# Patient Record
Sex: FEMALE | Race: WHITE | NOT HISPANIC OR LATINO | Employment: OTHER | URBAN - METROPOLITAN AREA
[De-identification: names, ages, dates, MRNs, and addresses within clinical notes are randomized per-mention and may not be internally consistent; named-entity substitution may affect disease eponyms.]

---

## 2017-01-23 ENCOUNTER — ALLSCRIPTS OFFICE VISIT (OUTPATIENT)
Dept: OTHER | Facility: OTHER | Age: 77
End: 2017-01-23

## 2017-02-15 ENCOUNTER — LAB REQUISITION (OUTPATIENT)
Dept: LAB | Facility: HOSPITAL | Age: 77
End: 2017-02-15
Payer: MEDICARE

## 2017-02-15 ENCOUNTER — ALLSCRIPTS OFFICE VISIT (OUTPATIENT)
Dept: OTHER | Facility: OTHER | Age: 77
End: 2017-02-15

## 2017-02-15 DIAGNOSIS — R39.9 UNSPECIFIED SYMPTOMS AND SIGNS INVOLVING THE GENITOURINARY SYSTEM: ICD-10-CM

## 2017-02-15 LAB
BILIRUB UR QL STRIP: NORMAL
CLARITY UR: NORMAL
COLOR UR: YELLOW
GLUCOSE (HISTORICAL): NORMAL
HGB UR QL STRIP.AUTO: NORMAL
KETONES UR STRIP-MCNC: NORMAL MG/DL
LEUKOCYTE ESTERASE UR QL STRIP: NORMAL
NITRITE UR QL STRIP: NORMAL
PH UR STRIP.AUTO: 5 [PH]
PROT UR STRIP-MCNC: NORMAL MG/DL
SP GR UR STRIP.AUTO: 1.01
UROBILINOGEN UR QL STRIP.AUTO: 0.2

## 2017-02-15 PROCEDURE — 81003 URINALYSIS AUTO W/O SCOPE: CPT | Performed by: PHYSICIAN ASSISTANT

## 2017-02-16 LAB
BILIRUB UR QL STRIP: NEGATIVE
CLARITY UR: CLEAR
COLOR UR: YELLOW
GLUCOSE UR STRIP-MCNC: NEGATIVE MG/DL
HGB UR QL STRIP.AUTO: NEGATIVE
KETONES UR STRIP-MCNC: NEGATIVE MG/DL
LEUKOCYTE ESTERASE UR QL STRIP: NEGATIVE
NITRITE UR QL STRIP: NEGATIVE
PH UR STRIP.AUTO: 6 [PH] (ref 4.5–8)
PROT UR STRIP-MCNC: NEGATIVE MG/DL
SP GR UR STRIP.AUTO: 1.02 (ref 1–1.03)
UROBILINOGEN UR QL STRIP.AUTO: 0.2 E.U./DL

## 2017-02-20 ENCOUNTER — ALLSCRIPTS OFFICE VISIT (OUTPATIENT)
Dept: OTHER | Facility: OTHER | Age: 77
End: 2017-02-20

## 2017-02-21 ENCOUNTER — OFFICE VISIT (OUTPATIENT)
Dept: URGENT CARE | Facility: CLINIC | Age: 77
End: 2017-02-21
Payer: MEDICARE

## 2017-02-21 PROCEDURE — 99213 OFFICE O/P EST LOW 20 MIN: CPT

## 2017-02-21 PROCEDURE — G0463 HOSPITAL OUTPT CLINIC VISIT: HCPCS

## 2017-02-22 ENCOUNTER — APPOINTMENT (OUTPATIENT)
Dept: LAB | Facility: HOSPITAL | Age: 77
End: 2017-02-22
Payer: MEDICARE

## 2017-02-22 ENCOUNTER — TRANSCRIBE ORDERS (OUTPATIENT)
Dept: ADMINISTRATIVE | Facility: HOSPITAL | Age: 77
End: 2017-02-22

## 2017-02-22 DIAGNOSIS — R19.7 DIARRHEA, UNSPECIFIED TYPE: Primary | ICD-10-CM

## 2017-02-22 DIAGNOSIS — R19.7 DIARRHEA, UNSPECIFIED TYPE: ICD-10-CM

## 2017-02-22 PROCEDURE — 87899 AGENT NOS ASSAY W/OPTIC: CPT

## 2017-02-22 PROCEDURE — 87046 STOOL CULTR AEROBIC BACT EA: CPT

## 2017-02-22 PROCEDURE — 87015 SPECIMEN INFECT AGNT CONCNTJ: CPT

## 2017-02-22 PROCEDURE — 87045 FECES CULTURE AEROBIC BACT: CPT

## 2017-02-24 ENCOUNTER — ALLSCRIPTS OFFICE VISIT (OUTPATIENT)
Dept: OTHER | Facility: OTHER | Age: 77
End: 2017-02-24

## 2017-02-24 LAB
BACTERIA STL CULT: NORMAL
BACTERIA STL CULT: NORMAL

## 2017-02-28 ENCOUNTER — GENERIC CONVERSION - ENCOUNTER (OUTPATIENT)
Dept: OTHER | Facility: OTHER | Age: 77
End: 2017-02-28

## 2017-03-07 ENCOUNTER — TRANSCRIBE ORDERS (OUTPATIENT)
Dept: ADMINISTRATIVE | Facility: HOSPITAL | Age: 77
End: 2017-03-07

## 2017-03-07 ENCOUNTER — APPOINTMENT (OUTPATIENT)
Dept: LAB | Facility: HOSPITAL | Age: 77
End: 2017-03-07
Attending: INTERNAL MEDICINE
Payer: MEDICARE

## 2017-03-07 ENCOUNTER — GENERIC CONVERSION - ENCOUNTER (OUTPATIENT)
Dept: OTHER | Facility: OTHER | Age: 77
End: 2017-03-07

## 2017-03-07 DIAGNOSIS — R19.7 DIARRHEA, UNSPECIFIED TYPE: ICD-10-CM

## 2017-03-07 DIAGNOSIS — R10.13 EPIGASTRIC PAIN: Primary | ICD-10-CM

## 2017-03-07 DIAGNOSIS — R10.32 LLQ ABDOMINAL PAIN: ICD-10-CM

## 2017-03-07 DIAGNOSIS — R19.7 DIARRHEA, UNSPECIFIED TYPE: Primary | ICD-10-CM

## 2017-03-07 LAB
ALBUMIN SERPL BCP-MCNC: 3.6 G/DL (ref 3.5–5)
ALP SERPL-CCNC: 68 U/L (ref 46–116)
ALT SERPL W P-5'-P-CCNC: 28 U/L (ref 12–78)
ANION GAP SERPL CALCULATED.3IONS-SCNC: 9 MMOL/L (ref 4–13)
AST SERPL W P-5'-P-CCNC: 20 U/L (ref 5–45)
BASOPHILS # BLD AUTO: 0.02 THOUSANDS/ΜL (ref 0–0.1)
BASOPHILS NFR BLD AUTO: 0 % (ref 0–1)
BILIRUB SERPL-MCNC: 0.6 MG/DL (ref 0.2–1)
BUN SERPL-MCNC: 9 MG/DL (ref 5–25)
CALCIUM SERPL-MCNC: 8.8 MG/DL (ref 8.3–10.1)
CHLORIDE SERPL-SCNC: 105 MMOL/L (ref 100–108)
CO2 SERPL-SCNC: 28 MMOL/L (ref 21–32)
CREAT SERPL-MCNC: 0.8 MG/DL (ref 0.6–1.3)
EOSINOPHIL # BLD AUTO: 0.17 THOUSAND/ΜL (ref 0–0.61)
EOSINOPHIL NFR BLD AUTO: 2 % (ref 0–6)
ERYTHROCYTE [DISTWIDTH] IN BLOOD BY AUTOMATED COUNT: 13 % (ref 11.6–15.1)
GFR SERPL CREATININE-BSD FRML MDRD: >60 ML/MIN/1.73SQ M
GLUCOSE SERPL-MCNC: 251 MG/DL (ref 65–140)
HCT VFR BLD AUTO: 42.5 % (ref 34.8–46.1)
HGB BLD-MCNC: 14.1 G/DL (ref 11.5–15.4)
LYMPHOCYTES # BLD AUTO: 2.41 THOUSANDS/ΜL (ref 0.6–4.47)
LYMPHOCYTES NFR BLD AUTO: 34 % (ref 14–44)
MCH RBC QN AUTO: 29.4 PG (ref 26.8–34.3)
MCHC RBC AUTO-ENTMCNC: 33.2 G/DL (ref 31.4–37.4)
MCV RBC AUTO: 89 FL (ref 82–98)
MONOCYTES # BLD AUTO: 0.57 THOUSAND/ΜL (ref 0.17–1.22)
MONOCYTES NFR BLD AUTO: 8 % (ref 4–12)
NEUTROPHILS # BLD AUTO: 3.91 THOUSANDS/ΜL (ref 1.85–7.62)
NEUTS SEG NFR BLD AUTO: 56 % (ref 43–75)
PLATELET # BLD AUTO: 192 THOUSANDS/UL (ref 149–390)
PMV BLD AUTO: 10.3 FL (ref 8.9–12.7)
POTASSIUM SERPL-SCNC: 4.3 MMOL/L (ref 3.5–5.3)
PROT SERPL-MCNC: 7 G/DL (ref 6.4–8.2)
RBC # BLD AUTO: 4.8 MILLION/UL (ref 3.81–5.12)
SODIUM SERPL-SCNC: 142 MMOL/L (ref 136–145)
TSH SERPL DL<=0.05 MIU/L-ACNC: 1.39 UIU/ML (ref 0.36–3.74)
WBC # BLD AUTO: 7.08 THOUSAND/UL (ref 4.31–10.16)

## 2017-03-07 PROCEDURE — 83516 IMMUNOASSAY NONANTIBODY: CPT

## 2017-03-07 PROCEDURE — 80053 COMPREHEN METABOLIC PANEL: CPT

## 2017-03-07 PROCEDURE — 84443 ASSAY THYROID STIM HORMONE: CPT

## 2017-03-07 PROCEDURE — 82784 ASSAY IGA/IGD/IGG/IGM EACH: CPT

## 2017-03-07 PROCEDURE — 86255 FLUORESCENT ANTIBODY SCREEN: CPT

## 2017-03-07 PROCEDURE — 36415 COLL VENOUS BLD VENIPUNCTURE: CPT

## 2017-03-07 PROCEDURE — 85025 COMPLETE CBC W/AUTO DIFF WBC: CPT

## 2017-03-08 ENCOUNTER — APPOINTMENT (OUTPATIENT)
Dept: LAB | Facility: HOSPITAL | Age: 77
End: 2017-03-08
Attending: INTERNAL MEDICINE
Payer: MEDICARE

## 2017-03-08 ENCOUNTER — TRANSCRIBE ORDERS (OUTPATIENT)
Dept: ADMINISTRATIVE | Facility: HOSPITAL | Age: 77
End: 2017-03-08

## 2017-03-08 DIAGNOSIS — R10.32 LLQ ABDOMINAL PAIN: ICD-10-CM

## 2017-03-08 DIAGNOSIS — R19.7 DIARRHEA, UNSPECIFIED TYPE: Primary | ICD-10-CM

## 2017-03-08 DIAGNOSIS — R19.7 DIARRHEA, UNSPECIFIED TYPE: ICD-10-CM

## 2017-03-08 LAB
C DIFF TOX GENS STL QL NAA+PROBE: NORMAL
ENDOMYSIUM IGA SER QL: NEGATIVE
GLIADIN PEPTIDE IGA SER-ACNC: 2 UNITS (ref 0–19)
GLIADIN PEPTIDE IGG SER-ACNC: 2 UNITS (ref 0–19)
IGA SERPL-MCNC: 115 MG/DL (ref 64–422)
TTG IGA SER-ACNC: <2 U/ML (ref 0–3)
TTG IGG SER-ACNC: <2 U/ML (ref 0–5)

## 2017-03-08 PROCEDURE — 89055 LEUKOCYTE ASSESSMENT FECAL: CPT

## 2017-03-08 PROCEDURE — 87046 STOOL CULTR AEROBIC BACT EA: CPT

## 2017-03-08 PROCEDURE — 82705 FATS/LIPIDS FECES QUAL: CPT

## 2017-03-08 PROCEDURE — 87329 GIARDIA AG IA: CPT

## 2017-03-08 PROCEDURE — 87899 AGENT NOS ASSAY W/OPTIC: CPT

## 2017-03-08 PROCEDURE — 87015 SPECIMEN INFECT AGNT CONCNTJ: CPT

## 2017-03-08 PROCEDURE — 87045 FECES CULTURE AEROBIC BACT: CPT

## 2017-03-08 PROCEDURE — 87493 C DIFF AMPLIFIED PROBE: CPT

## 2017-03-10 LAB
BACTERIA STL CULT: NORMAL
BACTERIA STL CULT: NORMAL
FAT STL QL: NORMAL
G LAMBLIA AG STL QL IA: NEGATIVE
NEUTRAL FAT STL QL: NORMAL

## 2017-03-11 LAB — WBC SPEC QL GRAM STN: NORMAL

## 2017-03-13 ENCOUNTER — HOSPITAL ENCOUNTER (OUTPATIENT)
Dept: CT IMAGING | Facility: HOSPITAL | Age: 77
Discharge: HOME | End: 2017-03-13
Attending: INTERNAL MEDICINE
Payer: MEDICARE

## 2017-03-13 DIAGNOSIS — R10.13 EPIGASTRIC PAIN: ICD-10-CM

## 2017-03-13 PROCEDURE — 74177 CT ABD & PELVIS W/CONTRAST: CPT

## 2017-03-13 RX ADMIN — IOHEXOL 100 ML: 350 INJECTION, SOLUTION INTRAVENOUS at 14:33

## 2017-03-23 ENCOUNTER — APPOINTMENT (OUTPATIENT)
Dept: LAB | Facility: CLINIC | Age: 77
End: 2017-03-23
Payer: MEDICARE

## 2017-03-23 ENCOUNTER — TRANSCRIBE ORDERS (OUTPATIENT)
Dept: LAB | Facility: CLINIC | Age: 77
End: 2017-03-23

## 2017-03-23 ENCOUNTER — ALLSCRIPTS OFFICE VISIT (OUTPATIENT)
Dept: OTHER | Facility: OTHER | Age: 77
End: 2017-03-23

## 2017-03-23 DIAGNOSIS — E03.9 UNSPECIFIED HYPOTHYROIDISM: ICD-10-CM

## 2017-03-23 DIAGNOSIS — E06.3 CHRONIC LYMPHOCYTIC THYROIDITIS: Primary | ICD-10-CM

## 2017-03-23 DIAGNOSIS — E78.2 MIXED HYPERLIPIDEMIA: ICD-10-CM

## 2017-03-23 DIAGNOSIS — E13.8 DIABETES MELLITUS OF OTHER TYPE WITH COMPLICATION: ICD-10-CM

## 2017-03-23 LAB
ALBUMIN SERPL BCP-MCNC: 3.6 G/DL (ref 3.5–5)
ALP SERPL-CCNC: 67 U/L (ref 46–116)
ALT SERPL W P-5'-P-CCNC: 25 U/L (ref 12–78)
ANION GAP SERPL CALCULATED.3IONS-SCNC: 5 MMOL/L (ref 4–13)
AST SERPL W P-5'-P-CCNC: 16 U/L (ref 5–45)
BILIRUB SERPL-MCNC: 0.65 MG/DL (ref 0.2–1)
BUN SERPL-MCNC: 7 MG/DL (ref 5–25)
CALCIUM SERPL-MCNC: 9.2 MG/DL (ref 8.3–10.1)
CHLORIDE SERPL-SCNC: 106 MMOL/L (ref 100–108)
CHOLEST SERPL-MCNC: 131 MG/DL (ref 50–200)
CO2 SERPL-SCNC: 31 MMOL/L (ref 21–32)
CREAT SERPL-MCNC: 0.6 MG/DL (ref 0.6–1.3)
CREAT UR-MCNC: 27.6 MG/DL
EST. AVERAGE GLUCOSE BLD GHB EST-MCNC: 157 MG/DL
GFR SERPL CREATININE-BSD FRML MDRD: >60 ML/MIN/1.73SQ M
GLUCOSE P FAST SERPL-MCNC: 142 MG/DL (ref 65–99)
HBA1C MFR BLD: 7.1 % (ref 4.2–6.3)
HDLC SERPL-MCNC: 42 MG/DL (ref 40–60)
LDLC SERPL CALC-MCNC: 61 MG/DL (ref 0–100)
MICROALBUMIN UR-MCNC: 5.9 MG/L (ref 0–20)
MICROALBUMIN/CREAT 24H UR: 21 MG/G CREATININE (ref 0–30)
POTASSIUM SERPL-SCNC: 4.4 MMOL/L (ref 3.5–5.3)
PROT SERPL-MCNC: 7 G/DL (ref 6.4–8.2)
SODIUM SERPL-SCNC: 142 MMOL/L (ref 136–145)
TRIGL SERPL-MCNC: 139 MG/DL

## 2017-03-23 PROCEDURE — 80053 COMPREHEN METABOLIC PANEL: CPT

## 2017-03-23 PROCEDURE — 82570 ASSAY OF URINE CREATININE: CPT

## 2017-03-23 PROCEDURE — 83036 HEMOGLOBIN GLYCOSYLATED A1C: CPT

## 2017-03-23 PROCEDURE — 36415 COLL VENOUS BLD VENIPUNCTURE: CPT

## 2017-03-23 PROCEDURE — 80061 LIPID PANEL: CPT

## 2017-03-23 PROCEDURE — 82043 UR ALBUMIN QUANTITATIVE: CPT

## 2017-04-10 DIAGNOSIS — I80.8 PHLEBITIS AND THROMBOPHLEBITIS OF OTHER SITES: ICD-10-CM

## 2017-04-10 DIAGNOSIS — J98.4 OTHER DISORDERS OF LUNG: ICD-10-CM

## 2017-04-10 DIAGNOSIS — D17.20 BENIGN LIPOMATOUS NEOPLASM OF SKIN AND SUBCUTANEOUS TISSUE OF EXTREMITY: ICD-10-CM

## 2017-04-10 DIAGNOSIS — D21.12 BENIGN NEOPLASM OF CONNECTIVE AND OTHER SOFT TISSUE OF LEFT UPPER LIMB, INCLUDING SHOULDER: ICD-10-CM

## 2017-04-11 ENCOUNTER — GENERIC CONVERSION - ENCOUNTER (OUTPATIENT)
Dept: OTHER | Facility: OTHER | Age: 77
End: 2017-04-11

## 2017-04-19 ENCOUNTER — TRANSCRIBE ORDERS (OUTPATIENT)
Dept: LAB | Facility: CLINIC | Age: 77
End: 2017-04-19

## 2017-04-19 ENCOUNTER — APPOINTMENT (OUTPATIENT)
Dept: LAB | Facility: CLINIC | Age: 77
End: 2017-04-19
Payer: MEDICARE

## 2017-04-19 DIAGNOSIS — R15.9 ENCOPRESIS(307.7): Primary | ICD-10-CM

## 2017-04-19 DIAGNOSIS — Z15.89 BIALLELIC MUTATION OF C19ORF12 GENE: ICD-10-CM

## 2017-04-19 LAB — CEA SERPL-MCNC: 2.2 NG/ML (ref 0–3)

## 2017-04-19 PROCEDURE — 36415 COLL VENOUS BLD VENIPUNCTURE: CPT

## 2017-04-19 PROCEDURE — 82378 CARCINOEMBRYONIC ANTIGEN: CPT

## 2017-04-20 ENCOUNTER — HOSPITAL ENCOUNTER (OUTPATIENT)
Dept: NON INVASIVE DIAGNOSTICS | Facility: HOSPITAL | Age: 77
Discharge: HOME | End: 2017-04-20
Attending: INTERNAL MEDICINE
Payer: MEDICARE

## 2017-04-20 DIAGNOSIS — I80.8 PHLEBITIS AND THROMBOPHLEBITIS OF OTHER SITES: ICD-10-CM

## 2017-04-20 PROCEDURE — 93971 EXTREMITY STUDY: CPT

## 2017-05-04 ENCOUNTER — ALLSCRIPTS OFFICE VISIT (OUTPATIENT)
Dept: OTHER | Facility: OTHER | Age: 77
End: 2017-05-04

## 2017-05-04 ENCOUNTER — GENERIC CONVERSION - ENCOUNTER (OUTPATIENT)
Dept: OTHER | Facility: OTHER | Age: 77
End: 2017-05-04

## 2017-05-04 ENCOUNTER — TRANSCRIBE ORDERS (OUTPATIENT)
Dept: ADMINISTRATIVE | Facility: HOSPITAL | Age: 77
End: 2017-05-04

## 2017-05-04 DIAGNOSIS — Z12.31 ENCOUNTER FOR MAMMOGRAM TO ESTABLISH BASELINE MAMMOGRAM: Primary | ICD-10-CM

## 2017-05-08 ENCOUNTER — HOSPITAL ENCOUNTER (OUTPATIENT)
Dept: RADIOLOGY | Facility: HOSPITAL | Age: 77
Discharge: HOME | End: 2017-05-08
Payer: MEDICARE

## 2017-05-08 ENCOUNTER — ALLSCRIPTS OFFICE VISIT (OUTPATIENT)
Dept: OTHER | Facility: OTHER | Age: 77
End: 2017-05-08

## 2017-05-08 DIAGNOSIS — J98.4 OTHER DISORDERS OF LUNG: ICD-10-CM

## 2017-05-08 PROCEDURE — 71020 HB CHEST X-RAY 2VW FRONTAL&LATL: CPT

## 2017-05-10 ENCOUNTER — HOSPITAL ENCOUNTER (OUTPATIENT)
Dept: ULTRASOUND IMAGING | Facility: HOSPITAL | Age: 77
Discharge: HOME | End: 2017-05-10
Attending: INTERNAL MEDICINE
Payer: MEDICARE

## 2017-05-10 DIAGNOSIS — D21.12 BENIGN NEOPLASM OF CONNECTIVE AND OTHER SOFT TISSUE OF LEFT UPPER LIMB, INCLUDING SHOULDER: ICD-10-CM

## 2017-05-10 DIAGNOSIS — D17.20 BENIGN LIPOMATOUS NEOPLASM OF SKIN AND SUBCUTANEOUS TISSUE OF EXTREMITY: ICD-10-CM

## 2017-05-10 PROCEDURE — 76882 US LMTD JT/FCL EVL NVASC XTR: CPT

## 2017-05-17 ENCOUNTER — TRANSCRIBE ORDERS (OUTPATIENT)
Dept: ADMINISTRATIVE | Facility: HOSPITAL | Age: 77
End: 2017-05-17

## 2017-05-17 ENCOUNTER — ALLSCRIPTS OFFICE VISIT (OUTPATIENT)
Dept: OTHER | Facility: OTHER | Age: 77
End: 2017-05-17

## 2017-05-17 DIAGNOSIS — J98.4 OTHER PULMONARY INSUFFICIENCY, NOT ELSEWHERE CLASSIFIED: Primary | ICD-10-CM

## 2017-05-17 DIAGNOSIS — J98.4 OTHER DISORDERS OF LUNG: ICD-10-CM

## 2017-05-30 ENCOUNTER — ALLSCRIPTS OFFICE VISIT (OUTPATIENT)
Dept: OTHER | Facility: OTHER | Age: 77
End: 2017-05-30

## 2017-06-01 ENCOUNTER — GENERIC CONVERSION - ENCOUNTER (OUTPATIENT)
Dept: OTHER | Facility: OTHER | Age: 77
End: 2017-06-01

## 2017-06-06 ENCOUNTER — HOSPITAL ENCOUNTER (OUTPATIENT)
Dept: PULMONOLOGY | Facility: HOSPITAL | Age: 77
Discharge: HOME | End: 2017-06-06
Attending: INTERNAL MEDICINE
Payer: MEDICARE

## 2017-06-06 ENCOUNTER — HOSPITAL ENCOUNTER (OUTPATIENT)
Dept: CT IMAGING | Facility: HOSPITAL | Age: 77
Discharge: HOME | End: 2017-06-06
Attending: INTERNAL MEDICINE
Payer: MEDICARE

## 2017-06-06 DIAGNOSIS — J98.4 OTHER DISORDERS OF LUNG: ICD-10-CM

## 2017-06-06 DIAGNOSIS — J98.4 OTHER PULMONARY INSUFFICIENCY, NOT ELSEWHERE CLASSIFIED: ICD-10-CM

## 2017-06-06 PROCEDURE — 94726 PLETHYSMOGRAPHY LUNG VOLUMES: CPT

## 2017-06-06 PROCEDURE — 94760 N-INVAS EAR/PLS OXIMETRY 1: CPT

## 2017-06-06 PROCEDURE — 94620 HB PULMONARY STRESS TEST/SIMPLE: CPT

## 2017-06-06 PROCEDURE — 94729 DIFFUSING CAPACITY: CPT

## 2017-06-06 PROCEDURE — 94060 EVALUATION OF WHEEZING: CPT

## 2017-06-06 PROCEDURE — 71250 CT THORAX DX C-: CPT

## 2017-06-06 RX ORDER — ALBUTEROL SULFATE 2.5 MG/3ML
2.5 SOLUTION RESPIRATORY (INHALATION) EVERY 6 HOURS PRN
Status: DISCONTINUED | OUTPATIENT
Start: 2017-06-06 | End: 2017-06-10 | Stop reason: HOSPADM

## 2017-06-08 ENCOUNTER — GENERIC CONVERSION - ENCOUNTER (OUTPATIENT)
Dept: OTHER | Facility: OTHER | Age: 77
End: 2017-06-08

## 2017-07-03 DIAGNOSIS — Z12.31 ENCOUNTER FOR SCREENING MAMMOGRAM FOR MALIGNANT NEOPLASM OF BREAST: ICD-10-CM

## 2017-07-05 ENCOUNTER — HOSPITAL ENCOUNTER (OUTPATIENT)
Dept: BONE DENSITY | Facility: IMAGING CENTER | Age: 77
Discharge: HOME | End: 2017-07-05
Payer: MEDICARE

## 2017-07-05 DIAGNOSIS — Z12.31 ENCOUNTER FOR SCREENING MAMMOGRAM FOR MALIGNANT NEOPLASM OF BREAST: ICD-10-CM

## 2017-07-05 PROCEDURE — G0202 SCR MAMMO BI INCL CAD: HCPCS

## 2017-08-08 ENCOUNTER — ALLSCRIPTS OFFICE VISIT (OUTPATIENT)
Dept: OTHER | Facility: OTHER | Age: 77
End: 2017-08-08

## 2017-08-22 ENCOUNTER — TRANSCRIBE ORDERS (OUTPATIENT)
Dept: LAB | Facility: CLINIC | Age: 77
End: 2017-08-22

## 2017-08-22 ENCOUNTER — APPOINTMENT (OUTPATIENT)
Dept: LAB | Facility: CLINIC | Age: 77
End: 2017-08-22
Payer: MEDICARE

## 2017-08-22 DIAGNOSIS — E78.2 MIXED HYPERLIPIDEMIA: ICD-10-CM

## 2017-08-22 DIAGNOSIS — I10 ESSENTIAL HYPERTENSION, MALIGNANT: Primary | ICD-10-CM

## 2017-08-22 DIAGNOSIS — E03.9 UNSPECIFIED HYPOTHYROIDISM: ICD-10-CM

## 2017-08-22 DIAGNOSIS — E13.8 DIABETES MELLITUS OF OTHER TYPE WITH COMPLICATION: ICD-10-CM

## 2017-08-22 DIAGNOSIS — E06.3 CHRONIC LYMPHOCYTIC THYROIDITIS: ICD-10-CM

## 2017-08-22 LAB
ALBUMIN SERPL BCP-MCNC: 3.5 G/DL (ref 3.5–5)
ALP SERPL-CCNC: 66 U/L (ref 46–116)
ALT SERPL W P-5'-P-CCNC: 24 U/L (ref 12–78)
ANION GAP SERPL CALCULATED.3IONS-SCNC: 7 MMOL/L (ref 4–13)
AST SERPL W P-5'-P-CCNC: 19 U/L (ref 5–45)
BILIRUB SERPL-MCNC: 0.76 MG/DL (ref 0.2–1)
BUN SERPL-MCNC: 9 MG/DL (ref 5–25)
CALCIUM SERPL-MCNC: 9.4 MG/DL (ref 8.3–10.1)
CHLORIDE SERPL-SCNC: 103 MMOL/L (ref 100–108)
CHOLEST SERPL-MCNC: 179 MG/DL (ref 50–200)
CO2 SERPL-SCNC: 29 MMOL/L (ref 21–32)
CREAT SERPL-MCNC: 0.62 MG/DL (ref 0.6–1.3)
EST. AVERAGE GLUCOSE BLD GHB EST-MCNC: 166 MG/DL
GFR SERPL CREATININE-BSD FRML MDRD: 88 ML/MIN/1.73SQ M
GLUCOSE P FAST SERPL-MCNC: 146 MG/DL (ref 65–99)
HBA1C MFR BLD: 7.4 % (ref 4.2–6.3)
HDLC SERPL-MCNC: 42 MG/DL (ref 40–60)
LDLC SERPL CALC-MCNC: 94 MG/DL (ref 0–100)
POTASSIUM SERPL-SCNC: 4.5 MMOL/L (ref 3.5–5.3)
PROT SERPL-MCNC: 7 G/DL (ref 6.4–8.2)
SODIUM SERPL-SCNC: 139 MMOL/L (ref 136–145)
T3FREE SERPL-MCNC: 2.2 PG/ML (ref 2.3–4.2)
T4 FREE SERPL-MCNC: 1 NG/DL (ref 0.76–1.46)
TRIGL SERPL-MCNC: 215 MG/DL
TSH SERPL DL<=0.05 MIU/L-ACNC: 0.84 UIU/ML (ref 0.36–3.74)

## 2017-08-22 PROCEDURE — 84443 ASSAY THYROID STIM HORMONE: CPT

## 2017-08-22 PROCEDURE — 84481 FREE ASSAY (FT-3): CPT

## 2017-08-22 PROCEDURE — 36415 COLL VENOUS BLD VENIPUNCTURE: CPT

## 2017-08-22 PROCEDURE — 80053 COMPREHEN METABOLIC PANEL: CPT

## 2017-08-22 PROCEDURE — 84439 ASSAY OF FREE THYROXINE: CPT

## 2017-08-22 PROCEDURE — 83036 HEMOGLOBIN GLYCOSYLATED A1C: CPT

## 2017-08-22 PROCEDURE — 80061 LIPID PANEL: CPT

## 2017-08-23 ENCOUNTER — APPOINTMENT (OUTPATIENT)
Dept: LAB | Facility: CLINIC | Age: 77
End: 2017-08-23
Payer: MEDICARE

## 2017-08-23 ENCOUNTER — TRANSCRIBE ORDERS (OUTPATIENT)
Dept: LAB | Facility: CLINIC | Age: 77
End: 2017-08-23

## 2017-08-23 DIAGNOSIS — E78.2 MIXED HYPERLIPIDEMIA: ICD-10-CM

## 2017-08-23 DIAGNOSIS — E06.3 CHRONIC LYMPHOCYTIC THYROIDITIS: ICD-10-CM

## 2017-08-23 DIAGNOSIS — I10 ESSENTIAL HYPERTENSION, MALIGNANT: ICD-10-CM

## 2017-08-23 DIAGNOSIS — E13.8 DIABETES MELLITUS OF OTHER TYPE WITH COMPLICATION: Primary | ICD-10-CM

## 2017-08-23 DIAGNOSIS — E03.9 UNSPECIFIED HYPOTHYROIDISM: ICD-10-CM

## 2017-08-23 PROCEDURE — 84479 ASSAY OF THYROID (T3 OR T4): CPT

## 2017-08-23 PROCEDURE — 36415 COLL VENOUS BLD VENIPUNCTURE: CPT

## 2017-08-24 LAB — T3RU NFR SERPL: 25 % (ref 24–39)

## 2017-08-29 ENCOUNTER — GENERIC CONVERSION - ENCOUNTER (OUTPATIENT)
Dept: OTHER | Facility: OTHER | Age: 77
End: 2017-08-29

## 2017-08-30 ENCOUNTER — ALLSCRIPTS OFFICE VISIT (OUTPATIENT)
Dept: OTHER | Facility: OTHER | Age: 77
End: 2017-08-30

## 2017-09-17 ENCOUNTER — OFFICE VISIT (OUTPATIENT)
Dept: URGENT CARE | Facility: CLINIC | Age: 77
End: 2017-09-17
Payer: MEDICARE

## 2017-09-17 PROCEDURE — G0463 HOSPITAL OUTPT CLINIC VISIT: HCPCS

## 2017-09-17 PROCEDURE — 99214 OFFICE O/P EST MOD 30 MIN: CPT

## 2017-10-02 ENCOUNTER — GENERIC CONVERSION - ENCOUNTER (OUTPATIENT)
Dept: OTHER | Facility: OTHER | Age: 77
End: 2017-10-02

## 2017-10-11 ENCOUNTER — GENERIC CONVERSION - ENCOUNTER (OUTPATIENT)
Dept: OTHER | Facility: OTHER | Age: 77
End: 2017-10-11

## 2017-10-26 ENCOUNTER — APPOINTMENT (OUTPATIENT)
Dept: LAB | Facility: CLINIC | Age: 77
End: 2017-10-26
Payer: MEDICARE

## 2017-10-26 ENCOUNTER — TRANSCRIBE ORDERS (OUTPATIENT)
Dept: LAB | Facility: CLINIC | Age: 77
End: 2017-10-26

## 2017-10-26 DIAGNOSIS — Z79.1 ENCOUNTER FOR LONG-TERM (CURRENT) USE OF NON-STEROIDAL ANTI-INFLAMMATORIES: ICD-10-CM

## 2017-10-26 DIAGNOSIS — M19.041 ARTHRITIS OF RIGHT HAND: Primary | ICD-10-CM

## 2017-10-26 LAB
BASOPHILS # BLD AUTO: 0.02 THOUSANDS/ΜL (ref 0–0.1)
BASOPHILS NFR BLD AUTO: 0 % (ref 0–1)
EOSINOPHIL # BLD AUTO: 0.24 THOUSAND/ΜL (ref 0–0.61)
EOSINOPHIL NFR BLD AUTO: 3 % (ref 0–6)
ERYTHROCYTE [DISTWIDTH] IN BLOOD BY AUTOMATED COUNT: 12.6 % (ref 11.6–15.1)
HCT VFR BLD AUTO: 44.3 % (ref 34.8–46.1)
HGB BLD-MCNC: 15.1 G/DL (ref 11.5–15.4)
LYMPHOCYTES # BLD AUTO: 2.61 THOUSANDS/ΜL (ref 0.6–4.47)
LYMPHOCYTES NFR BLD AUTO: 34 % (ref 14–44)
MCH RBC QN AUTO: 30 PG (ref 26.8–34.3)
MCHC RBC AUTO-ENTMCNC: 34.1 G/DL (ref 31.4–37.4)
MCV RBC AUTO: 88 FL (ref 82–98)
MONOCYTES # BLD AUTO: 0.64 THOUSAND/ΜL (ref 0.17–1.22)
MONOCYTES NFR BLD AUTO: 8 % (ref 4–12)
NEUTROPHILS # BLD AUTO: 4.06 THOUSANDS/ΜL (ref 1.85–7.62)
NEUTS SEG NFR BLD AUTO: 55 % (ref 43–75)
NRBC BLD AUTO-RTO: 0 /100 WBCS
PLATELET # BLD AUTO: 204 THOUSANDS/UL (ref 149–390)
PMV BLD AUTO: 10.9 FL (ref 8.9–12.7)
RBC # BLD AUTO: 5.04 MILLION/UL (ref 3.81–5.12)
WBC # BLD AUTO: 7.58 THOUSAND/UL (ref 4.31–10.16)

## 2017-10-26 PROCEDURE — 85025 COMPLETE CBC W/AUTO DIFF WBC: CPT

## 2017-10-26 PROCEDURE — 36415 COLL VENOUS BLD VENIPUNCTURE: CPT

## 2017-11-22 ENCOUNTER — ALLSCRIPTS OFFICE VISIT (OUTPATIENT)
Dept: OTHER | Facility: OTHER | Age: 77
End: 2017-11-22

## 2017-11-23 NOTE — PROGRESS NOTES
Assessment  Assessed    1  Chronic hand pain, right (729 5,338 29) (M79 641,G89 29)   2  Hypertension (401 9) (I10)   3  Pacemaker Permanent Placement   4  Sick sinus syndrome (427 81) (I49 5)   5  Paroxysmal atrial fibrillation (427 31) (I48 0)    Plan  Chronic hand pain, right    · 1 - [place] [facility], Maite Espinoza  (Orthopedic Surgery) Co-Management  *  Status: Active Requested for: 60JEB0624   Ordered; For: Chronic hand pain, right; Ordered By: Adrian Hitchcock Performed:  Due: 17FQF3408  Care Summary provided  : Yes  Hypertension, Paroxysmal atrial fibrillation    · EKG/ECG- POC ; every 1 year; Last 95DSV7566; Next 92NOM8695; Status:Active   For: 'Hypertension, Paroxysmal atrial fibrillation'Ordered By: Reji Hassan'  Paroxysmal atrial fibrillation    · EKG/ECG- POC; Status:Complete;   Done: 10EAU9636   Perform: In Office; [phone]; Last Updated By:Tiffany Davila; 11/22/2017 3:12:04 PM;Ordered;atrial fibrillation; Ordered By:Yao Melgar;   · Follow-up visit in 1 year Evaluation and Treatment  Follow-up  Status: Hold For -Scheduling  Requested for: 60IZL8871   Ordered;Paroxysmal atrial fibrillation; Ordered By: Adrian Hitchcock Performed:  Due: 53AMA5130   US EXTREMITY SOFT TISSUE; Status:Resulted - Requires Verification;   Done: 56QDI7920 12:00AM NTP:72UVO3441; Ordered; For:Benign neoplasm of soft tissues of left upper extremity, Lipoma of upper extremity, unspecified laterality; Ordered By:Yao Melgar;    Discussion/Summary  Cardiology Discussion Summary Free Text Note Form [facility]:   1  Left upper arm swelling  It is slightly larger than the right arm  There is no evidence of vascular insufficiency or occlusion, possible lipoma present  Ultrasound negative in the past  Paroxysmal atrial for ablation, stable  Anticoagulation on appropriate therapy  Sick sinus syndrome pacemaker in situ working appropriately    Right thumb pain referral to hand and wrist surgeon she is wearing splint today  Shortness of breath and wheezing stable possibly related to prior tobacco use ECHO 2015 showed mild to moderate mitral regurgitation I do not hear any significant murmur today consider repeat echo shortness of breath gets worse  one year  Chief Complaint  Chief Complaint Free Text Note Form: Pt here for F/U  States she has had swelling of the upper left arm for about 9mths  Denies any pain or cardiac issues      History of Present Illness  Cardiology HPI Free Text Note Form [facility]: She has a dual-chamber pacemaker for tachycardia-bradycardia syndrome  Sick sinus syndrome and paroxysmal atrial fibrillation  She states that her left arm has swelled up  She is on anticoagulant therapy is not bothering her  She has just been concerned there is a history of lipomas  A duplex Doppler showed no evidence of deep vein thrombosis  point review of systems positive for lack of energy fatigue weight changes shortness of breath wheezing that is unchanged from prior years frequent urination at night loss of bladder control numbness tingling swallowing problems arthritis back pain she is concerned over continued swelling of her left arm although this is been evaluated and there was no pathology present  It does not hurt her or bother her she also has right thumb pain and swollen finger she would like have a referral to a rheumatologist if possible because the one she is currently seeing his pharmacy from where she lives  has shortness of breath with exertion, which she states is stable  All other 12 point review of systems negative for complaints today      Review of Systems  ROS Reviewed:   ROS reviewed  Active Problems  Problems    1  Abnormal echocardiogram (793 2) (R93 1)   2  Adenocarcinoma of colon (153 9) (C18 9)   3  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)   4  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)   5  Cardiomegaly (429 3) (I51 7)   6  Colonoscopy (Fiberoptic) Screening   7   Dermatitis (692 9) (L30 9)   8  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)   9  Encounter for screening mammogram for malignant neoplasm of breast (V76 12) (Z12 31)   10  Encounter for therapeutic drug monitoring (V58 83) (Z51 81)   11  History of Esophageal stricture (530 3) (K22 2)   12  Feeling anxious (300 00) (F41 9)   13  Hematuria, microscopic (599 72) (R31 29)   14  Hyperlipidemia (272 4) (E78 5)   15  Hypertension (401 9) (I10)   16  Hypothyroidism (244 9) (E03 9)   17  Insomnia (780 52) (G47 00)   18  Irritable bowel syndrome with diarrhea (564 1) (K58 0)   19  Lipoma of upper extremity, unspecified laterality (214 8) (D17 20)   20  Long term use of drug (V58 69) (Z79 899)   21  Mitral regurgitation (424 0) (I34 0)   22  Need for prophylactic vaccination and inoculation against influenza (V04 81) (Z23)   23  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)   24  Osteoporosis (733 00) (M81 0)   25  Pacemaker Permanent Placement   26  Paraesophageal hernia (553 3) (K44 9)   27  Paroxysmal atrial fibrillation (427 31) (I48 0)   28  Primary osteoarthritis involving multiple joints (715 09) (M15 0)   29  Restrictive lung disease (518 89) (J98 4)   30  Screening for genitourinary condition (V81 6) (Z13 89)   31  Screening for neurological condition (V80 09) (Z13 89)   32  Sick sinus syndrome (427 81) (I49 5)   33  Symptoms involving urinary system (788 99) (R39 9)   34  Thrombophlebitis of arm, left (451 84) (I80 8)   35  Type 2 diabetes mellitus (250 00) (E11 9)   36  Visit For: Screening Exam Neurological Disorders (V80 09)   37  Vitamin D deficiency (268 9) (E55 9)    Past Medical History  Problems    1  History of Abdominal pain, epigastric (789 06) (R10 13)   2  History of Abnormal ECG (794 31) (R94 31)   3  Acute bronchitis (466 0) (J20 9)   4  History of Acute UTI (599 0) (N39 0)   5  History of Benign Neoplasm Of The Sigmoid Colon (211 3)   6  History of Bronchitis, asthmatic (289 90) (J29 938)   7   History of Contact dermatitis due to plant (692 6) (L25 5)   8  History of Contact dermatitis due to poison ivy (692 6) (L23 7)   9  History of Cough (786 2) (R05)   10  History of Dyspepsia (536 8) (K30)   11  History of Encounter for routine gynecological examination (V72 31) (Z01 419)   12  History of Esophageal stricture (530 3) (K22 2)   13  History of Fibrotic Scar (709 2)   14  History of abdominal pain (V13 89) (Z87 898)   15  History of acute pharyngitis (V12 69) (Z87 09)   16  History of acute pharyngitis (V12 69) (Z87 09)   17  History of iron deficiency anemia (V12 3) (Z86 2)   18  History of urinary frequency (V13 09) (Z87 898)   19  History of Jaw pain (784 92) (R68 84)   20  Need for prophylactic vaccination and inoculation against influenza (V04 81) (Z23)   21  History of Nervousness (799 21) (R45 0)   22  History of Plant dermatitis (692 6) (L25 5)   23  History of Screening for glaucoma (V80 1) (Z13 5)   24  History of Urinary Tract Infection (V13 02)   25  History of Wax in ear (380 4) (H61 20)   26  History of Yeast vaginitis (112 1) (B37 3)  Active Problems And Past Medical History Reviewed: The active problems and past medical history were reviewed and updated today  Surgical History  Problems    1  History of  Section   2  History of Colon Surgery   3  History of Total Abdominal Hysterectomy  Surgical History Reviewed: The surgical history was reviewed and updated today  Family History  Father    1  Family history of Father  At Age 62   2  Family history of Gastric Cancer (V16 0)   3  Family history of Mother  At Age 80  Family History Reviewed: The family history was reviewed and updated today         Social History  Problems    · Always uses seat belt   · Copy of advanced directive obtained (V49 89) (Z78 9)   · Drinks coffee   · Former smoker (V15 82) (Q05 552)   · Has smoke detectors   · No alcohol use   · No drug use   · Uses Safety Equipment - Seatbelts  Social History Reviewed: The social history was reviewed and updated today  The social history was reviewed and is unchanged  Current Meds   1  Atenolol 50 MG Oral Tablet; TAKE 1 TABLET AT BEDTIME; Therapy: 68SJC5175 to (Last Rx:11Jan2017)  Requested for: 33TNO1434 Ordered   2  BD Pen Needle Mini U/F 31G X 5 MM Miscellaneous; used twice daily; Therapy: 53IPP7732 to (Evaluate:48Ioq9851); Last Rx:24Dir3269 Ordered   3  [place]etta 10 MCG Pen 10 MCG/0 04ML SOLN; INJECT 10 UNIT Twice daily; Therapy: (Recorded:04Jun2015) to Recorded   4  Centrum Silver TABS; TAKE 1 TABLET DAILY; Therapy: (Recorded:03Apr2014) to Recorded   5  Diclofenac Sodium 1 % Transdermal Gel; APPLY TO TENDER AREA AND RUB IN WELL UP TO FOUR TIMES A DAY; Therapy: 09LOU6373 to (Last Rx:30Nov2016)  Requested for: 12NOE4645 Ordered   6  DULoxetine HCl - 20 MG Oral Capsule Delayed Release Particles; TAKE ONE CAPSULE BY MOUTH EVERY DAY; Therapy: 21HEZ0090 to (Last DB:94OVB1311)  Requested for: 47KLR0453 Ordered   7  Eliquis 5 MG Oral Tablet; take 1 tablet by mouth twice a day; Therapy: 47Xjz8397 to (Evaluate:94Dgc0175)  Requested for: 64Vqo7257; Last Rx:54Pmc9732; Status: ACTIVE - Transmit to Pharmacy - Awaiting Verification Ordered   8  Ferrous Sulfate TABS; TAKE 1 TABLET DAILY; Therapy: ([facility]) to Recorded   9  Fish Oil CAPS; take 1 capsule daily; Therapy: (Recorded:03Apr2014) to Recorded   10  FreeStyle Lite Test In Vitro Strip; USE 4 TIMES DAILY; Therapy: 92ZJH6760 to (Last Rx:13Apr2015)  Requested for: 41Pkp6008 Ordered   11  MetFORMIN HCl  MG Oral Tablet Extended Release 24 Hour; Take 1 tablet twice  daily; Therapy: 18Wxn2367 to  Requested for: 22Nov2017 Recorded   12  Microlet Lancets Miscellaneous; USE 4 TIMES DAILY; Therapy: 66SDK5651 to (Last Rx:12Mar2015)  Requested for: 88AWJ8041 Ordered   13  Precision QID Monitor Device; USE AS DIRECTED; Therapy: 47JUS1997 to (Last Rx:18Mar2015)  Requested for: 51RPF0190 Ordered   14  Simvastatin 20 MG Oral Tablet; take 1 tablet daily at bedtime; Therapy: 30MYR9379 to (Last JU:13BUX4873)  Requested for: 42RUU3090 Ordered   15  Synthroid 125 MCG Oral Tablet; TAKE 1 TABLET DAILY; Therapy: (0664 313 06 [phone]) to  Requested for: 22Nov2017 Recorded   16  Ventolin  (90 Base) MCG/ACT Inhalation Aerosol Solution; INHALE 2 PUFFS  EVERY 4 HOURS AS NEEDED FOR COUGH AND WHEEZE;  Therapy: 04MBQ1896 to (Evaluate:10Jun2018)  Requested for: 78NQX6655; Last  Rx:15Jun2017 Ordered  Medication List Reviewed: The medication list was reviewed and updated today  Allergies  Medication    1  No Known Drug Allergies  Non-Medication    2  No Known Environmental Allergies   3  No Known Food Allergies    Vitals  Vital Signs    Recorded: 22Nov2017 03:10PM   Heart Rate 75   Systolic 403, RUE, Sitting   Diastolic 78, RUE, Sitting   Height 5 ft 4 in   Weight 179 lb 9 oz   BMI Calculated 30 82   BSA Calculated 1 87       Physical Exam   Constitutional - General appearance: No acute distress, well appearing and well nourished  -- Her left arm is s swollenlThere is good palpable pulse and there is the swelling is contained to the upper part of the arm  It is soft and nontender  Eyes - Conjunctiva and Sclera examination: Conjunctiva pink, sclera anicteric  Neck - Normal, no JVD   Pulmonary - Respiratory effort: No signs of respiratory distress  -- Auscultation of lungs: Clear to auscultation  Cardiovascular - Auscultation of heart: Normal rate and rhythm, normal S1 and S2, no murmurs  -- Pedal pulses: Normal, 2+ bilaterally  -- Examination of extremities for edema and/or varicosities: Normal    Abdomen - Soft  Musculoskeletal - Gait and station: Normal gait  Skin - Skin: Normal without rashes  Skin is warm and well perfused  Neurologic - Speech normal  No focal deficits    Psychiatric - Orientation to person, place, and time: Normal       Results/Data  ECG Report: Normal sinus rhythm Cardiac "Electrophysiology Report [id number] 08:27PM Jaycob Be     Test Name Result Flag Reference   Cardiac Electrophysiology Report      [id number]  pdf       Health Management  Colonoscopy (Fiberoptic) Screening   COLONOSCOPY; every 3 years; Last 02Jun2016; Next Due: 02Jun2019; Active  Encounter for screening mammogram for malignant neoplasm of breast   * MAMMO SCREENING BILATERAL W CAD; every 1 year; Last 07RFN9196; Next Due: 45HQC1733; Active  Hypertension   EKG/ECG- POC; every 1 year; Last 22Nov2017; Next Due: 22Nov2018; Active  Paroxysmal atrial fibrillation   EKG/ECG- POC; every 1 year; Last 22Nov2017; Next Due: 22Nov2018; Active  Health Maintenance   Medicare Annual Wellness Visit; every 1 year; Last 08Aug2017; Next Due: 08Aug2018;  Active    Future Appointments    Date/Time Provider Specialty Site   12/05/2017 08:45 AM Enedelia Echavarria DO Family Medicine [facility]   11/26/2018 09:00 AM Cardiology, [address]   02/26/2018 03:00 PM Cardiology, Device Remote   [facility]       Signatures   Electronically signed by : Kari Thomas DO; Nov 22 2017  3:50PM EST                       (Author)    "

## 2017-12-04 ENCOUNTER — GENERIC CONVERSION - ENCOUNTER (OUTPATIENT)
Dept: OTHER | Facility: OTHER | Age: 77
End: 2017-12-04

## 2017-12-05 ENCOUNTER — GENERIC CONVERSION - ENCOUNTER (OUTPATIENT)
Dept: OTHER | Facility: OTHER | Age: 77
End: 2017-12-05

## 2017-12-11 ENCOUNTER — GENERIC CONVERSION - ENCOUNTER (OUTPATIENT)
Dept: FAMILY MEDICINE CLINIC | Facility: CLINIC | Age: 77
End: 2017-12-11

## 2017-12-19 ENCOUNTER — TRANSCRIBE ORDERS (OUTPATIENT)
Dept: LAB | Facility: CLINIC | Age: 77
End: 2017-12-19

## 2017-12-19 ENCOUNTER — APPOINTMENT (OUTPATIENT)
Dept: LAB | Facility: CLINIC | Age: 77
End: 2017-12-19
Payer: MEDICARE

## 2017-12-19 DIAGNOSIS — E06.3 CHRONIC LYMPHOCYTIC THYROIDITIS: Primary | ICD-10-CM

## 2017-12-19 DIAGNOSIS — I51.9 MYXEDEMA HEART DISEASE: ICD-10-CM

## 2017-12-19 DIAGNOSIS — E78.2 MIXED HYPERLIPIDEMIA: ICD-10-CM

## 2017-12-19 DIAGNOSIS — I10 ESSENTIAL HYPERTENSION, MALIGNANT: ICD-10-CM

## 2017-12-19 DIAGNOSIS — E11.9 TYPE 2 DIABETES MELLITUS WITHOUT COMPLICATION, UNSPECIFIED LONG TERM INSULIN USE STATUS: ICD-10-CM

## 2017-12-19 DIAGNOSIS — E03.9 MYXEDEMA HEART DISEASE: ICD-10-CM

## 2017-12-19 LAB
ALBUMIN SERPL BCP-MCNC: 3.6 G/DL (ref 3.5–5)
ALP SERPL-CCNC: 72 U/L (ref 46–116)
ALT SERPL W P-5'-P-CCNC: 24 U/L (ref 12–78)
ANION GAP SERPL CALCULATED.3IONS-SCNC: 5 MMOL/L (ref 4–13)
AST SERPL W P-5'-P-CCNC: 28 U/L (ref 5–45)
BILIRUB SERPL-MCNC: 0.73 MG/DL (ref 0.2–1)
BUN SERPL-MCNC: 9 MG/DL (ref 5–25)
CALCIUM SERPL-MCNC: 9.1 MG/DL (ref 8.3–10.1)
CHLORIDE SERPL-SCNC: 106 MMOL/L (ref 100–108)
CHOLEST SERPL-MCNC: 142 MG/DL (ref 50–200)
CO2 SERPL-SCNC: 28 MMOL/L (ref 21–32)
CREAT SERPL-MCNC: 0.67 MG/DL (ref 0.6–1.3)
EST. AVERAGE GLUCOSE BLD GHB EST-MCNC: 183 MG/DL
GFR SERPL CREATININE-BSD FRML MDRD: 86 ML/MIN/1.73SQ M
GLUCOSE P FAST SERPL-MCNC: 164 MG/DL (ref 65–99)
HBA1C MFR BLD: 8 % (ref 4.2–6.3)
HDLC SERPL-MCNC: 42 MG/DL (ref 40–60)
LDLC SERPL CALC-MCNC: 66 MG/DL (ref 0–100)
POTASSIUM SERPL-SCNC: 4.9 MMOL/L (ref 3.5–5.3)
PROT SERPL-MCNC: 7.5 G/DL (ref 6.4–8.2)
SODIUM SERPL-SCNC: 139 MMOL/L (ref 136–145)
TRIGL SERPL-MCNC: 169 MG/DL
TSH SERPL DL<=0.05 MIU/L-ACNC: 1.95 UIU/ML (ref 0.36–3.74)

## 2017-12-19 PROCEDURE — 84443 ASSAY THYROID STIM HORMONE: CPT

## 2017-12-19 PROCEDURE — 80053 COMPREHEN METABOLIC PANEL: CPT

## 2017-12-19 PROCEDURE — 80061 LIPID PANEL: CPT

## 2017-12-19 PROCEDURE — 83036 HEMOGLOBIN GLYCOSYLATED A1C: CPT

## 2017-12-19 PROCEDURE — 36415 COLL VENOUS BLD VENIPUNCTURE: CPT

## 2018-01-08 ENCOUNTER — ALLSCRIPTS OFFICE VISIT (OUTPATIENT)
Dept: OTHER | Facility: OTHER | Age: 78
End: 2018-01-08

## 2018-01-08 ENCOUNTER — APPOINTMENT (OUTPATIENT)
Dept: RADIOLOGY | Facility: CLINIC | Age: 78
End: 2018-01-08
Payer: MEDICARE

## 2018-01-08 ENCOUNTER — APPOINTMENT (OUTPATIENT)
Dept: OCCUPATIONAL THERAPY | Facility: CLINIC | Age: 78
End: 2018-01-08
Payer: MEDICARE

## 2018-01-08 DIAGNOSIS — M79.641 PAIN OF RIGHT HAND: ICD-10-CM

## 2018-01-08 DIAGNOSIS — M19.041 PRIMARY OSTEOARTHRITIS OF RIGHT HAND: ICD-10-CM

## 2018-01-08 PROCEDURE — 97760 ORTHOTIC MGMT&TRAING 1ST ENC: CPT

## 2018-01-08 PROCEDURE — 73130 X-RAY EXAM OF HAND: CPT

## 2018-01-08 PROCEDURE — G8990 OTHER PT/OT CURRENT STATUS: HCPCS

## 2018-01-08 PROCEDURE — G8991 OTHER PT/OT GOAL STATUS: HCPCS

## 2018-01-09 NOTE — PROGRESS NOTES
Assessment   1  Osteoarthritis of finger of right hand (713 94) (M19 041)    Plan   Osteoarthritis of finger of right hand    · *1 - [facility]  Fashion right ring finger PIP join    immoblization splint  Status: Active - Retrospective Authorization  Requested for:    [id number]  Care Summary provided  : Yes   · Follow-up visit in 3 months Evaluation and Treatment  Follow-up  Status: Hold For -    Scheduling  Requested for: 71MDF5323   · Joint Bursa Aspiration &/or Injection Small; Status:Complete - Retrospective Authorization;      Done: 07PGB9382  Primary osteoarthritis involving multiple joints    · Betamethasone Sod Phos & Acet 6 (3-3) MG/ML Injection Suspension    Discussion/Summary      Conservative versus surgical treatment options were thoroughly discussed with patient  At this time she has not had any previous conservative treatment and her  is undergoing some upcoming surgery  Though her pain level does dictate having surgery she wishes to hold off on this until her  has healed  Cold cortisone injection into the right ring PIP joint was given today and she tolerated this well she will be referred to occupational therapy for a right ring finger PIP joint immobilization splint to be worn for comfort  She'll follow-up in 3 months for recheck  Shawna Zurita acted as a scribe into visit the presence of attending surgeon  Attestation:   Ramandeep Millan MD, personally performed the services described in this documentation as scribed in my presence  Chief Complaint   1  Hand Problem    History of Present Illness   HPI: 69-year-old female who is here for evaluation of her right ring finger  Year she's been noting right knuckle pain and swelling which has gradually progressed  She denies a specific injury but does have a history of arthritis  She's been wearing a comfort brace for [facility] arthritis treated at another facility   Prior treatment in regards to her right finger  Her discomfort is at the middle knuckle and it is a constant daily ache  She has some limitations in motion  Review of Systems        Constitutional: No fever, no chills, feels well, no tiredness, no recent weight gain or loss  Eyes: No complaints of eyesight problems, no red eyes  ENT: no loss of hearing, no nosebleeds, no sore throat  Cardiovascular: No complaints of chest pain, no palpitations, no leg claudication or lower extremity edema  Respiratory: no compliants of shortness of breath, no wheezing, no cough  Gastrointestinal: no complaints of abdominal pain, no constipation, no nausea or diarrhea, no vomiting, no bloody stools  Genitourinary: no complaints of dysuria, no incontinence  Musculoskeletal: as noted in HPI  Integumentary: no complaints of skin rash or lesion, no itching or dry skin, no skin wounds  Neurological: no complaints of headache, no confusion, no numbness or tingling, no dizziness  Endocrine: No complaints of muscle weakness, no feelings of weakness, no frequent urination, no excessive thirst       Psychiatric: No suicidal thoughts, no anxiety, no feelings of depression  ROS reviewed  Active Problems   1  Abnormal echocardiogram (793 2) (R93 1)   2  Adenocarcinoma of colon (153 9) (C18 9)   3  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)   4  Cardiomegaly (429 3) (I51 7)   5  Chronic hand pain, right (729 5,338 29) (M79 641,G89 29)   6  Colonoscopy (Fiberoptic) Screening   7  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)   8  History of Esophageal stricture (530 3) (K22 2)   9  Feeling anxious (300 00) (F41 9)   10  Hematuria, microscopic (599 72) (R31 29)   11  Hyperlipidemia (272 4) (E78 5)   12  Hypertension (401 9) (I10)   13  Hypothyroidism (244 9) (E03 9)   14  Insomnia (780 52) (G47 00)   15  Irritable bowel syndrome with diarrhea (564 1) (K58 0)   16   Lipoma of upper extremity, unspecified laterality (214 8) (D17 20)   17  Mitral regurgitation (424 0) (I34 0)   18  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)   19  Osteoporosis (733 00) (M81 0)   20  Pacemaker Permanent Placement   21  Paraesophageal hernia (553 3) (K44 9)   22  Paroxysmal atrial fibrillation (427 31) (I48 0)   23  Primary osteoarthritis involving multiple joints (715 09) (M15 0)   24  Restrictive lung disease (518 89) (J98 4)   25  Sick sinus syndrome (427 81) (I49 5)   26  Symptoms involving urinary system (788 99) (R39 9)   27  Thrombophlebitis of arm, left (451 84) (I80 8)   28  Type 2 diabetes mellitus (250 00) (E11 9)   29   Vitamin D deficiency (268 9) (E55 9)    Past Medical History    · History of Abdominal pain, epigastric (789 06) (R10 13)   · History of Abnormal ECG (794 31) (R94 31)   · Acute bronchitis (466 0) (J20 9)   · History of Acute UTI (599 0) (N39 0)   · History of Benign Neoplasm Of The Sigmoid Colon (211 3)   · History of Bronchitis, asthmatic (493 90) (J45 909)   · History of Contact dermatitis due to plant (692 6) (L25 5)   · History of Contact dermatitis due to poison ivy (692 6) (L23 7)   · History of Cough (786 2) (R05)   · History of Dyspepsia (536 8) (K30)   · History of Encounter for routine gynecological examination (V72 31) (Z01 419)   · History of Esophageal stricture (530 3) (K22 2)   · History of Fibrotic Scar (709 2)   · History of abdominal pain (V13 89) (S79 073)   · History of acute pharyngitis (V12 69) (Z87 09)   · History of acute pharyngitis (V12 69) (Z87 09)   · History of acute pharyngitis (V12 69) (Z87 09)   · History of dermatitis (V13 3) (Z87 2)   · History of iron deficiency anemia (V12 3) (Z86 2)   · History of urinary frequency (V13 09) (Z87 898)   · History of Jaw pain (784 92) (R68 84)   · History of Nervousness (799 21) (R45 0)   · History of Plant dermatitis (692 6) (L25 5)   · History of Screening for glaucoma (V80 1) (Z13 5)   · History of Urinary Tract Infection (V13 02)   · History of Wax in ear (380 4) (H61 20)   · History of Yeast vaginitis (112 1) (B37 3)     The active problems and past medical history were reviewed and updated today  Surgical History    · History of  Section   · History of Colon Surgery   · History of Total Abdominal Hysterectomy     The surgical history was reviewed and updated today  Family History    · Family history of Father  At Age 62   · Family history of Gastric Cancer (V16 0)   · Family history of Mother  At Age 80     The family history was reviewed and updated today  Social History    · Always uses seat belt   · Copy of advanced directive obtained (V49 89) (Z78 9)   · Drinks coffee   · Drinks 5 cups a day   · Former smoker (V15 82) (Z87 891)   · Quit smoking in    · Has smoke detectors   · No alcohol use   · No drug use   · Uses Safety Equipment - Seatbelts  The social history was reviewed and updated today  Current Meds    1  Atenolol 50 MG Oral Tablet; TAKE 1 TABLET AT BEDTIME; Therapy: 65PLD7760 to (Last Rx:2017)  Requested for: 84VXD7641 Ordered   2  BD Pen Needle Mini U/F 31G X 5 MM Miscellaneous; used twice daily; Therapy: 12STU3692 to (Evaluate:84Yld4866); Last Rx:2013 Ordered   3  Byetta 10 MCG Pen 10 MCG/0 04ML SOLN; INJECT 10 UNIT Twice daily; Therapy: (Recorded:2015) to Recorded   4  Centrum Silver TABS; TAKE 1 TABLET DAILY; Therapy: (Recorded:39Fcp4878) to Recorded   5  Diclofenac Sodium 1 % Transdermal Gel; APPLY TO TENDER AREA AND RUB IN     WELL UP TO FOUR TIMES A DAY; Therapy: 27FAS7839 to (Last Rx:2016)  Requested for: 11POX8176 Ordered   6  DULoxetine HCl - 20 MG Oral Capsule Delayed Release Particles; TAKE ONE CAPSULE     BY MOUTH EVERY DAY; Therapy: 12RVJ7341 to (Last E58TQS4485)  Requested for: 13LHT9577 Ordered   7  Eliquis 5 MG Oral Tablet; take 1 tablet by mouth twice a day;      Therapy: 90Jdv9267 to (Evaluate:32Wdh2499)  Requested for: 69Hzu0837; Last Rx: 07UWO8784; Status: ACTIVE - Transmit to Pharmacy - Awaiting Verification Ordered   8  Ferrous Sulfate TABS; TAKE 1 TABLET DAILY; Therapy: [phone]) to Recorded   9  Fish Oil CAPS; take 1 capsule daily; Therapy: (Recorded:42Yky9400) to Recorded   10  FreeStyle Lite Test In Vitro Strip; USE 4 TIMES DAILY; Therapy: 16WXM1041 to (Last Rx:13Apr2015)  Requested for: 64Lnm7486 Ordered   11  MetFORMIN HCl  MG Oral Tablet Extended Release 24 Hour; Take 1 tablet twice      daily; Therapy: 74Aiu5132 to  Requested for: 22Nov2017 Recorded   12  Microlet Lancets Miscellaneous; USE 4 TIMES DAILY; Therapy: 58ZHE8337 to (Last Rx:12Mar2015)  Requested for: 76POP4103 Ordered   13  Precision QID Monitor Device; USE AS DIRECTED; Therapy: 01BCO7622 to (Last Rx:18Mar2015)  Requested for: 68MZN6097 Ordered   14  Simvastatin 20 MG Oral Tablet; take 1 tablet daily at bedtime; Therapy: 23RUE2811 to (Last NI:04EHM9655)  Requested for: 33KJI5332 Ordered   15  Synthroid 125 MCG Oral Tablet; TAKE 1 TABLET DAILY; Therapy: ([phone]) to  Requested for: 22Nov2017 Recorded   16  Ventolin  (90 Base) MCG/ACT Inhalation Aerosol Solution; INHALE 2 PUFFS      EVERY 4 HOURS AS NEEDED FOR COUGH AND WHEEZE;      Therapy: 11CYX3714 to (Evaluate:10Jun2018)  Requested for: 54MXH8107; Last      Rx:15Jun2017 Ordered     The medication list was reviewed and updated today  Allergies   1  No Known Drug Allergies  2  No Known Environmental Allergies   3  No Known Food Allergies    Vitals   Signs   Heart Rate: 41  Systolic: 436  Diastolic: 76  Height: 5 ft 4 in  Weight: 181 lb 4 oz  BMI Calculated: 31 11  BSA Calculated: 1 88    Physical Exam           General: No acute distress, age-appropriate  Neck: Supple, trachea midline  HEENT: Normocephalic atraumatic, mucous membranes are moist, sclera are nonicteric  Cardiovascular: No discernable arrhythmia        Respiratory: Breathing is even and unlabored, no stridor or audible wheezing  Psychiatric: Awake alert and oriented x3, normal mood and affect  Abdomen: Without rebound or guarding  No shortness of breath, mood and affect are appropriate  Right Fingers:      Right ring finger reveals a hypertrophic PIP joint with imitations and motion 25-55 degrees and tenderness to deep palpation  No ligament laxity is noted today at the PIP joint  Skin: was evaluated and demonstrated no masses, no abrasions, no erythema, no effusion, no edema, no fluctuance, no induration, no laceration, no ulceration, no lymphadenopathy  Right Upper Neurovascular: were evaluated and demonstrated: The patient has normal motor strength to the median, ulnar and radial nerve  Normal sensation to the median, ulnar, and radial nerve  Normal pulses in the radial and ulnar artery  Capillary refill is < 2 seconds  Results/Data   I personally reviewed the films/images/results in the office today  My interpretation follows  X-ray Review 3 views of the right hand were obtained here today confirm obliteration of the right ring finger PIP joint with significant sclerosis and osteophyte formation and cystic changes present  Procedure              Injection: After appropriate confirmation of the patient, site, and procedure, the Right ring PIP joint was prepared in a sterile fashion  The area was then injected with 6 milligrams of Celestone and 1 of 1% lidocaine without epinephrine  A sterile bandage was then applied  The patient tolerated the procedure well   All risks, benefits, and potential complications of the steroid injection were discussed with the patient, which were including but not limited to: Bleeding, small risk of infection, numbness and tingling within the area of injection, soreness at the injection site, potential brief exacerbation of pain, as well as steroid reactions including flushing, increased aggravation, and potential increased blood sugar in diabetics  Future Appointments      Date/Time Provider Specialty Site   06/05/2018 08:00 AM Pina Tinoco DO Family Medicine [address]  [address]   11/26/2018 09:00 AM Cardiology, [address]   02/26/2018 03:00 PM Cardiology, Device Remote   [address]   06/25/2018 07:30 AM RAJ Arellano   Endocrinology [facility] ENDOCRINOLOGY  [facility]   01/16/2018 01:00 PM Danica Little MD Orthopedic Surgery [facility]     Signatures    Electronically signed by : Earline Mi, [facility]; Jan 8 2018  4:16PM EST                       (/Recorder)     Electronically signed by : RAJ Aquino ; Jan 8 2018  4:35PM EST                       (Author)

## 2018-01-11 NOTE — MISCELLANEOUS
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)    Plan   #1 patient's dysphagia, reflux and esophagitis have gone away with her surgery  Of course she is only having clear liquids and soft food at this time  She is still on her omeprazole  I asked patient to pay attention to what she eats now as the weight loss of 8 pounds she has full come back if she does not change her ways  Her blood sugars still aren't really low or normal even despite what she is eating  #2 keep the same medicine for diabetes and recheck as scheduled    #3 patient to ask Dr Addison Earl when she could go back on her calcium and vitamin D    Recheck as scheduled or as needed     Chief Complaint  Chief Complaint Free Text Note Form: Pt presents to the office for her SCRALET appt from her recent hospital stay  Colon 05/2016  mammo 06/29//2016  PAP def  EKG 01/2017  DM foot 11/2016  DM eye 11/2014- done 10/2015 dr Rabia Salvador  AWV 06/2016       History of Present Illness   Patient is here for a transition of care note  Patient had to go in for a large pair of esophageal hernia repair with a volvulus  This was done endoscopically with great success  Currently patient is not on her vitamin D and calcium her surgery  She is also not on garlic because of cardiology  Patient's blood sugars have been 150s 160s and today I 200  Patient is down 8 pounds since her preop in January  She was not taking her pain medication because she didn't want to become addicted; however her port entrances are very painful  I advised her to continue with the pain medicine for at least a week in order to help herself feel better as pain prevents one from healing  TCM Communication [facility]: [facility] records were reviewed  She was hospitalized [facility] [address]   The date of admission: 01/27/2016, date of discharge: 01/29/2016  Diagnosis: PEH with partial obstruction  She was discharged to home  Medications reviewed and updated today  She scheduled a follow up appointment  The patient is currently asymptomatic  Counseling was provided to the patient  Communication performed and completed by Rajinder Carmichael MA, 02/01/2016      Review of Systems   Constitutional  No fever chills no fatigue no weight loss no weight gain    Mental status  No anxiety or depression and no sleep disturbances no changes in personality or emotional problems no suicidal or homicidal ideations    Eyes  No eye pain no red eyes no visual disturbances no discharge no eye itch    ENT  No earache no hearing loss nasal discharge no sore throat no hoarseness no postnasal drip     Cardio  No chest pain no palpitations no leg edema no claudication no dyspnea on exertion no nocturnal dyspnea    Respiratory  No shortness of breath or wheeze no cough no orthopnea no dyspnea on exertion no hemoptysis no sputum production    GI  No abdominal pain no nausea no vomiting no diarrhea or constipation no bloody stools no change in bowel habits no change in weight      no dysuria hematuria no pyuria no incontinence no pelvic pain    Musculoskeletal  No myalgias arthralgias no joint swelling or stiffness no limb pain or swelling    Skin  No rashes or lesions no itchiness and no wounds    Neuro  No headache dizziness lightheadedness syncope numbness paresthesias or confusion    Heme  No swollen glands no easy bruising        Active Problems    1  Abnormal ECG (794 31) (R94 31)   2  Abnormal echocardiogram (793 2) (R93 1)   3  Adenocarcinoma of colon (153 9) (C18 9)   4  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)   5  Cardiomegaly (429 3) (I51 7)   6  Colonoscopy (Fiberoptic) Screening   7  COPD, moderate (496) (J44 9)   8  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)   9  Dysphagia (787 20) (R13 10)   10   Encounter for screening mammogram for malignant neoplasm of breast (V76 12) (Z12 31)   11  Encounter for therapeutic drug monitoring (V58 83) (Z51 81)   12  Esophageal stricture (530 3) (K22 2)   13  Feeling anxious (300 00) (F41 9)   14  Gastro-esophageal reflux disease with esophagitis (530 11) (K21 0)   15  Hyperlipidemia (272 4) (E78 5)   16  Hypertension (401 9) (I10)   17  Hypothyroidism (244 9) (E03 9)   18  Insomnia (780 52) (G47 00)   19  Iron deficiency anemia (280 9) (D50 9)   20  Long term use of drug (V58 69) (Z79 899)   21  Mitral regurgitation (424 0) (I34 0)   22  Need for pneumococcal vaccination (V03 82) (Z23)   23  Need for prophylactic vaccination and inoculation against influenza (V04 81) (Z23)   24  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)   25  Osteoporosis (733 00) (M81 0)   26  Pacemaker Permanent Placement   27  Paraesophageal hernia (553 3) (K44 9)   28  Paroxysmal atrial fibrillation (427 31) (I48 0)   29  Primary osteoarthritis involving multiple joints (715 09) (M15 0)   30  Screening for genitourinary condition (V81 6) (Z13 89)   31  Screening for neurological condition (V80 09) (Z13 89)   32  Screening for osteoporosis (V82 81) (Z13 820)   33  Sick sinus syndrome (427 81) (I49 5)   34  Symptoms involving urinary system (788 99) (R39 9)   35  Type 2 diabetes mellitus (250 00) (E11 9)   36  Visit For: Screening Exam Neurological Disorders (V80 09)   37  Vitamin D deficiency (268 9) (E55 9)    Past Medical History    1  History of Abdominal pain, epigastric (789 06) (R10 13)   2  Acute bronchitis (466 0) (J20 9)   3  History of Acute UTI (599 0) (N39 0)   4  History of Benign Neoplasm Of The Sigmoid Colon (211 3)   5  History of Bronchitis, asthmatic (493 90) (J45 909)   6  History of Contact dermatitis due to plant (692 6) (L25 5)   7  History of Contact dermatitis due to poison ivy (692 6) (L23 7)   8  History of Cough (786 2) (R05)   9  History of Dyspepsia (536 8) (K30)   10   History of Encounter for routine gynecological examination (C42 31) (Z01 419)   11  History of Fibrotic Scar (709 2)   12  History of abdominal pain (V13 89) (Z87 898)   13  History of acute pharyngitis (V12 69) (Z87 09)   14  History of acute pharyngitis (V12 69) (Z87 09)   15  History of insomnia (V13 89) (Z87 898)   16  History of urinary frequency (V13 09) (Z87 898)   17  Need for prophylactic vaccination and inoculation against influenza (V04 81) (Z23)   18  History of Nervousness (799 21) (R45 0)   19  History of Plant dermatitis (692 6) (L25 5)   20  History of Screening for glaucoma (V80 1) (Z13 5)   21  History of Urinary Tract Infection (V13 02)   22  History of Wax in ear (380 4) (H61 20)    Surgical History    1  History of  Section   2  History of Colon Surgery   3  History of Total Abdominal Hysterectomy    Family History    1  Family history of Father  At Age 62   2  Family history of Gastric Cancer (V16 0)   3  Family history of Mother  At Age 80    Social History    · Always uses seat belt   · Drinks coffee   · Former smoker (V83 76) (S43 082)   · Has smoke detectors   · No alcohol use   · No drug use   · Uses Safety Equipment - Seatbelts    Current Meds   1  Advair Diskus 250-50 MCG/DOSE Inhalation Aerosol Powder Breath Activated; One puff   twice a day and drink something afterwards; Therapy: 11RRL6455 to (Last Rx:2015)  Requested for: 46UMM7831 Ordered   2  Atenolol 50 MG Oral Tablet; TAKE 1 TABLET AT BEDTIME; Therapy: 93ZUZ6341 to (Last Rx:2016)  Requested for: 03ODX5139 Ordered   3  BD Pen Needle Mini U/F 31G X 5 MM Miscellaneous; used twice daily; Therapy: 20OEB0826 to (Evaluate:32Enn1123); Last Rx:2013 Ordered   4  Byetta 10 MCG Pen 10 MCG/0 04ML SOLN; INJECT 10 UNIT Twice daily; Therapy: (Recorded:45Uux7016) to Recorded   5  Centrum Silver TABS; TAKE 1 TABLET DAILY; Therapy: (Recorded:2014) to Recorded   6  Co Q 10 CAPS; take 1 capsule daily; Therapy: (Recorded:2014) to Recorded   7   Eliquis 5 MG Oral Tablet; Take 1 tablet twice daily; Therapy: 03Oiw1933 to (Evaluate:88Ftx5198)  Requested for: 82Fal4272; Last   Rx:88Rah3458 Ordered   8  Ferrous Sulfate TABS; TAKE 1 TABLET DAILY; Therapy: [phone]) to Recorded   9  Fish Oil CAPS; take 1 capsule daily; Therapy: (Recorded:79Vtz8691) to Recorded   10  FreeStyle Lite Test In Vitro Strip; USE 4 TIMES DAILY; Therapy: 95OQF7526 to (Last Rx:87Nez3636)  Requested for: 59Pdd2979 Ordered   11  Invokana 100 MG Oral Tablet; Therapy: 82SRP7259 to Recorded   12  MetFORMIN HCl ER (OSM) 1000 MG Oral Tablet Extended Release 24 Hour; take 1 tablet    twice a day; Therapy: 36Zdt0089 to (Last Rx:36Coj0627)  Requested for: 49Mip1005 Ordered   13  Microlet Lancets Miscellaneous; USE 4 TIMES DAILY; Therapy: 23BVI5796 to (Last Rx:12Mar2015)  Requested for: 21DCB1565 Ordered   14  Omeprazole 40 MG Oral Capsule Delayed Release; take one capsule at bedtime; Therapy: 11EKQ1014 to (Evaluate:24Tsn0717)  Requested for: 63Gly1420; Last    Rx:01Ikp2815 Ordered   15  Precision QID Monitor Device; USE AS DIRECTED; Therapy: 67YRB4752 to (Last Rx:18Mar2015)  Requested for: 69QOJ8062 Ordered   16  Simvastatin 20 MG Oral Tablet; take 1 tablet daily at bedtime; Therapy: 54YAB7705 to (Last NK:26DBT3652)  Requested for: 85OWE1254 Ordered   17  Synthroid 125 MCG Oral Tablet; Take 1 tablet daily  Requested for: 15Tus7625; Last    Rx:12Apr2013 Ordered   18  Ventolin  (90 Base) MCG/ACT Inhalation Aerosol Solution; INHALE 2 PUFFS    EVERY 4 HOURS AS NEEDED FOR COUGH AND WHEEZE;    Therapy: 38UOG1183 to (Last Rx:03Apr2014)  Requested for: 58Rpq9438 Ordered   19  Voltaren 1 % Transdermal Gel; APPLY TO TENDER AREA AND RUB IN WELL UP TO    FOUR TIMES A DAY; Therapy: 53WXN1451 to (Last Rx:95Ded0600)  Requested for: 88Smh3099 Ordered    Allergies    1  No Known Drug Allergies    2  No Known Environmental Allergies   3   No Known Food Allergies    Vitals  Signs Hipolito Gaucher Includes: Current Encounter]   Recorded: [id number] 02:17PM   Heart Rate: 76, R Radial  Respiration: 14  Systolic: 393, RUE, Sitting  Diastolic: 70, RUE, Sitting  Height: 5 ft 3 in  Weight: 158 lb   BMI Calculated: 27 99  BSA Calculated: 1 75    Physical Exam   Constitutional  Appears healthy, Looks well, Appearance consistent with age    Mental Status  Alert, Oriented, Cooperative, Memory function normal , clean    Neck  No neck mass, No thyromegaly, Good carotid upstrokes bilaterally, trachea midline positive click    Respiratory  Breath sounds normal, No rales, No rhonchi, No wheezing, normal palpation    Cardiac   Regular rhythm without ectopy or murmur no S3-S4, no heave lift or thrill to palpation    Vascular  No leg edema, No pedal edema    Muscular skeletal  No clubbing cyanosis , muscle tone normal    Skin  No appreciable rashes or lesions        Health Management  Colonoscopy (Fiberoptic) Screening   COLONOSCOPY; every 3 years; Last 40DIO9960; Next Due: [id number]; Active  Encounter for screening mammogram for malignant neoplasm of breast   Digital Bilateral Screening Mammogram With CAD; every 1 year; Last 10KQX7606; Next Due:  16YMX4845; Active  Hypertension   EKG/ECG- POC; every 1 year; Last 46STP8063; Next Due: 36UOA4025; Overdue  Health Maintenance   Medicare Annual Wellness Visit; every 1 year; Last 44TYU6530; Next Due: 65ZEB1381;  Active    Future Appointments    Date/Time Provider Specialty Site   03/17/2016 07:30 AM Edelmira Dewey DO [address]   11/17/2016 09:30 AM Cardiology, [address] [facility]   02/22/2016 08:30 AM Cardiology, Device Remote   [address]   02/19/2016 02:15 PM Yudelka Pugh MD Thoracic Surgery CT SURGERY [facility]     Signatures   Electronically signed by : Olga Barkley DO; Feb 8 2016  2:45PM EST                       (Author)

## 2018-01-11 NOTE — RESULT NOTES
Verified Results  * CT CHEST WO CONTRAST [id number] 11:36AM Donald Qureshi Order Number: [id number]    - Patient Instructions: To schedule this appointment, please contact Central Scheduling at [phone]  Test Name Result Flag Reference   CT CHEST WO CONTRAST (Report)     CT CHEST WITHOUT IV CONTRAST     INDICATION: J98 4: Other disorders of lung (this clinical history is taken directly from the electronic ordering system)  Hiatal hernia repair, reevaluate  COMPARISON: January 21, 2016  TECHNIQUE: CT examination of the chest was performed without intravenous contrast  Reformatted images were created in axial, sagittal, and coronal planes  Radiation dose length product (DLP) for this visit: 228 75 mGy-cm   This examination, like all CT scans performed in the [facility], was performed utilizing techniques to minimize radiation dose exposure, including the use of iterative   reconstruction and automated exposure control  FINDINGS:     LUNGS: Mild centrilobular emphysematous changes are noted  No suspicious pulmonary nodule or mass is identified  PLEURA: Unremarkable  HEART/GREAT VESSELS: Left chest pacemaker is noted with intact pacer leads unchanged in position from previous examination  No thoracic aortic aneurysm  MEDIASTINUM AND PIEDAD: Unremarkable  CHEST WALL AND LOWER NECK: Thyromegaly is again noted, similar from previous examination  Left chest pacer noted  No axillary mass  VISUALIZED STRUCTURES IN THE UPPER ABDOMEN: Postsurgical changes related to reduction and repair of paraesophageal hernia  No residual hiatal hernia  OSSEOUS STRUCTURES: No acute fracture  No destructive osseous lesion  IMPRESSION:     Expected postsurgical appearance in a patient who has undergone prior paraesophageal hernia reduction and repair  No residual or recurrent hiatal hernia       Centrilobular emphysematous changes without suspicious pulmonary nodule or mass present  Thyromegaly         Workstation performed: [id number]     Signed by:   John Conroy MD   6/7/17

## 2018-01-12 NOTE — MISCELLANEOUS
Message   Recorded as Task   Date: 02/28/2017 08:27 AM, Created By: Simon Rogers   Task Name: Call Back   Assigned To: Trudy Espinoza   Regarding Patient: Deidra Shaffer, Status: Active   CommentMarqulupe Schmitt - 28 Feb 2017 8:27 AM     TASK CREATED  Caller: Self; Results Inquiry; [phone] (Home); [phone] (Work)  patient would like stool results  It looks like [employer] tried to connect it back to the urgent care provider  patient wanted to make sure you are aware that nothing changed with sickness, still having pain   Trudy Espinoza - 28 Feb 2017 1:31 PM     TASK REPLIED TO: Previously Assigned To Elton Espinoza  Her cultures are negative    I could send her to physical therapy to help stretch out her muscles if she would like   Nate Glasgow - 28 Feb 2017 2:12 PM     TASK REPLIED TO: Previously Assigned To [employer]   Electronically signed by : Dany Nissen, DO; Feb 28 2017  2:46PM EST                       (Author)

## 2018-01-12 NOTE — MISCELLANEOUS
Message   Recorded as Task   Date: 07/25/2016 10:40 AM, Created By: Williams Vega   Task Name: Call Back   Assigned To: Trudy Espinoza   Regarding Patient: Bennie Angeles, Status: Active   CommentBarbaraann Else - 25 Jul 2016 10:40 AM     TASK CREATED  Please call Dr Keli Couch office and asked to talk to either his nurse her   Please send the message that patient keeps getting terrible yeast infections from her Invocana  She does not want to stop taking it because she is losing weight and her sugars are good, however she gets very terrible yeast infections that will eventually become resistant to the few medicines we have to treat her  Erendira Mann - 25 Jul 2016 11:35 AM     TASK REPLIED TO: Previously Assigned To Erendira Mann  I spoke to Dr Keli Couch office  They state that if you do not drink enough fluids while taking Invokana, yeast infections are a side effect  They actually addressed this with her at her April 2016 visit  They faxed that visit over, it is on your desk  They state that they will make a note in her chart regarding this call from our office to address with her at the next visit          Signatures   Electronically signed by : Alma Rosa Hand DO; Jul 25 2016  5:35PM EST                       (Author)

## 2018-01-12 NOTE — RESULT NOTES
Verified Results  (1) SURGICAL PATHOLOGY 19ZAM4240 12:24PM Yolette Peguero   Intestinal metaplasia     Test Name Result Flag Reference   LAB AP CASE REPORT (Report)     Surgical Pathology Report             Case: [id number]                   Authorizing Provider: Mark Handley MD       Collected:      01/09/2016 1337        Ordering Location:   [address] [place] [address]   Received:      01/11/2016 [place] [address] Operating Room                            Pathologist:      Cesia Forte                              Specimens:  A) - Stomach, antrum                                          B) - Stomach, gastric body-r/o h pylori   LAB AP FINAL DIAGNOSIS (Report)     A  Stomach, antrum, biopsy:    - Inactive chronic antritis with focal intestinal metaplasia  - No H  pylori organisms are identified on H&E and immunohistochemical   stains     - Negative for dysplasia and malignancy  B  Stomach, stomach, biopsy:    - Inactive chronic oxyntic gastritis  - No H  pylori organisms are identified on H&E and immunohistochemical   stains     - Negative for intestinal metaplasia, dysplasia and malignancy  LAB AP SURGICAL ADDITIONAL INFORMATION (Report)     These tests were developed and their performance characteristics   determined by [address] [place] [address] or [facility]  They may not be cleared or approved by the [place]  Food and   Drug Administration  The FDA has determined that such clearance or   approval is not necessary  These tests are used for clinical purposes  They should not be regarded as investigational or for research  This   laboratory has been approved by [place] 88, designated as a high-complexity   laboratory and is qualified to perform these tests  LAB AP GROSS DESCRIPTION (Report)     A  The specimen is received in formalin, labeled with the patient's name   and hospital number, and is designated antrum   The specimen consists of   2 tan-pink soft tissue fragments each measuring 0 4 cm in greatest   dimension  Entirely submitted  One cassette  B  The specimen is received in formalin, labeled with the patient's name   and hospital number, and is designated antrum  The specimen consists of   3 tan-pink soft tissue fragments measuring 0 2 cm, 0 3 cm, and 0 3 cm in   greatest dimension  Entirely submitted  One cassette  Note: The estimated total formalin fixation time based upon information   provided by the submitting clinician and the standard processing schedule   is over 72 hours  MAS   LAB AP CLINICAL INFORMATION      Intestinal metaplasia       Discussion/Summary   negative biopsies  Follow up with Dr Yun Jensen and [address]  thanks

## 2018-01-13 VITALS
HEIGHT: 64 IN | DIASTOLIC BLOOD PRESSURE: 66 MMHG | RESPIRATION RATE: 16 BRPM | HEART RATE: 72 BPM | SYSTOLIC BLOOD PRESSURE: 120 MMHG | BODY MASS INDEX: 29.62 KG/M2 | WEIGHT: 173.5 LBS

## 2018-01-13 VITALS
WEIGHT: 173.6 LBS | HEART RATE: 68 BPM | DIASTOLIC BLOOD PRESSURE: 70 MMHG | RESPIRATION RATE: 16 BRPM | BODY MASS INDEX: 29.64 KG/M2 | HEIGHT: 64 IN | SYSTOLIC BLOOD PRESSURE: 120 MMHG

## 2018-01-13 VITALS
DIASTOLIC BLOOD PRESSURE: 80 MMHG | RESPIRATION RATE: 16 BRPM | WEIGHT: 173.1 LBS | TEMPERATURE: 98.4 F | HEART RATE: 72 BPM | BODY MASS INDEX: 29.55 KG/M2 | SYSTOLIC BLOOD PRESSURE: 122 MMHG | HEIGHT: 64 IN

## 2018-01-13 VITALS
HEIGHT: 64 IN | DIASTOLIC BLOOD PRESSURE: 80 MMHG | SYSTOLIC BLOOD PRESSURE: 110 MMHG | WEIGHT: 174 LBS | HEART RATE: 70 BPM | BODY MASS INDEX: 29.71 KG/M2

## 2018-01-13 NOTE — MISCELLANEOUS
Assessment    1  Former smoker (E32 76) (L03 052)    Plan  Screening for genitourinary condition    · *VB-Urinary Incontinence Screen (Dx V81 6 Screen for UI); Status:Complete -  Retrospective By Protocol Authorization;   Done: 39OIS0651 01:45PM   Performed: In Office; ICC:[id number]; Last Updated By:Tommy Rivera; 1/11/2016 1:49:28 PM;Ordered; For:Screening for genitourinary condition; Ordered By:Trudy Espinoza; For patient's organoaxial gastric volvulus she will see Dr Amita Pinto and have this area surgerized that she no longer has to worry about it    Patient to recheck as scheduled or as needed, all patient information from this weekend was reviewed with her  Chief Complaint  Chief Complaint Free Text Note Form: Pt presents here for a [facility]ARLET appt;    AWV due 06/2016  colon due 05/2016  EKG due  mammo due 06/29/2016  pap def      History of Present Illness   Patient went into the hospital January 8 for terrific pain that started in her epigastric area radiated straight through to her back up to her left shoulder and then down into her left lower quadrant of her abdomen  She was told by Dr Michael Pillai GI that when this happen again she needed to go immediately to the emergency room to be evaluated  While she was there she had a CAT scan and a chest x-ray  CAT scan showed a volvulus of her stomach which was causing the pain  When patient took the oral barium she vomited and this stopped her pain  Now she has an appointment with Dr Trip Benito, thoracic surgery, to have this repaired  She is here for her [address]: The patient is being contacted for follow-up after hospitalization  Hospital course was discussed with the inpatient physician and records were reviewed  She was hospitalized [address]  The date of admission: 01/08/2016, date of discharge: 01/11/2016  Diagnosis: Hiatal Hernia  She was discharged to home  Medications reviewed and updated today     She scheduled a follow up appointment  The patient is currently asymptomatic  Counseling was provided to the patient  Communication performed and completed by Carlos Manuel Santos MA, 01/11/2016      Review of Systems  Preventive Quality 65 Older:   Preventive Quality 65 and Older: The patient currently has no urinary incontinence symptoms  Active Problems    1  Abnormal ECG (794 31) (R94 31)   2  Abnormal echocardiogram (793 2) (R93 1)   3  Adenocarcinoma of colon (153 9) (C18 9)   4  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)   5  Cardiomegaly (429 3) (I51 7)   6  Colonoscopy (Fiberoptic) Screening   7  COPD, moderate (496) (J44 9)   8  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)   9  Dysphagia (787 20) (R13 10)   10  Encounter for screening mammogram for malignant neoplasm of breast (V76 12)    (Z12 31)   11  Encounter for therapeutic drug monitoring (V58 83) (Z51 81)   12  Esophageal stricture (530 3) (K22 2)   13  Feeling anxious (300 00) (F41 9)   14  Gastro-esophageal reflux disease with esophagitis (530 11) (K21 0)   15  Hyperlipidemia (272 4) (E78 5)   16  Hypertension (401 9) (I10)   17  Hypothyroidism (244 9) (E03 9)   18  Insomnia (780 52) (G47 00)   19  Iron deficiency anemia (280 9) (D50 9)   20  Long term use of drug (V58 69) (Z79 899)   21  Mitral regurgitation (424 0) (I34 0)   22  Need for pneumococcal vaccination (V03 82) (Z23)   23  Need for prophylactic vaccination and inoculation against influenza (V04 81) (Z23)   24  Osteoporosis (733 00) (M81 0)   25  Pacemaker Permanent Placement   26  Paroxysmal atrial fibrillation (427 31) (I48 0)   27  Primary osteoarthritis involving multiple joints (715 09) (M15 0)   28  Screening for genitourinary condition (V81 6) (Z13 89)   29  Screening for neurological condition (V80 09) (Z13 89)   30  Screening for osteoporosis (V82 81) (Z13 820)   31  Sick sinus syndrome (427 81) (I49 5)   32  Symptoms involving urinary system (788 99) (R39 9)   33   Type 2 diabetes mellitus (250 00) (E11 9) 34  Visit For: Screening Exam Neurological Disorders (V80 09)   35  Vitamin D deficiency (268 9) (E55 9)    Past Medical History    1  History of Abdominal pain, epigastric (789 06) (R10 13)   2  Acute bronchitis (466 0) (J20 9)   3  History of Acute UTI (599 0) (N39 0)   4  History of Benign Neoplasm Of The Sigmoid Colon (211 3)   5  History of Bronchitis, asthmatic (493 90) (J45 909)   6  History of Contact dermatitis due to plant (692 6) (L25 5)   7  History of Contact dermatitis due to poison ivy (692 6) (L23 7)   8  History of Cough (786 2) (R05)   9  History of Dyspepsia (536 8) (K30)   10  History of Encounter for routine gynecological examination (V72 31) (Z01 419)   11  History of Fibrotic Scar (709 2)   12  History of abdominal pain (V13 89) (Z87 898)   13  History of acute pharyngitis (V12 69) (Z87 09)   14  History of acute pharyngitis (V12 69) (Z87 09)   15  History of insomnia (V13 89) (Z87 898)   16  History of urinary frequency (V13 09) (Z87 898)   17  Need for prophylactic vaccination and inoculation against influenza (V04 81) (Z23)   18  History of Nervousness (799 21) (R45 0)   19  History of Plant dermatitis (692 6) (L25 5)   20  History of Screening for glaucoma (V80 1) (Z13 5)   21  History of Urinary Tract Infection (V13 02)   22  History of Wax in ear (380 4) (H61 20)    Surgical History    1  History of  Section   2  History of Colon Surgery   3  History of Total Abdominal Hysterectomy    Family History    1  Family history of Father  At Age 62   2  Family history of Gastric Cancer (V16 0)   3  Family history of Mother  At Age 80    Social History    · Former smoker (F40 98) (B67 565)   · Uses Safety Equipment - Seatbelts    Current Meds   1  Advair Diskus 250-50 MCG/DOSE Inhalation Aerosol Powder Breath Activated; One puff   twice a day and drink something afterwards; Therapy: 71ASY0036 to (Last Rx:2015)  Requested for: 20WJK9807 Ordered   2   Atenolol 50 MG Oral Tablet; TAKE 1 TABLET AT BEDTIME; Therapy: 91HBE1356 to (Last Rx:06Jan2016)  Requested for: 51ISX0811 Ordered   3  BD Pen Needle Mini U/F 31G X 5 MM Miscellaneous; used twice daily; Therapy: 74XOJ2721 to (Evaluate:31Iqc1998); Last Rx:08Ijk6227 Ordered   4  Byetta 10 MCG Pen 10 MCG/0 04ML SOLN; INJECT 10 UNIT Twice daily; Therapy: (Recorded:04Jun2015) to Recorded   5  Calcium 500 MG Oral Tablet Chewable; TAKE 3 TABLET Daily; Therapy: [phone]) to Recorded   6  Centrum Silver TABS; TAKE 1 TABLET DAILY; Therapy: (Recorded:03Apr2014) to Recorded   7  Co Q 10 CAPS; take 1 capsule daily; Therapy: (Recorded:03Apr2014) to Recorded   8  Eliquis 5 MG Oral Tablet; Take 1 tablet twice daily; Therapy: 92Jtj8774 to (Evaluate:19Zag2004)  Requested for: 84Cze8877; Last   Rx:01Aug2015 Ordered   9  Ferrous Sulfate TABS; TAKE 1 TABLET DAILY; Therapy: [phone]) to Recorded   10  Fish Oil CAPS; take 1 capsule daily; Therapy: (Recorded:03Apr2014) to Recorded   11  FreeStyle Lite Test In Vitro Strip; USE 4 TIMES DAILY; Therapy: 40JOS3684 to (Last Rx:13Apr2015)  Requested for: 41Dwc1797 Ordered   12  Garlic CAPS; take 1 capsule daily; Therapy: (Recorded:03Apr2014) to Recorded   13  Invokana 100 MG Oral Tablet; Therapy: 86WVP3744 to Recorded   14  MetFORMIN HCl ER (OSM) 1000 MG Oral Tablet Extended Release 24 Hour; take 1 tablet    twice a day; Therapy: 03Pjr3082 to (Last Rx:25Apr2015)  Requested for: 87Jwa0098 Ordered   15  Microlet Lancets Miscellaneous; USE 4 TIMES DAILY; Therapy: 01TEJ2154 to (Last Rx:12Mar2015)  Requested for: 70VFD4568 Ordered   16  Mometasone Furoate 0 1 % External Cream; APPLY SPARINGLY TO AFFECTED AREAS    TWICE DAILY  (AM AND PM); Therapy: 91OUL4017 to (Last Rx:15Nne8457)  Requested for: 82CGV0313 Ordered   17  Omeprazole 40 MG Oral Capsule Delayed Release; take one capsule at bedtime;     Therapy: 60HCA9570 to (Evaluate:19Lus3035)  Requested for: 49MRQ2725; Last    Rx:16Jke9891 Ordered   18  Oscal 500/200 D-3 TABS; TAKE 1 TABLET DAILY AS DIRECTED; Therapy: (Recorded:73Ymy9955) to Recorded   19  Osteo-Bi-Flex TABS; Therapy: (Recorded:94Mvx2162) to Recorded   20  Precision QID Monitor Device; USE AS DIRECTED; Therapy: 15CRP2151 to (Last Rx:84Yhw2383)  Requested for: 44GYN8561 Ordered   21  Simvastatin 20 MG Oral Tablet; take 1 tablet daily at bedtime; Therapy: 78JGO6645 to (Last SH:46UHO1627)  Requested for: 72UMY1068 Ordered   22  Synthroid 125 MCG Oral Tablet; Take 1 tablet daily  Requested for: 98Fmy8249; Last    Rx:12Apr2013 Ordered   23  Ventolin  (90 Base) MCG/ACT Inhalation Aerosol Solution; INHALE 2 PUFFS    EVERY 4 HOURS AS NEEDED FOR COUGH AND WHEEZE;    Therapy: 77JCQ5482 to (Last Rx:91Ydn0995)  Requested for: 10Fwc1922 Ordered   24  Vitamin D 1000 UNIT CAPS; Take 1 capsule twice daily; Therapy: (Mohan Bagley) to Recorded   25  Voltaren 1 % Transdermal Gel; APPLY TO TENDER AREA AND RUB IN WELL UP TO    FOUR TIMES A DAY; Therapy: 28WEB9906 to (Last Rx:68Tbv7868)  Requested for: 47Fbp9375 Ordered    Allergies    1   No Known Drug Allergies    Vitals  Signs [Data Includes: Current Encounter]   Recorded: 36KVJ6473 01:45PM   Heart Rate: 92  Respiration: 14  Systolic: 150, RUE, Sitting  Diastolic: 70, RUE, Sitting  Height: 5 ft 3 25 in  Weight: 166 lb 6 4 oz  BMI Calculated: 29 24  BSA Calculated: 1 79    Physical Exam   Constitutional  Appears healthy, Looks well, Appearance consistent with age    Mental Status  Alert, Oriented, Cooperative, Memory function normal , clean    Neck  No neck mass, No thyromegaly, Good carotid upstrokes bilaterally, trachea midline positive click    Respiratory  Breath sounds normal, No rales, No rhonchi, No wheezing, normal palpation    Cardiac   Regular rhythm without ectopy or murmur no S3-S4, no heave lift or thrill to palpation    Vascular  No leg edema, No pedal edema    Muscular skeletal  No clubbing cyanosis , muscle tone normal    Skin  No appreciable rashes or lesions        Results/Data  Encounter Results   *VB-Urinary Incontinence Screen (Dx V81 6 Screen for UI) 45TQH3995 01:45PM Nava Pompa     Test Name Result Flag Reference   Urinary Incontinence Assessment 79KLG9796       PHQ-2 Adult Depression Screening 73WIG1118 01:44PM User, Ahs     Test Name Result Flag Reference   PHQ-2 Adult Depression Score 0     Q1: 0, Q2: 0   PHQ-2 Adult Depression Screening Negative       Falls Risk Assessment (Dx V80 09 Screen for Neurologic Disorder) 73AMX6999 01:44PM User, Ahs     Test Name Result Flag Reference   Falls Risk      No falls in the past year       Health Management  Colonoscopy (Fiberoptic) Screening   COLONOSCOPY; every 3 years; Last 67KVR5344; Next Due: A0304071; Active  Encounter for screening mammogram for malignant neoplasm of breast   Digital Bilateral Screening Mammogram With CAD; every 1 year; Last 51KQG2052; Next Due:  08ALP5774; Active  Hypertension   EKG/ECG- POC; every 1 year; Last 82JHF3633; Next Due: 33YDW4630; Overdue  Health Maintenance   Medicare Annual Wellness Visit; every 1 year; Last 76INX7649; Next Due: 54BEO2279;  Active    Future Appointments    Date/Time Provider Specialty Site   03/17/2016 07:30 AM Nava Pompa DO [address]   11/17/2016 09:30 AM Cardiology, Device Clinic [facility]   02/22/2016 08:30 AM Cardiology, Device Remote  [address]     Signatures   Electronically signed by : Atif Carcamo DO; Jan 11 2016  2:20PM EST                       (Author)

## 2018-01-14 VITALS
DIASTOLIC BLOOD PRESSURE: 80 MMHG | WEIGHT: 174.5 LBS | SYSTOLIC BLOOD PRESSURE: 130 MMHG | TEMPERATURE: 97.9 F | RESPIRATION RATE: 16 BRPM | HEART RATE: 72 BPM | HEIGHT: 64 IN | BODY MASS INDEX: 29.79 KG/M2

## 2018-01-14 VITALS
OXYGEN SATURATION: 95 % | WEIGHT: 293 LBS | DIASTOLIC BLOOD PRESSURE: 64 MMHG | TEMPERATURE: 97.5 F | HEART RATE: 84 BPM | HEIGHT: 64 IN | SYSTOLIC BLOOD PRESSURE: 120 MMHG | BODY MASS INDEX: 50.02 KG/M2 | RESPIRATION RATE: 16 BRPM

## 2018-01-14 VITALS
HEIGHT: 64 IN | DIASTOLIC BLOOD PRESSURE: 70 MMHG | SYSTOLIC BLOOD PRESSURE: 120 MMHG | HEART RATE: 72 BPM | RESPIRATION RATE: 16 BRPM | BODY MASS INDEX: 29.37 KG/M2 | WEIGHT: 172 LBS

## 2018-01-14 VITALS
BODY MASS INDEX: 30.66 KG/M2 | HEART RATE: 75 BPM | HEIGHT: 64 IN | SYSTOLIC BLOOD PRESSURE: 120 MMHG | WEIGHT: 179.56 LBS | DIASTOLIC BLOOD PRESSURE: 78 MMHG

## 2018-01-15 VITALS
SYSTOLIC BLOOD PRESSURE: 120 MMHG | DIASTOLIC BLOOD PRESSURE: 80 MMHG | WEIGHT: 171.2 LBS | HEIGHT: 64 IN | RESPIRATION RATE: 16 BRPM | BODY MASS INDEX: 29.23 KG/M2 | TEMPERATURE: 97.4 F | HEART RATE: 72 BPM

## 2018-01-15 NOTE — MISCELLANEOUS
Message   Recorded as Task   Date: 10/10/2017 05:18 PM, Created By: Moody Ndiaye   Task Name: Call Back   Assigned To: Trudy Espinoza   Regarding Patient: Estefany Husain, Status: Active   CommentHoney Gens - 10 Oct 2017 5:18 PM     TASK CREATED  Please send material on the printer with the request from Rheumatology is both the it is for records  Please add a note on the fax face sheet saying we "did not refer her so we really have nothing that is pertinent to send  In close the following for history purposes"   Stephanie Rivera - 11 Oct 2017 7:23 AM     TASK REPLIED TO: Previously Assigned To Gala Gonzales   MRP        Signatures   Electronically signed by : Kasey Griffin DO; Oct 11 2017  4:53PM EST                       (Author)

## 2018-01-15 NOTE — MISCELLANEOUS
Message   Recorded as Task   Date: 04/08/2016 02:08 PM, Created By: [id number]   Task Name: Call Back   Assigned To: [address]   Regarding Patient: Ruthie Kaufman, Status: Active   CommentSmai Gay - 08 Apr 2016 2:08 PM     TASK CREATED  Call patient please  Someone ordered his thyroid and it shows that his Synthroid needs to be decreased  I have change his Synthroid from 125 ÃÂµg to now 100 ÃÂµg daily  I will need to have his TSH done in 3 months  Order is on the printer  If the physician who is giving him the Synthroid has called and made any changes, he can ignore my changes but I need to know about it   Kelly Carrillo - 08 Apr 2016 5:17 PM     TASK REPLIED TO: Previously Assigned To Kelly Carrillo  SPOKE WITH PT STATED DR Livier Lazo IS NOW TAKING CARE OF HER THYROID  SHE WILL BE FOLLOWING UP WITH HIM FOR THIS  Signatures   Electronically signed by : Paula Hoff DO;  Apr 8 2016  5:41PM EST                       (Author)

## 2018-01-22 VITALS
DIASTOLIC BLOOD PRESSURE: 76 MMHG | SYSTOLIC BLOOD PRESSURE: 131 MMHG | WEIGHT: 181.25 LBS | HEART RATE: 41 BPM | BODY MASS INDEX: 30.94 KG/M2 | HEIGHT: 64 IN

## 2018-01-22 VITALS — BODY MASS INDEX: 30.56 KG/M2 | HEIGHT: 64 IN | WEIGHT: 179 LBS

## 2018-01-23 NOTE — CONSULTS
Chief Complaint    1  Hand Problem    History of Present Illness  HPI: 71-year-old female who is here for evaluation of her right ring finger  Year she's been noting right knuckle pain and swelling which has gradually progressed  She denies a specific injury but does have a history of arthritis  She's been wearing a comfort brace for [facility] arthritis treated at another facility  Prior treatment in regards to her right finger  Her discomfort is at the middle knuckle and it is a constant daily ache  She has some limitations in motion  Review of Systems    Constitutional: No fever, no chills, feels well, no tiredness, no recent weight gain or loss  Eyes: No complaints of eyesight problems, no red eyes  ENT: no loss of hearing, no nosebleeds, no sore throat  Cardiovascular: No complaints of chest pain, no palpitations, no leg claudication or lower extremity edema  Respiratory: no compliants of shortness of breath, no wheezing, no cough  Gastrointestinal: no complaints of abdominal pain, no constipation, no nausea or diarrhea, no vomiting, no bloody stools  Genitourinary: no complaints of dysuria, no incontinence  Musculoskeletal: as noted in HPI  Integumentary: no complaints of skin rash or lesion, no itching or dry skin, no skin wounds  Neurological: no complaints of headache, no confusion, no numbness or tingling, no dizziness  Endocrine: No complaints of muscle weakness, no feelings of weakness, no frequent urination, no excessive thirst    Psychiatric: No suicidal thoughts, no anxiety, no feelings of depression  ROS reviewed  Active Problems    1  Abnormal echocardiogram (793 2) (R93 1)   2  Adenocarcinoma of colon (153 9) (C18 9)   3  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)   4  Cardiomegaly (429 3) (I51 7)   5  Chronic hand pain, right (729 5,338 29) (M79 641,G89 29)   6  Colonoscopy (Fiberoptic) Screening   7  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)   8   History of Esophageal stricture (530 3) (K22 2)   9  Feeling anxious (300 00) (F41 9)   10  Hematuria, microscopic (599 72) (R31 29)   11  Hyperlipidemia (272 4) (E78 5)   12  Hypertension (401 9) (I10)   13  Hypothyroidism (244 9) (E03 9)   14  Insomnia (780 52) (G47 00)   15  Irritable bowel syndrome with diarrhea (564 1) (K58 0)   16  Lipoma of upper extremity, unspecified laterality (214 8) (D17 20)   17  Mitral regurgitation (424 0) (I34 0)   18  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)   19  Osteoporosis (733 00) (M81 0)   20  Pacemaker Permanent Placement   21  Paraesophageal hernia (553 3) (K44 9)   22  Paroxysmal atrial fibrillation (427 31) (I48 0)   23  Primary osteoarthritis involving multiple joints (715 09) (M15 0)   24  Restrictive lung disease (518 89) (J98 4)   25  Sick sinus syndrome (427 81) (I49 5)   26  Symptoms involving urinary system (788 99) (R39 9)   27  Thrombophlebitis of arm, left (451 84) (I80 8)   28  Type 2 diabetes mellitus (250 00) (E11 9)   29   Vitamin D deficiency (268 9) (E55 9)    Past Medical History    · History of Abdominal pain, epigastric (789 06) (R10 13)   · History of Abnormal ECG (794 31) (R94 31)   · Acute bronchitis (466 0) (J20 9)   · History of Acute UTI (599 0) (N39 0)   · History of Benign Neoplasm Of The Sigmoid Colon (211 3)   · History of Bronchitis, asthmatic (493 90) (J45 909)   · History of Contact dermatitis due to plant (692 6) (L25 5)   · History of Contact dermatitis due to poison ivy (692 6) (L23 7)   · History of Cough (786 2) (R05)   · History of Dyspepsia (536 8) (K30)   · History of Encounter for routine gynecological examination (V72 31) (Z01 419)   · History of Esophageal stricture (530 3) (K22 2)   · History of Fibrotic Scar (709 2)   · History of abdominal pain (V13 89) (G57 536)   · History of acute pharyngitis (V12 69) (Z87 09)   · History of acute pharyngitis (V12 69) (Z87 09)   · History of acute pharyngitis (V12 69) (Z87 09)   · History of dermatitis (V13 3) (Z87 2)   · History of iron deficiency anemia (V12 3) (Z86 2)   · History of urinary frequency (V13 09) (U57 115)   · History of Jaw pain (784 92) (R68 84)   · History of Nervousness (799 21) (R45 0)   · History of Plant dermatitis (692 6) (L25 5)   · History of Screening for glaucoma (V80 1) (Z13 5)   · History of Urinary Tract Infection (V13 02)   · History of Wax in ear (380 4) (H61 20)   · History of Yeast vaginitis (112 1) (B37 3)    The active problems and past medical history were reviewed and updated today  Surgical History    · History of  Section   · History of Colon Surgery   · History of Total Abdominal Hysterectomy    The surgical history was reviewed and updated today  Family History    · Family history of Father  At Age 62   · Family history of Gastric Cancer (V16 0)   · Family history of Mother  At Age 80    The family history was reviewed and updated today  Social History    · Always uses seat belt   · Copy of advanced directive obtained (V49 89) (Z78 9)   · Drinks coffee   · Drinks 5 cups a day   · Former smoker (V15 82) (Z87 891)   · Quit smoking in    · Has smoke detectors   · No alcohol use   · No drug use   · Uses Safety Equipment - Seatbelts  The social history was reviewed and updated today  Current Meds   1  Atenolol 50 MG Oral Tablet; TAKE 1 TABLET AT BEDTIME; Therapy: 13SRW9656 to (Last Rx:2017)  Requested for: 73NXT5265 Ordered   2  BD Pen Needle Mini U/F 31G X 5 MM Miscellaneous; used twice daily; Therapy: 81FFA5833 to (Evaluate:76Oje9217); Last Rx:2013 Ordered   3  Byetta 10 MCG Pen 10 MCG/0 04ML SOLN; INJECT 10 UNIT Twice daily; Therapy: (Recorded:2015) to Recorded   4  Centrum Silver TABS; TAKE 1 TABLET DAILY; Therapy: (Recorded:2014) to Recorded   5  Diclofenac Sodium 1 % Transdermal Gel; APPLY TO TENDER AREA AND RUB IN WELL   UP TO FOUR TIMES A DAY;    Therapy: 36SEU6194 to (Last Rx:2016) Requested for: 08UYL6576 Ordered   6  DULoxetine HCl - 20 MG Oral Capsule Delayed Release Particles; TAKE ONE CAPSULE   BY MOUTH EVERY DAY; Therapy: 39MBS8520 to (Last DM:93MEA6792)  Requested for: 94NJY9745 Ordered   7  Eliquis 5 MG Oral Tablet; take 1 tablet by mouth twice a day; Therapy: 48Xic5935 to (Evaluate:31Tkm9321)  Requested for: 02Ihc2755; Last   Rx:68Vza6363; Status: ACTIVE - Transmit to Pharmacy - Awaiting Verification Ordered   8  Ferrous Sulfate TABS; TAKE 1 TABLET DAILY; Therapy: (21 ) to Recorded   9  Fish Oil CAPS; take 1 capsule daily; Therapy: (Recorded:03Apr2014) to Recorded   10  FreeStyle Lite Test In Vitro Strip; USE 4 TIMES DAILY; Therapy: 81PTL5405 to (Last Rx:38Zjt1692)  Requested for: 22Boe2182 Ordered   11  MetFORMIN HCl  MG Oral Tablet Extended Release 24 Hour; Take 1 tablet twice    daily; Therapy: 25Apr2015 to  Requested for: 22Nov2017 Recorded   12  Microlet Lancets Miscellaneous; USE 4 TIMES DAILY; Therapy: 62KSZ3163 to (Last Rx:12Mar2015)  Requested for: 90DQR1508 Ordered   13  Precision QID Monitor Device; USE AS DIRECTED; Therapy: 16DXR2532 to (Last Rx:18Mar2015)  Requested for: 34SYH0671 Ordered   14  Simvastatin 20 MG Oral Tablet; take 1 tablet daily at bedtime; Therapy: 87VAC6340 to (Last SX:48QKJ9643)  Requested for: 05XZV3586 Ordered   15  Synthroid 125 MCG Oral Tablet; TAKE 1 TABLET DAILY; Therapy: (0370 4729760) to  Requested for: 22Nov2017 Recorded   16  Ventolin  (90 Base) MCG/ACT Inhalation Aerosol Solution; INHALE 2 PUFFS    EVERY 4 HOURS AS NEEDED FOR COUGH AND WHEEZE;    Therapy: 19DBC4781 to (Evaluate:10Jun2018)  Requested for: 54ADZ1057; Last    Rx:15Jun2017 Ordered    The medication list was reviewed and updated today  Allergies    1  No Known Drug Allergies    2  No Known Environmental Allergies   3   No Known Food Allergies    Vitals  Signs    Heart Rate: 99ZHQAYEMX: 004ALMXLRVPU: 12LQIMBM: 5 ft 4 inWeight: 181 lb 4 ozBMI Calculated: 31 11BSA Calculated: 1 88    Physical Exam      General: No acute distress, age-appropriate  Neck: Supple, trachea midline  HEENT: Normocephalic atraumatic, mucous membranes are moist, sclera are nonicteric  Cardiovascular: No discernable arrhythmia  Respiratory: Breathing is even and unlabored, no stridor or audible wheezing  Psychiatric: Awake alert and oriented x3, normal mood and affect  Abdomen: Without rebound or guarding  No shortness of breath, mood and affect are appropriate  Right Fingers:   Right ring finger reveals a hypertrophic PIP joint with imitations and motion 25-55 degrees and tenderness to deep palpation  No ligament laxity is noted today at the PIP joint  Skin: was evaluated and demonstrated no masses, no abrasions, no erythema, no effusion, no edema, no fluctuance, no induration, no laceration, no ulceration, no lymphadenopathy  Right Upper Neurovascular: were evaluated and demonstrated: The patient has normal motor strength to the median, ulnar and radial nerve  Normal sensation to the median, ulnar, and radial nerve  Normal pulses in the radial and ulnar artery  Capillary refill is < 2 seconds  Results/Data  I personally reviewed the films/images/results in the office today  My interpretation follows  X-ray Review 3 views of the right hand were obtained here today confirm obliteration of the right ring finger PIP joint with significant sclerosis and osteophyte formation and cystic changes present  Procedure           Injection: After appropriate confirmation of the patient, site, and procedure, the Right ring PIP joint was prepared in a sterile fashion  The area was then injected with 6 milligrams of Celestone and 1 of 1% lidocaine without epinephrine  A sterile bandage was then applied  The patient tolerated the procedure well   All risks, benefits, and potential complications of the steroid injection were discussed with the patient, which were including but not limited to: Bleeding, small risk of infection, numbness and tingling within the area of injection, soreness at the injection site, potential brief exacerbation of pain, as well as steroid reactions including flushing, increased aggravation, and potential increased blood sugar in diabetics  Assessment    1  Osteoarthritis of finger of right hand (035 94) (M19 041)    Plan     Osteoarthritis of finger of right hand    · *1 - [facility]  Fashion right ring finger PIP join  immoblization splint  Status: Active - Retrospective Authorization  Requested for:  69TII1982  Care Summary provided  : Yes   · Follow-up visit in 3 months Evaluation and Treatment  Follow-up  Status: Hold For -  Scheduling  Requested for: 03DDW4463   · Joint Bursa Aspiration &/or Injection Small; Status:Complete - Retrospective Authorization;    Done: 79PFS5520     Primary osteoarthritis involving multiple joints    · Betamethasone Sod Phos & Acet 6 (3-3) MG/ML Injection Suspension    Discussion/Summary    Conservative versus surgical treatment options were thoroughly discussed with patient  At this time she has not had any previous conservative treatment and her  is undergoing some upcoming surgery  Though her pain level does dictate having surgery she wishes to hold off on this until her  has healed  Cold cortisone injection into the right ring PIP joint was given today and she tolerated this well she will be referred to occupational therapy for a right ring finger PIP joint immobilization splint to be worn for comfort  She'll follow-up in 3 months for recheck  Genesis Clarke acted as a scribe into visit the presence of attending surgeon  Physician Attestation:    Piter Shaffer MD, personally performed the services described in this documentation as scribed in my presence           Signatures   Electronically signed by : [facility]; Jan 8 2018  4:16PM EST                       (/Recorder)    Electronically signed by : RAJ Cooley ; Jan 8 2018  4:35PM EST                       (Author)

## 2018-01-23 NOTE — CONSULTS
Chief Complaint    1  Hand Problem    History of Present Illness  HPI: 52-year-old female who is here for evaluation of her right ring finger  Year she's been noting right knuckle pain and swelling which has gradually progressed  She denies a specific injury but does have a history of arthritis  She's been wearing a comfort brace for [facility] arthritis treated at another facility  Prior treatment in regards to her right finger  Her discomfort is at the middle knuckle and it is a constant daily ache  She has some limitations in motion  Review of Systems  Focused-Female Orthopedics:   Constitutional: No fever, no chills, feels well, no tiredness, no recent weight gain or loss  Eyes: No complaints of eyesight problems, no red eyes  ENT: no loss of hearing, no nosebleeds, no sore throat  Cardiovascular: No complaints of chest pain, no palpitations, no leg claudication or lower extremity edema  Respiratory: no compliants of shortness of breath, no wheezing, no cough  Gastrointestinal: no complaints of abdominal pain, no constipation, no nausea or diarrhea, no vomiting, no bloody stools  Genitourinary: no complaints of dysuria, no incontinence  Musculoskeletal: as noted in HPI  Integumentary: no complaints of skin rash or lesion, no itching or dry skin, no skin wounds  Neurological: no complaints of headache, no confusion, no numbness or tingling, no dizziness  Endocrine: No complaints of muscle weakness, no feelings of weakness, no frequent urination, no excessive thirst    Psychiatric: No suicidal thoughts, no anxiety, no feelings of depression  ROS Reviewed:   ROS reviewed  Active Problems    1  Abnormal echocardiogram (793 2) (R93 1)   2  Adenocarcinoma of colon (153 9) (C18 9)   3  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)   4  Cardiomegaly (429 3) (I51 7)   5  Chronic hand pain, right (729 5,338 29) (M79 641,G89 29)   6  Colonoscopy (Fiberoptic) Screening   7   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)   8  History of Esophageal stricture (530 3) (K22 2)   9  Feeling anxious (300 00) (F41 9)   10  Hematuria, microscopic (599 72) (R31 29)   11  Hyperlipidemia (272 4) (E78 5)   12  Hypertension (401 9) (I10)   13  Hypothyroidism (244 9) (E03 9)   14  Insomnia (780 52) (G47 00)   15  Irritable bowel syndrome with diarrhea (564 1) (K58 0)   16  Lipoma of upper extremity, unspecified laterality (214 8) (D17 20)   17  Mitral regurgitation (424 0) (I34 0)   18  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)   19  Osteoporosis (733 00) (M81 0)   20  Pacemaker Permanent Placement   21  Paraesophageal hernia (553 3) (K44 9)   22  Paroxysmal atrial fibrillation (427 31) (I48 0)   23  Primary osteoarthritis involving multiple joints (715 09) (M15 0)   24  Restrictive lung disease (518 89) (J98 4)   25  Sick sinus syndrome (427 81) (I49 5)   26  Symptoms involving urinary system (788 99) (R39 9)   27  Thrombophlebitis of arm, left (451 84) (I80 8)   28  Type 2 diabetes mellitus (250 00) (E11 9)   29  Vitamin D deficiency (268 9) (E55 9)    Past Medical History    1  History of Abdominal pain, epigastric (789 06) (R10 13)   2  History of Abnormal ECG (794 31) (R94 31)   3  Acute bronchitis (466 0) (J20 9)   4  History of Acute UTI (599 0) (N39 0)   5  History of Benign Neoplasm Of The Sigmoid Colon (211 3)   6  History of Bronchitis, asthmatic (493 90) (J45 909)   7  History of Contact dermatitis due to plant (692 6) (L25 5)   8  History of Contact dermatitis due to poison ivy (692 6) (L23 7)   9  History of Cough (786 2) (R05)   10  History of Dyspepsia (536 8) (K30)   11  History of Encounter for routine gynecological examination (V72 31) (Z01 419)   12  History of Esophageal stricture (530 3) (K22 2)   13  History of Fibrotic Scar (709 2)   14  History of abdominal pain (V13 89) (Z87 898)   15  History of acute pharyngitis (V12 69) (Z87 09)   16  History of acute pharyngitis (V12 69) (Z87 09)   17   History of acute pharyngitis (V12 69) (Z87 09)   18  History of dermatitis (V13 3) (Z87 2)   19  History of iron deficiency anemia (V12 3) (Z86 2)   20  History of urinary frequency (V13 09) (Z87 898)   21  History of Jaw pain (784 92) (R68 84)   22  History of Nervousness (799 21) (R45 0)   23  History of Plant dermatitis (692 6) (L25 5)   24  History of Screening for glaucoma (V80 1) (Z13 5)   25  History of Urinary Tract Infection (V13 02)   26  History of Wax in ear (380 4) (H61 20)   27  History of Yeast vaginitis (112 1) (B37 3)  Active Problems And Past Medical History Reviewed: The active problems and past medical history were reviewed and updated today  Surgical History    1  History of  Section   2  History of Colon Surgery   3  History of Total Abdominal Hysterectomy  Surgical History Reviewed: The surgical history was reviewed and updated today  Family History    1  Family history of Father  At Age 62   2  Family history of Gastric Cancer (V16 0)   3  Family history of Mother  At Age 80  Family History Reviewed: The family history was reviewed and updated today  Social History    · Always uses seat belt   · Copy of advanced directive obtained (V49 89) (Z78 9)   · Drinks coffee   · Former smoker (V15 82) (M98 122)   · Has smoke detectors   · No alcohol use   · No drug use   · Uses Safety Equipment - Seatbelts  Social History Reviewed: The social history was reviewed and updated today  Current Meds   1  Atenolol 50 MG Oral Tablet; TAKE 1 TABLET AT BEDTIME; Therapy: 67JLL3701 to (Last Rx:2017)  Requested for: 70ZXI6551 Ordered   2  BD Pen Needle Mini U/F 31G X 5 MM Miscellaneous; used twice daily; Therapy: 98PKC7057 to (Evaluate:08Yqn8350); Last Rx:2013 Ordered   3  Byetta 10 MCG Pen 10 MCG/0 04ML SOLN; INJECT 10 UNIT Twice daily; Therapy: (Recorded:2015) to Recorded   4  Centrum Silver TABS; TAKE 1 TABLET DAILY;    Therapy: (Recorded:2014) to Recorded   5  Diclofenac Sodium 1 % Transdermal Gel; APPLY TO TENDER AREA AND RUB IN WELL   UP TO FOUR TIMES A DAY; Therapy: 29DOK0098 to (Last Rx:16Bux5983)  Requested for: 54NOO1568 Ordered   6  DULoxetine HCl - 20 MG Oral Capsule Delayed Release Particles; TAKE ONE CAPSULE   BY MOUTH EVERY DAY; Therapy: 84TJM4391 to (Last TF:70XYE6753)  Requested for: 45CGT2739 Ordered   7  Eliquis 5 MG Oral Tablet; take 1 tablet by mouth twice a day; Therapy: 62Sii8981 to (Evaluate:41Cdx2613)  Requested for: 14Xus7452; Last   Rx:07Mli2427; Status: ACTIVE - Transmit to Pharmacy - Awaiting Verification Ordered   8  Ferrous Sulfate TABS; TAKE 1 TABLET DAILY; Therapy: (21 ) to Recorded   9  Fish Oil CAPS; take 1 capsule daily; Therapy: (Recorded:21Dzl1614) to Recorded   10  FreeStyle Lite Test In Vitro Strip; USE 4 TIMES DAILY; Therapy: 16SHE0224 to (Last Rx:83Ypy7335)  Requested for: 57Eow0654 Ordered   11  MetFORMIN HCl  MG Oral Tablet Extended Release 24 Hour; Take 1 tablet twice    daily; Therapy: 09Nhf3528 to  Requested for: 22Nov2017 Recorded   12  Microlet Lancets Miscellaneous; USE 4 TIMES DAILY; Therapy: 75BUC9262 to (Last Rx:12Mar2015)  Requested for: 23EQU0608 Ordered   13  Precision QID Monitor Device; USE AS DIRECTED; Therapy: 73BMJ8982 to (Last Rx:18Mar2015)  Requested for: 71QVU5468 Ordered   14  Simvastatin 20 MG Oral Tablet; take 1 tablet daily at bedtime; Therapy: 70RNQ9421 to (Last YP:45RUT5730)  Requested for: 86LGV2094 Ordered   15  Synthroid 125 MCG Oral Tablet; TAKE 1 TABLET DAILY; Therapy: (Dona Camejo) to  Requested for: 22Nov2017 Recorded   16  Ventolin  (90 Base) MCG/ACT Inhalation Aerosol Solution; INHALE 2 PUFFS    EVERY 4 HOURS AS NEEDED FOR COUGH AND WHEEZE;    Therapy: 61QZQ5203 to (Evaluate:10Jun2018)  Requested for: 34UQV1570; Last    Rx:15Jun2017 Ordered  Medication List Reviewed: The medication list was reviewed and updated today  Allergies    1  No Known Drug Allergies    2  No Known Environmental Allergies   3  No Known Food Allergies    Vitals  Signs   Recorded: 26IDR4575 03:19PM   Heart Rate: 41  Systolic: 586  Diastolic: 76  Height: 5 ft 4 in  Weight: 181 lb 4 oz  BMI Calculated: 31 11  BSA Calculated: 1 88    Physical Exam      General: No acute distress, age-appropriate  Neck: Supple, trachea midline  HEENT: Normocephalic atraumatic, mucous membranes are moist, sclera are nonicteric  Cardiovascular: No discernable arrhythmia  Respiratory: Breathing is even and unlabored, no stridor or audible wheezing  Psychiatric: Awake alert and oriented x3, normal mood and affect  Abdomen: Without rebound or guarding  No shortness of breath, mood and affect are appropriate  Right Fingers:   Right ring finger reveals a hypertrophic PIP joint with imitations and motion 25-55 degrees and tenderness to deep palpation  No ligament laxity is noted today at the PIP joint  Skin: was evaluated and demonstrated no masses, no abrasions, no erythema, no effusion, no edema, no fluctuance, no induration, no laceration, no ulceration, no lymphadenopathy  Right Upper Neurovascular: were evaluated and demonstrated: The patient has normal motor strength to the median, ulnar and radial nerve  Normal sensation to the median, ulnar, and radial nerve  Normal pulses in the radial and ulnar artery  Capillary refill is < 2 seconds  Results/Data  Diagnostic Studies Reviewed: I personally reviewed the films/images/results in the office today  My interpretation follows  X-ray Review 3 views of the right hand were obtained here today confirm obliteration of the right ring finger PIP joint with significant sclerosis and osteophyte formation and cystic changes present  Procedure           Injection: After appropriate confirmation of the patient, site, and procedure, the Right ring PIP joint was prepared in a sterile fashion   The area was then injected with 6 milligrams of Celestone and 1 of 1% lidocaine without epinephrine  A sterile bandage was then applied  The patient tolerated the procedure well  All risks, benefits, and potential complications of the steroid injection were discussed with the patient, which were including but not limited to: Bleeding, small risk of infection, numbness and tingling within the area of injection, soreness at the injection site, potential brief exacerbation of pain, as well as steroid reactions including flushing, increased aggravation, and potential increased blood sugar in diabetics  Assessment    1  Osteoarthritis of finger of right hand (715 94) (M19 041)    Plan  Osteoarthritis of finger of right hand    1  *1 - [facility]  Fashion right ring finger PIP join   immoblization splint  Status: Active - Retrospective Authorization  Requested for:   33JAO2080  Care Summary provided  : Yes   2  Follow-up visit in 3 months Evaluation and Treatment  Follow-up  Status: Hold For -   Scheduling  Requested for: 62LAQ5987   3  Joint Bursa Aspiration &/or Injection Small; Status:Complete - Retrospective Authorization;     Done: 76JCN0387  Primary osteoarthritis involving multiple joints    4  Betamethasone Sod Phos & Acet 6 (3-3) MG/ML Injection Suspension    Discussion/Summary  Discussion Summary:   Conservative versus surgical treatment options were thoroughly discussed with patient  At this time she has not had any previous conservative treatment and her  is undergoing some upcoming surgery  Though her pain level does dictate having surgery she wishes to hold off on this until her  has healed  Cold cortisone injection into the right ring PIP joint was given today and she tolerated this well she will be referred to occupational therapy for a right ring finger PIP joint immobilization splint to be worn for comfort  She'll follow-up in 3 months for akilah Perez Hilaria Chaudhry acted as a scribe into visit the presence of attending surgeon  Physician Attestation:    Darcie Kent MD, personally performed the services described in this documentation as scribed in my presence           Future Appointments    Signatures   Electronically signed by : Charles Pierre, [facility]; Jan 8 2018  4:16PM EST                       (/Recorder)    Electronically signed by : RAJ Bartlett ; Jan 8 2018  4:35PM EST                       (Author)

## 2018-01-24 VITALS
SYSTOLIC BLOOD PRESSURE: 120 MMHG | RESPIRATION RATE: 16 BRPM | BODY MASS INDEX: 31.07 KG/M2 | TEMPERATURE: 97.8 F | HEART RATE: 68 BPM | HEIGHT: 64 IN | DIASTOLIC BLOOD PRESSURE: 72 MMHG | WEIGHT: 182 LBS

## 2018-01-24 VITALS
DIASTOLIC BLOOD PRESSURE: 80 MMHG | BODY MASS INDEX: 30.83 KG/M2 | WEIGHT: 180.6 LBS | RESPIRATION RATE: 16 BRPM | HEIGHT: 64 IN | HEART RATE: 80 BPM | SYSTOLIC BLOOD PRESSURE: 120 MMHG

## 2018-01-31 ENCOUNTER — TELEPHONE (OUTPATIENT)
Dept: FAMILY MEDICINE CLINIC | Facility: CLINIC | Age: 78
End: 2018-01-31

## 2018-01-31 NOTE — TELEPHONE ENCOUNTER
Patient of dr Burt Siu who has a rattle in her chest and a cough since Sunday night  She has no fever but is achy and has no appetitie  She is asking what she can do  She can come in for a visit but we only have 15 min time slots left  pt # [phone]

## 2018-02-01 ENCOUNTER — OFFICE VISIT (OUTPATIENT)
Dept: FAMILY MEDICINE CLINIC | Facility: CLINIC | Age: 78
End: 2018-02-01
Payer: MEDICARE

## 2018-02-01 VITALS
WEIGHT: 174.3 LBS | BODY MASS INDEX: 29.92 KG/M2 | HEART RATE: 84 BPM | RESPIRATION RATE: 16 BRPM | SYSTOLIC BLOOD PRESSURE: 120 MMHG | DIASTOLIC BLOOD PRESSURE: 70 MMHG | TEMPERATURE: 98.5 F

## 2018-02-01 DIAGNOSIS — R05.9 COUGH: Primary | ICD-10-CM

## 2018-02-01 PROBLEM — R31.29 HEMATURIA, MICROSCOPIC: Status: ACTIVE | Noted: 2017-02-15

## 2018-02-01 PROBLEM — J06.9 UPPER RESPIRATORY INFECTION: Status: ACTIVE | Noted: 2018-02-01

## 2018-02-01 PROBLEM — K58.0 IRRITABLE BOWEL SYNDROME WITH DIARRHEA: Status: ACTIVE | Noted: 2017-03-23

## 2018-02-01 PROBLEM — J98.4 RESTRICTIVE LUNG DISEASE: Status: ACTIVE | Noted: 2017-05-04

## 2018-02-01 PROCEDURE — 99214 OFFICE O/P EST MOD 30 MIN: CPT | Performed by: FAMILY MEDICINE

## 2018-02-01 RX ORDER — METFORMIN HYDROCHLORIDE 500 MG/1
TABLET, EXTENDED RELEASE ORAL
COMMUNITY
Start: 2018-01-15 | End: 2018-03-06 | Stop reason: SDUPTHER

## 2018-02-01 RX ORDER — LEVOTHYROXINE SODIUM 112 UG/1
125 TABLET ORAL DAILY
COMMUNITY
Start: 2018-01-25 | End: 2018-05-16

## 2018-02-01 RX ORDER — METFORMIN HYDROCHLORIDE EXTENDED-RELEASE TABLETS 500 MG/1
TABLET, FILM COATED, EXTENDED RELEASE ORAL
COMMUNITY
Start: 2017-11-27 | End: 2018-02-01 | Stop reason: SDUPTHER

## 2018-02-01 RX ORDER — ATENOLOL 50 MG/1
1 TABLET ORAL
COMMUNITY
Start: 2016-01-06 | End: 2018-02-06 | Stop reason: SDUPTHER

## 2018-02-01 RX ORDER — SIMVASTATIN 40 MG
TABLET ORAL
COMMUNITY
Start: 2018-01-27 | End: 2018-02-01 | Stop reason: SDUPTHER

## 2018-02-01 RX ORDER — EXENATIDE 250 UG/ML
10 INJECTION SUBCUTANEOUS 2 TIMES DAILY
COMMUNITY
Start: 2017-10-29 | End: 2019-04-17 | Stop reason: SDUPTHER

## 2018-02-01 RX ORDER — EMPAGLIFLOZIN 10 MG/1
10 TABLET, FILM COATED ORAL DAILY
COMMUNITY
Start: 2018-01-12 | End: 2018-07-12 | Stop reason: SDUPTHER

## 2018-02-01 RX ORDER — LANCETS
EACH MISCELLANEOUS 4 TIMES DAILY
COMMUNITY
Start: 2015-03-12 | End: 2018-05-07

## 2018-02-01 RX ORDER — METFORMIN HYDROCHLORIDE 500 MG/1
500 TABLET, EXTENDED RELEASE ORAL 2 TIMES DAILY
COMMUNITY
Start: 2015-04-25 | End: 2018-08-15 | Stop reason: SDUPTHER

## 2018-02-01 RX ORDER — DULOXETIN HYDROCHLORIDE 20 MG/1
CAPSULE, DELAYED RELEASE ORAL
COMMUNITY
Start: 2018-01-07 | End: 2018-02-01 | Stop reason: SDUPTHER

## 2018-02-01 RX ORDER — ALBUTEROL SULFATE 90 UG/1
2 AEROSOL, METERED RESPIRATORY (INHALATION) EVERY 4 HOURS PRN
COMMUNITY
Start: 2012-03-07 | End: 2018-08-09 | Stop reason: SDUPTHER

## 2018-02-01 RX ORDER — LEVOTHYROXINE SODIUM 0.12 MG/1
1 TABLET ORAL DAILY
COMMUNITY
End: 2018-02-01 | Stop reason: SDUPTHER

## 2018-02-01 RX ORDER — SIMVASTATIN 20 MG
1 TABLET ORAL
COMMUNITY
Start: 2014-02-19 | End: 2018-12-05 | Stop reason: SDUPTHER

## 2018-02-01 RX ORDER — DULOXETIN HYDROCHLORIDE 20 MG/1
1 CAPSULE, DELAYED RELEASE ORAL DAILY
COMMUNITY
Start: 2017-03-23 | End: 2018-03-16 | Stop reason: SDUPTHER

## 2018-02-01 RX ORDER — HYDROCODONE POLISTIREX AND CHLORPHENIRAMINE POLISTIREX 10; 8 MG/5ML; MG/5ML
5 SUSPENSION, EXTENDED RELEASE ORAL
Qty: 120 ML | Refills: 0 | Status: SHIPPED | OUTPATIENT
Start: 2018-02-01 | End: 2018-03-06 | Stop reason: ALTCHOICE

## 2018-02-01 NOTE — PROGRESS NOTES
Assessment/Plan:    Upper respiratory infection  Patient has a viral infection but the cough is keeping her from sleeping at night  Her peak flows are also low at 250 expected around 400  Because of the diabetes however we will hold off on any prednisone and try to get her to sleep at night so she can heel  Each night she will take 1 tsp or 5 mL of cough medicine  If this does not work she will take 10 mL of cough medicine  Patient will gradually get better  If she gets worse she will let us know  If she gets a temperature greater than 101 5 she will let us now  Her cough will slowly go away can last anywhere from 4-6 weeks      Sindhu was seen today for cold like symptoms and cough  Diagnoses and all orders for this visit:    Cough  -     Peak flow  -     hydrocodone-chlorpheniramine polistirex (TUSSIONEX) 10-8 mg/5 mL ER suspension; Take 5 mL by mouth daily at bedtime as needed for cough If 5 mL does not work, the next night take 10 mL Max Daily Amount: 5 mL        Subjective: Today is Thursday, this past Sunday night after dinner patient had pain in her neck  She went to bed and felt okay  She woke up the next day and Monday and Tuesday she was coughing all day  Wednesday she started with a runny nose    She is overall feels are head hurts all over, she is coughing as a result of the coughing she is not sleeping  She had has no fevers or chills but does complain of myalgias      Cough           Constitutional  No fatigue, No weight loss, No weight gain, No fever, No chills    Respiratory  As above    Cardiovascular  No chest pain, No palpitations, No arrhythmia, No orthopnea, No nocturnal dyspnea, No edema, No claudication    Breasts (R,L)  No discharge from nipple, No breast tenderness, No breast mass    Gastrointestinal  No loss of appetite, No dysphagia, No abdominal pain, No nausea, No vomiting, No change in bowel habits, No diarrhea, No constipation, No blood in stool    Genitourinary, Female  No increased frequency of urination, No dysuria, No hematuria, No nocturia, No urinary incontinence, No vaginal discharge, No abnormal vaginal bleeding, No pelvic pain, No menstrual problem, No menopausal problem    Neurologic  No headache, No dizziness, No lightheadedness, No syncope, No vertigo, No weakness, No numbness, No paresthesia, No tremor    Psychiatric  No difficulty sleeping, No mood swings, Not feeling anxious, Not feeling depressed, No confusion, No memory loss    Muscular skeletal  Myalgias, arthralgias, no joint swelling or stiffness, no limb pain or swelling    Heme  No swollen glands, no easy bruising    Past Medical History:   Diagnosis Date    A-fib ([place] [address] )     Acid reflux     Arthritis     Cancer ([place] [address] )     COPD (chronic obstructive pulmonary disease) ([place] [address] )     Diabetes mellitus ([place] [address] )     High cholesterol     Hypothyroidism      Social History   Substance Use Topics    Smoking status: Former Smoker     Types: Cigarettes     Quit date: 2002    Smokeless tobacco: Never Used    Alcohol use No     Family History   Problem Relation Age of Onset    Heart disease Mother     Diabetes Mother     Asthma Father     Stomach cancer Father     Cancer Paternal Grandmother     Cancer Paternal Grandfather     Stomach cancer Sister        Current Outpatient Prescriptions   Medication Sig Dispense Refill    albuterol (VENTOLIN HFA) 90 mcg/act inhaler Inhale 2 puffs every 4 (four) hours as needed      apixaban (ELIQUIS) 5 mg Take 1 tablet by mouth 2 (two) times a day      atenolol (TENORMIN) 50 mg tablet Take 1 tablet by mouth      BYETTA 10 MCG PEN 10 MCG/0 04ML SOPN       diclofenac sodium (VOLTAREN) 1 % Place on the skin      DULoxetine (CYMBALTA) 20 mg capsule Take 1 capsule by mouth daily      Ferrous Sulfate 27 MG TABS Take 1 tablet by mouth daily      fluticasone-salmeterol (ADVAIR) 250-50 mcg/dose Inhale 1 puff 2 (two) times a day as needed        glucose blood (FREESTYLE LITE) test strip by In Vitro route 4 (four) times a day      Insulin Pen Needle (B-D UF III MINI PEN NEEDLES) 31G X 5 MM MISC by Does not apply route      JARDIANCE 10 MG TABS       levothyroxine 112 mcg tablet       metFORMIN (GLUCOPHAGE-XR) 500 mg 24 hr tablet Take 1 tablet by mouth 2 (two) times a day      MICROLET LANCETS MISC by Does not apply route 4 (four) times a day      Misc Natural Products (OSTEO BI-FLEX ADV JOINT SHIELD PO) Take by mouth daily   Multiple Vitamins-Minerals (CENTRUM SILVER 50+MEN) TABS Take 1 tablet by mouth daily      Omega-3 Fatty Acids (FISH OIL) 645 MG CAPS Take 1 capsule by mouth daily      simvastatin (ZOCOR) 20 mg tablet Take 1 tablet by mouth      hydrocodone-chlorpheniramine polistirex (TUSSIONEX) 10-8 mg/5 mL ER suspension Take 5 mL by mouth daily at bedtime as needed for cough If 5 mL does not work, the next night take 10 mL Max Daily Amount: 5 mL 120 mL 0    metFORMIN (GLUCOPHAGE-XR) 500 mg 24 hr tablet       omeprazole (PriLOSEC) 40 MG capsule Take 40 mg by mouth daily in the early morning   oxyCODONE (ROXICODONE) 5 mg immediate release tablet May take 5-10mg q4-6hrs prn pain 30 tablet 0     No current facility-administered medications for this visit  Objective:    Vitals:    02/01/18 1022   BP: 120/70   Pulse: 84   Resp: 16   Temp: 98 5 °F (36 9 °C)     Body mass index is 29 92 kg/m²       Constitutional  Patient appears ill and is coughing but is nontoxic    Mental Status  Alert, Cooperative, oriented to time person and place, recent and remote memory intact, mood and affect normal , patient is reasonable    HEENT  TMs are clear, turbinates are moderate size and red watery discharge pharynx benign    Neck  No neck mass, No thyromegaly, No thyroid nodule, No thyroid tenderness    Respiratory  Some coarse anterior breath sounds that clear with coughing  Respiratory effort normal, Breath sounds normal, No rales, No rhonchi, No wheezing Cardiac  Heart rate normal, Heart rhythm normal, Heart sounds normal, No heart murmur    Vascular  Carotid pulse normal, No carotid bruit, No varicose veins, No leg edema    Breasts (R,L)  No discharge from nipple, No breast tenderness, No breast mass, No nipple retraction, No skin changes    Gastrointestinal  Bowel sounds normal, Abdomen soft, No hepatomegaly, No splenomegaly, No abdominal tenderness, No abdominal mass, No hernia, No hemorrhoids    Genitourinary, Female  Vulva normal, No erythema of urethra    Genitourinary, Female  Vulva normal, No erythema of vulva, Vaginal mucosa normal, No vaginal discharge, No lesion of urethra, No urethral discharge, No bladder distention, No bladder tenderness, Cervix normal, No cervical inflammation, No cervical lesion, No cervical discharge, No cervical tenderness, Uterus normal size, No uterine tenderness, No adnexal tenderness, No adnexal mass, No pelvic mass    Lymphatics  No axillary lymphadenopathy, No inguinal lymphadenopathy    Skin  Pattern of hair growth normal, No skin rash, No abnormal skin lesion, No acne

## 2018-02-01 NOTE — ASSESSMENT & PLAN NOTE
Patient has a viral infection but the cough is keeping her from sleeping at night  Her peak flows are also low at 250 expected around 400  Because of the diabetes however we will hold off on any prednisone and try to get her to sleep at night so she can heel  Each night she will take 1 tsp or 5 mL of cough medicine  If this does not work she will take 10 mL of cough medicine     Patient will gradually get better  If she gets worse she will let us know  If she gets a temperature greater than 101 5 she will let us now  Her cough will slowly go away can last anywhere from 4-6 weeks

## 2018-02-01 NOTE — PATIENT INSTRUCTIONS
Upper respiratory infection  Patient has a viral infection but the cough is keeping her from sleeping at night  Her peak flows are also low at 250 expected around 400  Because of the diabetes however we will hold off on any prednisone and try to get her to sleep at night so she can heel  Each night she will take 1 tsp or 5 mL of cough medicine  If this does not work she will take 10 mL of cough medicine     Patient will gradually get better  If she gets worse she will let us know  If she gets a temperature greater than 101 5 she will let us now  Her cough will slowly go away can last anywhere from 4-6 weeks

## 2018-02-02 ENCOUNTER — TELEPHONE (OUTPATIENT)
Dept: FAMILY MEDICINE CLINIC | Facility: CLINIC | Age: 78
End: 2018-02-02

## 2018-02-06 ENCOUNTER — TELEPHONE (OUTPATIENT)
Dept: FAMILY MEDICINE CLINIC | Facility: CLINIC | Age: 78
End: 2018-02-06

## 2018-02-06 DIAGNOSIS — I10 ESSENTIAL HYPERTENSION: Primary | ICD-10-CM

## 2018-02-06 RX ORDER — ATENOLOL 50 MG/1
50 TABLET ORAL DAILY
Qty: 90 TABLET | Refills: 3 | Status: SHIPPED | OUTPATIENT
Start: 2018-02-06 | End: 2018-03-12 | Stop reason: SDUPTHER

## 2018-02-12 ENCOUNTER — TELEPHONE (OUTPATIENT)
Dept: FAMILY MEDICINE CLINIC | Facility: CLINIC | Age: 78
End: 2018-02-12

## 2018-02-12 NOTE — TELEPHONE ENCOUNTER
Patient's bladder leaks when she sneezes or coughs and would like to know who to go to to fix it    [phone]

## 2018-02-12 NOTE — TELEPHONE ENCOUNTER
We have talked about this many times as the only way to do this is surgery and she was not   Interested    If she is now we can send her to Dr Radha Mcpherson

## 2018-02-26 ENCOUNTER — CLINICAL SUPPORT (OUTPATIENT)
Dept: CARDIOLOGY CLINIC | Facility: CLINIC | Age: 78
End: 2018-02-26
Payer: MEDICARE

## 2018-02-26 DIAGNOSIS — I49.5 SICK SINUS SYNDROME (HCC): Primary | ICD-10-CM

## 2018-02-26 DIAGNOSIS — Z95.0 CARDIAC PACEMAKER: ICD-10-CM

## 2018-02-26 DIAGNOSIS — I48.0 PAROXYSMAL ATRIAL FIBRILLATION (HCC): ICD-10-CM

## 2018-02-26 PROCEDURE — 93296 REM INTERROG EVL PM/IDS: CPT | Performed by: INTERNAL MEDICINE

## 2018-02-26 PROCEDURE — 93294 REM INTERROG EVL PM/LDLS PM: CPT | Performed by: INTERNAL MEDICINE

## 2018-02-26 NOTE — PROGRESS NOTES
CARELINK TRANSMISSION (PPM);  BATTERY STATUS ADEQUATE (7 5 YRS)  AP-71 5%  -3 2%  ALL AVAILABLE LEAD PARAMETERS APPEAR WITHIN NORMAL LIMITS & STABLE  [address]  LONGEST DURATION @ >= 12 HRS  ALL AVAIL EGRAMS SHOW PAF/AFL   9 SVT- AF EPISODE FOR RVR (AVG CL ~ 330 MS)  AT/AF BURDEN - 4 2%  HX OF PAF   PT TAKES ELIQUIS, ATENOLOL  NORMAL DEVICE FUNCTION   eb       Current Outpatient Prescriptions:     albuterol (VENTOLIN HFA) 90 mcg/act inhaler, Inhale 2 puffs every 4 (four) hours as needed, Disp: , Rfl:     apixaban (ELIQUIS) 5 mg, Take 1 tablet by mouth 2 (two) times a day, Disp: , Rfl:     atenolol (TENORMIN) 50 mg tablet, Take 1 tablet (50 mg total) by mouth daily, Disp: 90 tablet, Rfl: 3    BYETTA 10 MCG PEN 10 MCG/0 04ML SOPN, , Disp: , Rfl:     diclofenac sodium (VOLTAREN) 1 %, Place on the skin, Disp: , Rfl:     DULoxetine (CYMBALTA) 20 mg capsule, Take 1 capsule by mouth daily, Disp: , Rfl:     Ferrous Sulfate 27 MG TABS, Take 1 tablet by mouth daily, Disp: , Rfl:     fluticasone-salmeterol (ADVAIR) 250-50 mcg/dose, Inhale 1 puff 2 (two) times a day as needed  , Disp: , Rfl:     glucose blood (FREESTYLE LITE) test strip, by In Vitro route 4 (four) times a day, Disp: , Rfl:     hydrocodone-chlorpheniramine polistirex (TUSSIONEX) 10-8 mg/5 mL ER suspension, Take 5 mL by mouth daily at bedtime as needed for cough If 5 mL does not work, the next night take 10 mL Max Daily Amount: 5 mL, Disp: 120 mL, Rfl: 0    Insulin Pen Needle (B-D UF III MINI PEN NEEDLES) 31G X 5 MM MISC, by Does not apply route, Disp: , Rfl:     JARDIANCE 10 MG TABS, , Disp: , Rfl:     levothyroxine 112 mcg tablet, , Disp: , Rfl:     metFORMIN (GLUCOPHAGE-XR) 500 mg 24 hr tablet, , Disp: , Rfl:     metFORMIN (GLUCOPHAGE-XR) 500 mg 24 hr tablet, Take 1 tablet by mouth 2 (two) times a day, Disp: , Rfl:     MICROLET LANCETS MISC, by Does not apply route 4 (four) times a day, Disp: , Rfl:     Misc Natural Products (OSTEO BI-FLEX ADV JOINT SHIELD PO), Take by mouth daily  , Disp: , Rfl:     Multiple Vitamins-Minerals (CENTRUM SILVER 50+MEN) TABS, Take 1 tablet by mouth daily, Disp: , Rfl:     Omega-3 Fatty Acids (FISH OIL) 645 MG CAPS, Take 1 capsule by mouth daily, Disp: , Rfl:     omeprazole (PriLOSEC) 40 MG capsule, Take 40 mg by mouth daily in the early morning   , Disp: , Rfl:     oxyCODONE (ROXICODONE) 5 mg immediate release tablet, May take 5-10mg q4-6hrs prn pain, Disp: 30 tablet, Rfl: 0    simvastatin (ZOCOR) 20 mg tablet, Take 1 tablet by mouth, Disp: , Rfl:

## 2018-03-06 ENCOUNTER — OFFICE VISIT (OUTPATIENT)
Dept: VASCULAR SURGERY | Facility: CLINIC | Age: 78
End: 2018-03-06
Payer: MEDICARE

## 2018-03-06 VITALS
WEIGHT: 175 LBS | TEMPERATURE: 98.4 F | HEIGHT: 69 IN | SYSTOLIC BLOOD PRESSURE: 130 MMHG | RESPIRATION RATE: 16 BRPM | BODY MASS INDEX: 25.92 KG/M2 | HEART RATE: 90 BPM | DIASTOLIC BLOOD PRESSURE: 70 MMHG

## 2018-03-06 DIAGNOSIS — M79.89 LEFT ARM SWELLING: Primary | ICD-10-CM

## 2018-03-06 PROCEDURE — 99203 OFFICE O/P NEW LOW 30 MIN: CPT | Performed by: SURGERY

## 2018-03-06 NOTE — ASSESSMENT & PLAN NOTE
Patient relates left arm swelling upper arm post pacemaker    Also palpable supraclavicular lymph node and obtaining CT chest

## 2018-03-06 NOTE — PATIENT INSTRUCTIONS
Left upper arm swelling which has occurred since her pacemaker placed  Her Doppler shows no evidence of deep vein thrombosis in the arm however I am suspicious there may be central vein stenosis  She also has a palpable supraclavicular lymph node on the left am requesting his CT chest for evaluation    I am also recommending a left arm sleeve for graduated compression

## 2018-03-06 NOTE — PROGRESS NOTES
Assessment/Plan:    Left arm swelling  Patient relates left arm swelling upper arm post pacemaker  Also palpable supraclavicular lymph node and obtaining CT chest       Diagnoses and all orders for this visit:    Left arm swelling        Subjective:      Patient ID: Danny Viveros is a 69 y o  female  HPI history of pacemaker via left cephalic vein approach with left arm swelling and palpable lymph node supraclavicular space on the left    The following portions of the patient's history were reviewed and updated as appropriate: allergies, current medications, past family history, past medical history, past social history, past surgical history and problem list     Review of Systems  all systems negative    Objective:      /70 (BP Location: Right arm, Patient Position: Sitting, Cuff Size: Adult)   Pulse 90   Temp 98 4 °F (36 9 °C) (Tympanic)   Resp 16   Ht 5' 9" (1 753 m)   Wt 79 4 kg (175 lb)   BMI 25 84 kg/m²          Physical Exam      Oriented x3 no evidence of clinical depression  Neck is supple carotid pulses bilaterally no bruits heard, palpable node/fullness left supra clavicular space, left upper arm swelling  Chest lungs clear abdomen soft without palpable masses lower extremity pulses are palpable femoral popliteal dorsalis pedis    Vitals:    03/06/18 1510   BP: 130/70   BP Location: Right arm   Patient Position: Sitting   Cuff Size: Adult   Pulse: 90   Resp: 16   Temp: 98 4 °F (36 9 °C)   TempSrc: Tympanic   Weight: 79 4 kg (175 lb)   Height: 5' 9" (1 753 m)       Patient Active Problem List   Diagnosis    Organoaxial gastric volvulus    Type 2 diabetes mellitus ([address] )    Hypertension    Coronary artery disease involving native coronary artery of native heart without angina pectoris    History of colon cancer    Abnormal echocardiogram    Adenocarcinoma of colon (HCC)    Pacheco esophagus    Cardiomegaly    Colon cancer screening    Diffuse nontoxic goiter    Feeling anxious    Hematuria, microscopic    Hyperlipidemia    Hypothyroidism    Insomnia    Irritable bowel syndrome with diarrhea    Mitral regurgitation    Osteoporosis    History of permanent cardiac pacemaker placement    Paraesophageal hernia    Paroxysmal atrial fibrillation (HCC)    Restrictive lung disease    Primary osteoarthritis involving multiple joints    Sick sinus syndrome (HCC)    Vitamin D deficiency    Upper respiratory infection    Left arm swelling       Past Surgical History:   Procedure Laterality Date    CARDIAC PACEMAKER PLACEMENT      does not know type  Dr Geovanna Mcrae is cariologist    COLON SURGERY      resection  removed 18 In     COLONOSCOPY      ESOPHAGOGASTRODUODENOSCOPY N/A 1/9/2016    Procedure: ESOPHAGOGASTRODUODENOSCOPY (EGD); Surgeon: Aline Vázquez MD;  Location: [facility];  Service:     HYSTERECTOMY      SC LAP, REPAIR PARAESOPHAGEAL HERNIA, INCL FUNDOPLASTY W/ MESH N/A 1/27/2016    Procedure: LAPAROSCOPIC PARAESOPHAGEAL HERNIA REPAIR with mesh; toupet  FUNDOPLICATION ;  Surgeon: Benuel Gosselin, MD;  Location: [facility];  Service: Thoracic       Family History   Problem Relation Age of Onset    Heart disease Mother     Diabetes Mother     Asthma Father     Stomach cancer Father     Cancer Paternal Grandmother     Cancer Paternal Grandfather     Stomach cancer Sister        Social History     Social History    Marital status: /Civil Union     Spouse name: N/A    Number of children: N/A    Years of education: N/A     Occupational History    Not on file       Social History Main Topics    Smoking status: Former Smoker     Types: Cigarettes     Quit date: 2002    Smokeless tobacco: Never Used    Alcohol use No    Drug use: No    Sexual activity: Not on file     Other Topics Concern    Not on file     Social History Narrative    No narrative on file       No Known Allergies      Current Outpatient Prescriptions:     albuterol (VENTOLIN HFA) 90 mcg/act inhaler, Inhale 2 puffs every 4 (four) hours as needed, Disp: , Rfl:     apixaban (ELIQUIS) 5 mg, Take 1 tablet by mouth 2 (two) times a day, Disp: , Rfl:     atenolol (TENORMIN) 50 mg tablet, Take 1 tablet (50 mg total) by mouth daily, Disp: 90 tablet, Rfl: 3    BYETTA 10 MCG PEN 10 MCG/0 04ML SOPN, , Disp: , Rfl:     diclofenac sodium (VOLTAREN) 1 %, Place on the skin, Disp: , Rfl:     DULoxetine (CYMBALTA) 20 mg capsule, Take 1 capsule by mouth daily, Disp: , Rfl:     Ferrous Sulfate 27 MG TABS, Take 1 tablet by mouth daily, Disp: , Rfl:     glucose blood (FREESTYLE LITE) test strip, by In Vitro route 4 (four) times a day, Disp: , Rfl:     Insulin Pen Needle (B-D UF III MINI PEN NEEDLES) 31G X 5 MM MISC, by Does not apply route, Disp: , Rfl:     JARDIANCE 10 MG TABS, , Disp: , Rfl:     levothyroxine 112 mcg tablet, 125 mcg  , Disp: , Rfl:     metFORMIN (GLUCOPHAGE-XR) 500 mg 24 hr tablet, Take 1 tablet by mouth 2 (two) times a day, Disp: , Rfl:     MICROLET LANCETS MISC, by Does not apply route 4 (four) times a day, Disp: , Rfl:     Misc Natural Products (OSTEO BI-FLEX ADV JOINT SHIELD PO), Take by mouth daily  , Disp: , Rfl:     Multiple Vitamins-Minerals (CENTRUM SILVER 50+MEN) TABS, Take 1 tablet by mouth daily, Disp: , Rfl:     Omega-3 Fatty Acids (FISH OIL) 645 MG CAPS, Take 1 capsule by mouth daily, Disp: , Rfl:     simvastatin (ZOCOR) 20 mg tablet, Take 1 tablet by mouth, Disp: , Rfl:

## 2018-03-07 NOTE — PROGRESS NOTES
"  Discussion/Summary  Normal device function      Results/Data  Cardiac Device Remote [id number] 05:39PM Ogden Bucco     Test Name Result Flag Reference   MISCELLANEOUS COMMENT (Report)     CARELINK TRANSMISSION:X0A BATTERY STATUS ADEQUATE (8 YRS)  AP 75%  1 2%  ALL AVAILABLE LEAD PARAMETERS WITHIN NORMAL LIMITS & TRENDS STABLE  657 AT/AF NOTED  MULTIPLE EPISODES ON 5/23/17  LONGEST DURATION @ 64 MINS  AT/AF BURDEN = 2 1%  HX OF PAF  ALL AVAIL EGRAMS SHOW PAF/AFL  7 VHR EPISODES WITH MOST RECENT FOR NSVT @ 10 BEATS/AVG  MS  EF = 55% (5/19/15 ECHO)  PT TAKES ELIQUIS & ATENOLOL  CO SIGN/TASK TO DR COCHRAN  NORMAL DEVICE FUNCTION  eb   Cardiac Electrophysiology Report      [id number]  pdf   DEVICE TYPE Pacemaker       Cardiac Electrophysiology Report [id number] 05:39PM Ogden Bucco     Test Name Result Flag Reference   Cardiac Electrophysiology Report      [id number]  pdf     Signatures   Electronically signed by : Siobhan Espinoza ; May 31 2017 11:10AM EST                       (Author)    Electronically signed by : Kendall Mckeon DO; May 31 2017  3:17PM EST                       (Author)    "

## 2018-03-07 NOTE — PROGRESS NOTES
"  Discussion/Summary  Normal device function      Results/Data  Cardiac Device In Clinic [id number] 03:05PM Zula Alexis     Test Name Result Flag Reference   MISCELLANEOUS COMMENT (Report)     DEVICE INTERROGATED IN THE [facility] OFFICE  BATTERY VOLTAGE ADEQUATE  (7 YRS)  AP 74%  1%  ALL LEAD PARAMETERS WITHIN NORMAL LIMITS  MULTIPLE AT/AF EPISODES DETECTED (3% BURDEN)  PATIENT IS ON ELIQUIS AND ATENOLOL  NO PROGRAMMING CHANGES MADE TO DEVICE PARAMETERS  NORMAL DEVICE FUNCTION  APPT WITH DR VILLALOBOS  ---STEPHANIE   Cardiac Electrophysiology Report      [id number]  pdf   DEVICE TYPE Pacemaker       Cardiac Electrophysiology Report [id number] 03:05PM Zula Alexis     Test Name Result Flag Reference   Cardiac Electrophysiology Report      [id number]     Signatures   Electronically signed by : Mack Aguirre, ; Nov 17 2016  9:30AM EST                       (Author)    Electronically signed by : Luis Hernandez DO; Nov 25 2016  5:51PM EST                       (Author)    "

## 2018-03-07 NOTE — PROGRESS NOTES
"  Discussion/Summary  Normal device function      Results/Data  Diagnostic Studies Reviewed Cardio: I personally reviewed the recording/images in the office today  My interpretation follows  Cardiac Device In Clinic [id number] 08:27PM Julia Cash     Test Name Result Flag Reference   MISCELLANEOUS COMMENT (Report)     DEVICE INTERROGATED IN THE [facility] OFFICE  BATTERY STATUS ADEQUATE (7 5 YRS)  AP 78 5%  1 6%  ALL LEAD PARAMETERS TEST WITHIN NORMAL LIMITS & STABLE  MULTIPLE AHR EPISODES WITH MANY SAME DAY  LONGEST DURATION @ 5 HRS  AT/AF BURDEN - 2 4%  HX OF PAF  ALL AVAIL EGRAMS SHOW PAF/AFL  1 NEW VHR EPISODES FOR NSVT @ 7 BEATS/AVG RATE 182 BPM  EF - 55% (5/19/15 ECHO)  PT TAKES ELIQUIS & ATENOLOL  NO PROGRAMMING CHANGES MADE TO DEVICE PARAMETERS  PT SEEN IN OFFICE TODAY DR COCHRAN  NORMAL DEVICE FUNCTION  eb   Cardiac Electrophysiology Report      [id number]  pdf   DEVICE TYPE Pacemaker       Cardiac Electrophysiology Report [id number] 08:27PM Julia Cash     Test Name Result Flag Reference   Cardiac Electrophysiology Report      [id number]  pdf     Signatures   Electronically signed by : Missy Chua, ; Nov 22 2017  2:59PM EST                       (Author)    Electronically signed by : Cain Vasquez DO; Nov 26 2017  4:57PM EST                       (Author)    "

## 2018-03-07 NOTE — PROGRESS NOTES
"  Discussion/Summary  Normal device function      Results/Data  Cardiac Device Remote [id number] 05:41PM Bettie Cueto     Test Name Result Flag Reference   MISCELLANEOUS COMMENT      CARELINK TRANSMISSION: BATTERY STATUS "OK"  AP 75%  1 4%  493 AT/AF NOTED, 2 1% OF TIME IN AT/AF  HX OF PAF  PT ON ELIQUIS & ATENOLOL  AVAIL EGRAMS SHOW PAF  ALL LEAD PARAMETERS WITHIN NORMAL LIMITS  NORMAL DEVICE FUNCTION  NC   Cardiac Electrophysiology Report      [id number]  pdf   DEVICE TYPE Pacemaker       Cardiac Electrophysiology Report [id number] 05:41PM Bettie Cueto     Test Name Result Flag Reference   Cardiac Electrophysiology Report      [id number]  pdf     Signatures   Electronically signed by : Zaire Cordova, ; Feb 22 2017  2:44PM EST                       (Author)    Electronically signed by : Jesus Coto DO; Mar  2 2017 12:56PM EST                       (Author)    "

## 2018-03-07 NOTE — PROGRESS NOTES
"  Discussion/Summary  Normal device function      Results/Data  Cardiac Device Remote [id number] 10:09AM Antonella Self     Test Name Result Flag Reference   MISCELLANEOUS COMMENT (Report)     CARELINK TRANSMISSION:T0DSZCIDXL STATUS ADEQUATE (7 5 YRS)  AP 80 6%  1 9%  ALL AVAILABLE LEAD PARAMETERS APPEAR WITHIN NORMAL LIMITS & TRENDS STABLE  553 AT/AF NOTED  MULTIPLE EPISODES ON SAME DAYS  LONGEST DURATION @ 3 HRS  RVR NOTED WITH V RATES >100 BPM DURING AHR  26 SVT-AF EPISODES ALSO NOTED  AT/AF BURDEN = 3 8%  HX OF PAF  ALL AVAIL EGRAMS SHOW PAF/AFL  PT TAKES ELIQUIS & ATENOLOL  NORMAL DEVICE FUNCTION  eb   Cardiac Electrophysiology Report      [id number]  pdf   DEVICE TYPE Pacemaker       Cardiac Electrophysiology Report [id number] 10:09AM Antonella Self     Test Name Result Flag Reference   Cardiac Electrophysiology Report      [id number]  pdf     Signatures   Electronically signed by : Natasha Vega, ; Aug 30 2017 11:07AM EST                       (Author)    Electronically signed by : Adela Mederos DO; Sep  4 2017 12:18PM EST                       (Author)    "

## 2018-03-07 NOTE — PROGRESS NOTES
"  Discussion/Summary  Normal device function      Results/Data  Results   Cardiac Device Remote [id number] 09:38PM Ilivikki Rebolledo     Test Name Result Flag Reference   MISCELLANEOUS COMMENT (Report)     CARELINK TRANSMISSION: BATTERY VOLTAGE ADEQUATE (9 5 YRS); AP = 73%,  = 1 5%; (3) NSVT, (3) SVT & (262) AHR EPISODES NOTED - ALL EGRMS STORED APPEAR TO BE AF/FL w RVR (* >100 VENT RATES WITH AT/AF EPISODES) - LONGEST AT/AF DURATION @ 3 HRS (3 2% AT/AF DAILY); HX OF SAME - PT TAKES ELIQUIS, TOPROL; ALL LEAD PARAMETERS APPEAR WITHIN NORMAL LIMITS/STABLE; NO OTHER TESTING AVAILABLE DUE TO REMOTE TRANSMISSION; NORMAL DEVICE FUNCTION  eb   Cardiac Electrophysiology Report      [id number]}  pdf   DEVICE TYPE Pacemaker       Cardiac Electrophysiology Report [id number] 09:38PM Marco Rebolledo     Test Name Result Flag Reference   Cardiac Electrophysiology Report      [id number]  pdf     Signatures   Electronically signed by : Samuel Yee, ; Feb 22 2016 11:25AM EST                       (Author)    Electronically signed by : Jazmin Felder DO; Feb 23 2016 11:10AM EST                       (Author)    "

## 2018-03-07 NOTE — PROGRESS NOTES
"  Discussion/Summary  Normal device function      Results/Data  Results   Cardiac Device Remote [id number] 06:13AM Evelyn Nazario     Test Name Result Flag Reference   MISCELLANEOUS COMMENT (Report)     CARELINK TRANSMISSION: BATTERY VOLTAGE ADEQUATE (9 YRS); AP = 70%,  = 1%; (5) VHR EPISODES -EPISODE #NSVT/~1121 STORED AS MONITORED VT @ ~20 BEATS/AVG  MS; EPISODE #1075 WITH 7 BEAT NSVT/ AVG  MS; OTHERS APPEAR TO BE AF/RVR; (25) SVT & MULTI AT/AF ALSO NOTED FOR AF/RVR (*NOTE V RATES >100 DURING SOME EPISODES); EF = 55% (5/19/15 ECHO); PT TAKES ELIQUIS, ATENOLOL; ALL LEAD PARAMETERS APPEAR WITHIN NORMAL LIMITS/STABLE; NORMAL DEVICE FUNCTION  eb   Cardiac Electrophysiology Report      [id number]  pdf   DEVICE TYPE Pacemaker       Cardiac Electrophysiology Report [id number] 06:13AM Evelyn Nazario     Test Name Result Flag Reference   Cardiac Electrophysiology Report      [id number]  pdf     Signatures   Electronically signed by : Tesha García, ; May 23 2016 11:34AM EST                       (Author)    Electronically signed by : Neftaly Thompson DO; May 25 2016  7:30PM EST                       (Author)    "

## 2018-03-07 NOTE — PROGRESS NOTES
"  Discussion/Summary  Normal device function      Results/Data  Cardiac Device Remote [id number] 02:16PM Jacqueline Trivedi     Test Name Result Flag Reference   MISCELLANEOUS COMMENT (Report)     CARELINK TRANSMISSION: BATTERY VOLTAGE ADEQUATE (8 YRS)  AP-75%, -1%  DEVICE CLASSIFIED 3 NSVT & 10 FAST A&V EPISODES THAT OCCURRED CONSECUTIVELY OVER 1 5 HRS ON 7/26/16- AF W/ RVR ON EGM'S, HR UP  BPM  388 AT/AF EPISODES LONGEST 7 MIN  AF BURDEN-2 2%  HX: PAF & ON ELIQUIS & ATENOLOL  ALL AVAILABLE LEAD PARAMETERS WITHIN NORMAL LIMITS  NORMAL DEVICE FUNCTION  GV   Cardiac Electrophysiology Report      [id number]  pdf   DEVICE TYPE Pacemaker       Cardiac Electrophysiology Report [id number] 02:16PM Jacquelinesalinas Trivedi     Test Name Result Flag Reference   Cardiac Electrophysiology Report      [id number]  pdf     Signatures   Electronically signed by : Asa Meza RN; Aug 26 2016  2:09PM EST                       (Author)    Electronically signed by : Octavio Chavarria DO; Aug 29 2016  6:24PM EST                       (Author)    "

## 2018-03-08 ENCOUNTER — APPOINTMENT (OUTPATIENT)
Dept: LAB | Facility: CLINIC | Age: 78
End: 2018-03-08
Payer: MEDICARE

## 2018-03-08 DIAGNOSIS — M79.89 LEFT ARM SWELLING: ICD-10-CM

## 2018-03-08 LAB
ANION GAP SERPL CALCULATED.3IONS-SCNC: 7 MMOL/L (ref 4–13)
BUN SERPL-MCNC: 10 MG/DL (ref 5–25)
CALCIUM SERPL-MCNC: 9.2 MG/DL (ref 8.3–10.1)
CHLORIDE SERPL-SCNC: 104 MMOL/L (ref 100–108)
CO2 SERPL-SCNC: 28 MMOL/L (ref 21–32)
CREAT SERPL-MCNC: 0.67 MG/DL (ref 0.6–1.3)
GFR SERPL CREATININE-BSD FRML MDRD: 86 ML/MIN/1.73SQ M
GLUCOSE SERPL-MCNC: 145 MG/DL (ref 65–140)
POTASSIUM SERPL-SCNC: 4.2 MMOL/L (ref 3.5–5.3)
SODIUM SERPL-SCNC: 139 MMOL/L (ref 136–145)

## 2018-03-08 PROCEDURE — 80048 BASIC METABOLIC PNL TOTAL CA: CPT

## 2018-03-08 PROCEDURE — 36415 COLL VENOUS BLD VENIPUNCTURE: CPT

## 2018-03-12 DIAGNOSIS — I10 ESSENTIAL HYPERTENSION: ICD-10-CM

## 2018-03-12 NOTE — TELEPHONE ENCOUNTER
Keli called and said she is completely out of her Atenolol  Is asking for a 2 week refill to the [facility] in her chart    She also needs 90 day refills of the Atenolol and Duloxetine to the [facility]

## 2018-03-13 NOTE — TELEPHONE ENCOUNTER
Patient called again asking for her refills to please be sent to [facility] and her mailorder  See previous message   I told her it takes 24/48 hrs, she said she is out of the atenolol

## 2018-03-14 ENCOUNTER — HOSPITAL ENCOUNTER (OUTPATIENT)
Dept: CT IMAGING | Facility: HOSPITAL | Age: 78
Discharge: HOME | End: 2018-03-14
Attending: SURGERY
Payer: MEDICARE

## 2018-03-14 DIAGNOSIS — M79.89 SWELLING OF ARM: ICD-10-CM

## 2018-03-14 DIAGNOSIS — M79.89 SWELLING OF ARM: Primary | ICD-10-CM

## 2018-03-14 PROCEDURE — 71250 CT THORAX DX C-: CPT

## 2018-03-14 RX ORDER — ATENOLOL 50 MG/1
TABLET ORAL
Qty: 90 TABLET | Refills: 3 | Status: SHIPPED | OUTPATIENT
Start: 2018-03-14 | End: 2018-03-16 | Stop reason: SDUPTHER

## 2018-03-15 ENCOUNTER — TELEPHONE (OUTPATIENT)
Dept: FAMILY MEDICINE CLINIC | Facility: CLINIC | Age: 78
End: 2018-03-15

## 2018-03-16 DIAGNOSIS — F41.9 ANXIETY: Primary | ICD-10-CM

## 2018-03-16 DIAGNOSIS — I10 ESSENTIAL HYPERTENSION: ICD-10-CM

## 2018-03-16 RX ORDER — DULOXETIN HYDROCHLORIDE 20 MG/1
20 CAPSULE, DELAYED RELEASE ORAL DAILY
Qty: 90 CAPSULE | Refills: 3 | Status: SHIPPED | OUTPATIENT
Start: 2018-03-16 | End: 2019-04-10 | Stop reason: SDUPTHER

## 2018-03-16 RX ORDER — ATENOLOL 50 MG/1
50 TABLET ORAL
Qty: 15 TABLET | Refills: 0 | Status: SHIPPED | OUTPATIENT
Start: 2018-03-16 | End: 2019-05-07 | Stop reason: SDUPTHER

## 2018-03-20 DIAGNOSIS — I48.91 ATRIAL FIBRILLATION, UNSPECIFIED TYPE (HCC): Primary | ICD-10-CM

## 2018-04-16 ENCOUNTER — OFFICE VISIT (OUTPATIENT)
Dept: OBGYN CLINIC | Facility: CLINIC | Age: 78
End: 2018-04-16
Payer: MEDICARE

## 2018-04-16 VITALS
HEART RATE: 83 BPM | WEIGHT: 174.2 LBS | BODY MASS INDEX: 29.74 KG/M2 | SYSTOLIC BLOOD PRESSURE: 135 MMHG | DIASTOLIC BLOOD PRESSURE: 92 MMHG | HEIGHT: 64 IN

## 2018-04-16 DIAGNOSIS — M18.11 PRIMARY OSTEOARTHRITIS OF FIRST CARPOMETACARPAL JOINT OF RIGHT HAND: Primary | ICD-10-CM

## 2018-04-16 DIAGNOSIS — M19.041 OSTEOARTHRITIS OF FINGER, RIGHT: ICD-10-CM

## 2018-04-16 PROCEDURE — 99214 OFFICE O/P EST MOD 30 MIN: CPT | Performed by: ORTHOPAEDIC SURGERY

## 2018-04-16 NOTE — PROGRESS NOTES
ASSESSMENT/PLAN:    Diagnoses and all orders for this visit:    Primary osteoarthritis of first carpometacarpal joint of right hand    Osteoarthritis of finger, right        Assessment:   Thumb CMC Arthritis  right and Arthritis of the PIP joint  Right ring finger    Plan:   The patient may continue to resume activities as tolerated  Surgery for a R ring finger PIP jt replacement was discussed with the patient today  She would like to proceed with this option however she is undergoing bladder surgery in the next month and would like to follow up to schedule sx after she is healed from this surgery  It was discussed with the patient that this is a 3 month recovery  Pt will be in a splint for 6 weeks after sx and will be in therapy to work on motion  Follow Up:  2  month(s)    To Do Next Visit:       General Discussions:  CMC Arthritis: The anatomy and physiology of carpometacarpal joint arthritis was discussed with the patient today in the office  Deterioration of the articular cartilage eventually leads to hypermobility at the thumb [facility] joint, resulting in joint subluxation, osteophyte formation, cystic changes within the trapezium and base of the first metacarpal, as well as subchondral sclerosis  Eventually, pain, limited mobility, and compensatory hyperextension at the metacarpophalangeal joint may develop  While normal activity and usage of the thumb joint may provide a painful experience to the patient, this typically does not result in damage to the thumb or hand  Treatment options include resting thumb spica splints to decreased joint edema, pain, and inflammation  Therapy exercises to strengthen the thenar musculature may relieve pain, but do not alter the overall continued development of osteoarthritis  Oral medications, topical medications, corticosteroid injections may decrease pain and increase overall function  Eventually, approximately 5% of patients may require surgical intervention  Osteoarthritis:  The anatomy and physiology of osteoarthritis was discussed with the patient today in the office  Deterioration of the articular cartilage eventually leads to altered mobility at the joint, resulting in joint subluxation, osteophyte formation, cystic changes, as well as subchondral sclerosis  Eventually, pain, limited mobility, and compensatory hypermobility at surrounding joints may develop  While normal activity and usage of the joint may provide a painful experience to the patient, this typically does not result in damage to the limb  Treatment options include splints to decreased joint edema, pain, and inflammation  Therapy exercises to strengthen the surrounding musculature may relieve pain, but do not alter the overall continued development of osteoarthritis  Oral medications, topical medications, corticosteroid injections may decrease pain and increase overall function  Eventually, some patients may require surgical intervention  Operative Discussions:         _____________________________________________________  CHIEF COMPLAINT:  Chief Complaint   Patient presents with    Right Ring Finger - Follow-up         SUBJECTIVE:  Ivone Head is a 69y o  year old female who presents for follow up regarding Thumb CMC Arthritis  right and Arthritis of the PIP joint  Ring finger  Since last visit, Ivone Head has tried steroid injection, therapy and bracing with partial relief  Today there is Pain  Moderate  Intermittant  Sharp and Aching to the right ring finger  Pt likes to craft and makes wreaths and states that these issues are severely affecting her ability to do this     Radiation: Yes to the  thumb  Associated symptoms: None    PAST MEDICAL HISTORY:  Past Medical History:   Diagnosis Date    A-fib ([facility] )     Acid reflux     Arthritis     Cancer ([facility] )     COPD (chronic obstructive pulmonary disease) (HCC)     Diabetes mellitus (HCC)     High cholesterol     Hypothyroidism        PAST SURGICAL HISTORY:  Past Surgical History:   Procedure Laterality Date    CARDIAC PACEMAKER PLACEMENT      does not know type  Dr Rajiv Fox is cariologist    COLON SURGERY      resection  removed 18 In     COLONOSCOPY      ESOPHAGOGASTRODUODENOSCOPY N/A 1/9/2016    Procedure: ESOPHAGOGASTRODUODENOSCOPY (EGD);   Surgeon: Arnel Garcia MD;  [facility];  Service:     HYSTERECTOMY      [place] LAP, REPAIR PARAESOPHAGEAL HERNIA, INCL FUNDOPLASTY W/ MESH N/A 1/27/2016    Procedure: LAPAROSCOPIC PARAESOPHAGEAL HERNIA REPAIR with mesh; toupet  FUNDOPLICATION ;  Surgeon: Bertin Randolph MD;  [facility];  Service: Thoracic       FAMILY HISTORY:  Family History   Problem Relation Age of Onset    Heart disease Mother     Diabetes Mother     Asthma Father     Stomach cancer Father     Cancer Paternal Grandmother     Cancer Paternal Grandfather     Stomach cancer Sister        SOCIAL HISTORY:  Social History   Substance Use Topics    Smoking status: Former Smoker     Types: Cigarettes     Quit date: 2002    Smokeless tobacco: Never Used    Alcohol use No       MEDICATIONS:    Current Outpatient Prescriptions:     albuterol (VENTOLIN HFA) 90 mcg/act inhaler, Inhale 2 puffs every 4 (four) hours as needed, Disp: , Rfl:     apixaban (ELIQUIS) 5 mg, Take 1 tablet (5 mg total) by mouth 2 (two) times a day, Disp: 56 tablet, Rfl: 0    atenolol (TENORMIN) 50 mg tablet, Take 1 tablet (50 mg total) by mouth daily at bedtime, Disp: 15 tablet, Rfl: 0    BYETTA 10 MCG PEN 10 MCG/0 04ML SOPN, , Disp: , Rfl:     diclofenac sodium (VOLTAREN) 1 %, Place on the skin, Disp: , Rfl:     DULoxetine (CYMBALTA) 20 mg capsule, Take 1 capsule (20 mg total) by mouth daily, Disp: 90 capsule, Rfl: 3    Ferrous Sulfate 27 MG TABS, Take 1 tablet by mouth daily, Disp: , Rfl:     glucose blood (FREESTYLE LITE) test strip, by In Vitro route 4 (four) times a day, Disp: , Rfl:     Insulin Pen Needle (B-D UF III MINI PEN NEEDLES) 31G X 5 MM MISC, by Does not apply route, Disp: , Rfl:     JARDIANCE 10 MG TABS, , Disp: , Rfl:     levothyroxine 112 mcg tablet, 125 mcg  , Disp: , Rfl:     metFORMIN (GLUCOPHAGE-XR) 500 mg 24 hr tablet, Take 1 tablet by mouth 2 (two) times a day, Disp: , Rfl:     MICROLET LANCETS MISC, by Does not apply route 4 (four) times a day, Disp: , Rfl:     Misc Natural Products (OSTEO BI-FLEX ADV JOINT SHIELD PO), Take by mouth daily  , Disp: , Rfl:     Multiple Vitamins-Minerals (CENTRUM SILVER 50+MEN) TABS, Take 1 tablet by mouth daily, Disp: , Rfl:     Omega-3 Fatty Acids (FISH OIL) 645 MG CAPS, Take 1 capsule by mouth daily, Disp: , Rfl:     simvastatin (ZOCOR) 20 mg tablet, Take 1 tablet by mouth, Disp: , Rfl:     ALLERGIES:  No Known Allergies    REVIEW OF SYSTEMS:  Pertinent items are noted in HPI  LABS:  HgA1c:   Lab Results   Component Value Date    HGBA1C 8 0 (H) 12/19/2017     BMP:   Lab Results   Component Value Date    GLUCOSE 145 (H) 03/08/2018    CALCIUM 9 2 03/08/2018     03/08/2018    K 4 2 03/08/2018    CO2 28 03/08/2018     03/08/2018    BUN 10 03/08/2018    CREATININE 0 67 03/08/2018           _____________________________________________________  PHYSICAL EXAMINATION:  General: well developed and well nourished, alert, oriented times 3 and appears comfortable  Psychiatric: Normal  HEENT: Trachea Midline, No torticollis  Cardiovascular: No discernable arrhythmia  Pulmonary: No wheezing or stridor  Skin: No masses, erthema, lacerations, fluctation, ulcerations, swelling localized to R ring finger PIP jt  Neurovascular: Sensation Intact to the Median, Ulnar, Radial Nerve, Motor Intact to the Median, Ulnar, Radial Nerve and Pulses Intact    MUSCULOSKELETAL EXAMINATION:  RIGHT SIDE:  CMC: Positive tendnerness CMC, Positive Shoulder Sign and full motion, no hyperextension  and Finger:  swelling localize to ring pip jt   flexion deformity of 10 degrees, patient can flex pip jt to 45 at pip jt  no instability, tenderness at pip jt  no problem with dorsal and volar translation   fdp fds intact      _____________________________________________________  STUDIES REVIEWED:  No Studies to review      PROCEDURES PERFORMED:  Procedures  No Procedures performed today

## 2018-04-24 ENCOUNTER — APPOINTMENT (OUTPATIENT)
Dept: LAB | Facility: CLINIC | Age: 78
End: 2018-04-24
Payer: MEDICARE

## 2018-04-24 ENCOUNTER — TRANSCRIBE ORDERS (OUTPATIENT)
Dept: LAB | Facility: CLINIC | Age: 78
End: 2018-04-24

## 2018-04-24 DIAGNOSIS — N39.3 FEMALE STRESS INCONTINENCE: Primary | ICD-10-CM

## 2018-04-24 LAB
ANION GAP SERPL CALCULATED.3IONS-SCNC: 5 MMOL/L (ref 4–13)
BUN SERPL-MCNC: 13 MG/DL (ref 5–25)
CALCIUM SERPL-MCNC: 9.7 MG/DL (ref 8.3–10.1)
CHLORIDE SERPL-SCNC: 105 MMOL/L (ref 100–108)
CO2 SERPL-SCNC: 29 MMOL/L (ref 21–32)
CREAT SERPL-MCNC: 0.65 MG/DL (ref 0.6–1.3)
ERYTHROCYTE [DISTWIDTH] IN BLOOD BY AUTOMATED COUNT: 13.7 % (ref 11.6–15.1)
GFR SERPL CREATININE-BSD FRML MDRD: 87 ML/MIN/1.73SQ M
GLUCOSE P FAST SERPL-MCNC: 139 MG/DL (ref 65–99)
HCT VFR BLD AUTO: 48.3 % (ref 34.8–46.1)
HGB BLD-MCNC: 15.1 G/DL (ref 11.5–15.4)
MCH RBC QN AUTO: 29.2 PG (ref 26.8–34.3)
MCHC RBC AUTO-ENTMCNC: 31.3 G/DL (ref 31.4–37.4)
MCV RBC AUTO: 93 FL (ref 82–98)
PLATELET # BLD AUTO: 212 THOUSANDS/UL (ref 149–390)
PMV BLD AUTO: 11 FL (ref 8.9–12.7)
POTASSIUM SERPL-SCNC: 4.6 MMOL/L (ref 3.5–5.3)
RBC # BLD AUTO: 5.18 MILLION/UL (ref 3.81–5.12)
SODIUM SERPL-SCNC: 139 MMOL/L (ref 136–145)
WBC # BLD AUTO: 7.27 THOUSAND/UL (ref 4.31–10.16)

## 2018-04-24 PROCEDURE — 36415 COLL VENOUS BLD VENIPUNCTURE: CPT

## 2018-04-24 PROCEDURE — 85027 COMPLETE CBC AUTOMATED: CPT

## 2018-04-24 PROCEDURE — 80048 BASIC METABOLIC PNL TOTAL CA: CPT

## 2018-05-01 NOTE — PROGRESS NOTES
ASSESSMENT/PLAN:    PATIENT IS MEDICALLY CLEARED FOR HER PROPOSED SURGERY FOR SLING PLACEMENT FOR HER BLADDER INCONTINENCE  Patient is on Eliquis for CAD and cardiomegaly, she saw Cardiology who advised her to stop 1 day prior  Patient's past medical history is as follows: Adenocarcinoma of the colon, 2012  This was resected Patient will probably have a colonoscopy sometime in the next 12 months   She follows with GI      Emphysema by CT   Patient only on Ventolin as needed as her pulmonaryfunction test and walk test are okay Follows with Pulmonary only as needed       Insomnia/anxiety   Patient taking duloxetine for sleep and has not slept better       Diabetes   Follows with endocrine doctor Kerry This taking Byetta Invocana and metformin Last A1c was up to 7 4       Hashimoto's thyroiditis   Also following with endocrine for suppression of her thyroid with medication        Sick sinus syndrome   Patient has a pacer and takes Eliquis for anticoagulation and atenolol Follows with Cardiology       Vitamin D deficiency   Continue vitamin D 2000 units daily        Osteoporosis   Calcium vitamin D and exercise       History of large paraesophageal hernia   Had surgery and no longer has Pacheco's or her reflux and no longer on any PPIs       Colon cancer by history   Next colonoscopy June 2019       Hyperlipidemia   Simvastatin and CoQ10 and diet           Will follow-up with pulmonary for recommendations regarding breathing   Will follow up with endocrine regarding diabetes and thyroid  colonoscopy June 2019 Next mammogram July 5, 2018        Rechec 6 months or sooner if needed      Health Maintenance   Topic Date Due    SLP PLAN OF CARE  1940    DTaP,Tdap,and Td Vaccines (1 - Tdap) 10/22/1962    GLAUCOMA SCREENING 67+ YR  01/06/2016    Diabetic Foot Exam  03/23/2018    URINE MICROALBUMIN  03/23/2018    HEMOGLOBIN A1C  06/19/2018    Annual Wellnes Visit (AWV)  08/08/2018    INFLUENZA VACCINE 09/01/2018    OPHTHALMOLOGY EXAM  12/11/2018    Fall Risk  02/01/2019    Depression Screening PHQ-9  02/01/2019    Urinary Incontinence Screening  02/01/2019    COLONOSCOPY  06/02/2019    PNEUMOCOCCAL POLYSACCHARIDE VACCINE AGE 72 AND OVER  Completed         Problem List as of 5/4/2018 Reviewed: 1/27/2016 11:21 AM by Darrion Rouse MD    Abnormal echocardiogram    Adenocarcinoma of colon ([facility] )    Pacheco esophagus    Cardiomegaly    Colon cancer screening    Coronary artery disease involving native coronary artery of native heart without angina pectoris    Diffuse nontoxic goiter    Feeling anxious    Hematuria, microscopic    History of colon cancer    History of permanent cardiac pacemaker placement    Hyperlipidemia    Hypertension    Hypothyroidism    Insomnia    Irritable bowel syndrome with diarrhea    Left arm swelling    Last Assessment & Plan 3/6/2018 Office Visit Written 3/6/2018  3:31 PM by Amarilis Soto MD     Patient relates left arm swelling upper arm post pacemaker  Also palpable supraclavicular lymph node and obtaining CT chest         Mitral regurgitation    Organoaxial gastric volvulus    Osteoporosis    Paraesophageal hernia    Paroxysmal atrial fibrillation (HCC)    Primary osteoarthritis involving multiple joints    Restrictive lung disease    Sick sinus syndrome ([facility] )    Type 2 diabetes mellitus ([facility] )    Upper respiratory infection    Last Assessment & Plan 2/1/2018 Office Visit Written 2/1/2018 10:47 AM by Samuel Arzate DO     Patient has a viral infection but the cough is keeping her from sleeping at night  Her peak flows are also low at 250 expected around 400  Because of the diabetes however we will hold off on any prednisone and try to get her to sleep at night so she can heel  Each night she will take 1 tsp or 5 mL of cough medicine  If this does not work she will take 10 mL of cough medicine     Patient will gradually get better  If she gets worse she will let us know  If she gets a temperature greater than 101 5 she will let us now  Her cough will slowly go away can last anywhere from 4-6 weeks           Vitamin D deficiency            Subjective:   No chief complaint on file  Patient is here for preop clearance for sling procedure for stress urinary incontinence  She saw Dr Amita Simpson today and had a workup and is now being scheduled  She gets shortness of breath only with extreme work  She does not feel any chest pain or palpitations  She does have a pacer in place  She does not have any bleeding tendencies  She does not wake up at night short of breath  or with palpitation    She had EKG done today and blood work for her clearance  patient ID: Janusz Matt is a 69 y o  female  Past Medical History:   Diagnosis Date    [facility]     Acid reflux     Arthritis     Cancer ([facility] )     COPD (chronic obstructive pulmonary disease) (HCC)     Diabetes mellitus ([facility] )     High cholesterol     Hypothyroidism      Past Surgical History:   Procedure Laterality Date    CARDIAC PACEMAKER PLACEMENT      does not know type  Dr Indira Chris is cariologist    COLON SURGERY      resection  removed 18 In     COLONOSCOPY      ESOPHAGOGASTRODUODENOSCOPY N/A 1/9/2016    Procedure: ESOPHAGOGASTRODUODENOSCOPY (EGD);   Surgeon: Ly Dickey MD;  [facility];  Service:     HYSTERECTOMY      [place] LAP, REPAIR PARAESOPHAGEAL HERNIA, INCL FUNDOPLASTY W/ MESH N/A 1/27/2016    Procedure: LAPAROSCOPIC PARAESOPHAGEAL HERNIA REPAIR with mesh; toupet  FUNDOPLICATION ;  Surgeon: Berry Paz MD;  [facility];  Service: Thoracic     Family History   Problem Relation Age of Onset    Heart disease Mother     Diabetes Mother     Asthma Father     Stomach cancer Father     Cancer Paternal Grandmother     Cancer Paternal Grandfather     Stomach cancer Sister      Social History   Substance Use Topics    Smoking status: Former Smoker     Types: Cigarettes     Quit date: 2002    Smokeless tobacco: Never Used    Alcohol use No     History   Smoking Status    Former Smoker    Types: Cigarettes    Quit date: 2002   Smokeless Tobacco    Never Used        MED LIST WAS REVIEWED AND UPDATED       ROS    Constitutional  No fever chills no fatigue no weight loss no weight gain    Mental status  No anxiety or depression and no sleep disturbances no changes in personality or emotional problems no suicidal or homicidal ideations    Eyes  No eye pain no red eyes no visual disturbances no discharge no eye itch    ENT  No earache no hearing loss nasal discharge no sore throat no hoarseness no postnasal drip     Cardio  No chest pain no palpitations no leg edema no claudication no dyspnea on exertion no nocturnal dyspnea    Respiratory  No shortness of breath or wheeze no cough no orthopnea no dyspnea on exertion no hemoptysis no sputum production    GI  No abdominal pain no nausea no vomiting no diarrhea or constipation no bloody stools no change in bowel habits no change in weight      no dysuria hematuria no pyuria no incontinence no pelvic pain    Musculoskeletal  No myalgias arthralgias no joint swelling or stiffness no limb pain or swelling    Skin  No rashes or lesions no itchiness and no wounds    Neuro  No headache dizziness lightheadedness syncope numbness paresthesias or confusion    Heme  No swollen glands no easy bruising          Objective:    EKG shows paced rhythm about 75% of the time  Cannot really tell anything else from this    SMA 7 and CBC are stable and normal    VITALS:  Wt Readings from Last 3 Encounters:   04/16/18 79 kg (174 lb 3 2 oz)   03/06/18 79 4 kg (175 lb)   02/01/18 79 1 kg (174 lb 4 8 oz)     BP Readings from Last 3 Encounters:   04/16/18 135/92   03/06/18 130/70   02/01/18 120/70     Pulse Readings from Last 3 Encounters:   04/16/18 83   03/06/18 90   02/01/18 84     There is no height or weight on file to calculate BMI      Laboratory Results:   Lab Results Component Value Date    WBC 7 27 04/24/2018    WBC 7 58 10/26/2017    WBC 7 08 03/07/2017    HGB 15 1 04/24/2018    HGB 15 1 10/26/2017    HGB 14 1 03/07/2017    HCT 48 3 (H) 04/24/2018    HCT 44 3 10/26/2017    HCT 42 5 03/07/2017    MCV 93 04/24/2018    MCV 88 10/26/2017    MCV 89 03/07/2017     04/24/2018     10/26/2017     03/07/2017     Lab Results   Component Value Date     04/24/2018     03/08/2018     12/19/2017    K 4 6 04/24/2018    K 4 2 03/08/2018    K 4 9 12/19/2017     04/24/2018     03/08/2018     12/19/2017    CO2 29 04/24/2018    CO2 28 03/08/2018    CO2 28 12/19/2017    BUN 13 04/24/2018    BUN 10 03/08/2018    BUN 9 12/19/2017     Lab Results   Component Value Date    GLUCOSE 145 (H) 03/08/2018    ALT 24 12/19/2017    AST 28 12/19/2017    ALKPHOS 72 12/19/2017    EGFR 87 04/24/2018    CALCIUM 9 7 04/24/2018     No results found for: TSH    Lipid Profile:   Lab Results   Component Value Date    CHOL 142 12/19/2017    CHOL 179 08/22/2017    CHOL 131 03/23/2017     Lab Results   Component Value Date    HDL 42 12/19/2017    HDL 42 08/22/2017    HDL 42 03/23/2017     Lab Results   Component Value Date    LDLCALC 66 12/19/2017    LDLCALC 94 08/22/2017    LDLCALC 61 03/23/2017     Lab Results   Component Value Date    TRIG 169 (H) 12/19/2017    TRIG 215 (H) 08/22/2017    TRIG 139 03/23/2017       Diabetic labs (if applicable)  Lab Results   Component Value Date    HGBA1C 8 0 (H) 12/19/2017    HGBA1C 7 4 (H) 08/22/2017    HGBA1C 7 1 (H) 03/23/2017     Lab Results   Component Value Date    GLUF 139 (H) 04/24/2018    LDLCALC 66 12/19/2017    CREATININE 0 65 04/24/2018          Physical Exam  Constitutional  Appears healthy, Looks well, Appearance consistent with age    Mental Status  Alert, Oriented, Cooperative, Memory function normal , clean, and reasonable    Neck  No neck mass, No thyromegaly, Good carotid upstrokes bilaterally, trachea midline positive click    Respiratory  Breath sounds normal, No rales, No rhonchi, No wheezing, normal palpation    Cardiac  Irregularly irregular rhythm with a controlled rate, no murmur no S3-S4, no heave lift or thrill to palpation    Vascular  No leg edema, No pedal edema    Muscular skeletal  No clubbing cyanosis , muscle tone normal    Skin  No appreciable rashes or abnormal appearing lesions

## 2018-05-04 ENCOUNTER — OFFICE VISIT (OUTPATIENT)
Dept: FAMILY MEDICINE CLINIC | Facility: CLINIC | Age: 78
End: 2018-05-04
Payer: MEDICARE

## 2018-05-04 VITALS
DIASTOLIC BLOOD PRESSURE: 70 MMHG | HEART RATE: 72 BPM | BODY MASS INDEX: 28.71 KG/M2 | SYSTOLIC BLOOD PRESSURE: 136 MMHG | RESPIRATION RATE: 16 BRPM | HEIGHT: 64 IN | WEIGHT: 168.2 LBS

## 2018-05-04 DIAGNOSIS — C18.9 ADENOCARCINOMA OF COLON (HCC): ICD-10-CM

## 2018-05-04 DIAGNOSIS — I10 ESSENTIAL HYPERTENSION: ICD-10-CM

## 2018-05-04 DIAGNOSIS — K22.719 BARRETT'S ESOPHAGUS WITH DYSPLASIA: ICD-10-CM

## 2018-05-04 DIAGNOSIS — I25.10 CORONARY ARTERY DISEASE INVOLVING NATIVE CORONARY ARTERY OF NATIVE HEART WITHOUT ANGINA PECTORIS: ICD-10-CM

## 2018-05-04 DIAGNOSIS — E11.9 TYPE 2 DIABETES MELLITUS WITHOUT COMPLICATION, WITH LONG-TERM CURRENT USE OF INSULIN (HCC): ICD-10-CM

## 2018-05-04 DIAGNOSIS — R93.1 ABNORMAL ECHOCARDIOGRAM: ICD-10-CM

## 2018-05-04 DIAGNOSIS — E03.9 HYPOTHYROIDISM, UNSPECIFIED TYPE: ICD-10-CM

## 2018-05-04 DIAGNOSIS — I34.0 MITRAL VALVE INSUFFICIENCY, UNSPECIFIED ETIOLOGY: ICD-10-CM

## 2018-05-04 DIAGNOSIS — E78.2 MIXED HYPERLIPIDEMIA: ICD-10-CM

## 2018-05-04 DIAGNOSIS — Z01.818 PRE-OP EXAMINATION: Primary | ICD-10-CM

## 2018-05-04 DIAGNOSIS — Z79.4 TYPE 2 DIABETES MELLITUS WITHOUT COMPLICATION, WITH LONG-TERM CURRENT USE OF INSULIN (HCC): ICD-10-CM

## 2018-05-04 PROBLEM — J06.9 UPPER RESPIRATORY INFECTION: Status: RESOLVED | Noted: 2018-02-01 | Resolved: 2018-05-04

## 2018-05-04 PROBLEM — N39.3 STRESS INCONTINENCE IN FEMALE: Status: ACTIVE | Noted: 2018-05-04

## 2018-05-04 PROBLEM — K58.0 IRRITABLE BOWEL SYNDROME WITH DIARRHEA: Status: RESOLVED | Noted: 2017-03-23 | Resolved: 2018-05-04

## 2018-05-04 PROCEDURE — 93000 ELECTROCARDIOGRAM COMPLETE: CPT | Performed by: FAMILY MEDICINE

## 2018-05-04 PROCEDURE — 99215 OFFICE O/P EST HI 40 MIN: CPT | Performed by: FAMILY MEDICINE

## 2018-05-04 NOTE — PATIENT INSTRUCTIONS
Patient is cleared for surgery    Patient already knows that she has an appointment in June and will follow up with me then or sooner if needed

## 2018-05-06 ENCOUNTER — ANESTHESIA EVENT (OUTPATIENT)
Dept: PERIOP | Facility: HOSPITAL | Age: 78
End: 2018-05-06
Payer: MEDICARE

## 2018-05-07 NOTE — PRE-PROCEDURE INSTRUCTIONS
Pre-Surgery Instructions:   Medication Instructions    albuterol (VENTOLIN HFA) 90 mcg/act inhaler Instructed patient per Anesthesia Guidelines   apixaban (ELIQUIS) 5 mg Patient was instructed by Physician and understands   atenolol (TENORMIN) 50 mg tablet Instructed patient per Anesthesia Guidelines   BYETTA 10 MCG PEN 10 MCG/0 04ML SOPN Instructed patient per Anesthesia Guidelines   diclofenac sodium (VOLTAREN) 1 % Patient was instructed to contact Physician for medication instruction   DULoxetine (CYMBALTA) 20 mg capsule Instructed patient per Anesthesia Guidelines   Ferrous Sulfate 27 MG TABS Instructed patient per Anesthesia Guidelines   JARDIANCE 10 MG TABS Instructed patient per Anesthesia Guidelines   levothyroxine 112 mcg tablet Instructed patient per Anesthesia Guidelines   metFORMIN (GLUCOPHAGE-XR) 500 mg 24 hr tablet Instructed patient per Anesthesia Guidelines   Misc Natural Products (OSTEO BI-FLEX ADV JOINT SHIELD PO) Patient was instructed by Physician and understands   Multiple Vitamins-Minerals (CENTRUM SILVER 50+MEN) TABS Patient was instructed by Physician and understands   Omega-3 Fatty Acids (FISH OIL) 645 MG CAPS Patient was instructed by Physician and understands   simvastatin (ZOCOR) 20 mg tablet Instructed patient per Anesthesia Guidelines  Instructed to take levothyroxine and cymbalta am of surgery  with sip ofw ater per anesthesia DR Delores Pereira    To use inhaler am ofs urgery

## 2018-05-07 NOTE — ANESTHESIA PREPROCEDURE EVALUATION
Review of Systems/Medical History  Patient summary reviewed  Chart reviewed  No history of anesthetic complications     Cardiovascular  EKG reviewed, Pacemaker/AICD, Hyperlipidemia, Hypertension controlled, Valvular heart disease , mitral regurgitation, CAD , Dysrhythmias , atrial fibrillation,    Pulmonary  COPD moderate- medication dependent ,        GI/Hepatic    GERD well controlled,  Hiatal hernia, GI malignancy,             Endo/Other  Diabetes well controlled type 2 Oral agent, History of thyroid disease , hypothyroidism,      GYN    Hysterectomy,        Hematology  Anemia ,     Musculoskeletal    Arthritis     Neurology  Negative neurology ROS      Psychology   Anxiety, Depression , being treated for depression,              Physical Exam    Airway    Mallampati score: II  TM Distance: >3 FB  Neck ROM: full     Dental       Cardiovascular  Rhythm: regular, Rate: normal, Cardiovascular exam normal    Pulmonary  Pulmonary exam normal Breath sounds clear to auscultation,     Other Findings        Anesthesia Plan  ASA Score- 3     Anesthesia Type- IV sedation with anesthesia with ASA Monitors  Additional Monitors:   Airway Plan:     Comment: GA prn  Plan Factors- Patient instructed to abstain from smoking on day of procedure  Patient did not smoke on day of surgery  Induction- intravenous  Postoperative Plan-     Informed Consent- Anesthetic plan and risks discussed with patient               Lab Results   Component Value Date    WBC 7 27 04/24/2018    HGB 15 1 04/24/2018    HCT 48 3 (H) 04/24/2018    MCV 93 04/24/2018     04/24/2018     Lab Results   Component Value Date    GLUCOSE 145 (H) 03/08/2018    CALCIUM 9 7 04/24/2018     04/24/2018    K 4 6 04/24/2018    CO2 29 04/24/2018     04/24/2018    BUN 13 04/24/2018    CREATININE 0 65 04/24/2018     Lab Results   Component Value Date    INR 1 18 (H) 01/21/2016    INR 0 95 07/29/2015    PROTIME 14 5 (H) 01/21/2016    PROTIME 12 5 07/29/2015     Lab Results   Component Value Date    PTT 31 01/21/2016

## 2018-05-08 RX ORDER — CLOTRIMAZOLE AND BETAMETHASONE DIPROPIONATE 10; .64 MG/G; MG/G
1 CREAM TOPICAL 2 TIMES DAILY PRN
COMMUNITY
End: 2019-08-06 | Stop reason: ALTCHOICE

## 2018-05-09 RX ORDER — CHOLECALCIFEROL (VITAMIN D3) 25 MCG
1 CAPSULE ORAL DAILY
COMMUNITY

## 2018-05-09 RX ORDER — ASCORBIC ACID 500 MG
500 TABLET ORAL DAILY
COMMUNITY

## 2018-05-14 ENCOUNTER — HOSPITAL ENCOUNTER (OUTPATIENT)
Facility: HOSPITAL | Age: 78
Setting detail: OUTPATIENT SURGERY
Discharge: HOME | End: 2018-05-14
Attending: OBSTETRICS & GYNECOLOGY | Admitting: OBSTETRICS & GYNECOLOGY
Payer: MEDICARE

## 2018-05-14 ENCOUNTER — ANESTHESIA (OUTPATIENT)
Dept: PERIOP | Facility: HOSPITAL | Age: 78
End: 2018-05-14
Payer: MEDICARE

## 2018-05-14 VITALS
RESPIRATION RATE: 20 BRPM | HEIGHT: 63 IN | TEMPERATURE: 98.6 F | BODY MASS INDEX: 30.3 KG/M2 | SYSTOLIC BLOOD PRESSURE: 109 MMHG | WEIGHT: 171 LBS | HEART RATE: 61 BPM | DIASTOLIC BLOOD PRESSURE: 55 MMHG | OXYGEN SATURATION: 95 %

## 2018-05-14 DIAGNOSIS — I48.91 ATRIAL FIBRILLATION, UNSPECIFIED TYPE (HCC): ICD-10-CM

## 2018-05-14 LAB
GLUCOSE SERPL-MCNC: 153 MG/DL (ref 65–140)
GLUCOSE SERPL-MCNC: 158 MG/DL (ref 65–140)

## 2018-05-14 PROCEDURE — 51798 US URINE CAPACITY MEASURE: CPT | Performed by: OBSTETRICS & GYNECOLOGY

## 2018-05-14 PROCEDURE — 82948 REAGENT STRIP/BLOOD GLUCOSE: CPT

## 2018-05-14 PROCEDURE — C1771 REP DEV, URINARY, W/SLING: HCPCS | Performed by: OBSTETRICS & GYNECOLOGY

## 2018-05-14 DEVICE — SINGLE INCISION SLING SYSTEM
Type: IMPLANTABLE DEVICE | Site: VAGINA | Status: FUNCTIONAL
Brand: ALTIS

## 2018-05-14 RX ORDER — MIDAZOLAM HYDROCHLORIDE 1 MG/ML
INJECTION INTRAMUSCULAR; INTRAVENOUS AS NEEDED
Status: DISCONTINUED | OUTPATIENT
Start: 2018-05-14 | End: 2018-05-14 | Stop reason: SURG

## 2018-05-14 RX ORDER — MAGNESIUM HYDROXIDE 1200 MG/15ML
LIQUID ORAL AS NEEDED
Status: DISCONTINUED | OUTPATIENT
Start: 2018-05-14 | End: 2018-05-14 | Stop reason: HOSPADM

## 2018-05-14 RX ORDER — ACETAMINOPHEN 325 MG/1
650 TABLET ORAL EVERY 6 HOURS
Status: DISCONTINUED | OUTPATIENT
Start: 2018-05-14 | End: 2018-05-14 | Stop reason: HOSPADM

## 2018-05-14 RX ORDER — PROPOFOL 10 MG/ML
INJECTION, EMULSION INTRAVENOUS CONTINUOUS PRN
Status: DISCONTINUED | OUTPATIENT
Start: 2018-05-14 | End: 2018-05-14 | Stop reason: SURG

## 2018-05-14 RX ORDER — SODIUM CHLORIDE 9 MG/ML
125 INJECTION, SOLUTION INTRAVENOUS CONTINUOUS
Status: DISCONTINUED | OUTPATIENT
Start: 2018-05-14 | End: 2018-05-14 | Stop reason: HOSPADM

## 2018-05-14 RX ORDER — EPHEDRINE SULFATE 50 MG/ML
INJECTION, SOLUTION INTRAVENOUS AS NEEDED
Status: DISCONTINUED | OUTPATIENT
Start: 2018-05-14 | End: 2018-05-14 | Stop reason: SURG

## 2018-05-14 RX ORDER — FENTANYL CITRATE 50 UG/ML
INJECTION, SOLUTION INTRAMUSCULAR; INTRAVENOUS AS NEEDED
Status: DISCONTINUED | OUTPATIENT
Start: 2018-05-14 | End: 2018-05-14 | Stop reason: SURG

## 2018-05-14 RX ORDER — FENTANYL CITRATE/PF 50 MCG/ML
25 SYRINGE (ML) INJECTION
Status: DISCONTINUED | OUTPATIENT
Start: 2018-05-14 | End: 2018-05-14 | Stop reason: HOSPADM

## 2018-05-14 RX ORDER — ONDANSETRON 2 MG/ML
4 INJECTION INTRAMUSCULAR; INTRAVENOUS ONCE AS NEEDED
Status: DISCONTINUED | OUTPATIENT
Start: 2018-05-14 | End: 2018-05-14 | Stop reason: HOSPADM

## 2018-05-14 RX ORDER — ONDANSETRON 2 MG/ML
INJECTION INTRAMUSCULAR; INTRAVENOUS AS NEEDED
Status: DISCONTINUED | OUTPATIENT
Start: 2018-05-14 | End: 2018-05-14 | Stop reason: SURG

## 2018-05-14 RX ADMIN — EPHEDRINE SULFATE 10 MG: 50 INJECTION, SOLUTION INTRAMUSCULAR; INTRAVENOUS; SUBCUTANEOUS at 07:58

## 2018-05-14 RX ADMIN — ACETAMINOPHEN 650 MG: 325 TABLET, FILM COATED ORAL at 11:39

## 2018-05-14 RX ADMIN — FENTANYL CITRATE 50 MCG: 50 INJECTION, SOLUTION INTRAMUSCULAR; INTRAVENOUS at 07:29

## 2018-05-14 RX ADMIN — ONDANSETRON HYDROCHLORIDE 4 MG: 2 INJECTION, SOLUTION INTRAVENOUS at 07:52

## 2018-05-14 RX ADMIN — PROPOFOL 130 MCG/KG/MIN: 10 INJECTION, EMULSION INTRAVENOUS at 07:29

## 2018-05-14 RX ADMIN — SODIUM CHLORIDE 125 ML/HR: 0.9 INJECTION, SOLUTION INTRAVENOUS at 06:45

## 2018-05-14 RX ADMIN — PROPOFOL 100 MCG/KG/MIN: 10 INJECTION, EMULSION INTRAVENOUS at 08:09

## 2018-05-14 RX ADMIN — EPHEDRINE SULFATE 15 MG: 50 INJECTION, SOLUTION INTRAMUSCULAR; INTRAVENOUS; SUBCUTANEOUS at 08:03

## 2018-05-14 RX ADMIN — FENTANYL CITRATE 25 MCG: 50 INJECTION, SOLUTION INTRAMUSCULAR; INTRAVENOUS at 08:58

## 2018-05-14 RX ADMIN — EPHEDRINE SULFATE 10 MG: 50 INJECTION, SOLUTION INTRAMUSCULAR; INTRAVENOUS; SUBCUTANEOUS at 07:43

## 2018-05-14 RX ADMIN — FENTANYL CITRATE 50 MCG: 50 INJECTION, SOLUTION INTRAMUSCULAR; INTRAVENOUS at 07:26

## 2018-05-14 RX ADMIN — Medication 1000 MG: at 07:26

## 2018-05-14 RX ADMIN — MIDAZOLAM HYDROCHLORIDE 2 MG: 1 INJECTION, SOLUTION INTRAMUSCULAR; INTRAVENOUS at 07:20

## 2018-05-14 RX ADMIN — FENTANYL CITRATE 25 MCG: 50 INJECTION, SOLUTION INTRAMUSCULAR; INTRAVENOUS at 09:03

## 2018-05-14 NOTE — ANESTHESIA POSTPROCEDURE EVALUATION
Post-Op Assessment Note      CV Status:  Stable    Mental Status:  Alert and awake    Hydration Status:  Euvolemic    PONV Controlled:  Controlled    Airway Patency:  Patent    Post Op Vitals Reviewed: Yes          Staff: Anesthesiologist           /71 (05/14/18 0935)    Temp      Pulse 64 (05/14/18 0935)   Resp 19 (05/14/18 0935)    SpO2 92 % (05/14/18 0935)

## 2018-05-14 NOTE — OP NOTE
OPERATIVE REPORT  PATIENT NAME: Ulysses Chandler    :  1940  MRN: [id number]  Pt Location: [facility] 04    SURGERY DATE: 2018    Surgeon(s) and Role: * Josh Pickard MD - Primary     * Michael Ocasio MD - Fellow, Leora Arora MD - Resident, present    Preop Diagnosis:  Stress incontinence [N39 3]    Post-Op Diagnosis Codes:     * Stress incontinence [N39 3]    Procedure(s) (LRB):  INSERTION PUBOVAGINAL SLING (FEMALE) (N/A)  CYSTOSCOPY (N/A)    Specimen(s):  * No specimens in log *    Estimated Blood Loss:   Minimal    Drains:       Anesthesia Type:   IV Sedation with Anesthesia    Operative Indications:  Stress incontinence [N39 3]    Operative Findings:  Cystoscopy: efflux noted from bilateral ureteral orifices  No mesh, suture material, or injury noted to bladder lumen  Complications:   None    Procedure and Technique:  Appropriate preoperative antibiotics chosen per ACOG guidelines were given  Bilateral SCDs were placed in the lower extremities for DVT prevention prior to the institution of anesthesia  No bladder, ureteral, viscus, or solid organ injury were noted at the end of the procedure  The patient was identified in the holding area by the operating room staff and attending physician  She was taken to the operating room where anesthesia was instituted without complications  She was placed in the dorsal lithotomy position with the legs in [address] [place] [address] with care taken to avoid excessive flexion or extension of her lower extremities  The patient was prepped and draped in the usual sterile fashion  A Sue catheter was inserted  Next, we turned our attention to the single incision sling  The mid urethral zone was identified by reference to the Sue catheter and urethral meatus   Local anesthetic solution was injected into the anterior vaginal wall muscularis at the mid urethral level for hydrodissection and vasoconstriction, 10 mL was injected at the midline  Next, an additional 10 mL was injected to the left and right of midline directing the infiltration laterally towards the cephalad aspect of the inferior pubic ramus bilaterally  Care was taken to ensure that the anterolateral sulcus was flattened by the infiltration to minimize chance for buttonholing or sling (tape) becoming too close to the anterolateral sulcus  After completion of hydrodissection, 2 Allis clamps were used to grasp the anterior vaginal wall for traction  A 1 5 cm incision was made into the midline through mucosa and muscularis  Two Allis clamps were then placed on each cut edge of the incision for stabilization  Tenotomy scissors were used to create a small vaginal tunnel with sharp and blunt dissection above the anterior vaginal wall directed laterally towards the cephalad aspect of the inferior pubic ramus bilaterally  Dissection was carried out to the edge of the bone itself but without dissection into the obturator internus muscle  Once the dissection was complete, the Altis sling system was placed  An index finger was placed in the vagina for guidance  The Altis trocar and fixed anchor was placed into the pre-dissected tract  The handle was held horizontally in a slight upward angle to avoid buttonholing of the sulcus  Cephalad drift was used to allow passage around the inferior pubic ramus  A thumb was placed on the heel of the introducer to allow a lateral followed by a rotation push maneuver to place a fixed anchor into the obturator internus muscle membrane complex on the patient's left side  Proper handle deviation confirmed proper anchor placement, as well as inspection of the sling itself  Once the introducer was removed, proper anchor placement was confirmed by gentle tugging on the sling  The exact same sequence of steps was repeated on the patient's right side with the adjustable anchor and trocar       Once placement of dynamic anchor was completed, the introducer was removed and gentle tugging again confirmed proper anchor placement  The Sue catheter was then removed and a diagnostic cystoscopy was performed by instilling 300 mL of fluid into the bladder  Both ureters were effluxing normally and there were no lacerations or evidence of injury to the lower urinary tract  The tensioning suture was used to adjust the tape until there was minimal to no leakage with Crede and coughing  After appropriate tensioning, the tensioning suture was cut  The vaginal incision was closed with 2-0 Vicryl suture in a running locked stitch  The sponge, needle and instrument count were correct x 2  The patient tolerated the procedure well  She was awakened from anesthesia and transferred to the recovery room in stable condition  A qualified resident physician was not available  Dr Chris Yadav was present for the entire procedure      Patient Disposition:  PACU     SIGNATURE: Rolan Maddox MD  DATE: May 14, 2018  TIME: 8:19 AM

## 2018-05-14 NOTE — DISCHARGE INSTRUCTIONS
Bladder Sling Procedure   WHAT YOU NEED TO KNOW:   A bladder sling procedure is surgery to treat urinary incontinence in women  The sling acts as a hammock to keep your urethra in place and hold it closed when your bladder is full  You may have vaginal bleeding or discharge for up to a week after your surgery  Use sanitary pads  Do not use tampons  You may have some pelvic discomfort or trouble urinating  DISCHARGE INSTRUCTIONS:   Call 911 for any of the following:   · You have sudden trouble breathing  Seek care immediately if:   · Your bleeding gets worse  · You have yellow or foul smelling discharge from your vagina  · You cannot urinate, or you are urinating less than what is normal for you  · You feel confused  Contact your healthcare provider if:   · You have a fever  · You do not feel like you are able to empty your bladder completely when you urinate  · You feel the need to urinate very suddenly  · You have burning or stinging when you urinate  · You have blood in your urine  · Your skin is itchy, swollen, or you have a rash  · You have questions or concerns about your condition or care  Medicines:   · Prescription pain medicine  may be given  Ask your how to take this medicine safely  · Take your medicine as directed  Contact your healthcare provider if you think your medicine is not helping or if you have side effects  Tell him or her if you are allergic to any medicine  Keep a list of the medicines, vitamins, and herbs you take  Include the amounts, and when and why you take them  Bring the list or the pill bottles to follow-up visits  Carry your medicine list with you in case of an emergency  Self-catheterization:  You may need to put a catheter into your bladder after you urinate to empty any remaining urine  A catheter is a small rubber tube used to drain urine  Healthcare providers will teach you how to put the catheter in safely   This may be needed until you are completely emptying your bladder  Sue catheter: You may have a Sue catheter for a short period of time  The Sue is a tube put into your bladder to drain urine into a bag  Keep the bag below your waist  This will prevent urine from flowing back into your bladder and causing an infection or other problems  Also, keep the tube free of kinks so the urine will drain properly  Do not pull on the catheter  This can cause pain and bleeding, and may cause the catheter to come out  Activity:  Do not lift heavy objects for 6 weeks after your procedure  Do not have intercourse for 4 to 6 weeks  Do not use a tampon for 4 weeks  Ask your healthcare provider when you can return to work or your usual activities  Do pelvic muscle exercises: These are also called Kegel exercises  These exercises help strengthen your pelvic muscles and help prevent urine leakage  Tighten the muscles of your pelvis and hold them tight for 5 seconds, then relax for 5 seconds  Gradually work up to tightening them for 10 seconds and relaxing for 10 seconds  Do this 3 times each day  Keep a record:  Keep a record of when you urinate and if you leak any urine  Write down what you were doing when you leaked urine, such as coughing or sneezing  Bring the log to your follow-up visits  Prevent constipation:  Drink liquids as directed  You may need to drink more water than usual to soften your bowel movements  Eat a variety of healthy foods, especially fruit and foods high in fiber  You may need to use an over-the-counter bowel movement softener  Follow up with your healthcare provider as directed: You may need a test to check how much urine remains in your bladder after you urinate  This will help show how the sling is working  Write down your questions so you remember to ask them during your visits    © 2017 [employer] Information is for End User's use only and may not be sold, redistributed or otherwise used for commercial purposes  All illustrations and images included in CareNotes® are the copyrighted property of A D A M , Inc  or Jimmy Batista  The above information is an  only  It is not intended as medical advice for individual conditions or treatments  Talk to your doctor, nurse or pharmacist before following any medical regimen to see if it is safe and effective for you

## 2018-05-16 ENCOUNTER — APPOINTMENT (OUTPATIENT)
Dept: LAB | Facility: HOSPITAL | Age: 78
End: 2018-05-16
Attending: INTERNAL MEDICINE
Payer: MEDICARE

## 2018-05-16 ENCOUNTER — OFFICE VISIT (OUTPATIENT)
Dept: ENDOCRINOLOGY | Facility: HOSPITAL | Age: 78
End: 2018-05-16
Payer: MEDICARE

## 2018-05-16 VITALS
WEIGHT: 174 LBS | HEART RATE: 60 BPM | SYSTOLIC BLOOD PRESSURE: 138 MMHG | HEIGHT: 63 IN | DIASTOLIC BLOOD PRESSURE: 82 MMHG | BODY MASS INDEX: 30.83 KG/M2

## 2018-05-16 DIAGNOSIS — E06.3 HYPOTHYROIDISM DUE TO HASHIMOTO'S THYROIDITIS: ICD-10-CM

## 2018-05-16 DIAGNOSIS — E11.40 TYPE 2 DIABETES MELLITUS WITH DIABETIC NEUROPATHY, WITHOUT LONG-TERM CURRENT USE OF INSULIN (HCC): Primary | ICD-10-CM

## 2018-05-16 DIAGNOSIS — E03.8 HYPOTHYROIDISM DUE TO HASHIMOTO'S THYROIDITIS: ICD-10-CM

## 2018-05-16 DIAGNOSIS — I10 ESSENTIAL HYPERTENSION: ICD-10-CM

## 2018-05-16 DIAGNOSIS — E11.40 TYPE 2 DIABETES MELLITUS WITH DIABETIC NEUROPATHY, WITHOUT LONG-TERM CURRENT USE OF INSULIN (HCC): ICD-10-CM

## 2018-05-16 DIAGNOSIS — E78.5 HYPERLIPIDEMIA, UNSPECIFIED HYPERLIPIDEMIA TYPE: ICD-10-CM

## 2018-05-16 LAB
ALBUMIN SERPL BCP-MCNC: 3.8 G/DL (ref 3.5–5)
ALP SERPL-CCNC: 77 U/L (ref 46–116)
ALT SERPL W P-5'-P-CCNC: 22 U/L (ref 12–78)
ANION GAP SERPL CALCULATED.3IONS-SCNC: 7 MMOL/L (ref 4–13)
AST SERPL W P-5'-P-CCNC: 42 U/L (ref 5–45)
BILIRUB SERPL-MCNC: 0.8 MG/DL (ref 0.2–1)
BUN SERPL-MCNC: 11 MG/DL (ref 5–25)
CALCIUM SERPL-MCNC: 9.3 MG/DL (ref 8.3–10.1)
CHLORIDE SERPL-SCNC: 105 MMOL/L (ref 100–108)
CHOLEST SERPL-MCNC: 144 MG/DL (ref 50–200)
CO2 SERPL-SCNC: 30 MMOL/L (ref 21–32)
CREAT SERPL-MCNC: 0.67 MG/DL (ref 0.6–1.3)
CREAT UR-MCNC: 33.1 MG/DL
EST. AVERAGE GLUCOSE BLD GHB EST-MCNC: 160 MG/DL
GFR SERPL CREATININE-BSD FRML MDRD: 86 ML/MIN/1.73SQ M
GLUCOSE SERPL-MCNC: 108 MG/DL (ref 65–140)
HBA1C MFR BLD: 7.2 % (ref 4.2–6.3)
HDLC SERPL-MCNC: 18 MG/DL (ref 40–60)
LDLC SERPL CALC-MCNC: 85 MG/DL (ref 0–100)
MICROALBUMIN UR-MCNC: 10.2 MG/L (ref 0–20)
MICROALBUMIN/CREAT 24H UR: 31 MG/G CREATININE (ref 0–30)
POTASSIUM SERPL-SCNC: 4.2 MMOL/L (ref 3.5–5.3)
PROT SERPL-MCNC: 7.4 G/DL (ref 6.4–8.2)
SODIUM SERPL-SCNC: 142 MMOL/L (ref 136–145)
T4 FREE SERPL-MCNC: 1.3 NG/DL (ref 0.76–1.46)
TRIGL SERPL-MCNC: 206 MG/DL
TSH SERPL DL<=0.05 MIU/L-ACNC: 0.27 UIU/ML (ref 0.36–3.74)

## 2018-05-16 PROCEDURE — 83036 HEMOGLOBIN GLYCOSYLATED A1C: CPT

## 2018-05-16 PROCEDURE — 84439 ASSAY OF FREE THYROXINE: CPT

## 2018-05-16 PROCEDURE — 80053 COMPREHEN METABOLIC PANEL: CPT

## 2018-05-16 PROCEDURE — 82570 ASSAY OF URINE CREATININE: CPT

## 2018-05-16 PROCEDURE — 99204 OFFICE O/P NEW MOD 45 MIN: CPT | Performed by: INTERNAL MEDICINE

## 2018-05-16 PROCEDURE — 36415 COLL VENOUS BLD VENIPUNCTURE: CPT

## 2018-05-16 PROCEDURE — 82043 UR ALBUMIN QUANTITATIVE: CPT

## 2018-05-16 PROCEDURE — 84443 ASSAY THYROID STIM HORMONE: CPT

## 2018-05-16 PROCEDURE — 80061 LIPID PANEL: CPT

## 2018-05-16 RX ORDER — AMPICILLIN TRIHYDRATE 250 MG
200 CAPSULE ORAL DAILY
COMMUNITY

## 2018-05-16 RX ORDER — LEVOTHYROXINE SODIUM 0.12 MG/1
125 TABLET ORAL DAILY
Qty: 90 TABLET | Refills: 3
Start: 2018-05-16 | End: 2018-05-17 | Stop reason: SDUPTHER

## 2018-05-16 RX ORDER — AMOXICILLIN 500 MG
CAPSULE ORAL
COMMUNITY
End: 2018-08-01

## 2018-05-16 NOTE — LETTER
May 16, 2018     Deb Phan DO  [address] [address]    Patient: Ivone Head   YOB: 1940   Date of Visit: 5/16/2018       Dear Dr Mason [place]: Thank you for referring Almax Sindy to me for evaluation  Below are my notes for this consultation  If you have questions, please do not hesitate to call me  I look forward to following your patient along with you  Sincerely,        Sierra Munson DO        CC: No Recipients  Sierra Munson DO  5/16/2018  8:35 AM  Sign at close encounter  5/16/2018    Assessment/Plan      Diagnoses and all orders for this visit:    Type 2 diabetes mellitus with diabetic neuropathy, without long-term current use of insulin ([facility] )  -     Comprehensive metabolic panel Lab Collect; Future  -     HEMOGLOBIN A1C W/ EAG ESTIMATION Lab Collect; Future  -     Microalbumin / creatinine urine ratio- Lab Collect; Future  -     Lipid Panel with Direct LDL reflex Lab Collect; Future  -     Comprehensive metabolic panel Lab Collect; Future  -     HEMOGLOBIN A1C W/ EAG ESTIMATION Lab Collect; Future    Hyperlipidemia, unspecified hyperlipidemia type    Hypothyroidism due to Hashimoto's thyroiditis  -     TSH, 3rd generation Lab Collect; Future  -     T4, free Lab Collect; Future  -     levothyroxine 125 mcg tablet; Take 1 tablet (125 mcg total) by mouth daily 1 tab 6 days of the week  0 5 tab Sundays   -     TSH, 3rd generation Lab Collect; Future  -     T4, free Lab Collect; Future    Essential hypertension    Other orders  -     Coenzyme Q10 (COQ10) 200 MG CAPS; Take by mouth  -     Omega-3 Fatty Acids (FISH OIL) 1200 MG CAPS; Take by mouth        Assessment/Plan:  1  Type 2 diabetes with peripheral neuropathy:  Will update patient's blood work with that A1c, CMP, lipids, urine microalbumin to creatinine ratio now  Based on A1c, I suspect I may need increase her Jardiance dose to 25 mg daily    She is tolerating this medicine well since starting it in January  Will continue metformin  twice a day and Byetta 10 twice a day  I have also ordered labs to be done before follow-up appointment in 3 months  Otherwise she is up-to-date on preventive care  2   Hypertension:  Controlled on current regimen  3   Hyperlipidemia:  Check lipids with upcoming blood work  4   Hypothyroidism:  Check TSH and free T4 in optimize dose so as to not be a factor in patients bowel concerns  CC: Diabetes Consult    History of Present Illness     HPI: Mike Arroyo is a 69y o  year old female with type 2 diabetes for about 8 years  She is on oral agents at home and takes Jardiance 10 mg daily, Metformin  bid, byetta 10 bid  She denies any polyuria, polydipsia, nocturia and blurry vision  She denies nephropathy and retinopathy but does admit to neuropathy  Hypoglycemic episodes: No      The patient's last eye exam was in October 2017  The patient's last foot exam was in Jan 2018  Blood Sugar/Glucometer/Pump/CGM review:   Fasting blood sugar typically at goal   Occasional hyperglycemia later in the day  No hypoglycemia  Hypothyroidism:   She takes levothyroxin 125 mcg generic tablets but only takes half tablet on Sundays  Otherwise she feels clinically euthyroid without any symptoms of hypothyroidism hyperthyroidism  Had recent sling placed and notes better urination but notes constipation being worked up     Review of Systems   Constitutional: Negative for chills, fatigue and fever  HENT: Negative for trouble swallowing and voice change  Eyes: Negative for visual disturbance  Respiratory: Negative for shortness of breath  Cardiovascular: Negative for chest pain, palpitations and leg swelling  Gastrointestinal: Positive for constipation  Negative for abdominal pain, nausea and vomiting  Endocrine: Negative for cold intolerance, polydipsia and polyuria     Musculoskeletal: Negative for arthralgias and myalgias  Skin: Negative for rash  Neurological: Negative for dizziness, tremors and weakness  Hematological: Negative for adenopathy  Psychiatric/Behavioral: Negative for agitation and confusion  Historical Information   Past Medical History:   Diagnosis Date    A-fib [facility] )     Acid reflux     resolved    Anxiety     Colon cancer ([facility] )     2012- had colon resection    COPD (chronic obstructive pulmonary disease) (HCC)     questionable    Depression     Diabetes mellitus (HCC)     on byetta    Dizziness     occ    High cholesterol     Hypertension     Hypothyroidism     Iron (Fe) deficiency anemia     Irritable bowel syndrome     OA (osteoarthritis)     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    Pacemaker     hx SSS    Rash     labia area- uses cream prn    Restrictive lung disease     Seasonal allergies     MONA (stress urinary incontinence, female)     Wears glasses     reading     Past Surgical History:   Procedure Laterality Date   [address]       does not know type  Dr Litzy Lopes is cariologist    COLON SURGERY      resection  removed 18 In     COLONOSCOPY      ESOPHAGOGASTRODUODENOSCOPY N/A 1/9/2016    Procedure: ESOPHAGOGASTRODUODENOSCOPY (EGD); Surgeon: Helga Hernandez MD;  [facility];  Service:    Burtis Milder      IL CYSTOURETHROSCOPY N/A 5/14/2018    Procedure: Nicki Hargrove;  Surgeon: Jordan Cook MD;  [facility];  Service: UroGynecology           IL LAP, REPAIR PARAESOPHAGEAL HERNIA, INCL FUNDOPLASTY W/ MESH N/A 1/27/2016    Procedure: LAPAROSCOPIC PARAESOPHAGEAL HERNIA REPAIR with mesh; toupet  FUNDOPLICATION ;  Surgeon: Oly Tapia MD;  [facility];  Service: Thoracic    IL SLING OPER STRES INCONTINENCE N/A 5/14/2018    Procedure: INSERTION PUBOVAGINAL SLING (FEMALE);   Surgeon: Jordan Cook MD;  [facility];  Service: UroGynecology            Social History   History   Alcohol Use No     History   Drug Use No     History   Smoking Status    Former Smoker    Types: Cigarettes    Quit date: 2002   Smokeless Tobacco    Never Used     Family History:   Family History   Problem Relation Age of Onset    Heart disease Mother     Diabetes Mother     Asthma Father     Stomach cancer Father     Cancer Paternal Grandmother     Cancer Paternal Grandfather     Stomach cancer Sister     Substance Abuse Neg Hx     Mental illness Neg Hx        Meds/Allergies   Current Outpatient Prescriptions   Medication Sig Dispense Refill    albuterol (VENTOLIN HFA) 90 mcg/act inhaler Inhale 2 puffs every 4 (four) hours as needed      apixaban (ELIQUIS) 5 mg Resume Eliquis on Tuesday 5/15/18  5mg by mouth BID 56 tablet 0    ascorbic acid (VITAMIN C) 500 mg tablet Take 500 mg by mouth daily      atenolol (TENORMIN) 50 mg tablet Take 1 tablet (50 mg total) by mouth daily at bedtime 15 tablet 0    BYETTA 10 MCG PEN 10 MCG/0 04ML SOPN Inject 10 mcg under the skin 2 (two) times a day        Cholecalciferol (VITAMIN D-3) 1000 units CAPS Take 1 capsule by mouth daily      clotrimazole-betamethasone (LOTRISONE) 1-0 05 % cream Apply 1 application topically 2 (two) times a day as needed      Coenzyme Q10 (COQ10) 200 MG CAPS Take by mouth      diclofenac sodium (VOLTAREN) 1 % Place 2 g on the skin 4 (four) times a day as needed        DULoxetine (CYMBALTA) 20 mg capsule Take 1 capsule (20 mg total) by mouth daily 90 capsule 3    Ferrous Sulfate 27 MG TABS Take 1 tablet by mouth daily      JARDIANCE 10 MG TABS Take 10 mg by mouth daily        MEGARED OMEGA-3 KRILL OIL PO Take 1 capsule by mouth daily      metFORMIN (GLUCOPHAGE-XR) 500 mg 24 hr tablet Take 500 mg by mouth 2 (two) times a day        Misc Natural Products (OSTEO BI-FLEX ADV JOINT SHIELD PO) Take 1 tablet by mouth daily        Multiple Vitamins-Minerals (CENTRUM SILVER 50+MEN) TABS Take 1 tablet by mouth daily      Omega-3 Fatty Acids (FISH OIL) 1200 MG CAPS Take by mouth      simvastatin (ZOCOR) 20 mg tablet Take 1 tablet by mouth daily at bedtime        levothyroxine 125 mcg tablet Take 1 tablet (125 mcg total) by mouth daily 1 tab 6 days of the week  0 5 tab Sundays  90 tablet 3     No current facility-administered medications for this visit  No Known Allergies    Objective   Vitals: Blood pressure 138/82, pulse 60, height 5' 3" (1 6 m), weight 78 9 kg (174 lb), not currently breastfeeding  Invasive Devices          No matching active lines, drains, or airways          Physical Exam   Constitutional: She is oriented to person, place, and time  She appears well-developed and well-nourished  No distress  HENT:   Head: Normocephalic and atraumatic  Mouth/Throat: No oropharyngeal exudate  Eyes: Conjunctivae and EOM are normal  Pupils are equal, round, and reactive to light  No scleral icterus  Neck: Normal range of motion  Neck supple  No thyromegaly present  Cardiovascular: Normal rate and regular rhythm  No murmur heard  Pulmonary/Chest: Effort normal and breath sounds normal  She has no wheezes  Abdominal: Soft  Bowel sounds are normal  She exhibits no distension  There is no tenderness  Musculoskeletal: Normal range of motion  She exhibits no edema  Lymphadenopathy:     She has no cervical adenopathy  Neurological: She is alert and oriented to person, place, and time  She exhibits normal muscle tone  Skin: Skin is warm and dry  No rash noted  She is not diaphoretic  Psychiatric: She has a normal mood and affect  Her behavior is normal        The history was obtained from the review of the chart and from the patient      Lab Results:    Most recent [facility] is  Lab Results   Component Value Date    HGBA1C 8 0 (H) 12/19/2017           No components found for: HA1C  No results found for: GLU    Lab Results   Component Value Date    CREATININE 0 65 04/24/2018    CREATININE 0 67 03/08/2018    CREATININE 0 67 12/19/2017    BUN 13 04/24/2018     04/24/2018    K 4 6 04/24/2018     04/24/2018    CO2 29 04/24/2018     eGFR   Date Value Ref Range Status   04/24/2018 87 ml/min/1 73sq m Final     No components found for: [facility]    Lab Results   Component Value Date    CHOL 142 12/19/2017    HDL 42 12/19/2017    TRIG 169 (H) 12/19/2017       Lab Results   Component Value Date    ALT 24 12/19/2017    AST 28 12/19/2017    ALKPHOS 72 12/19/2017    BILITOT 0 73 12/19/2017       Lab Results   Component Value Date    FREET4 1 00 08/22/2017         Future Appointments  Date Time Provider Avinash Jimenezi   5/31/2018 11:00 AM [facility] [place] [facility]   6/5/2018 8:00 AM Alirio Pratt DO [facility]   6/18/2018 10:00 AM Henry Friend MD ORTHO [facility]   8/31/2018 1:00 PM [facility] [place] [facility]   11/26/2018 9:00 AM DEVICE IN PERSON [place] [facility]

## 2018-05-16 NOTE — PROGRESS NOTES
5/16/2018    Assessment/Plan      Diagnoses and all orders for this visit:    Type 2 diabetes mellitus with diabetic neuropathy, without long-term current use of insulin ([facility] [address] )  -     Comprehensive metabolic panel Lab Collect; Future  -     HEMOGLOBIN A1C W/ EAG ESTIMATION Lab Collect; Future  -     Microalbumin / creatinine urine ratio- Lab Collect; Future  -     Lipid Panel with Direct LDL reflex Lab Collect; Future  -     Comprehensive metabolic panel Lab Collect; Future  -     HEMOGLOBIN A1C W/ EAG ESTIMATION Lab Collect; Future    Hyperlipidemia, unspecified hyperlipidemia type    Hypothyroidism due to Hashimoto's thyroiditis  -     TSH, 3rd generation Lab Collect; Future  -     T4, free Lab Collect; Future  -     levothyroxine 125 mcg tablet; Take 1 tablet (125 mcg total) by mouth daily 1 tab 6 days of the week  0 5 tab Sundays   -     TSH, 3rd generation Lab Collect; Future  -     T4, free Lab Collect; Future    Essential hypertension    Other orders  -     Coenzyme Q10 (COQ10) 200 MG CAPS; Take by mouth  -     Omega-3 Fatty Acids (FISH OIL) 1200 MG CAPS; Take by mouth        Assessment/Plan:  1  Type 2 diabetes with peripheral neuropathy:  Will update patient's blood work with that A1c, CMP, lipids, urine microalbumin to creatinine ratio now  Based on A1c, I suspect I may need increase her Jardiance dose to 25 mg daily  She is tolerating this medicine well since starting it in January  Will continue metformin  twice a day and Byetta 10 twice a day  I have also ordered labs to be done before follow-up appointment in 3 months  Otherwise she is up-to-date on preventive care  2   Hypertension:  Controlled on current regimen  3   Hyperlipidemia:  Check lipids with upcoming blood work  4   Hypothyroidism:  Check TSH and free T4 in optimize dose so as to not be a factor in patients bowel concerns        CC: Diabetes Consult    History of Present Illness     HPI: Marie De La Torre is a 69 y o  year old female with type 2 diabetes for about 8 years  She is on oral agents at home and takes Jardiance 10 mg daily, Metformin  bid, byetta 10 bid  She denies any polyuria, polydipsia, nocturia and blurry vision  She denies nephropathy and retinopathy but does admit to neuropathy  Hypoglycemic episodes: No      The patient's last eye exam was in October 2017  The patient's last foot exam was in Jan 2018  Blood Sugar/Glucometer/Pump/CGM review:   Fasting blood sugar typically at goal   Occasional hyperglycemia later in the day  No hypoglycemia  Hypothyroidism:   She takes levothyroxin 125 mcg generic tablets but only takes half tablet on Sundays  Otherwise she feels clinically euthyroid without any symptoms of hypothyroidism hyperthyroidism  Had recent sling placed and notes better urination but notes constipation being worked up     Review of Systems   Constitutional: Negative for chills, fatigue and fever  HENT: Negative for trouble swallowing and voice change  Eyes: Negative for visual disturbance  Respiratory: Negative for shortness of breath  Cardiovascular: Negative for chest pain, palpitations and leg swelling  Gastrointestinal: Positive for constipation  Negative for abdominal pain, nausea and vomiting  Endocrine: Negative for cold intolerance, polydipsia and polyuria  Musculoskeletal: Negative for arthralgias and myalgias  Skin: Negative for rash  Neurological: Negative for dizziness, tremors and weakness  Hematological: Negative for adenopathy  Psychiatric/Behavioral: Negative for agitation and confusion         Historical Information   Past Medical History:   Diagnosis Date    [facility]     Acid reflux     resolved    Anxiety     [facility]     2012- had colon resection    COPD (chronic obstructive pulmonary disease) (HCC)     questionable    Depression     Diabetes mellitus (HCC)     on byetta    Dizziness     occ    High cholesterol     Hypertension     Hypothyroidism     Iron (Fe) deficiency anemia     Irritable bowel syndrome     OA (osteoarthritis)     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    Pacemaker     hx SSS    Rash     labia area- uses cream prn    Restrictive lung disease     Seasonal allergies     MONA (stress urinary incontinence, female)     Wears glasses     reading     Past Surgical History:   Procedure Laterality Date   [address]      does not know type  Dr Fred Benites is cariologist    COLON SURGERY      resection  removed 18 In     COLONOSCOPY      ESOPHAGOGASTRODUODENOSCOPY N/A 1/9/2016    Procedure: ESOPHAGOGASTRODUODENOSCOPY (EGD); Surgeon: Kimberly Aguilera MD;  [facility];  Service:    Leiva Ayla      MO CYSTOURETHROSCOPY N/A 5/14/2018    Procedure: Zac Finney;  Surgeon: Josh Pickard MD;  [facility];  Service: UroGynecology           MO LAP, REPAIR PARAESOPHAGEAL HERNIA, INCL FUNDOPLASTY W/ MESH N/A 1/27/2016    Procedure: LAPAROSCOPIC PARAESOPHAGEAL HERNIA REPAIR with mesh; toupet  FUNDOPLICATION ;  Surgeon: Carlos Bustamante MD;  [facility];  Service: Thoracic    MO SLING OPER STRES INCONTINENCE N/A 5/14/2018    Procedure: INSERTION PUBOVAGINAL SLING (FEMALE);   Surgeon: Josh Pickard MD;  [facility];  Service: UroGynecology            Social History   History   Alcohol Use No     History   Drug Use No     History   Smoking Status    Former Smoker    Types: Cigarettes    Quit date: 2002   Smokeless Tobacco    Never Used     Family History:   Family History   Problem Relation Age of Onset    Heart disease Mother     Diabetes Mother     Asthma Father     Stomach cancer Father     Cancer Paternal Grandmother     Cancer Paternal Grandfather     Stomach cancer Sister     Substance Abuse Neg Hx     Mental illness Neg Hx        Meds/Allergies   Current Outpatient Prescriptions   Medication Sig Dispense Refill    albuterol (VENTOLIN HFA) 90 mcg/act inhaler Inhale 2 puffs every 4 (four) hours as needed      apixaban (ELIQUIS) 5 mg Resume Eliquis on Tuesday 5/15/18  5mg by mouth BID 56 tablet 0    ascorbic acid (VITAMIN C) 500 mg tablet Take 500 mg by mouth daily      atenolol (TENORMIN) 50 mg tablet Take 1 tablet (50 mg total) by mouth daily at bedtime 15 tablet 0    BYETTA 10 MCG PEN 10 MCG/0 04ML SOPN Inject 10 mcg under the skin 2 (two) times a day        Cholecalciferol (VITAMIN D-3) 1000 units CAPS Take 1 capsule by mouth daily      clotrimazole-betamethasone (LOTRISONE) 1-0 05 % cream Apply 1 application topically 2 (two) times a day as needed      Coenzyme Q10 (COQ10) 200 MG CAPS Take by mouth      diclofenac sodium (VOLTAREN) 1 % Place 2 g on the skin 4 (four) times a day as needed        DULoxetine (CYMBALTA) 20 mg capsule Take 1 capsule (20 mg total) by mouth daily 90 capsule 3    Ferrous Sulfate 27 MG TABS Take 1 tablet by mouth daily      JARDIANCE 10 MG TABS Take 10 mg by mouth daily        MEGARED OMEGA-3 KRILL OIL PO Take 1 capsule by mouth daily      metFORMIN (GLUCOPHAGE-XR) 500 mg 24 hr tablet Take 500 mg by mouth 2 (two) times a day        Misc Natural Products (OSTEO BI-FLEX ADV JOINT SHIELD PO) Take 1 tablet by mouth daily        Multiple Vitamins-Minerals (CENTRUM SILVER 50+MEN) TABS Take 1 tablet by mouth daily      Omega-3 Fatty Acids (FISH OIL) 1200 MG CAPS Take by mouth      simvastatin (ZOCOR) 20 mg tablet Take 1 tablet by mouth daily at bedtime        levothyroxine 125 mcg tablet Take 1 tablet (125 mcg total) by mouth daily 1 tab 6 days of the week  0 5 tab Sundays  90 tablet 3     No current facility-administered medications for this visit  No Known Allergies    Objective   Vitals: Blood pressure 138/82, pulse 60, height 5' 3" (1 6 m), weight 78 9 kg (174 lb), not currently breastfeeding    Invasive Devices          No matching active lines, drains, or airways          Physical Exam   Constitutional: She is oriented to person, place, and time  She appears well-developed and well-nourished  No distress  HENT:   Head: Normocephalic and atraumatic  Mouth/Throat: No oropharyngeal exudate  Eyes: Conjunctivae and EOM are normal  Pupils are equal, round, and reactive to light  No scleral icterus  Neck: Normal range of motion  Neck supple  No thyromegaly present  Cardiovascular: Normal rate and regular rhythm  No murmur heard  Pulmonary/Chest: Effort normal and breath sounds normal  She has no wheezes  Abdominal: Soft  Bowel sounds are normal  She exhibits no distension  There is no tenderness  Musculoskeletal: Normal range of motion  She exhibits no edema  Lymphadenopathy:     She has no cervical adenopathy  Neurological: She is alert and oriented to person, place, and time  She exhibits normal muscle tone  Skin: Skin is warm and dry  No rash noted  She is not diaphoretic  Psychiatric: She has a normal mood and affect  Her behavior is normal        The history was obtained from the review of the chart and from the patient      Lab Results:    Most recent Alc is  Lab Results   Component Value Date    HGBA1C 8 0 (H) 12/19/2017           No components found for: HA1C  No results found for: GLU    Lab Results   Component Value Date    CREATININE 0 65 04/24/2018    CREATININE 0 67 03/08/2018    CREATININE 0 67 12/19/2017    BUN 13 04/24/2018     04/24/2018    K 4 6 04/24/2018     04/24/2018    CO2 29 04/24/2018     eGFR   Date Value Ref Range Status   04/24/2018 87 ml/min/1 73sq m Final     No components found for: [facility]    Lab Results   Component Value Date    CHOL 142 12/19/2017    HDL 42 12/19/2017    TRIG 169 (H) 12/19/2017       Lab Results   Component Value Date    ALT 24 12/19/2017    AST 28 12/19/2017    ALKPHOS 72 12/19/2017    BILITOT 0 73 12/19/2017       Lab Results   Component Value Date    FREET4 1 00 08/22/2017         Future Appointments  Date Time Provider Avinash Caceres 5/31/2018 11:00 AM DEVICE [facility] [place] [facility]   6/5/2018 8:00 AM Kelley Garcia DO [facility]   6/18/2018 10:00 AM Richard Fuentes MD [facility]THO [facility]   8/31/2018 1:00 PM [facility] [place] [facility]   11/26/2018 9:00 AM DEVICE IN PERSON [place] [facility]

## 2018-05-17 RX ORDER — LEVOTHYROXINE SODIUM 0.12 MG/1
125 TABLET ORAL DAILY
Qty: 90 TABLET | Refills: 0
Start: 2018-05-17 | End: 2019-06-05 | Stop reason: SDUPTHER

## 2018-05-31 ENCOUNTER — IN-CLINIC DEVICE VISIT (OUTPATIENT)
Dept: CARDIOLOGY CLINIC | Facility: CLINIC | Age: 78
End: 2018-05-31
Payer: MEDICARE

## 2018-05-31 DIAGNOSIS — Z95.0 PACEMAKER: Primary | ICD-10-CM

## 2018-05-31 DIAGNOSIS — R00.1 BRADYCARDIA: ICD-10-CM

## 2018-05-31 PROCEDURE — 93294 REM INTERROG EVL PM/LDLS PM: CPT | Performed by: INTERNAL MEDICINE

## 2018-05-31 PROCEDURE — 93296 REM INTERROG EVL PM/IDS: CPT | Performed by: INTERNAL MEDICINE

## 2018-05-31 NOTE — PROGRESS NOTES
Results for orders placed or performed in visit on 05/31/18   Cardiac EP device report    Narrative    CARELINK TRANSMISSION: BATTERY STATUS ADEQUATE (7 YRS)  AP 73 9%;  2 7%  ALL AVAILABLE LEAD PARAMETERS WITHIN NORMAL LIMITS  11 VT, 15 FAST A&V EPISODES WITH AVAILABLE EGMs SHOWING AF/RVR  507  AHR, MULTIPLE - CONSECUTIVE/SAME DAY EPISODES   ALL AVAIL EGMs SHOW PAF/AFL  AT/AF BURDEN  4 7%  HX OF PAF  PT TAKES ELIQUIS & ATENOLOL  NORMAL DEVICE FUNCTION   PL/GV

## 2018-06-05 ENCOUNTER — TELEPHONE (OUTPATIENT)
Dept: FAMILY MEDICINE CLINIC | Facility: CLINIC | Age: 78
End: 2018-06-05

## 2018-06-05 DIAGNOSIS — Z12.31 SCREENING MAMMOGRAM, ENCOUNTER FOR: Primary | ICD-10-CM

## 2018-06-18 ENCOUNTER — OFFICE VISIT (OUTPATIENT)
Dept: OBGYN CLINIC | Facility: CLINIC | Age: 78
End: 2018-06-18
Payer: MEDICARE

## 2018-06-18 VITALS
HEART RATE: 84 BPM | DIASTOLIC BLOOD PRESSURE: 67 MMHG | HEIGHT: 64 IN | BODY MASS INDEX: 29.02 KG/M2 | SYSTOLIC BLOOD PRESSURE: 106 MMHG | WEIGHT: 170 LBS

## 2018-06-18 DIAGNOSIS — M15.9 PRIMARY OSTEOARTHRITIS INVOLVING MULTIPLE JOINTS: Primary | ICD-10-CM

## 2018-06-18 PROCEDURE — 99213 OFFICE O/P EST LOW 20 MIN: CPT | Performed by: ORTHOPAEDIC SURGERY

## 2018-06-18 RX ORDER — POLYETHYLENE GLYCOL 3350, SODIUM CHLORIDE, POTASSIUM CHLORIDE, SODIUM BICARBONATE, AND SODIUM SULFATE 240; 5.84; 2.98; 6.72; 22.72 G/4L; G/4L; G/4L; G/4L; G/4L
POWDER, FOR SOLUTION ORAL
COMMUNITY
Start: 2018-05-18 | End: 2018-08-01

## 2018-06-18 NOTE — PROGRESS NOTES
ASSESSMENT/PLAN:    There are no diagnoses linked to this encounter  Assessment:   Right thumb CMC arthritis  Right ring finger PIP joint arthritis    Plan:   Resume activities as tolerated, activity modification and bracing  Will see her in approximately 4 months to determine which is bothering her more  Operative discussion included that for right thumb Weilby procedures well as right ring finger PIP joint arthroplasty the which would have to be done as separate procedures    Follow Up:  4 months    To Do Next Visit:  Discuss whether not we should pursue right thumb Weilby procedure or right ring finger PIP joint arthroplasty    General Discussions:      Operative Discussions:     Weilby: The anatomy and physiology of carpometacarpal joint arthritis was discussed with the patient today in the office  Deterioration of the articular cartilage eventually leads to hypermobility at the thumb [facility] joint, resulting in joint subluxation, osteophyte formation, cystic changes within the trapezium and base of the first metacarpal, as well as subchondral sclerosis  Eventually, pain, limited mobility, and compensatory hyperextension at the metacarpophalangeal joint may develop  While normal activity and usage of the thumb joint may provide a painful experience to the patient, this typically does not result in damage to the thumb or hand  Treatment options include resting thumb spica splints to decreased joint edema, pain, and inflammation  Therapy exercises to strengthen the thenar musculature may relieve pain, but do not alter the overall continued development of osteoarthritis  Oral medications, topical medications, corticosteroid injections may decrease pain and increase overall function  Eventually, approximately 5% of patients may require surgical intervention  The risks and benefit of surgery were discussed with the patient    The patient is aware of the incision, the harvesting of the flexor carpi radialis tendon, as well as the weave portion of the procedure  After the procedure, the pain will be decreased, the function improved, and the strength improved  The potential for numbness around the incision site was also discussed  The patient is aware that in the postoperative period, a bulky dressing will be worn for roughly 10 days, followed by a removable splint for 4  This removable splint may be removed for showering, bathing, and daily therapy exercises  At 6 weeks postoperative, the splint will be discontinued in strengthening will continue for the next 6-8 weeks  The patient is aware that the average total recovery time is approximately 3-4 months  Success rate is approximately 90-95%  The risks and benefits of the procedure were explained to the patient, which include, but are not limited to: Bleeding, infection, recurrence, pain, scar, damage to tendons, damage to nerves, and damage to blood vessels, failure to give desired results and complications related to anesthesia  These risks, along with alternative conservative treatment options, and postoperative protocols were voiced back and understood by the patient  All questions were answered to the patient's satisfaction  The patient agrees to comply with a standard postoperative protocol, and is willing to proceed  Education was provided via written and auditory forms  There were no barriers to learning  Written handouts regarding wound care, incision and scar care, and general preoperative information was provided to the patient  Prior to surgery, the patient may be requested to stop all anti-inflammatory medications  Prophylactic aspirin, Plavix, and Coumadin may be allowed to be continued  Medications including vitamin E , ginkgo, and fish oil are requested to be stopped approximately one week prior to surgery  Hypertensive medications and beta blockers, if taken, should be continued    Bony Consent: The risks and benefits of the procedure were explained to the patient, which include, but are not limited to: Bleeding, infection, recurrence, pain, scar, malunion, nonunion, damage to tendons, damage to nerves, and damage to blood vessels, and complications related to anesthesia, failure to give desire result, need for more surgery  These risks, along with alternative conservative treatment options, and postoperative protocols were voiced back and understood by the patient  All questions were answered to the patient's satisfaction  The patient agrees to comply with a standard postoperative protocol, and is willing to proceed  Education was provided via written and auditory forms  There were no barriers to learning  Written handouts regarding wound care, incision and scar care, and general preoperative information was provided to the patient  Prior to surgery, the patient may be requested to stop all anti-inflammatory medications  Prophylactic aspirin, Plavix, and Coumadin may be allowed to be continued  Medications including vitamin E , ginkgo, and fish oil are requested to be stopped approximately one week prior to surgery  Hypertensive medications and beta blockers, if taken, should be continued  _____________________________________________________  CHIEF COMPLAINT:  No chief complaint on file  SUBJECTIVE:  Jamia Condon is a 69y o  year old female who presents for follow up regarding right hand CMC joint arthritis as well as right ring finger PIP joint arthritis  She continues to wear a brace  She has previously received corticosteroid injections that do not help with the pain   Pain is worse with activity and improved with rest     PAST MEDICAL HISTORY:  Past Medical History:   Diagnosis Date    A-fib [facility])     Abnormal ECG     last assessed: 7/14/2015    Acid reflux     resolved    Anxiety     Benign neoplasm of sigmoid colon 09/13/2011    next colon 2012    Bronchitis, asthmatic     last assessed: 3/12/2012    Colon cancer [facility])     2012- had colon resection    COPD (chronic obstructive pulmonary disease) ([facility] [address] )     questionable    Depression     Diabetes mellitus ([facility] [address] )     on byetta    Dizziness     occ    Esophageal stricture     last assessed: 9/30/2014    High cholesterol     Hypertension     Hypothyroidism     Iron (Fe) deficiency anemia 12/22/2011    iron pill continue    Irritable bowel syndrome     OA (osteoarthritis)     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    Pacemaker     hx SSS    Rash     labia area- uses cream prn    Restrictive lung disease     Seasonal allergies     Skin scarring/fibrosis     fibrotic scar; last assessed: 3/22/2013    MONA (stress urinary incontinence, female)     Urinary frequency     last assessed: 9/10/2012    Wears glasses     reading       PAST SURGICAL HISTORY:  Past Surgical History:   Procedure Laterality Date   [address]      does not know type  Dr Taisha Sky is cariologist   [facility] [address] [address]      resection  removed 25 In     COLONOSCOPY  06/01/2012    proctosigmoidectomy    ESOPHAGOGASTRODUODENOSCOPY N/A 1/9/2016    Procedure: ESOPHAGOGASTRODUODENOSCOPY (EGD); Surgeon: Tegan Mora MD;  [facility];  Service:    Arline Donald      MT CYSTOURETHROSCOPY N/A 5/14/2018    Procedure: Carter Dues;  Surgeon: Pierre De Jesus MD;  [facility];  Service: UroGynecology           MT LAP, REPAIR PARAESOPHAGEAL HERNIA, INCL FUNDOPLASTY W/ MESH N/A 1/27/2016    Procedure: LAPAROSCOPIC PARAESOPHAGEAL HERNIA REPAIR with mesh; toupet  FUNDOPLICATION ;  Surgeon: Charles Mobley MD;  [facility];  Service: Thoracic    MT SLING OPER STRES INCONTINENCE N/A 5/14/2018    Procedure: INSERTION PUBOVAGINAL SLING (FEMALE);   Surgeon: Pierre De Jesus MD;  [facility];  Service: UroGynecology           TOTAL ABDOMINAL HYSTERECTOMY         FAMILY HISTORY:  Family History   Problem Relation Age of Onset    Heart disease Mother     Diabetes Mother     Asthma Father     Stomach cancer Father         gastrkic cancer    Cancer Paternal Grandmother     Cancer Paternal Grandfather     Stomach cancer Sister     Substance Abuse Neg Hx     Mental illness Neg Hx        SOCIAL HISTORY:  Social History   Substance Use Topics    Smoking status: Former Smoker     Types: Cigarettes     Quit date: 2002    Smokeless tobacco: Never Used    Alcohol use No       MEDICATIONS:    Current Outpatient Prescriptions:     albuterol (VENTOLIN HFA) 90 mcg/act inhaler, Inhale 2 puffs every 4 (four) hours as needed, Disp: , Rfl:     apixaban (ELIQUIS) 5 mg, Resume Eliquis on Tuesday 5/15/18  5mg by mouth BID, Disp: 56 tablet, Rfl: 0    ascorbic acid (VITAMIN C) 500 mg tablet, Take 500 mg by mouth daily, Disp: , Rfl:     atenolol (TENORMIN) 50 mg tablet, Take 1 tablet (50 mg total) by mouth daily at bedtime, Disp: 15 tablet, Rfl: 0    BYETTA 10 MCG PEN 10 MCG/0 04ML SOPN, Inject 10 mcg under the skin 2 (two) times a day  , Disp: , Rfl:     Cholecalciferol (VITAMIN D-3) 1000 units CAPS, Take 1 capsule by mouth daily, Disp: , Rfl:     clotrimazole-betamethasone (LOTRISONE) 1-0 05 % cream, Apply 1 application topically 2 (two) times a day as needed, Disp: , Rfl:     Coenzyme Q10 (COQ10) 200 MG CAPS, Take by mouth, Disp: , Rfl:     diclofenac sodium (VOLTAREN) 1 %, Place 2 g on the skin 4 (four) times a day as needed  , Disp: , Rfl:     DULoxetine (CYMBALTA) 20 mg capsule, Take 1 capsule (20 mg total) by mouth daily, Disp: 90 capsule, Rfl: 3    Ferrous Sulfate 27 MG TABS, Take 1 tablet by mouth daily, Disp: , Rfl:     JARDIANCE 10 MG TABS, Take 10 mg by mouth daily  , Disp: , Rfl:     levothyroxine 125 mcg tablet, Take 1 tablet (125 mcg total) by mouth daily 1 tab 6 days of the week  No tab Sundays  , Disp: 90 tablet, Rfl: 0    MEGARED OMEGA-3 KRILL OIL PO, Take 1 capsule by mouth daily, Disp: , Rfl:     metFORMIN (GLUCOPHAGE-XR) 500 mg 24 hr tablet, Take 500 mg by mouth 2 (two) times a day  , Disp: , Rfl:     Misc Natural Products (OSTEO BI-FLEX ADV JOINT SHIELD PO), Take 1 tablet by mouth daily  , Disp: , Rfl:     Multiple Vitamins-Minerals (CENTRUM SILVER 50+MEN) TABS, Take 1 tablet by mouth daily, Disp: , Rfl:     Omega-3 Fatty Acids (FISH OIL) 1200 MG CAPS, Take by mouth, Disp: , Rfl:     PEG 3350-KCl-NaBcb-NaCl-NaSulf (PEG 3350/ELECTROLYTES) 240 g SOLR, , Disp: , Rfl:     simvastatin (ZOCOR) 20 mg tablet, Take 1 tablet by mouth daily at bedtime  , Disp: , Rfl:     ALLERGIES:  No Known Allergies    REVIEW OF SYSTEMS:  Pertinent items are noted in HPI  A comprehensive review of systems was negative  LABS:  HgA1c:   Lab Results   Component Value Date    HGBA1C 7 2 (H) 05/16/2018     BMP:   Lab Results   Component Value Date    GLUCOSE 108 05/16/2018    CALCIUM 9 3 05/16/2018     05/16/2018    K 4 2 05/16/2018    CO2 30 05/16/2018     05/16/2018    BUN 11 05/16/2018    CREATININE 0 67 05/16/2018           _____________________________________________________  PHYSICAL EXAMINATION:  General: well developed and well nourished, alert, oriented times 3 and appears comfortable  Psychiatric: Normal  HEENT: Trachea Midline, No torticollis  Cardiovascular: No discernable arrhythmia  Pulmonary: No wheezing or stridor  Skin: No masses, erthema, lacerations, fluctation, ulcerations  Neurovascular: Sensation Intact to the Median, Ulnar, Radial Nerve, Motor Intact to the Median, Ulnar, Radial Nerve and Pulses Intact    MUSCULOSKELETAL EXAMINATION:   Right hand  Mild tenderness to palpation over the right thumb CMC joint  Palpable crepitation  No appreciable hyper extension of the thumb MCP joint   Tenderness to palpation of the ring finger PIP joint with obvious Chad's nodules, range of motion is limited from 30-90 degrees without instability    _____________________________________________________  STUDIES REVIEWED:  No Studies to review      PROCEDURES PERFORMED:  Procedures  No Procedures performed today

## 2018-07-09 ENCOUNTER — HOSPITAL ENCOUNTER (OUTPATIENT)
Dept: MAMMOGRAPHY | Facility: CLINIC | Age: 78
Discharge: HOME | End: 2018-07-09
Payer: MEDICARE

## 2018-07-09 DIAGNOSIS — Z12.31 SCREENING MAMMOGRAM, ENCOUNTER FOR: ICD-10-CM

## 2018-07-09 PROCEDURE — 77067 SCR MAMMO BI INCL CAD: CPT

## 2018-07-09 PROCEDURE — 77063 BREAST TOMOSYNTHESIS BI: CPT

## 2018-07-11 ENCOUNTER — TELEPHONE (OUTPATIENT)
Dept: OBGYN CLINIC | Facility: HOSPITAL | Age: 78
End: 2018-07-11

## 2018-07-11 NOTE — TELEPHONE ENCOUNTER
Caller: patient  Callback#h  [phone]  V#[phone]  Dr Philippe Alamo          Patient want to schedule surgery for right ring finger please advise thanks

## 2018-07-11 NOTE — TELEPHONE ENCOUNTER
This is for R ring finger PIP joint arthroplasty - would you like me to put orders and consent in or do you feel like she needs to follow up first? You saw her 6/18

## 2018-07-12 ENCOUNTER — PREP FOR PROCEDURE (OUTPATIENT)
Dept: OBGYN CLINIC | Facility: HOSPITAL | Age: 78
End: 2018-07-12

## 2018-07-12 DIAGNOSIS — M19.041 ARTHRITIS OF FINGER OF RIGHT HAND: Primary | ICD-10-CM

## 2018-07-12 DIAGNOSIS — E11.8 TYPE 2 DIABETES MELLITUS WITH COMPLICATION, UNSPECIFIED WHETHER LONG TERM INSULIN USE: Primary | ICD-10-CM

## 2018-07-12 RX ORDER — EMPAGLIFLOZIN 10 MG/1
10 TABLET, FILM COATED ORAL DAILY
Qty: 30 TABLET | Refills: 3 | Status: SHIPPED | OUTPATIENT
Start: 2018-07-12 | End: 2018-08-08 | Stop reason: SDUPTHER

## 2018-07-26 ENCOUNTER — OFFICE VISIT (OUTPATIENT)
Dept: FAMILY MEDICINE CLINIC | Facility: CLINIC | Age: 78
End: 2018-07-26
Payer: MEDICARE

## 2018-07-26 VITALS
WEIGHT: 168.2 LBS | RESPIRATION RATE: 16 BRPM | BODY MASS INDEX: 28.71 KG/M2 | SYSTOLIC BLOOD PRESSURE: 110 MMHG | DIASTOLIC BLOOD PRESSURE: 70 MMHG | HEART RATE: 72 BPM | HEIGHT: 64 IN

## 2018-07-26 DIAGNOSIS — M15.9 PRIMARY OSTEOARTHRITIS INVOLVING MULTIPLE JOINTS: ICD-10-CM

## 2018-07-26 DIAGNOSIS — R10.32 LEFT LOWER QUADRANT PAIN: Primary | ICD-10-CM

## 2018-07-26 PROCEDURE — 99214 OFFICE O/P EST MOD 30 MIN: CPT | Performed by: FAMILY MEDICINE

## 2018-07-26 RX ORDER — ESTRADIOL 0.1 MG/G
CREAM VAGINAL
COMMUNITY
Start: 2018-06-26 | End: 2020-11-03 | Stop reason: SDUPTHER

## 2018-07-26 NOTE — PROGRESS NOTES
Assessment/Plan:    Left lower quadrant pain  We will check a CT scan 1st of her abdomen and pelvis and see if we can delineate what exactly is causing her pain  It may be a hernia as it is sore to feels this way but it is not distinct and seems out of placed  Also does not follow hernia in that it is worse when she lays down rather than stands up  We will call her with results and further plan such as surgery consult  Subjective:   Lucila Lee is a 69 y  o female  Chief Complaint   Patient presents with    Abdominal Pain     For the last couple months patient has had a left achiness in her low left lower quadrant around her femoral canal   2 weeks ago she cut suzi bushes with electric she years and they were very high  She is not sure if this aggravated it but since that time her pain is worse and now is stabbing and thumping in nature  Now it does not go away where it had in the past   For her it is worse when she lays down but does not keep her awake and it does not wake her from sleep  Sometimes she feels a bulge in this area          Past Medical History:   Diagnosis Date    [facility])     Abnormal ECG     last assessed: 7/14/2015    Acid reflux     resolved    Anxiety     Benign neoplasm of sigmoid colon 09/13/2011    next colon 2012    Bronchitis, asthmatic     last assessed: 3/12/2012    [facility] cancer ([facility] [address] )     2012- had colon resection    COPD (chronic obstructive pulmonary disease) ([facility] [facility] )     questionable    Depression     Diabetes mellitus ([facility] [address] )     on byetta    Dizziness     occ    Esophageal stricture     last assessed: 9/30/2014    High cholesterol     Hypertension     Hypothyroidism     Iron (Fe) deficiency anemia 12/22/2011    iron pill continue    Irritable bowel syndrome     OA (osteoarthritis)     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    Pacemaker     hx SSS    Rash     labia area- uses cream prn    Restrictive lung disease     Seasonal allergies     Skin scarring/fibrosis     fibrotic scar; last assessed: 3/22/2013    MONA (stress urinary incontinence, female)     Urinary frequency     last assessed: 9/10/2012    Wears glasses     reading     Social History   Substance Use Topics    Smoking status: Former Smoker     Types: Cigarettes     Quit date: 2002    Smokeless tobacco: Never Used    Alcohol use No     Family History   Problem Relation Age of Onset    Heart disease Mother     Diabetes Mother     Asthma Father     Stomach cancer Father         gastrkic cancer    Cancer Paternal Grandmother     Cancer Paternal Grandfather     Stomach cancer Sister     Substance Abuse Neg Hx     Mental illness Neg Hx     Alcohol abuse Neg Hx        MEDICATIONS REVIEWED AND UPDATED    Rest Of 10 Point Review Of System Negative    Objective:    Vitals:    07/26/18 1109   BP: 110/70   Pulse: 72   Resp: 16     Body mass index is 29 1 kg/m²  Physical Exam   General  Patient in no acute distress, well appearing, well nourished and appears stated age    Mental status  Good judgment and insight, oriented to time person and place, recent and remote memory is intact, mood and affect are normal, cooperative, and patient is reasonable  Abdomen  Soft no hepatosplenomegaly no palpable masses in her abdomen  Left groin area above the inguinal canal is some fullness and tenderness that appears horizontal 2 finger breaths below the umbilicus  There does seem to be a hernia type material that seems to reduce with gentle rotation  There is also a lot of pain for patient when I do this      Skin  This is totally clear

## 2018-07-26 NOTE — PATIENT INSTRUCTIONS
Left lower quadrant pain  We will check a CT scan 1st of her abdomen and pelvis and see if we can delineate what exactly is causing her pain  It may be a hernia as it is sore to feels this way but it is not distinct and seems out of placed  Also does not follow hernia in that it is worse when she lays down rather than stands up  We will call her with results and further plan such as surgery consult

## 2018-08-01 ENCOUNTER — HOSPITAL ENCOUNTER (OUTPATIENT)
Dept: CT IMAGING | Facility: HOSPITAL | Age: 78
Discharge: HOME | End: 2018-08-01
Payer: MEDICARE

## 2018-08-01 DIAGNOSIS — R10.32 LEFT LOWER QUADRANT PAIN: ICD-10-CM

## 2018-08-01 PROCEDURE — 74177 CT ABD & PELVIS W/CONTRAST: CPT

## 2018-08-01 RX ADMIN — IOHEXOL 100 ML: 350 INJECTION, SOLUTION INTRAVENOUS at 07:54

## 2018-08-01 NOTE — PRE-PROCEDURE INSTRUCTIONS
Pre-Surgery Instructions:   Medication Instructions    albuterol (VENTOLIN HFA) 90 mcg/act inhaler Instructed patient per Anesthesia Guidelines   apixaban (ELIQUIS) 5 mg Patient was instructed by Physician and understands   ascorbic acid (VITAMIN C) 500 mg tablet Patient was instructed by Physician and understands   atenolol (TENORMIN) 50 mg tablet Instructed patient per Anesthesia Guidelines   BYETTA 10 MCG PEN 10 MCG/0 04ML SOPN Patient was instructed by Physician and understands   Cholecalciferol (VITAMIN D-3) 1000 units CAPS Patient was instructed by Physician and understands   Coenzyme Q10 (COQ10) 200 MG CAPS Patient was instructed by Physician and understands   diclofenac sodium (VOLTAREN) 1 % Patient was instructed by Physician and understands   DULoxetine (CYMBALTA) 20 mg capsule Instructed patient per Anesthesia Guidelines   Ferrous Sulfate 27 MG TABS Patient was instructed by Physician and understands   JARDIANCE 10 MG TABS Patient was instructed by Physician and understands   levothyroxine 125 mcg tablet Patient was instructed by Physician and understands   CHUCKIE OMEGA-3 KRILL OIL PO Patient was instructed by Physician and understands   metFORMIN (GLUCOPHAGE-XR) 500 mg 24 hr tablet Patient was instructed by Physician and understands   Misc Natural Products (OSTEO BI-FLEX ADV JOINT SHIELD PO) Patient was instructed by Physician and understands   Multiple Vitamins-Minerals (CENTRUM SILVER 50+MEN) TABS Patient was instructed by Physician and understands   simvastatin (ZOCOR) 20 mg tablet Instructed patient per Anesthesia Guidelines  Before your operation, you play an important role in decreasing your risk for infection by washing with special antiseptic soap  This is an effective way to reduce bacteria on the skin which may help to prevent infections at the surgical site  Please read the following directions in advance  1   In the week before your operation purchase a 4 ounce bottle of antiseptic soap containing chlorhexidine gluconate 4%  Some brand names include: Aplicare, Endure, and Hibiclens  The cost is usually less than $5 00  · For your convenience, the [address] [place] [address] carries the soap  · It may also be available at your doctor's office or pre-admission testing center, and at most retail pharmacies  · If you are allergic or sensitive to soaps containing chlorhexidine gluconate (CHG), please let your doctor know so another antiseptic soap can be suggested  · CHG antiseptic soap is for external use only  2      The day before your operation follow these directions carefully to get ready  · Place clean lines (sheets) on your bed; you should sleep on clean sheets after your evening shower  · Get clean towels and washcloths ready - you need enough for 2 showers  · Set aside clean underwear, pajamas, and clothing to wear after the shower  Reminders:  · DO NOT use any other soap or body rinse on your skin during or after the antiseptic showers  · DO NOT use lotion , powder, deodorant, or perfume/aftershave of any kind on your skin after your antiseptic shower  · DO NOT shave any body parts in the 24 hours/the day before your operation  · DO NOT get the antiseptic soap in your eyes, ears, nose, mouth, or vaginal area  3      You will need to shower the night before AND the morning of your Surgery  Shower 1:  · The evening before your operation, take the fist shower  · First, shampoo your hair with regular shampoo and rinse it completely before you use the anitseptic soap  After washing and rinsing your hair, rinse your body  · Next, use a clean wash cloth to apply the antiseptic soap and wash your body from the neck down to your toes using 1/2 bottle of the antiseptic soap  You will use the other 1/2 bottle for the second shower  · Clean the area where your incision will be; later this area well for about 2 minutes    · If you ar having head or neck surgery, wash areas with the antiseptic soap  · Rinse yourself completely with running water  · Use a clean towel to dry off  · Wear clean underwear and clothing/pajamas  Shower 2:  · The Morning of your operation, take the second shower following the same steps as Shower 1 using the second 1/2 of the bottle of antiseptic soap  · Use clean cloths and towels to was and dry yourself off  · Wear clean underwear and clothing

## 2018-08-02 ENCOUNTER — TELEPHONE (OUTPATIENT)
Dept: CARDIOLOGY CLINIC | Facility: CLINIC | Age: 78
End: 2018-08-02

## 2018-08-05 NOTE — PROGRESS NOTES
ASSESSMENT/PLAN:    PATIENT IS MEDICALLY CLEARED FOR SURGERY ON HER RIGHT RING FINGER FOR REPLACEMENT OF HER RIGHT PIP JOINT UNDER LOCAL ANESTHESIA CONSCIOUS SEDATION    Patient's past medical history is as follows:     Adenocarcinoma of the colon, 2012  This was resected Patient will probably have a colonoscopy sometime in the next 12 months   She follows with GI       Emphysema by CT   Patient only on Ventolin as needed as her pulmonary function test and walk test are okay Follows with Pulmonary only as needed        Insomnia/anxiety   Patient taking duloxetine for sleep and has not slept better        Diabetes   Follows with endocrine doctor Kerry This taking Byetta Invocana and metformin Last A1c was up to 7 4        Hashimoto's thyroiditis   Also following with endocrine for suppression of her thyroid with medication         Sick sinus syndrome   Patient has a pacer and takes Eliquis for anticoagulation and atenolol Follows with Cardiology        Vitamin D deficiency   Continue vitamin D 2000 units daily         Osteoporosis   Calcium vitamin D and exercise        History of large paraesophageal hernia   Had surgery and no longer has Pacheco's or her reflux and no longer on any PPIs        Colon cancer by history   Next colonoscopy June 2019        Hyperlipidemia   Simvastatin and CoQ10 and diet             Will follow-up with pulmonary for recommendations regarding breathing   Will follow up with endocrine regarding diabetes and thyroid  colonoscopy June 2019 Next mammogram July 5, 2018        Rechec 6 months or sooner if needed        Health Maintenance   Topic Date Due    SLP PLAN OF CARE  1940    DTaP,Tdap,and Td Vaccines (1 - Tdap) 10/22/1962    GLAUCOMA SCREENING 65 + YR  01/06/2016    Diabetic Foot Exam  03/23/2018    Medicare Annual Wellness Visit (AWV)  08/08/2018    INFLUENZA VACCINE  09/01/2018    DM Eye Exam  12/11/2018    Fall Risk  02/01/2019    Depression Screening PHQ-9 02/01/2019    HEMOGLOBIN A1C  02/08/2019    URINE MICROALBUMIN  05/16/2019    Urinary Incontinence Screening  08/09/2019    CRC Screening: Colonoscopy  06/11/2021    PNEUMOCOCCAL POLYSACCHARIDE VACCINE AGE 72 AND OVER  Completed         Problem List as of 8/9/2018 Reviewed: 7/26/2018 11:30 AM by Mei Ugalde DO    Abnormal echocardiogram    Adenocarcinoma of colon ([address] )    Arthritis of finger of right hand    Pacheco esophagus    Cardiomegaly    Colon cancer screening    Coronary artery disease involving native coronary artery of native heart without angina pectoris    Diffuse nontoxic goiter    Feeling anxious    Hematuria, microscopic    History of colon cancer    History of permanent cardiac pacemaker placement    Hyperlipidemia    Hypertension    Hypothyroidism    Insomnia    Left arm swelling    Last Assessment & Plan 3/6/2018 Office Visit Written 3/6/2018  3:31 PM by Kush Ma MD     Patient relates left arm swelling upper arm post pacemaker  Also palpable supraclavicular lymph node and obtaining CT chest         Mitral regurgitation    Osteoporosis    Paraesophageal hernia    Paroxysmal atrial fibrillation (HCC)    Primary osteoarthritis involving multiple joints    Restrictive lung disease    Sick sinus syndrome (HCC)    Stress incontinence in female    Type 2 diabetes mellitus (HCC)    Vitamin D deficiency            Subjective:   Chief Complaint   Patient presents with    Pre-op Exam     Right ring finger surgery on 08/16/2018   Jolanda Seals Medicare Wellness Visit     Patient is here for preop clearance to have an arthroplasty done of her right ring finger PIP joint by Dr Sascha Aguilar  She will have local anesthesia  Patient has been well without any acute illnesses        patient ID: Ulysses Chandler is a 69 y o  female      Past Medical History:   Diagnosis Date    A-fib [facility])     Abnormal ECG     last assessed: 7/14/2015    Acid reflux     resolved    Anxiety     Benign neoplasm of sigmoid colon 09/13/2011    next colon 2012    Bronchitis, asthmatic     last assessed: 3/12/2012    Colon cancer ([facility] [address] )     2012- had colon resection    COPD (chronic obstructive pulmonary disease) ([facility] [address] )     questionable    Depression     Diabetes mellitus ([facility] )     on byetta    Dizziness     occ    Esophageal stricture     last assessed: 9/30/2014    High cholesterol     Hypertension     Hypothyroidism     Iron (Fe) deficiency anemia 12/22/2011    iron pill continue    Irritable bowel syndrome     OA (osteoarthritis)     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    Pacemaker     hx SSS    Rash     labia area- uses cream prn    Restrictive lung disease     Seasonal allergies     Skin scarring/fibrosis     fibrotic scar; last assessed: 3/22/2013    MONA (stress urinary incontinence, female)     Urinary frequency     last assessed: 9/10/2012    Wears glasses     reading     Past Surgical History:   Procedure Laterality Date   [address]      does not know type  Dr Enoc Hewitt is cariologist   [facility] [address]      resection  removed 25 In     COLONOSCOPY  06/01/2012    proctosigmoidectomy    ESOPHAGOGASTRODUODENOSCOPY N/A 1/9/2016    Procedure: ESOPHAGOGASTRODUODENOSCOPY (EGD); Surgeon: Angelina Inman MD;  [facility];  Service:    Henry Laws      SC CYSTOURETHROSCOPY N/A 5/14/2018    Procedure: Main Morris;  Surgeon: Aaron Clayton MD;  [facility];  Service: UroGynecology           SC LAP, REPAIR PARAESOPHAGEAL HERNIA, INCL FUNDOPLASTY W/ MESH N/A 1/27/2016    Procedure: LAPAROSCOPIC PARAESOPHAGEAL HERNIA REPAIR with mesh; toupet  FUNDOPLICATION ;  Surgeon: Karin Guadalupe MD;  Location[facility];  Service: Thoracic    SC SLING OPER STRES INCONTINENCE N/A 5/14/2018    Procedure: INSERTION PUBOVAGINAL SLING (FEMALE);   Surgeon: Aaron Clayton MD;  [facility];  Service: UroGynecology           TOTAL ABDOMINAL HYSTERECTOMY       Family History   Problem Relation Age of Onset    Heart disease Mother     Diabetes Mother     Asthma Father     Stomach cancer Father         gastrkic cancer    Cancer Paternal Grandmother     Cancer Paternal Grandfather     Stomach cancer Sister     Substance Abuse Neg Hx     Mental illness Neg Hx     Alcohol abuse Neg Hx      Social History   Substance Use Topics    Smoking status: Former Smoker     Types: Cigarettes     Quit date: 2002    Smokeless tobacco: Never Used    Alcohol use No     History   Smoking Status    Former Smoker    Types: Cigarettes    Quit date: 2002   Smokeless Tobacco    Never Used        MED LIST WAS REVIEWED AND UPDATED       ROS    Constitutional  No fever chills no fatigue no weight loss no weight gain    Mental status  No anxiety or depression and no sleep disturbances no changes in personality or emotional problems no suicidal or homicidal ideations    Eyes  No eye pain no red eyes no visual disturbances no discharge no eye itch    ENT  No earache no hearing loss nasal discharge no sore throat no hoarseness no postnasal drip     Cardio  No chest pain no palpitations no leg edema no claudication no dyspnea on exertion no nocturnal dyspnea    Respiratory  No shortness of breath or wheeze no cough no orthopnea no dyspnea on exertion no hemoptysis no sputum production    GI  No abdominal pain no nausea no vomiting no diarrhea or constipation no bloody stools no change in bowel habits no change in weight      no dysuria hematuria no pyuria no incontinence no pelvic pain    Musculoskeletal  No myalgias arthralgias no joint swelling or stiffness no limb pain or swelling    Skin  No rashes or lesions no itchiness and no wounds    Neuro  No headache dizziness lightheadedness syncope numbness paresthesias or confusion    Heme  No swollen glands no easy bruising          Objective:      VITALS:  Wt Readings from Last 3 Encounters:   08/09/18 76 6 kg (168 lb 14 4 oz)   07/26/18 76 3 kg (168 lb 3 2 oz)   06/18/18 77 1 kg (170 lb)     BP Readings from Last 3 Encounters:   08/09/18 128/70   07/26/18 110/70   06/18/18 106/67     Pulse Readings from Last 3 Encounters:   08/09/18 72   07/26/18 72   06/18/18 84     Body mass index is 29 22 kg/m²      Laboratory Results:   Lab Results   Component Value Date    WBC 7 27 04/24/2018    WBC 7 58 10/26/2017    WBC 7 08 03/07/2017    HGB 15 1 04/24/2018    HGB 15 1 10/26/2017    HGB 14 1 03/07/2017    HCT 48 3 (H) 04/24/2018    HCT 44 3 10/26/2017    HCT 42 5 03/07/2017    MCV 93 04/24/2018    MCV 88 10/26/2017    MCV 89 03/07/2017     04/24/2018     10/26/2017     03/07/2017     Lab Results   Component Value Date     08/08/2018     05/16/2018     04/24/2018    K 4 6 08/08/2018    K 4 2 05/16/2018    K 4 6 04/24/2018     08/08/2018     05/16/2018     04/24/2018    CO2 30 08/08/2018    CO2 30 05/16/2018    CO2 29 04/24/2018    BUN 13 08/08/2018    BUN 11 05/16/2018    BUN 13 04/24/2018     Lab Results   Component Value Date    GLUCOSE 108 05/16/2018    ALT 19 08/08/2018    AST 14 08/08/2018    ALKPHOS 77 08/08/2018    EGFR 78 08/08/2018    CALCIUM 9 3 08/08/2018     No results found for: TSH    Lipid Profile:   Lab Results   Component Value Date    CHOL 144 05/16/2018    CHOL 142 12/19/2017    CHOL 179 08/22/2017     Lab Results   Component Value Date    HDL 18 (L) 05/16/2018    HDL 42 12/19/2017    HDL 42 08/22/2017     Lab Results   Component Value Date    LDLCALC 85 05/16/2018    LDLCALC 66 12/19/2017    LDLCALC 94 08/22/2017     Lab Results   Component Value Date    TRIG 206 (H) 05/16/2018    TRIG 169 (H) 12/19/2017    TRIG 215 (H) 08/22/2017       Diabetic labs (if applicable)  Lab Results   Component Value Date    HGBA1C 7 3 (H) 08/08/2018    HGBA1C 7 2 (H) 05/16/2018    HGBA1C 8 0 (H) 12/19/2017     Lab Results   Component Value Date    GLUF 153 (H) 08/08/2018    [facility] 85 05/16/2018 CREATININE 0 75 08/08/2018          Physical Exam    Constitutional  Appears healthy, Looks well, Appearance consistent with age    Mental Status  Alert, Oriented, Cooperative, Memory function normal , clean, and reasonable    Neck  No neck mass, No thyromegaly, Good carotid upstrokes bilaterally, trachea midline positive click    Respiratory  Breath sounds normal, No rales, No rhonchi, No wheezing, normal palpation    Cardiac   Regular rhythm without ectopy or murmur no S3-S4, no heave lift or thrill to palpation    Vascular  No leg edema, No pedal edema    Muscular skeletal  No clubbing cyanosis , muscle tone normal    Skin  No appreciable rashes or abnormal appearing lesions

## 2018-08-08 ENCOUNTER — APPOINTMENT (OUTPATIENT)
Dept: LAB | Facility: CLINIC | Age: 78
End: 2018-08-08
Payer: MEDICARE

## 2018-08-08 ENCOUNTER — TRANSCRIBE ORDERS (OUTPATIENT)
Dept: LAB | Facility: CLINIC | Age: 78
End: 2018-08-08

## 2018-08-08 DIAGNOSIS — E06.3 HYPOTHYROIDISM DUE TO HASHIMOTO'S THYROIDITIS: Primary | ICD-10-CM

## 2018-08-08 DIAGNOSIS — E11.40 TYPE 2 DIABETES MELLITUS WITH DIABETIC NEUROPATHY, WITHOUT LONG-TERM CURRENT USE OF INSULIN (HCC): ICD-10-CM

## 2018-08-08 DIAGNOSIS — E06.3 HYPOTHYROIDISM DUE TO HASHIMOTO'S THYROIDITIS: ICD-10-CM

## 2018-08-08 DIAGNOSIS — E11.8 TYPE 2 DIABETES MELLITUS WITH COMPLICATION, UNSPECIFIED WHETHER LONG TERM INSULIN USE: ICD-10-CM

## 2018-08-08 DIAGNOSIS — E03.8 HYPOTHYROIDISM DUE TO HASHIMOTO'S THYROIDITIS: ICD-10-CM

## 2018-08-08 DIAGNOSIS — E03.8 HYPOTHYROIDISM DUE TO HASHIMOTO'S THYROIDITIS: Primary | ICD-10-CM

## 2018-08-08 LAB
ALBUMIN SERPL BCP-MCNC: 3.8 G/DL (ref 3.5–5)
ALP SERPL-CCNC: 77 U/L (ref 46–116)
ALT SERPL W P-5'-P-CCNC: 19 U/L (ref 12–78)
ANION GAP SERPL CALCULATED.3IONS-SCNC: 5 MMOL/L (ref 4–13)
AST SERPL W P-5'-P-CCNC: 14 U/L (ref 5–45)
BILIRUB SERPL-MCNC: 0.86 MG/DL (ref 0.2–1)
BUN SERPL-MCNC: 13 MG/DL (ref 5–25)
CALCIUM SERPL-MCNC: 9.3 MG/DL (ref 8.3–10.1)
CHLORIDE SERPL-SCNC: 105 MMOL/L (ref 100–108)
CO2 SERPL-SCNC: 30 MMOL/L (ref 21–32)
CREAT SERPL-MCNC: 0.75 MG/DL (ref 0.6–1.3)
EST. AVERAGE GLUCOSE BLD GHB EST-MCNC: 163 MG/DL
GFR SERPL CREATININE-BSD FRML MDRD: 78 ML/MIN/1.73SQ M
GLUCOSE P FAST SERPL-MCNC: 153 MG/DL (ref 65–99)
HBA1C MFR BLD: 7.3 % (ref 4.2–6.3)
POTASSIUM SERPL-SCNC: 4.6 MMOL/L (ref 3.5–5.3)
PROT SERPL-MCNC: 7.6 G/DL (ref 6.4–8.2)
SODIUM SERPL-SCNC: 140 MMOL/L (ref 136–145)
T4 FREE SERPL-MCNC: 0.92 NG/DL (ref 0.76–1.46)
TSH SERPL DL<=0.05 MIU/L-ACNC: 2.53 UIU/ML (ref 0.36–3.74)

## 2018-08-08 PROCEDURE — 80053 COMPREHEN METABOLIC PANEL: CPT

## 2018-08-08 PROCEDURE — 36415 COLL VENOUS BLD VENIPUNCTURE: CPT

## 2018-08-08 PROCEDURE — 83036 HEMOGLOBIN GLYCOSYLATED A1C: CPT

## 2018-08-08 PROCEDURE — 84439 ASSAY OF FREE THYROXINE: CPT

## 2018-08-08 PROCEDURE — 84443 ASSAY THYROID STIM HORMONE: CPT

## 2018-08-08 RX ORDER — EMPAGLIFLOZIN 10 MG/1
10 TABLET, FILM COATED ORAL DAILY
Qty: 90 TABLET | Refills: 1 | Status: SHIPPED | OUTPATIENT
Start: 2018-08-08 | End: 2018-08-15 | Stop reason: DRUGHIGH

## 2018-08-09 ENCOUNTER — OFFICE VISIT (OUTPATIENT)
Dept: FAMILY MEDICINE CLINIC | Facility: CLINIC | Age: 78
End: 2018-08-09
Payer: MEDICARE

## 2018-08-09 VITALS
RESPIRATION RATE: 16 BRPM | HEART RATE: 72 BPM | SYSTOLIC BLOOD PRESSURE: 128 MMHG | BODY MASS INDEX: 29.22 KG/M2 | WEIGHT: 168.9 LBS | DIASTOLIC BLOOD PRESSURE: 70 MMHG

## 2018-08-09 DIAGNOSIS — E03.8 OTHER SPECIFIED HYPOTHYROIDISM: ICD-10-CM

## 2018-08-09 DIAGNOSIS — M81.0 AGE-RELATED OSTEOPOROSIS WITHOUT CURRENT PATHOLOGICAL FRACTURE: ICD-10-CM

## 2018-08-09 DIAGNOSIS — I48.0 PAROXYSMAL ATRIAL FIBRILLATION (HCC): ICD-10-CM

## 2018-08-09 DIAGNOSIS — E78.2 MIXED HYPERLIPIDEMIA: ICD-10-CM

## 2018-08-09 DIAGNOSIS — J98.4 RESTRICTIVE LUNG DISEASE: Primary | ICD-10-CM

## 2018-08-09 DIAGNOSIS — K22.70 BARRETT'S ESOPHAGUS WITHOUT DYSPLASIA: ICD-10-CM

## 2018-08-09 DIAGNOSIS — I10 ESSENTIAL HYPERTENSION: ICD-10-CM

## 2018-08-09 DIAGNOSIS — E04.0 DIFFUSE NONTOXIC GOITER: ICD-10-CM

## 2018-08-09 DIAGNOSIS — R45.89 FEELING ANXIOUS: ICD-10-CM

## 2018-08-09 DIAGNOSIS — Z79.4 TYPE 2 DIABETES MELLITUS WITHOUT COMPLICATION, WITH LONG-TERM CURRENT USE OF INSULIN (HCC): ICD-10-CM

## 2018-08-09 DIAGNOSIS — I25.10 CORONARY ARTERY DISEASE INVOLVING NATIVE CORONARY ARTERY OF NATIVE HEART WITHOUT ANGINA PECTORIS: ICD-10-CM

## 2018-08-09 DIAGNOSIS — M19.041 ARTHRITIS OF FINGER OF RIGHT HAND: ICD-10-CM

## 2018-08-09 DIAGNOSIS — K44.9 PARAESOPHAGEAL HERNIA: ICD-10-CM

## 2018-08-09 DIAGNOSIS — E11.9 TYPE 2 DIABETES MELLITUS WITHOUT COMPLICATION, WITH LONG-TERM CURRENT USE OF INSULIN (HCC): ICD-10-CM

## 2018-08-09 PROBLEM — M79.89 LEFT ARM SWELLING: Status: RESOLVED | Noted: 2018-03-06 | Resolved: 2018-08-09

## 2018-08-09 PROCEDURE — 99214 OFFICE O/P EST MOD 30 MIN: CPT | Performed by: FAMILY MEDICINE

## 2018-08-09 RX ORDER — ALBUTEROL SULFATE 90 UG/1
2 AEROSOL, METERED RESPIRATORY (INHALATION) EVERY 4 HOURS PRN
Qty: 1 INHALER | Refills: 0 | Status: SHIPPED | OUTPATIENT
Start: 2018-08-09 | End: 2018-08-10 | Stop reason: SDUPTHER

## 2018-08-10 ENCOUNTER — TELEPHONE (OUTPATIENT)
Dept: FAMILY MEDICINE CLINIC | Facility: CLINIC | Age: 78
End: 2018-08-10

## 2018-08-10 DIAGNOSIS — J98.4 RESTRICTIVE LUNG DISEASE: ICD-10-CM

## 2018-08-10 NOTE — TELEPHONE ENCOUNTER
Patient called to say that the script that you sent for her Albuterol was sent to the wrong pharmacy  She would like it sent to [address] [place] [address] Delivery  Could you please resend to [facility] in her chart      Best number for Keli:  [phone]

## 2018-08-13 ENCOUNTER — OFFICE VISIT (OUTPATIENT)
Dept: SURGERY | Facility: HOSPITAL | Age: 78
End: 2018-08-13
Payer: MEDICARE

## 2018-08-13 VITALS
HEART RATE: 72 BPM | SYSTOLIC BLOOD PRESSURE: 120 MMHG | BODY MASS INDEX: 29.12 KG/M2 | TEMPERATURE: 99.2 F | WEIGHT: 170.6 LBS | HEIGHT: 64 IN | DIASTOLIC BLOOD PRESSURE: 76 MMHG | RESPIRATION RATE: 16 BRPM

## 2018-08-13 DIAGNOSIS — R10.84 GENERALIZED ABDOMINAL PAIN: Primary | ICD-10-CM

## 2018-08-13 DIAGNOSIS — R10.9 ABDOMINAL PAIN, UNSPECIFIED ABDOMINAL LOCATION: ICD-10-CM

## 2018-08-13 PROCEDURE — 99203 OFFICE O/P NEW LOW 30 MIN: CPT | Performed by: SURGERY

## 2018-08-13 RX ORDER — NAPROXEN 500 MG/1
500 TABLET ORAL 2 TIMES DAILY WITH MEALS
Qty: 30 TABLET | Refills: 0 | Status: SHIPPED | OUTPATIENT
Start: 2018-08-13 | End: 2018-12-11 | Stop reason: ALTCHOICE

## 2018-08-13 RX ORDER — CYCLOBENZAPRINE HCL 10 MG
10 TABLET ORAL 3 TIMES DAILY PRN
Qty: 30 TABLET | Refills: 0 | Status: SHIPPED | OUTPATIENT
Start: 2018-08-13 | End: 2019-08-06 | Stop reason: ALTCHOICE

## 2018-08-13 NOTE — LETTER
August 17, 2018     Nathen Crockett [address] [address]    Patient: Harish Schmitt   YOB: 1940   Date of Visit: 8/13/2018       Dear Dr Oro Neither: Thank you for referring Titi Holman to me for evaluation  Below are my notes for this consultation  If you have questions, please do not hesitate to call me  I look forward to following your patient along with you           Sincerely,        Dharmesh Chaney MD        CC: No Recipients

## 2018-08-13 NOTE — PROGRESS NOTES
Assessment/Plan: Josefa Cornell is a 69year old female who presents today, per referral by Dr Corbin Guerin, for consultation regarding abdominal pain  Physical exam revealed no hernia  The area is tender  Will prescribe anti-inflammatory and muscle relaxer  She should not take these until after surgery unless Dr Vy Combs says otherwise  Recommended she follow up with Dr Albin Boykin, as the pain has been there since surgery  She will follow up as needed  She knows to call if any questions or concerns arise  No problem-specific Assessment & Plan notes found for this encounter  There are no diagnoses linked to this encounter  Subjective:      Patient ID: Janeth Baker is a 69 y o  female  Josefa Cornell is a 69year old female who presents today, per referral by Dr Corbin Guerin, for consultation regarding abdominal pain  She feels a pain in her lower abdomen that feels "like a lump"  She reports it pains her every day  It used to be off and on, but it does not stop now  She has not taken any medication for it  Her pain was relieved by placing a hot compress on the area  She had colon cancer about 6 years ago for which she had surgery  She has also had a   The following portions of the patient's history were reviewed and updated as appropriate: allergies, current medications, past family history, past medical history, past social history, past surgical history and problem list     Review of Systems   Constitutional: Negative  HENT: Negative  Eyes: Negative  Respiratory: Negative  Cardiovascular: Negative  Gastrointestinal: Positive for abdominal pain  Endocrine: Negative  Genitourinary: Negative  Musculoskeletal: Negative  Skin: Negative  Allergic/Immunologic: Negative  Neurological: Negative  Hematological: Negative  Psychiatric/Behavioral: Negative  All other systems reviewed and are negative          Objective:      /76   Pulse 72 Temp 99 2 °F (37 3 °C) (Tympanic)   Resp 16   Ht 5' 3 75" (1 619 m)   Wt 77 4 kg (170 lb 9 6 oz)   BMI 29 51 kg/m²          Physical Exam   Constitutional: She is oriented to person, place, and time  She appears well-developed and well-nourished  No distress  HENT:   Head: Normocephalic and atraumatic  Right Ear: External ear normal    Left Ear: External ear normal    Nose: Nose normal    Mouth/Throat: Oropharynx is clear and moist  No oropharyngeal exudate  Eyes: Conjunctivae and EOM are normal  Pupils are equal, round, and reactive to light  Right eye exhibits no discharge  Left eye exhibits no discharge  No scleral icterus  Neck: Normal range of motion  Neck supple  No JVD present  No tracheal deviation present  No thyromegaly present  Cardiovascular: Normal rate, normal heart sounds and intact distal pulses  Exam reveals no gallop and no friction rub  No murmur heard  Pulmonary/Chest: Effort normal  No stridor  No respiratory distress  She has no wheezes  She has no rales  She exhibits no tenderness  Abdominal: Soft  Bowel sounds are normal  She exhibits no distension and no mass  There is tenderness  There is no rebound and no guarding  No hernias  Musculoskeletal: Normal range of motion  She exhibits no edema, tenderness or deformity  Lymphadenopathy:     She has no cervical adenopathy  Neurological: She is alert and oriented to person, place, and time  No cranial nerve deficit  Coordination normal    Skin: Skin is warm and dry  No rash noted  She is not diaphoretic  No erythema  No pallor  Psychiatric: She has a normal mood and affect  Her behavior is normal  Judgment and thought content normal    Nursing note and vitals reviewed  By signing my name below, Roger Carbone, attest that this documentation has been prepared under the direction and in the presence of Leeanna Hogue MD  Electronically Signed: Jenny Garcia   08/13/18  Leeanna NOBLE, personally performed the services described in this documentation  All medical record entries made by the scribe were at my direction and in my presence  I have reviewed the chart and discharge instructions and agree that the record reflects my personal performance and is accurate and complete  Leeanna Hogue MD  08/13/18

## 2018-08-14 RX ORDER — ALBUTEROL SULFATE 90 UG/1
2 AEROSOL, METERED RESPIRATORY (INHALATION) EVERY 4 HOURS PRN
Qty: 1 INHALER | Refills: 0 | Status: SHIPPED | OUTPATIENT
Start: 2018-08-14 | End: 2018-11-16 | Stop reason: SDUPTHER

## 2018-08-15 ENCOUNTER — OFFICE VISIT (OUTPATIENT)
Dept: ENDOCRINOLOGY | Facility: HOSPITAL | Age: 78
End: 2018-08-15
Payer: MEDICARE

## 2018-08-15 ENCOUNTER — APPOINTMENT (OUTPATIENT)
Dept: LAB | Facility: CLINIC | Age: 78
End: 2018-08-15
Payer: MEDICARE

## 2018-08-15 ENCOUNTER — HOSPITAL ENCOUNTER (OUTPATIENT)
Dept: NON INVASIVE DIAGNOSTICS | Facility: HOSPITAL | Age: 78
Discharge: HOME | End: 2018-08-15
Payer: MEDICARE

## 2018-08-15 VITALS
WEIGHT: 170.2 LBS | SYSTOLIC BLOOD PRESSURE: 116 MMHG | HEART RATE: 74 BPM | HEIGHT: 64 IN | BODY MASS INDEX: 29.06 KG/M2 | DIASTOLIC BLOOD PRESSURE: 70 MMHG

## 2018-08-15 DIAGNOSIS — I10 ESSENTIAL HYPERTENSION: ICD-10-CM

## 2018-08-15 DIAGNOSIS — E03.8 HYPOTHYROIDISM DUE TO HASHIMOTO'S THYROIDITIS: ICD-10-CM

## 2018-08-15 DIAGNOSIS — Z79.4 TYPE 2 DIABETES MELLITUS WITHOUT COMPLICATION, WITH LONG-TERM CURRENT USE OF INSULIN (HCC): Primary | ICD-10-CM

## 2018-08-15 DIAGNOSIS — E55.9 VITAMIN D DEFICIENCY: ICD-10-CM

## 2018-08-15 DIAGNOSIS — M19.041 ARTHRITIS OF FINGER OF RIGHT HAND: ICD-10-CM

## 2018-08-15 DIAGNOSIS — E06.3 HYPOTHYROIDISM DUE TO HASHIMOTO'S THYROIDITIS: ICD-10-CM

## 2018-08-15 DIAGNOSIS — E11.9 TYPE 2 DIABETES MELLITUS WITHOUT COMPLICATION, WITH LONG-TERM CURRENT USE OF INSULIN (HCC): Primary | ICD-10-CM

## 2018-08-15 DIAGNOSIS — E11.8 TYPE 2 DIABETES MELLITUS WITH COMPLICATION, UNSPECIFIED WHETHER LONG TERM INSULIN USE: ICD-10-CM

## 2018-08-15 DIAGNOSIS — E78.2 MIXED HYPERLIPIDEMIA: ICD-10-CM

## 2018-08-15 LAB
APTT PPP: 28 SECONDS (ref 24–36)
ATRIAL RATE: 81 BPM
BASOPHILS # BLD AUTO: 0.03 THOUSANDS/ΜL (ref 0–0.1)
BASOPHILS NFR BLD AUTO: 0 % (ref 0–1)
EOSINOPHIL # BLD AUTO: 0.24 THOUSAND/ΜL (ref 0–0.61)
EOSINOPHIL NFR BLD AUTO: 3 % (ref 0–6)
ERYTHROCYTE [DISTWIDTH] IN BLOOD BY AUTOMATED COUNT: 13.4 % (ref 11.6–15.1)
HCT VFR BLD AUTO: 47.5 % (ref 34.8–46.1)
HGB BLD-MCNC: 14.7 G/DL (ref 11.5–15.4)
IMM GRANULOCYTES # BLD AUTO: 0.02 THOUSAND/UL (ref 0–0.2)
IMM GRANULOCYTES NFR BLD AUTO: 0 % (ref 0–2)
INR PPP: 0.93 (ref 0.86–1.17)
LYMPHOCYTES # BLD AUTO: 3.22 THOUSANDS/ΜL (ref 0.6–4.47)
LYMPHOCYTES NFR BLD AUTO: 37 % (ref 14–44)
MCH RBC QN AUTO: 28.5 PG (ref 26.8–34.3)
MCHC RBC AUTO-ENTMCNC: 30.9 G/DL (ref 31.4–37.4)
MCV RBC AUTO: 92 FL (ref 82–98)
MONOCYTES # BLD AUTO: 0.63 THOUSAND/ΜL (ref 0.17–1.22)
MONOCYTES NFR BLD AUTO: 7 % (ref 4–12)
NEUTROPHILS # BLD AUTO: 4.51 THOUSANDS/ΜL (ref 1.85–7.62)
NEUTS SEG NFR BLD AUTO: 53 % (ref 43–75)
NRBC BLD AUTO-RTO: 0 /100 WBCS
P AXIS: 0 DEGREES
PLATELET # BLD AUTO: 217 THOUSANDS/UL (ref 149–390)
PMV BLD AUTO: 10.9 FL (ref 8.9–12.7)
PR INTERVAL: 222 MS
PROTHROMBIN TIME: 12.6 SECONDS (ref 11.8–14.2)
QRS AXIS: 44 DEGREES
QRSD INTERVAL: 94 MS
QT INTERVAL: 394 MS
QTC INTERVAL: 457 MS
RBC # BLD AUTO: 5.15 MILLION/UL (ref 3.81–5.12)
T WAVE AXIS: 81 DEGREES
VENTRICULAR RATE: 81 BPM
WBC # BLD AUTO: 8.65 THOUSAND/UL (ref 4.31–10.16)

## 2018-08-15 PROCEDURE — 36415 COLL VENOUS BLD VENIPUNCTURE: CPT

## 2018-08-15 PROCEDURE — 85730 THROMBOPLASTIN TIME PARTIAL: CPT

## 2018-08-15 PROCEDURE — 85610 PROTHROMBIN TIME: CPT

## 2018-08-15 PROCEDURE — 85025 COMPLETE CBC W/AUTO DIFF WBC: CPT

## 2018-08-15 PROCEDURE — 93010 ELECTROCARDIOGRAM REPORT: CPT | Performed by: INTERNAL MEDICINE

## 2018-08-15 PROCEDURE — 99214 OFFICE O/P EST MOD 30 MIN: CPT | Performed by: NURSE PRACTITIONER

## 2018-08-15 PROCEDURE — 93005 ELECTROCARDIOGRAM TRACING: CPT

## 2018-08-15 RX ORDER — METFORMIN HYDROCHLORIDE 500 MG/1
500 TABLET, EXTENDED RELEASE ORAL
Qty: 270 TABLET | Refills: 2 | Status: SHIPPED | OUTPATIENT
Start: 2018-08-15 | End: 2018-08-15 | Stop reason: SDUPTHER

## 2018-08-15 RX ORDER — METFORMIN HYDROCHLORIDE 500 MG/1
500 TABLET, EXTENDED RELEASE ORAL 2 TIMES DAILY WITH MEALS
Qty: 180 TABLET | Refills: 2 | Status: SHIPPED | OUTPATIENT
Start: 2018-08-15 | End: 2019-04-03 | Stop reason: SDUPTHER

## 2018-08-15 NOTE — PROGRESS NOTES
Shikha Peraza 69 y o  female MRN: [id number]    Encounter: [id number]      Assessment/Plan     Assessment: This is a 69y o -year-old female with type 2 diabetes, hypothyroidism, hyperlipidemia and hypertension    Plan:  1  Type 2 diabetes:  Her most recent hemoglobin A1c is elevated to 7 3  She is checking her blood sugars only once daily in the morning when she checks and her range is 150-160 by her report  I have asked her to increase her dose of Jardiance to 25 mg  She will continue metformin and Byetta at the current dose  I have asked her to check her blood sugars twice daily at alternating times and for the record to the office in 2 weeks for review and further adjustment, if necessary  She is due for her next diabetic eye exam in November 2018  She will continue to follow up with Podiatry every 12 weeks  Check hemoglobin A1c prior to next visit  2   Hypothyroidism:  She believe she is taking levothyroxine 112 mcg, however our system shows 125 mcg  She will confirm the dose and contact the office  Check TSH and free T4 prior to next visit  3   Hypertension:  She is normotensive in the office today  Continue atenolol  Check comprehensive metabolic panel prior to next visit  4   Hyperlipidemia:  Continue simvastatin  5   Vitamin-D deficiency:  Continue supplement with 1000 units of vitamin D3 daily  CC:  Type 2 Diabetes follow-up    History of Present Illness     HPI:  80-year-old female presents in the office today for follow-up of type 2 diabetes, hypothyroidism, hypertension and hyperlipidemia  She states she has been diagnosed with type 2 diabetes for 8 years  She presents with no blood sugar records or meter for download  She checks her blood sugars once daily in the AM and states she is in the 150-160 range  She is currently taking metformin 500 mg twice daily, Byetta 10 mg twice daily and Jardiance 10 mg daily    She denies any recent episodes of hypoglycemia, polyuria, polydipsia or polyphagia  Her most recent hemoglobin A1c from August 8, 2018 of 7 3  She states she is up-to-date with her annual diabetic eye exam and is due again in November 2018  She complains of some numbness and tingling to her feet at times and follows Podiatry every 12 weeks for regular diabetic foot care  For her hypothyroidism, she is treated with levothyroxine 112? mcg daily Monday through Saturday and no tablet on Sunday  Her most recent TSH from August 8, 2018 was 2 530 with a free T4 of 0 92  Her hypertension is treated with atenolol 50 mg daily  For her hyperlipidemia, she is treated with simvastatin 20 mg daily  For her vitamin-D deficiency, she supplements with 1000 units of vitamin D3 daily  Review of Systems   Constitutional: Negative  Negative for chills, fatigue and fever  HENT: Negative  Eyes: Negative  Respiratory: Negative  Negative for chest tightness and shortness of breath  Cardiovascular: Negative  Negative for chest pain and palpitations  Gastrointestinal: Positive for abdominal pain ( left-sided inguinal discomfort)  Negative for constipation, diarrhea and vomiting  Endocrine: Negative for cold intolerance, heat intolerance, polydipsia, polyphagia and polyuria  Genitourinary: Negative  Musculoskeletal: Positive for arthralgias (Right hand)  Skin: Negative  Allergic/Immunologic: Negative  Neurological: Negative  Negative for dizziness, syncope, light-headedness and headaches  Hematological: Negative  Psychiatric/Behavioral: Negative  All other systems reviewed and are negative        Historical Information   Past Medical History:   Diagnosis Date    A-fib [facility])     Abnormal ECG     last assessed: 7/14/2015    Acid reflux     resolved    Anxiety     Benign neoplasm of sigmoid colon 09/13/2011    next colon 2012    Bronchitis, asthmatic     last assessed: 3/12/2012    Colon cancer ([facility] [address] )     2012- had colon resection    COPD (chronic obstructive pulmonary disease) (HCC)     questionable    Depression     Diabetes mellitus ([facility] [address] )     on byetta    Dizziness     occ    Esophageal stricture     last assessed: 9/30/2014    High cholesterol     Hypertension     Hypothyroidism     Iron (Fe) deficiency anemia 12/22/2011    iron pill continue    Irritable bowel syndrome     OA (osteoarthritis)     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    Pacemaker     hx SSS    Rash     labia area- uses cream prn    Restrictive lung disease     Seasonal allergies     Skin scarring/fibrosis     fibrotic scar; last assessed: 3/22/2013    MONA (stress urinary incontinence, female)     Urinary frequency     last assessed: 9/10/2012    Wears glasses     reading     Past Surgical History:   Procedure Laterality Date   [address]      does not know type  Dr Alirio Zacarias is cariologist   [facility] [address]      resection  removed 25 In     COLONOSCOPY  06/01/2012    proctosigmoidectomy    ESOPHAGOGASTRODUODENOSCOPY N/A 1/9/2016    Procedure: ESOPHAGOGASTRODUODENOSCOPY (EGD); Surgeon: Robert Jacobs MD;  [facility];  Service:    [place]      [place] CYSTOURETHROSCOPY N/A 5/14/2018    Procedure: Daly Sunshine;  Surgeon: Zee Martini MD;  [facility];  Service: UroGynecology           [place] LAP, REPAIR PARAESOPHAGEAL HERNIA, INCL FUNDOPLASTY W/ MESH N/A 1/27/2016    Procedure: LAPAROSCOPIC PARAESOPHAGEAL HERNIA REPAIR with mesh; toupet  FUNDOPLICATION ;  Surgeon: Andres Austin MD;  [facility];  Service: Thoracic    [place] SLING OPER STRES INCONTINENCE N/A 5/14/2018    Procedure: INSERTION PUBOVAGINAL SLING (FEMALE);   Surgeon: Zee Martini MD;  [facility];  Service: UroGynecology           TOTAL ABDOMINAL HYSTERECTOMY       Social History   History   Alcohol Use No     History   Drug Use No     History   Smoking Status    Former Smoker    Types: Cigarettes    Quit date: 2002 Smokeless Tobacco    Never Used     Family History:   Family History   Problem Relation Age of Onset    Heart disease Mother     Diabetes Mother     Asthma Father     Stomach cancer Father         gastrkic cancer    Cancer Paternal Grandmother     Cancer Paternal Grandfather     Stomach cancer Sister     Substance Abuse Neg Hx     Mental illness Neg Hx     Alcohol abuse Neg Hx        Meds/Allergies   Current Outpatient Prescriptions   Medication Sig Dispense Refill    albuterol (VENTOLIN HFA) 90 mcg/act inhaler Inhale 2 puffs every 4 (four) hours as needed for shortness of breath 1 Inhaler 0    apixaban (ELIQUIS) 5 mg Resume Eliquis on Tuesday 5/15/18  5mg by mouth BID 56 tablet 0    ascorbic acid (VITAMIN C) 500 mg tablet Take 500 mg by mouth daily      atenolol (TENORMIN) 50 mg tablet Take 1 tablet (50 mg total) by mouth daily at bedtime 15 tablet 0    BYETTA 10 MCG PEN 10 MCG/0 04ML SOPN Inject 10 mcg under the skin 2 (two) times a day        Cholecalciferol (VITAMIN D-3) 1000 units CAPS Take 1 capsule by mouth daily      clotrimazole-betamethasone (LOTRISONE) 1-0 05 % cream Apply 1 application topically 2 (two) times a day as needed      Coenzyme Q10 (COQ10) 200 MG CAPS Take by mouth      cyclobenzaprine (FLEXERIL) 10 mg tablet Take 1 tablet (10 mg total) by mouth 3 (three) times a day as needed for muscle spasms 30 tablet 0    diclofenac sodium (VOLTAREN) 1 % Apply 2 g topically 4 (four) times a day 1 Tube 1    DULoxetine (CYMBALTA) 20 mg capsule Take 1 capsule (20 mg total) by mouth daily 90 capsule 3    estradiol (ESTRACE) 0 1 mg/g vaginal cream       Ferrous Sulfate 27 MG TABS Take 1 tablet by mouth daily      JARDIANCE 10 MG TABS Take 1 tablet (10 mg total) by mouth daily 90 tablet 1    levothyroxine 125 mcg tablet Take 1 tablet (125 mcg total) by mouth daily 1 tab 6 days of the week  No tab Sundays   90 tablet 0    MEGARED OMEGA-3 KRILL OIL PO Take 1 capsule by mouth daily      metFORMIN (GLUCOPHAGE-XR) 500 mg 24 hr tablet Take 500 mg by mouth 2 (two) times a day        Misc Natural Products (OSTEO BI-FLEX ADV JOINT SHIELD PO) Take 1 tablet by mouth daily        Multiple Vitamins-Minerals (CENTRUM SILVER 50+MEN) TABS Take 1 tablet by mouth daily      naproxen (EC NAPROSYN) 500 MG EC tablet Take 1 tablet (500 mg total) by mouth 2 (two) times a day with meals 30 tablet 0    simvastatin (ZOCOR) 20 mg tablet Take 1 tablet by mouth daily at bedtime         No current facility-administered medications for this visit  Allergies   Allergen Reactions    Fluconazole Dizziness       Objective   Vitals: Blood pressure 116/70, pulse 74, height 5' 3 75" (1 619 m), weight 77 2 kg (170 lb 3 2 oz), not currently breastfeeding  Physical Exam   Constitutional: She is oriented to person, place, and time  She appears well-developed and well-nourished  No distress  HENT:   Head: Normocephalic and atraumatic  Mouth/Throat: Oropharynx is clear and moist    Eyes: Conjunctivae and EOM are normal  Pupils are equal, round, and reactive to light  Right eye exhibits no discharge  Left eye exhibits no discharge  Neck: Normal range of motion  Neck supple  No JVD present  No tracheal deviation present  No thyromegaly present  Cardiovascular: Normal rate, regular rhythm, normal heart sounds and intact distal pulses  Pulmonary/Chest: Effort normal and breath sounds normal  No stridor  No respiratory distress  She has no wheezes  She has no rales  She exhibits no tenderness  Abdominal: Soft  Bowel sounds are normal  She exhibits no distension  There is no tenderness  Musculoskeletal: Normal range of motion  She exhibits edema (Left arm-post pacemaker implantation)  She exhibits no tenderness or deformity  Lymphadenopathy:     She has no cervical adenopathy  Neurological: She is alert and oriented to person, place, and time  She displays normal reflexes  No cranial nerve deficit   Coordination normal    Skin: Skin is warm and dry  No rash noted  No erythema  No pallor  Dry skin   Psychiatric: She has a normal mood and affect  Her behavior is normal  Judgment and thought content normal    Vitals reviewed      Lab Results:   Lab Results   Component Value Date/Time    Hemoglobin A1C 7 3 (H) 08/08/2018 08:13 AM    Hemoglobin A1C 7 2 (H) 05/16/2018 08:46 AM    Hemoglobin A1C 8 0 (H) 12/19/2017 07:18 AM    WBC 7 27 04/24/2018 07:25 AM    WBC 7 58 10/26/2017 08:52 AM    Hemoglobin 15 1 04/24/2018 07:25 AM    Hemoglobin 15 1 10/26/2017 08:52 AM    Hematocrit 48 3 (H) 04/24/2018 07:25 AM    Hematocrit 44 3 10/26/2017 08:52 AM    MCV 93 04/24/2018 07:25 AM    MCV 88 10/26/2017 08:52 AM    Platelets 617 66/73/2396 07:25 AM    Platelets 583 01/24/8990 08:52 AM    BUN 13 08/08/2018 08:13 AM    BUN 11 05/16/2018 08:46 AM    BUN 13 04/24/2018 07:25 AM    Sodium 140 08/08/2018 08:13 AM    Sodium 142 05/16/2018 08:46 AM    Sodium 139 04/24/2018 07:25 AM    Potassium 4 6 08/08/2018 08:13 AM    Potassium 4 2 05/16/2018 08:46 AM    Potassium 4 6 04/24/2018 07:25 AM    Chloride 105 08/08/2018 08:13 AM    Chloride 105 05/16/2018 08:46 AM    Chloride 105 04/24/2018 07:25 AM    CO2 30 08/08/2018 08:13 AM    CO2 30 05/16/2018 08:46 AM    CO2 29 04/24/2018 07:25 AM    Creatinine 0 75 08/08/2018 08:13 AM    Creatinine 0 67 05/16/2018 08:46 AM    Creatinine 0 65 04/24/2018 07:25 AM    AST 14 08/08/2018 08:13 AM    AST 42 05/16/2018 08:46 AM    AST 28 12/19/2017 07:18 AM    ALT 19 08/08/2018 08:13 AM    ALT 22 05/16/2018 08:46 AM    ALT 24 12/19/2017 07:18 AM    Albumin 3 8 08/08/2018 08:13 AM    Albumin 3 8 05/16/2018 08:46 AM    Albumin 3 6 12/19/2017 07:18 AM    Cholesterol 144 05/16/2018 08:46 AM    Cholesterol 142 12/19/2017 07:18 AM    Cholesterol 179 08/22/2017 07:16 AM    HDL, Direct 18 (L) 05/16/2018 08:46 AM    HDL, Direct 42 12/19/2017 07:18 AM    HDL, Direct 42 08/22/2017 07:16 AM    Triglycerides 206 (H) 05/16/2018 08:46 AM    Triglycerides 169 (H) 12/19/2017 07:18 AM    Triglycerides 215 (H) 08/22/2017 07:16 AM     Portions of the record may have been created with voice recognition software  Occasional wrong word or "sound a like" substitutions may have occurred due to the inherent limitations of voice recognition software  Read the chart carefully and recognize, using context, where substitutions have occurred

## 2018-08-15 NOTE — PATIENT INSTRUCTIONS
Be mindful of diet  Stay active and stay hydrated  Please check your blood sugars 2 times daily and for the record to the office in 2 weeks for review and adjustment, if necessary  Increase Jardiance to 25 mg daily  Continue Byetta and Metformin  Continue atenolol and simvastatin  Continue with regular supplementation of vitamin D3 daily

## 2018-08-16 ENCOUNTER — ANESTHESIA EVENT (OUTPATIENT)
Dept: PERIOP | Facility: HOSPITAL | Age: 78
End: 2018-08-16
Payer: MEDICARE

## 2018-08-16 ENCOUNTER — ANESTHESIA (OUTPATIENT)
Dept: PERIOP | Facility: HOSPITAL | Age: 78
End: 2018-08-16
Payer: MEDICARE

## 2018-08-16 ENCOUNTER — HOSPITAL ENCOUNTER (OUTPATIENT)
Facility: HOSPITAL | Age: 78
Setting detail: OUTPATIENT SURGERY
Discharge: HOME | End: 2018-08-16
Attending: ORTHOPAEDIC SURGERY | Admitting: ORTHOPAEDIC SURGERY
Payer: MEDICARE

## 2018-08-16 VITALS
DIASTOLIC BLOOD PRESSURE: 60 MMHG | TEMPERATURE: 98.5 F | BODY MASS INDEX: 30.12 KG/M2 | WEIGHT: 170 LBS | OXYGEN SATURATION: 92 % | RESPIRATION RATE: 20 BRPM | HEART RATE: 64 BPM | SYSTOLIC BLOOD PRESSURE: 117 MMHG | HEIGHT: 63 IN

## 2018-08-16 DIAGNOSIS — M19.041 ARTHRITIS OF FINGER OF RIGHT HAND: Primary | ICD-10-CM

## 2018-08-16 LAB — GLUCOSE SERPL-MCNC: 184 MG/DL (ref 65–140)

## 2018-08-16 PROCEDURE — C1776 JOINT DEVICE (IMPLANTABLE): HCPCS | Performed by: ORTHOPAEDIC SURGERY

## 2018-08-16 PROCEDURE — 26536 REVISE/IMPLANT FINGER JOINT: CPT | Performed by: ORTHOPAEDIC SURGERY

## 2018-08-16 PROCEDURE — 82948 REAGENT STRIP/BLOOD GLUCOSE: CPT

## 2018-08-16 DEVICE — THE ASCENSION SILICONE PIP IS AN ANATOMICALLY DESIGNED, SINGLE USE, ONE PIECE, FLEXIBLE HINGE PROSTHESIS DESIGNED TO BE IMPLANTED WITHOUT BONE CEMENT ACROSS THE PROXIMAL INTERPHALANGEAL  (PIP) JOINT.  IT IS MADE FROM MEDICAL GRADE SILICONE ELASTOMER.  THE PROXIMAL AND DISTAL STEMS OF THE PROSTHESIS ARE PRE-FLEXED TO MATCH THE APPROXIMATE NATURAL FLEXION POSITION OF THE JOINT WHEN THE HAND IS RELAXED.  THE PIP PROVIDES 90 DEGREES OF FLEXION FROM FULL EXTENSION.  THE ASCENSION PIP FINGER JOINT PROSTHESES IS INTENDED FOR CEMENTLESS REPLACEMENT OF THE PROXIMAL INTERPHALANGEAL (PIP) JOINT WHERE DISABLED BY RHEUMATOID, DEGENERATIVE, OR TRAUMATIC ARTHRITIS.
Type: IMPLANTABLE DEVICE | Site: FINGER | Status: FUNCTIONAL
Brand: ASCENSION® SILICONE PIP

## 2018-08-16 RX ORDER — HYDRALAZINE HYDROCHLORIDE 20 MG/ML
5 INJECTION INTRAMUSCULAR; INTRAVENOUS AS NEEDED
Status: DISCONTINUED | OUTPATIENT
Start: 2018-08-16 | End: 2018-08-16 | Stop reason: HOSPADM

## 2018-08-16 RX ORDER — ONDANSETRON 2 MG/ML
4 INJECTION INTRAMUSCULAR; INTRAVENOUS ONCE AS NEEDED
Status: DISCONTINUED | OUTPATIENT
Start: 2018-08-16 | End: 2018-08-16 | Stop reason: HOSPADM

## 2018-08-16 RX ORDER — FENTANYL CITRATE 50 UG/ML
INJECTION, SOLUTION INTRAMUSCULAR; INTRAVENOUS AS NEEDED
Status: DISCONTINUED | OUTPATIENT
Start: 2018-08-16 | End: 2018-08-16 | Stop reason: SURG

## 2018-08-16 RX ORDER — PROMETHAZINE HYDROCHLORIDE 25 MG/ML
6.25 INJECTION, SOLUTION INTRAMUSCULAR; INTRAVENOUS ONCE AS NEEDED
Status: DISCONTINUED | OUTPATIENT
Start: 2018-08-16 | End: 2018-08-16 | Stop reason: HOSPADM

## 2018-08-16 RX ORDER — METOCLOPRAMIDE HYDROCHLORIDE 5 MG/ML
10 INJECTION INTRAMUSCULAR; INTRAVENOUS ONCE AS NEEDED
Status: DISCONTINUED | OUTPATIENT
Start: 2018-08-16 | End: 2018-08-16 | Stop reason: HOSPADM

## 2018-08-16 RX ORDER — MIDAZOLAM HYDROCHLORIDE 1 MG/ML
INJECTION INTRAMUSCULAR; INTRAVENOUS AS NEEDED
Status: DISCONTINUED | OUTPATIENT
Start: 2018-08-16 | End: 2018-08-16 | Stop reason: SURG

## 2018-08-16 RX ORDER — MAGNESIUM HYDROXIDE 1200 MG/15ML
LIQUID ORAL AS NEEDED
Status: DISCONTINUED | OUTPATIENT
Start: 2018-08-16 | End: 2018-08-16 | Stop reason: HOSPADM

## 2018-08-16 RX ORDER — ROPIVACAINE HYDROCHLORIDE 5 MG/ML
INJECTION, SOLUTION EPIDURAL; INFILTRATION; PERINEURAL AS NEEDED
Status: DISCONTINUED | OUTPATIENT
Start: 2018-08-16 | End: 2018-08-16 | Stop reason: SURG

## 2018-08-16 RX ORDER — HYDROCODONE BITARTRATE AND ACETAMINOPHEN 5; 325 MG/1; MG/1
1 TABLET ORAL EVERY 6 HOURS PRN
Qty: 20 TABLET | Refills: 0 | Status: SHIPPED | OUTPATIENT
Start: 2018-08-16 | End: 2018-08-27 | Stop reason: SDUPTHER

## 2018-08-16 RX ORDER — SODIUM CHLORIDE, SODIUM LACTATE, POTASSIUM CHLORIDE, CALCIUM CHLORIDE 600; 310; 30; 20 MG/100ML; MG/100ML; MG/100ML; MG/100ML
75 INJECTION, SOLUTION INTRAVENOUS CONTINUOUS
Status: DISCONTINUED | OUTPATIENT
Start: 2018-08-16 | End: 2018-08-16 | Stop reason: HOSPADM

## 2018-08-16 RX ORDER — ONDANSETRON 2 MG/ML
INJECTION INTRAMUSCULAR; INTRAVENOUS AS NEEDED
Status: DISCONTINUED | OUTPATIENT
Start: 2018-08-16 | End: 2018-08-16 | Stop reason: SURG

## 2018-08-16 RX ORDER — MEPERIDINE HYDROCHLORIDE 50 MG/ML
12.5 INJECTION INTRAMUSCULAR; INTRAVENOUS; SUBCUTANEOUS
Status: DISCONTINUED | OUTPATIENT
Start: 2018-08-16 | End: 2018-08-16 | Stop reason: HOSPADM

## 2018-08-16 RX ORDER — FENTANYL CITRATE/PF 50 MCG/ML
25 SYRINGE (ML) INJECTION
Status: DISCONTINUED | OUTPATIENT
Start: 2018-08-16 | End: 2018-08-16 | Stop reason: HOSPADM

## 2018-08-16 RX ORDER — LABETALOL HYDROCHLORIDE 5 MG/ML
5 INJECTION, SOLUTION INTRAVENOUS AS NEEDED
Status: DISCONTINUED | OUTPATIENT
Start: 2018-08-16 | End: 2018-08-16 | Stop reason: HOSPADM

## 2018-08-16 RX ORDER — PROPOFOL 10 MG/ML
INJECTION, EMULSION INTRAVENOUS CONTINUOUS PRN
Status: DISCONTINUED | OUTPATIENT
Start: 2018-08-16 | End: 2018-08-16 | Stop reason: SURG

## 2018-08-16 RX ORDER — EPHEDRINE SULFATE 50 MG/ML
INJECTION, SOLUTION INTRAVENOUS AS NEEDED
Status: DISCONTINUED | OUTPATIENT
Start: 2018-08-16 | End: 2018-08-16 | Stop reason: SURG

## 2018-08-16 RX ORDER — SENNOSIDES 8.6 MG
650 CAPSULE ORAL EVERY 8 HOURS
Qty: 15 TABLET | Refills: 0 | Status: SHIPPED | OUTPATIENT
Start: 2018-08-16 | End: 2018-08-21

## 2018-08-16 RX ADMIN — ONDANSETRON 4 MG: 2 INJECTION INTRAMUSCULAR; INTRAVENOUS at 13:30

## 2018-08-16 RX ADMIN — MIDAZOLAM HYDROCHLORIDE 2 MG: 1 INJECTION, SOLUTION INTRAMUSCULAR; INTRAVENOUS at 11:21

## 2018-08-16 RX ADMIN — EPHEDRINE SULFATE 10 MG: 50 INJECTION, SOLUTION INTRAMUSCULAR; INTRAVENOUS; SUBCUTANEOUS at 13:42

## 2018-08-16 RX ADMIN — EPHEDRINE SULFATE 10 MG: 50 INJECTION, SOLUTION INTRAMUSCULAR; INTRAVENOUS; SUBCUTANEOUS at 13:17

## 2018-08-16 RX ADMIN — FENTANYL CITRATE 50 MCG: 50 INJECTION, SOLUTION INTRAMUSCULAR; INTRAVENOUS at 11:22

## 2018-08-16 RX ADMIN — CEFAZOLIN SODIUM 2000 MG: 2 SOLUTION INTRAVENOUS at 12:34

## 2018-08-16 RX ADMIN — SODIUM CHLORIDE, SODIUM LACTATE, POTASSIUM CHLORIDE, AND CALCIUM CHLORIDE: .6; .31; .03; .02 INJECTION, SOLUTION INTRAVENOUS at 11:01

## 2018-08-16 RX ADMIN — PROPOFOL 60 MCG/KG/MIN: 10 INJECTION, EMULSION INTRAVENOUS at 12:27

## 2018-08-16 RX ADMIN — ROPIVACAINE HYDROCHLORIDE 25 ML: 5 INJECTION, SOLUTION EPIDURAL; INFILTRATION; PERINEURAL at 11:25

## 2018-08-16 NOTE — OP NOTE
OPERATIVE REPORT  PATIENT NAME: Abraham Brennan  :  1940  MRN: [id number]  Pt Location: [facility]    SURGERY DATE: 18    Surgeon(s) and Role:     * Kimberli Reilly MD - Primary     * Arianna Watson MD - Assisting    Pre-Op Diagnosis:  Arthritis of finger of right hand [M19 041]    Post-Op Diagnosis Codes:     * Arthritis of finger of right hand [M19 041]    Procedure(s):  ARTHROPLASTY PIP/FINGER - RIGHT RING (Right)    Specimen(s):  * No orders in the log *    Estimated Blood Loss:   Minimal      Anesthesia Type:   General    Operative Indications: The patient has a history of Arthritis of the ring finger proximal interphalangeal joint  right that was recalcitrant to conservative management  The decision was made to bring the patient to the operating room for Arthroplasty of the right ring finger proximal interphalangeal joint  Risks of the procedure were explained which include, but are not limited to bleeding; infection; damage to nerves, arteries,veins, tendons; scar; pain; need for reoperation; failure to give desired result; and risks of anaesthesia  All questions were answered to satisfaction and they were willing to proceed  Operative Findings:  Significant arthritic change right hand ring finger proximal interphalangeal joint    Complications:   None    Procedure and Technique:  After the patient, site, and procedure were identified, the patient was brought into the operating room in a supine position  Regional anaesthesia and sedation were provided  A well padded tourniquet was applied to the extremity, set at 250 mmHg  The  right upper extremity was then prepped and drapped in a normal, sterile, orthopedic fashion  After the patient, site, and procedure were identified attention was turned towards the right hand ring finger  An Esmarch bandage was used to exsanguinate the limb the tourniquet was inflated to 250 mm of mercury    A dorsal longitudinal incision was made over the dorsal aspect of the hand  This was centered at the proximal interphalangeal joint  We dissected down through skin and subcutaneous tissues protecting dorsal venous structures as well as small cutaneous nerves  Full-thickness radial and ulnar flaps were then performed  A Chaime approach was made with elevation of the extensor tendon  We identified significant synovitis localized to the proximal interphalangeal joint with significant osteophyte formation on the distal aspect of the proximal phalanx  There was complete loss of articular cartilage on the proximal base of the middle phalanx  Rongeur was used to resect the synovitis as well as osteophytes  With care taken to protect the collateral ligaments, the finger was flexed utilizing a microsagittal saw, the distal aspect of the proximal phalanx was then resected  Once this was done, the finger was flexed a microsagittal saw was then utilized to resect the base of the middle phalanx  At this point, utilizing fluoroscopic guidance a starter awl was then placed down the central canal of the distal aspect of the proximal phalanx and proximal aspect of the middle phalanx  We then broached to a size 0 from the [address] [place] [address] PIP silicone arthroplasty set  Fluoroscopic images confirmed no larger implant could be placed secondary to the proximal phalanx fit  A size 0 trial was then placed which demonstrated excellent stability  Wound was then copiously irrigated with sterile saline solution and a final size 0 implant was then placed  Fluoroscopic images confirmed good placement  Patient had good range of motion from extension to 90° flexion  The wound was copiously irrigated with sterile saline solution and the extensor tendon was repaired utilizing 4-0 Ethibond sutures  The tourniquet was then deflated  There was a good blood supply noted to the tip of the finger        At the completion of the procedure, hemostasis was obtained with cautery and direct pressure  The wounds were copiously irrigated with sterile solution  The wounds were closed with Prolene  Sterile dressings were applied, including Xeroform, Gauze, Dorsal Splint and Finger Dressing  Please note, all sponge, needle, and instrument counts were correct prior to closure  Loupe magnification was utilized  The patient tolerated the procedure well       I was present for the entire procedure    Patient Disposition:  PACU  and hemodynamically stable    SIGNATURE: Yan Pendleton MD  DATE: 08/16/18  TIME: 2:04 PM

## 2018-08-16 NOTE — H&P
H&P Exam - Orthopedics   Sinhdu A Genoe 69 y o  female MRN: [id number]  Unit/Bed#: HOLDING 2    Assessment/Plan   Assessment:  Right ring finger proximal interphalangeal joint arthritis  Plan:  Right ring finger proximal interphalangeal joint arthroplasty    History of Present Illness   HPI:  Usama Kemp is a 69 y o  y o  female who presents with right ring finger pain with decreased function proximal interphalangeal joint      Historical Information  Review Of Systems:   · Skin: Normal  · Neuro: See HPI  · Musculoskeletal: See HPI  · 14 point review of systems negative except as stated above     Past Medical History:   Past Medical History:   Diagnosis Date    A-fib ([facility] )     Abnormal ECG     last assessed: 7/14/2015    Acid reflux     resolved    Anxiety     Benign neoplasm of sigmoid colon 09/13/2011    next colon 2012    Bronchitis, asthmatic     last assessed: 3/12/2012    Colon cancer ([facility] [address] )     2012- had colon resection    COPD (chronic obstructive pulmonary disease) ([facility] [address] )     questionable    Depression     Diabetes mellitus ([facility] )     on byetta    Dizziness     occ    Esophageal stricture     last assessed: 9/30/2014    High cholesterol     Hypertension     Hypothyroidism     Iron (Fe) deficiency anemia 12/22/2011    iron pill continue    Irritable bowel syndrome     OA (osteoarthritis)     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    Pacemaker     hx SSS    Rash     labia area- uses cream prn    Restrictive lung disease     Seasonal allergies     Skin scarring/fibrosis     fibrotic scar; last assessed: 3/22/2013    MONA (stress urinary incontinence, female)     Urinary frequency     last assessed: 9/10/2012    Wears glasses     reading       Past Surgical History:   Past Surgical History:   Procedure Laterality Date   [id number]       does not know type  Dr Santiago Martinez is cariologist    [address]      resection  removed 18 In     COLONOSCOPY 06/01/2012    proctosigmoidectomy    ESOPHAGOGASTRODUODENOSCOPY N/A 1/9/2016    Procedure: ESOPHAGOGASTRODUODENOSCOPY (EGD); Surgeon: Andrey Nguyen MD;  [facility];  Service:    Nitin Kelsey      HI CYSTOURETHROSCOPY N/A 5/14/2018    Procedure: Christian Callahans;  Surgeon: Margarita Dumont MD;  [facility];  Service: UroGynecology           [place] LAP, REPAIR PARAESOPHAGEAL HERNIA, INCL FUNDOPLASTY W/ MESH N/A 1/27/2016    Procedure: LAPAROSCOPIC PARAESOPHAGEAL HERNIA REPAIR with mesh; toupet  FUNDOPLICATION ;  Surgeon: Shreyas Martinez MD;  [facility];  Service: Thoracic    HI SLING OPER STRES INCONTINENCE N/A 5/14/2018    Procedure: INSERTION PUBOVAGINAL SLING (FEMALE); Surgeon: Margarita Dumont MD;  [facility];  Service: UroGynecology           TOTAL ABDOMINAL HYSTERECTOMY         Family History:  Family history reviewed and non-contributory  Family History   Problem Relation Age of Onset    Heart disease Mother     Diabetes Mother     Asthma Father     Stomach cancer Father         gastrkic cancer    Cancer Paternal Grandmother     Cancer Paternal Grandfather     Stomach cancer Sister     Substance Abuse Neg Hx     Mental illness Neg Hx     Alcohol abuse Neg Hx        Social History:  Social History     Social History    Marital status: /Civil Union     Spouse name: N/A    Number of children: N/A    Years of education: N/A     Social History Main Topics    Smoking status: Former Smoker     Types: Cigarettes     Quit date: 2002    Smokeless tobacco: Never Used    Alcohol use No    Drug use: No    Sexual activity: No     Other Topics Concern    None     Social History Narrative    Always uses seat belt    Copy of advanced directive obtained    Drinks coffee-drinks 5 cups a day    Has smoke detectors    Uses safety equipment-seatbelts       Allergies:    Allergies   Allergen Reactions    Fluconazole Dizziness           Labs:    0  Lab Value Date/Time   HCT 47 5 (H) 08/15/2018 1116   HCT 48 3 (H) 04/24/2018 0725   HCT 44 3 10/26/2017 0852   HCT 40 6 08/01/2015 0454   HCT 46 3 (H) 07/31/2015 0919   HCT 42 9 07/29/2015 0803   HGB 14 7 08/15/2018 1116   HGB 15 1 04/24/2018 0725   HGB 15 1 10/26/2017 0852   HGB 13 6 08/01/2015 0454   HGB 15 6 (H) 07/31/2015 0919   HGB 14 6 07/29/2015 0803   INR 0 93 08/15/2018 1116   INR 0 95 07/29/2015 0803   WBC 8 65 08/15/2018 1116   WBC 7 27 04/24/2018 0725   WBC 7 58 10/26/2017 0852   WBC 7 95 08/01/2015 0454   WBC 7 12 07/31/2015 0919   WBC 7 07 07/29/2015 0803       Meds:    Current Facility-Administered Medications:     ceFAZolin (ANCEF) IVPB (premix) 2,000 mg, 2,000 mg, Intravenous, Once, ANGELIC Rascon    lactated ringers infusion, 75 mL/hr, Intravenous, Continuous, Galen Fritz DO    Facility-Administered Medications Ordered in Other Encounters:     fentanyl citrate (PF) 100 MCG/2ML, , Intravenous, PRN, Sun Guy MD, 50 mcg at 08/16/18 1122    midazolam (VERSED) injection, , Intravenous, PRN, Sun Guy MD, 2 mg at 08/16/18 1121    ropivacaine (NAROPIN) 0 5 % injection, , , PRN, Sun Guy MD, 25 mL at 08/16/18 1125    Blood Culture:   No results found for: BLOODCX    Wound Culture:   No results found for: WOUNDCULT    Ins and Outs:  No intake/output data recorded  Physical Exam  /74   Pulse 80   Temp 98 °F (36 7 °C) (Oral)   Resp 18   Ht 5' 3" (1 6 m)   Wt 77 1 kg (170 lb)   SpO2 98%   BMI 30 11 kg/m²   Gen: Alert and oriented to person, place, time  HEENT: EOMI, eyes clear, moist mucus membranes, hearing intact  Respiratory: Bilateral chest rise  No audible wheezing found  Cardiovascular: Regular Rate and Rhythm  Abdomen: soft nontender/nondistended  Ortho Exam:  Tendons 60° motion right ring finger proximal interphalangeal joint with significant tenderness  Neuro Exam:  The patient is neurovascularly intact in the median, ulnar, and radial nerve distribution    There is normal sensation and good capillary refill within the digits  2+ pulses      Lab Results: Reviewed  Imaging: Reviewed

## 2018-08-16 NOTE — ANESTHESIA PREPROCEDURE EVALUATION
Review of Systems/Medical History  Patient summary reviewed  Chart reviewed  No history of anesthetic complications     Cardiovascular  EKG reviewed, Exercise tolerance (METS): >4,  Pacemaker/AICD (SSS s/p PPM), Hyperlipidemia, Hypertension , CAD , Dysrhythmias (on eliquis (last taken 8/14/18)) , atrial fibrillation,    Pulmonary  Smoker ex-smoker  , COPD mild- PRN medicaiton , Asthma , well controlled/ stable ,        GI/Hepatic    GERD well controlled, GI malignancy (colon CA s/p colectomy),        Negative  ROS        Endo/Other  Diabetes , History of thyroid disease , hypothyroidism,      GYN  Negative gynecology ROS          Hematology  Negative hematology ROS      Musculoskeletal    Arthritis     Neurology  Negative neurology ROS      Psychology   Anxiety, Depression ,              Physical Exam    Airway    Mallampati score: II  TM Distance: >3 FB  Neck ROM: full     Dental   No notable dental hx     Cardiovascular  Rhythm: regular, Rate: normal, Cardiovascular exam normal    Pulmonary  Pulmonary exam normal Breath sounds clear to auscultation,     Other Findings        Anesthesia Plan  ASA Score- 3     Anesthesia Type- regional and IV sedation with anesthesia with ASA Monitors  Additional Monitors:   Airway Plan:     Comment: Discussed plan for SS right BP PNB + MAC w/GA LMA as back-up  Willa Mitchell Plan Factors-    Induction-     Postoperative Plan- Plan for postoperative opioid use  Informed Consent- Anesthetic plan and risks discussed with patient  I personally reviewed this patient with the CRNA  Discussed and agreed on the Anesthesia Plan with the JASMYNE Mitchell

## 2018-08-16 NOTE — ANESTHESIA POSTPROCEDURE EVALUATION
Post-Op Assessment Note      CV Status:  Stable    Mental Status:  Alert and awake    Hydration Status:  Euvolemic    PONV Controlled:  Controlled    Airway Patency:  Patent    Post Op Vitals Reviewed: Yes          Staff: CRNA, Anesthesiologist       Comments: Awake on 3L NC to PACU          BP   121/57   Temp      Pulse  61   Resp   18   SpO2   98

## 2018-08-16 NOTE — ANESTHESIA PROCEDURE NOTES
Peripheral Block    Patient location during procedure: pre-op  Start time: 8/16/2018 11:20 AM  Removal time: 8/16/2018 11:30 AM  Reason for block: procedure for pain, at surgeon's request and post-op pain management  Staffing  Anesthesiologist: Yury NAVARRO  Performed: anesthesiologist   Preanesthetic Checklist  Completed: patient identified, site marked, surgical consent, pre-op evaluation, timeout performed, IV checked, risks and benefits discussed and monitors and equipment checked  Peripheral Block  Patient position: sitting  Prep: Betadine  Patient monitoring: heart rate, cardiac monitor, continuous pulse ox and frequent blood pressure checks  Block type: supraclavicular  Laterality: right  Injection technique: single-shot  Procedures: ultrasound guided  Ultrasound permanent image saved  Local infiltration: ropivacaine  Infiltration strength: 0 5 %  Dose: 25 mL  Needle  Needle type: Stimuplex   Needle gauge: 22 G  Needle length: 5 cm  Needle localization: anatomical landmarks and ultrasound guidance  Test dose: negative  Assessment  Injection assessment: incremental injection, local visualized surrounding nerve on ultrasound, negative aspiration for CSF, negative aspiration for heme and no paresthesia on injection  Heart rate change: no  Slow fractionated injection: yes  Post-procedure:  site cleaned  patient tolerated the procedure well with no immediate complications

## 2018-08-17 ENCOUNTER — TELEPHONE (OUTPATIENT)
Dept: OBGYN CLINIC | Facility: HOSPITAL | Age: 78
End: 2018-08-17

## 2018-08-17 NOTE — TELEPHONE ENCOUNTER
I called back to check on how patient was doing   states patient is currently resting  I advised her to call if she continues to have increased pain  He verbalized understanding

## 2018-08-17 NOTE — TELEPHONE ENCOUNTER
Zion Mattson,    So I spoke with the resident who was in the surgery  She is in a finger splint only with a finger bandage both below and above the splint  So what she can do is remove the top finger bandage to expose the splint  She then can take the tape off of the splint, and either reapply or apply new tape more loosely where the old tape was  She then can reapply the outer finger bandage  Agree with ice and elevation  Let me know if she continues to have issues   Thank you

## 2018-08-17 NOTE — TELEPHONE ENCOUNTER
Patient was given above instructions  Her  will be back in 30min and then she will call me back for step by step instructions while we are on the phone together

## 2018-08-17 NOTE — TELEPHONE ENCOUNTER
Patient called me back, she states that she took the medication on an empty stomach and it made her sick but she is feeling better now  Her pain is down a bit  She will continue to ice and elevate  She will take her medication on a schedule with food  She will call office should uncontrolled pain return

## 2018-08-17 NOTE — TELEPHONE ENCOUNTER
Dr Dandre Velazquez yesterday    Patient calling that she is having problems with the hand swelling  She is icing and elevating  I advised her 20 min on 20 min off, elevate above the heart  Patient is on eliquis and cannot take NSAIDs orally  Will she be able to loosen her surgical splint at all or is this not possible to relieve some of the swelling?

## 2018-08-17 NOTE — TELEPHONE ENCOUNTER
Patient called back that her pain is still not controlled  She is under medicating with her pain medication  Advised her that she should take her medication on schedule for the next couple days  Advised ok to take Norco 2 tabs now to knock the pain down, then go back to 1 tab every 6 hrs  Ice 20 min on 20 min off and elevate  Patient is does not want to touch the tape on the splint so she will try above and call office if not able to control pain and swelling

## 2018-08-17 NOTE — TELEPHONE ENCOUNTER
I spoke with patient's   and he states that she developed nausea after taking the norco 2 tabs x 1 dose  I asked him if she wanted to request Zofran for nausea and at this time she does not want to take any more medication  I asked her to return my call when she could  He will tell her to call

## 2018-08-20 NOTE — TELEPHONE ENCOUNTER
I spoke with patient this morning and she states that she had a good weekend, pain is improved but she still is taking tylenol for her pain  She is requesting someone to retape her splint, she does not want to touch it  Jolie, can we bring her in to see tomorrow ? Thank you

## 2018-08-20 NOTE — TELEPHONE ENCOUNTER
I unfortunately will not be in the office until Wed (they have me filling in for surgeries while Juan Guevara is out)  We could see if any of the other providers feel comfortable helping her adjust or potentially even see if a hand therapist would mind helping us out?

## 2018-08-20 NOTE — TELEPHONE ENCOUNTER
Shona Santos, could you possibly see this Dr Fidelia England patient in [place] tomorrow for a tape adjustment on the patients splint  She does not feel comfortable doing it herself  Please let me know  Thank you

## 2018-08-21 ENCOUNTER — OFFICE VISIT (OUTPATIENT)
Dept: OBGYN CLINIC | Facility: CLINIC | Age: 78
End: 2018-08-21

## 2018-08-21 VITALS
WEIGHT: 169.4 LBS | HEART RATE: 78 BPM | BODY MASS INDEX: 28.22 KG/M2 | SYSTOLIC BLOOD PRESSURE: 146 MMHG | DIASTOLIC BLOOD PRESSURE: 86 MMHG | HEIGHT: 65 IN

## 2018-08-21 DIAGNOSIS — Z96.691 AFTERCARE FOLLOWING RIGHT FINGER JOINT REPLACEMENT SURGERY: Primary | ICD-10-CM

## 2018-08-21 DIAGNOSIS — Z47.1 AFTERCARE FOLLOWING RIGHT FINGER JOINT REPLACEMENT SURGERY: Primary | ICD-10-CM

## 2018-08-21 PROBLEM — M19.041 ARTHRITIS OF FINGER OF RIGHT HAND: Status: RESOLVED | Noted: 2018-07-12 | Resolved: 2018-08-21

## 2018-08-21 PROCEDURE — 99024 POSTOP FOLLOW-UP VISIT: CPT | Performed by: ORTHOPAEDIC SURGERY

## 2018-08-21 NOTE — ASSESSMENT & PLAN NOTE
71-year-old female status post a right ring finger PIP joint replacement on August 16th by Dr Zarina Kelley  Her dressing was changed today and she was placed in a dorsal splint over her PIP joint  She will follow up in one week as scheduled

## 2018-08-21 NOTE — PROGRESS NOTES
Assessment:     1  Aftercare following right finger joint replacement surgery          Plan:     Problem List Items Addressed This Visit        Other    Aftercare following right finger joint replacement surgery - Primary     64-year-old female status post a right ring finger PIP joint replacement on August 16th by Dr Kameron Grady  Her dressing was changed today and she was placed in a dorsal splint over her PIP joint  She will follow up in one week as scheduled  Patient ID: Mayank Mackay is a 69 y o  female  Chief Complaint:  dirty dressing    Subjective:  64-year-old female with a right ring finger PIP joint arthroplasty by Dr Kameron Grady on August 16th  She comes in today because she is very concerned about how dirty her dressing is in the splint seems to be shifting  She has been having no other problems  Allergy:  Allergies   Allergen Reactions    Fluconazole Dizziness       Medications:  all current active meds have been reviewed    ROS:  Review of Systems   All other systems reviewed and are negative  Objective:  BP Readings from Last 1 Encounters:   08/21/18 146/86      Wt Readings from Last 1 Encounters:   08/21/18 76 8 kg (169 lb 6 4 oz)        Exam:   Physical Exam  Right Hand Exam     Comments:  Right ring finger incision is clean, dry, intact with no erythema or drainage  Very minimal swelling of the hand, moderate swelling of the finger

## 2018-08-27 ENCOUNTER — TELEPHONE (OUTPATIENT)
Dept: CARDIOLOGY CLINIC | Facility: CLINIC | Age: 78
End: 2018-08-27

## 2018-08-27 ENCOUNTER — TELEPHONE (OUTPATIENT)
Dept: OBGYN CLINIC | Facility: HOSPITAL | Age: 78
End: 2018-08-27

## 2018-08-27 DIAGNOSIS — M19.041 ARTHRITIS OF FINGER OF RIGHT HAND: ICD-10-CM

## 2018-08-27 RX ORDER — HYDROCODONE BITARTRATE AND ACETAMINOPHEN 5; 325 MG/1; MG/1
1 TABLET ORAL EVERY 6 HOURS PRN
Qty: 10 TABLET | Refills: 0 | Status: SHIPPED | OUTPATIENT
Start: 2018-08-27 | End: 2019-02-05 | Stop reason: ALTCHOICE

## 2018-08-27 RX ORDER — HYDROCODONE BITARTRATE AND ACETAMINOPHEN 5; 325 MG/1; MG/1
1 TABLET ORAL EVERY 6 HOURS PRN
Qty: 10 TABLET | Refills: 0 | Status: SHIPPED | OUTPATIENT
Start: 2018-08-27 | End: 2018-08-27 | Stop reason: SDUPTHER

## 2018-08-27 NOTE — TELEPHONE ENCOUNTER
Dr Jerrica Talley patient - Arthroplasty R Ring Finger    Patient calling that she is still having pain and is requesting refill on pain medication  She is checking with cardiologist if she would be okay to do a short course of anti-inflammatory if you would allow  I advised she can also take the tylenol 1000mg TID, and ice 20 min on 20 min off  She has postop with Jolie on 8/29

## 2018-08-27 NOTE — TELEPHONE ENCOUNTER
Jolie can you re-send the [place] to [facility]  The RX was sent to mail order pharmacy  Thanks

## 2018-08-27 NOTE — TELEPHONE ENCOUNTER
Per Dr Shelbi Medrano Cardiology, patient is okay to take a short course of aleve OTC BID for pain and inflammation while on her eliquis  Are you okay with her taking the aleve?

## 2018-08-27 NOTE — TELEPHONE ENCOUNTER
Patient called, questioning if of to take NSAIDs with Eliquis post surgery, arthroplasty of finger by Dr Jake Ayon  She originally was given Vicodin and Tylenol Arthritis which she tolerated but now wanted to give her Aleve OTC b i d  No h/o GI bleeds  Is on Eliquis  Per Buzz Monday, ANGELIC, ok to use for short-term therapy  I did relay this to [facility] at Dr Derek Wilson office and she will notify the patient  She will be seeing Dr Jake Ayon at end of the week as well

## 2018-08-27 NOTE — TELEPHONE ENCOUNTER
Only if she needs it   As long her her cardio is OK, I'm OK with it, but would minimize use to be safe

## 2018-08-27 NOTE — TELEPHONE ENCOUNTER
Patient made aware recommendations below and that RX refilled called to pharmacy  Patient states that she will stick with the Norco and tylenol keeping tylenol to 3000mg max daily dosage  Follow up on 8/29

## 2018-08-27 NOTE — PROGRESS NOTES
Assessment:   S/P Right ring finger PIP arthoplasty 8/16/18    Plan:   Finger splint made today for patient to wear until 4 weeks after surgery  We will then have her proceed with therapy to work on ROM and wear the splint at nighttime only    Follow Up:  8  week(s)        CHIEF COMPLAINT:  No chief complaint on file  SUBJECTIVE:  Josephine Wiseman is a 69y o  year old female who presents for follow up S/P Right ring finger PIP arthoplasty 8/16/18  Patient with significant swelling of the finger and still with some discomfort  No tingling or numbness in this finger  PHYSICAL EXAMINATION:  General: well developed and well nourished, alert, oriented times 3 and appears comfortable  Psychiatric: Normal    MUSCULOSKELETAL EXAMINATION:  Incision: Clean, dry, intact  Patient does have moderate edema along the area of her incision  Range of Motion:  This was not directly tested, but patient noted to be able to flex and extend the finger while talking  Neurovascular status: Neuro intact, good cap refill  Done today: Sutures out      STUDIES REVIEWED:  Images were reviewd in PACS: Xrays taken today show well-aligned PIP joint arthroplasty, no evidence of failure      PROCEDURES PERFORMED:  Procedures  No Procedures performed today

## 2018-08-27 NOTE — TELEPHONE ENCOUNTER
I did send in an rx for a few more days worth of pills  If she takes the hydrocodone/acetaminophen, she will have to cut her tylenol back down to 650 3x/day though

## 2018-08-29 ENCOUNTER — OFFICE VISIT (OUTPATIENT)
Dept: OCCUPATIONAL THERAPY | Facility: HOSPITAL | Age: 78
End: 2018-08-29
Payer: MEDICARE

## 2018-08-29 ENCOUNTER — OFFICE VISIT (OUTPATIENT)
Dept: OBGYN CLINIC | Facility: HOSPITAL | Age: 78
End: 2018-08-29

## 2018-08-29 ENCOUNTER — HOSPITAL ENCOUNTER (OUTPATIENT)
Dept: RADIOLOGY | Facility: HOSPITAL | Age: 78
Discharge: HOME | End: 2018-08-29
Payer: MEDICARE

## 2018-08-29 VITALS
BODY MASS INDEX: 28.32 KG/M2 | WEIGHT: 170 LBS | SYSTOLIC BLOOD PRESSURE: 122 MMHG | HEART RATE: 78 BPM | HEIGHT: 65 IN | DIASTOLIC BLOOD PRESSURE: 80 MMHG

## 2018-08-29 DIAGNOSIS — Z96.691 AFTERCARE FOLLOWING RIGHT FINGER JOINT REPLACEMENT SURGERY: Primary | ICD-10-CM

## 2018-08-29 DIAGNOSIS — Z48.89 AFTERCARE FOLLOWING SURGERY: ICD-10-CM

## 2018-08-29 DIAGNOSIS — Z48.89 AFTERCARE FOLLOWING SURGERY: Primary | ICD-10-CM

## 2018-08-29 DIAGNOSIS — Z47.1 AFTERCARE FOLLOWING RIGHT FINGER JOINT REPLACEMENT SURGERY: Primary | ICD-10-CM

## 2018-08-29 PROCEDURE — G8990 OTHER PT/OT CURRENT STATUS: HCPCS | Performed by: OCCUPATIONAL THERAPIST

## 2018-08-29 PROCEDURE — G8992 OTHER PT/OT  D/C STATUS: HCPCS | Performed by: OCCUPATIONAL THERAPIST

## 2018-08-29 PROCEDURE — G8991 OTHER PT/OT GOAL STATUS: HCPCS | Performed by: OCCUPATIONAL THERAPIST

## 2018-08-29 PROCEDURE — 99024 POSTOP FOLLOW-UP VISIT: CPT | Performed by: PHYSICIAN ASSISTANT

## 2018-08-29 PROCEDURE — L3933 FO W/O JOINTS CF: HCPCS | Performed by: OCCUPATIONAL THERAPIST

## 2018-08-29 PROCEDURE — 73140 X-RAY EXAM OF FINGER(S): CPT

## 2018-08-29 NOTE — PROGRESS NOTES
Splint     Diagnosis:   1  Aftercare following right finger joint replacement surgery       Indication: Arthroplasty     Location: Right Ring Finger  Supplies: Custom Fit Orthotic  Splint type: Digit Gutter  Wearing Schedule: Remove for hygiene only  Describe Position: PIP blocking with lateral support, resting position (~15 degrees flexion)    Precautions: Arthroplasty    Patient or Caregiver expresses understanding of wearing Schedule and Precautions? Yes  Patient or Caregiver able to don/doff orthotic independently? Yes    Written orders provided to patient?  No  Orders Obtained: Verbal  Orders Obtained from: Ezequiel Rice    Return for evaluation and treatment Yes

## 2018-08-30 ENCOUNTER — TELEPHONE (OUTPATIENT)
Dept: CARDIOLOGY CLINIC | Facility: CLINIC | Age: 78
End: 2018-08-30

## 2018-08-30 NOTE — TELEPHONE ENCOUNTER
Pt needs extraction & dentist is requesting a 3 day hold on the Eliquis  Pt has hx of PAF  Please advise, thanks

## 2018-08-31 ENCOUNTER — REMOTE DEVICE CLINIC VISIT (OUTPATIENT)
Dept: CARDIOLOGY CLINIC | Facility: CLINIC | Age: 78
End: 2018-08-31
Payer: MEDICARE

## 2018-08-31 DIAGNOSIS — I48.0 PAROXYSMAL ATRIAL FIBRILLATION (HCC): Primary | ICD-10-CM

## 2018-08-31 DIAGNOSIS — Z95.0 CARDIAC PACEMAKER IN SITU: ICD-10-CM

## 2018-08-31 DIAGNOSIS — R00.1 BRADYCARDIA: ICD-10-CM

## 2018-08-31 DIAGNOSIS — I49.5 SICK SINUS SYNDROME (HCC): ICD-10-CM

## 2018-08-31 PROCEDURE — 93294 REM INTERROG EVL PM/LDLS PM: CPT | Performed by: INTERNAL MEDICINE

## 2018-08-31 PROCEDURE — 93296 REM INTERROG EVL PM/IDS: CPT | Performed by: INTERNAL MEDICINE

## 2018-08-31 NOTE — PROGRESS NOTES
Results for orders placed or performed in visit on 08/31/18   Cardiac EP device report    Narrative    MDT DUAL PM  CARELINK TRANSMISSION: BATTERY VOLTAGE ADEQUATE (6 5 YRS)  AP-90%, -1%  ALL AVAILABLE LEAD PARAMETERS WITHIN NORMAL LIMITS  DEVICE CLASSIFIED 2 NSVT EPISODES- PAT ON EGM'S  9 FAST A&V EPISODES, 8 ON SAME DAY, MAX DURATION 2 6 MINS W/ MAX HR-182 BPM- AF W/ RVR ON EGM'S  196 AF/AFL EPISODES LONGEST 47 MIN  AF BURDEN-2 2%  HX: PAF & ON ELIQUIS & ATENOLOL  NORMAL DEVICE FUNCTION   GV

## 2018-09-10 ENCOUNTER — TELEPHONE (OUTPATIENT)
Dept: ENDOCRINOLOGY | Facility: HOSPITAL | Age: 78
End: 2018-09-10

## 2018-09-10 NOTE — TELEPHONE ENCOUNTER
Pt not sure which are the correct dosages for Jardiance & Simvastatin  I told her it looked like Verner Brow was increased to 25 mg at her last visit  She still has a lot of 10 mg pills left  If 25 mg is the right dose, can she take two & a half of these pills to use them up? She has Rx for Simvastatin 40 mg, but thinks she should only be taking 20 mg  Wasn't sure if you ordered 40 mg tablets and told her to take half a pill a day

## 2018-09-11 NOTE — TELEPHONE ENCOUNTER
Yes, she can utilize the jardiance 10 mg-2 tabs daily until they are used up  For her Simvastatin, I have her documented as taking 20 mg daily in her chart from her last visit  I did not change her dose

## 2018-09-17 ENCOUNTER — EVALUATION (OUTPATIENT)
Dept: OCCUPATIONAL THERAPY | Facility: CLINIC | Age: 78
End: 2018-09-17
Payer: MEDICARE

## 2018-09-17 DIAGNOSIS — Z47.1 AFTERCARE FOLLOWING RIGHT FINGER JOINT REPLACEMENT SURGERY: Primary | ICD-10-CM

## 2018-09-17 DIAGNOSIS — Z96.691 AFTERCARE FOLLOWING RIGHT FINGER JOINT REPLACEMENT SURGERY: Primary | ICD-10-CM

## 2018-09-17 PROCEDURE — 97165 OT EVAL LOW COMPLEX 30 MIN: CPT | Performed by: OCCUPATIONAL THERAPIST

## 2018-09-17 PROCEDURE — G8991 OTHER PT/OT GOAL STATUS: HCPCS | Performed by: OCCUPATIONAL THERAPIST

## 2018-09-17 PROCEDURE — 97110 THERAPEUTIC EXERCISES: CPT | Performed by: OCCUPATIONAL THERAPIST

## 2018-09-17 PROCEDURE — G8990 OTHER PT/OT CURRENT STATUS: HCPCS | Performed by: OCCUPATIONAL THERAPIST

## 2018-09-17 NOTE — PROGRESS NOTES
OT Evaluation     Today's date: 2018  Patient name: Shikha Peraza  : 1940  MRN: [id number]  Referring provider: Morelia Solano MD  Dx:   Encounter Diagnosis     ICD-10-CM    1  Aftercare following right finger joint replacement surgery Z47 1     Z96 691                   Assessment  Impairments: abnormal or restricted ROM, impaired physical strength and pain with function  Functional limitations: no use R for  , lift , carry   Assessment details: Caridad Zurita presents S/P R ring PIP arthroplasty with residual pain , decreased ROM and edema   She is limited with use of R for ADL - meal prep , housekeeping   She is unable to  and lift with R hand   She gets assistance from her  for bottles , jars   Function is limited by a Aia 16 OA as well  Goals  STG - 3 wks  1- I with HEP   2- improve ROM 25%    LTG - 6 wks  1- no pain with ROM  2- I with ADL- meal prep , housekeeping      Plan  Planned modality interventions: fluidotherapy, thermotherapy: hydrocollator packs and cryotherapy  Planned therapy interventions: ADL retraining, manual therapy, massage, orthotic fitting/training, therapeutic exercise, stretching, strengthening, home exercise program, functional ROM exercises and graded exercise  Frequency: 2x week  Duration in weeks: 4  Plan of Care beginning date: 2018  Plan of Care expiration date: 10/17/2018  Plan details: MH/fluido  Edema management   Scar management   ROM  Functional exercises        Subjective Evaluation    History of Present Illness  Date of surgery: 2018  Mechanism of injury: surgery  Mechanism of injury: R ring PIP arthroplasty     Quality of life: good    Pain  Current pain ratin  At best pain ratin  At worst pain ratin  Quality: throbbing  Relieving factors: ice  Progression: improved    Social Support  Steps to enter house: no  Stairs in house: yes   [facility] in: [facility]  Lives with: spouse    Patient Goals  Patient goals for therapy: increased motion, decreased pain, decreased edema, increased strength and independence with ADLs/IADLs          Objective     Observations     Additional Observation Details  Scar 4cm  R dorsal ring PIP- mildly tender    Active Range of Motion     Right Digits   Flexion   Ring     MCP: 95    PIP: 60    DIP: 35  Extension   Ring     MCP: -10    PIP: 30    DIP: 0    Passive Range of Motion     Right Digits   Flexion   Ring     MCP: 90    PIP: 64    DIP: 50  Extension   Ring     MCP: -30    PIP: 0    DIP: 0    Additional Passive Range of Motion Details  Blocked PIP  65  ; DIP 60    Strength/Myotome Testing     Left Wrist/Hand      (2nd hand position)     Trial 1: 46    Additional Strength Details  Scar 4cm  R dorsal ring PIP- mildly tender  R not tested     Swelling     Left Wrist/Hand   Ring     Middle: 6 3 cm    Right Wrist/Hand   Ring     Middle: 7 cm      Flowsheet Rows      Most Recent Value   PT/OT G-Codes   Current Score  37   Projected Score  75   FOTO information reviewed  Yes   Assessment Type  Evaluation   G code set  Other PT/OT Primary   Other PT Primary Current Status ()  CJ   Other PT Primary Goal Status ()  CK          Precautions:  PIP arthroplasty     Daily Treatment Diary     Manual  9/17            retrograde 3m            Scar STM 4m            Grade 1 MOB 3m                                          Exercise Diary  9/17            AROM digits 10x            Blocking PIP/DIP 10x            Pegs                                                                                                                                                                                                                              HEP ROM   5x day                Modalities  9/17            MH pre 10x

## 2018-09-24 ENCOUNTER — OFFICE VISIT (OUTPATIENT)
Dept: OCCUPATIONAL THERAPY | Facility: CLINIC | Age: 78
End: 2018-09-24
Payer: MEDICARE

## 2018-09-24 DIAGNOSIS — Z47.1 AFTERCARE FOLLOWING RIGHT FINGER JOINT REPLACEMENT SURGERY: Primary | ICD-10-CM

## 2018-09-24 DIAGNOSIS — Z96.691 AFTERCARE FOLLOWING RIGHT FINGER JOINT REPLACEMENT SURGERY: Primary | ICD-10-CM

## 2018-09-24 PROCEDURE — 97140 MANUAL THERAPY 1/> REGIONS: CPT | Performed by: OCCUPATIONAL THERAPIST

## 2018-09-24 PROCEDURE — 97110 THERAPEUTIC EXERCISES: CPT | Performed by: OCCUPATIONAL THERAPIST

## 2018-09-24 NOTE — PROGRESS NOTES
Daily Note     Today's date: 2018  Patient name: Josephine Wiseman  : 1940  MRN: [id number]  Referring provider: Martin Aceves MD  Dx:   Encounter Diagnosis     ICD-10-CM    1  Aftercare following right finger joint replacement surgery Z47 1     Z96 691                   Subjective: I have some pain      Objective: See treatment diary below      Manual             retrograde 3m 3m           Scar STM 4m 4m           Grade 1 MOB 3m 3m                                         Exercise Diary             AROM digits 10x 10x           Blocking PIP/DIP 10x 10x           Pegs   30x           Wrist e/f   Dowel  3x10           Hook fist  6x6                                                                                                                                                                                                 HEP ROM   5x day                Modalities             MH pre 10x 10x                                             Assessment: Tolerated treatment well  Patient has improve ROM PIP  PIP -32-68      Plan: Continue per plan of care

## 2018-09-26 ENCOUNTER — TELEPHONE (OUTPATIENT)
Dept: ENDOCRINOLOGY | Facility: HOSPITAL | Age: 78
End: 2018-09-26

## 2018-09-26 ENCOUNTER — OFFICE VISIT (OUTPATIENT)
Dept: OCCUPATIONAL THERAPY | Facility: CLINIC | Age: 78
End: 2018-09-26
Payer: MEDICARE

## 2018-09-26 DIAGNOSIS — Z47.1 AFTERCARE FOLLOWING RIGHT FINGER JOINT REPLACEMENT SURGERY: Primary | ICD-10-CM

## 2018-09-26 DIAGNOSIS — Z96.691 AFTERCARE FOLLOWING RIGHT FINGER JOINT REPLACEMENT SURGERY: Primary | ICD-10-CM

## 2018-09-26 PROCEDURE — 97110 THERAPEUTIC EXERCISES: CPT | Performed by: OCCUPATIONAL THERAPIST

## 2018-09-26 PROCEDURE — 97140 MANUAL THERAPY 1/> REGIONS: CPT | Performed by: OCCUPATIONAL THERAPIST

## 2018-09-26 NOTE — PROGRESS NOTES
Daily Note     Today's date: 2018  Patient name: Jamia Condon  : 1940  MRN: [id number]  Referring provider: Alf Rehman MD  Dx:   Encounter Diagnosis     ICD-10-CM    1  Aftercare following right finger joint replacement surgery Z47 1     Z96 691                   Subjective: I am doing better      Objective: See treatment diary below      Manual            retrograde 3m 3m 3m          Scar STM 4m 4m 4m          Grade 1 MOB 3m 3m 3m                                        Exercise Diary            AROM digits 10x 10x 10x          Blocking PIP/DIP 10x 10x 10x          Pegs   30x 10x          Wrist e/f   Dowel  3x10 Dowel  3x10          Hook fist  6x6 6x6                                                                                                                                                                                                HEP ROM   5x day                Modalities           MH pre 10x 10x 10x 10                                         Assessment: Tolerated treatment well  Patient has improved ROM for flex      PIP 35-70    Plan:  Cont POC

## 2018-09-26 NOTE — TELEPHONE ENCOUNTER
Reviewed blood sugars  1st week is well controlled however, 2nd week as higher blood sugars before dinner and consistently before bedtime  I suspect that these are related to diet  For now, continue current regimen

## 2018-10-01 ENCOUNTER — OFFICE VISIT (OUTPATIENT)
Dept: OCCUPATIONAL THERAPY | Facility: CLINIC | Age: 78
End: 2018-10-01
Payer: MEDICARE

## 2018-10-01 DIAGNOSIS — Z96.691 AFTERCARE FOLLOWING RIGHT FINGER JOINT REPLACEMENT SURGERY: Primary | ICD-10-CM

## 2018-10-01 DIAGNOSIS — Z47.1 AFTERCARE FOLLOWING RIGHT FINGER JOINT REPLACEMENT SURGERY: Primary | ICD-10-CM

## 2018-10-01 PROCEDURE — 97110 THERAPEUTIC EXERCISES: CPT | Performed by: OCCUPATIONAL THERAPIST

## 2018-10-01 PROCEDURE — 97140 MANUAL THERAPY 1/> REGIONS: CPT | Performed by: OCCUPATIONAL THERAPIST

## 2018-10-01 PROCEDURE — 97018 PARAFFIN BATH THERAPY: CPT | Performed by: OCCUPATIONAL THERAPIST

## 2018-10-01 NOTE — PROGRESS NOTES
Daily Note     Today's date: 10/1/2018  Patient name: Kylah Shah  : 1940  MRN: [id number]  Referring provider: Yosef Gil MD  Dx:   Encounter Diagnosis     ICD-10-CM    1  Aftercare following right finger joint replacement surgery Z47 1     Z96 691                   Subjective: I was trimming suzi bushes yesterday , the finger looks crooked  Objective: See treatment diary below        Manual   10         retrograde 3m 3m 3m 3m         Scar STM 4m 4m 4m 4m         Grade 1 MOB 3m 3m 3m 3m                                       Exercise Diary   10         AROM digits 10x 10x 10x 10x         Blocking PIP/DIP 10x 10x 10x 10x         Pegs   30x 10x 10x         Wrist e/f   Dowel  3x10 Dowel  3x10 2#  3x10         Hook fist  6x6 6x6 6x6         powerweb     Yellow  3x10         flexbar p/s    Yellow  3x10                                                                                                                                                                     HEP ROM   5x day                Modalities   10        MH pre 10x 10x 10x 10         Pdip w/flex     10m                           Assessment: Tolerated treatment well  Patient has improved ROM   Joint alignment looks good but looks off from edema   Plan: Continue per plan of care

## 2018-10-03 ENCOUNTER — APPOINTMENT (OUTPATIENT)
Dept: OCCUPATIONAL THERAPY | Facility: CLINIC | Age: 78
End: 2018-10-03
Payer: MEDICARE

## 2018-10-03 DIAGNOSIS — I48.91 ATRIAL FIBRILLATION, UNSPECIFIED TYPE (HCC): ICD-10-CM

## 2018-10-04 RX ORDER — APIXABAN 5 MG/1
TABLET, FILM COATED ORAL
Qty: 180 TABLET | Refills: 3 | Status: SHIPPED | OUTPATIENT
Start: 2018-10-04 | End: 2019-07-17 | Stop reason: SDUPTHER

## 2018-10-05 ENCOUNTER — OFFICE VISIT (OUTPATIENT)
Dept: OCCUPATIONAL THERAPY | Facility: CLINIC | Age: 78
End: 2018-10-05
Payer: MEDICARE

## 2018-10-05 DIAGNOSIS — Z96.691 AFTERCARE FOLLOWING RIGHT FINGER JOINT REPLACEMENT SURGERY: Primary | ICD-10-CM

## 2018-10-05 DIAGNOSIS — Z47.1 AFTERCARE FOLLOWING RIGHT FINGER JOINT REPLACEMENT SURGERY: Primary | ICD-10-CM

## 2018-10-05 PROCEDURE — 97110 THERAPEUTIC EXERCISES: CPT | Performed by: OCCUPATIONAL THERAPIST

## 2018-10-05 PROCEDURE — 97140 MANUAL THERAPY 1/> REGIONS: CPT | Performed by: OCCUPATIONAL THERAPIST

## 2018-10-05 PROCEDURE — 97018 PARAFFIN BATH THERAPY: CPT | Performed by: OCCUPATIONAL THERAPIST

## 2018-10-05 NOTE — PROGRESS NOTES
Daily Note     Today's date: 10/5/2018  Patient name: Caroline Murphy  : 1940  MRN: [id number]  Referring provider: Francine Jimenez MD  Dx:   Encounter Diagnosis     ICD-10-CM    1  Aftercare following right finger joint replacement surgery Z47 1     Z96 691                   Subjective: My finger doesn't want to go straight  Objective: See treatment diary below        Manual  9/17 9/24 9/26 10/1 10/5        retrograde 3m 3m 3m 3m 3min        Scar STM 4m 4m 4m 4m 4min        Grade 1 MOB 3m 3m 3m 3m 3min                                      Exercise Diary  9/17 9/24 9/26 10/1 105        AROM digits 10x 10x 10x 10x 10x        Blocking PIP/DIP 10x 10x 10x 10x 10x        Pegs   30x 10x 10x grooved pegs        Wrist e/f   Dowel  3x10 Dowel  3x10 2#  3x10 #2 3x10        Hook fist  6x6 6x6 6x6 skinny pegs        powerweb     Yellow  3x10 yellow 3x10        flexbar p/s    Yellow  3x10 yellow 3x10                                                                                                                                                                    HEP ROM   5x day                Modalities  9/17 9/24 9/26 10/1 10/5        MH pre 10x 10x 10x 10         Pdip w/flex     10m                           Assessment: Tolerated treatment well  Patient has improved ROM   Pt  Will be on vacation and will return to therapy in 1 week  Plan: Continue per plan of care

## 2018-10-15 ENCOUNTER — OFFICE VISIT (OUTPATIENT)
Dept: OCCUPATIONAL THERAPY | Facility: CLINIC | Age: 78
End: 2018-10-15
Payer: MEDICARE

## 2018-10-15 DIAGNOSIS — Z47.1 AFTERCARE FOLLOWING RIGHT FINGER JOINT REPLACEMENT SURGERY: Primary | ICD-10-CM

## 2018-10-15 DIAGNOSIS — Z96.691 AFTERCARE FOLLOWING RIGHT FINGER JOINT REPLACEMENT SURGERY: Primary | ICD-10-CM

## 2018-10-15 PROCEDURE — 97022 WHIRLPOOL THERAPY: CPT | Performed by: OCCUPATIONAL THERAPIST

## 2018-10-15 PROCEDURE — 97140 MANUAL THERAPY 1/> REGIONS: CPT | Performed by: OCCUPATIONAL THERAPIST

## 2018-10-15 PROCEDURE — 97110 THERAPEUTIC EXERCISES: CPT | Performed by: OCCUPATIONAL THERAPIST

## 2018-10-15 NOTE — PROGRESS NOTES
Daily Note     Today's date: 10/15/2018  Patient name: Cecil Evans  : 1940  MRN: [id number]  Referring provider: Coral Ruiz MD  Dx:   Encounter Diagnosis     ICD-10-CM    1  Aftercare following right finger joint replacement surgery Z47 1     Z96 691                   Subjective: I was away       Objective: See treatment diary below        Manual  9/17 9/24 9/26 10/1 10/5 10/15       retrograde 3m 3m 3m 3m 3min 3m       Scar STM 4m 4m 4m 4m 4min 4m       Grade 1 MOB 3m 3m 3m 3m 3min 3m                                     Exercise Diary  9/17 9/24 9/26 10/1 10/5 10/15       AROM digits 10x 10x 10x 10x 10x 10x       Blocking PIP/DIP 10x 10x 10x 10x 10x 10x       Pegs   30x 10x 10x grooved pegs 20x       Wrist e/f   Dowel  3x10 Dowel  3x10 2#  3x10 #2 3x10 2#  3x10       Hook fist  6x6 6x6 6x6 skinny pegs Skinny  pegs       powerweb     Yellow  3x10 yellow 3x10 Yellow  3x10       flexbar p/s    Yellow  3x10 yellow 3x10 Yellow  3x10                                                                                                                                                                   HEP ROM   5x day                Modalities  9/17 9/24 9/26 10/1 10/5 10/15       MH pre 10x 10x 10x 10  10m       Pdip w/flex     10m                               Assessment: Tolerated treatment well  Patient has improved ROM   PIP --      Plan: Continue per plan of care

## 2018-10-17 ENCOUNTER — OFFICE VISIT (OUTPATIENT)
Dept: OCCUPATIONAL THERAPY | Facility: CLINIC | Age: 78
End: 2018-10-17
Payer: MEDICARE

## 2018-10-17 DIAGNOSIS — Z47.1 AFTERCARE FOLLOWING RIGHT FINGER JOINT REPLACEMENT SURGERY: Primary | ICD-10-CM

## 2018-10-17 DIAGNOSIS — Z96.691 AFTERCARE FOLLOWING RIGHT FINGER JOINT REPLACEMENT SURGERY: Primary | ICD-10-CM

## 2018-10-17 PROCEDURE — 97140 MANUAL THERAPY 1/> REGIONS: CPT | Performed by: OCCUPATIONAL THERAPIST

## 2018-10-17 PROCEDURE — 97022 WHIRLPOOL THERAPY: CPT | Performed by: OCCUPATIONAL THERAPIST

## 2018-10-17 PROCEDURE — 97110 THERAPEUTIC EXERCISES: CPT | Performed by: OCCUPATIONAL THERAPIST

## 2018-10-22 ENCOUNTER — OFFICE VISIT (OUTPATIENT)
Dept: OCCUPATIONAL THERAPY | Facility: CLINIC | Age: 78
End: 2018-10-22
Payer: MEDICARE

## 2018-10-22 DIAGNOSIS — Z47.1 AFTERCARE FOLLOWING RIGHT FINGER JOINT REPLACEMENT SURGERY: Primary | ICD-10-CM

## 2018-10-22 DIAGNOSIS — Z96.691 AFTERCARE FOLLOWING RIGHT FINGER JOINT REPLACEMENT SURGERY: Primary | ICD-10-CM

## 2018-10-22 PROCEDURE — G8991 OTHER PT/OT GOAL STATUS: HCPCS | Performed by: OCCUPATIONAL THERAPIST

## 2018-10-22 PROCEDURE — 97140 MANUAL THERAPY 1/> REGIONS: CPT | Performed by: OCCUPATIONAL THERAPIST

## 2018-10-22 PROCEDURE — G8990 OTHER PT/OT CURRENT STATUS: HCPCS | Performed by: OCCUPATIONAL THERAPIST

## 2018-10-22 PROCEDURE — 97110 THERAPEUTIC EXERCISES: CPT | Performed by: OCCUPATIONAL THERAPIST

## 2018-10-22 NOTE — PROGRESS NOTES
Daily Note     Today's date: 10/22/2018  Patient name: Margarita Shirley  : 1940  MRN: [id number]  Referring provider: Gurdeep Vysa MD  Dx:   Encounter Diagnosis     ICD-10-CM    1  Aftercare following right finger joint replacement surgery Z47 1     Z96 691                   Subjective: I am doing  Better       Objective: See treatment diary below        Manual  9/17 9/24 9/26 10/1 10/5 10/15 10/17 10/22     retrograde 3m 3m 3m 3m 3min 3m 3m 3m     Scar STM 4m 4m 4m 4m 4min 4m 4m 4m     Grade 1 MOB 3m 3m 3m 3m 3min 3m 3m 3m                                   Exercise Diary  9/17 9/24 9/26 10/1 10/5 10/15 10/17 10/22     AROM digits 10x 10x 10x 10x 10x 10x 10x 10x     Blocking PIP/DIP 10x 10x 10x 10x 10x 10x 10x 10x     Pegs   30x 10x 10x grooved pegs 20x       Wrist e/f   Dowel  3x10 Dowel  3x10 2#  3x10 #2 3x10 2#  3x10 #3 3x10 3#  3x10     Hook fist  6x6 6x6 6x6 skinny pegs Skinny  pegs skinny pegs skinny pegs     powerweb     Yellow  3x10 yellow 3x10 Yellow  3x10 Red 3x10 Green  3x10     flexbar p/s    Yellow  3x10 yellow 3x10 Yellow  3x10 red 3x10 Red  3x10                                                                                                                                                                 HEP ROM   5x day                Modalities  9/17 9/24 9/26 10/1 10/5 10/15 10/17 10/22     MH pre 10x 10x 10x 10  10m       Pdip w/flex     10m        Fluido       10min 10m               Assessment: Tolerated treatment well  Patient has improved ROM   Plan: Continue per plan of care    REEVAL

## 2018-10-23 ENCOUNTER — IMMUNIZATION (OUTPATIENT)
Dept: FAMILY MEDICINE CLINIC | Facility: CLINIC | Age: 78
End: 2018-10-23
Payer: MEDICARE

## 2018-10-23 DIAGNOSIS — Z23 ENCOUNTER FOR IMMUNIZATION: ICD-10-CM

## 2018-10-23 PROCEDURE — G0008 ADMIN INFLUENZA VIRUS VAC: HCPCS

## 2018-10-23 PROCEDURE — 90662 IIV NO PRSV INCREASED AG IM: CPT

## 2018-10-25 ENCOUNTER — OFFICE VISIT (OUTPATIENT)
Dept: OCCUPATIONAL THERAPY | Facility: CLINIC | Age: 78
End: 2018-10-25
Payer: MEDICARE

## 2018-10-25 DIAGNOSIS — Z96.691 AFTERCARE FOLLOWING RIGHT FINGER JOINT REPLACEMENT SURGERY: Primary | ICD-10-CM

## 2018-10-25 DIAGNOSIS — Z47.1 AFTERCARE FOLLOWING RIGHT FINGER JOINT REPLACEMENT SURGERY: Primary | ICD-10-CM

## 2018-10-25 PROCEDURE — G8991 OTHER PT/OT GOAL STATUS: HCPCS | Performed by: OCCUPATIONAL THERAPIST

## 2018-10-25 PROCEDURE — 97140 MANUAL THERAPY 1/> REGIONS: CPT | Performed by: OCCUPATIONAL THERAPIST

## 2018-10-25 PROCEDURE — G8990 OTHER PT/OT CURRENT STATUS: HCPCS | Performed by: OCCUPATIONAL THERAPIST

## 2018-10-25 PROCEDURE — 97110 THERAPEUTIC EXERCISES: CPT | Performed by: OCCUPATIONAL THERAPIST

## 2018-10-25 NOTE — PROGRESS NOTES
Daily Note /REEVAL    Today's date: 10/25/2018  Patient name: Trey Huggins  : 1940  MRN: [id number]  Referring provider: Alonso Eller MD  Dx:   Encounter Diagnosis     ICD-10-CM    1  Aftercare following right finger joint replacement surgery Z47 1     Z96 691                   Subjective: I am stiff       Objective: See treatment diary below    Assessment  Impairments: abnormal or restricted ROM, impaired physical strength and pain with function  Functional limitations: no use R for  , lift , carry   Assessment details: Kelli Jackson presents S/P R ring PIP arthroplasty with mild l pain , decreased ROM and edema   She is mildly limited with use of R for ADL   Chava [place] She has improved   and lift with R hand   She gets assistance from her  for bottles , jars   Function is limited by a [facility] OA as well  Goals  STG - 3 wks  1- I with HEP   2- improve ROM 25%    LTG - 6 wks  1- no pain with ROM- partially met   2- I with ADL- meal prep , housekeeping-met      Plan  Planned modality interventions: fluidotherapy, thermotherapy: hydrocollator packs and cryotherapy  Planned therapy interventions: ADL retraining, manual therapy, massage, orthotic fitting/training, therapeutic exercise, stretching, strengthening, home exercise program, functional ROM exercises and graded exercise  Frequency: 2x week  Duration in weeks: 4  Plan of Care beginning date: 10/25/18  Plan of Care expiration date:   Plan details: MH/fluido  Edema management   Scar management   ROM  Functional exercises        Subjective Evaluation    History of Present Illness  Date of surgery: 2018  Mechanism of injury: surgery  Mechanism of injury: R ring PIP arthroplasty     Quality of life: good    Pain  Current pain ratin  At best pain ratin  At worst pain ratin  Quality: throbbing  Relieving factors: ice  Progression: improved    Social Support  Steps to enter house: no  Stairs in house: yes   Lives in: [place] house  Lives with: spouse    Patient Goals  Patient goals for therapy: increased motion, decreased pain, decreased edema, increased strength and independence with ADLs/IADLs            Observations     Additional Observation Details  Scar 4cm  R dorsal ring PIP- mildly tender    Active Range of Motion     Right Digits   Flexion   Ring     MCP: 95    PIP: 65    DIP: 63  Extension   Ring     MCP:0    PIP: 23    DIP: 0    Passive Range of Motion     Right Digits   Flexion   Ring     MCP: 90    PIP: 80    DIP: 65  Extension   Ring     MCP: -30    PIP: 0    DIP: 0    Additional Passive Range of Motion Details  Blocked PIP 70 ; DIP 65    Strength/Myotome Testing          (2nd hand position)    R/L:  41/46    Additional Strength Details  Scar 4cm  R dorsal ring PIP- mildly tender  R not tested     Swelling     Left Wrist/Hand   Ring     Middle: 6 3 cm    Right Wrist/Hand   Ring     Middle: 7 cm          Manual  9/17 9/24 9/26 10/1 10/5 10/15 10/17 10/22 10/25    retrograde 3m 3m 3m 3m 3min 3m 3m 3m 3m    Scar STM 4m 4m 4m 4m 4min 4m 4m 4m 4m    Grade 1 MOB 3m 3m 3m 3m 3min 3m 3m 3m 3m                                  Exercise Diary  9/17 9/24 9/26 10/1 10/5 10/15 10/17 10/22 10/25    AROM digits 10x 10x 10x 10x 10x 10x 10x 10x 10x    Blocking PIP/DIP 10x 10x 10x 10x 10x 10x 10x 10x 10x    Pegs   30x 10x 10x grooved pegs 20x   20x    Wrist e/f   Dowel  3x10 Dowel  3x10 2#  3x10 #2 3x10 2#  3x10 #3 3x10 3#  3x10 3#      Hook fist  6x6 6x6 6x6 skinny pegs Skinny  pegs skinny pegs skinny pegs     powerweb     Yellow  3x10 yellow 3x10 Yellow  3x10 Red 3x10 Green  3x10 Green  3x10    flexbar p/s    Yellow  3x10 yellow 3x10 Yellow  3x10 red 3x10 Red  3x10 Red  3x10    flexbar roll         3x10                                                                                                                                                   HEP ROM   5x day                Modalities  9/17 9/24 9/26 10/1 10/5 10/15 10/17 10/22 10/25    MH pre 10x 10x 10x 10  10m   10m    Pdip w/flex     10m        Fluido       10min 10m             Plan: Continue per plan of care

## 2018-10-29 ENCOUNTER — OFFICE VISIT (OUTPATIENT)
Dept: OBGYN CLINIC | Facility: CLINIC | Age: 78
End: 2018-10-29

## 2018-10-29 VITALS
BODY MASS INDEX: 28.56 KG/M2 | WEIGHT: 171.4 LBS | DIASTOLIC BLOOD PRESSURE: 78 MMHG | HEIGHT: 65 IN | HEART RATE: 83 BPM | SYSTOLIC BLOOD PRESSURE: 125 MMHG

## 2018-10-29 DIAGNOSIS — M19.041 ARTHRITIS OF FINGER OF RIGHT HAND: Primary | ICD-10-CM

## 2018-10-29 PROCEDURE — 99024 POSTOP FOLLOW-UP VISIT: CPT | Performed by: ORTHOPAEDIC SURGERY

## 2018-10-29 NOTE — PROGRESS NOTES
Assessment:   S/P Right ring finger PIP arthoplasty 8/16/18    Plan:   Resume activities as tolerated  We will put a a referral for relative motion splint for finger flexion to wear continuously except for hand hygiene for the next 6 weeks  Follow Up:  6  week(s)    To Do Next Visit:         CHIEF COMPLAINT:  Chief Complaint   Patient presents with    Right Ring Finger - Post-op         SUBJECTIVE:   Aleyda Horvath is a 69y o  year old female who presents for follow up after S/P Right ring finger PIP arthoplasty 8/16/18   Today patient has Stiffness/LROM  PHYSICAL EXAMINATION:  General: well developed and well nourished, alert, oriented times 3 and appears comfortable  Psychiatric: Normal    MUSCULOSKELETAL EXAMINATION:  Incision: healed and swelling present  Range of Motion: DIP flexion 45  PIP flexion 50  MP Flexion 82  PIP extension 35  DIP extension 24   MP extension 0  Neurovascular status: Neuro intact, good cap refill  Activity Restrictions: No restrictions         STUDIES REVIEWED:  No Studies to review      PROCEDURES PERFORMED:  Procedures  No Procedures performed today   Scribe Attestation    I,:   Teodoro Griffin am acting as a scribe while in the presence of the attending physician :        I,:   Sonido Roth MD personally performed the services described in this documentation    as scribed in my presence :

## 2018-10-31 ENCOUNTER — OFFICE VISIT (OUTPATIENT)
Dept: OCCUPATIONAL THERAPY | Facility: CLINIC | Age: 78
End: 2018-10-31
Payer: MEDICARE

## 2018-10-31 DIAGNOSIS — Z47.1 AFTERCARE FOLLOWING RIGHT FINGER JOINT REPLACEMENT SURGERY: Primary | ICD-10-CM

## 2018-10-31 DIAGNOSIS — Z96.691 AFTERCARE FOLLOWING RIGHT FINGER JOINT REPLACEMENT SURGERY: Primary | ICD-10-CM

## 2018-10-31 PROCEDURE — 97140 MANUAL THERAPY 1/> REGIONS: CPT | Performed by: OCCUPATIONAL THERAPIST

## 2018-10-31 PROCEDURE — 97110 THERAPEUTIC EXERCISES: CPT | Performed by: OCCUPATIONAL THERAPIST

## 2018-10-31 PROCEDURE — 97022 WHIRLPOOL THERAPY: CPT | Performed by: OCCUPATIONAL THERAPIST

## 2018-10-31 NOTE — PROGRESS NOTES
Daily Note     Today's date: 10/31/2018  Patient name: Andry Carrillo  : 1940  MRN: [id number]  Referring provider: Juan Cullen MD  Dx:   Encounter Diagnosis     ICD-10-CM    1  Aftercare following right finger joint replacement surgery Z47 1     Z96 691                   Subjective: The splint is too tight                            Manual  9/17 9/24 9/26 10/1 10/5 10/15 10/17 10/22 10/25 10/31   retrograde 3m 3m 3m 3m 3min 3m 3m 3m 3m 3m   Scar STM 4m 4m 4m 4m 4min 4m 4m 4m 4m 4m   Grade 1 MOB 3m 3m 3m 3m 3min 3m 3m 3m 3m 3m                                 Exercise Diary  9/17 9/24 9/26 10/1 10/5 10/15 10/17 10/22 10/25 10/31   AROM digits 10x 10x 10x 10x 10x 10x 10x 10x 10x 10x   Blocking PIP/DIP 10x 10x 10x 10x 10x 10x 10x 10x 10x 10x   Pegs   30x 10x 10x grooved pegs 20x   20x skinny pegs   Wrist e/f   Dowel  3x10 Dowel  3x10 2#  3x10 #2 3x10 2#  3x10 #3 3x10 3#  3x10 3#   #3 3x10   Hook fist  6x6 6x6 6x6 skinny pegs Skinny  pegs skinny pegs skinny pegs     powerweb     Yellow  3x10 yellow 3x10 Yellow  3x10 Red 3x10 Green  3x10 Green  3x10 Green 3x10   flexbar p/s    Yellow  3x10 yellow 3x10 Yellow  3x10 red 3x10 Red  3x10 Red  3x10    flexbar roll         3x10                                                                                                                                                   HEP ROM   5x day                Modalities  9/17 9/24 9/26 10/1 10/5 10/15 10/17 10/22 10/25 10/31   MH pre 10x 10x 10x 10  10m   10m    Pdip w/flex     10m        Fluido       10min 10m  10m           Plan: Continue per plan of care    Pt  Requesting D/C next visit

## 2018-11-01 ENCOUNTER — OFFICE VISIT (OUTPATIENT)
Dept: OCCUPATIONAL THERAPY | Facility: CLINIC | Age: 78
End: 2018-11-01
Payer: MEDICARE

## 2018-11-01 DIAGNOSIS — Z47.1 AFTERCARE FOLLOWING RIGHT FINGER JOINT REPLACEMENT SURGERY: Primary | ICD-10-CM

## 2018-11-01 DIAGNOSIS — Z96.691 AFTERCARE FOLLOWING RIGHT FINGER JOINT REPLACEMENT SURGERY: Primary | ICD-10-CM

## 2018-11-01 PROBLEM — M19.031 CMC DJD(CARPOMETACARPAL DEGENERATIVE JOINT DISEASE), LOCALIZED PRIMARY, RIGHT: Status: ACTIVE | Noted: 2018-11-01

## 2018-11-01 PROCEDURE — 1123F ACP DISCUSS/DSCN MKR DOCD: CPT | Performed by: ORTHOPAEDIC SURGERY

## 2018-11-01 PROCEDURE — G8991 OTHER PT/OT GOAL STATUS: HCPCS | Performed by: OCCUPATIONAL THERAPIST

## 2018-11-01 PROCEDURE — G8990 OTHER PT/OT CURRENT STATUS: HCPCS | Performed by: OCCUPATIONAL THERAPIST

## 2018-11-01 PROCEDURE — 97140 MANUAL THERAPY 1/> REGIONS: CPT | Performed by: OCCUPATIONAL THERAPIST

## 2018-11-01 PROCEDURE — 97110 THERAPEUTIC EXERCISES: CPT | Performed by: OCCUPATIONAL THERAPIST

## 2018-11-01 NOTE — PROGRESS NOTES
Daily Note /discharge  Today's date: 2018  Patient name: Margarita Shirley  : 1940  MRN: [id number]  Referring provider: Gurdeep Vyas MD  Dx:   Encounter Diagnosis     ICD-10-CM    1  Aftercare following right finger joint replacement surgery Z47 1     Z96 691                   Subjective: I am having thumb surgery       Objective: See treatment diary below      Daily Treatment Diary     Manual              retrograde 3m            Scar STM 4m            Grade 1 MOB 3m                                          Exercise Diary              AROM digits 10x            Blocking PIP/DIP 10x            PROM PIP 10x            powerweb   Blue   3x10            Flex   Yellow  3x10            Flex bar p/s Red  3x10            Wrist e/f 3#  3x10                                                                                                                                                                        HEP ROM   5x day                Modalities               pre 10x                                                  Assessment: Tolerated treatment well   Patient has improved flexion - post tx       Plan: discharge

## 2018-11-02 ENCOUNTER — TRANSCRIBE ORDERS (OUTPATIENT)
Dept: ADMINISTRATIVE | Facility: HOSPITAL | Age: 78
End: 2018-11-02

## 2018-11-02 ENCOUNTER — LAB (OUTPATIENT)
Dept: LAB | Facility: CLINIC | Age: 78
End: 2018-11-02
Payer: MEDICARE

## 2018-11-02 DIAGNOSIS — E03.8 HYPOTHYROIDISM DUE TO HASHIMOTO'S THYROIDITIS: ICD-10-CM

## 2018-11-02 DIAGNOSIS — E06.3 HYPOTHYROIDISM DUE TO HASHIMOTO'S THYROIDITIS: ICD-10-CM

## 2018-11-02 DIAGNOSIS — I10 ESSENTIAL HYPERTENSION: ICD-10-CM

## 2018-11-02 DIAGNOSIS — Z79.4 TYPE 2 DIABETES MELLITUS WITHOUT COMPLICATION, WITH LONG-TERM CURRENT USE OF INSULIN (HCC): ICD-10-CM

## 2018-11-02 DIAGNOSIS — E11.9 TYPE 2 DIABETES MELLITUS WITHOUT COMPLICATION, WITH LONG-TERM CURRENT USE OF INSULIN (HCC): ICD-10-CM

## 2018-11-02 LAB
ALBUMIN SERPL BCP-MCNC: 3.7 G/DL (ref 3.5–5)
ALP SERPL-CCNC: 75 U/L (ref 46–116)
ALT SERPL W P-5'-P-CCNC: 25 U/L (ref 12–78)
ANION GAP SERPL CALCULATED.3IONS-SCNC: 6 MMOL/L (ref 4–13)
AST SERPL W P-5'-P-CCNC: 19 U/L (ref 5–45)
BILIRUB SERPL-MCNC: 0.88 MG/DL (ref 0.2–1)
BUN SERPL-MCNC: 15 MG/DL (ref 5–25)
CALCIUM SERPL-MCNC: 9.3 MG/DL (ref 8.3–10.1)
CHLORIDE SERPL-SCNC: 104 MMOL/L (ref 100–108)
CO2 SERPL-SCNC: 28 MMOL/L (ref 21–32)
CREAT SERPL-MCNC: 0.77 MG/DL (ref 0.6–1.3)
EST. AVERAGE GLUCOSE BLD GHB EST-MCNC: 174 MG/DL
GFR SERPL CREATININE-BSD FRML MDRD: 75 ML/MIN/1.73SQ M
GLUCOSE SERPL-MCNC: 200 MG/DL (ref 65–140)
HBA1C MFR BLD: 7.7 % (ref 4.2–6.3)
POTASSIUM SERPL-SCNC: 4.6 MMOL/L (ref 3.5–5.3)
PROT SERPL-MCNC: 7.2 G/DL (ref 6.4–8.2)
SODIUM SERPL-SCNC: 138 MMOL/L (ref 136–145)
T4 FREE SERPL-MCNC: 1.22 NG/DL (ref 0.76–1.46)
TSH SERPL DL<=0.05 MIU/L-ACNC: 2.48 UIU/ML (ref 0.36–3.74)

## 2018-11-02 PROCEDURE — 83036 HEMOGLOBIN GLYCOSYLATED A1C: CPT

## 2018-11-02 PROCEDURE — 84439 ASSAY OF FREE THYROXINE: CPT

## 2018-11-02 PROCEDURE — 80053 COMPREHEN METABOLIC PANEL: CPT

## 2018-11-02 PROCEDURE — 36415 COLL VENOUS BLD VENIPUNCTURE: CPT

## 2018-11-02 PROCEDURE — 84443 ASSAY THYROID STIM HORMONE: CPT

## 2018-11-06 NOTE — PROGRESS NOTES
Please call the patient regarding abnormal result  Hemoglobin A1c has elevated to 7 7  Fasting glucose is elevated on comprehensive metabolic panel  Please send in blood sugars for review so that changes can be made to regimen to aid blood sugars throughout the day

## 2018-11-16 DIAGNOSIS — J98.4 RESTRICTIVE LUNG DISEASE: ICD-10-CM

## 2018-11-28 ENCOUNTER — IN-CLINIC DEVICE VISIT (OUTPATIENT)
Dept: CARDIOLOGY CLINIC | Facility: CLINIC | Age: 78
End: 2018-11-28
Payer: MEDICARE

## 2018-11-28 ENCOUNTER — OFFICE VISIT (OUTPATIENT)
Dept: CARDIOLOGY CLINIC | Facility: CLINIC | Age: 78
End: 2018-11-28
Payer: MEDICARE

## 2018-11-28 VITALS
SYSTOLIC BLOOD PRESSURE: 102 MMHG | DIASTOLIC BLOOD PRESSURE: 68 MMHG | HEART RATE: 68 BPM | BODY MASS INDEX: 28.29 KG/M2 | WEIGHT: 169.8 LBS | RESPIRATION RATE: 12 BRPM | HEIGHT: 65 IN

## 2018-11-28 DIAGNOSIS — I25.10 CORONARY ARTERY DISEASE INVOLVING NATIVE CORONARY ARTERY OF NATIVE HEART WITHOUT ANGINA PECTORIS: ICD-10-CM

## 2018-11-28 DIAGNOSIS — I49.5 SSS (SICK SINUS SYNDROME) (HCC): Primary | ICD-10-CM

## 2018-11-28 DIAGNOSIS — I34.0 NON-RHEUMATIC MITRAL REGURGITATION: ICD-10-CM

## 2018-11-28 DIAGNOSIS — I48.0 PAROXYSMAL ATRIAL FIBRILLATION (HCC): ICD-10-CM

## 2018-11-28 DIAGNOSIS — I10 ESSENTIAL HYPERTENSION: ICD-10-CM

## 2018-11-28 DIAGNOSIS — Z95.0 PACEMAKER: ICD-10-CM

## 2018-11-28 DIAGNOSIS — I49.5 SICK SINUS SYNDROME (HCC): Primary | ICD-10-CM

## 2018-11-28 DIAGNOSIS — I51.7 CARDIOMEGALY: ICD-10-CM

## 2018-11-28 PROCEDURE — 93280 PM DEVICE PROGR EVAL DUAL: CPT | Performed by: INTERNAL MEDICINE

## 2018-11-28 PROCEDURE — 99214 OFFICE O/P EST MOD 30 MIN: CPT | Performed by: INTERNAL MEDICINE

## 2018-11-28 RX ORDER — LEVOTHYROXINE SODIUM 112 UG/1
TABLET ORAL
COMMUNITY
Start: 2018-11-21 | End: 2018-11-28 | Stop reason: ALTCHOICE

## 2018-11-28 NOTE — PROGRESS NOTES
EPS Progress Note - Trey Huggins 69 y o  female MRN: [id number]           ASSESSMENT:  1  Sick sinus syndrome (HCC)     2  Paroxysmal atrial fibrillation ([address] )     3  Non-rheumatic mitral regurgitation     4  Essential hypertension     5  Coronary artery disease involving native coronary artery of native heart without angina pectoris     6  Cardiomegaly             PLAN:  1  Continue oral anticoagulant she is in atrial fibrillation 2 2% of the time 2  Continue routine pacemaker check 3  Get echocardiogram for her murmur make sure there is no progression of her valvular heart disease 4  I counseled her on watching her sugar intake she does admit to increased cookie intake her hemoglobin A1c is up at 7 7 5  Her atrial fibrillation rates are rapid at times but she does not have enough at that to warrant increasing her atenolol    HPI:   Interim history she recently had her finger repair  She gets some dizziness of standing quickly some shortness of breath with exertion occasional palpitations but overall she is stable and offers no complaints  Her pacemaker was interrogated today and is working appropriately  ROS:  12 point ROS negative except the above mentioned complaints      Objective:     Vitals: Blood pressure 102/68, pulse 68, resp  rate 12, height 5' 5" (1 651 m), weight 77 kg (169 lb 12 8 oz), not currently breastfeeding , Body mass index is 28 26 kg/m²  ,        Physical Exam:    GEN: Trey Huggins appears well, alert and oriented x 3, pleasant and cooperative   HEENT: pupils equal, round, and reactive to light; extraocular muscles intact  NECK: supple, no carotid bruits   HEART: regular rhythm, normal S1 and S2, 2/6 holosystolic murmur, clicks, gallops or rubs   LUNGS: clear to auscultation bilaterally; no wheezes, rales, or rhonchi   ABDOMEN: normal bowel sounds, soft, no tenderness, no distention  EXTREMITIES: peripheral pulses normal; no clubbing, cyanosis, or edema  NEURO: no focal findings   SKIN: normal without suspicious lesions on exposed skin    Medications:      Current Outpatient Prescriptions:     ascorbic acid (VITAMIN C) 500 mg tablet, Take 500 mg by mouth daily, Disp: , Rfl:     atenolol (TENORMIN) 50 mg tablet, Take 1 tablet (50 mg total) by mouth daily at bedtime, Disp: 15 tablet, Rfl: 0    BYETTA 10 MCG PEN 10 MCG/0 04ML SOPN, Inject 10 mcg under the skin 2 (two) times a day  , Disp: , Rfl:     Cholecalciferol (VITAMIN D-3) 1000 units CAPS, Take 1 capsule by mouth daily, Disp: , Rfl:     Coenzyme Q10 (COQ10) 200 MG CAPS, Take by mouth, Disp: , Rfl:     cyclobenzaprine (FLEXERIL) 10 mg tablet, Take 1 tablet (10 mg total) by mouth 3 (three) times a day as needed for muscle spasms, Disp: 30 tablet, Rfl: 0    diclofenac sodium (VOLTAREN) 1 %, Apply 2 g topically 4 (four) times a day, Disp: 1 Tube, Rfl: 1    DULoxetine (CYMBALTA) 20 mg capsule, Take 1 capsule (20 mg total) by mouth daily, Disp: 90 capsule, Rfl: 3    ELIQUIS 5 MG, TAKE 1 TABLET TWICE A DAY, Disp: 180 tablet, Rfl: 3    Empagliflozin 25 MG TABS, Take 1 tablet (25 mg total) by mouth every morning, Disp: 90 tablet, Rfl: 2    estradiol (ESTRACE) 0 1 mg/g vaginal cream, , Disp: , Rfl:     Ferrous Sulfate 27 MG TABS, Take 1 tablet by mouth daily, Disp: , Rfl:     HYDROcodone-acetaminophen (NORCO) 5-325 mg per tablet, Take 1 tablet by mouth every 6 (six) hours as needed for pain for up to 10 doses Max Daily Amount: 4 tablets, Disp: 10 tablet, Rfl: 0    levothyroxine 125 mcg tablet, Take 1 tablet (125 mcg total) by mouth daily 1 tab 6 days of the week  No tab Sundays  , Disp: 90 tablet, Rfl: 0    MEGARED OMEGA-3 KRILL OIL PO, Take 1 capsule by mouth daily, Disp: , Rfl:     metFORMIN (GLUCOPHAGE-XR) 500 mg 24 hr tablet, Take 1 tablet (500 mg total) by mouth 2 (two) times a day with meals, Disp: 180 tablet, Rfl: 2    Misc Natural Products (OSTEO BI-FLEX ADV JOINT SHIELD PO), Take 1 tablet by mouth daily  , Disp: , Rfl:     Multiple Vitamins-Minerals (CENTRUM SILVER 50+MEN) TABS, Take 1 tablet by mouth daily, Disp: , Rfl:     naproxen (EC NAPROSYN) 500 MG EC tablet, Take 1 tablet (500 mg total) by mouth 2 (two) times a day with meals, Disp: 30 tablet, Rfl: 0    PROAIR  (90 Base) MCG/ACT inhaler, USE 2 INHALATIONS EVERY 4 HOURS AS NEEDED FOR SHORTNESS OF BREATH, Disp: 8 5 g, Rfl: 0    simvastatin (ZOCOR) 20 mg tablet, Take 1 tablet by mouth daily at bedtime  , Disp: , Rfl:     clotrimazole-betamethasone (LOTRISONE) 1-0 05 % cream, Apply 1 application topically 2 (two) times a day as needed, Disp: , Rfl:      Family History   Problem Relation Age of Onset    Heart disease Mother     Diabetes Mother     Asthma Father     Stomach cancer Father         gastrkic cancer    Cancer Paternal Grandmother     Cancer Paternal Grandfather     Stomach cancer Sister     Substance Abuse Neg Hx     Mental illness Neg Hx     Alcohol abuse Neg Hx      Social History     Social History    Marital status: /Civil Union     Spouse name: N/A    Number of children: N/A    Years of education: N/A     Occupational History    Not on file       Social History Main Topics    Smoking status: Former Smoker     Types: Cigarettes     Quit date: 2002    Smokeless tobacco: Never Used    Alcohol use No    Drug use: No    Sexual activity: No     Other Topics Concern    Not on file     Social History Narrative    Always uses seat belt    Copy of advanced directive obtained    Drinks coffee-drinks 5 cups a day    Has smoke detectors    Uses safety equipment-seatbelts     History   Smoking Status    Former Smoker    Types: Cigarettes    Quit date: 2002   Smokeless Tobacco    Never Used     History   Alcohol Use No       Labs & Results:  Below is the patient's most recent value for Albumin, ALT, AST, BUN, Calcium, Chloride, Cholesterol, CO2, Creatinine, GFR, Glucose, HDL, Hematocrit, Hemoglobin, Hemoglobin A1C, LDL, Magnesium, Phosphorus, Platelets, Potassium, PSA, Sodium, Triglycerides, and WBC  Lab Results   Component Value Date    ALT 25 11/02/2018    AST 19 11/02/2018    BUN 15 11/02/2018    CALCIUM 9 3 11/02/2018     11/02/2018    CHOL 147 12/21/2015    CO2 28 11/02/2018    CREATININE 0 77 11/02/2018    HDL 18 (L) 05/16/2018    HCT 47 5 (H) 08/15/2018    HGB 14 7 08/15/2018    HGBA1C 7 7 (H) 11/02/2018    MG 1 8 01/27/2016     08/15/2018    K 4 6 11/02/2018     12/21/2015    TRIG 206 (H) 05/16/2018    WBC 8 65 08/15/2018     Note: for a comprehensive list of the patient's lab results, access the Results Review activity  No results found for this or any previous visit  Cardiac testing:   No results found for this or any previous visit  No results found for this or any previous visit  No results found for this or any previous visit  No results found for this or any previous visit        EKG personally reviewed by Grisel Hernandez DO

## 2018-11-29 NOTE — PROGRESS NOTES
Results for orders placed or performed in visit on 11/28/18   Cardiac EP device report    Narrative    MDT DUAL PM  DEVICE INTERROGATED IN THE [facility] OFFICE  BATTERY ADEQUATE ( 6 YRS)  AP 81%;  2%  ALL LEAD PARAMETERS WITHIN NORMAL LIMITS  14 VT-NS; 47 FAST A&V & 1,605 AT/AF EPISODES WITH AVAILABLE EGMs SHOWING AF/RVR (UP TO 9 MIN  & 200 BPM)  PT  TAKES ELIQUIS & ATENOLOL  EF 55% (ECHO/MAY, 2015)  NO PROGRAMMING CHANGES MADE TO DEVICE PARAMETERS  NORMAL DEVICE FUNCTION  OFFICE VISIT TO FOLLOW WITH DR Hemal Alvares   PL

## 2018-12-04 ENCOUNTER — OFFICE VISIT (OUTPATIENT)
Dept: ENDOCRINOLOGY | Facility: HOSPITAL | Age: 78
End: 2018-12-04
Payer: MEDICARE

## 2018-12-04 VITALS
DIASTOLIC BLOOD PRESSURE: 70 MMHG | HEIGHT: 65 IN | WEIGHT: 171.4 LBS | BODY MASS INDEX: 28.56 KG/M2 | SYSTOLIC BLOOD PRESSURE: 110 MMHG | HEART RATE: 89 BPM

## 2018-12-04 DIAGNOSIS — E11.40 TYPE 2 DIABETES MELLITUS WITH DIABETIC NEUROPATHY, WITHOUT LONG-TERM CURRENT USE OF INSULIN (HCC): Primary | ICD-10-CM

## 2018-12-04 DIAGNOSIS — E03.8 HYPOTHYROIDISM DUE TO HASHIMOTO'S THYROIDITIS: ICD-10-CM

## 2018-12-04 DIAGNOSIS — E55.9 VITAMIN D DEFICIENCY: ICD-10-CM

## 2018-12-04 DIAGNOSIS — E78.2 MIXED HYPERLIPIDEMIA: ICD-10-CM

## 2018-12-04 DIAGNOSIS — I10 ESSENTIAL HYPERTENSION: ICD-10-CM

## 2018-12-04 DIAGNOSIS — E06.3 HYPOTHYROIDISM DUE TO HASHIMOTO'S THYROIDITIS: ICD-10-CM

## 2018-12-04 PROCEDURE — 99214 OFFICE O/P EST MOD 30 MIN: CPT | Performed by: NURSE PRACTITIONER

## 2018-12-04 NOTE — PATIENT INSTRUCTIONS
Be mindful of diet  Consider follow up with medical nutrition therapy for help with your diet  Stay active and stay hydrated  Increase Metformin 500mg to three times per day (breakfast, lunch and dinner)  Please check your blood sugars 2 times daily and for the record to the office in 2 weeks for review and adjustment, if necessary  Continue Mariah  Continue atenolol and simvastatin  Continue with regular supplementation of vitamin D3 daily

## 2018-12-04 NOTE — PROGRESS NOTES
Trey Huggins 69 y o  female MRN: [id number]    Encounter: [id number]      Assessment/Plan     Assessment: This is a 69y o -year-old female with type 2 diabetes, hypothyroidism, hyperlipidemia and hypertension    Plan:  1  Type 2 diabetes:  Her most recent hemoglobin A1c is elevated to 7 7    she admits to not following a proper diabetic diet  I have asked her to increase metformin 500 mg - 3 times a day with meals  She will continue Ronen and Destiny at the current dose  I have asked her to check her blood sugars twice daily at alternating times and for the record to the office in 2 weeks for review and further adjustment, if necessary  Offered medical nutrition therapy offered at the time of the visit which was declined at this time  She is due for her next diabetic eye exam next week  I have asked her to have a report sent to the office to be made part of her chart  She will continue to follow up with Podiatry every 12 weeks  Check hemoglobin A1c prior to next visit      2  Hypothyroidism:  Cotninue levothyroxine 125 mcg 6 days per week      She complains of some occasional constipation but otherwise has no symptoms of hypo or hyperthyroidism  Check TSH and free T4 prior to next visit      3  Hypertension:  She is normotensive in the office today  Continue atenolol  Check comprehensive metabolic panel prior to next visit      4  Hyperlipidemia:  Continue simvastatin      5   Vitamin-D deficiency:  Continue supplement with 1000 units of vitamin D3 daily        CC:   Type 2 Diabetes follow-up    History of Present Illness     HPI:  67-year-old female presents in the office today for follow-up of type 2 diabetes, hypothyroidism, hypertension and hyperlipidemia  She states she has been diagnosed with type 2 diabetes for 8 years  She presents with no blood sugar records or meter for download  She checks her blood sugars once daily in the AM and states she is in the 150-160 range    She is currently taking metformin 500 mg twice daily, Byetta 10 mg twice daily and Jardiance 25 mg daily  She denies any recent episodes of hypoglycemia, polyuria, polydipsia or polyphagia  Her most recent hemoglobin A1c from November 2, 2018 of 7 7  She states she is up-to-date with her annual diabetic eye exam and is due again next week with Dr Lloyd Bonilla  She complains of some numbness and tingling to her feet at times and follows Podiatry every 12 weeks for regular diabetic foot care      For her hypothyroidism, she is treated with levothyroxine 125 mcg daily Monday through Saturday and no tablet on Sunday  Her most recent TSH from November 2, 2018 was 2 480 with a free T4 of 1 22      Her hypertension is treated with atenolol 50 mg daily      For her hyperlipidemia, she is treated with simvastatin 20 mg daily      For her vitamin-D deficiency, she supplements with 1000 units of vitamin D3 daily  Review of Systems   Constitutional: Negative  Negative for chills and fever  HENT: Negative  Negative for trouble swallowing and voice change  Eyes: Negative  Respiratory: Positive for shortness of breath (slight LOVETT on steps with laundry)  Negative for chest tightness  Cardiovascular: Negative  Negative for chest pain and palpitations  Gastrointestinal: Negative  Negative for abdominal pain, constipation, diarrhea and vomiting  Endocrine: Positive for polyuria ( attributed to Jardiance)  Negative for cold intolerance, heat intolerance, polydipsia and polyphagia  Genitourinary: Negative  Musculoskeletal: Positive for arthralgias (Right hand-arthritis)  Skin: Negative  Allergic/Immunologic: Negative  Neurological: Negative  Negative for dizziness, syncope, light-headedness and headaches  Hematological: Negative  Psychiatric/Behavioral: Negative  All other systems reviewed and are negative        Historical Information   Past Medical History:   Diagnosis Date    [facility]     Abnormal ECG     last assessed: 7/14/2015    Acid reflux     resolved    Anxiety     Benign neoplasm of sigmoid colon 09/13/2011    next colon 2012    Bronchitis, asthmatic     last assessed: 3/12/2012    Colon cancer ([facility] )     2012- had colon resection    COPD (chronic obstructive pulmonary disease) (HCC)     questionable    Depression     Diabetes mellitus ([facility] )     on byetta    Dizziness     occ    Esophageal stricture     last assessed: 9/30/2014    High cholesterol     Hypertension     Hypothyroidism     Iron (Fe) deficiency anemia 12/22/2011    iron pill continue    Irritable bowel syndrome     OA (osteoarthritis)     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    Pacemaker     hx SSS    Rash     labia area- uses cream prn    Restrictive lung disease     Seasonal allergies     Skin scarring/fibrosis     fibrotic scar; last assessed: 3/22/2013    MONA (stress urinary incontinence, female)     Urinary frequency     last assessed: 9/10/2012    Wears glasses     reading     Past Surgical History:   Procedure Laterality Date   [address]      does not know type  Dr Gracia Alford is cariologist   [address] [facility] [address]      resection  removed 25 In     COLONOSCOPY  06/01/2012    proctosigmoidectomy    ESOPHAGOGASTRODUODENOSCOPY N/A 1/9/2016    Procedure: ESOPHAGOGASTRODUODENOSCOPY (EGD);   Surgeon: Lia Weiss MD;  [facility];  Service:    Janice Mare HYSTERECTOMY      AK ARTHROPLASTY I-P JT Right 8/16/2018    Procedure: ARTHROPLASTY PIP/FINGER - RIGHT RING;  Surgeon: Antelmo Barahona MD;  Location: [facility];  Service: Orthopedics    AK CYSTOURETHROSCOPY N/A 5/14/2018    Procedure: Susa Ovens;  Surgeon: Maco Mcghee MD;  [facility];  Service: UroGynecology           AK LAP, REPAIR PARAESOPHAGEAL HERNIA, INCL FUNDOPLASTY W/ MESH N/A 1/27/2016    Procedure: LAPAROSCOPIC PARAESOPHAGEAL HERNIA REPAIR with mesh; toupet  FUNDOPLICATION ;  Surgeon: Syeda Brothers MD Mark;  Location: [facility];  Service: Thoracic    OH SLING OPER STRES INCONTINENCE N/A 5/14/2018    Procedure: INSERTION PUBOVAGINAL SLING (FEMALE);   Surgeon: Fitz Bean MD;  [facility];  Service: UroGynecology           TOTAL ABDOMINAL HYSTERECTOMY       Social History   History   Alcohol Use No     History   Drug Use No     History   Smoking Status    Former Smoker    Types: Cigarettes    Quit date: 2002   Smokeless Tobacco    Never Used     Family History:   Family History   Problem Relation Age of Onset    Heart disease Mother     Diabetes Mother     Asthma Father     Stomach cancer Father         gastrkic cancer    Cancer Paternal Grandmother     Cancer Paternal Grandfather     Stomach cancer Sister     Substance Abuse Neg Hx     Mental illness Neg Hx     Alcohol abuse Neg Hx        Meds/Allergies   Current Outpatient Prescriptions   Medication Sig Dispense Refill    ascorbic acid (VITAMIN C) 500 mg tablet Take 500 mg by mouth daily      atenolol (TENORMIN) 50 mg tablet Take 1 tablet (50 mg total) by mouth daily at bedtime 15 tablet 0    BYETTA 10 MCG PEN 10 MCG/0 04ML SOPN Inject 10 mcg under the skin 2 (two) times a day        Cholecalciferol (VITAMIN D-3) 1000 units CAPS Take 1 capsule by mouth daily      clotrimazole-betamethasone (LOTRISONE) 1-0 05 % cream Apply 1 application topically 2 (two) times a day as needed      Coenzyme Q10 (COQ10) 200 MG CAPS Take by mouth      cyclobenzaprine (FLEXERIL) 10 mg tablet Take 1 tablet (10 mg total) by mouth 3 (three) times a day as needed for muscle spasms 30 tablet 0    diclofenac sodium (VOLTAREN) 1 % Apply 2 g topically 4 (four) times a day 1 Tube 1    DULoxetine (CYMBALTA) 20 mg capsule Take 1 capsule (20 mg total) by mouth daily 90 capsule 3    ELIQUIS 5 MG TAKE 1 TABLET TWICE A  tablet 3    Empagliflozin 25 MG TABS Take 1 tablet (25 mg total) by mouth every morning 90 tablet 2    estradiol (ESTRACE) 0 1 mg/g vaginal cream       Ferrous Sulfate 27 MG TABS Take 1 tablet by mouth daily      HYDROcodone-acetaminophen (NORCO) 5-325 mg per tablet Take 1 tablet by mouth every 6 (six) hours as needed for pain for up to 10 doses Max Daily Amount: 4 tablets 10 tablet 0    levothyroxine 125 mcg tablet Take 1 tablet (125 mcg total) by mouth daily 1 tab 6 days of the week  No tab Sundays  90 tablet 0    MEGARED OMEGA-3 KRILL OIL PO Take 1 capsule by mouth daily      metFORMIN (GLUCOPHAGE-XR) 500 mg 24 hr tablet Take 1 tablet (500 mg total) by mouth 2 (two) times a day with meals 180 tablet 2    Misc Natural Products (OSTEO BI-FLEX ADV JOINT SHIELD PO) Take 1 tablet by mouth daily        Multiple Vitamins-Minerals (CENTRUM SILVER 50+MEN) TABS Take 1 tablet by mouth daily      naproxen (EC NAPROSYN) 500 MG EC tablet Take 1 tablet (500 mg total) by mouth 2 (two) times a day with meals 30 tablet 0    PROAIR  (90 Base) MCG/ACT inhaler USE 2 INHALATIONS EVERY 4 HOURS AS NEEDED FOR SHORTNESS OF BREATH 8 5 g 0    simvastatin (ZOCOR) 20 mg tablet Take 1 tablet by mouth daily at bedtime         No current facility-administered medications for this visit  Allergies   Allergen Reactions    Fluconazole Dizziness       Objective   Vitals: Blood pressure 110/70, pulse 89, height 5' 5" (1 651 m), weight 77 7 kg (171 lb 6 4 oz), not currently breastfeeding  Physical Exam   Constitutional: She is oriented to person, place, and time  She appears well-developed and well-nourished  No distress  HENT:   Head: Normocephalic and atraumatic  Mouth/Throat: Oropharynx is clear and moist    Eyes: Pupils are equal, round, and reactive to light  Conjunctivae and EOM are normal  Right eye exhibits no discharge  Left eye exhibits no discharge  No scleral icterus  Neck: Normal range of motion  Neck supple  No JVD present  No tracheal deviation present  No thyromegaly present     Cardiovascular: Normal rate, regular rhythm, normal heart sounds and intact distal pulses  Exam reveals no gallop and no friction rub  No murmur heard  Pulmonary/Chest: Effort normal and breath sounds normal  No stridor  No respiratory distress  She has no wheezes  She has no rales  Pacer to left chest wall   Abdominal: Soft  Bowel sounds are normal  She exhibits no distension  Musculoskeletal: Normal range of motion  She exhibits no edema, tenderness or deformity  Lymphadenopathy:     She has no cervical adenopathy  Neurological: She is alert and oriented to person, place, and time  She displays normal reflexes  No cranial nerve deficit  Coordination normal    Skin: Skin is warm and dry  No rash noted  No erythema  No pallor  Dry skin   Psychiatric: She has a normal mood and affect  Her behavior is normal  Judgment and thought content normal    Vitals reviewed      Lab Results:   Lab Results   Component Value Date/Time    Hemoglobin A1C 7 7 (H) 11/02/2018 09:05 AM    Hemoglobin A1C 7 3 (H) 08/08/2018 08:13 AM    Hemoglobin A1C 7 2 (H) 05/16/2018 08:46 AM    WBC 8 65 08/15/2018 11:16 AM    WBC 7 27 04/24/2018 07:25 AM    Hemoglobin 14 7 08/15/2018 11:16 AM    Hemoglobin 15 1 04/24/2018 07:25 AM    Hematocrit 47 5 (H) 08/15/2018 11:16 AM    Hematocrit 48 3 (H) 04/24/2018 07:25 AM    MCV 92 08/15/2018 11:16 AM    MCV 93 04/24/2018 07:25 AM    Platelets 985 47/62/0510 11:16 AM    Platelets 801 80/49/0528 07:25 AM    BUN 15 11/02/2018 09:05 AM    BUN 13 08/08/2018 08:13 AM    BUN 11 05/16/2018 08:46 AM    Potassium 4 6 11/02/2018 09:05 AM    Potassium 4 6 08/08/2018 08:13 AM    Potassium 4 2 05/16/2018 08:46 AM    Chloride 104 11/02/2018 09:05 AM    Chloride 105 08/08/2018 08:13 AM    Chloride 105 05/16/2018 08:46 AM    CO2 28 11/02/2018 09:05 AM    CO2 30 08/08/2018 08:13 AM    CO2 30 05/16/2018 08:46 AM    Creatinine 0 77 11/02/2018 09:05 AM    Creatinine 0 75 08/08/2018 08:13 AM    Creatinine 0 67 05/16/2018 08:46 AM    AST 19 11/02/2018 09:05 AM    AST 14 08/08/2018 08:13 AM    AST 42 05/16/2018 08:46 AM    ALT 25 11/02/2018 09:05 AM    ALT 19 08/08/2018 08:13 AM    ALT 22 05/16/2018 08:46 AM    Albumin 3 7 11/02/2018 09:05 AM    Albumin 3 8 08/08/2018 08:13 AM    Albumin 3 8 05/16/2018 08:46 AM    HDL, Direct 18 (L) 05/16/2018 08:46 AM    HDL, Direct 42 12/19/2017 07:18 AM    Triglycerides 206 (H) 05/16/2018 08:46 AM    Triglycerides 169 (H) 12/19/2017 07:18 AM     Portions of the record may have been created with voice recognition software  Occasional wrong word or "sound a like" substitutions may have occurred due to the inherent limitations of voice recognition software  Read the chart carefully and recognize, using context, where substitutions have occurred

## 2018-12-05 DIAGNOSIS — E11.8 TYPE 2 DIABETES MELLITUS WITH COMPLICATION, UNSPECIFIED WHETHER LONG TERM INSULIN USE: Primary | ICD-10-CM

## 2018-12-05 RX ORDER — SIMVASTATIN 20 MG
40 TABLET ORAL
Qty: 180 TABLET | Refills: 0 | Status: SHIPPED | OUTPATIENT
Start: 2018-12-05 | End: 2019-02-20 | Stop reason: SDUPTHER

## 2018-12-10 ENCOUNTER — OFFICE VISIT (OUTPATIENT)
Dept: OBGYN CLINIC | Facility: CLINIC | Age: 78
End: 2018-12-10
Payer: MEDICARE

## 2018-12-10 ENCOUNTER — OFFICE VISIT (OUTPATIENT)
Dept: OCCUPATIONAL THERAPY | Facility: CLINIC | Age: 78
End: 2018-12-10

## 2018-12-10 VITALS
WEIGHT: 173.6 LBS | SYSTOLIC BLOOD PRESSURE: 108 MMHG | BODY MASS INDEX: 28.92 KG/M2 | DIASTOLIC BLOOD PRESSURE: 70 MMHG | HEIGHT: 65 IN | HEART RATE: 77 BPM

## 2018-12-10 DIAGNOSIS — M15.9 PRIMARY OSTEOARTHRITIS INVOLVING MULTIPLE JOINTS: Primary | ICD-10-CM

## 2018-12-10 DIAGNOSIS — M19.031 CMC DJD(CARPOMETACARPAL DEGENERATIVE JOINT DISEASE), LOCALIZED PRIMARY, RIGHT: Primary | ICD-10-CM

## 2018-12-10 DIAGNOSIS — M79.641 PAIN OF RIGHT HAND: Primary | ICD-10-CM

## 2018-12-10 PROCEDURE — 99214 OFFICE O/P EST MOD 30 MIN: CPT | Performed by: PHYSICIAN ASSISTANT

## 2018-12-10 RX ORDER — CEFAZOLIN SODIUM 2 G/50ML
2000 SOLUTION INTRAVENOUS ONCE
Status: CANCELLED | OUTPATIENT
Start: 2018-12-10 | End: 2018-12-10

## 2018-12-10 NOTE — PROGRESS NOTES
ASSESSMENT/PLAN:    Assessment:   Right thumb CMC DJD    Plan:   Patient does not get relief from injections and feels like her hand is recovered enough from her PIP arthroplasty that she would like to proceed with Osiris Macario now    Follow Up: After Surgery    To Do Next Visit:  Sutures out    Operative Discussions:  [place]: The anatomy and physiology of carpometacarpal joint arthritis was discussed with the patient today in the office  Deterioration of the articular cartilage eventually leads to hypermobility at the thumb [facility] joint, resulting in joint subluxation, osteophyte formation, cystic changes within the trapezium and base of the first metacarpal, as well as subchondral sclerosis  Eventually, pain, limited mobility, and compensatory hyperextension at the metacarpophalangeal joint may develop  While normal activity and usage of the thumb joint may provide a painful experience to the patient, this typically does not result in damage to the thumb or hand  Treatment options include resting thumb spica splints to decreased joint edema, pain, and inflammation  Therapy exercises to strengthen the thenar musculature may relieve pain, but do not alter the overall continued development of osteoarthritis  Oral medications, topical medications, corticosteroid injections may decrease pain and increase overall function  Eventually, approximately 5% of patients may require surgical intervention  The risks and benefit of surgery were discussed with the patient  The patient is aware of the incision, the harvesting of the flexor carpi radialis tendon, as well as the weave portion of the procedure  After the procedure, the pain will be decreased, the function improved, and the strength improved  The potential for numbness around the incision site was also discussed  The patient is aware that in the postoperative period, a bulky dressing will be worn for roughly 10 days, followed by a removable splint for 4    This removable splint may be removed for showering, bathing, and daily therapy exercises  At 6 weeks postoperative, the splint will be discontinued in strengthening will continue for the next 6-8 weeks  The patient is aware that the average total recovery time is approximately 3-4 months  Success rate is approximately 90-95%  The risks and benefits of the procedure were explained to the patient, which include, but are not limited to: Bleeding, infection, recurrence, pain, scar, damage to tendons, damage to nerves, and damage to blood vessels, failure to give desired results and complications related to anesthesia  These risks, along with alternative conservative treatment options, and postoperative protocols were voiced back and understood by the patient  All questions were answered to the patient's satisfaction  The patient agrees to comply with a standard postoperative protocol, and is willing to proceed  Education was provided via written and auditory forms  There were no barriers to learning  Written handouts regarding wound care, incision and scar care, and general preoperative information was provided to the patient  Prior to surgery, the patient may be requested to stop all anti-inflammatory medications  Prophylactic aspirin, Plavix, and Coumadin may be allowed to be continued  Medications including vitamin E , ginkgo, and fish oil are requested to be stopped approximately one week prior to surgery  Hypertensive medications and beta blockers, if taken, should be continued  _____________________________________________________  CHIEF COMPLAINT:  Chief Complaint   Patient presents with    Right Hand - Follow-up         SUBJECTIVE:  Ethan Avila is a 69y o  year old female who presents for follow up regarding right thumb CMC DJD  Patient states that steroid injections have not worked for her  She continues to wear a brace which helps some, but she feels she cannot do much without it   Describes pain at the base of the thumb  This affects her activities of daily living as she has difficulty with gripping activities  PAST MEDICAL HISTORY:  Past Medical History:   Diagnosis Date    A-fib [facility])     Abnormal ECG     last assessed: 7/14/2015    Acid reflux     resolved    Anxiety     Benign neoplasm of sigmoid colon 09/13/2011    next colon 2012    Bronchitis, asthmatic     last assessed: 3/12/2012    [facility] )     2012- had colon resection    COPD [facility]chronic obstructive pulmonary disease) ([facility] )     questionable    Depression     Diabetes mellitus ([facility] )     on byetta    Dizziness     occ    Esophageal stricture     last assessed: 9/30/2014    High cholesterol     Hypertension     Hypothyroidism     Iron (Fe) deficiency anemia 12/22/2011    iron pill continue    Irritable bowel syndrome     OA (osteoarthritis)     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    Pacemaker     hx SSS    Rash     labia area- uses cream prn    Restrictive lung disease     Seasonal allergies     Skin scarring/fibrosis     fibrotic scar; last assessed: 3/22/2013    MONA (stress urinary incontinence, female)     Urinary frequency     last assessed: 9/10/2012    Wears glasses     reading       PAST SURGICAL HISTORY:  Past Surgical History:   Procedure Laterality Date   [address] [place] [address]      does not know type  Dr Nito Agustin is cariologist   [facility]      resection  removed 25 In     COLONOSCOPY  06/01/2012    proctosigmoidectomy    ESOPHAGOGASTRODUODENOSCOPY N/A 1/9/2016    Procedure: ESOPHAGOGASTRODUODENOSCOPY (EGD);   Surgeon: Dmitry Hicks MD;  Location:  MAIN OR;  Service:    [facility] HYSTERECTOMY      FL ARTHROPLASTY I-P JT Right 8/16/2018    Procedure: ARTHROPLASTY PIP/FINGER - RIGHT RING;  Surgeon: Shannon Chery MD;  Location:  MAIN OR;  Service: Orthopedics    FL CYSTOURETHROSCOPY N/A 5/14/2018    Procedure: Zohreh Morin;  Surgeon: Miguel Angel Lara MD;  [facility];  Service: UroGynecology           SC LAP, REPAIR PARAESOPHAGEAL HERNIA, INCL FUNDOPLASTY W/ MESH N/A 1/27/2016    Procedure: LAPAROSCOPIC PARAESOPHAGEAL HERNIA REPAIR with mesh; toupet  FUNDOPLICATION ;  Surgeon: Kostas Shin MD;  [facility]: [facility];  Service: Thoracic    SC SLING OPER STRES INCONTINENCE N/A 5/14/2018    Procedure: INSERTION PUBOVAGINAL SLING (FEMALE);   Surgeon: Lloyd Chaparro MD;  [facility];  Service: UroGynecology           TOTAL ABDOMINAL HYSTERECTOMY         FAMILY HISTORY:  Family History   Problem Relation Age of Onset    Heart disease Mother     Diabetes Mother     Asthma Father     Stomach cancer Father         gastrkic cancer    Cancer Paternal Grandmother     Cancer Paternal Grandfather     Stomach cancer Sister     Substance Abuse Neg Hx     Mental illness Neg Hx     Alcohol abuse Neg Hx        SOCIAL HISTORY:  Social History   Substance Use Topics    Smoking status: Former Smoker     Types: Cigarettes     Quit date: 2002    Smokeless tobacco: Never Used    Alcohol use No       MEDICATIONS:    Current Outpatient Prescriptions:     ascorbic acid (VITAMIN C) 500 mg tablet, Take 500 mg by mouth daily, Disp: , Rfl:     atenolol (TENORMIN) 50 mg tablet, Take 1 tablet (50 mg total) by mouth daily at bedtime, Disp: 15 tablet, Rfl: 0    BYETTA 10 MCG PEN 10 MCG/0 04ML SOPN, Inject 10 mcg under the skin 2 (two) times a day  , Disp: , Rfl:     Cholecalciferol (VITAMIN D-3) 1000 units CAPS, Take 1 capsule by mouth daily, Disp: , Rfl:     clotrimazole-betamethasone (LOTRISONE) 1-0 05 % cream, Apply 1 application topically 2 (two) times a day as needed, Disp: , Rfl:     Coenzyme Q10 (COQ10) 200 MG CAPS, Take by mouth, Disp: , Rfl:     cyclobenzaprine (FLEXERIL) 10 mg tablet, Take 1 tablet (10 mg total) by mouth 3 (three) times a day as needed for muscle spasms, Disp: 30 tablet, Rfl: 0    diclofenac sodium (VOLTAREN) 1 %, Apply 2 g topically 4 (four) times a day, Disp: 1 Tube, Rfl: 1    DULoxetine (CYMBALTA) 20 mg capsule, Take 1 capsule (20 mg total) by mouth daily, Disp: 90 capsule, Rfl: 3    ELIQUIS 5 MG, TAKE 1 TABLET TWICE A DAY, Disp: 180 tablet, Rfl: 3    Empagliflozin 25 MG TABS, Take 1 tablet (25 mg total) by mouth every morning, Disp: 90 tablet, Rfl: 2    estradiol (ESTRACE) 0 1 mg/g vaginal cream, , Disp: , Rfl:     Ferrous Sulfate 27 MG TABS, Take 1 tablet by mouth daily, Disp: , Rfl:     levothyroxine 125 mcg tablet, Take 1 tablet (125 mcg total) by mouth daily 1 tab 6 days of the week  No tab Sundays  , Disp: 90 tablet, Rfl: 0    MEGARED OMEGA-3 KRILL OIL PO, Take 1 capsule by mouth daily, Disp: , Rfl:     metFORMIN (GLUCOPHAGE-XR) 500 mg 24 hr tablet, Take 1 tablet (500 mg total) by mouth 2 (two) times a day with meals (Patient taking differently: Take 500 mg by mouth 3 (three) times a day with meals  ), Disp: 180 tablet, Rfl: 2    Misc Natural Products (OSTEO BI-FLEX ADV JOINT SHIELD PO), Take 1 tablet by mouth daily  , Disp: , Rfl:     Multiple Vitamins-Minerals (CENTRUM SILVER 50+MEN) TABS, Take 1 tablet by mouth daily, Disp: , Rfl:     naproxen (EC NAPROSYN) 500 MG EC tablet, Take 1 tablet (500 mg total) by mouth 2 (two) times a day with meals, Disp: 30 tablet, Rfl: 0    PROAIR  (90 Base) MCG/ACT inhaler, USE 2 INHALATIONS EVERY 4 HOURS AS NEEDED FOR SHORTNESS OF BREATH, Disp: 8 5 g, Rfl: 0    simvastatin (ZOCOR) 20 mg tablet, Take 2 tablets (40 mg total) by mouth daily at bedtime, Disp: 180 tablet, Rfl: 0    HYDROcodone-acetaminophen (NORCO) 5-325 mg per tablet, Take 1 tablet by mouth every 6 (six) hours as needed for pain for up to 10 doses Max Daily Amount: 4 tablets (Patient not taking: Reported on 12/10/2018 ), Disp: 10 tablet, Rfl: 0    ALLERGIES:  Allergies   Allergen Reactions    Fluconazole Dizziness       REVIEW OF SYSTEMS:  Pertinent items are noted in HPI    A comprehensive review of systems was negative  LABS:  HgA1c:   Lab Results   Component Value Date    HGBA1C 7 7 (H) 11/02/2018     BMP:   Lab Results   Component Value Date    GLUCOSE 245 (H) 01/27/2016    CALCIUM 9 3 11/02/2018     12/21/2015    K 4 6 11/02/2018    CO2 28 11/02/2018     11/02/2018    BUN 15 11/02/2018    CREATININE 0 77 11/02/2018           _____________________________________________________  PHYSICAL EXAMINATION:  General: well developed and well nourished, alert, oriented times 3 and appears comfortable  Psychiatric: Normal  HEENT: Trachea Midline, No torticollis  Cardiovascular: No discernable arrhythmia  Pulmonary: No wheezing or stridor  Skin: No masses, erthema, lacerations, fluctation, ulcerations  Neurovascular: Sensation Intact to the Median, Ulnar, Radial Nerve, Motor Intact to the Median, Ulnar, Radial Nerve and Pulses Intact    MUSCULOSKELETAL EXAMINATION:  RIGHT SIDE:  CMC: Positive shuck, Positive grind, Positive tendnerness CMC, Positive Shoulder Sign and no appreciable hyperextension of MP joint     _____________________________________________________  STUDIES REVIEWED:  Images were reviewd in PACS: Previous Xrays show degeneration of the right thumb CMC joint with significant joint space narrowing        PROCEDURES PERFORMED:  Procedures  No Procedures performed today

## 2018-12-11 ENCOUNTER — HOSPITAL ENCOUNTER (OUTPATIENT)
Dept: NON INVASIVE DIAGNOSTICS | Facility: HOSPITAL | Age: 78
Discharge: HOME | End: 2018-12-11
Attending: ORTHOPAEDIC SURGERY
Payer: MEDICARE

## 2018-12-11 DIAGNOSIS — M79.641 PAIN OF RIGHT HAND: ICD-10-CM

## 2018-12-11 LAB
ATRIAL RATE: 76 BPM
P AXIS: 16 DEGREES
PR INTERVAL: 260 MS
QRS AXIS: 48 DEGREES
QRSD INTERVAL: 76 MS
QT INTERVAL: 370 MS
QTC INTERVAL: 416 MS
T WAVE AXIS: 97 DEGREES
VENTRICULAR RATE: 76 BPM

## 2018-12-11 PROCEDURE — 93005 ELECTROCARDIOGRAM TRACING: CPT

## 2018-12-11 PROCEDURE — 93010 ELECTROCARDIOGRAM REPORT: CPT | Performed by: INTERNAL MEDICINE

## 2018-12-11 NOTE — PRE-PROCEDURE INSTRUCTIONS
Pre-Surgery Instructions:   Medication Instructions    ascorbic acid (VITAMIN C) 500 mg tablet Instructed patient per Anesthesia Guidelines   atenolol (TENORMIN) 50 mg tablet Instructed patient per Anesthesia Guidelines   BYETTA 10 MCG PEN 10 MCG/0 04ML SOPN Instructed patient per Anesthesia Guidelines   Cholecalciferol (VITAMIN D-3) 1000 units CAPS Instructed patient per Anesthesia Guidelines   Coenzyme Q10 (COQ10) 200 MG CAPS Instructed patient per Anesthesia Guidelines   cyclobenzaprine (FLEXERIL) 10 mg tablet Instructed patient per Anesthesia Guidelines   diclofenac sodium (VOLTAREN) 1 % Instructed patient per Anesthesia Guidelines   DULoxetine (CYMBALTA) 20 mg capsule Instructed patient per Anesthesia Guidelines   ELIQUIS 5 MG Patient was instructed to contact Physician for medication instruction   Empagliflozin 25 MG TABS Instructed patient per Anesthesia Guidelines   Ferrous Sulfate 27 MG TABS Instructed patient per Anesthesia Guidelines   levothyroxine 125 mcg tablet Instructed patient per Anesthesia Guidelines   MEGARED OMEGA-3 KRILL OIL PO Instructed patient per Anesthesia Guidelines   metFORMIN (GLUCOPHAGE-XR) 500 mg 24 hr tablet Instructed patient per Anesthesia Guidelines   Misc Natural Products (OSTEO BI-FLEX ADV JOINT SHIELD PO) Instructed patient per Anesthesia Guidelines   Multiple Vitamins-Minerals (CENTRUM SILVER 50+MEN) TABS Instructed patient per Anesthesia Guidelines   PROAIR  (90 Base) MCG/ACT inhaler Instructed patient per Anesthesia Guidelines   simvastatin (ZOCOR) 20 mg tablet Instructed patient per Anesthesia Guidelines  Pre-op Showering Instructions for Surgery Patients    Before your operation, you play an important role in decreasing your risk for infection by washing with special antiseptic soap  This is an effective way to reduce bacteria on the skin which may help to prevent infections at the surgical site      Please read the following directions in advance  1  In the week before your operation, purchase a 4 ounce bottle of antiseptic soap containing chlorhexidine gluconate (CHG)  4%  Some brand names include: Aplicare®, Endure, and Hibiclens®  The cost is usually less than $5 00   For your convenience, the [employer] carries the soap   It may also be available at your doctors office or pre-admission testing center, and at most retail pharmacies   If you are allergic or sensitive to soaps containing CHG, please let your doctor know so another antiseptic can be suggested   CHG antiseptic soap is for external use only  2  The day before your operation, follow these instructions carefully to get ready   Please clean linens (sheets) on your bed; you should sleep on clean sheets after your evening shower   Get clean towels and washcloth ready - you need enough for 2 showers   Set aside clean underwear, pajamas, and clothing to wear after the showers     Reminders:   DO NOT use any other soap or body rinse on your skin during or after the antiseptic showers   DO NOT use lotion, powder, deodorant, or perfume/aftershave of any kind on your skin after your antiseptic shower   DO NOT shave any body parts in the 24 hours/day before your operation   DO NOT get the antiseptic soap in your eyes, ears, nose, mouth, or vaginal area    3  You will need to shower the night before AND the morning of your surgery  Shower 1:   The first evening before the operation, take the first shower   First, shampoo your hair with regular shampoo and rinse it completely before you use the antiseptic soap  After washing and rinsing your hair, rinse your body   Next, use a clean washcloth to apply the antiseptic soap and wash your body from the neck down to your toes using ½ bottle of the antiseptic soap   You will use the other ½ bottle for the second shower   Clean the area where your incision will be; lather this area well for about 2 minutes   If you are having head or neck surgery, wash areas with the antiseptic soap   Rinse yourself completely with running water   Use a clean towel to dry off   Wear clean underwear and clothing/pajamas  Shower 2   The morning of your operation, take the second shower following the same steps as Shower 1 using the second ½ of the bottle of antiseptic soap   Use clean cloths and towels to wash and dry yourself   Wear clean underwear and clothing

## 2018-12-12 ENCOUNTER — LAB (OUTPATIENT)
Dept: LAB | Facility: CLINIC | Age: 78
End: 2018-12-12
Payer: MEDICARE

## 2018-12-12 DIAGNOSIS — I10 ESSENTIAL HYPERTENSION: ICD-10-CM

## 2018-12-12 DIAGNOSIS — M19.031 CMC DJD(CARPOMETACARPAL DEGENERATIVE JOINT DISEASE), LOCALIZED PRIMARY, RIGHT: ICD-10-CM

## 2018-12-12 DIAGNOSIS — E11.40 TYPE 2 DIABETES MELLITUS WITH DIABETIC NEUROPATHY, WITHOUT LONG-TERM CURRENT USE OF INSULIN (HCC): ICD-10-CM

## 2018-12-12 LAB
ALBUMIN SERPL BCP-MCNC: 4 G/DL (ref 3.5–5)
ALP SERPL-CCNC: 79 U/L (ref 46–116)
ALT SERPL W P-5'-P-CCNC: 25 U/L (ref 12–78)
ANION GAP SERPL CALCULATED.3IONS-SCNC: 6 MMOL/L (ref 4–13)
AST SERPL W P-5'-P-CCNC: 19 U/L (ref 5–45)
BASOPHILS # BLD AUTO: 0.05 THOUSANDS/ΜL (ref 0–0.1)
BASOPHILS NFR BLD AUTO: 1 % (ref 0–1)
BILIRUB SERPL-MCNC: 0.9 MG/DL (ref 0.2–1)
BUN SERPL-MCNC: 14 MG/DL (ref 5–25)
CALCIUM SERPL-MCNC: 9.2 MG/DL (ref 8.3–10.1)
CHLORIDE SERPL-SCNC: 102 MMOL/L (ref 100–108)
CO2 SERPL-SCNC: 30 MMOL/L (ref 21–32)
CREAT SERPL-MCNC: 0.78 MG/DL (ref 0.6–1.3)
EOSINOPHIL # BLD AUTO: 0.26 THOUSAND/ΜL (ref 0–0.61)
EOSINOPHIL NFR BLD AUTO: 3 % (ref 0–6)
ERYTHROCYTE [DISTWIDTH] IN BLOOD BY AUTOMATED COUNT: 12.7 % (ref 11.6–15.1)
EST. AVERAGE GLUCOSE BLD GHB EST-MCNC: 177 MG/DL
GFR SERPL CREATININE-BSD FRML MDRD: 74 ML/MIN/1.73SQ M
GLUCOSE P FAST SERPL-MCNC: 141 MG/DL (ref 65–99)
HBA1C MFR BLD: 7.8 % (ref 4.2–6.3)
HCT VFR BLD AUTO: 49.7 % (ref 34.8–46.1)
HGB BLD-MCNC: 15.5 G/DL (ref 11.5–15.4)
IMM GRANULOCYTES # BLD AUTO: 0.01 THOUSAND/UL (ref 0–0.2)
IMM GRANULOCYTES NFR BLD AUTO: 0 % (ref 0–2)
LYMPHOCYTES # BLD AUTO: 3.07 THOUSANDS/ΜL (ref 0.6–4.47)
LYMPHOCYTES NFR BLD AUTO: 40 % (ref 14–44)
MCH RBC QN AUTO: 28.9 PG (ref 26.8–34.3)
MCHC RBC AUTO-ENTMCNC: 31.2 G/DL (ref 31.4–37.4)
MCV RBC AUTO: 93 FL (ref 82–98)
MONOCYTES # BLD AUTO: 0.58 THOUSAND/ΜL (ref 0.17–1.22)
MONOCYTES NFR BLD AUTO: 8 % (ref 4–12)
NEUTROPHILS # BLD AUTO: 3.67 THOUSANDS/ΜL (ref 1.85–7.62)
NEUTS SEG NFR BLD AUTO: 48 % (ref 43–75)
NRBC BLD AUTO-RTO: 0 /100 WBCS
PLATELET # BLD AUTO: 210 THOUSANDS/UL (ref 149–390)
PMV BLD AUTO: 10.1 FL (ref 8.9–12.7)
POTASSIUM SERPL-SCNC: 4.6 MMOL/L (ref 3.5–5.3)
PROT SERPL-MCNC: 7.7 G/DL (ref 6.4–8.2)
RBC # BLD AUTO: 5.37 MILLION/UL (ref 3.81–5.12)
SODIUM SERPL-SCNC: 138 MMOL/L (ref 136–145)
WBC # BLD AUTO: 7.64 THOUSAND/UL (ref 4.31–10.16)

## 2018-12-12 PROCEDURE — 80053 COMPREHEN METABOLIC PANEL: CPT

## 2018-12-12 PROCEDURE — 85025 COMPLETE CBC W/AUTO DIFF WBC: CPT

## 2018-12-12 PROCEDURE — 83036 HEMOGLOBIN GLYCOSYLATED A1C: CPT

## 2018-12-12 PROCEDURE — 36415 COLL VENOUS BLD VENIPUNCTURE: CPT

## 2018-12-13 LAB
LEFT EYE DIABETIC RETINOPATHY: NORMAL
RIGHT EYE DIABETIC RETINOPATHY: NORMAL

## 2018-12-13 NOTE — PROGRESS NOTES
Please call the patient regarding  result  Hemoglobin A1c remains stable at 7 8 but is above goal   Please send in blood sugars for review so changes can be made to regimen to help her blood sugars throughout the day  Fasting glucose is elevated on comprehensive metabolic panel

## 2019-01-03 ENCOUNTER — TELEPHONE (OUTPATIENT)
Dept: ENDOCRINOLOGY | Facility: HOSPITAL | Age: 79
End: 2019-01-03

## 2019-01-03 RX ORDER — NAPROXEN 500 MG/1
250 TABLET ORAL 2 TIMES DAILY WITH MEALS
COMMUNITY
End: 2019-02-05 | Stop reason: ALTCHOICE

## 2019-01-03 NOTE — TELEPHONE ENCOUNTER
Reviewed blood sugars from December 3rd through December 16 2018  Overall, blood sugars are well controlled with higher blood sugar readings at times before bedtime  Continue current regimen and be mindful of diet especially at dinner  Continue to check blood sugars regularly

## 2019-01-04 ENCOUNTER — OFFICE VISIT (OUTPATIENT)
Dept: FAMILY MEDICINE CLINIC | Facility: CLINIC | Age: 79
End: 2019-01-04
Payer: MEDICARE

## 2019-01-04 VITALS
HEART RATE: 78 BPM | SYSTOLIC BLOOD PRESSURE: 118 MMHG | DIASTOLIC BLOOD PRESSURE: 82 MMHG | HEIGHT: 64 IN | WEIGHT: 169.1 LBS | RESPIRATION RATE: 15 BRPM | BODY MASS INDEX: 28.87 KG/M2

## 2019-01-04 DIAGNOSIS — M19.031 CMC DJD(CARPOMETACARPAL DEGENERATIVE JOINT DISEASE), LOCALIZED PRIMARY, RIGHT: ICD-10-CM

## 2019-01-04 PROBLEM — C18.9 MALIGNANT TUMOR OF COLON (HCC): Status: ACTIVE | Noted: 2019-01-04

## 2019-01-04 PROBLEM — K21.9 GASTRO-ESOPHAGEAL REFLUX DISEASE WITHOUT ESOPHAGITIS: Status: ACTIVE | Noted: 2019-01-04

## 2019-01-04 PROCEDURE — 99214 OFFICE O/P EST MOD 30 MIN: CPT | Performed by: PHYSICIAN ASSISTANT

## 2019-01-04 RX ORDER — CLOBETASOL PROPIONATE 0.5 MG/G
CREAM TOPICAL
COMMUNITY
Start: 2018-12-18

## 2019-01-04 NOTE — PROGRESS NOTES
[facility] PRACTICE PRE-OPERATIVE EVALUATION  [facility] - [address] [place] [address]    NAME: Sindhu Fajardo  AGE: 69 y o  SEX: female  : 1940     DATE: 2019    [facility] Pre-Operative Evaluation      Chief Complaint: Pre-operative Evaluation     Surgery: ARTHROPLASTY THUMB AMADOLBY (Right Finger)  Anticipated Date of Surgery: 1/10/2019  Referring Provider: Mirian Walker PA-C  , Dr Rafael West      History of Present Illness:     Casey Caal is a 69 y o  female who presents to the office today for a preoperative consultation at the request of surgeon, Dr Rafael West, who plans on performing [address] (Right Finger on right digit 1  Planned anesthesia is general  Patient has a bleeding risk of: no recent abnormal bleeding  Patient does not have objections to receiving blood products if needed  Current anti-platelet/anti-coagulation medications that the patient is prescribed includes: none        Assessment of Chronic Conditions:     - Type 2 DMn - empagliflozin, metformin, byetta follows with Dr Elaine Cordon  - sick sinus syndrome - has pacer, on eliquis and atenolol, c/w Dr Gracia Alford  - hypothyroid with goiter - levothyroxine 125 mcg, follows with Dr Elaine Cordon  - hyperlipidemia - simvastatin, c/w Dr Gracia Alford  - hiatal hernia - no longer with [facility], [facility]  - restrictive airway/emphysema - proair as needed, c/w Pulmonology  - vitamin d def - on vitamin D  - anxiety - on Cymbalta  - adenocarcinoma of the colon - , in remission  - osteoporosis - c/w calcium, vitamin d and exercise     Assessment of Cardiac Risk:  · Denies unstable or severe angina or MI in the last 6 weeks or history of stent placement in the last year   · Denies decompensated heart failure (e g  New onset heart failure, NYHA functional class IV heart failure, or worsening existing heart failure)  · Denies significant arrhythmias such as high grade AV block, symptomatic ventricular arrhythmia, newly recognized ventricular tachycardia, supraventricular tachycardia with resting heart rate >100, or symptomatic bradycardia  · Denies severe heart valve disease including aortic stenosis or symptomatic mitral stenosis     Exercise Capacity:  · Able to walk 4 blocks without symptoms?: Yes  · Able to walk 2 flights without symptoms?: Yes    Prior Anesthesia Reactions: No     Personal history of venous thromboembolic disease? No    History of steroid use for >2 weeks within last year? No         Review of Systems:     Review of Systems   Constitutional: Negative  HENT: Negative  Eyes: Negative  Respiratory: Negative  Cardiovascular: Negative  Gastrointestinal: Negative  Endocrine: Negative  Genitourinary: Negative  Musculoskeletal: Negative  Skin: Negative  Allergic/Immunologic: Negative  Neurological: Negative  Hematological: Negative  Psychiatric/Behavioral: Negative          Current Problem List:     Patient Active Problem List   Diagnosis    Type 2 diabetes mellitus without complication, with long-term current use of insulin ([facility] )    Essential hypertension    Coronary artery disease involving native coronary artery of native heart without angina pectoris    History of colon cancer    Abnormal echocardiogram    Adenocarcinoma of colon ([facility] )    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    Cardiomegaly    Diffuse nontoxic goiter    Feeling anxious    Hematuria, microscopic    Mixed hyperlipidemia    Other specified hypothyroidism    Insomnia    Mitral regurgitation    Age-related osteoporosis without current pathological fracture    History of permanent cardiac pacemaker placement    Paraesophageal hernia    Paroxysmal atrial fibrillation (HCC)    Restrictive lung disease    Primary osteoarthritis involving multiple joints    Sick sinus syndrome ([address] )    Vitamin D deficiency    Stress incontinence in female    Aftercare following right finger joint replacement surgery    [facility](carpometacarpal degenerative joint disease), localized primary, right       Allergies:      Allergies   Allergen Reactions    Fluconazole Dizziness       Current Medications:       Current Outpatient Prescriptions:     ascorbic acid (VITAMIN C) 500 mg tablet, Take 500 mg by mouth daily, Disp: , Rfl:     atenolol (TENORMIN) 50 mg tablet, Take 1 tablet (50 mg total) by mouth daily at bedtime, Disp: 15 tablet, Rfl: 0    BYETTA 10 MCG PEN 10 MCG/0 04ML SOPN, Inject 10 mcg under the skin 2 (two) times a day  , Disp: , Rfl:     Cholecalciferol (VITAMIN D-3) 1000 units CAPS, Take 1 capsule by mouth daily, Disp: , Rfl:     clobetasol (TEMOVATE) 0 05 % cream, , Disp: , Rfl:     clotrimazole-betamethasone (LOTRISONE) 1-0 05 % cream, Apply 1 application topically 2 (two) times a day as needed, Disp: , Rfl:     Coenzyme Q10 (COQ10) 200 MG CAPS, Take 200 mg by mouth daily  , Disp: , Rfl:     cyclobenzaprine (FLEXERIL) 10 mg tablet, Take 1 tablet (10 mg total) by mouth 3 (three) times a day as needed for muscle spasms, Disp: 30 tablet, Rfl: 0    diclofenac sodium (VOLTAREN) 1 %, Apply 2 g topically 4 (four) times a day (Patient taking differently: Apply 2 g topically as needed  ), Disp: 1 Tube, Rfl: 1    DULoxetine (CYMBALTA) 20 mg capsule, Take 1 capsule (20 mg total) by mouth daily (Patient taking differently: Take 40 mg by mouth daily  ), Disp: 90 capsule, Rfl: 3    ELIQUIS 5 MG, TAKE 1 TABLET TWICE A DAY, Disp: 180 tablet, Rfl: 3    Empagliflozin 25 MG TABS, Take 1 tablet (25 mg total) by mouth every morning, Disp: 90 tablet, Rfl: 2    estradiol (ESTRACE) 0 1 mg/g vaginal cream, , Disp: , Rfl:     Ferrous Sulfate 27 MG TABS, Take 1 tablet by mouth daily, Disp: , Rfl:     HYDROcodone-acetaminophen (NORCO) 5-325 mg per tablet, Take 1 tablet by mouth every 6 (six) hours as needed for pain for up to 10 doses Max Daily Amount: 4 tablets, Disp: 10 tablet, Rfl: 0   levothyroxine 125 mcg tablet, Take 1 tablet (125 mcg total) by mouth daily 1 tab 6 days of the week  No tab Sundays  , Disp: 90 tablet, Rfl: 0    MEGARED OMEGA-3 KRILL OIL PO, Take 1 capsule by mouth daily, Disp: , Rfl:     metFORMIN (GLUCOPHAGE-XR) 500 mg 24 hr tablet, Take 1 tablet (500 mg total) by mouth 2 (two) times a day with meals (Patient taking differently: Take 500 mg by mouth 3 (three) times a day with meals  ), Disp: 180 tablet, Rfl: 2    Misc Natural Products (OSTEO BI-FLEX ADV JOINT SHIELD PO), Take 1 tablet by mouth daily  , Disp: , Rfl:     Multiple Vitamins-Minerals (CENTRUM SILVER 50+MEN) TABS, Take 1 tablet by mouth daily, Disp: , Rfl:     naproxen (NAPROSYN) 500 mg tablet, Take 250 mg by mouth 2 (two) times a day with meals, Disp: , Rfl:     PROAIR  (90 Base) MCG/ACT inhaler, USE 2 INHALATIONS EVERY 4 HOURS AS NEEDED FOR SHORTNESS OF BREATH, Disp: 8 5 g, Rfl: 0    simvastatin (ZOCOR) 20 mg tablet, Take 2 tablets (40 mg total) by mouth daily at bedtime, Disp: 180 tablet, Rfl: 0    Past Medical History:       Past Medical History:   Diagnosis Date    [facility]     Abnormal ECG     last assessed: 7/14/2015    Acid reflux     resolved    Anxiety     Benign neoplasm of sigmoid colon 09/13/2011    next colon 2012    Bronchitis, asthmatic     last assessed: 3/12/2012    Colon cancer ([facility] )     2012- had colon resection    COPD (chronic obstructive pulmonary disease) (HCC)     questionable    Depression     Diabetes mellitus (HCC)     on byetta    Dizziness     occ    Esophageal stricture     last assessed: 9/30/2014    High cholesterol     Hypertension     Hypothyroidism     Iron (Fe) deficiency anemia 12/22/2011    iron pill continue    Irritable bowel syndrome     OA (osteoarthritis)     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    Pacemaker     hx SSS    Rash     labia area- uses cream prn    Restrictive lung disease     Seasonal allergies     Skin scarring/fibrosis fibrotic scar; last assessed: 3/22/2013    MONA (stress urinary incontinence, female)     Urinary frequency     last assessed: 9/10/2012    Wears glasses     reading        Past Surgical History:   Procedure Laterality Date   [address]      does not know type  Dr Marisa Naylor is cariologist   [address]      resection  removed 18 In     COLONOSCOPY  06/01/2012    proctosigmoidectomy    ESOPHAGOGASTRODUODENOSCOPY N/A 1/9/2016    Procedure: ESOPHAGOGASTRODUODENOSCOPY (EGD); Surgeon: Sera Granados MD;  [facility];  Service:    Bertha Diones HYSTERECTOMY      TN ARTHROPLASTY I-P JT Right 8/16/2018    Procedure: ARTHROPLASTY PIP/FINGER - RIGHT RING;  Surgeon: Jearldine Goodell, MD;  Location[facility];  Service: Orthopedics    TN CYSTOURETHROSCOPY N/A 5/14/2018    Procedure: Joan Mixer;  Surgeon: Hazeline Jeans, MD;  [facility];  Service: UroGynecology           TN LAP, REPAIR PARAESOPHAGEAL HERNIA, INCL FUNDOPLASTY W/ MESH N/A 1/27/2016    Procedure: LAPAROSCOPIC PARAESOPHAGEAL HERNIA REPAIR with mesh; toupet  FUNDOPLICATION ;  Surgeon: Enrique Frey MD;  [facility];  Service: Thoracic    TN SLING OPER STRES INCONTINENCE N/A 5/14/2018    Procedure: INSERTION PUBOVAGINAL SLING (FEMALE); Surgeon: Hazeline Jeans, MD;  [facility];  Service: UroGynecology           TOTAL ABDOMINAL HYSTERECTOMY          Family History   Problem Relation Age of Onset    Heart disease Mother     Diabetes Mother     Asthma Father     Stomach cancer Father         gastrkic cancer    Cancer Paternal Grandmother     Cancer Paternal Grandfather     Stomach cancer Sister     Substance Abuse Neg Hx     Mental illness Neg Hx     Alcohol abuse Neg Hx         Social History     Social History    Marital status: /Civil Union     Spouse name: N/A    Number of children: N/A    Years of education: N/A     Occupational History    Not on file       Social History Main Topics    Smoking status: Former Smoker     Types: Cigarettes     Quit date:     Smokeless tobacco: Never Used    Alcohol use No    Drug use: No    Sexual activity: No     Other Topics Concern    Not on file     Social History Narrative    Always uses seat belt    Copy of advanced directive obtained    Drinks coffee-drinks 5 cups a day    Has smoke detectors    Uses safety equipment-seatbelts        Physical Exam:     /82 (BP Location: Right arm, Patient Position: Sitting, Cuff Size: Standard)   Pulse 78   Resp 15   Ht 5' 3 75" (1 619 m)   Wt 76 7 kg (169 lb 1 6 oz)   BMI 29 25 kg/m²     Physical Exam   Constitutional: She is oriented to person, place, and time  She appears well-developed and well-nourished  Neck: Neck supple  No thyromegaly present  Cardiovascular: Normal rate, regular rhythm, normal heart sounds and intact distal pulses  No murmur heard  Pulmonary/Chest: Effort normal and breath sounds normal    Abdominal: Soft  Bowel sounds are normal    Lymphadenopathy:     She has no cervical adenopathy  Neurological: She is alert and oriented to person, place, and time  Skin: Skin is warm  Psychiatric: She has a normal mood and affect  Judgment normal         Data:     Pre-operative work-up    Laboratory Results: I have personally reviewed the pertinent laboratory results/reports      EKG: I have personally reviewed pertinent reports  Chest x-ray: not indicated      Previous cardiopulmonary studies within the past year:  · Echocardiogram: 18  · Cardiac Catheterization: 2015  · Stress Test: 2015  · Pulmonary Function Testin2017      Assessment & Recommendations:     1  [facility](carpometacarpal degenerative joint disease), localized primary, right  Ambulatory referral to Internal Medicine       Pre-Op Evaluation Assessment  69 y o  female with planned surgery:     ARTHROPLASTY THUMB WEILBY (Right Finger)  Known risk factors for perioperative complications: type 2 DM, sick sinus syndrome, restrictive airway disease        Current medications which may produce withdrawal symptoms if withheld perioperatively: atenolol  Pre-Op Evaluation Plan  1  Further preoperative workup as follows:   - None; no further preoperative work-up is required    2  Medication Management/Recommendations:   - hold metformin and empaglifozin morning of procedure  - hold eliquis as advised 48 hours prior to procedure  - continue with other medications the morning of surgery with a small sip of water    3  Prophylaxis for cardiac events with perioperative beta-blockers: patient to take atenolol morning of surgery  4  Patient requires further consultation with: None    Clearance  Patient is CLEARED for surgery without any additional cardiac testing       Irma Leiva PA-C  [address] [place] [id number]  Phone#  [phone]  Fax#  [phone]

## 2019-01-09 ENCOUNTER — ANESTHESIA EVENT (OUTPATIENT)
Dept: PERIOP | Facility: HOSPITAL | Age: 79
End: 2019-01-09
Payer: MEDICARE

## 2019-01-09 DIAGNOSIS — E11.40 TYPE 2 DIABETES MELLITUS WITH DIABETIC NEUROPATHY, WITHOUT LONG-TERM CURRENT USE OF INSULIN (HCC): Primary | ICD-10-CM

## 2019-01-09 RX ORDER — BLOOD-GLUCOSE METER
KIT MISCELLANEOUS
Qty: 400 EACH | Refills: 5 | Status: SHIPPED | OUTPATIENT
Start: 2019-01-09 | End: 2020-12-22 | Stop reason: SDUPTHER

## 2019-01-10 ENCOUNTER — HOSPITAL ENCOUNTER (OUTPATIENT)
Facility: HOSPITAL | Age: 79
Setting detail: OUTPATIENT SURGERY
Discharge: HOME | End: 2019-01-10
Attending: ORTHOPAEDIC SURGERY | Admitting: ORTHOPAEDIC SURGERY
Payer: MEDICARE

## 2019-01-10 ENCOUNTER — ANESTHESIA (OUTPATIENT)
Dept: PERIOP | Facility: HOSPITAL | Age: 79
End: 2019-01-10
Payer: MEDICARE

## 2019-01-10 VITALS
BODY MASS INDEX: 29.79 KG/M2 | WEIGHT: 168.13 LBS | HEIGHT: 63 IN | HEART RATE: 60 BPM | RESPIRATION RATE: 18 BRPM | OXYGEN SATURATION: 93 % | SYSTOLIC BLOOD PRESSURE: 129 MMHG | DIASTOLIC BLOOD PRESSURE: 70 MMHG | TEMPERATURE: 97.9 F

## 2019-01-10 DIAGNOSIS — M19.031 CMC DJD(CARPOMETACARPAL DEGENERATIVE JOINT DISEASE), LOCALIZED PRIMARY, RIGHT: Primary | ICD-10-CM

## 2019-01-10 LAB
GLUCOSE SERPL-MCNC: 122 MG/DL (ref 65–140)
GLUCOSE SERPL-MCNC: 137 MG/DL (ref 65–140)

## 2019-01-10 PROCEDURE — 25310 TRANSPLANT FOREARM TENDON: CPT | Performed by: ORTHOPAEDIC SURGERY

## 2019-01-10 PROCEDURE — 25447 ARTHRP NTRCRP/CRP/MTCR NTRPS: CPT | Performed by: ORTHOPAEDIC SURGERY

## 2019-01-10 PROCEDURE — 82948 REAGENT STRIP/BLOOD GLUCOSE: CPT

## 2019-01-10 RX ORDER — ONDANSETRON 2 MG/ML
4 INJECTION INTRAMUSCULAR; INTRAVENOUS ONCE
Status: DISCONTINUED | OUTPATIENT
Start: 2019-01-10 | End: 2019-01-10 | Stop reason: HOSPADM

## 2019-01-10 RX ORDER — PROPOFOL 10 MG/ML
INJECTION, EMULSION INTRAVENOUS AS NEEDED
Status: DISCONTINUED | OUTPATIENT
Start: 2019-01-10 | End: 2019-01-10 | Stop reason: SURG

## 2019-01-10 RX ORDER — MIDAZOLAM HYDROCHLORIDE 1 MG/ML
INJECTION INTRAMUSCULAR; INTRAVENOUS AS NEEDED
Status: DISCONTINUED | OUTPATIENT
Start: 2019-01-10 | End: 2019-01-10 | Stop reason: SURG

## 2019-01-10 RX ORDER — PROPOFOL 10 MG/ML
INJECTION, EMULSION INTRAVENOUS CONTINUOUS PRN
Status: DISCONTINUED | OUTPATIENT
Start: 2019-01-10 | End: 2019-01-10 | Stop reason: SURG

## 2019-01-10 RX ORDER — HYDROCODONE BITARTRATE AND ACETAMINOPHEN 5; 325 MG/1; MG/1
1 TABLET ORAL EVERY 6 HOURS PRN
Status: DISCONTINUED | OUTPATIENT
Start: 2019-01-10 | End: 2019-01-10 | Stop reason: HOSPADM

## 2019-01-10 RX ORDER — FENTANYL CITRATE 50 UG/ML
INJECTION, SOLUTION INTRAMUSCULAR; INTRAVENOUS AS NEEDED
Status: DISCONTINUED | OUTPATIENT
Start: 2019-01-10 | End: 2019-01-10 | Stop reason: SURG

## 2019-01-10 RX ORDER — ACETAMINOPHEN 325 MG/1
650 TABLET ORAL ONCE
Status: DISCONTINUED | OUTPATIENT
Start: 2019-01-10 | End: 2019-01-10 | Stop reason: HOSPADM

## 2019-01-10 RX ORDER — CEFAZOLIN SODIUM 2 G/50ML
2000 SOLUTION INTRAVENOUS ONCE
Status: COMPLETED | OUTPATIENT
Start: 2019-01-10 | End: 2019-01-10

## 2019-01-10 RX ORDER — SODIUM CHLORIDE, SODIUM LACTATE, POTASSIUM CHLORIDE, CALCIUM CHLORIDE 600; 310; 30; 20 MG/100ML; MG/100ML; MG/100ML; MG/100ML
50 INJECTION, SOLUTION INTRAVENOUS CONTINUOUS
Status: DISCONTINUED | OUTPATIENT
Start: 2019-01-10 | End: 2019-01-10 | Stop reason: HOSPADM

## 2019-01-10 RX ORDER — SODIUM CHLORIDE, SODIUM LACTATE, POTASSIUM CHLORIDE, CALCIUM CHLORIDE 600; 310; 30; 20 MG/100ML; MG/100ML; MG/100ML; MG/100ML
INJECTION, SOLUTION INTRAVENOUS CONTINUOUS PRN
Status: DISCONTINUED | OUTPATIENT
Start: 2019-01-10 | End: 2019-01-10 | Stop reason: SURG

## 2019-01-10 RX ORDER — HYDROCODONE BITARTRATE AND ACETAMINOPHEN 5; 325 MG/1; MG/1
1 TABLET ORAL EVERY 4 HOURS PRN
Qty: 20 TABLET | Refills: 0 | Status: SHIPPED | OUTPATIENT
Start: 2019-01-10 | End: 2019-01-20

## 2019-01-10 RX ORDER — FENTANYL CITRATE/PF 50 MCG/ML
25 SYRINGE (ML) INJECTION
Status: DISCONTINUED | OUTPATIENT
Start: 2019-01-10 | End: 2019-01-10 | Stop reason: HOSPADM

## 2019-01-10 RX ORDER — SENNOSIDES 8.6 MG
650 CAPSULE ORAL EVERY 8 HOURS
Qty: 15 TABLET | Refills: 0 | Status: SHIPPED | OUTPATIENT
Start: 2019-01-10 | End: 2019-04-08

## 2019-01-10 RX ADMIN — FENTANYL CITRATE 50 MCG: 50 INJECTION, SOLUTION INTRAMUSCULAR; INTRAVENOUS at 07:56

## 2019-01-10 RX ADMIN — MIDAZOLAM HYDROCHLORIDE 1 MG: 1 INJECTION, SOLUTION INTRAMUSCULAR; INTRAVENOUS at 07:39

## 2019-01-10 RX ADMIN — CEFAZOLIN SODIUM 2000 MG: 2 SOLUTION INTRAVENOUS at 08:02

## 2019-01-10 RX ADMIN — SODIUM CHLORIDE, SODIUM LACTATE, POTASSIUM CHLORIDE, AND CALCIUM CHLORIDE: .6; .31; .03; .02 INJECTION, SOLUTION INTRAVENOUS at 07:30

## 2019-01-10 RX ADMIN — PROPOFOL 20 MG: 10 INJECTION, EMULSION INTRAVENOUS at 08:03

## 2019-01-10 RX ADMIN — PROPOFOL 20 MCG/KG/MIN: 10 INJECTION, EMULSION INTRAVENOUS at 08:08

## 2019-01-10 RX ADMIN — FENTANYL CITRATE 50 MCG: 50 INJECTION, SOLUTION INTRAMUSCULAR; INTRAVENOUS at 07:39

## 2019-01-10 RX ADMIN — MIDAZOLAM HYDROCHLORIDE 1 MG: 1 INJECTION, SOLUTION INTRAMUSCULAR; INTRAVENOUS at 07:41

## 2019-01-10 NOTE — DISCHARGE INSTRUCTIONS
Post Operative Instructions    You have had surgery on your arm today, please read and follow the information below:  · Elevate your hand above your elbow during the next 24-48 hours to help with swelling  · Place your hand and arm over your head with motion at your shoulder three times a day  · Do not apply any cream/ointment/oil to your incisions including antibiotics  · Do not soak your hands in standing water (dishwater, tubs, Jacuzzi's, pools, etc ) until given permission (typically 2-3 weeks after injury)    Call the office if you notice any:  · Increased numbness or tingling of your hand or fingers that is not relieved with elevation  · Increasing pain that is not controlled with medication  · Difficulty chewing, breathing, swallowing  · Chest pains or shortness of breath  · Fever over 101 4 degrees  Bandage: Do NOT remove bandage until follow-up appointment  Motion: Move fingers into a fist 5 times a day, DO NOT move any splinted fingers  Weight bearing status: The operated extremity should be non-weight bearing until further notice  Ice: Ice for 10 minutes every hour as needed for swelling x 24 hours  Sling: Sling for comfort for 2-3 days  Pain medication:   Tylenol Extended Release 650 mg every 8 hours  Norco/Hydrocodone one tab every 6 hours AS NEEDED for pain     Follow-up Appointment: 7-10 days  Please call the office if you have any questions or concerns regarding your post-operative care

## 2019-01-10 NOTE — H&P
H&P Exam - Orthopedics   Sindhu PENALOZA Genoe 69 y o  female MRN: [id number]  Unit/Bed#: HOLDING 1    Assessment/Plan   Assessment:  Right thumb carpometacarpal joint osteoarthritis  Plan:  Plan for right thumb interpositional arthroplasty with flexor carpi radialis tendon transfer    History of Present Illness   HPI:  Vivian Mckeon is a 69 y o  y o  female who presents with right thumb carpometacarpal joint pain with failed conservative management  Patient for thumb interpositional arthroplasty on the right side today  [facility]     Historical Information  Review Of Systems:   · Skin: Normal  · Neuro: See HPI  · Musculoskeletal: See HPI  · 14 point review of systems negative except as stated above     Past Medical History:   Past Medical History:   Diagnosis Date    A-fib ([facility] [address] )     Abnormal ECG     last assessed: 7/14/2015    Acid reflux     resolved    Anxiety     Benign neoplasm of sigmoid colon 09/13/2011    next colon 2012    Bronchitis, asthmatic     last assessed: 3/12/2012    Colon cancer ([facility] [address] )     2012- had colon resection    COPD (chronic obstructive pulmonary disease) ([facility] [address] )     questionable    Depression     Diabetes mellitus ([facility] [address] )     on byetta    Dizziness     occ    Esophageal stricture     last assessed: 9/30/2014    High cholesterol     Hypertension     Hypothyroidism     Iron (Fe) deficiency anemia 12/22/2011    iron pill continue    Irritable bowel syndrome     OA (osteoarthritis)     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    Pacemaker     hx SSS    Rash     labia area- uses cream prn    Restrictive lung disease     Seasonal allergies     Skin scarring/fibrosis     fibrotic scar; last assessed: 3/22/2013    MONA (stress urinary incontinence, female)     Urinary frequency     last assessed: 9/10/2012    Wears glasses     reading       Past Surgical History:   Past Surgical History:   Procedure Laterality Date   [address]       does not know type  Dr Salty Nobles is cariologist     SECTION      COLON SURGERY      resection  removed 25 In     COLONOSCOPY  2012    proctosigmoidectomy    ESOPHAGOGASTRODUODENOSCOPY N/A 2016    Procedure: ESOPHAGOGASTRODUODENOSCOPY (EGD); Surgeon: Gio Mendoza MD;  [facility];  Service:    Chuy Edgar HYSTERECTOMY      CA ARTHROPLASTY I-P JT Right 2018    Procedure: ARTHROPLASTY PIP/FINGER - RIGHT RING;  Surgeon: Wilfredo Kruse MD;  [facility];  Service: Orthopedics    CA CYSTOURETHROSCOPY N/A 2018    Procedure: [place];  Surgeon: Rick Quiroga MD;  [facility];  Service: UroGynecology           CA LAP, REPAIR PARAESOPHAGEAL HERNIA, INCL FUNDOPLASTY W/ MESH N/A 2016    Procedure: LAPAROSCOPIC PARAESOPHAGEAL HERNIA REPAIR with mesh; toupet  FUNDOPLICATION ;  Surgeon: Alise Johnson MD;  [facility];  Service: Thoracic    CA SLING OPER STRES INCONTINENCE N/A 2018    Procedure: INSERTION PUBOVAGINAL SLING (FEMALE);   Surgeon: Rick Quiroga MD;  [facility];  Service: UroGynecology           TOTAL ABDOMINAL HYSTERECTOMY         Family History:  Family history reviewed and non-contributory  Family History   Problem Relation Age of Onset    Heart disease Mother     Diabetes Mother     Asthma Father     Stomach cancer Father         gastrkic cancer    Cancer Paternal Grandmother     Cancer Paternal Grandfather     Stomach cancer Sister     Substance Abuse Neg Hx     Mental illness Neg Hx     Alcohol abuse Neg Hx        Social History:  Social History     Social History    Marital status: /Civil Union     Spouse name: N/A    Number of children: N/A    Years of education: N/A     Social History Main Topics    Smoking status: Former Smoker     Types: Cigarettes     Quit date:     Smokeless tobacco: Never Used    Alcohol use No    Drug use: No    Sexual activity: No     Other Topics Concern    None     Social History Narrative    Always uses seat belt    Copy of advanced directive obtained    Drinks coffee-drinks 5 cups a day    Has smoke detectors    Uses safety equipment-seatbelts       Allergies: Allergies   Allergen Reactions    Fluconazole Dizziness           Labs:    0  Lab Value Date/Time   HCT 49 7 (H) 12/12/2018 0750   HCT 47 5 (H) 08/15/2018 1116   HCT 48 3 (H) 04/24/2018 0725   HCT 40 6 08/01/2015 0454   HCT 46 3 (H) 07/31/2015 0919   HCT 42 9 07/29/2015 0803   HGB 15 5 (H) 12/12/2018 0750   HGB 14 7 08/15/2018 1116   HGB 15 1 04/24/2018 0725   HGB 13 6 08/01/2015 0454   HGB 15 6 (H) 07/31/2015 0919   HGB 14 6 07/29/2015 0803   INR 0 93 08/15/2018 1116   INR 0 95 07/29/2015 0803   WBC 7 64 12/12/2018 0750   WBC 8 65 08/15/2018 1116   WBC 7 27 04/24/2018 0725   WBC 7 95 08/01/2015 0454   WBC 7 12 07/31/2015 0919   WBC 7 07 07/29/2015 0803       Meds:    Current Facility-Administered Medications:     ceFAZolin (ANCEF) IVPB (premix) 2,000 mg, 2,000 mg, Intravenous, Once, Jolie Cowan PA-C    Blood Culture:   No results found for: BLOODCX    Wound Culture:   No results found for: WOUNDCULT    Ins and Outs:  No intake/output data recorded  Physical Exam  /78   Pulse 82   Temp 98 7 °F (37 1 °C) (Oral)   Resp 16   Ht 5' 3" (1 6 m)   Wt 76 3 kg (168 lb 2 oz)   SpO2 94%   BMI 29 78 kg/m²   Gen: Alert and oriented to person, place, time  HEENT: EOMI, eyes clear, moist mucus membranes, hearing intact  Respiratory: Bilateral chest rise  No audible wheezing found  Cardiovascular: Regular Rate and Rhythm  Abdomen: soft nontender/nondistended  Ortho Exam:  Tenderness to palpation right thumb with positive shoulder sign, positive circumduction with crepitation  Neuro Exam:   The patient is neurovascularly intact in the median, ulnar, and radial nerve distribution  There is normal sensation and good capillary refill within the digits  2+ pulses              Lab Results: Reviewed  Imaging: Reviewed

## 2019-01-10 NOTE — ANESTHESIA PREPROCEDURE EVALUATION
Review of Systems/Medical History  Patient summary reviewed  Chart reviewed      Cardiovascular  EKG reviewed, Negative cardio ROS Pacemaker/AICD, Hyperlipidemia, Hypertension controlled, CAD , Dysrhythmias , atrial fibrillation,   Comment: cardiomegaly,  Pulmonary  Negative pulmonary ROS Smoker ex-smoker  Cumulative Pack Years: [de-identified], COPD mild- PRN medicaiton , Asthma , well controlled/ stable ,        GI/Hepatic  Negative GI/hepatic ROS   GERD well controlled, GI malignancy,   Comment: H/o colon ca s/p resection     Negative  ROS        Endo/Other  Negative endo/other ROS Diabetes well controlled type 2 Oral agent, History of thyroid disease , hypothyroidism,      GYN  Negative gynecology ROS          Hematology  Negative hematology ROS Anemia ,     Musculoskeletal  Negative musculoskeletal ROS   Arthritis     Neurology  Negative neurology ROS      Psychology   Negative psychology ROS Anxiety, Depression ,              Physical Exam    Airway    Mallampati score: I  TM Distance: >3 FB  Neck ROM: full     Dental   No notable dental hx upper dentures,     Cardiovascular  Comment: Negative ROS, Rhythm: regular, Rate: normal, Cardiovascular exam normal    Pulmonary  Pulmonary exam normal Breath sounds clear to auscultation,     Other Findings        Anesthesia Plan  ASA Score- 3     Anesthesia Type- IV sedation with anesthesia and regional with ASA Monitors  Additional Monitors:   Airway Plan:     Comment: Plan right supraclavicular NB  Plan Factors-    Induction- intravenous  Postoperative Plan-     Informed Consent- Anesthetic plan and risks discussed with patient  I personally reviewed this patient with the CRNA  Discussed and agreed on the Anesthesia Plan with the JASMYNE Silver

## 2019-01-10 NOTE — OP NOTE
OPERATIVE REPORT  PATIENT NAME: Pollo Quinteros  :  1940  MRN: [id number]  Pt Location: [facility]    SURGERY DATE: 01/10/19    Surgeon(s) and Role:     * Tory Blanco MD - Primary     * Hill Sebastian PA-C - Assisting     * Ananda Heath - Assisting    Pre-Op Diagnosis:  [facility] DJD(carpometacarpal degenerative joint disease), localized primary, right [M19 031]    Post-Op Diagnosis Codes:     [facility] DJD(carpometacarpal degenerative joint disease), localized primary, right [M19 031]    Procedure(s):  ARTHROPLASTY THUMB WEILBY (Right)  TRANSFER TENDON FLEXOR CARPI RADIALIS FROM FOREARM TO HAND (Right)    Specimen(s):  * No orders in the log *    Estimated Blood Loss:   Minimal      Anesthesia Type:   General    Operative Indications: The patient has a history of Thumb CMC Arthritis  right that was recalcitrant to conservative management  The decision was made to bring the patient to the operating room for Thumb Interpositional Arthroplasty Mila Fernandez) with FCR transfer  right  Risks of the procedure were explained which include, but are not limited to bleeding; infection; damage to nerves, arteries,veins, tendons; scar; pain; need for reoperation; failure to give desired result; and risks of anaesthesia  All questions were answered to satisfaction and they were willing to proceed  Operative Findings:  Right stage IV thumb CMC osteoarthritis with 5° hyperextension at the metacarpophalangeal joint    Complications:   None    Procedure and Technique:  After the patient, site, and procedure were identified, the patient was brought into the operating room in a supine position  Regional anaesthesia and sedation were provided  A well padded tourniquet was applied to the extremity, set at 250 mmHg  The  right upper extremity was then prepped and drapped in a normal, sterile, orthopedic fashion      After the patient, site, and procedure were once again identified, attention was turned to the right thumb   A curvilinear incision was made at the junction of the glaborous and nonglaborous skin extending toward the flexor carpi radialis tendon  Care was taken to protect the superficial sensory branch of the radial nerve, the radial artery, the median nerve, the palmar cutaneous branch of the median nerve, the flexor carpi radialis tendon, the abductor pollicis longus tendon and the extensor pollicis brevis tendon  The thenar muscles were elevated off of the thumb metacarpal and an arthrotomy was done at the trapeziometacarpal and scaphotrapezial joint  The trapezium was removed in its entirety  Inspection revealed stage 4 (full thickiness loss of the articular cartilage with exposed subchondral bone) arthritis at the level of the metacarpal base, stage 3 (partial thickness loss of the articular cartilage) arthritis at the distal pole of the scaphoid, and inspection of the scaphotrapezoid joint revealed stage 1 (minimal fraying of the articular surface) arthritis  Through a second incision, using a [employer] tendon retriever, the ulnar half of the FCR tendon was harvested toward its insertion  The thumb was held in a reduced position and the tendon was woven through the APL at its insertion into the thumb metacarpal and secured with Ethibond suture  A figure of 8 wrap was then performed around the remainder of the FCR and APL tendons and secured with Ethibond suture  Gelfoam was placed in the remainder of the hole  At the completion of the procedure, hemostasis was obtained with cautery and direct pressure  The wounds were copiously irrigated with sterile solution  The wounds were closed with Vicryl and Prolene  Sterile dressings were applied, including Xeroform, gauze, tweeners, webril, ACE  Please note, all sponge, needle, and instrument counts were correct prior to closure  Loupe magnification was utilized  The patient tolerated the procedure well       I was present for the entire procedure    Patient Disposition:  PACU , hemodynamically stable and extubated and stable    SIGNATURE: Mary Chew MD  DATE: 01/10/19  TIME: 8:56 AM

## 2019-01-10 NOTE — ANESTHESIA PROCEDURE NOTES
Peripheral Block    Patient location during procedure: holding area  Start time: 1/10/2019 7:55 AM  Reason for block: at surgeon's request and post-op pain management  Staffing  Anesthesiologist: Minda Clarke  Performed: anesthesiologist   Preanesthetic Checklist  Completed: patient identified, site marked, surgical consent, pre-op evaluation, timeout performed, IV checked, risks and benefits discussed and monitors and equipment checked  Peripheral Block  Patient position: supine  Prep: Betadine  Patient monitoring: heart rate, continuous pulse ox and frequent blood pressure checks  Block type: supraclavicular  Laterality: right  Injection technique: single-shot  Procedures: ultrasound guided and nerve stimulator  Ultrasound permanent image saved  Local infiltration: ropivacaine  Infiltration strength: 0 5 %  Dose: 35 mL  Needle  Needle type: Stimuplex   Needle gauge: 22 G  Needle length: 5 cm  Needle localization: ultrasound guidance and nerve stimulator  Test dose: negative  Assessment  Injection assessment: incremental injection, local visualized surrounding nerve on ultrasound, negative aspiration for CSF, negative aspiration for heme and no paresthesia on injection  Paresthesia pain: none  Heart rate change: no  Slow fractionated injection: yes  Post-procedure:  site cleaned  patient tolerated the procedure well with no immediate complications

## 2019-01-16 ENCOUNTER — TELEPHONE (OUTPATIENT)
Dept: OBGYN CLINIC | Facility: HOSPITAL | Age: 79
End: 2019-01-16

## 2019-01-16 ENCOUNTER — OFFICE VISIT (OUTPATIENT)
Dept: OBGYN CLINIC | Facility: HOSPITAL | Age: 79
End: 2019-01-16

## 2019-01-16 ENCOUNTER — OFFICE VISIT (OUTPATIENT)
Dept: OCCUPATIONAL THERAPY | Facility: HOSPITAL | Age: 79
End: 2019-01-16
Payer: MEDICARE

## 2019-01-16 VITALS
HEART RATE: 82 BPM | HEIGHT: 64 IN | WEIGHT: 169 LBS | SYSTOLIC BLOOD PRESSURE: 134 MMHG | DIASTOLIC BLOOD PRESSURE: 74 MMHG | BODY MASS INDEX: 28.85 KG/M2

## 2019-01-16 DIAGNOSIS — M19.031 CMC DJD(CARPOMETACARPAL DEGENERATIVE JOINT DISEASE), LOCALIZED PRIMARY, RIGHT: Primary | ICD-10-CM

## 2019-01-16 DIAGNOSIS — M18.11 PRIMARY OSTEOARTHRITIS OF FIRST CARPOMETACARPAL JOINT OF RIGHT HAND: Primary | ICD-10-CM

## 2019-01-16 PROCEDURE — 99024 POSTOP FOLLOW-UP VISIT: CPT | Performed by: ORTHOPAEDIC SURGERY

## 2019-01-16 PROCEDURE — L3808 WHFO, RIGID W/O JOINTS: HCPCS | Performed by: OCCUPATIONAL THERAPIST

## 2019-01-16 RX ORDER — SULFAMETHOXAZOLE AND TRIMETHOPRIM 800; 160 MG/1; MG/1
1 TABLET ORAL EVERY 12 HOURS SCHEDULED
Qty: 14 TABLET | Refills: 0 | Status: SHIPPED | OUTPATIENT
Start: 2019-01-16 | End: 2019-01-23

## 2019-01-16 NOTE — PROGRESS NOTES
Splint     Diagnosis:   1  [facility] DJD(carpometacarpal degenerative joint disease), localized primary, right       Indication: Joint Protection    Location: Right  wrist, hand and thumb  Supplies: Custom Fit Orthotic  Splint type: Thumb SPICA forearm-based  Wearing Schedule: Remove for hygiene only  Describe Position: Wrist neutral, thumb 25/25 abduction    Precautions: Joint Replacement    Patient or Caregiver expresses understanding of wearing Schedule and Precautions? Yes  Patient or Caregiver able to don/doff orthotic independently? Yes    Written orders provided to patient?  No  Orders Obtained: Written  Orders Obtained from: [facility]    Return for evaluation and treatment Will require re-fitting at next wound check

## 2019-01-16 NOTE — TELEPHONE ENCOUNTER
Pt  Called stating she is having discomfort to incision area  Pt  Stated "it feels like it is rubbing the incision raw " pt  Wanted to com in and see the Dr  Today and stated she didn't mind coming to [place]  I offered appt  For 1 pm today pt  Accepted  Pt  Is aware of date, time and place of appt

## 2019-01-16 NOTE — PROGRESS NOTES
Assessment:   S/p arthroplasty thumb Austinlrebecca on 1/10/19    Plan:   The patient will meet with our hand therapist today for a forearm based thumb spica splint which she was instructed to wear until we see her next  We will provide the patient a prescription of bactrim, DS today  Follow Up:  as scheduled on monday    To Do Next Visit:         CHIEF COMPLAINT:  Chief Complaint   Patient presents with    Right Hand - Follow-up         SUBJECTIVE:  Aleyda Horvath is a 69y o  year old female who presents for follow up after S/p arthroplasty thumb Austinl[place] on 1/10/19  Today patient has Pain  Moderate  Constant  Sharp and Aching         PHYSICAL EXAMINATION:  General: well developed and well nourished, alert, oriented times 3 and appears comfortable  Psychiatric: Normal    MUSCULOSKELETAL EXAMINATION:  Incision: ecchymosis and small hematoma with reactive hyperemia   Range of Motion: full composite fist possible  Neurovascular status: Neuro intact, good cap refill  Activity Restrictions: Cast/splint restrictions  Done today: dressing changed and splint removed      STUDIES REVIEWED:  No Studies to review      PROCEDURES PERFORMED:  Procedures  No Procedures performed today   Scribe Attestation    I,:   Teodoro Griffin am acting as a scribe while in the presence of the attending physician :        I,:   Sonido Roth MD personally performed the services described in this documentation    as scribed in my presence :

## 2019-01-21 ENCOUNTER — OFFICE VISIT (OUTPATIENT)
Dept: OBGYN CLINIC | Facility: CLINIC | Age: 79
End: 2019-01-21

## 2019-01-21 VITALS
BODY MASS INDEX: 28.71 KG/M2 | WEIGHT: 168.2 LBS | SYSTOLIC BLOOD PRESSURE: 134 MMHG | DIASTOLIC BLOOD PRESSURE: 71 MMHG | HEIGHT: 64 IN | HEART RATE: 78 BPM

## 2019-01-21 DIAGNOSIS — Z47.1 AFTERCARE FOLLOWING RIGHT FINGER JOINT REPLACEMENT SURGERY: ICD-10-CM

## 2019-01-21 DIAGNOSIS — Z96.691 AFTERCARE FOLLOWING RIGHT FINGER JOINT REPLACEMENT SURGERY: ICD-10-CM

## 2019-01-21 DIAGNOSIS — Z47.89 AFTERCARE FOLLOWING SURGERY OF THE MUSCULOSKELETAL SYSTEM: Primary | ICD-10-CM

## 2019-01-21 PROCEDURE — 99024 POSTOP FOLLOW-UP VISIT: CPT | Performed by: PHYSICIAN ASSISTANT

## 2019-01-21 NOTE — PROGRESS NOTES
Assessment:   S/p right Weilby on 1/10/19    Plan: At this time, appears that most of the erythema is 2/2 reactive hyperemia  Will have patient finish her abx  Will make 1 more wound check follow up next week to see how the finger looks after being off the abx  Patient is to call right away if hand looks worse instead of better  She was also shown the trick to lift the arm to make sure erythema improves  Comfort cool given today  Therapy to work on ROM only, continue 1-2lb weight limit    Follow Up:  1  week(s)        CHIEF COMPLAINT:  Chief Complaint   Patient presents with    Right Thumb - Post-op         SUBJECTIVE:  Anshu Rodriguez is a 69y o  year old female who presents for follow up S/p right Weilby on 1/10/19  Patient states she is doing OK, does not believe her hand looks worse compared to last week  States she has been taking her abx as instructed  Describes slight tingling in the thumb  PHYSICAL EXAMINATION:  General: well developed and well nourished, alert, oriented times 3 and appears comfortable  Psychiatric: Normal    MUSCULOSKELETAL EXAMINATION:  Incision: Clean, dry, intact and erythema present  When lifting this arm, the erythema does significantly decrease  No increased warmth compared to other areas of the hand    Range of Motion: As expected  Neurovascular status: Neuro intact, good cap refill  Activity Restrictions: Restrictions: 1-2lb weight limit R hand  Done today: Sutures out and Steri strips applied      STUDIES REVIEWED:  No Studies to review      PROCEDURES PERFORMED:  Procedures  No Procedures performed today

## 2019-01-25 ENCOUNTER — TRANSCRIBE ORDERS (OUTPATIENT)
Dept: ADMINISTRATIVE | Facility: HOSPITAL | Age: 79
End: 2019-01-25

## 2019-01-28 ENCOUNTER — OFFICE VISIT (OUTPATIENT)
Dept: OBGYN CLINIC | Facility: CLINIC | Age: 79
End: 2019-01-28

## 2019-01-28 VITALS
WEIGHT: 168 LBS | DIASTOLIC BLOOD PRESSURE: 67 MMHG | SYSTOLIC BLOOD PRESSURE: 136 MMHG | BODY MASS INDEX: 28.68 KG/M2 | HEIGHT: 64 IN | HEART RATE: 87 BPM

## 2019-01-28 DIAGNOSIS — Z47.1 AFTERCARE FOLLOWING RIGHT FINGER JOINT REPLACEMENT SURGERY: Primary | ICD-10-CM

## 2019-01-28 DIAGNOSIS — Z96.691 AFTERCARE FOLLOWING RIGHT FINGER JOINT REPLACEMENT SURGERY: Primary | ICD-10-CM

## 2019-01-28 PROCEDURE — 99024 POSTOP FOLLOW-UP VISIT: CPT | Performed by: ORTHOPAEDIC SURGERY

## 2019-01-28 NOTE — PROGRESS NOTES
ASSESSMENT/PLAN:    Assessment:   Post-op s/p Weilby procedure R thumb 1/10/19 (now POD18)    Plan:   Continue 1-2 lb weight limit restriction to right hand  Patient already has therapy appointment set up to begin motion next week  No strengthening until 4 additional weeks  Follow-up in 4 weeks from current time for re-check  Continue thumb spica brace    To Do Next Visit: Evaluate wound appearance, eval for continuing symptom improvement      _____________________________________________________  CHIEF COMPLAINT:  Chief Complaint   Patient presents with    Right Thumb - Post-op         SUBJECTIVE:  Ramon Landers is a 69y o  year old female who presents for continued postoperative care status post right thumb Weilby procedure and she is now postoperative day 18  She has occasional subjective numbness in her thumb when she experiences swelling which is improved by elevating her hand above her head  She feels the pain and swelling is slightly improved when wearing a thumb spica brace she obtained over the counter  She denies persistent motor/sensory deficits       PAST MEDICAL HISTORY:  Past Medical History:   Diagnosis Date    A-fib [facility])     Abnormal ECG     last assessed: 7/14/2015    Acid reflux     resolved    Anxiety     Benign neoplasm of sigmoid colon 09/13/2011    next colon 2012    Bronchitis, asthmatic     last assessed: 3/12/2012    Colon cancer [facility] [address] )     2012- had colon resection    COPD (chronic obstructive pulmonary disease) ([facility] [address] )     questionable    Depression     Diabetes mellitus ([facility] [address] )     on byetta    Dizziness     occ    Esophageal stricture     last assessed: 9/30/2014    High cholesterol     Hypertension     Hypothyroidism     Iron (Fe) deficiency anemia 12/22/2011    iron pill continue    Irritable bowel syndrome     OA (osteoarthritis)     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    Pacemaker     hx SSS    Rash     labia area- uses cream prn    Restrictive lung disease  Seasonal allergies     Skin scarring/fibrosis     fibrotic scar; last assessed: 3/22/2013    MONA (stress urinary incontinence, female)     Urinary frequency     last assessed: 9/10/2012    Wears glasses     reading       PAST SURGICAL HISTORY:  Past Surgical History:   Procedure Laterality Date   [address]      does not know type  Dr Michel Arzate is cariologist     SECTION      COLON SURGERY      resection  removed 25 In     COLONOSCOPY  2012    proctosigmoidectomy    ESOPHAGOGASTRODUODENOSCOPY N/A 2016    Procedure: ESOPHAGOGASTRODUODENOSCOPY (EGD); Surgeon: Caprice Sheth MD;  [facility];  Service:    Jullie Liming HYSTERECTOMY      W[place] ARTHROPLASTY I-P JT Right 2018    Procedure: ARTHROPLASTY PIP/FINGER - RIGHT RING;  Surgeon: Sonido Roth MD;  [facility];  Service: Orthopedics    WV CYSTOURETHROSCOPY N/A 2018    Procedure: Herve Dowd;  Surgeon: Franck Lomax MD;  [facility];  Service: UroGynecology           W[place] LAP, REPAIR PARAESOPHAGEAL HERNIA, INCL FUNDOPLASTY W/ MESH N/A 2016    Procedure: LAPAROSCOPIC PARAESOPHAGEAL HERNIA REPAIR with mesh; toupet  FUNDOPLICATION ;  Surgeon: Christiano Cisneros MD;  [facility];  Service: Thoracic    W[place] REPAIR INTERCARP/CARP-METACARP JT Right 1/10/2019    Procedure: Dayron Sena;  Surgeon: Sonido Roth MD;  [facility];  Service: Orthopedics    W[place] SLING OPER STRES INCONTINENCE N/A 2018    Procedure: INSERTION PUBOVAGINAL SLING (FEMALE);   Surgeon: Franck Lomax MD;  [facility];  Service: UroGynecology           TOTAL ABDOMINAL HYSTERECTOMY      WRIST TENDON TRANSFER Right 1/10/2019    Procedure: TRANSFER TENDON FLEXOR CARPI RADIALIS FROM FOREARM TO HAND;  Surgeon: Sonido Roth MD;  [facility];  Service: Orthopedics       FAMILY HISTORY:  Family History   Problem Relation Age of Onset    Heart disease Mother     Diabetes Mother     Asthma Father     Stomach cancer Father         gastrkic cancer    Cancer Paternal Grandmother     Cancer Paternal Grandfather     Stomach cancer Sister     Substance Abuse Neg Hx     Mental illness Neg Hx     Alcohol abuse Neg Hx        SOCIAL HISTORY:  Social History   Substance Use Topics    Smoking status: Former Smoker     Types: Cigarettes     Quit date: 2002    Smokeless tobacco: Never Used    Alcohol use No       MEDICATIONS:    Current Outpatient Prescriptions:     acetaminophen (TYLENOL 8 HOUR) 650 mg CR tablet, Take 1 tablet (650 mg total) by mouth every 8 (eight) hours, Disp: 15 tablet, Rfl: 0    ascorbic acid (VITAMIN C) 500 mg tablet, Take 500 mg by mouth daily, Disp: , Rfl:     atenolol (TENORMIN) 50 mg tablet, Take 1 tablet (50 mg total) by mouth daily at bedtime, Disp: 15 tablet, Rfl: 0    BYETTA 10 MCG PEN 10 MCG/0 04ML SOPN, Inject 10 mcg under the skin 2 (two) times a day  , Disp: , Rfl:     Cholecalciferol (VITAMIN D-3) 1000 units CAPS, Take 1 capsule by mouth daily, Disp: , Rfl:     clobetasol (TEMOVATE) 0 05 % cream, , Disp: , Rfl:     Coenzyme Q10 (COQ10) 200 MG CAPS, Take 200 mg by mouth daily  , Disp: , Rfl:     cyclobenzaprine (FLEXERIL) 10 mg tablet, Take 1 tablet (10 mg total) by mouth 3 (three) times a day as needed for muscle spasms, Disp: 30 tablet, Rfl: 0    diclofenac sodium (VOLTAREN) 1 %, Apply 2 g topically 4 (four) times a day (Patient taking differently: Apply 2 g topically as needed  ), Disp: 1 Tube, Rfl: 1    DULoxetine (CYMBALTA) 20 mg capsule, Take 1 capsule (20 mg total) by mouth daily (Patient taking differently: Take 40 mg by mouth daily  ), Disp: 90 capsule, Rfl: 3    ELIQUIS 5 MG, TAKE 1 TABLET TWICE A DAY, Disp: 180 tablet, Rfl: 3    Empagliflozin 25 MG TABS, Take 1 tablet (25 mg total) by mouth every morning, Disp: 90 tablet, Rfl: 2    Ferrous Sulfate 27 MG TABS, Take 1 tablet by mouth daily, Disp: , Rfl:     FREESTYLE LITE test strip, Test 4 times daily  , Disp: 400 each, Rfl: 5    HYDROcodone-acetaminophen (NORCO) 5-325 mg per tablet, Take 1 tablet by mouth every 6 (six) hours as needed for pain for up to 10 doses Max Daily Amount: 4 tablets, Disp: 10 tablet, Rfl: 0    levothyroxine 125 mcg tablet, Take 1 tablet (125 mcg total) by mouth daily 1 tab 6 days of the week  No tab Sundays  , Disp: 90 tablet, Rfl: 0    MEGARED OMEGA-3 KRILL OIL PO, Take 1 capsule by mouth daily, Disp: , Rfl:     metFORMIN (GLUCOPHAGE-XR) 500 mg 24 hr tablet, Take 1 tablet (500 mg total) by mouth 2 (two) times a day with meals (Patient taking differently: Take 500 mg by mouth 3 (three) times a day with meals  ), Disp: 180 tablet, Rfl: 2    Misc Natural Products (OSTEO BI-FLEX ADV JOINT SHIELD PO), Take 1 tablet by mouth daily  , Disp: , Rfl:     Multiple Vitamins-Minerals (CENTRUM SILVER 50+MEN) TABS, Take 1 tablet by mouth daily, Disp: , Rfl:     naproxen (NAPROSYN) 500 mg tablet, Take 250 mg by mouth 2 (two) times a day with meals, Disp: , Rfl:     PROAIR  (90 Base) MCG/ACT inhaler, USE 2 INHALATIONS EVERY 4 HOURS AS NEEDED FOR SHORTNESS OF BREATH, Disp: 8 5 g, Rfl: 0    simvastatin (ZOCOR) 20 mg tablet, Take 2 tablets (40 mg total) by mouth daily at bedtime, Disp: 180 tablet, Rfl: 0    clotrimazole-betamethasone (LOTRISONE) 1-0 05 % cream, Apply 1 application topically 2 (two) times a day as needed, Disp: , Rfl:     estradiol (ESTRACE) 0 1 mg/g vaginal cream, , Disp: , Rfl:     ALLERGIES:  Allergies   Allergen Reactions    Fluconazole Dizziness       REVIEW OF SYSTEMS:  Pertinent items are noted in HPI  A comprehensive review of systems was negative      LABS:  HgA1c:   Lab Results   Component Value Date    HGBA1C 7 8 (H) 12/12/2018     BMP:   Lab Results   Component Value Date    GLUCOSE 245 (H) 01/27/2016    CALCIUM 9 2 12/12/2018     12/21/2015    K 4 6 12/12/2018    CO2 30 12/12/2018     12/12/2018    BUN 14 12/12/2018 CREATININE 0 78 12/12/2018       _____________________________________________________  PHYSICAL EXAMINATION:  General: well developed and well nourished, alert, oriented times 3 and appears comfortable  Psychiatric: Normal  HEENT: Trachea Midline, No torticollis  Cardiovascular: No discernable arrhythmia  Pulmonary: No wheezing or stridor  Skin: Mild residual redness about border of right thumb steri=strips but no proximal erythema, no streaking; no purulence or fluctuance  Neurovascular: Sensation Intact to the Median, Ulnar, Radial Nerve, Motor Intact to the Median, Ulnar, Radial Nerve and Pulses Intact    MUSCULOSKELETAL EXAMINATION:  Extremities:  Right hand  Mild residual appropriate soft tissue swelling to base of right thumb  Hand awrm/well perfused  Sensation intact to radial/ulnar aspect of right thumb      _____________________________________________________  STUDIES REVIEWED:  No imaging reviewed today      PROCEDURES PERFORMED:  Procedures  None performed today    Abeba Shah  01/28/19      I interviewed, took the history and examined the patient  I discuss the case with the resident and reviewed the resident's note  I supervised the resident and I agree with the resident management plan as it was presented to me  I was present in the clinic and examined the patient

## 2019-02-01 NOTE — PROGRESS NOTES
ASSESSMENT/PLAN:    Lichen simplex chronicus  Patient using estrogen vaginal cream for this    Osteoporosis  We will check a DEXA since last 1 was 2014  Patient on calcium and vitamin-D  Cannot take Fosamax at this time secondary to her bed reflux     Adenocarcinoma of the colon, 2012  Next colonoscopy 2021 no evidence of disease    Emphysema by CT   Patient only on Ventolin as needed as her pulmonary function test and walk test are okay   Follows with Pulmonary only as needed        Insomnia/anxiety   Patient taking duloxetine for anxiety, sleeping is still not where she would like      Diabetes   Follows with endocrine doctor Kerry This taking Byetta Invocana and metformin Last A1c was up to 7 8 from 7 4      Hashimoto's thyroiditis   Also following with endocrine for suppression of her thyroid with medication         Sick sinus syndrome/cardiomegaly/coronary artery disease/AFib  Patient has a pacer and takes Eliquis for anticoagulation and atenolol Follows with Cardiology        Vitamin D deficiency   Continue vitamin D 2000 units daily         Osteoporosis   Calcium vitamin D and exercise        History of large paraesophageal hernia   Had surgery and no longer has Pacheco's or her reflux and no longer on any PPIs      Colon cancer by history   Next colonoscopy June 2019        Hyperlipidemia   Simvastatin and CoQ10 and diet    Thyroid goiter   Followed by endocrine who orders ultrasounds     Will follow-up with pulmonary for recommendations regarding breathing   Will follow up with endocrine regarding diabetes and thyroid           Rechec 6 months or sooner if needed    Screening blood work prior          Health Maintenance   Topic Date Due    SLP PLAN OF CARE  1940    DTaP,Tdap,and Td Vaccines (1 - Tdap) 10/22/1962    Medicare Annual Wellness Visit (AWV)  08/08/2018    OT PLAN OF CARE  03/06/2019    URINE MICROALBUMIN  05/16/2019    HEMOGLOBIN A1C  06/12/2019    Urinary Incontinence Screening 08/09/2019    DM Eye Exam  12/13/2019    Diabetic Foot Exam  01/09/2020    Fall Risk  02/04/2020    Depression Screening PHQ  02/04/2020    CRC Screening: Colonoscopy  06/11/2021    INFLUENZA VACCINE  Completed    Pneumococcal PPSV23/PCV13 65+ Years / High and Highest Risk  Completed         Problem List as of 2/5/2019 Reviewed: 1/21/2019 11:22 AM by Maddie Smith PA-C    Abnormal echocardiogram    Adenocarcinoma of colon [facility])    Aftercare following right finger joint replacement surgery    Last Assessment & Plan 8/21/2018 Office Visit Written 8/21/2018  8:31 AM by Missy Duenas MD     52-year-old female status post a right ring finger PIP joint replacement on August 16th by Dr Steven Washington  Her dressing was changed today and she was placed in a dorsal splint over her PIP joint  She will follow up in one week as scheduled           Age-related osteoporosis without current pathological fracture    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    Cardiomegaly    CMC DJD(carpometacarpal degenerative joint disease), localized primary, right    Coronary artery disease involving native coronary artery of native heart without angina pectoris    Diffuse nontoxic goiter    Essential hypertension    Feeling anxious    Gastro-esophageal reflux disease without esophagitis    Hematuria, microscopic    History of colon cancer    History of permanent cardiac pacemaker placement    Insomnia    Malignant tumor of colon (HCC)    Mitral regurgitation    Mixed hyperlipidemia    Other specified hypothyroidism    Paraesophageal hernia    Paroxysmal atrial fibrillation (HCC)    Primary osteoarthritis involving multiple joints    Restrictive lung disease    Sick sinus syndrome (HCC)    Stress incontinence in female    Type 2 diabetes mellitus without complication, with long-term current use of insulin (HCC)    Vitamin D deficiency            Subjective:   Chief Complaint   Patient presents with    Coronary Artery Disease    Diabetes    GERD    Hyperlipidemia    Medicare Wellness Visit    Vitamin D Deficiency     Patient is here for recheck on her blood pressure  Also to look over any blood work she has had  Overall she is feeling well but is disappointed that her hand is not better    She follows with Cardiology and has some testing already set up she  She is on Eliquis for anticoagulation and atenolol    She remains on all her medications without any problems  She follows with Dr Alyson Sandoval for diabetes in her A1c is still high so he stopped her Invokana and had and a new medication whose name she is not sure of yet  She is on metformin where she had been off it prior  She also follows with Endocrine for her thyroid and he follows her blood work as well         patient ID: Anshu Rodriguez is a 69 y o  female      Past Medical History:   Diagnosis Date    A-fib [facility])     Abnormal ECG     last assessed: 7/14/2015    Acid reflux     resolved    Anxiety     Benign neoplasm of sigmoid colon 09/13/2011    next colon 2012    Bronchitis, asthmatic     last assessed: 3/12/2012    [facility] [address] )     2012- had colon resection    COPD (chronic obstructive pulmonary disease) ([facility] [address] )     questionable    Depression     Diabetes mellitus ([facility] [address] )     on byetta    Dizziness     occ    Esophageal stricture     last assessed: 9/30/2014    High cholesterol     Hypertension     Hypothyroidism     Iron (Fe) deficiency anemia 12/22/2011    iron pill continue    Irritable bowel syndrome     OA (osteoarthritis)     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    Pacemaker     hx SSS    Rash     labia area- uses cream prn    Restrictive lung disease     Seasonal allergies     Skin scarring/fibrosis     fibrotic scar; last assessed: 3/22/2013    MONA (stress urinary incontinence, female)     Urinary frequency     last assessed: 9/10/2012    Wears glasses     reading     Past Surgical History:   Procedure Laterality Date    CARDIAC PACEMAKER PLACEMENT      does not know type  Dr Alejandra Granados is cariologist   [address]      resection  removed 18 In     COLONOSCOPY  06/01/2012    proctosigmoidectomy    ESOPHAGOGASTRODUODENOSCOPY N/A 1/9/2016    Procedure: ESOPHAGOGASTRODUODENOSCOPY (EGD); Surgeon: Marlen Prajapati MD;  [facility];  Service:    Tomie Coop HYSTERECTOMY      [place] ARTHROPLASTY I-P JT Right 8/16/2018    Procedure: ARTHROPLASTY PIP/FINGER - RIGHT RING;  Surgeon: Lina Ross MD;  [facility];  Service: Orthopedics    [place] CYSTOURETHROSCOPY N/A 5/14/2018    Procedure: Annamarie Prader;  Surgeon: Diana Denney MD;  [facility];  Service: UroGynecology           [place] LAP, REPAIR PARAESOPHAGEAL HERNIA, INCL FUNDOPLASTY W/ MESH N/A 1/27/2016    Procedure: LAPAROSCOPIC PARAESOPHAGEAL HERNIA REPAIR with mesh; toupet  FUNDOPLICATION ;  Surgeon: Erica Brennan MD;  [facility];  Service: Thoracic    [place] REPAIR INTERCARP/CARP-METACARP JT Right 1/10/2019    Procedure: Liss Go;  Surgeon: Lina Ross MD;  [facility];  Service: Orthopedics    [place] SLING OPER STRES INCONTINENCE N/A 5/14/2018    Procedure: INSERTION PUBOVAGINAL SLING (FEMALE);   Surgeon: Diana Denney MD;  [facility];  Service: UroGynecology           TOTAL ABDOMINAL HYSTERECTOMY      WRIST TENDON TRANSFER Right 1/10/2019    Procedure: TRANSFER TENDON FLEXOR CARPI RADIALIS FROM FOREARM TO HAND;  Surgeon: Lina Ross MD;  [facility];  Service: Orthopedics     Family History   Problem Relation Age of Onset    Heart disease Mother     Diabetes Mother     Asthma Father     Stomach cancer Father         gastrkic cancer    Cancer Paternal Grandmother     Cancer Paternal Grandfather     Stomach cancer Sister     Substance Abuse Neg Hx     Mental illness Neg Hx     Alcohol abuse Neg Hx      Social History   Substance Use Topics    Smoking status: Former Smoker     Types: Cigarettes Quit date: 2002    Smokeless tobacco: Never Used    Alcohol use No     History   Smoking Status    Former Smoker    Types: Cigarettes    Quit date: 2002   Smokeless Tobacco    Never Used        MED LIST WAS REVIEWED AND UPDATED       ROS    Constitutional  No fever chills no fatigue no weight loss no weight gain    Mental status  No anxiety or depression and no sleep disturbances no changes in personality or emotional problems no suicidal or homicidal ideations    Eyes  No eye pain no red eyes no visual disturbances no discharge no eye itch    ENT  No earache no hearing loss nasal discharge no sore throat no hoarseness no postnasal drip     Cardio  No chest pain no palpitations no leg edema no claudication no dyspnea on exertion no nocturnal dyspnea    Respiratory  No shortness of breath or wheeze no cough no orthopnea no dyspnea on exertion no hemoptysis no sputum production    GI  No abdominal pain no nausea no vomiting no diarrhea or constipation no bloody stools no change in bowel habits no change in weight      no dysuria hematuria no pyuria no incontinence no pelvic pain    Musculoskeletal  No myalgias arthralgias no joint swelling or stiffness no limb pain or swelling    Skin  No rashes or lesions no itchiness and no wounds    Neuro  No headache dizziness lightheadedness syncope numbness paresthesias or confusion    Heme  No swollen glands no easy bruising          Objective:      VITALS:  Wt Readings from Last 3 Encounters:   02/05/19 76 7 kg (169 lb)   01/28/19 76 2 kg (168 lb)   01/21/19 76 3 kg (168 lb 3 2 oz)     BP Readings from Last 3 Encounters:   02/05/19 128/70   01/28/19 136/67   01/21/19 134/71     Pulse Readings from Last 3 Encounters:   02/05/19 68   01/28/19 87   01/21/19 78     Body mass index is 29 24 kg/m²      Laboratory Results:   Lab Results   Component Value Date    WBC 7 64 12/12/2018    WBC 8 65 08/15/2018    WBC 7 27 04/24/2018    HGB 15 5 (H) 12/12/2018    HGB 14 7 08/15/2018    HGB 15 1 04/24/2018    HCT 49 7 (H) 12/12/2018    HCT 47 5 (H) 08/15/2018    HCT 48 3 (H) 04/24/2018    MCV 93 12/12/2018    MCV 92 08/15/2018    MCV 93 04/24/2018     12/12/2018     08/15/2018     04/24/2018     Lab Results   Component Value Date     12/21/2015     08/01/2015     07/31/2015    K 4 6 12/12/2018    K 4 6 11/02/2018    K 4 6 08/08/2018     12/12/2018     11/02/2018     08/08/2018    CO2 30 12/12/2018    CO2 28 11/02/2018    CO2 30 08/08/2018    BUN 14 12/12/2018    BUN 15 11/02/2018    BUN 13 08/08/2018     Lab Results   Component Value Date    GLUCOSE 245 (H) 01/27/2016    ALT 25 12/12/2018    AST 19 12/12/2018    ALKPHOS 79 12/12/2018    EGFR 74 12/12/2018    CALCIUM 9 2 12/12/2018     No results found for: TSH    Lipid Profile:   Lab Results   Component Value Date    CHOL 147 12/21/2015    CHOL 195 08/21/2015    CHOL 185 03/03/2015     Lab Results   Component Value Date    HDL 18 (L) 05/16/2018    HDL 42 12/19/2017    HDL 42 08/22/2017     Lab Results   Component Value Date    LDLCALC 85 05/16/2018    LDLCALC 66 12/19/2017    LDLCALC 94 08/22/2017     Lab Results   Component Value Date    TRIG 206 (H) 05/16/2018    TRIG 169 (H) 12/19/2017    TRIG 215 (H) 08/22/2017       Diabetic labs (if applicable)  Lab Results   Component Value Date    HGBA1C 7 8 (H) 12/12/2018    HGBA1C 7 7 (H) 11/02/2018    HGBA1C 7 3 (H) 08/08/2018     Lab Results   Component Value Date    GLUF 141 (H) 12/12/2018    [facility] 85 05/16/2018    CREATININE 0 78 12/12/2018          Physical Exam    Constitutional  Appears healthy, Looks well, Appearance consistent with age    Mental Status  Alert, Oriented, Cooperative, Memory function normal , clean, and reasonable    Neck  No neck mass, No thyromegaly, Good carotid upstrokes bilaterally, trachea midline positive click    Respiratory  Breath sounds normal, No rales, No rhonchi, No wheezing, normal palpation    Cardiac   Regular rhythm without ectopy or murmur no S3-S4, no heave lift or thrill to palpation    Vascular  No leg edema, No pedal edema    Muscular skeletal  No clubbing cyanosis , muscle tone normal    Skin  No appreciable rashes or abnormal appearing lesions

## 2019-02-04 ENCOUNTER — EVALUATION (OUTPATIENT)
Dept: OCCUPATIONAL THERAPY | Facility: CLINIC | Age: 79
End: 2019-02-04
Payer: MEDICARE

## 2019-02-04 DIAGNOSIS — M19.031 CMC DJD(CARPOMETACARPAL DEGENERATIVE JOINT DISEASE), LOCALIZED PRIMARY, RIGHT: Primary | ICD-10-CM

## 2019-02-04 DIAGNOSIS — Z47.89 AFTERCARE FOLLOWING SURGERY OF THE MUSCULOSKELETAL SYSTEM: ICD-10-CM

## 2019-02-04 PROCEDURE — 97165 OT EVAL LOW COMPLEX 30 MIN: CPT | Performed by: OCCUPATIONAL THERAPIST

## 2019-02-04 PROCEDURE — 97140 MANUAL THERAPY 1/> REGIONS: CPT | Performed by: OCCUPATIONAL THERAPIST

## 2019-02-04 NOTE — PROGRESS NOTES
OT Evaluation     Today's date: 2019  Patient name: Davion Jorge  : 1940  MRN: [id number]  Referring provider: Judah Choe PA-C  Dx:   Encounter Diagnosis     ICD-10-CM    1  Aia 16 DJD(carpometacarpal degenerative joint disease), localized primary, right M19 031    2  Aftercare following surgery of the musculoskeletal system [id number] Ambulatory referral to PT/OT hand therapy                  Assessment  Assessment details: Juvencio Beaulieu presents S/P R [place] with residual pain , decreased ROM and edema  She is unable to use R hand for all but light ADL  She is wearing a comfort cool splint   She had an infection post op that has resolved   She is unable to pinch , lift ,  , wt bear with R   She has assistance from her    She will begin OT per protocol    Impairments: abnormal coordination, abnormal or restricted ROM, activity intolerance, impaired physical strength, lacks appropriate home exercise program and pain with function  Functional limitations: Pt unable to use R for meal prep , housekeeping ; limited self care Understanding of Dx/Px/POC: good   Prognosis: good    Goals  STG - 2 wks  1- I with HEP  2- improve  Pain 25%    LTG - 6 wks  1- improve ROM 25%  2- improve pain 50%  3- I ADL for housekeeping and meal prep    Plan  Plan details: /sully [facility]  Patient would benefit from: OT eval  Planned modality interventions: fluidotherapy, thermotherapy: hydrocollator packs and cryotherapy  Planned therapy interventions: manual therapy, joint mobilization, stretching, therapeutic exercise, functional ROM exercises, graded motor, home exercise program, massage, strengthening and therapeutic activities  Frequency: 2x week  Duration in weeks: 4  Plan of Care beginning date: 2019  Plan of Care expiration date: 3/4/2019        Subjective Evaluation    History of Present Illness  Date of surgery: 1/10/2019  Mechanism of injury: surgery  Mechanism of injury: S/P R Weilby 1/10/19    Quality of life: good    Pain  Current pain ratin  At best pain ratin  At worst pain ratin  Location: [facility]   Quality: sharp, throbbing, radiating and burning  Relieving factors: ice and medications  Progression: no change    Social Support  Steps to enter house: yes  Stairs in house: yes   Lives in: multiple-level home  Lives with: spouse    Employment status: not working  Hand dominance: right    Treatments  No previous or current treatments  Previous treatment: immobilization  Patient Goals  Patient goals for therapy: decreased edema, increased strength, independence with ADLs/IADLs, decreased pain and increased motion  Patient goal: regain use of R hand        Objective     Observations     Additional Observation Details  Weilby scars    Palpation     Additional Palpation Details  Tender dorsal R thumb    Neurological Testing     Additional Neurological Details  parasthesias  Radial thumb    Active Range of Motion     Right Wrist   Wrist flexion: 45 degrees   Wrist extension: 50 degrees   Radial deviation: 22 degrees   Ulnar deviation: 30 degrees     Right Thumb   Flexion     MP: 35    DIP: 30  Extension     MP: 0    DIP: 0  Palmar Abduction    CMC: 42  Opposition:   opp to 5th tip  Retropulsion 75%    Additional Active Range of Motion Details  R ring 4 cm to [facility]    Strength/Myotome Testing     Left Wrist/Hand      (2nd hand position)     Trial 1: 46    Thumb Strength  Key/Lateral Pinch     [place] 1: 12  Palmar/Three-Point Pinch     Trial 1: 8    Additional Strength Details  R not tested     Swelling     Left Wrist/Hand   Circumference wrist: 15 8 cm    Right Wrist/Hand   Circumference wrist: 16 cm      Flowsheet Rows      Most Recent Value   PT/OT G-Codes   Current Score  47   Projected Score  69          Precautions: Weilby  1/10/19    Daily Treatment Diary     Manual  2/4            graston  4m            Retrograde  4m            MOB wrist 2m Exercise Diary  2/4            AROM thumb 10x            AROM wrist 10x            Passive stretch 10x            Small pegs  30x                                                                                                                                                                                                                                Modalities  2/4            MH pre 10m

## 2019-02-05 ENCOUNTER — OFFICE VISIT (OUTPATIENT)
Dept: FAMILY MEDICINE CLINIC | Facility: CLINIC | Age: 79
End: 2019-02-05
Payer: MEDICARE

## 2019-02-05 VITALS
HEART RATE: 68 BPM | BODY MASS INDEX: 28.85 KG/M2 | HEIGHT: 64 IN | RESPIRATION RATE: 16 BRPM | SYSTOLIC BLOOD PRESSURE: 128 MMHG | WEIGHT: 169 LBS | DIASTOLIC BLOOD PRESSURE: 70 MMHG

## 2019-02-05 DIAGNOSIS — Z13.820 SCREENING FOR OSTEOPOROSIS: ICD-10-CM

## 2019-02-05 DIAGNOSIS — I48.0 PAROXYSMAL ATRIAL FIBRILLATION (HCC): ICD-10-CM

## 2019-02-05 DIAGNOSIS — I25.10 CORONARY ARTERY DISEASE INVOLVING NATIVE CORONARY ARTERY OF NATIVE HEART WITHOUT ANGINA PECTORIS: ICD-10-CM

## 2019-02-05 DIAGNOSIS — E78.2 MIXED HYPERLIPIDEMIA: ICD-10-CM

## 2019-02-05 DIAGNOSIS — K21.9 GASTRO-ESOPHAGEAL REFLUX DISEASE WITHOUT ESOPHAGITIS: ICD-10-CM

## 2019-02-05 DIAGNOSIS — I49.5 SICK SINUS SYNDROME (HCC): ICD-10-CM

## 2019-02-05 DIAGNOSIS — Z79.4 TYPE 2 DIABETES MELLITUS WITHOUT COMPLICATION, WITH LONG-TERM CURRENT USE OF INSULIN (HCC): ICD-10-CM

## 2019-02-05 DIAGNOSIS — C18.9 ADENOCARCINOMA OF COLON (HCC): ICD-10-CM

## 2019-02-05 DIAGNOSIS — K22.70 BARRETT'S ESOPHAGUS WITHOUT DYSPLASIA: ICD-10-CM

## 2019-02-05 DIAGNOSIS — E55.9 VITAMIN D DEFICIENCY: ICD-10-CM

## 2019-02-05 DIAGNOSIS — E03.8 OTHER SPECIFIED HYPOTHYROIDISM: ICD-10-CM

## 2019-02-05 DIAGNOSIS — E11.9 TYPE 2 DIABETES MELLITUS WITHOUT COMPLICATION, WITH LONG-TERM CURRENT USE OF INSULIN (HCC): ICD-10-CM

## 2019-02-05 DIAGNOSIS — Z00.00 MEDICARE ANNUAL WELLNESS VISIT, SUBSEQUENT: ICD-10-CM

## 2019-02-05 DIAGNOSIS — M81.0 AGE-RELATED OSTEOPOROSIS WITHOUT CURRENT PATHOLOGICAL FRACTURE: ICD-10-CM

## 2019-02-05 DIAGNOSIS — L28.0 LICHEN SIMPLEX CHRONICUS: ICD-10-CM

## 2019-02-05 DIAGNOSIS — I10 ESSENTIAL HYPERTENSION: Primary | ICD-10-CM

## 2019-02-05 DIAGNOSIS — E04.0 DIFFUSE NONTOXIC GOITER: ICD-10-CM

## 2019-02-05 DIAGNOSIS — C18.9 MALIGNANT NEOPLASM OF COLON, UNSPECIFIED PART OF COLON (HCC): ICD-10-CM

## 2019-02-05 PROBLEM — Z47.1: Status: RESOLVED | Noted: 2018-08-21 | Resolved: 2019-02-05

## 2019-02-05 PROBLEM — Z96.691: Status: RESOLVED | Noted: 2018-08-21 | Resolved: 2019-02-05

## 2019-02-05 PROCEDURE — 99215 OFFICE O/P EST HI 40 MIN: CPT | Performed by: FAMILY MEDICINE

## 2019-02-05 NOTE — PATIENT INSTRUCTIONS
Lichen simplex chronicus  Patient using estrogen vaginal cream for this    Osteoporosis  We will check a DEXA since last 1 was 2014  Patient on calcium and vitamin-D  Cannot take Fosamax at this time secondary to her bed reflux     Adenocarcinoma of the colon, 2012  Next colonoscopy 2021 no evidence of disease    Emphysema by CT   Patient only on Ventolin as needed as her pulmonary function test and walk test are okay   Follows with Pulmonary only as needed        Insomnia/anxiety   Patient taking duloxetine for anxiety, sleeping is still not where she would like      Diabetes   Follows with endocrine doctor Kerry This taking Byetta Invocana and metformin Last A1c was up to 7 8 from 7 4      Hashimoto's thyroiditis   Also following with endocrine for suppression of her thyroid with medication         Sick sinus syndrome/cardiomegaly/coronary artery disease/AFib  Patient has a pacer and takes Eliquis for anticoagulation and atenolol Follows with Cardiology        Vitamin D deficiency   Continue vitamin D 2000 units daily         Osteoporosis   Calcium vitamin D and exercise        History of large paraesophageal hernia   Had surgery and no longer has Pacheco's or her reflux and no longer on any PPIs      Colon cancer by history   Next colonoscopy June 2019        Hyperlipidemia   Simvastatin and CoQ10 and diet    Thyroid goiter   Followed by endocrine who orders ultrasounds     Will follow-up with pulmonary for recommendations regarding breathing   Will follow up with endocrine regarding diabetes and thyroid           Rechec 6 months or sooner if needed    Screening blood work prior

## 2019-02-05 NOTE — PROGRESS NOTES
Assessment and Plan:    Problem List Items Addressed This Visit     None        Health Maintenance Due   Topic Date Due    SLP PLAN OF CARE  1940    DTaP,Tdap,and Td Vaccines (1 - Tdap) 10/22/1962    Medicare Annual Wellness Visit (AWV)  08/08/2018         HPI:  Sydni Holder is a 69 y o  female here for her Subsequent Wellness Visit      Patient Active Problem List   Diagnosis    Type 2 diabetes mellitus without complication, with long-term current use of insulin ([facility] )    Essential hypertension    Coronary artery disease involving native coronary artery of native heart without angina pectoris    History of colon cancer    Abnormal echocardiogram    Adenocarcinoma of colon ([facility] )    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    Cardiomegaly    Diffuse nontoxic goiter    Feeling anxious    Hematuria, microscopic    Mixed hyperlipidemia    Other specified hypothyroidism    Insomnia    Mitral regurgitation    Age-related osteoporosis without current pathological fracture    History of permanent cardiac pacemaker placement    Paraesophageal hernia    Paroxysmal atrial fibrillation (HCC)    Restrictive lung disease    Primary osteoarthritis involving multiple joints    Sick sinus syndrome ([facility] )    Vitamin D deficiency    Stress incontinence in female    Aftercare following right finger joint replacement surgery    [facility] DJD(carpometacarpal degenerative joint disease), localized primary, right    Malignant tumor of colon ([facility] )    Gastro-esophageal reflux disease without esophagitis     Past Medical History:   Diagnosis Date    [facility]     Abnormal ECG     last assessed: 7/14/2015    Acid reflux     resolved    Anxiety     Benign neoplasm of sigmoid colon 09/13/2011    next colon 2012    Bronchitis, asthmatic     last assessed: 3/12/2012    Colon cancer ([facility] )     2012- had colon resection    COPD (chronic obstructive pulmonary disease) (HCC)     questionable    Depression     Diabetes mellitus ([facility] )     on byetta    Dizziness     occ    Esophageal stricture     last assessed: 9/30/2014    High cholesterol     Hypertension     Hypothyroidism     Iron (Fe) deficiency anemia 12/22/2011    iron pill continue    Irritable bowel syndrome     OA (osteoarthritis)     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    Pacemaker     hx SSS    Rash     labia area- uses cream prn    Restrictive lung disease     Seasonal allergies     Skin scarring/fibrosis     fibrotic scar; last assessed: 3/22/2013    MONA (stress urinary incontinence, female)     Urinary frequency     last assessed: 9/10/2012    Wears glasses     reading     Past Surgical History:   Procedure Laterality Date   [address]      does not know type  Dr Cele Toro is cariologist   [facility]      resection  removed 25 In     COLONOSCOPY  06/01/2012    proctosigmoidectomy    ESOPHAGOGASTRODUODENOSCOPY N/A 1/9/2016    Procedure: ESOPHAGOGASTRODUODENOSCOPY (EGD); Surgeon: Andrés Roach MD;  [facility];  Service:    Ardeth Needs HYSTERECTOMY      [place] ARTHROPLASTY I-P JT Right 8/16/2018    Procedure: ARTHROPLASTY PIP/FINGER - RIGHT RING;  Surgeon: Jhon Iraheta MD;  [facility];  Service: Orthopedics    WV CYSTOURETHROSCOPY N/A 5/14/2018    Procedure: Fabby Cloud;  Surgeon: Ferdinand Masters MD;  [facility];  Service: UroGynecology           WV LAP, REPAIR PARAESOPHAGEAL HERNIA, INCL FUNDOPLASTY W/ MESH N/A 1/27/2016    Procedure: LAPAROSCOPIC PARAESOPHAGEAL HERNIA REPAIR with mesh; toupet  FUNDOPLICATION ;  Surgeon: Jax Em MD;  [facility];  Service: Thoracic    W[place] REPAIR INTERCARP/CARP-METACARP JT Right 1/10/2019    Procedure: Rachel Davis;  Surgeon: Jhon Iraheta MD;  [facility];  Service: Orthopedics    W[place] SLING OPER STRES INCONTINENCE N/A 5/14/2018    Procedure: INSERTION PUBOVAGINAL SLING (FEMALE);   Surgeon: Nicolas Brown Steven Coronado MD;  [facility];  Service: UroGynecology           TOTAL ABDOMINAL HYSTERECTOMY      WRIST TENDON TRANSFER Right 1/10/2019    Procedure: TRANSFER TENDON FLEXOR CARPI RADIALIS FROM FOREARM TO HAND;  Surgeon: Rodríguez Hogue MD;  Location:  MAIN OR;  Service: Orthopedics     Family History   Problem Relation Age of Onset    Heart disease Mother     Diabetes Mother     Asthma Father     Stomach cancer Father         gastrkic cancer    Cancer Paternal Grandmother     Cancer Paternal Grandfather     Stomach cancer Sister     Substance Abuse Neg Hx     Mental illness Neg Hx     Alcohol abuse Neg Hx      History   Smoking Status    Former Smoker    Types: Cigarettes    Quit date: 2002   Smokeless Tobacco    Never Used     History   Alcohol Use No      History   Drug Use No       Current Outpatient Prescriptions   Medication Sig Dispense Refill    acetaminophen (TYLENOL 8 HOUR) 650 mg CR tablet Take 1 tablet (650 mg total) by mouth every 8 (eight) hours 15 tablet 0    ascorbic acid (VITAMIN C) 500 mg tablet Take 500 mg by mouth daily      atenolol (TENORMIN) 50 mg tablet Take 1 tablet (50 mg total) by mouth daily at bedtime 15 tablet 0    BYETTA 10 MCG PEN 10 MCG/0 04ML SOPN Inject 10 mcg under the skin 2 (two) times a day        Cholecalciferol (VITAMIN D-3) 1000 units CAPS Take 1 capsule by mouth daily      clobetasol (TEMOVATE) 0 05 % cream       clotrimazole-betamethasone (LOTRISONE) 1-0 05 % cream Apply 1 application topically 2 (two) times a day as needed      Coenzyme Q10 (COQ10) 200 MG CAPS Take 200 mg by mouth daily        cyclobenzaprine (FLEXERIL) 10 mg tablet Take 1 tablet (10 mg total) by mouth 3 (three) times a day as needed for muscle spasms 30 tablet 0    diclofenac sodium (VOLTAREN) 1 % Apply 2 g topically 4 (four) times a day (Patient taking differently: Apply 2 g topically as needed  ) 1 Tube 1    DULoxetine (CYMBALTA) 20 mg capsule Take 1 capsule (20 mg total) by mouth daily (Patient taking differently: Take 40 mg by mouth daily  ) 90 capsule 3    ELIQUIS 5 MG TAKE 1 TABLET TWICE A  tablet 3    Empagliflozin 25 MG TABS Take 1 tablet (25 mg total) by mouth every morning 90 tablet 2    estradiol (ESTRACE) 0 1 mg/g vaginal cream       Ferrous Sulfate 27 MG TABS Take 1 tablet by mouth daily      FREESTYLE LITE test strip Test 4 times daily  400 each 5    levothyroxine 125 mcg tablet Take 1 tablet (125 mcg total) by mouth daily 1 tab 6 days of the week  No tab Sundays  90 tablet 0    MEGARED OMEGA-3 KRILL OIL PO Take 1 capsule by mouth daily      metFORMIN (GLUCOPHAGE-XR) 500 mg 24 hr tablet Take 1 tablet (500 mg total) by mouth 2 (two) times a day with meals (Patient taking differently: Take 500 mg by mouth 3 (three) times a day with meals  ) 180 tablet 2    Misc Natural Products (OSTEO BI-FLEX ADV JOINT SHIELD PO) Take 1 tablet by mouth daily        Multiple Vitamins-Minerals (CENTRUM SILVER 50+MEN) TABS Take 1 tablet by mouth daily      PROAIR  (90 Base) MCG/ACT inhaler USE 2 INHALATIONS EVERY 4 HOURS AS NEEDED FOR SHORTNESS OF BREATH 8 5 g 0    simvastatin (ZOCOR) 20 mg tablet Take 2 tablets (40 mg total) by mouth daily at bedtime 180 tablet 0     No current facility-administered medications for this visit        Allergies   Allergen Reactions    Fluconazole Dizziness     Immunization History   Administered Date(s) Administered    Influenza 09/30/2015, 09/19/2016, 10/02/2017    Influenza Split High Dose Preservative Free IM 09/30/2013, 09/30/2014, 09/30/2015, 09/19/2016, 10/02/2017    Influenza TIV (IM) 10/24/2008, 12/08/2009, 09/14/2010, 09/13/2011, 09/10/2012, 09/01/2015, 10/05/2016    Influenza, injectable, quadrivalent, preservative free 0 5 mL 10/23/2018    Pneumococcal Conjugate 13-Valent 10/29/2015    Pneumococcal Polysaccharide PPV23 12/01/2005, 03/15/2011, 10/01/2015    Zoster 01/07/2014       Patient Care Team:  Virgie Kennedy Brien Marrufo DO as PCP - General  Jad Landry DO as PCP - Endocrinology (Endocrinology)  DO Radha Allison MD Kathlean Mitchell, MD    Medicare Screening Tests and Risk Assessments:      Health Risk Assessment:  Patient rates overall health as good  Patient feels that their physical health rating is Same  Eyesight was rated as Same  Hearing was rated as Same  Patient feels that their emotional and mental health rating is Same  Pain experienced by patient in the last 7 days has been A lot  Patient's pain rating has been 8/10  Patient states that she has experienced no weight loss or gain in last 6 months  Emotional/Mental Health:  Patient has been feeling nervous/anxious  PHQ-9 Depression Screening:    Frequency of the following problems over the past two weeks:      1  Little interest or pleasure in doing things: 0 - not at all  PHQ-2 Score: 0          Broken Bones/Falls: Fall Risk Assessment:    In the past year, patient has experienced: No history of falling in past year          Bladder/Bowel:  Patient has leaked urine accidently in the last six months  Patient reports no loss of bowel control  Immunizations:  Patient has had a flu vaccination within the last year  Patient has received a pneumonia shot  Patient has received a shingles shot  Patient has received tetanus/diphtheria shot  Home Safety:  Patient does not have trouble with stairs inside or outside of their home  Patient currently reports that there are no safety hazards present in home, working smoke alarms, working carbon monoxide detectors  Preventative Screenings:   Breast cancer screening performed, colon cancer screen completed, cholesterol screen completed, glaucoma eye exam completed,     Nutrition:  Current diet: Regular and Limited junk food with servings of the following:    Medications:  Patient is currently taking over-the-counter supplements       List of OTC medications includes: Vitamins and minerals  Patient is able to manage medications  Lifestyle Choices:  Patient reports no tobacco use  Patient has smoked or used tobacco in the past   Patient has stopped her tobacco use  Tobacco use quit date: Quit smoking 2002  Patient reports no alcohol use  Patient drives a vehicle  Patient wears seat belt  Current level of exercise of physical activity described by patient as: Walking  Activities of Daily Living:  Can get out of bed by his or her self, able to dress self, able to make own meals, able to do own shopping, able to bathe self, can do own laundry/housekeeping, can manage own money, pay bills and track expenses    Previous Hospitalizations:  Hospitalization or ED visit in past 12 months  Number of hospitalizations within the last year: 1-2        Advanced Directives:  Patient has decided on a power of   Patient has spoken to designated power of   Patient has completed advanced directive          Preventative Screening/Counseling:      Cardiovascular:      General: Screening Current          Diabetes:      General: Screening Current          Colorectal Cancer:      General: Screening Current          Breast Cancer:      General: Screening Current          Cervical Cancer:      General: Screening Not Indicated          Osteoporosis:      Due for studies: DXA Appendicular          AAA:      General: Screening Not Indicated          HIV:      General: Screening Not Indicated          Hepatitis C:      General: Screening Not Indicated        Advanced Directives:   Patient has living will for healthcare,     Immunizations:      Influenza: Influenza UTD This Year      Pneumococcal: Lifetime Vaccine Completed      Shingrix: Risks & Benefits Discussed      Hepatitis B (Low risk patients): Series Not Indicated      Zostavax: Zostavax Vaccine UTD      TD: Risks & Benefits Discussed      TDAP: Risks & Benefits Discussed      Other Preventative Counseling (Non-Medicare): Fall Prevention and Increase physical activity          Assessment and Plan:    Problem List Items Addressed This Visit     Adenocarcinoma of colon ([facility] )    Age-related osteoporosis without current pathological fracture    Relevant Orders    DXA bone density spine hip and pelvis    Vitamin D 25 hydroxy    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    Coronary artery disease involving native coronary artery of native heart without angina pectoris    Diffuse nontoxic goiter    Essential hypertension - Primary    Relevant Orders    Comprehensive metabolic panel    CBC and differential    TSH, 3rd generation with Free T4 reflex    Urinalysis with reflex to microscopic    Gastro-esophageal reflux disease without esophagitis    Lichen simplex chronicus    Malignant tumor of colon (HCC)    Mixed hyperlipidemia    Relevant Orders    Lipid panel    Other specified hypothyroidism    Paroxysmal atrial fibrillation (HCC)    Sick sinus syndrome ([facility] )    Type 2 diabetes mellitus without complication, with long-term current use of insulin ([facility] )    Relevant Orders    Microalbumin / creatinine urine ratio    Vitamin D deficiency      Other Visit Diagnoses     Medicare annual wellness visit, subsequent        Screening for osteoporosis        Relevant Orders    DXA bone density spine hip and pelvis        Health Maintenance Due   Topic Date Due    SLP PLAN OF CARE  1940    DTaP,Tdap,and Td Vaccines (1 - Tdap) 10/22/1962    Medicare Annual Wellness Visit (AWV)  08/08/2018         HPI:  Carlos Raymond is a 69 y o  female here for her Subsequent Wellness Visit      Patient Active Problem List   Diagnosis    Type 2 diabetes mellitus without complication, with long-term current use of insulin ([facility] )    Essential hypertension    Coronary artery disease involving native coronary artery of native heart without angina pectoris    Abnormal echocardiogram    [facility] of [facility]    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    Cardiomegaly    Diffuse nontoxic goiter    Feeling anxious    Hematuria, microscopic    Mixed hyperlipidemia    Other specified hypothyroidism    Insomnia    Mitral regurgitation    Age-related osteoporosis without current pathological fracture    History of permanent cardiac pacemaker placement    Paraesophageal hernia    Paroxysmal atrial fibrillation (HCC)    Restrictive lung disease    Primary osteoarthritis involving multiple joints    Sick sinus syndrome (HCC)    Vitamin D deficiency    Stress incontinence in female    [address] 17 DJD(carpometacarpal degenerative joint disease), localized primary, right    Malignant tumor of colon ([facility] )    Gastro-esophageal reflux disease without esophagitis    Lichen simplex chronicus     Past Medical History:   Diagnosis Date    A-fib ([facility] )     Abnormal ECG     last assessed: 7/14/2015    Acid reflux     resolved    Anxiety     Benign neoplasm of sigmoid colon 09/13/2011    next colon 2012    Bronchitis, asthmatic     last assessed: 3/12/2012    Colon cancer ([place] [address] )     2012- had colon resection    COPD [facility]ch[facility] obstructive pulmonary disease) ([facility])     questionable    Depression     Diabetes mellitus (HCC)     on byetta    Dizziness     occ    Esophageal stricture     last assessed: 9/30/2014    High cholesterol     Hypertension     Hypothyroidism     Iron (Fe) deficiency anemia 12/22/2011    iron pill continue    Irritable bowel syndrome     OA (osteoarthritis)     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    Pacemaker     hx SSS    Rash     labia area- uses cream prn    Restrictive lung disease     Seasonal allergies     Skin scarring/fibrosis     fibrotic scar; last assessed: 3/22/2013    MONA (stress urinary incontinence, female)     Urinary frequency     last assessed: 9/10/2012    Wears glasses     reading     Past Surgical History:   Procedure Laterality Date   [address] [place] [id number]       does not know type   Michael Boucher is cariologist     SECTION      COLON SURGERY      resection  removed 25 In     COLONOSCOPY  2012    proctosigmoidectomy    ESOPHAGOGASTRODUODENOSCOPY N/A 2016    Procedure: ESOPHAGOGASTRODUODENOSCOPY (EGD); Surgeon: Zechariah Huff MD;  [facility];  Service:    [facility] HYSTERECTOMY      [place] ARTHROPLASTY I-P JT Right 2018    Procedure: ARTHROPLASTY PIP/FINGER - RIGHT RING;  Surgeon: Isidoro Lopez MD;  [facility];  Service: Orthopedics    AZ CYSTOURETHROSCOPY N/A 2018    Procedure: Farshad Raja;  Surgeon: Olinda Escobedo MD;  [facility];  Service: UroGynecology           AZ LAP, REPAIR PARAESOPHAGEAL HERNIA, INCL FUNDOPLASTY W/ MESH N/A 2016    Procedure: LAPAROSCOPIC PARAESOPHAGEAL HERNIA REPAIR with mesh; toupet  FUNDOPLICATION ;  Surgeon: Rock Garnica MD;  [facility];  Service: Thoracic    AZ REPAIR INTERCARP/CARP-METACARP JT Right 1/10/2019    Procedure: Emmanuelle Wali;  Surgeon: Isidoro Lopez MD;  [facility];  Service: Orthopedics    AZ SLING OPER STRES INCONTINENCE N/A 2018    Procedure: INSERTION PUBOVAGINAL SLING (FEMALE);   Surgeon: Olinda Escobedo MD;  [facility];  Service: UroGynecology           TOTAL ABDOMINAL HYSTERECTOMY      WRIST TENDON TRANSFER Right 1/10/2019    Procedure: TRANSFER TENDON FLEXOR CARPI RADIALIS FROM FOREARM TO HAND;  Surgeon: Isidoro Lopez MD;  [facility];  Service: Orthopedics     Family History   Problem Relation Age of Onset    Heart disease Mother     Diabetes Mother    [facility] Asthma Father     Stomach cancer Father         gastrkic cancer    Cancer Paternal Grandmother     Cancer Paternal Grandfather     Stomach cancer Sister     Substance Abuse Neg Hx     Mental illness Neg Hx     Alcohol abuse Neg Hx      History   Smoking Status    Former Smoker    Types: Cigarettes    Quit date:    Smokeless Tobacco    Never Used     History Alcohol Use No      History   Drug Use No       Current Outpatient Prescriptions   Medication Sig Dispense Refill    acetaminophen (TYLENOL 8 HOUR) 650 mg CR tablet Take 1 tablet (650 mg total) by mouth every 8 (eight) hours 15 tablet 0    ascorbic acid (VITAMIN C) 500 mg tablet Take 500 mg by mouth daily      atenolol (TENORMIN) 50 mg tablet Take 1 tablet (50 mg total) by mouth daily at bedtime 15 tablet 0    BYETTA 10 MCG PEN 10 MCG/0 04ML SOPN Inject 10 mcg under the skin 2 (two) times a day        Cholecalciferol (VITAMIN D-3) 1000 units CAPS Take 1 capsule by mouth daily      clobetasol (TEMOVATE) 0 05 % cream       clotrimazole-betamethasone (LOTRISONE) 1-0 05 % cream Apply 1 application topically 2 (two) times a day as needed      Coenzyme Q10 (COQ10) 200 MG CAPS Take 200 mg by mouth daily        cyclobenzaprine (FLEXERIL) 10 mg tablet Take 1 tablet (10 mg total) by mouth 3 (three) times a day as needed for muscle spasms 30 tablet 0    diclofenac sodium (VOLTAREN) 1 % Apply 2 g topically 4 (four) times a day (Patient taking differently: Apply 2 g topically as needed  ) 1 Tube 1    DULoxetine (CYMBALTA) 20 mg capsule Take 1 capsule (20 mg total) by mouth daily (Patient taking differently: Take 40 mg by mouth daily  ) 90 capsule 3    ELIQUIS 5 MG TAKE 1 TABLET TWICE A  tablet 3    Empagliflozin 25 MG TABS Take 1 tablet (25 mg total) by mouth every morning 90 tablet 2    estradiol (ESTRACE) 0 1 mg/g vaginal cream       Ferrous Sulfate 27 MG TABS Take 1 tablet by mouth daily      FREESTYLE LITE test strip Test 4 times daily  400 each 5    levothyroxine 125 mcg tablet Take 1 tablet (125 mcg total) by mouth daily 1 tab 6 days of the week  No tab Sundays   90 tablet 0    MEGARED OMEGA-3 KRILL OIL PO Take 1 capsule by mouth daily      metFORMIN (GLUCOPHAGE-XR) 500 mg 24 hr tablet Take 1 tablet (500 mg total) by mouth 2 (two) times a day with meals (Patient taking differently: Take 500 mg by mouth 3 (three) times a day with meals  ) 180 tablet 2    Misc Natural Products (OSTEO BI-FLEX ADV JOINT SHIELD PO) Take 1 tablet by mouth daily        Multiple Vitamins-Minerals (CENTRUM SILVER 50+MEN) TABS Take 1 tablet by mouth daily      PROAIR  (90 Base) MCG/ACT inhaler USE 2 INHALATIONS EVERY 4 HOURS AS NEEDED FOR SHORTNESS OF BREATH 8 5 g 0    simvastatin (ZOCOR) 20 mg tablet Take 2 tablets (40 mg total) by mouth daily at bedtime 180 tablet 0     No current facility-administered medications for this visit        Allergies   Allergen Reactions    Fluconazole Dizziness     Immunization History   Administered Date(s) Administered    Influenza 09/30/2015, 09/19/2016, 10/02/2017    Influenza Split High Dose Preservative Free IM 09/30/2013, 09/30/2014, 09/30/2015, 09/19/2016, 10/02/2017    Influenza TIV (IM) 10/24/2008, 12/08/2009, 09/14/2010, 09/13/2011, 09/10/2012, 09/01/2015, 10/05/2016    Influenza, injectable, quadrivalent, preservative free 0 5 mL 10/23/2018    Pneumococcal Conjugate 13-Valent 10/29/2015    Pneumococcal Polysaccharide PPV23 12/01/2005, 03/15/2011, 10/01/2015    Zoster 01/07/2014       Patient Care Team:  Perla Santilaln DO as PCP - General  Sher Gant DO as PCP - Endocrinology (Endocrinology)  DO Osito Wright MD Eliverto Mccoy, MD    Medicare Screening Tests and Risk Assessments:  Medicare Annual Wellness Visit

## 2019-02-06 ENCOUNTER — LAB (OUTPATIENT)
Dept: LAB | Facility: CLINIC | Age: 79
End: 2019-02-06
Payer: MEDICARE

## 2019-02-06 ENCOUNTER — TRANSCRIBE ORDERS (OUTPATIENT)
Dept: ADMINISTRATIVE | Facility: HOSPITAL | Age: 79
End: 2019-02-06

## 2019-02-06 DIAGNOSIS — E13.49 OTHER DIABETIC NEUROLOGICAL COMPLICATION ASSOCIATED WITH OTHER SPECIFIED DIABETES MELLITUS (HCC): ICD-10-CM

## 2019-02-06 DIAGNOSIS — E03.8 HYPOTHYROIDISM DUE TO HASHIMOTO'S THYROIDITIS: ICD-10-CM

## 2019-02-06 DIAGNOSIS — E06.3 HYPOTHYROIDISM DUE TO HASHIMOTO'S THYROIDITIS: ICD-10-CM

## 2019-02-06 DIAGNOSIS — E13.49 OTHER DIABETIC NEUROLOGICAL COMPLICATION ASSOCIATED WITH OTHER SPECIFIED DIABETES MELLITUS (HCC): Primary | ICD-10-CM

## 2019-02-06 LAB
ALBUMIN SERPL BCP-MCNC: 4.2 G/DL (ref 3.5–5)
ALP SERPL-CCNC: 78 U/L (ref 46–116)
ALT SERPL W P-5'-P-CCNC: 27 U/L (ref 12–78)
ANION GAP SERPL CALCULATED.3IONS-SCNC: 4 MMOL/L (ref 4–13)
AST SERPL W P-5'-P-CCNC: 19 U/L (ref 5–45)
BILIRUB SERPL-MCNC: 0.92 MG/DL (ref 0.2–1)
BUN SERPL-MCNC: 13 MG/DL (ref 5–25)
CALCIUM SERPL-MCNC: 9.2 MG/DL (ref 8.3–10.1)
CHLORIDE SERPL-SCNC: 102 MMOL/L (ref 100–108)
CO2 SERPL-SCNC: 29 MMOL/L (ref 21–32)
CREAT SERPL-MCNC: 0.71 MG/DL (ref 0.6–1.3)
EST. AVERAGE GLUCOSE BLD GHB EST-MCNC: 163 MG/DL
GFR SERPL CREATININE-BSD FRML MDRD: 82 ML/MIN/1.73SQ M
GLUCOSE P FAST SERPL-MCNC: 129 MG/DL (ref 65–99)
HBA1C MFR BLD: 7.3 % (ref 4.2–6.3)
POTASSIUM SERPL-SCNC: 4.6 MMOL/L (ref 3.5–5.3)
PROT SERPL-MCNC: 7.7 G/DL (ref 6.4–8.2)
SODIUM SERPL-SCNC: 135 MMOL/L (ref 136–145)
T4 FREE SERPL-MCNC: 0.99 NG/DL (ref 0.76–1.46)
TSH SERPL DL<=0.05 MIU/L-ACNC: 5.2 UIU/ML (ref 0.36–3.74)

## 2019-02-06 PROCEDURE — 83036 HEMOGLOBIN GLYCOSYLATED A1C: CPT

## 2019-02-06 PROCEDURE — 84439 ASSAY OF FREE THYROXINE: CPT

## 2019-02-06 PROCEDURE — 84443 ASSAY THYROID STIM HORMONE: CPT

## 2019-02-06 PROCEDURE — 80053 COMPREHEN METABOLIC PANEL: CPT

## 2019-02-06 PROCEDURE — 36415 COLL VENOUS BLD VENIPUNCTURE: CPT

## 2019-02-06 NOTE — PROGRESS NOTES
Please call the patient regarding abnormal result  TSH is elevated 5 2  Free T4 is in the low-normal range at 0 99  Please increase levothyroxine 125 mcg from 1 tablet 6 days a week to 1 tablet 7 days per week  Recheck TSH and free T4 in 6 weeks to reassess

## 2019-02-11 ENCOUNTER — OFFICE VISIT (OUTPATIENT)
Dept: OCCUPATIONAL THERAPY | Facility: CLINIC | Age: 79
End: 2019-02-11
Payer: MEDICARE

## 2019-02-11 DIAGNOSIS — Z47.89 AFTERCARE FOLLOWING SURGERY OF THE MUSCULOSKELETAL SYSTEM: ICD-10-CM

## 2019-02-11 DIAGNOSIS — M19.031 CMC DJD(CARPOMETACARPAL DEGENERATIVE JOINT DISEASE), LOCALIZED PRIMARY, RIGHT: Primary | ICD-10-CM

## 2019-02-11 PROCEDURE — 97110 THERAPEUTIC EXERCISES: CPT

## 2019-02-11 PROCEDURE — 97140 MANUAL THERAPY 1/> REGIONS: CPT

## 2019-02-11 NOTE — PROGRESS NOTES
Daily Note     Today's date: 2019  Patient name: Hong Dumont  : 1940  MRN: [id number]  Referring provider: Oly Barillas PA-C  Dx:   Encounter Diagnosis     ICD-10-CM    1  [facility] DJD(carpometacarpal degenerative joint disease), localized primary, right M19 031    2  Aftercare following surgery of the musculoskeletal system [id number]                   Subjective: "(The scar) is sensitive "      Objective: See treatment diary below      Assessment: Tolerated treatment well  Patient demonstrated fatigue post treatment and would benefit from continued OT  Pt's surgical site is tender  Performed manual STM instead of IASTM  Plan: Continue per plan of care  Precautions: Gus Martinez  1/10/19      Daily Treatment Diary     Manual             graston  4m Man   STM 5'           Retrograde  4m 3'           MOB wrist 2m                                          Exercise Diary             AROM thumb 10x 10x           AROM wrist 10x 10x           Passive stretch 10x            Small pegs  30x 30 pegs                                                                                                                                                                                                                               Modalities             MH pre 10m 10'

## 2019-02-18 ENCOUNTER — OFFICE VISIT (OUTPATIENT)
Dept: OCCUPATIONAL THERAPY | Facility: CLINIC | Age: 79
End: 2019-02-18
Payer: MEDICARE

## 2019-02-18 DIAGNOSIS — Z47.89 AFTERCARE FOLLOWING SURGERY OF THE MUSCULOSKELETAL SYSTEM: Primary | ICD-10-CM

## 2019-02-18 PROCEDURE — 97140 MANUAL THERAPY 1/> REGIONS: CPT | Performed by: OCCUPATIONAL THERAPIST

## 2019-02-18 PROCEDURE — 97022 WHIRLPOOL THERAPY: CPT | Performed by: OCCUPATIONAL THERAPIST

## 2019-02-18 PROCEDURE — 97110 THERAPEUTIC EXERCISES: CPT | Performed by: OCCUPATIONAL THERAPIST

## 2019-02-18 NOTE — PROGRESS NOTES
Daily Note     Today's date: 2019  Patient name: Sydni Holder  : 1940  MRN: [id number]  Referring provider: Brandi Morgan PA-C  Dx:   Encounter Diagnosis     ICD-10-CM    1  Aftercare following surgery of the musculoskeletal system Z47 89                   Subjective: I feel good  Objective: See treatment diary below      Assessment: Tolerated treatment well  Patient demonstrated fatigue post treatment and would benefit from continued OT  Pt's surgical site is mildly tender  Pt  To see MD after next session  Plan: Continue per plan of care  Plan to upgrade with MD clearance  Precautions: Weilby  1/10/19      Daily Treatment Diary     Manual            graston  4m Man   STM 5' 4m          Retrograde  4m 3' 3m          MOB wrist 2m  3m                                        Exercise Diary            AROM thumb 10x 10x 10x          AROM wrist 10x 10x 10x          Passive stretch 10x  10x          Small pegs  30x 30 pegs 30x                                                                                                                                                                                                                              Modalities            MH pre 10m 10'           Fluido   10m

## 2019-02-20 DIAGNOSIS — E11.8 TYPE 2 DIABETES MELLITUS WITH COMPLICATION, UNSPECIFIED WHETHER LONG TERM INSULIN USE: ICD-10-CM

## 2019-02-20 RX ORDER — SIMVASTATIN 20 MG
TABLET ORAL
Qty: 180 TABLET | Refills: 0 | Status: SHIPPED | OUTPATIENT
Start: 2019-02-20 | End: 2019-06-13 | Stop reason: SDUPTHER

## 2019-02-25 ENCOUNTER — OFFICE VISIT (OUTPATIENT)
Dept: OBGYN CLINIC | Facility: CLINIC | Age: 79
End: 2019-02-25

## 2019-02-25 ENCOUNTER — OFFICE VISIT (OUTPATIENT)
Dept: OCCUPATIONAL THERAPY | Facility: CLINIC | Age: 79
End: 2019-02-25
Payer: MEDICARE

## 2019-02-25 VITALS
HEIGHT: 64 IN | BODY MASS INDEX: 29.23 KG/M2 | HEART RATE: 85 BPM | DIASTOLIC BLOOD PRESSURE: 83 MMHG | WEIGHT: 171.2 LBS | SYSTOLIC BLOOD PRESSURE: 130 MMHG

## 2019-02-25 DIAGNOSIS — Z47.89 AFTERCARE FOLLOWING SURGERY OF THE MUSCULOSKELETAL SYSTEM: Primary | ICD-10-CM

## 2019-02-25 DIAGNOSIS — M18.11 PRIMARY OSTEOARTHRITIS OF FIRST CARPOMETACARPAL JOINT OF RIGHT HAND: Primary | ICD-10-CM

## 2019-02-25 DIAGNOSIS — T14.8XXA FRACTURE: ICD-10-CM

## 2019-02-25 PROCEDURE — 97140 MANUAL THERAPY 1/> REGIONS: CPT | Performed by: OCCUPATIONAL THERAPIST

## 2019-02-25 PROCEDURE — 99024 POSTOP FOLLOW-UP VISIT: CPT | Performed by: ORTHOPAEDIC SURGERY

## 2019-02-25 PROCEDURE — 97022 WHIRLPOOL THERAPY: CPT | Performed by: OCCUPATIONAL THERAPIST

## 2019-02-25 PROCEDURE — 97110 THERAPEUTIC EXERCISES: CPT | Performed by: OCCUPATIONAL THERAPIST

## 2019-02-25 NOTE — PROGRESS NOTES
Assessment:   s/p Weilby procedure R thumb 1/10/19    Plan:   Patient may progress to strengthening in therapy  She may dc brace at this time and just use as needed  No formal restrictions  Follow Up:  2  month(s)    To Do Next Visit:         CHIEF COMPLAINT:  Chief Complaint   Patient presents with    Right Hand - Post-op         SUBJECTIVE:  Caroline Murphy is a 69y o  year old female who presents for follow up after s/p Weilby procedure R thumb 1/10/19  Today patient has Pain  Mild  Intermittant  Aching         PHYSICAL EXAMINATION:  General: well developed and well nourished, alert, oriented times 3 and appears comfortable  Psychiatric: Normal    MUSCULOSKELETAL EXAMINATION:  Incision: healed no signs of infection  Range of Motion: full thumb ROM no hyperextension, no crepitation   Neurovascular status: Neuro intact, good cap refill  Activity Restrictions: No restrictions         STUDIES REVIEWED:  No Studies to review      PROCEDURES PERFORMED:  Procedures  No Procedures performed today   Scribe Attestation    I,:   Stanislav Barber am acting as a scribe while in the presence of the attending physician :        I,:   Karis Cain MD personally performed the services described in this documentation    as scribed in my presence :

## 2019-02-25 NOTE — PROGRESS NOTES
Daily Note / ReEval    Today's date: 2019  Patient name: Seth Kaufman  : 1940  MRN: [id number]  Referring provider: Daisy Day PA-C  Dx:   Encounter Diagnosis     ICD-10-CM    1  Aftercare following surgery of the musculoskeletal system Z47 89                   Subjective: I feel good  Objective: See treatment diary below  Right Wrist   Wrist flexion: 45 degrees - 52 degrees ()  Wrist extension: 50 degrees - 64 degrees ()  Radial deviation: 22 degrees   Ulnar deviation: 30 degrees     Right Thumb   Flexion     MP: 35- 40 degrees ()    DIP: 30- 60 degrees ()  Extension     MP: 0    DIP: 0  Palmar Abduction    CMC: 42- 48 degrees ()  Opposition:   opp to 5th tip  Retropulsion 75%    Goals  STG - 2 wks  1- I with HEP- met  2- improve  Pain 25%- met    LTG - 6 wks  1- improve ROM 25%- progress  2- improve pain 50%- met  3- I ADL for housekeeping and meal prep- met    Assessment: Tolerated treatment well  Patient ROM is improved, pain is 2/10 with function  Pt's surgical site is mildly tender  Pt  To see MD today  Will benefit from continued OT for progressive strengthening  Plan: Continue per plan of care  Plan to upgrade with MD clearance  Precautions: Weilby  1/10/19      Daily Treatment Diary     Manual           graston  4m Man   STM 5' 4m 4m         Retrograde  4m 3' 3m 3m         MOB wrist 2m  3m 3m                                       Exercise Diary           AROM thumb 10x 10x 10x 10x         AROM wrist 10x 10x 10x 10x         Passive stretch 10x  10x 10x         Small pegs  30x 30 pegs 30x 30x                                                                                                                                                                                                                             Modalities           MH pre 10m 10'           Fluido   10m 10m

## 2019-02-26 ENCOUNTER — REMOTE DEVICE CLINIC VISIT (OUTPATIENT)
Dept: CARDIOLOGY CLINIC | Facility: CLINIC | Age: 79
End: 2019-02-26
Payer: MEDICARE

## 2019-02-26 DIAGNOSIS — I49.5 SICK SINUS SYNDROME (HCC): ICD-10-CM

## 2019-02-26 DIAGNOSIS — I48.0 PAROXYSMAL ATRIAL FIBRILLATION (HCC): Primary | ICD-10-CM

## 2019-02-26 DIAGNOSIS — Z95.0 PRESENCE OF PERMANENT CARDIAC PACEMAKER: ICD-10-CM

## 2019-02-26 PROCEDURE — 93294 REM INTERROG EVL PM/LDLS PM: CPT | Performed by: INTERNAL MEDICINE

## 2019-02-26 PROCEDURE — 93296 REM INTERROG EVL PM/IDS: CPT | Performed by: INTERNAL MEDICINE

## 2019-02-26 NOTE — PROGRESS NOTES
Results for orders placed or performed in visit on 02/26/19   Cardiac EP device report    Narrative    MDT-DUAL CHAMBER PPM  CARELINK TRANSMISSION: BATTERY VOLTAGE ADEQUATE  (6 YRS)  AP 83%  1%  ALL AVAILABLE LEAD PARAMETERS WITHIN NORMAL LIMITS  25 FAST A & V EPISODES DETECTED LONGEST 9 MIN  388 AT/AF EPISODE DETECTEDF (2 % OF TIME)  388 AT/AF EPISODES DETECTED LONGEST <6 HOURS  PATIENT IS ON ATENOLOL AND ELIQUIS  HISTORY OF PAF  NORMAL DEVICE FUNCTION   ---BROWN

## 2019-03-04 ENCOUNTER — APPOINTMENT (OUTPATIENT)
Dept: OCCUPATIONAL THERAPY | Facility: CLINIC | Age: 79
End: 2019-03-04
Payer: MEDICARE

## 2019-03-06 ENCOUNTER — OFFICE VISIT (OUTPATIENT)
Dept: OCCUPATIONAL THERAPY | Facility: CLINIC | Age: 79
End: 2019-03-06
Payer: MEDICARE

## 2019-03-06 DIAGNOSIS — Z47.89 AFTERCARE FOLLOWING SURGERY OF THE MUSCULOSKELETAL SYSTEM: Primary | ICD-10-CM

## 2019-03-06 DIAGNOSIS — M19.031 CMC DJD(CARPOMETACARPAL DEGENERATIVE JOINT DISEASE), LOCALIZED PRIMARY, RIGHT: ICD-10-CM

## 2019-03-06 PROCEDURE — 97022 WHIRLPOOL THERAPY: CPT | Performed by: OCCUPATIONAL THERAPIST

## 2019-03-06 PROCEDURE — 97140 MANUAL THERAPY 1/> REGIONS: CPT | Performed by: OCCUPATIONAL THERAPIST

## 2019-03-06 PROCEDURE — 97110 THERAPEUTIC EXERCISES: CPT | Performed by: OCCUPATIONAL THERAPIST

## 2019-03-06 NOTE — PROGRESS NOTES
Daily Note    Today's date: 3/6/2019  Patient name: Casey Caal  : 1940  MRN: [id number]  Referring provider: Mirian Walker PA-C  Dx:   Encounter Diagnosis     ICD-10-CM    1  Aftercare following surgery of the musculoskeletal system Z47 89    2  [facility] DJD(carpometacarpal degenerative joint disease), localized primary, right M19 031                   Subjective: I feel good  Objective: See treatment diary below      Assessment: Tolerated treatment well  Tolerating light resistive therex with mild fatigue  Plan: Continue per plan of care  Precautions: Weilby  1/10/19      Daily Treatment Diary     Manual   3/6        graston  4m Man   STM 5' 4m 4m 4m        Retrograde  4m 3' 3m 3m 3m        MOB wrist 2m  3m 3m 3m                                      Exercise Diary   3/6        AROM thumb 10x 10x 10x 10x 10x        AROM wrist 10x 10x 10x 10x 10x        Passive stretch 10x  10x 10x 10x        Small pegs  30x 30 pegs 30x 30x         Wrist curls     #2 3x10        EDC     Red bands x2        Pegs w/ clip     red                                                                                                                                                                                     Modalities   3/6        MH pre 10m 10'           Fluido   10m 10m 10m

## 2019-03-07 ENCOUNTER — HOSPITAL ENCOUNTER (OUTPATIENT)
Dept: NON INVASIVE DIAGNOSTICS | Facility: HOSPITAL | Age: 79
Discharge: HOME | End: 2019-03-07
Attending: INTERNAL MEDICINE
Payer: MEDICARE

## 2019-03-07 ENCOUNTER — HOSPITAL ENCOUNTER (OUTPATIENT)
Dept: BONE DENSITY | Facility: IMAGING CENTER | Age: 79
Discharge: HOME | End: 2019-03-07
Payer: MEDICARE

## 2019-03-07 DIAGNOSIS — M81.0 AGE-RELATED OSTEOPOROSIS WITHOUT CURRENT PATHOLOGICAL FRACTURE: ICD-10-CM

## 2019-03-07 DIAGNOSIS — I48.0 PAROXYSMAL ATRIAL FIBRILLATION (HCC): ICD-10-CM

## 2019-03-07 DIAGNOSIS — I25.10 CORONARY ARTERY DISEASE INVOLVING NATIVE CORONARY ARTERY OF NATIVE HEART WITHOUT ANGINA PECTORIS: ICD-10-CM

## 2019-03-07 DIAGNOSIS — I34.0 NON-RHEUMATIC MITRAL REGURGITATION: ICD-10-CM

## 2019-03-07 DIAGNOSIS — Z13.820 SCREENING FOR OSTEOPOROSIS: ICD-10-CM

## 2019-03-07 PROCEDURE — 93306 TTE W/DOPPLER COMPLETE: CPT

## 2019-03-07 PROCEDURE — 93306 TTE W/DOPPLER COMPLETE: CPT | Performed by: INTERNAL MEDICINE

## 2019-03-07 PROCEDURE — 77080 DXA BONE DENSITY AXIAL: CPT

## 2019-03-08 ENCOUNTER — OFFICE VISIT (OUTPATIENT)
Dept: ENDOCRINOLOGY | Facility: HOSPITAL | Age: 79
End: 2019-03-08
Payer: MEDICARE

## 2019-03-08 VITALS
HEIGHT: 64 IN | SYSTOLIC BLOOD PRESSURE: 126 MMHG | BODY MASS INDEX: 29.06 KG/M2 | DIASTOLIC BLOOD PRESSURE: 84 MMHG | WEIGHT: 170.2 LBS | HEART RATE: 86 BPM

## 2019-03-08 DIAGNOSIS — E06.3 HYPOTHYROIDISM DUE TO HASHIMOTO'S THYROIDITIS: ICD-10-CM

## 2019-03-08 DIAGNOSIS — I10 ESSENTIAL HYPERTENSION: ICD-10-CM

## 2019-03-08 DIAGNOSIS — E03.8 HYPOTHYROIDISM DUE TO HASHIMOTO'S THYROIDITIS: ICD-10-CM

## 2019-03-08 DIAGNOSIS — E11.40 TYPE 2 DIABETES MELLITUS WITH DIABETIC NEUROPATHY, WITHOUT LONG-TERM CURRENT USE OF INSULIN (HCC): Primary | ICD-10-CM

## 2019-03-08 DIAGNOSIS — E55.9 VITAMIN D DEFICIENCY: ICD-10-CM

## 2019-03-08 DIAGNOSIS — E78.2 MIXED HYPERLIPIDEMIA: ICD-10-CM

## 2019-03-08 PROCEDURE — 99214 OFFICE O/P EST MOD 30 MIN: CPT | Performed by: NURSE PRACTITIONER

## 2019-03-08 NOTE — PROGRESS NOTES
Aleyda Horvath 69 y o  female MRN: [id number]    Encounter: [id number]      Assessment/Plan     Assessment: This is a 69y o -year-old female with type 2 diabetes, hypothyroidism, hypertension, hyperlipidemia and vitamin-D deficiency  Plan:  1   Type 2 diabetes:  Her most recent hemoglobin A1c is improved to 7 3  She states that she is becoming more active and will be much more active in the spring  For now, she will continue  metformin, Jardiance and Byetta at the current dose   I have asked her to check her blood sugars twice daily at alternating times and for the record to the office in 2 weeks for review and further adjustment, if necessary  Offered medical nutrition therapy offered at the time of the visit which was declined at this time  She is up-to-date with her annual diabetic eye exams and is seeing the podiatrist every 12 weeks  Check hemoglobin A1c prior to next visit      2   Hypothyroidism:  Her most recent TSH is slightly elevated with a low normal free T4  I have asked her to continue levothyroxine 125 mcg daily with 2 tablets on Sunday  Check TSH and free T4 prior to next visit  Further titration of her levothyroxine dose may take place upon review of updated lab work      3   Hypertension:  She is normotensive in the office today   Continue atenolol   Check comprehensive metabolic panel prior to next visit      4   Hyperlipidemia:  Continue simvastatin  Lipid panel ordered by Dr Laura Chow earlier this week      5   Vitamin-D deficiency:  Continue supplement with 1000 units of vitamin D3 daily  Vitamin-D level ordered by Dr Laura Chow earlier this week  6   Osteopenia:  Recent DEXA scan reveals osteopenia to the lumbar spine and left hip  She will continue with calcium and vitamin-D supplementation  Stressed staying active reviewed fall risk and safety at the time of the visit      CC:  Type 2 Diabetes follow-up    History of Present Illness     HPI:  60-year-old female presents in the office today for follow-up of type 2 diabetes, hypothyroidism, hypertension and hyperlipidemia  Anamika Pritchett states she has been diagnosed with type 2 diabetes for 8 years  Anamika Pritchett presents with no blood sugar records or meter for download   She checks her blood sugars once daily in the AM and states she is in the 150-160 range   She is currently taking metformin 500 mg three times daily, Byetta 10 mg twice daily and Jardiance 25 mg daily   She denies any recent episodes of hypoglycemia, polyuria, polydipsia or polyphagia   Her most recent hemoglobin A1c from February 6, 2019 of 7 3  She states she is up-to-date with her annual diabetic eye exam with Dr Steve Ware as of December 2018   She complains of some numbness and tingling to her feet at times and follows Podiatry every 12 weeks for regular diabetic foot care      For her hypothyroidism, she is treated with levothyroxine 125 mcg daily   Her most recent TSH from  February 6, 2019 was 5 200 with a free T4 of 0 99  She complains of some constipation at times but otherwise has no symptoms/complaints of hypo or hyperthyroidism      Her hypertension is treated with atenolol 50 mg daily      For her hyperlipidemia, she is treated with simvastatin 40 mg daily      For her vitamin-D deficiency, she supplements with 1000 units of vitamin D3 daily  Her most recent DEXA scan performed on March 7, 2019 revealed a T-score of -1 4 and lumbar spine consistent with low bone mineral density-osteopenia  When compared to the prior examination, there was no significant change to the total bone mineral density of the lumbar spine  However, there was a 5% decrease in the total bone mineral density of the left hip  She continues to supplement with calcium and vitamin-D daily  She denies any recent falls or fractures  She recently underwent surgery on her right hand  Review of Systems   Constitutional: Negative  Negative for chills, fatigue and fever  HENT: Negative    Negative for trouble swallowing and voice change  Eyes: Negative  Respiratory: Positive for shortness of breath (Dyspnea on exertion at times)  Negative for chest tightness  Cardiovascular: Negative  Negative for chest pain and palpitations  Gastrointestinal: Negative  Negative for abdominal pain, constipation, diarrhea and vomiting  Endocrine: Positive for polyuria ( attributed to Jardiance)  Negative for cold intolerance, heat intolerance, polydipsia and polyphagia  Genitourinary: Negative  Musculoskeletal: Positive for arthralgias ( right hand)  Skin: Negative  Allergic/Immunologic: Negative  Neurological: Negative  Negative for dizziness, syncope, light-headedness and headaches  Hematological: Negative  Psychiatric/Behavioral: Negative  All other systems reviewed and are negative        Historical Information   Past Medical History:   Diagnosis Date    A-fib [facility])     Abnormal ECG     last assessed: 7/14/2015    Acid reflux     resolved    Anxiety     Benign neoplasm of sigmoid colon 09/13/2011    next colon 2012    Bronchitis, asthmatic     last assessed: 3/12/2012    Colon cancer ([facility] [address] )     2012- had colon resection    COPD (chronic obstructive pulmonary disease) ([facility] [address] )     questionable    Depression     Diabetes mellitus ([facility] [address] )     on byetta    Dizziness     occ    Esophageal stricture     last assessed: 9/30/2014    High cholesterol     Hypertension     Hypothyroidism     Iron (Fe) deficiency anemia 12/22/2011    iron pill continue    Irritable bowel syndrome     OA (osteoarthritis)     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    Pacemaker     hx SSS    Rash     labia area- uses cream prn    Restrictive lung disease     Seasonal allergies     Skin scarring/fibrosis     fibrotic scar; last assessed: 3/22/2013    MONA (stress urinary incontinence, female)     Urinary frequency     last assessed: 9/10/2012    Wears glasses     reading     Past Surgical History: Procedure Laterality Date    CARDIAC PACEMAKER PLACEMENT      does not know type  Dr Bharathi Alford is cariologist     SECTION      COLON SURGERY      resection  removed 25 In     COLONOSCOPY  2012    proctosigmoidectomy    ESOPHAGOGASTRODUODENOSCOPY N/A 2016    Procedure: ESOPHAGOGASTRODUODENOSCOPY (EGD); Surgeon: Odalys Shook MD;  [facility];  Service:    Bety Riis HYSTERECTOMY      WY ARTHROPLASTY I-P JT Right 2018    Procedure: ARTHROPLASTY PIP/FINGER - RIGHT RING;  Surgeon: Rekha Koenig MD;  [facility];  Service: Orthopedics    WY CYSTOURETHROSCOPY N/A 2018    Procedure: Juluis Door;  Surgeon: Florence Calderon MD;  [facility];  Service: UroGynecology           WY LAP, REPAIR PARAESOPHAGEAL HERNIA, INCL FUNDOPLASTY W/ MESH N/A 2016    Procedure: LAPAROSCOPIC PARAESOPHAGEAL HERNIA REPAIR with mesh; toupet  FUNDOPLICATION ;  Surgeon: Ximena Ahumada MD;  [facility];  Service: Thoracic    WY REPAIR INTERCARP/CARP-METACARP JT Right 1/10/2019    Procedure: Jordan Griffith;  Surgeon: Rekha Koenig MD;  [facility];  Service: Orthopedics    WY SLING OPER STRES INCONTINENCE N/A 2018    Procedure: INSERTION PUBOVAGINAL SLING (FEMALE);   Surgeon: Florence Calderon MD;  [facility];  Service: UroGynecology           TOTAL ABDOMINAL HYSTERECTOMY      WRIST TENDON TRANSFER Right 1/10/2019    Procedure: TRANSFER TENDON FLEXOR CARPI RADIALIS FROM FOREARM TO HAND;  Surgeon: Rekha Koenig MD;  [facility];  Service: Orthopedics     Social History   Social History     Substance and Sexual Activity   Alcohol Use No     Social History     Substance and Sexual Activity   Drug Use No     Social History     Tobacco Use   Smoking Status Former Smoker    Types: Cigarettes    Last attempt to quit:     Years since quittin 1   Smokeless Tobacco Former User    Quit date:      Family History:   Family History   Problem Relation Age of Onset    Heart disease Mother     Diabetes Mother     Asthma Father     Stomach cancer Father         gastrkic cancer    Cancer Paternal Grandmother     Cancer Paternal Grandfather     Stomach cancer Sister     Substance Abuse Neg Hx     Mental illness Neg Hx     Alcohol abuse Neg Hx        Meds/Allergies   Current Outpatient Medications   Medication Sig Dispense Refill    acetaminophen (TYLENOL 8 HOUR) 650 mg CR tablet Take 1 tablet (650 mg total) by mouth every 8 (eight) hours 15 tablet 0    ascorbic acid (VITAMIN C) 500 mg tablet Take 500 mg by mouth daily      atenolol (TENORMIN) 50 mg tablet Take 1 tablet (50 mg total) by mouth daily at bedtime 15 tablet 0    BYETTA 10 MCG PEN 10 MCG/0 04ML SOPN Inject 10 mcg under the skin 2 (two) times a day        Cholecalciferol (VITAMIN D-3) 1000 units CAPS Take 1 capsule by mouth daily      clobetasol (TEMOVATE) 0 05 % cream       clotrimazole-betamethasone (LOTRISONE) 1-0 05 % cream Apply 1 application topically 2 (two) times a day as needed      Coenzyme Q10 (COQ10) 200 MG CAPS Take 200 mg by mouth daily        cyclobenzaprine (FLEXERIL) 10 mg tablet Take 1 tablet (10 mg total) by mouth 3 (three) times a day as needed for muscle spasms 30 tablet 0    diclofenac sodium (VOLTAREN) 1 % Apply 2 g topically 4 (four) times a day (Patient taking differently: Apply 2 g topically as needed  ) 1 Tube 1    DULoxetine (CYMBALTA) 20 mg capsule Take 1 capsule (20 mg total) by mouth daily (Patient taking differently: Take 40 mg by mouth daily  ) 90 capsule 3    ELIQUIS 5 MG TAKE 1 TABLET TWICE A  tablet 3    Empagliflozin 25 MG TABS Take 1 tablet (25 mg total) by mouth every morning 90 tablet 2    estradiol (ESTRACE) 0 1 mg/g vaginal cream       Ferrous Sulfate 27 MG TABS Take 1 tablet by mouth daily      FREESTYLE LITE test strip Test 4 times daily   400 each 5    levothyroxine 125 mcg tablet Take 1 tablet (125 mcg total) by mouth daily 1 tab 6 days of the week  No tab Sundays  90 tablet 0    MEGARED OMEGA-3 KRILL OIL PO Take 1 capsule by mouth daily      metFORMIN (GLUCOPHAGE-XR) 500 mg 24 hr tablet Take 1 tablet (500 mg total) by mouth 2 (two) times a day with meals (Patient taking differently: Take 500 mg by mouth 3 (three) times a day with meals  ) 180 tablet 2    Misc Natural Products (OSTEO BI-FLEX ADV JOINT SHIELD PO) Take 1 tablet by mouth daily        Multiple Vitamins-Minerals (CENTRUM SILVER 50+MEN) TABS Take 1 tablet by mouth daily      PROAIR  (90 Base) MCG/ACT inhaler USE 2 INHALATIONS EVERY 4 HOURS AS NEEDED FOR SHORTNESS OF BREATH 8 5 g 0    simvastatin (ZOCOR) 20 mg tablet TAKE 2 TABLETS DAILY AT BEDTIME 180 tablet 0     No current facility-administered medications for this visit  Allergies   Allergen Reactions    Fluconazole Dizziness       Objective   Vitals: Blood pressure 126/84, pulse 86, height 5' 3 7" (1 618 m), weight 77 2 kg (170 lb 3 2 oz), not currently breastfeeding  Physical Exam   Constitutional: She is oriented to person, place, and time  She appears well-developed and well-nourished  HENT:   Head: Normocephalic and atraumatic  Mouth/Throat: Oropharynx is clear and moist    Eyes: Pupils are equal, round, and reactive to light  Conjunctivae and EOM are normal    Neck: Normal range of motion  Neck supple  Cardiovascular: Normal rate, regular rhythm, normal heart sounds and intact distal pulses  Pacer to left chest wall   Pulmonary/Chest: Effort normal and breath sounds normal    Abdominal: Soft  Bowel sounds are normal    Musculoskeletal: Normal range of motion  Neurological: She is alert and oriented to person, place, and time  Skin: Skin is warm and dry  Dry skin   Psychiatric: She has a normal mood and affect  Her behavior is normal  Judgment and thought content normal    Vitals reviewed      Lab Results:   Lab Results   Component Value Date/Time    Hemoglobin A1C 7 3 (H) 02/06/2019 07:47 AM    Hemoglobin A1C 7 8 (H) 12/12/2018 07:50 AM    Hemoglobin A1C 7 7 (H) 11/02/2018 09:05 AM    WBC 7 64 12/12/2018 07:50 AM    WBC 8 65 08/15/2018 11:16 AM    WBC 7 27 04/24/2018 07:25 AM    Hemoglobin 15 5 (H) 12/12/2018 07:50 AM    Hemoglobin 14 7 08/15/2018 11:16 AM    Hemoglobin 15 1 04/24/2018 07:25 AM    Hematocrit 49 7 (H) 12/12/2018 07:50 AM    Hematocrit 47 5 (H) 08/15/2018 11:16 AM    Hematocrit 48 3 (H) 04/24/2018 07:25 AM    MCV 93 12/12/2018 07:50 AM    MCV 92 08/15/2018 11:16 AM    MCV 93 04/24/2018 07:25 AM    Platelets 171 67/56/7595 07:50 AM    Platelets 902 68/25/9087 11:16 AM    Platelets 471 16/07/8734 07:25 AM    BUN 13 02/06/2019 07:47 AM    BUN 14 12/12/2018 07:50 AM    BUN 15 11/02/2018 09:05 AM    Potassium 4 6 02/06/2019 07:47 AM    Potassium 4 6 12/12/2018 07:50 AM    Potassium 4 6 11/02/2018 09:05 AM    Chloride 102 02/06/2019 07:47 AM    Chloride 102 12/12/2018 07:50 AM    Chloride 104 11/02/2018 09:05 AM    CO2 29 02/06/2019 07:47 AM    CO2 30 12/12/2018 07:50 AM    CO2 28 11/02/2018 09:05 AM    Creatinine 0 71 02/06/2019 07:47 AM    Creatinine 0 78 12/12/2018 07:50 AM    Creatinine 0 77 11/02/2018 09:05 AM    AST 19 02/06/2019 07:47 AM    AST 19 12/12/2018 07:50 AM    AST 19 11/02/2018 09:05 AM    ALT 27 02/06/2019 07:47 AM    ALT 25 12/12/2018 07:50 AM    ALT 25 11/02/2018 09:05 AM    Albumin 4 2 02/06/2019 07:47 AM    Albumin 4 0 12/12/2018 07:50 AM    Albumin 3 7 11/02/2018 09:05 AM    HDL, Direct 18 (L) 05/16/2018 08:46 AM    Triglycerides 206 (H) 05/16/2018 08:46 AM       Portions of the record may have been created with voice recognition software  Occasional wrong word or "sound a like" substitutions may have occurred due to the inherent limitations of voice recognition software  Read the chart carefully and recognize, using context, where substitutions have occurred

## 2019-03-08 NOTE — PATIENT INSTRUCTIONS
Be mindful of diet       Stay active and stay hydrated       Continue Metformin 500 mg to three times per day (breakfast, lunch and dinner)  Continue Byetta and Jardiance       Please check your blood sugars 2 times daily and for the record to the office in 2 weeks for review and adjustment, if necessary  Take levothyroxine 125 mcg daily Monday through Saturday and take 2 tablets on Sunday      Continue atenolol and simvastatin       Continue with regular supplementation of vitamin D3 daily  Obtain lab work as prescribed

## 2019-03-11 ENCOUNTER — OFFICE VISIT (OUTPATIENT)
Dept: OCCUPATIONAL THERAPY | Facility: CLINIC | Age: 79
End: 2019-03-11
Payer: MEDICARE

## 2019-03-11 DIAGNOSIS — E06.3 HYPOTHYROIDISM DUE TO HASHIMOTO'S THYROIDITIS: Primary | ICD-10-CM

## 2019-03-11 DIAGNOSIS — E03.8 HYPOTHYROIDISM DUE TO HASHIMOTO'S THYROIDITIS: Primary | ICD-10-CM

## 2019-03-11 DIAGNOSIS — M19.031 CMC DJD(CARPOMETACARPAL DEGENERATIVE JOINT DISEASE), LOCALIZED PRIMARY, RIGHT: ICD-10-CM

## 2019-03-11 DIAGNOSIS — Z47.89 AFTERCARE FOLLOWING SURGERY OF THE MUSCULOSKELETAL SYSTEM: Primary | ICD-10-CM

## 2019-03-11 PROCEDURE — 97022 WHIRLPOOL THERAPY: CPT | Performed by: OCCUPATIONAL THERAPIST

## 2019-03-11 PROCEDURE — 97110 THERAPEUTIC EXERCISES: CPT | Performed by: OCCUPATIONAL THERAPIST

## 2019-03-11 PROCEDURE — 97140 MANUAL THERAPY 1/> REGIONS: CPT | Performed by: OCCUPATIONAL THERAPIST

## 2019-03-11 RX ORDER — LEVOTHYROXINE SODIUM 112 UG/1
TABLET ORAL
Qty: 90 TABLET | Refills: 3 | Status: SHIPPED | OUTPATIENT
Start: 2019-03-11 | End: 2019-08-06 | Stop reason: ALTCHOICE

## 2019-03-11 NOTE — PROGRESS NOTES
Daily Note     Today's date: 3/11/2019  Patient name: Seth Kaufman  : 1940  MRN: [id number]  Referring provider: Daisy Day PA-C  Dx:   Encounter Diagnosis     ICD-10-CM    1  Aftercare following surgery of the musculoskeletal system Z47 89    2  [facility] DJD(carpometacarpal degenerative joint disease), localized primary, right M19 031                   Subjective: My hand is feeling  Good       Objective: See treatment diary below      Precautions: Weilby  1/10/19      Daily Treatment Diary     Manual  2/4 2/11 2/18 2/25 3/6 3/7       graston  4m Man  STM 5' 4m 4m 4m 4m       Retrograde  4m 3' 3m 3m 3m 3m       MOB wrist 2m  3m 3m 3m 3m                                     Exercise Diary  2/4 2/11 2/18 2/25 3/6 3/11       AROM thumb 10x 10x 10x 10x 10x 10x       AROM wrist 10x 10x 10x 10x 10x 10x       Passive stretch 10x  10x 10x 10x 10x       Small pegs  30x 30 pegs 30x 30x         Wrist curls     #2 3x10 #2  3x10       EDC     Red bands x2 Red  Bands  x2       Pegs w/ clip     red Red  30x                                                                                                                                                                                    Modalities  2/4 2/11 2/18 2/25 3/6 3/11       MH pre 10m 10'           Fluido   10m 10m 10m 10m                            Assessment: Tolerated treatment well  Patient has scar sensitivity   She has good use of R hand for ADL  Plan: Continue per plan of care

## 2019-03-14 ENCOUNTER — OFFICE VISIT (OUTPATIENT)
Dept: OCCUPATIONAL THERAPY | Facility: CLINIC | Age: 79
End: 2019-03-14
Payer: MEDICARE

## 2019-03-14 DIAGNOSIS — Z47.89 AFTERCARE FOLLOWING SURGERY OF THE MUSCULOSKELETAL SYSTEM: Primary | ICD-10-CM

## 2019-03-14 PROCEDURE — 97022 WHIRLPOOL THERAPY: CPT

## 2019-03-14 PROCEDURE — 97140 MANUAL THERAPY 1/> REGIONS: CPT

## 2019-03-14 PROCEDURE — 97110 THERAPEUTIC EXERCISES: CPT

## 2019-03-14 NOTE — PROGRESS NOTES
Daily Note     Today's date: 3/14/2019  Patient name: Nora Mares  : 1940  MRN: [id number]  Referring provider: Brandi Moe PA-C  Dx:   Encounter Diagnosis     ICD-10-CM    1  Aftercare following surgery of the musculoskeletal system Z47 89                   Subjective: I can feel it (post PREs)      Objective: See treatment diary below      Precautions: Weilby  1/10/19      Daily Treatment Diary     Manual  2/4 2/11 2/18 2/25 3/6 3/7 3/11      graston  4m Man  STM 5' 4m 4m 4m 4m 4m      Retrograde  4m 3' 3m 3m 3m 3m 3m      MOB wrist 2m  3m 3m 3m 3m 3m                                    Exercise Diary  2/4 2/11 2/18 2/25 3/6 3/11 3/13      AROM thumb 10x 10x 10x 10x 10x 10x 10x      AROM wrist 10x 10x 10x 10x 10x 10x 10x      Passive stretch 10x  10x 10x 10x 10x 10x      Small pegs  30x 30 pegs 30x 30x         Wrist curls     #2 3x10 #2  3x10 2# 3x10      EDC     Red bands x2 Red  Bands  x2 Red Bands x2      Pegs w/ clip     red Red  30x Green 30x                                                                                                                                                                                   Modalities  2/4 2/11 2/18 2/25 3/6 3/11 3/14      MH pre 10m 10'           Fluido   10m 10m 10m 10m 10m                           Assessment: Tolerated treatment well  Patient has scar sensitivity   She has good use of R hand for ADL  Plan: Continue per plan of care

## 2019-03-18 ENCOUNTER — OFFICE VISIT (OUTPATIENT)
Dept: OCCUPATIONAL THERAPY | Facility: CLINIC | Age: 79
End: 2019-03-18
Payer: MEDICARE

## 2019-03-18 DIAGNOSIS — M19.031 CMC DJD(CARPOMETACARPAL DEGENERATIVE JOINT DISEASE), LOCALIZED PRIMARY, RIGHT: ICD-10-CM

## 2019-03-18 DIAGNOSIS — Z47.89 AFTERCARE FOLLOWING SURGERY OF THE MUSCULOSKELETAL SYSTEM: Primary | ICD-10-CM

## 2019-03-18 PROCEDURE — 97110 THERAPEUTIC EXERCISES: CPT | Performed by: OCCUPATIONAL THERAPIST

## 2019-03-18 PROCEDURE — 97022 WHIRLPOOL THERAPY: CPT | Performed by: OCCUPATIONAL THERAPIST

## 2019-03-18 PROCEDURE — 97140 MANUAL THERAPY 1/> REGIONS: CPT | Performed by: OCCUPATIONAL THERAPIST

## 2019-03-18 NOTE — PROGRESS NOTES
Daily Note     Today's date: 3/18/2019  Patient name: Anshu Rodriguez  : 1940  MRN: [id number]  Referring provider: Mcdonald Barthel, PA-C  Dx:   Encounter Diagnosis     ICD-10-CM    1  Aftercare following surgery of the musculoskeletal system Z47 89    2  [facility]ia 16 DJD(carpometacarpal degenerative joint disease), localized primary, right M19 031                   Subjective: I am shaking a lot  Objective: See treatment diary below      Precautions: [place]  1/10/19      Daily Treatment Diary     Manual  2/4 2/11 2/18 2/25 3/6 3/7 3/11 3/18     graston  4m Man  STM 5' 4m 4m 4m 4m 4m 4m     Retrograde  4m 3' 3m 3m 3m 3m 3m 3m     MOB wrist 2m  3m 3m 3m 3m 3m 3m                                   Exercise Diary  2/4 2/11 2/18 2/25 3/6 3/11 3/13 3/18     AROM thumb 10x 10x 10x 10x 10x 10x 10x 10x     AROM wrist 10x 10x 10x 10x 10x 10x 10x 10x     Passive stretch 10x  10x 10x 10x 10x 10x 10x     Small pegs  30x 30 pegs 30x 30x         Wrist curls     #2 3x10 #2  3x10 2# 3x10 #3 3x10     EDC     Red bands x2 Red  Bands  x2 Red Bands x2 Green bands x2     Pegs w/ clip     red Red  30x Green 30x Green x30                                                                                                                                                                                  Modalities  2/4 2/11 2/18 2/25 3/6 3/11 3/14 3/18     MH pre 10m 10'           Fluido   10m 10m 10m 10m 10m 10m                          Assessment: Tolerated treatment well  Patient has scar sensitivity   She has good use of R hand for ADL  Plan: Continue per plan of care  Anticipate D/C next week

## 2019-03-20 ENCOUNTER — OFFICE VISIT (OUTPATIENT)
Dept: OCCUPATIONAL THERAPY | Facility: CLINIC | Age: 79
End: 2019-03-20
Payer: MEDICARE

## 2019-03-20 DIAGNOSIS — M19.031 CMC DJD(CARPOMETACARPAL DEGENERATIVE JOINT DISEASE), LOCALIZED PRIMARY, RIGHT: ICD-10-CM

## 2019-03-20 DIAGNOSIS — I10 ESSENTIAL HYPERTENSION: ICD-10-CM

## 2019-03-20 DIAGNOSIS — Z47.89 AFTERCARE FOLLOWING SURGERY OF THE MUSCULOSKELETAL SYSTEM: Primary | ICD-10-CM

## 2019-03-20 PROCEDURE — 97140 MANUAL THERAPY 1/> REGIONS: CPT | Performed by: OCCUPATIONAL THERAPIST

## 2019-03-20 PROCEDURE — 97022 WHIRLPOOL THERAPY: CPT | Performed by: OCCUPATIONAL THERAPIST

## 2019-03-20 PROCEDURE — 97110 THERAPEUTIC EXERCISES: CPT | Performed by: OCCUPATIONAL THERAPIST

## 2019-03-20 NOTE — PROGRESS NOTES
Daily Note     Today's date: 3/20/2019  Patient name: Carlos Raymond  : 1940  MRN: [id number]  Referring provider: Martin Duke PA-C  Dx:   Encounter Diagnosis     ICD-10-CM    1  Aftercare following surgery of the musculoskeletal system Z47 89    2  [facility] DJD(carpometacarpal degenerative joint disease), localized primary, right M19 031                   Subjective: I am doing a lot at home with my hand  Objective: See treatment diary below      Precautions: [place]  1/10/19      Daily Treatment Diary     Manual  2/4 2/11 2/18 2/25 3/6 3/7 3/11 3/18 3/20    graston  4m Man  STM 5' 4m 4m 4m 4m 4m 4m 4m    Retrograde  4m 3' 3m 3m 3m 3m 3m 3m 3m    MOB wrist 2m  3m 3m 3m 3m 3m 3m 3m                                  Exercise Diary  2/4 2/11 2/18 2/25 3/6 3/11 3/13 3/18 3/20    AROM thumb 10x 10x 10x 10x 10x 10x 10x 10x 10x    AROM wrist 10x 10x 10x 10x 10x 10x 10x 10x 10x    Passive stretch 10x  10x 10x 10x 10x 10x 10x 10x    Small pegs  30x 30 pegs 30x 30x         Wrist curls     #2 3x10 #2  3x10 2# 3x10 #3 3x10 #3 3x10    EDC     Red bands x2 Red  Bands  x2 Red Bands x2 Green bands x2 Green bands x2    Pegs w/ clip     red Red  30x Green 30x Green x30     Pinch clips         Black 3 sets                                                                                                                                                                    Modalities  2/4 2/11 2/18 2/25 3/6 3/11 3/14 3/18 3/20    MH pre 10m 10'           Fluido   10m 10m 10m 10m 10m 10m 10m                         Assessment: Tolerated treatment well  Patient has scar sensitivity   She has good use of R hand for ADL  Plan: Continue per plan of care  RA and D/C next week

## 2019-03-21 ENCOUNTER — APPOINTMENT (OUTPATIENT)
Dept: OCCUPATIONAL THERAPY | Facility: CLINIC | Age: 79
End: 2019-03-21
Payer: MEDICARE

## 2019-03-21 RX ORDER — ATENOLOL 50 MG/1
TABLET ORAL
Qty: 90 TABLET | Refills: 3 | Status: SHIPPED | OUTPATIENT
Start: 2019-03-21 | End: 2020-01-19

## 2019-03-25 ENCOUNTER — OFFICE VISIT (OUTPATIENT)
Dept: OCCUPATIONAL THERAPY | Facility: CLINIC | Age: 79
End: 2019-03-25
Payer: MEDICARE

## 2019-03-25 DIAGNOSIS — Z47.89 AFTERCARE FOLLOWING SURGERY OF THE MUSCULOSKELETAL SYSTEM: Primary | ICD-10-CM

## 2019-03-25 DIAGNOSIS — M19.031 CMC DJD(CARPOMETACARPAL DEGENERATIVE JOINT DISEASE), LOCALIZED PRIMARY, RIGHT: ICD-10-CM

## 2019-03-25 PROCEDURE — 97140 MANUAL THERAPY 1/> REGIONS: CPT | Performed by: OCCUPATIONAL THERAPIST

## 2019-03-25 PROCEDURE — 97022 WHIRLPOOL THERAPY: CPT | Performed by: OCCUPATIONAL THERAPIST

## 2019-03-25 PROCEDURE — 97110 THERAPEUTIC EXERCISES: CPT | Performed by: OCCUPATIONAL THERAPIST

## 2019-03-25 NOTE — PROGRESS NOTES
Daily Note     Today's date: 3/25/2019  Patient name: Florence Fisher  : 1940  MRN: [id number]  Referring provider: Heydi Watts PA-C  Dx:   Encounter Diagnosis     ICD-10-CM    1  Aftercare following surgery of the musculoskeletal system Z47 89    2  [facility] DJD(carpometacarpal degenerative joint disease), localized primary, right M19 031                   Subjective: I am doing a lot at home with my hand  Objective: See treatment diary below      Precautions: [place]  1/10/19      Daily Treatment Diary     Manual  2/4 2/11 2/18 2/25 3/6 3/7 3/11 3/18 3/20 3/25   graston  4m Man   STM 5' 4m 4m 4m 4m 4m 4m 4m 4m   Retrograde  4m 3' 3m 3m 3m 3m 3m 3m 3m 3m   MOB wrist 2m  3m 3m 3m 3m 3m 3m 3m 3m                                 Exercise Diary  2/4 2/11 2/18 2/25 3/6 3/11 3/13 3/18 3/20 3/25   AROM thumb 10x 10x 10x 10x 10x 10x 10x 10x 10x 10x   AROM wrist 10x 10x 10x 10x 10x 10x 10x 10x 10x 10x   Passive stretch 10x  10x 10x 10x 10x 10x 10x 10x 10x   Small pegs  30x 30 pegs 30x 30x         Wrist curls     #2 3x10 #2  3x10 2# 3x10 #3 3x10 #3 3x10 #3 3x10   EDC     Red bands x2 Red  Bands  x2 Red Bands x2 Green bands x2 Green bands x2 Green bands x2   Pegs w/ clip     red Red  30x Green 30x Green x30     Pinch clips         Black 3 sets Black 3 sets                                                                                                                                                                   Modalities  2/4 2/11 2/18 2/25 3/6 3/11 3/14 3/18 3/20 3/25   MH pre 10m 10'           Fluido   10m 10m 10m 10m 10m 10m 10m 10m                ctive Range of Motion     Right Wrist   Wrist flexion: 45 degrees -  45degrees (3/25)  Wrist extension: 50 degrees - 70degrees (3/25)  Radial deviation: 22 degrees   Ulnar deviation: 30 degrees     Right Thumb   Flexion     MP: 35- 50 (3/25)    DIP: 30- 70 (3/25)  Extension     MP: 0    DIP: 0  Palmar Abduction    CMC: 42- 48 (3/25)  Opposition:   opp to 5th tip  Retropulsion 75%      Strength/Myotome Testing   R  35lbs  R lateral pinch 9lbs  R tip pinch 10lbs  R 3 jaw 8lbs    Goals  STG - 2 wks  1- I with HEP-met  2- improve  Pain 25%-met    LTG - 6 wks  1- improve ROM 25%-met  2- improve pain 50%-met  3- I ADL for housekeeping and meal prep- met        Assessment: Tolerated treatment well  Patient has scar sensitivity   She has good use of R hand for ADL  ROM and strength is WNLs  Pt  Has met all goals established with treatment  No further OT needed at this time  Plan: D/C OT, goals met

## 2019-03-28 ENCOUNTER — APPOINTMENT (OUTPATIENT)
Dept: OCCUPATIONAL THERAPY | Facility: CLINIC | Age: 79
End: 2019-03-28
Payer: MEDICARE

## 2019-04-03 DIAGNOSIS — Z79.4 TYPE 2 DIABETES MELLITUS WITHOUT COMPLICATION, WITH LONG-TERM CURRENT USE OF INSULIN (HCC): ICD-10-CM

## 2019-04-03 DIAGNOSIS — E11.9 TYPE 2 DIABETES MELLITUS WITHOUT COMPLICATION, WITH LONG-TERM CURRENT USE OF INSULIN (HCC): ICD-10-CM

## 2019-04-03 RX ORDER — METFORMIN HYDROCHLORIDE 500 MG/1
TABLET, EXTENDED RELEASE ORAL
Qty: 270 TABLET | Refills: 1 | Status: SHIPPED | OUTPATIENT
Start: 2019-04-03 | End: 2019-06-12 | Stop reason: SDUPTHER

## 2019-04-08 ENCOUNTER — OFFICE VISIT (OUTPATIENT)
Dept: OBGYN CLINIC | Facility: CLINIC | Age: 79
End: 2019-04-08

## 2019-04-08 VITALS
WEIGHT: 167.6 LBS | HEART RATE: 81 BPM | DIASTOLIC BLOOD PRESSURE: 69 MMHG | BODY MASS INDEX: 28.61 KG/M2 | SYSTOLIC BLOOD PRESSURE: 121 MMHG | HEIGHT: 64 IN

## 2019-04-08 DIAGNOSIS — Z47.89 AFTERCARE FOLLOWING SURGERY OF THE MUSCULOSKELETAL SYSTEM: Primary | ICD-10-CM

## 2019-04-08 PROCEDURE — 99024 POSTOP FOLLOW-UP VISIT: CPT | Performed by: ORTHOPAEDIC SURGERY

## 2019-04-10 DIAGNOSIS — Z79.4 TYPE 2 DIABETES MELLITUS WITHOUT COMPLICATION, WITH LONG-TERM CURRENT USE OF INSULIN (HCC): ICD-10-CM

## 2019-04-10 DIAGNOSIS — F41.9 ANXIETY: ICD-10-CM

## 2019-04-10 DIAGNOSIS — E11.9 TYPE 2 DIABETES MELLITUS WITHOUT COMPLICATION, WITH LONG-TERM CURRENT USE OF INSULIN (HCC): ICD-10-CM

## 2019-04-10 RX ORDER — EMPAGLIFLOZIN 25 MG/1
TABLET, FILM COATED ORAL
Qty: 90 TABLET | Refills: 2 | Status: SHIPPED | OUTPATIENT
Start: 2019-04-10 | End: 2020-07-21

## 2019-04-11 RX ORDER — DULOXETIN HYDROCHLORIDE 20 MG/1
CAPSULE, DELAYED RELEASE ORAL
Qty: 90 CAPSULE | Refills: 3 | Status: SHIPPED | OUTPATIENT
Start: 2019-04-11 | End: 2020-04-21

## 2019-04-17 DIAGNOSIS — Z79.4 TYPE 2 DIABETES MELLITUS WITHOUT COMPLICATION, WITH LONG-TERM CURRENT USE OF INSULIN (HCC): Primary | ICD-10-CM

## 2019-04-17 DIAGNOSIS — E11.9 TYPE 2 DIABETES MELLITUS WITHOUT COMPLICATION, WITH LONG-TERM CURRENT USE OF INSULIN (HCC): Primary | ICD-10-CM

## 2019-04-17 RX ORDER — EXENATIDE 250 UG/ML
10 INJECTION SUBCUTANEOUS 2 TIMES DAILY
Qty: 3 PEN | Refills: 1 | Status: SHIPPED | OUTPATIENT
Start: 2019-04-17 | End: 2019-12-06 | Stop reason: SDUPTHER

## 2019-05-07 ENCOUNTER — OFFICE VISIT (OUTPATIENT)
Dept: GASTROENTEROLOGY | Facility: CLINIC | Age: 79
End: 2019-05-07
Payer: MEDICARE

## 2019-05-07 VITALS
SYSTOLIC BLOOD PRESSURE: 128 MMHG | WEIGHT: 164 LBS | DIASTOLIC BLOOD PRESSURE: 88 MMHG | BODY MASS INDEX: 29.06 KG/M2 | HEART RATE: 70 BPM | HEIGHT: 63 IN

## 2019-05-07 DIAGNOSIS — K21.9 GASTRO-ESOPHAGEAL REFLUX DISEASE WITHOUT ESOPHAGITIS: ICD-10-CM

## 2019-05-07 DIAGNOSIS — C18.9 ADENOCARCINOMA OF COLON (HCC): Primary | ICD-10-CM

## 2019-05-07 DIAGNOSIS — K22.70 BARRETT'S ESOPHAGUS WITHOUT DYSPLASIA: ICD-10-CM

## 2019-05-07 PROCEDURE — 99213 OFFICE O/P EST LOW 20 MIN: CPT | Performed by: INTERNAL MEDICINE

## 2019-05-07 PROCEDURE — 1124F ACP DISCUSS-NO DSCNMKR DOCD: CPT | Performed by: INTERNAL MEDICINE

## 2019-05-07 RX ORDER — SENNOSIDES 8.6 MG
650 CAPSULE ORAL EVERY 8 HOURS PRN
COMMUNITY
End: 2020-08-10 | Stop reason: ALTCHOICE

## 2019-05-07 RX ORDER — HYDROCODONE BITARTRATE AND ACETAMINOPHEN 5; 325 MG/1; MG/1
1 TABLET ORAL EVERY 4 HOURS PRN
COMMUNITY
End: 2019-08-06 | Stop reason: ALTCHOICE

## 2019-05-07 RX ORDER — NAPROXEN 500 MG/1
500 TABLET ORAL 2 TIMES DAILY WITH MEALS
COMMUNITY
End: 2019-08-06 | Stop reason: ALTCHOICE

## 2019-05-28 ENCOUNTER — LAB (OUTPATIENT)
Dept: LAB | Facility: CLINIC | Age: 79
End: 2019-05-28
Payer: MEDICARE

## 2019-05-28 DIAGNOSIS — E06.3 HYPOTHYROIDISM DUE TO HASHIMOTO'S THYROIDITIS: ICD-10-CM

## 2019-05-28 DIAGNOSIS — E03.8 HYPOTHYROIDISM DUE TO HASHIMOTO'S THYROIDITIS: ICD-10-CM

## 2019-05-28 DIAGNOSIS — I10 ESSENTIAL HYPERTENSION: ICD-10-CM

## 2019-05-28 DIAGNOSIS — E11.40 TYPE 2 DIABETES MELLITUS WITH DIABETIC NEUROPATHY, WITHOUT LONG-TERM CURRENT USE OF INSULIN (HCC): ICD-10-CM

## 2019-05-28 LAB
ALBUMIN SERPL BCP-MCNC: 3.8 G/DL (ref 3.5–5)
ALP SERPL-CCNC: 70 U/L (ref 46–116)
ALT SERPL W P-5'-P-CCNC: 24 U/L (ref 12–78)
ANION GAP SERPL CALCULATED.3IONS-SCNC: 5 MMOL/L (ref 4–13)
AST SERPL W P-5'-P-CCNC: 20 U/L (ref 5–45)
BILIRUB SERPL-MCNC: 0.69 MG/DL (ref 0.2–1)
BUN SERPL-MCNC: 12 MG/DL (ref 5–25)
CALCIUM SERPL-MCNC: 8.7 MG/DL (ref 8.3–10.1)
CHLORIDE SERPL-SCNC: 105 MMOL/L (ref 100–108)
CO2 SERPL-SCNC: 30 MMOL/L (ref 21–32)
CREAT SERPL-MCNC: 0.67 MG/DL (ref 0.6–1.3)
EST. AVERAGE GLUCOSE BLD GHB EST-MCNC: 160 MG/DL
GFR SERPL CREATININE-BSD FRML MDRD: 85 ML/MIN/1.73SQ M
GLUCOSE P FAST SERPL-MCNC: 142 MG/DL (ref 65–99)
HBA1C MFR BLD: 7.2 % (ref 4.2–6.3)
POTASSIUM SERPL-SCNC: 4.5 MMOL/L (ref 3.5–5.3)
PROT SERPL-MCNC: 7.3 G/DL (ref 6.4–8.2)
SODIUM SERPL-SCNC: 140 MMOL/L (ref 136–145)
T4 FREE SERPL-MCNC: 1.31 NG/DL (ref 0.76–1.46)
TSH SERPL DL<=0.05 MIU/L-ACNC: 0.07 UIU/ML (ref 0.36–3.74)

## 2019-05-28 PROCEDURE — 84443 ASSAY THYROID STIM HORMONE: CPT

## 2019-05-28 PROCEDURE — 83036 HEMOGLOBIN GLYCOSYLATED A1C: CPT

## 2019-05-28 PROCEDURE — 80053 COMPREHEN METABOLIC PANEL: CPT

## 2019-05-28 PROCEDURE — 84439 ASSAY OF FREE THYROXINE: CPT

## 2019-05-28 PROCEDURE — 36415 COLL VENOUS BLD VENIPUNCTURE: CPT

## 2019-05-30 ENCOUNTER — REMOTE DEVICE CLINIC VISIT (OUTPATIENT)
Dept: CARDIOLOGY CLINIC | Facility: CLINIC | Age: 79
End: 2019-05-30
Payer: MEDICARE

## 2019-05-30 DIAGNOSIS — I49.5 SSS (SICK SINUS SYNDROME) (HCC): ICD-10-CM

## 2019-05-30 DIAGNOSIS — R00.1 BRADYCARDIA: Primary | ICD-10-CM

## 2019-05-30 DIAGNOSIS — I48.0 PAROXYSMAL ATRIAL FIBRILLATION (HCC): ICD-10-CM

## 2019-05-30 DIAGNOSIS — Z95.0 PRESENCE OF CARDIAC PACEMAKER: ICD-10-CM

## 2019-05-30 PROCEDURE — 93294 REM INTERROG EVL PM/LDLS PM: CPT | Performed by: INTERNAL MEDICINE

## 2019-05-30 PROCEDURE — 93296 REM INTERROG EVL PM/IDS: CPT | Performed by: INTERNAL MEDICINE

## 2019-06-05 DIAGNOSIS — E06.3 HYPOTHYROIDISM DUE TO HASHIMOTO'S THYROIDITIS: ICD-10-CM

## 2019-06-05 DIAGNOSIS — E03.8 HYPOTHYROIDISM DUE TO HASHIMOTO'S THYROIDITIS: ICD-10-CM

## 2019-06-06 RX ORDER — LEVOTHYROXINE SODIUM 0.12 MG/1
TABLET ORAL
Qty: 102 TABLET | Refills: 0 | Status: SHIPPED | OUTPATIENT
Start: 2019-06-06 | End: 2019-08-28 | Stop reason: SDUPTHER

## 2019-06-09 DIAGNOSIS — Z79.4 TYPE 2 DIABETES MELLITUS WITHOUT COMPLICATION, WITH LONG-TERM CURRENT USE OF INSULIN (HCC): ICD-10-CM

## 2019-06-09 DIAGNOSIS — E11.9 TYPE 2 DIABETES MELLITUS WITHOUT COMPLICATION, WITH LONG-TERM CURRENT USE OF INSULIN (HCC): ICD-10-CM

## 2019-06-10 RX ORDER — METFORMIN HYDROCHLORIDE 500 MG/1
500 TABLET, EXTENDED RELEASE ORAL
Qty: 270 TABLET | Refills: 2 | Status: SHIPPED | OUTPATIENT
Start: 2019-06-10 | End: 2020-04-21 | Stop reason: SDUPTHER

## 2019-06-12 ENCOUNTER — OFFICE VISIT (OUTPATIENT)
Dept: ENDOCRINOLOGY | Facility: HOSPITAL | Age: 79
End: 2019-06-12
Payer: MEDICARE

## 2019-06-12 VITALS
WEIGHT: 162.2 LBS | SYSTOLIC BLOOD PRESSURE: 120 MMHG | HEIGHT: 63 IN | HEART RATE: 84 BPM | BODY MASS INDEX: 28.74 KG/M2 | DIASTOLIC BLOOD PRESSURE: 70 MMHG

## 2019-06-12 DIAGNOSIS — E55.9 VITAMIN D DEFICIENCY: ICD-10-CM

## 2019-06-12 DIAGNOSIS — E11.40 TYPE 2 DIABETES MELLITUS WITH DIABETIC NEUROPATHY, WITHOUT LONG-TERM CURRENT USE OF INSULIN (HCC): Primary | ICD-10-CM

## 2019-06-12 DIAGNOSIS — E06.3 HYPOTHYROIDISM DUE TO HASHIMOTO'S THYROIDITIS: ICD-10-CM

## 2019-06-12 DIAGNOSIS — E78.2 MIXED HYPERLIPIDEMIA: ICD-10-CM

## 2019-06-12 DIAGNOSIS — I10 ESSENTIAL HYPERTENSION: ICD-10-CM

## 2019-06-12 DIAGNOSIS — E03.8 HYPOTHYROIDISM DUE TO HASHIMOTO'S THYROIDITIS: ICD-10-CM

## 2019-06-12 PROCEDURE — 99214 OFFICE O/P EST MOD 30 MIN: CPT | Performed by: NURSE PRACTITIONER

## 2019-06-13 DIAGNOSIS — E11.8 TYPE 2 DIABETES MELLITUS WITH COMPLICATION, UNSPECIFIED WHETHER LONG TERM INSULIN USE: ICD-10-CM

## 2019-06-13 DIAGNOSIS — R10.84 GENERALIZED ABDOMINAL PAIN: ICD-10-CM

## 2019-06-13 RX ORDER — SIMVASTATIN 20 MG
TABLET ORAL
Qty: 180 TABLET | Refills: 0 | Status: SHIPPED | OUTPATIENT
Start: 2019-06-13 | End: 2019-08-28 | Stop reason: SDUPTHER

## 2019-06-14 RX ORDER — CYCLOBENZAPRINE HCL 10 MG
TABLET ORAL
Qty: 30 TABLET | Refills: 0 | OUTPATIENT
Start: 2019-06-14

## 2019-06-24 DIAGNOSIS — Z12.39 SCREENING FOR MALIGNANT NEOPLASM OF BREAST: Primary | ICD-10-CM

## 2019-07-17 DIAGNOSIS — I48.91 ATRIAL FIBRILLATION, UNSPECIFIED TYPE (HCC): ICD-10-CM

## 2019-07-18 ENCOUNTER — HOSPITAL ENCOUNTER (OUTPATIENT)
Dept: BONE DENSITY | Facility: IMAGING CENTER | Age: 79
Discharge: HOME | End: 2019-07-18
Payer: MEDICARE

## 2019-07-18 VITALS — HEIGHT: 63 IN | BODY MASS INDEX: 29.77 KG/M2 | WEIGHT: 168 LBS

## 2019-07-18 DIAGNOSIS — Z12.39 SCREENING FOR MALIGNANT NEOPLASM OF BREAST: ICD-10-CM

## 2019-07-18 PROCEDURE — 77063 BREAST TOMOSYNTHESIS BI: CPT

## 2019-07-18 PROCEDURE — 77067 SCR MAMMO BI INCL CAD: CPT

## 2019-07-22 ENCOUNTER — LAB (OUTPATIENT)
Dept: LAB | Facility: CLINIC | Age: 79
End: 2019-07-22
Payer: MEDICARE

## 2019-07-22 DIAGNOSIS — E11.9 TYPE 2 DIABETES MELLITUS WITHOUT COMPLICATION, WITH LONG-TERM CURRENT USE OF INSULIN (HCC): ICD-10-CM

## 2019-07-22 DIAGNOSIS — Z79.4 TYPE 2 DIABETES MELLITUS WITHOUT COMPLICATION, WITH LONG-TERM CURRENT USE OF INSULIN (HCC): ICD-10-CM

## 2019-07-22 DIAGNOSIS — E78.2 MIXED HYPERLIPIDEMIA: ICD-10-CM

## 2019-07-22 DIAGNOSIS — M81.0 AGE-RELATED OSTEOPOROSIS WITHOUT CURRENT PATHOLOGICAL FRACTURE: ICD-10-CM

## 2019-07-22 DIAGNOSIS — E03.8 HYPOTHYROIDISM DUE TO HASHIMOTO'S THYROIDITIS: ICD-10-CM

## 2019-07-22 DIAGNOSIS — E06.3 HYPOTHYROIDISM DUE TO HASHIMOTO'S THYROIDITIS: ICD-10-CM

## 2019-07-22 DIAGNOSIS — I10 ESSENTIAL HYPERTENSION: ICD-10-CM

## 2019-07-22 LAB
25(OH)D3 SERPL-MCNC: 33.8 NG/ML (ref 30–100)
ALBUMIN SERPL BCP-MCNC: 4 G/DL (ref 3.5–5)
ALP SERPL-CCNC: 73 U/L (ref 46–116)
ALT SERPL W P-5'-P-CCNC: 23 U/L (ref 12–78)
ANION GAP SERPL CALCULATED.3IONS-SCNC: 2 MMOL/L (ref 4–13)
AST SERPL W P-5'-P-CCNC: 17 U/L (ref 5–45)
BACTERIA UR QL AUTO: ABNORMAL /HPF
BASOPHILS # BLD AUTO: 0.02 THOUSANDS/ΜL (ref 0–0.1)
BASOPHILS NFR BLD AUTO: 0 % (ref 0–1)
BILIRUB SERPL-MCNC: 1 MG/DL (ref 0.2–1)
BILIRUB UR QL STRIP: NEGATIVE
BUN SERPL-MCNC: 14 MG/DL (ref 5–25)
CALCIUM SERPL-MCNC: 9.3 MG/DL (ref 8.3–10.1)
CHLORIDE SERPL-SCNC: 102 MMOL/L (ref 100–108)
CHOLEST SERPL-MCNC: 133 MG/DL (ref 50–200)
CLARITY UR: CLEAR
CO2 SERPL-SCNC: 30 MMOL/L (ref 21–32)
COLOR UR: YELLOW
CREAT SERPL-MCNC: 0.64 MG/DL (ref 0.6–1.3)
CREAT UR-MCNC: 56.9 MG/DL
EOSINOPHIL # BLD AUTO: 0.25 THOUSAND/ΜL (ref 0–0.61)
EOSINOPHIL NFR BLD AUTO: 3 % (ref 0–6)
ERYTHROCYTE [DISTWIDTH] IN BLOOD BY AUTOMATED COUNT: 12.5 % (ref 11.6–15.1)
GFR SERPL CREATININE-BSD FRML MDRD: 86 ML/MIN/1.73SQ M
GLUCOSE P FAST SERPL-MCNC: 134 MG/DL (ref 65–99)
GLUCOSE UR STRIP-MCNC: ABNORMAL MG/DL
HCT VFR BLD AUTO: 49.2 % (ref 34.8–46.1)
HDLC SERPL-MCNC: 26 MG/DL (ref 40–60)
HGB BLD-MCNC: 15.5 G/DL (ref 11.5–15.4)
HGB UR QL STRIP.AUTO: NEGATIVE
HYALINE CASTS #/AREA URNS LPF: ABNORMAL /LPF
IMM GRANULOCYTES # BLD AUTO: 0.01 THOUSAND/UL (ref 0–0.2)
IMM GRANULOCYTES NFR BLD AUTO: 0 % (ref 0–2)
KETONES UR STRIP-MCNC: NEGATIVE MG/DL
LDLC SERPL CALC-MCNC: 55 MG/DL (ref 0–100)
LEUKOCYTE ESTERASE UR QL STRIP: ABNORMAL
LYMPHOCYTES # BLD AUTO: 3.16 THOUSANDS/ΜL (ref 0.6–4.47)
LYMPHOCYTES NFR BLD AUTO: 42 % (ref 14–44)
MCH RBC QN AUTO: 28.4 PG (ref 26.8–34.3)
MCHC RBC AUTO-ENTMCNC: 31.5 G/DL (ref 31.4–37.4)
MCV RBC AUTO: 90 FL (ref 82–98)
MICROALBUMIN UR-MCNC: 16.1 MG/L (ref 0–20)
MICROALBUMIN/CREAT 24H UR: 28 MG/G CREATININE (ref 0–30)
MONOCYTES # BLD AUTO: 0.69 THOUSAND/ΜL (ref 0.17–1.22)
MONOCYTES NFR BLD AUTO: 9 % (ref 4–12)
NEUTROPHILS # BLD AUTO: 3.49 THOUSANDS/ΜL (ref 1.85–7.62)
NEUTS SEG NFR BLD AUTO: 46 % (ref 43–75)
NITRITE UR QL STRIP: NEGATIVE
NON-SQ EPI CELLS URNS QL MICRO: ABNORMAL /HPF
NONHDLC SERPL-MCNC: 107 MG/DL
NRBC BLD AUTO-RTO: 0 /100 WBCS
PH UR STRIP.AUTO: 5.5 [PH]
PLATELET # BLD AUTO: 189 THOUSANDS/UL (ref 149–390)
PMV BLD AUTO: 10.3 FL (ref 8.9–12.7)
POTASSIUM SERPL-SCNC: 4.4 MMOL/L (ref 3.5–5.3)
PROT SERPL-MCNC: 7.5 G/DL (ref 6.4–8.2)
PROT UR STRIP-MCNC: NEGATIVE MG/DL
RBC # BLD AUTO: 5.46 MILLION/UL (ref 3.81–5.12)
RBC #/AREA URNS AUTO: ABNORMAL /HPF
SODIUM SERPL-SCNC: 134 MMOL/L (ref 136–145)
SP GR UR STRIP.AUTO: 1.04 (ref 1–1.03)
T4 FREE SERPL-MCNC: 1.2 NG/DL (ref 0.76–1.46)
TRIGL SERPL-MCNC: 262 MG/DL
TSH SERPL DL<=0.05 MIU/L-ACNC: 0.04 UIU/ML (ref 0.36–3.74)
UROBILINOGEN UR QL STRIP.AUTO: 0.2 E.U./DL
WBC # BLD AUTO: 7.62 THOUSAND/UL (ref 4.31–10.16)
WBC #/AREA URNS AUTO: ABNORMAL /HPF

## 2019-07-22 PROCEDURE — 82043 UR ALBUMIN QUANTITATIVE: CPT

## 2019-07-22 PROCEDURE — 84443 ASSAY THYROID STIM HORMONE: CPT

## 2019-07-22 PROCEDURE — 80061 LIPID PANEL: CPT

## 2019-07-22 PROCEDURE — 82570 ASSAY OF URINE CREATININE: CPT

## 2019-07-22 PROCEDURE — 81001 URINALYSIS AUTO W/SCOPE: CPT

## 2019-07-22 PROCEDURE — 82306 VITAMIN D 25 HYDROXY: CPT

## 2019-07-22 PROCEDURE — 84439 ASSAY OF FREE THYROXINE: CPT

## 2019-07-22 PROCEDURE — 85025 COMPLETE CBC W/AUTO DIFF WBC: CPT

## 2019-07-22 PROCEDURE — 36415 COLL VENOUS BLD VENIPUNCTURE: CPT

## 2019-07-22 PROCEDURE — 80053 COMPREHEN METABOLIC PANEL: CPT

## 2019-07-23 NOTE — RESULT ENCOUNTER NOTE
Please call the patient regarding abnormal result  TSH is low with a normal free T4  Please continue levothyroxine 125 mcg daily Monday through Saturday and omit Sunday dose  Recheck TSH and free T4 in 6 weeks to reassess

## 2019-08-05 NOTE — PROGRESS NOTES
ASSESSMENT/PLAN:    Diabetes  Followed and maintained through Endocrine  Last A1c 7 2  Continue metformin, Jardiance and Byetta and to follow with endocrine    Lichen simplex chronicus  Patient using estrogen vaginal cream and clobetasol  Encourage patient to see gyn every 6 months to have this area checked  Encouraged her to use a cream at least daily and twice daily if it starts bothering her  Also instructed her that this can degenerate into a cancer      Osteoporosis   DEXA shows no change since 2014  Patient will continue calcium vitamin-D and will now start exercising since she has a dog  She can take Fosamax because of reflux     Adenocarcinoma of the colon, 2012  Next colonoscopy 2021 no evidence of disease     Emphysema by CT   Patient only on Ventolin as needed as her pulmonary function test and walk test are okay   Follows with Pulmonary only as needed        Insomnia/anxiety   Continue duloxetine        Hashimoto's thyroiditis   Also following with endocrine for suppression of her thyroid with medication         Sick sinus syndrome/cardiomegaly/coronary artery disease/AFib  Patient has a pacer and takes Eliquis for anticoagulation and atenolol Follows with Cardiology        Vitamin D deficiency   Continue vitamin D 2000 units daily          History of large paraesophageal hernia   Had surgery and no longer has Pacheco's or her reflux and no longer on any PPIs        Hyperlipidemia   Simvastatin and CoQ10 and diet     Thyroid goiter   Followed by endocrine who orders ultrasounds         Rechec 6 months or sooner if needed  EKG         Health Maintenance   Topic Date Due    SLP PLAN OF CARE  1940    BMI: Followup Plan  10/22/1959    HEPATITIS B VACCINES (1 of 3 - Risk 3-dose series) 10/22/1960    DTaP,Tdap,and Td Vaccines (1 - Tdap) 10/22/1962    Medicare Annual Wellness Visit (AWV)  08/08/2018    OT PLAN OF CARE  03/06/2019    INFLUENZA VACCINE  07/01/2019    HEMOGLOBIN A1C  11/28/2019    DM Eye Exam  12/13/2019    Urinary Incontinence Screening  02/05/2020    Fall Risk  02/25/2020    Depression Screening PHQ  02/25/2020    Diabetic Foot Exam  05/07/2020    BMI: Adult  07/18/2020    URINE MICROALBUMIN  07/22/2020    CRC Screening: Colonoscopy  06/11/2021    Pneumococcal Vaccine: 65+ Years  Completed    Pneumococcal Vaccine: Pediatrics (0 to 5 Years) and At-Risk Patients (6 to 59 Years)  Aged Out         Problem List as of 8/6/2019 Reviewed: 6/12/2019  9:14 AM by SHUN Thompson    Abnormal echocardiogram    Adenocarcinoma of colon ([facility] [address] )    Age-related osteoporosis without current pathological fracture    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    Cardiomegaly    [facility] DJD(carpometacarpal degenerative joint disease), localized primary, right    Coronary artery disease involving native coronary artery of native heart without angina pectoris    Diffuse nontoxic goiter    Essential hypertension    Feeling anxious    Gastro-esophageal reflux disease without esophagitis    Hematuria, microscopic    History of permanent cardiac pacemaker placement    Insomnia    Lichen simplex chronicus    Malignant tumor of colon (HCC)    Mitral regurgitation    Mixed hyperlipidemia    Other specified hypothyroidism    Paraesophageal hernia    Paroxysmal atrial fibrillation (HCC)    Primary osteoarthritis involving multiple joints    Restrictive lung disease    Sick sinus syndrome (HCC)    Stress incontinence in female    Type 2 diabetes mellitus without complication, with long-term current use of insulin (HCC)    Vitamin D deficiency            Subjective:   Chief Complaint   Patient presents with    Diabetes    GERD    Hyperlipidemia    Hypertension    Medicare Wellness Visit    Vitamin D Deficiency     Patient is here for 6 month recheck  She is going to get a [employer] dog and is excited about doing that  In the meantime she saw Endocrine twice since last visit was following her for her diabetes  Her last A1c was improved to 7 2  Thankfully she is off Invokana and is now on Byetta Jardiance and metformin    She also saw GI and had EGD and colonoscopy in 2018  She has a history adenocarcinoma of the colon will have another colonoscopy in 3 years or 2021  She will most likely have the EGD at the same time    She also saw Orthopedics who to care for thumb on the right hand and did surgery  It is still swollen  Told patient this will take at least a year    She also had her DEXA done that did not show any difference released in the last 2 years    She also had echo that was stable  She is here because she had her complete blood work done as well      patient ID: Josephine Wiseman is a 69 y o  female      Past Medical History:   Diagnosis Date    A-fib [facility])     Abnormal ECG     last assessed: 7/14/2015    Acid reflux     resolved    Anxiety     Benign neoplasm of sigmoid colon 09/13/2011    next colon 2012    Bronchitis, asthmatic     last assessed: 3/12/2012    Colon cancer ([facility] [address] )     2012- had colon resection    COPD (chronic obstructive pulmonary disease) ([facility] [address] )     questionable    Depression     Diabetes mellitus ([facility] [address] )     on byetta    Dizziness     occ    Esophageal stricture     last assessed: 9/30/2014    High cholesterol     Hypertension     Hypothyroidism     Iron (Fe) deficiency anemia 12/22/2011    iron pill continue    Irritable bowel syndrome     OA (osteoarthritis)     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    Pacemaker     hx SSS    Rash     labia area- uses cream prn    Restrictive lung disease     Seasonal allergies     Skin scarring/fibrosis     fibrotic scar; last assessed: 3/22/2013    MONA (stress urinary incontinence, female)     Urinary frequency     last assessed: 9/10/2012    Wears glasses     reading     Past Surgical History:   Procedure Laterality Date    BREAST EXCISIONAL BIOPSY Right 1990    benign, best guess on year   [address]      does not know type  Dr Debby Amaro is cariologist   [address]      resection  removed 18 In     COLONOSCOPY  06/01/2012    proctosigmoidectomy    ESOPHAGOGASTRODUODENOSCOPY N/A 1/9/2016    Procedure: ESOPHAGOGASTRODUODENOSCOPY (EGD); Surgeon: Roxanna Toledo MD;  [facility];  Service:    [facility] HYSTERECTOMY      MN ARTHROPLASTY I-P JT Right 8/16/2018    Procedure: ARTHROPLASTY PIP/FINGER - RIGHT RING;  Surgeon: Alejandro Puri MD;  [facility];  Service: Orthopedics    MN CYSTOURETHROSCOPY N/A 5/14/2018    Procedure: Emily Epstein;  Surgeon: Madison Romero MD;  [facility];  Service: UroGynecology           MN LAP, REPAIR PARAESOPHAGEAL HERNIA, INCL FUNDOPLASTY W/ MESH N/A 1/27/2016    Procedure: LAPAROSCOPIC PARAESOPHAGEAL HERNIA REPAIR with mesh; toupet  FUNDOPLICATION ;  Surgeon: Simone Lobato MD;  [facility];  Service: Thoracic    MN REPAIR INTERCARP/CARP-METACARP JT Right 1/10/2019    Procedure: Bonita Payne;  Surgeon: Alejandro Puri MD;  [facility];  Service: Orthopedics    MN SLING OPER STRES INCONTINENCE N/A 5/14/2018    Procedure: INSERTION PUBOVAGINAL SLING (FEMALE);   Surgeon: Madison Romero MD;  [facility];  Service: UroGynecology           TOTAL ABDOMINAL HYSTERECTOMY      WRIST TENDON TRANSFER Right 1/10/2019    Procedure: TRANSFER TENDON FLEXOR CARPI RADIALIS FROM FOREARM TO HAND;  Surgeon: Alejandro Puri MD;  [facility];  Service: Orthopedics     Family History   Problem Relation Age of Onset    Heart disease Mother     Diabetes Mother    [facility] Asthma Father     Stomach cancer Father         gastrkic cancer    Cancer Paternal Grandmother     Cancer Paternal Grandfather     No Known Problems Brother     Breast cancer Sister 76    Breast cancer Sister 77    Pancreatic cancer Sister 72    No Known Problems Maternal Aunt     No Known Problems Maternal Aunt     No Known Problems Maternal Aunt     No Known Problems Paternal Aunt     No Known Problems Daughter     No Known Problems Maternal Grandmother     No Known Problems Maternal Grandfather     Prostate cancer Paternal Uncle         age unknown    Substance Abuse Neg Hx     Mental illness Neg Hx     Alcohol abuse Neg Hx     Colon cancer Neg Hx     Colon polyps Neg Hx      Social History     Tobacco Use    Smoking status: Former Smoker     Types: Cigarettes     Last attempt to quit:      Years since quittin 6    Smokeless tobacco: Former User     Quit date:    Substance Use Topics    Alcohol use: No    Drug use: No     Social History     Tobacco Use   Smoking Status Former Smoker    Types: Cigarettes    Last attempt to quit:     Years since quittin 6   Smokeless Tobacco Former User    Quit date:         MED LIST WAS REVIEWED AND UPDATED       ROS    Constitutional  No fever chills no fatigue no weight loss no weight gain    Mental status  No anxiety or depression and no sleep disturbances no changes in personality or emotional problems no suicidal or homicidal ideations    Eyes  No eye pain no red eyes no visual disturbances no discharge no eye itch    ENT  No earache no hearing loss nasal discharge no sore throat no hoarseness no postnasal drip     Cardio  No chest pain no palpitations no leg edema no claudication no dyspnea on exertion no nocturnal dyspnea    Respiratory  No shortness of breath or wheeze no cough no orthopnea no dyspnea on exertion no hemoptysis no sputum production    GI  No abdominal pain no nausea no vomiting no diarrhea or constipation no bloody stools no change in bowel habits no change in weight      no dysuria hematuria no pyuria no incontinence no pelvic pain    Musculoskeletal  No myalgias arthralgias no joint swelling or stiffness no limb pain or swelling    Skin  No rashes or lesions no itchiness and no wounds    Neuro  No headache dizziness lightheadedness syncope numbness paresthesias or confusion    Heme  No swollen glands no easy bruising          Objective:  Patient lost 8 lb since last visit by stopping baking! VITALS:  Wt Readings from Last 3 Encounters:   08/06/19 72 6 kg (160 lb 1 6 oz)   07/18/19 76 2 kg (168 lb)   06/12/19 73 6 kg (162 lb 3 2 oz)     BP Readings from Last 3 Encounters:   08/06/19 134/80   06/12/19 120/70   05/07/19 128/88     Pulse Readings from Last 3 Encounters:   08/06/19 72   06/12/19 84   05/07/19 70     Body mass index is 27 92 kg/m²      Laboratory Results:   Lab Results   Component Value Date    WBC 7 62 07/22/2019    WBC 7 64 12/12/2018    WBC 8 65 08/15/2018    HGB 15 5 (H) 07/22/2019    HGB 15 5 (H) 12/12/2018    HGB 14 7 08/15/2018    HCT 49 2 (H) 07/22/2019    HCT 49 7 (H) 12/12/2018    HCT 47 5 (H) 08/15/2018    MCV 90 07/22/2019    MCV 93 12/12/2018    MCV 92 08/15/2018     07/22/2019     12/12/2018     08/15/2018     Lab Results   Component Value Date     12/21/2015     08/01/2015     07/31/2015    K 4 4 07/22/2019    K 4 5 05/28/2019    K 4 6 02/06/2019     07/22/2019     05/28/2019     02/06/2019    CO2 30 07/22/2019    CO2 30 05/28/2019    CO2 29 02/06/2019    BUN 14 07/22/2019    BUN 12 05/28/2019    BUN 13 02/06/2019     Lab Results   Component Value Date    GLUCOSE 245 (H) 01/27/2016    ALT 23 07/22/2019    AST 17 07/22/2019    ALKPHOS 73 07/22/2019    EGFR 86 07/22/2019    CALCIUM 9 3 07/22/2019     No results found for: TSHRFT4      Lipid Profile:   Lab Results   Component Value Date    CHOL 147 12/21/2015    CHOL 195 08/21/2015    CHOL 185 03/03/2015     Lab Results   Component Value Date    HDL 26 (L) 07/22/2019    HDL 18 (L) 05/16/2018    HDL 42 12/19/2017     Lab Results   Component Value Date    LDLCALC 55 07/22/2019    LDLCALC 85 05/16/2018    LDLCALC 66 12/19/2017     Lab Results   Component Value Date    TRIG 262 (H) 07/22/2019    TRIG 206 (H) 05/16/2018    TRIG 169 (H) 12/19/2017       Diabetic labs (if applicable)  Lab Results   Component Value Date    HGBA1C 7 2 (H) 05/28/2019    HGBA1C 7 3 (H) 02/06/2019    HGBA1C 7 8 (H) 12/12/2018     Lab Results   Component Value Date    GLUF 134 (H) 07/22/2019    LDLCALC 55 07/22/2019    CREATININE 0 64 07/22/2019          Physical Exam    Gen  No acute distress well-appearing well-nourished appears stated age    Mental status  Good judgment and insight oriented to time person and place, recent and remote memory intact mood and affect normal cooperative and patient is reasonable    HEENT  PERRLA 3 mm, EOMI without nystagmus, TMs clear, turbinates open pink no exudate, pharynx benign, tongue midline    Neck   supple no masses trachea midline positive click normal carotid upstrokes with no bruits    Cor  Regular rhythm without ectopy or murmur, no S3-S4, normal palpation that is no heave lift or thrill    Vascular  No edema, good pedal pulses    Lungs  CTA bilaterally in no respiratory distress no wheezes rhonchi or rales, normal to palpation no tactile fremitus    Abdomen  Soft, no palpable masses, no hepatosplenomegaly, normal bowel sounds, nontender    Lymphatics  No palpable nodes in the neck, supraclavicular area, axilla, or groin     Musculoskeletal  No clubbing cyanosis or edema muscle tone normal    Skin  no rashes or abnormal appearing lesions    Neuro  Normal ambulation, cranial nerves 2-12 grossly intact, higher functioning with reasoning intact

## 2019-08-06 ENCOUNTER — OFFICE VISIT (OUTPATIENT)
Dept: FAMILY MEDICINE CLINIC | Facility: CLINIC | Age: 79
End: 2019-08-06
Payer: MEDICARE

## 2019-08-06 VITALS
HEART RATE: 72 BPM | DIASTOLIC BLOOD PRESSURE: 80 MMHG | SYSTOLIC BLOOD PRESSURE: 134 MMHG | BODY MASS INDEX: 27.33 KG/M2 | WEIGHT: 160.1 LBS | RESPIRATION RATE: 16 BRPM | HEIGHT: 64 IN

## 2019-08-06 DIAGNOSIS — K44.9 PARAESOPHAGEAL HERNIA: ICD-10-CM

## 2019-08-06 DIAGNOSIS — Z00.00 MEDICARE ANNUAL WELLNESS VISIT, SUBSEQUENT: Primary | ICD-10-CM

## 2019-08-06 DIAGNOSIS — E55.9 VITAMIN D DEFICIENCY: ICD-10-CM

## 2019-08-06 DIAGNOSIS — Z79.4 TYPE 2 DIABETES MELLITUS WITHOUT COMPLICATION, WITH LONG-TERM CURRENT USE OF INSULIN (HCC): ICD-10-CM

## 2019-08-06 DIAGNOSIS — E11.9 TYPE 2 DIABETES MELLITUS WITHOUT COMPLICATION, WITH LONG-TERM CURRENT USE OF INSULIN (HCC): ICD-10-CM

## 2019-08-06 DIAGNOSIS — I10 ESSENTIAL HYPERTENSION: ICD-10-CM

## 2019-08-06 DIAGNOSIS — I34.0 NON-RHEUMATIC MITRAL REGURGITATION: ICD-10-CM

## 2019-08-06 DIAGNOSIS — C18.9 ADENOCARCINOMA OF COLON (HCC): ICD-10-CM

## 2019-08-06 DIAGNOSIS — M81.0 AGE-RELATED OSTEOPOROSIS WITHOUT CURRENT PATHOLOGICAL FRACTURE: ICD-10-CM

## 2019-08-06 DIAGNOSIS — N39.3 STRESS INCONTINENCE IN FEMALE: ICD-10-CM

## 2019-08-06 DIAGNOSIS — C18.9 MALIGNANT NEOPLASM OF COLON, UNSPECIFIED PART OF COLON (HCC): ICD-10-CM

## 2019-08-06 DIAGNOSIS — K21.9 GASTRO-ESOPHAGEAL REFLUX DISEASE WITHOUT ESOPHAGITIS: ICD-10-CM

## 2019-08-06 DIAGNOSIS — I48.0 PAROXYSMAL ATRIAL FIBRILLATION (HCC): ICD-10-CM

## 2019-08-06 DIAGNOSIS — E78.2 MIXED HYPERLIPIDEMIA: ICD-10-CM

## 2019-08-06 DIAGNOSIS — E04.0 DIFFUSE NONTOXIC GOITER: ICD-10-CM

## 2019-08-06 DIAGNOSIS — K22.70 BARRETT'S ESOPHAGUS WITHOUT DYSPLASIA: ICD-10-CM

## 2019-08-06 PROCEDURE — 99214 OFFICE O/P EST MOD 30 MIN: CPT | Performed by: FAMILY MEDICINE

## 2019-08-06 PROCEDURE — G0439 PPPS, SUBSEQ VISIT: HCPCS | Performed by: FAMILY MEDICINE

## 2019-08-06 NOTE — PATIENT INSTRUCTIONS
Diabetes  Followed and maintained through Endocrine  Last A1c 7 2  Continue metformin, Jardiance and Byetta and to follow with endocrine    Lichen simplex chronicus  Patient using estrogen vaginal cream and clobetasol  Encourage patient to see gyn every 6 months to have this area checked  Encouraged her to use a cream at least daily and twice daily if it starts bothering her  Also instructed her that this can degenerate into a cancer      Osteoporosis   DEXA shows no change since 2014  Patient will continue calcium vitamin-D and will now start exercising since she has a dog  She can take Fosamax because of reflux     Adenocarcinoma of the colon, 2012  Next colonoscopy 2021 no evidence of disease     Emphysema by CT   Patient only on Ventolin as needed as her pulmonary function test and walk test are okay   Follows with Pulmonary only as needed        Insomnia/anxiety   Continue duloxetine        Hashimoto's thyroiditis   Also following with endocrine for suppression of her thyroid with medication         Sick sinus syndrome/cardiomegaly/coronary artery disease/AFib  Patient has a pacer and takes Eliquis for anticoagulation and atenolol Follows with Cardiology        Vitamin D deficiency   Continue vitamin D 2000 units daily          History of large paraesophageal hernia   Had surgery and no longer has Pacheco's or her reflux and no longer on any PPIs        Hyperlipidemia   Simvastatin and CoQ10 and diet     Thyroid goiter   Followed by endocrine who orders ultrasounds         Rechec 6 months or sooner if needed  EKG              Obesity   AMBULATORY CARE:   Obesity  is when your body mass index (BMI) is greater than 30  Your healthcare provider will use your height and weight to measure your BMI  The risks of obesity include  many health problems, such as injuries or physical disability   You may need tests to check for the following:  · Diabetes     · High blood pressure or high cholesterol     · Heart disease · Gallbladder or liver disease     · Cancer of the colon, breast, prostate, liver, or kidney     · Sleep apnea     · Arthritis or gout  Seek care immediately if:   · You have a severe headache, confusion, or difficulty speaking  · You have weakness on one side of your body  · You have chest pain, sweating, or shortness of breath  Contact your healthcare provider if:   · You have symptoms of gallbladder or liver disease, such as pain in your upper abdomen  · You have knee or hip pain and discomfort while walking  · You have symptoms of diabetes, such as intense hunger and thirst, and frequent urination  · You have symptoms of sleep apnea, such as snoring or daytime sleepiness  · You have questions or concerns about your condition or care  Treatment for obesity  focuses on helping you lose weight to improve your health  Even a small decrease in BMI can reduce the risk for many health problems  Your healthcare provider will help you set a weight-loss goal   · Lifestyle changes  are the first step in treating obesity  These include making healthy food choices and getting regular physical activity  Your healthcare provider may suggest a weight-loss program that involves coaching, education, and therapy  · Medicine  may help you lose weight when it is used with a healthy diet and physical activity  · Surgery  can help you lose weight if you are very obese and have other health problems  There are several types of weight-loss surgery  Ask your healthcare provider for more information  Be successful losing weight:   · Set small, realistic goals  An example of a small goal is to walk for 20 minutes 5 days a week  Arnold goal is to lose 5% of your body weight  · Tell friends, family members, and coworkers about your goals  and ask for their support  Ask a friend to lose weight with you, or join a weight-loss support group      · Identify foods or triggers that may cause you to overeat , and find ways to avoid them  Remove tempting high-calorie foods from your home and workplace  Place a bowl of fresh fruit on your kitchen counter  If stress causes you to eat, then find other ways to cope with stress  · Keep a diary to track what you eat and drink  Also write down how many minutes of physical activity you do each day  Weigh yourself once a week and record it in your diary  Eating changes: You will need to eat 500 to 1,000 fewer calories each day than you currently eat to lose 1 to 2 pounds a week  The following changes will help you cut calories:  · Eat smaller portions  Use small plates, no larger than 9 inches in diameter  Fill your plate half full of fruits and vegetables  Measure your food using measuring cups until you know what a serving size looks like  · Eat 3 meals and 1 or 2 snacks each day  Plan your meals in advance  Marcus L[employer] and eat at home most of the time  Eat slowly  · Eat fruits and vegetables at every meal   They are low in calories and high in fiber, which makes you feel full  Do not add butter, margarine, or cream sauce to vegetables  Use herbs to season steamed vegetables  · Eat less fat and fewer fried foods  Eat more baked or grilled chicken and fish  These protein sources are lower in calories and fat than red meat  Limit fast food  Dress your salads with olive oil and vinegar instead of bottled dressing  · Limit the amount of sugar you eat  Do not drink sugary beverages  Limit alcohol  Activity changes:  Physical activity is good for your body in many ways  It helps you burn calories and build strong muscles  It decreases stress and depression, and improves your mood  It can also help you sleep better  Talk to your healthcare provider before you begin an exercise program   · Exercise for at least 30 minutes 5 days a week  Start slowly  Set aside time each day for physical activity that you enjoy and that is convenient for you   It is best to do both weight training and an activity that increases your heart rate, such as walking, bicycling, or swimming  · Find ways to be more active  Do yard work and housecleaning  Walk up the stairs instead of using elevators  Spend your leisure time going to events that require walking, such as outdoor festivals or fairs  This extra physical activity can help you lose weight and keep it off  Follow up with your healthcare provider as directed: You may need to meet with a dietitian  Write down your questions so you remember to ask them during your visits  © 2017 [employer]  Information is for End User's use only and may not be sold, redistributed or otherwise used for commercial purposes  All illustrations and images included in CareNotes® are the copyrighted property of [employer]  or Jimmy Batista  The above information is an  only  It is not intended as medical advice for individual conditions or treatments  Talk to your doctor, nurse or pharmacist before following any medical regimen to see if it is safe and effective for you  Urinary Incontinence   WHAT YOU NEED TO KNOW:   What is urinary incontinence? Urinary incontinence (UI) is when you lose control of your bladder  What causes UI? UI occurs because your bladder cannot store or empty urine properly  The following are the most common types of UI:  · Stress incontinence  is when you leak urine due to increased bladder pressure  This may happen when you cough, sneeze, or exercise  · Urge incontinence  is when you feel the need to urinate right away and leak urine accidentally  · Mixed incontinence  is when you have both stress and urge UI  What are the signs and symptoms of UI?   · You feel like your bladder does not empty completely when you urinate  · You urinate often and need to urinate immediately  · You leak urine when you sleep, or you wake up with the urge to urinate      · You leak urine when you cough, sneeze, exercise, or laugh  How is UI diagnosed? Your healthcare provider will ask how often you leak urine and whether you have stress or urge symptoms  Tell him which medicines you take, how often you urinate, and how much liquid you drink each day  You may need any of the following tests:  · Urine tests  may show infection or kidney function  · A pelvic exam  may be done to check for blockages  A pelvic exam will also show if your bladder, uterus, or other organs have moved out of place  · An x-ray, ultrasound, or CT  may show problems with parts of your urinary system  You may be given contrast liquid to help your organs show up better in the pictures  Tell the healthcare provider if you have ever had an allergic reaction to contrast liquid  Do not enter the MRI room with anything metal  Metal can cause serious injury  Tell the healthcare provider if you have any metal in or on your body  · A bladder scan  will show how much urine is left in your bladder after you urinate  You will be asked to urinate and then healthcare providers will use a small ultrasound machine to check the urine left in your bladder  · Cystometry  is used to check the function of your urinary system  Your healthcare provider checks the pressure in your bladder while filling it with fluid  Your bladder pressure may also be tested when your bladder is full and while you urinate  How is UI treated? · Medicines  can help strengthen your bladder control  · Electrical stimulation  is used to send a small amount of electrical energy to your pelvic floor muscles  This helps control your bladder function  Electrodes may be placed outside your body or in your rectum  For women, the electrodes may be placed in the vagina  · A bulking agent  may be injected into the wall of your urethra to make it thicker  This helps keep your urethra closed and decreases urine leakage       · Devices  such as a clamp, pessary, or tampon may help stop urine leaks  Ask your healthcare provider for more information about these and other devices  · Surgery  may be needed if other treatments do not work  Several types of surgery can help improve your bladder control  Ask your healthcare provider for more information about the surgery you may need  How can I manage my symptoms? · Do pelvic muscle exercises often  Your pelvic muscles help you stop urinating  Squeeze these muscles tight for 5 seconds, then relax for 5 seconds  Gradually work up to squeezing for 10 seconds  Do 3 sets of 15 repetitions a day, or as directed  This will help strengthen your pelvic muscles and improve bladder control  · A catheter  may be used to help empty your bladder  A catheter is a tiny, plastic tube that is put into your bladder to drain your urine  Your healthcare provider may tell you to use a catheter to prevent your bladder from getting too full and leaking urine  · Keep a UI record  Write down how often you leak urine and how much you leak  Make a note of what you were doing when you leaked urine  · Train your bladder  Go to the bathroom at set times, such as every 2 hours, even if you do not feel the urge to go  You can also try to hold your urine when you feel the urge to go  For example, hold your urine for 5 minutes when you feel the urge to go  As that becomes easier, hold your urine for 10 minutes  · Drink liquids as directed  Ask your healthcare provider how much liquid to drink each day and which liquids are best for you  You may need to limit the amount of liquid you drink to help control your urine leakage  Limit or do not have drinks that contain caffeine or alcohol  Do not drink any liquid right before you go to bed  · Prevent constipation  Eat a variety of high-fiber foods  Good examples are high-fiber cereals, beans, vegetables, and whole-grain breads  Prune juice may help make your bowel movement softer   Walking is the best way to trigger your intestines to have a bowel movement  · Exercise regularly and maintain a healthy weight  Ask your healthcare provider how much you should weigh and about the best exercise plan for you  Weight loss and exercise will decrease pressure on your bladder and help you control your leakage  Ask him to help you create a weight loss plan if you are overweight  When should I seek immediate care? · You have severe pain  · You are confused or cannot think clearly  When should I contact my healthcare provider? · You have a fever  · You see blood in your urine  · You have pain when you urinate  · You have new or worse pain, even after treatment  · Your mouth feels dry or you have vision changes  · Your urine is cloudy or smells bad  · You have questions or concerns about your condition or care  CARE AGREEMENT:   You have the right to help plan your care  Learn about your health condition and how it may be treated  Discuss treatment options with your caregivers to decide what care you want to receive  You always have the right to refuse treatment  The above information is an  only  It is not intended as medical advice for individual conditions or treatments  Talk to your doctor, nurse or pharmacist before following any medical regimen to see if it is safe and effective for you  © 2017 [facility] Information is for End User's use only and may not be sold, redistributed or otherwise used for commercial purposes  All illustrations and images included in CareNotes® are the copyrighted property of [employer]  or Jimmy Batista  Cigarette Smoking and Your Health   AMBULATORY CARE:   Risks to your health if you smoke:  Nicotine and other chemicals found in tobacco damage every cell in your body  Even if you are a light smoker, you have an increased risk for cancer, heart disease, and lung disease   If you are pregnant or have diabetes, smoking increases your risk for complications  Benefits to your health if you stop smoking:   · You decrease respiratory symptoms such as coughing, wheezing, and shortness of breath  · You reduce your risk for cancers of the lung, mouth, throat, kidney, bladder, pancreas, stomach, and cervix  If you already have cancer, you increase the benefits of chemotherapy  You also reduce your risk for cancer returning or a second cancer from developing  · You reduce your risk for heart disease, blood clots, heart attack, and stroke  · You reduce your risk for lung infections, and diseases such as pneumonia, asthma, chronic bronchitis, and emphysema  · Your circulation improves  More oxygen can be delivered to your body  If you have diabetes, you lower your risk for complications, such as kidney, artery, and eye diseases  You also lower your risk for nerve damage  Nerve damage can lead to amputations, poor vision, and blindness  · You improve your body's ability to heal and to fight infections  Benefits to the health of others if you stop smoking:  Tobacco is harmful to nonsmokers who breathe in your secondhand smoke  The following are ways the health of others around you may improve when you stop smoking:  · You lower the risks for lung cancer and heart disease in nonsmoking adults  · If you are pregnant, you lower the risk for miscarriage, early delivery, low birth weight, and stillbirth  You also lower your baby's risk for SIDS, obesity, developmental delay, and neurobehavioral problems, such as ADHD  · If you have children, you lower their risk for ear infections, colds, pneumonia, bronchitis, and asthma  For more information and support to stop smoking:   · Smokefree  gov  Phone: 3- 010 - 526-7557  Web Address: www [employer]  Follow up with your healthcare provider as directed:  Write down your questions so you remember to ask them during your visits     © 2017 [facility] [address] is for End User's use only and may not be sold, redistributed or otherwise used for commercial purposes  All illustrations and images included in CareNotes® are the copyrighted property of A D A M , Inc  or Jimmy Batista  The above information is an  only  It is not intended as medical advice for individual conditions or treatments  Talk to your doctor, nurse or pharmacist before following any medical regimen to see if it is safe and effective for you  Fall Prevention   AMBULATORY CARE:   Fall prevention  includes ways to make your home and other areas safer  It also includes ways you can move more carefully to prevent a fall  Health conditions that cause changes in your blood pressure, vision, or muscle strength and coordination may increase your risk for falls  Medicines may also increase your risk for falls if they make you dizzy, weak, or sleepy  Call 911 or have someone else call if:   · You have fallen and are unconscious  · You have fallen and cannot move part of your body  Contact your healthcare provider if:   · You have fallen and have pain or a headache  · You have questions or concerns about your condition or care  Fall prevention tips:   · Stand or sit up slowly  This may help you keep your balance and prevent falls  · Use assistive devices as directed  Your healthcare provider may suggest that you use a cane or walker to help you keep your balance  You may need to have grab bars put in your bathroom near the toilet or in the shower  · Wear shoes that fit well and have soles that   Wear shoes both inside and outside  Use slippers with good   Do not wear shoes with high heels  · Wear a personal alarm  This is a device that allows you to call 911 if you fall and need help  Ask your healthcare provider for more information  · Stay active  Exercise can help strengthen your muscles and improve your balance   Your healthcare provider may recommend water aerobics or walking  He or she may also recommend physical therapy to improve your coordination  Never start an exercise program without talking to your healthcare provider first      · Manage your medical conditions  Keep all appointments with your healthcare providers  Visit your eye doctor as directed  Home safety tips:   · Add items to prevent falls in the bathroom  Put nonslip strips on your bath or shower floor to prevent you from slipping  Use a bath mat if you do not have carpet in the bathroom  This will prevent you from falling when you step out of the bath or shower  Use a shower seat so you do not need to stand while you shower  Sit on the toilet or a chair in your bathroom to dry yourself and put on clothing  This will prevent you from losing your balance from drying or dressing yourself while you are standing  · Keep paths clear  Remove books, shoes, and other objects from walkways and stairs  Place cords for telephones and lamps out of the way so that you do not need to walk over them  Tape them down if you cannot move them  Remove small rugs  If you cannot remove a rug, secure it with double-sided tape  This will prevent you from tripping  · Install bright lights in your home  Use night lights to help light paths to the bathroom or kitchen  Always turn on the light before you start walking  · Keep items you use often on shelves within reach  Do not use a step stool to help you reach an item  · Paint or place reflective tape on the edges of your stairs  This will help you see the stairs better  Follow up with your healthcare provider as directed:  Write down your questions so you remember to ask them during your visits  © 2017 [address] [employer] Information is for End User's use only and may not be sold, redistributed or otherwise used for commercial purposes   All illustrations and images included in CareNotes® are the copyrighted property of [employer]  or Jimmy Batista  The above information is an  only  It is not intended as medical advice for individual conditions or treatments  Talk to your doctor, nurse or pharmacist before following any medical regimen to see if it is safe and effective for you  Advance Directives   WHAT YOU NEED TO KNOW:   What are advance directives? Advance directives are legal documents that state your wishes and plans for medical care  These plans are made ahead of time in case you lose your ability to make decisions for yourself  Advance directives can apply to any medical decision, such as the treatments you want, and if you want to donate organs  What are the types of advance directives? There are many types of advance directives, and each state has rules about how to use them  You may choose a combination of any of the following:  · Living will: This is a written record of the treatment you want  You can also choose which treatments you do not want, which to limit, and which to stop at a certain time  This includes surgery, medicine, IV fluid, and tube feedings  · Durable power of  for healthcare [facility]): This is a written record that states who you want to make healthcare choices for you when you are unable to make them for yourself  This person, called a proxy, is usually a family member or a friend  You may choose more than 1 proxy  · Do not resuscitate (DNR) order:  A DNR order is used in case your heart stops beating or you stop breathing  It is a request not to have certain forms of treatment, such as CPR  A DNR order may be included in other types of advance directives  · Medical directive: This covers the care that you want if you are in a coma, near death, or unable to make decisions for yourself  You can list the treatments you want for each condition   Treatment may include pain medicine, surgery, blood transfusions, dialysis, IV or tube feedings, and a ventilator (breathing machine)  · Values history: This document has questions about your views, beliefs, and how you feel and think about life  This information can help others choose the care that you would choose  Why are advance directives important? An advance directive helps you control your care  Although spoken wishes may be used, it is better to have your wishes written down  Spoken wishes can be misunderstood, or not followed  Treatments may be given even if you do not want them  An advance directive may make it easier for your family to make difficult choices about your care  How do I decide what to put in my advance directives? · Make informed decisions:  Make sure you fully understand treatments or care you may receive  Think about the benefits and problems your decisions could cause for you or your family  Talk to healthcare providers if you have concerns or questions before you write down your wishes  You may also want to talk with your Christianity or , or a   Check your state laws to make sure that what you put in your advance directive is legal      · Sign all forms:  Sign and date your advance directive when you have finished  You may also need 2 witnesses to sign the forms  Witnesses cannot be your doctor or his staff, your spouse, heirs or beneficiaries, people you owe money to, or your chosen proxy  Talk to your family, proxy, and healthcare providers about your advance directive  Give each person a copy, and keep one for yourself in a place you can get to easily  Do not keep it hidden or locked away  · Review and revise your plans: You can revise your advance directive at any time, as long as you are able to make decisions  Review your plan every year, and when there are changes in your life, or your health  When you make changes, let your family, proxy, and healthcare providers know  Give each a new copy    Where can I find more information? · [employer]  [address] [place] , [address]  Phone: 0- [phone] - [phone]  Phone: 8- [phone]  Web Address: http://www  aafp org  · [address] [facility])  [address], [address] [facility] , [address] [place] [address]  Phone: [phone]  Phone: [phone]  Web Address: Lizbeth suárez  CARE AGREEMENT:   You have the right to help plan your care  To help with this plan, you must learn about your health condition and treatment options  You must also learn about advance directives and how they are used  Work with your healthcare providers to decide what care will be used to treat you  You always have the right to refuse treatment  The above information is an  only  It is not intended as medical advice for individual conditions or treatments  Talk to your doctor, nurse or pharmacist before following any medical regimen to see if it is safe and effective for you  © 2017 [employer]  Information is for End User's use only and may not be sold, redistributed or otherwise used for commercial purposes  All illustrations and images included in CareNotes® are the copyrighted property of [employer]  or Jimmy Batista

## 2019-08-06 NOTE — PROGRESS NOTES
Assessment and Plan:   Patient is aware the Shingrix and has been trying to get it  Everything else is up-to-date  Problem List Items Addressed This Visit     None         History of Present Illness:     Patient presents for Medicare Annual Wellness visit    Patient Care Team:  Aakash Vega DO as PCP - General  Jessenia Wilson DO as PCP - Endocrinology (Endocrinology)  DO Kelsie Gregory MD Buck Morgans, MD     Problem List:     Patient Active Problem List   Diagnosis    Type 2 diabetes mellitus without complication, with long-term current use of insulin ([facility]    Essential hypertension    Coronary artery disease involving native coronary artery of native heart without angina pectoris    Abnormal echocardiogram    Adenocarcinoma of colon ([facility]    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    Cardiomegaly    Diffuse nontoxic goiter    Feeling anxious    Hematuria, microscopic    Mixed hyperlipidemia    Other specified hypothyroidism    Insomnia    Mitral regurgitation    Age-related osteoporosis without current pathological fracture    History of permanent cardiac pacemaker placement    Paraesophageal hernia    Paroxysmal atrial fibrillation ([facility]    Restrictive lung disease    Primary osteoarthritis involving multiple joints    Sick sinus syndrome ([facility]    Vitamin D deficiency    Stress incontinence in female    [facility](carpometacarpal degenerative joint disease), localized primary, right    Malignant tumor of colon ([facility]    Gastro-esophageal reflux disease without esophagitis    Lichen simplex chronicus      Past Medical and Surgical History:     Past Medical History:   Diagnosis Date    A-fib ([facility]     Abnormal ECG     last assessed: 7/14/2015    Acid reflux     resolved    Anxiety     Benign neoplasm of sigmoid colon 09/13/2011    next colon 2012    Bronchitis, asthmatic     last assessed: 3/12/2012    Colon cancer ([facility] )     2012- had colon resection    COPD (chronic obstructive pulmonary disease) (HCC)     questionable    Depression     Diabetes mellitus ([facility] )     on byetta    Dizziness     occ    Esophageal stricture     last assessed: 9/30/2014    High cholesterol     Hypertension     Hypothyroidism     Iron (Fe) deficiency anemia 12/22/2011    iron pill continue    Irritable bowel syndrome     OA (osteoarthritis)     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    Pacemaker     hx SSS    Rash     labia area- uses cream prn    Restrictive lung disease     Seasonal allergies     Skin scarring/fibrosis     fibrotic scar; last assessed: 3/22/2013    MONA (stress urinary incontinence, female)     Urinary frequency     last assessed: 9/10/2012    Wears glasses     reading     Past Surgical History:   Procedure Laterality Date    BREAST EXCISIONAL BIOPSY Right 1990    benign, best guess on year   [address]      does not know type  Dr Indira Chris is cariologist    [facility]      resection  removed 25 In     COLONOSCOPY  06/01/2012    proctosigmoidectomy    ESOPHAGOGASTRODUODENOSCOPY N/A 1/9/2016    Procedure: ESOPHAGOGASTRODUODENOSCOPY (EGD);   Surgeon: Ly Dickey MD;  [facility];  Service:    Keary Chapo HYSTERECTOMY      FL ARTHROPLASTY I-P JT Right 8/16/2018    Procedure: ARTHROPLASTY PIP/FINGER - RIGHT RING;  Surgeon: Nadira Liang MD;  [facility];  Service: Orthopedics    FL CYSTOURETHROSCOPY N/A 5/14/2018    Procedure: Lowell Sep;  Surgeon: Rito Cranker, MD;  [facility];  Service: UroGynecology           FL LAP, REPAIR PARAESOPHAGEAL HERNIA, INCL FUNDOPLASTY W/ MESH N/A 1/27/2016    Procedure: LAPAROSCOPIC PARAESOPHAGEAL HERNIA REPAIR with mesh; toupet  FUNDOPLICATION ;  Surgeon: Berry Paz MD;  [facility];  Service: Thoracic    FL REPAIR INTERCARP/CARP-METACARP JT Right 1/10/2019    Procedure: Krystal Paul;  Surgeon: Nadira Liang MD;  [facility]; Service: Orthopedics    MT SLING OPER STRES INCONTINENCE N/A 2018    Procedure: INSERTION PUBOVAGINAL SLING (FEMALE);   Surgeon: Leidy Hylton MD;  [facility];  Service: UroGynecology           TOTAL ABDOMINAL HYSTERECTOMY      WRIST TENDON TRANSFER Right 1/10/2019    Procedure: TRANSFER TENDON FLEXOR CARPI RADIALIS FROM FOREARM TO HAND;  Surgeon: Felipe West MD;  Location:  MAIN OR;  Service: Orthopedics      Family History:     Family History   Problem Relation Age of Onset    Heart disease Mother     Diabetes Mother     Asthma Father     Stomach cancer Father         gastrkic cancer    Cancer Paternal Grandmother     Cancer Paternal Grandfather     No Known Problems Brother     Breast cancer Sister 76    Breast cancer Sister 77    Pancreatic cancer Sister 72    No Known Problems Maternal Aunt     No Known Problems Maternal Aunt     No Known Problems Maternal Aunt     No Known Problems Paternal Aunt     No Known Problems Daughter     No Known Problems Maternal Grandmother     No Known Problems Maternal Grandfather     Prostate cancer Paternal Uncle         age unknown    Substance Abuse Neg Hx     Mental illness Neg Hx     Alcohol abuse Neg Hx     Colon cancer Neg Hx     Colon polyps Neg Hx       Social History:     Social History     Tobacco Use   Smoking Status Former Smoker    Types: Cigarettes    Last attempt to quit:     Years since quittin 6   Smokeless Tobacco Former User    Quit date:      Social History     Substance and Sexual Activity   Alcohol Use No     Social History     Substance and Sexual Activity   Drug Use No      Medications and Allergies:     Current Outpatient Medications   Medication Sig Dispense Refill    acetaminophen (TYLENOL) 650 mg CR tablet Take 650 mg by mouth every 8 (eight) hours as needed for mild pain      apixaban (ELIQUIS) 5 mg Take 1 tablet (5 mg total) by mouth 2 (two) times a day 180 tablet 3    ascorbic acid (VITAMIN C) 500 mg tablet Take 500 mg by mouth daily      atenolol (TENORMIN) 50 mg tablet TAKE 1 TABLET AT BEDTIME 90 tablet 3    BYETTA 10 MCG PEN 10 MCG/0 04ML SOPN Inject 0 04 mL under the skin 2 (two) times a day 3 pen 1    Cholecalciferol (VITAMIN D-3) 1000 units CAPS Take 1 capsule by mouth daily      clobetasol (TEMOVATE) 0 05 % cream       Coenzyme Q10 (COQ10) 200 MG CAPS Take 200 mg by mouth daily        diclofenac sodium (VOLTAREN) 1 % Apply 2 g topically 4 (four) times a day (Patient taking differently: Apply 2 g topically as needed  ) 1 Tube 1    DULoxetine (CYMBALTA) 20 mg capsule TAKE 1 CAPSULE DAILY 90 capsule 3    estradiol (ESTRACE) 0 1 mg/g vaginal cream       Ferrous Sulfate 27 MG TABS Take 1 tablet by mouth daily      FREESTYLE LITE test strip Test 4 times daily  400 each 5    JARDIANCE 25 MG TABS TAKE 1 TABLET EVERY MORNING 90 tablet 2    levothyroxine 125 mcg tablet 1 tab 6 days of the week and 2 tablets on Sunday  102 tablet 0    MEGARED OMEGA-3 KRILL OIL PO Take 1 capsule by mouth daily      metFORMIN (GLUCOPHAGE-XR) 500 mg 24 hr tablet Take 1 tablet (500 mg total) by mouth 3 (three) times a day with meals 270 tablet 2    Misc Natural Products (OSTEO BI-FLEX ADV JOINT SHIELD PO) Take 1 tablet by mouth daily        Multiple Vitamins-Minerals (CENTRUM SILVER 50+MEN) TABS Take 1 tablet by mouth daily      PROAIR  (90 Base) MCG/ACT inhaler USE 2 INHALATIONS EVERY 4 HOURS AS NEEDED FOR SHORTNESS OF BREATH 8 5 g 0    simvastatin (ZOCOR) 20 mg tablet TAKE 2 TABLETS DAILY AT BEDTIME 180 tablet 0     No current facility-administered medications for this visit        Allergies   Allergen Reactions    Fluconazole Dizziness      Immunizations:     Immunization History   Administered Date(s) Administered    INFLUENZA 09/30/2015, 09/19/2016, 10/02/2017    Influenza Split High Dose Preservative Free IM 09/30/2013, 09/30/2014, 09/30/2015, 09/19/2016, 10/02/2017    Influenza TIV (IM) 10/24/2008, 12/08/2009, 09/14/2010, 09/13/2011, 09/10/2012, 09/01/2015, 10/05/2016    Influenza, injectable, quadrivalent, preservative free 0 5 mL 10/23/2018    Pneumococcal Conjugate 13-Valent 10/29/2015    Pneumococcal Polysaccharide PPV23 12/01/2005, 03/15/2011, 10/01/2015    Zoster 01/07/2014      Medicare Screening Tests and Risk Assessments:     Batsheva Dumont is here for her Subsequent Wellness visit  Health Risk Assessment:  Patient rates overall health as very good  Patient feels that their physical health rating is Much better  Eyesight was rated as Same  Hearing was rated as Same  Patient feels that their emotional and mental health rating is Same  Pain experienced by patient in the last 7 days has been None  Patient states that she has experienced no weight loss or gain in last 6 months  Emotional/Mental Health:  Patient has been feeling nervous/anxious  PHQ-9 Depression Screening:    Frequency of the following problems over the past two weeks:      1  Little interest or pleasure in doing things: 0 - not at all      2  Feeling down, depressed, or hopeless: 0 - not at all  PHQ-2 Score: 0          Broken Bones/Falls: Fall Risk Assessment:    In the past year, patient has experienced: No history of falling in past year          Bladder/Bowel:  Patient has leaked urine accidently in the last six months  Patient reports no loss of bowel control  Immunizations:  Patient has had a flu vaccination within the last year  Patient has received a pneumonia shot  Patient has received a shingles shot  Patient has received tetanus/diphtheria shot  Home Safety:  Patient does not have trouble with stairs inside or outside of their home  Patient currently reports that there are no safety hazards present in home, working smoke alarms, working carbon monoxide detectors        Preventative Screenings:   Breast cancer screening performed, colon cancer screen completed, cholesterol screen completed, glaucoma eye exam completed,     Nutrition:  Current diet: Regular and Limited junk food with servings of the following:    Medications:  Patient is currently taking over-the-counter supplements  List of OTC medications includes: Vitamins and minerals  Patient is able to manage medications  Lifestyle Choices:  Patient reports no tobacco use  Patient has smoked or used tobacco in the past   Patient has stopped her tobacco use  Tobacco use quit date: Quit smoking in 2002  Patient reports no alcohol use  Patient drives a vehicle  Patient wears seat belt  Current level of exercise of physical activity described by patient as: No         Activities of Daily Living:  Can get out of bed by his or her self, able to dress self, able to make own meals, able to do own shopping, able to bathe self, can do own laundry/housekeeping, can manage own money, pay bills and track expenses    Previous Hospitalizations:  Hospitalization or ED visit in past 12 months        Advanced Directives:  Patient has decided on a power of   Patient has spoken to designated power of   Patient has completed advanced directive          Preventative Screening/Counseling:      Cardiovascular:      General: Screening Current          Diabetes:      General: Screening Current          Colorectal Cancer:      General: Screening Current          Breast Cancer:      General: Screening Current          Cervical Cancer:      General: Screening Not Indicated          Osteoporosis:      General: Screening Current          AAA:      General: Screening Not Indicated          HIV:      General: Screening Not Indicated          Hepatitis C:      General: Screening Not Indicated        Advanced Directives:   Patient has living will for healthcare,     Immunizations:      Influenza: Risks & Benefits Discussed      Pneumococcal: Lifetime Vaccine Completed      Shingrix: Risks & Benefits Discussed      Hepatitis B (Low risk patients): Series Not Indicated      TD: Risks & Benefits Discussed      TDAP: Risks & Benefits Discussed      Other Preventative Counseling (Non-Medicare):   Fall Prevention and Increase physical activity

## 2019-08-19 ENCOUNTER — LAB (OUTPATIENT)
Dept: LAB | Facility: CLINIC | Age: 79
End: 2019-08-19
Payer: MEDICARE

## 2019-08-19 DIAGNOSIS — E11.40 TYPE 2 DIABETES MELLITUS WITH DIABETIC NEUROPATHY, WITHOUT LONG-TERM CURRENT USE OF INSULIN (HCC): ICD-10-CM

## 2019-08-19 DIAGNOSIS — E03.8 HYPOTHYROIDISM DUE TO HASHIMOTO'S THYROIDITIS: ICD-10-CM

## 2019-08-19 DIAGNOSIS — E06.3 HYPOTHYROIDISM DUE TO HASHIMOTO'S THYROIDITIS: ICD-10-CM

## 2019-08-19 LAB
EST. AVERAGE GLUCOSE BLD GHB EST-MCNC: 146 MG/DL
HBA1C MFR BLD: 6.7 % (ref 4.2–6.3)
T4 FREE SERPL-MCNC: 1.35 NG/DL (ref 0.76–1.46)
TSH SERPL DL<=0.05 MIU/L-ACNC: 0.14 UIU/ML (ref 0.36–3.74)

## 2019-08-19 PROCEDURE — 84443 ASSAY THYROID STIM HORMONE: CPT

## 2019-08-19 PROCEDURE — 83036 HEMOGLOBIN GLYCOSYLATED A1C: CPT

## 2019-08-19 PROCEDURE — 36415 COLL VENOUS BLD VENIPUNCTURE: CPT

## 2019-08-19 PROCEDURE — 84439 ASSAY OF FREE THYROXINE: CPT

## 2019-08-20 NOTE — RESULT ENCOUNTER NOTE
Please call the patient regarding abnormal result  Hemoglobin A1c has improved to 6 7  TSH remains low with a high normal free T4  Please continue levothyroxine 125 mcg Monday through Saturday and take 1/2 tablet on Sunday

## 2019-08-28 DIAGNOSIS — E11.8 TYPE 2 DIABETES MELLITUS WITH COMPLICATION, UNSPECIFIED WHETHER LONG TERM INSULIN USE: ICD-10-CM

## 2019-08-28 DIAGNOSIS — E06.3 HYPOTHYROIDISM DUE TO HASHIMOTO'S THYROIDITIS: ICD-10-CM

## 2019-08-28 DIAGNOSIS — E03.8 HYPOTHYROIDISM DUE TO HASHIMOTO'S THYROIDITIS: ICD-10-CM

## 2019-08-28 RX ORDER — LEVOTHYROXINE SODIUM 0.12 MG/1
TABLET ORAL
Qty: 102 TABLET | Refills: 4 | Status: SHIPPED | OUTPATIENT
Start: 2019-08-28 | End: 2020-11-04

## 2019-08-28 RX ORDER — SIMVASTATIN 20 MG
TABLET ORAL
Qty: 180 TABLET | Refills: 4 | Status: SHIPPED | OUTPATIENT
Start: 2019-08-28 | End: 2019-12-16 | Stop reason: SDUPTHER

## 2019-09-09 ENCOUNTER — REMOTE DEVICE CLINIC VISIT (OUTPATIENT)
Dept: CARDIOLOGY CLINIC | Facility: CLINIC | Age: 79
End: 2019-09-09
Payer: MEDICARE

## 2019-09-09 DIAGNOSIS — Z95.0 PRESENCE OF PERMANENT CARDIAC PACEMAKER: Primary | ICD-10-CM

## 2019-09-09 PROCEDURE — 93294 REM INTERROG EVL PM/LDLS PM: CPT | Performed by: INTERNAL MEDICINE

## 2019-09-09 PROCEDURE — 93296 REM INTERROG EVL PM/IDS: CPT | Performed by: INTERNAL MEDICINE

## 2019-09-09 NOTE — PROGRESS NOTES
MDT-DUAL CHAMBER PPM  CARELINK TRANSMISSION:  BATTERY VOLTAGE ADEQUATE (5 5 YR )   AP 80 9%  1 7%   ALL LEAD PARAMETERS WITHIN NORMAL LIMITS   3 VT EPISODES WITH EGMS SHOWING NSVT (19 @ 191 BPM, 17 @ 205 BPM - NO TERMINATION RECORDED, 6 @ 176 BPM)    98 FAST A&V EPISODES (LONGEST 9 MIN ),  AT/AF EPISODES (3 8%) WITH LONGEST EPISODE 7 HR , AND AVG   V HR 64 - 167 BPM   EF 55% (ECHO 03/2019)   PT TAKES ELIQUIS AND ATENOLOL   TASK TO DR COCHRAN FOR NSVT/HR>200BPM   NORMAL DEVICE FUNCTION   RG

## 2019-09-10 DIAGNOSIS — I48.91 ATRIAL FIBRILLATION, UNSPECIFIED TYPE (HCC): ICD-10-CM

## 2019-09-19 ENCOUNTER — OFFICE VISIT (OUTPATIENT)
Dept: ENDOCRINOLOGY | Facility: HOSPITAL | Age: 79
End: 2019-09-19
Payer: MEDICARE

## 2019-09-19 VITALS
WEIGHT: 158.8 LBS | HEIGHT: 64 IN | DIASTOLIC BLOOD PRESSURE: 90 MMHG | SYSTOLIC BLOOD PRESSURE: 130 MMHG | HEART RATE: 76 BPM | BODY MASS INDEX: 27.11 KG/M2

## 2019-09-19 DIAGNOSIS — I10 ESSENTIAL HYPERTENSION: ICD-10-CM

## 2019-09-19 DIAGNOSIS — E06.3 HYPOTHYROIDISM DUE TO HASHIMOTO'S THYROIDITIS: ICD-10-CM

## 2019-09-19 DIAGNOSIS — E55.9 VITAMIN D DEFICIENCY: ICD-10-CM

## 2019-09-19 DIAGNOSIS — E78.2 MIXED HYPERLIPIDEMIA: ICD-10-CM

## 2019-09-19 DIAGNOSIS — M81.0 AGE-RELATED OSTEOPOROSIS WITHOUT CURRENT PATHOLOGICAL FRACTURE: ICD-10-CM

## 2019-09-19 DIAGNOSIS — E11.40 TYPE 2 DIABETES MELLITUS WITH DIABETIC NEUROPATHY, WITHOUT LONG-TERM CURRENT USE OF INSULIN (HCC): Primary | ICD-10-CM

## 2019-09-19 DIAGNOSIS — E03.8 HYPOTHYROIDISM DUE TO HASHIMOTO'S THYROIDITIS: ICD-10-CM

## 2019-09-19 PROCEDURE — 99214 OFFICE O/P EST MOD 30 MIN: CPT | Performed by: NURSE PRACTITIONER

## 2019-09-19 NOTE — PROGRESS NOTES
Padmini Peña 69 y o  female MRN: [id number]    Encounter: [id number]      Assessment/Plan     Assessment: This is a 69y o -year-old female with type 2 diabetes, hypothyroidism, hypertension, hyperlipidemia and vitamin-D deficiency    Plan:  1   Type 2 diabetes:  Her most recent hemoglobin A1c is improved to 6 7   For now, she will continue  metformin, Jardiance and Byetta at the current dose   I have asked her to continue checking her blood sugars twice daily at alternating times and for the record to the office in 2 weeks for review and further adjustment, if necessary  She is up-to-date with her annual diabetic eye exams and is seeing the podiatrist every 12 weeks  Check hemoglobin A1c prior to next visit      2   Hypothyroidism:  Her most recent TSH was low with a normal free T4  Her dosage was changed to levothyroxine 125 mcg Monday through Saturday with a half tablet on Sunday approximately 4 weeks ago   Check TSH and free T4 in 2 weeks to reassess   Further titration of her levothyroxine dose may take place upon review of updated lab work      3   Hypertension:  Her blood pressure was reassessed to to 126/76 at the end of the office visit  Continue atenolol   Check comprehensive metabolic panel prior to next visit      4   Hyperlipidemia:  Continue simvastatin   Check fasting lipid panel prior to next visit      5   Vitamin-D deficiency:  Continue supplement with 1000 units of vitamin D3 daily         6   Osteopenia:  Recent DEXA scan reveals osteopenia to the lumbar spine and left hip   She will continue with calcium and vitamin-D supplementation   Stressed staying active reviewed fall risk and safety at the time of the visit      CC:  Type 2 Diabetes follow-up    History of Present Illness     HPI:  23-year-old female presents in the office today for follow-up of type 2 diabetes, hypothyroidism, hypertension and hyperlipidemia  [facility] states she has been diagnosed with type 2 diabetes for 8 years  Cosme Sin presents with no blood sugar records or meter for download   She checks her blood sugars once daily in the AM and states she is in the 150-160 range   She is currently taking metformin 500 mg three times daily, Byetta 10 mg twice daily and Jardiance 25 mg daily   She denies any recent episodes of hypoglycemia, polyuria, polydipsia or polyphagia   Her most recent hemoglobin A1c from   August 19, 2019 of 6 7  She states she is up-to-date with her annual diabetic eye exam with Dr Emy Rivers of June 11, 2019  She complains of some numbness and tingling to her feet at times and follows Podiatry every 12 weeks for regular diabetic foot care      For her hypothyroidism, she is treated with levothyroxine 125 mcg daily Monday through Saturday and half tablet on Sunday (dosage change as of August 20, 2019)  [place] most recent TSH from August 19, 2019 was  0 142 with a free T4 of 1 35   She complains of some constipation at times but otherwise has no symptoms/complaints of hypo or hyperthyroidism      Her hypertension is treated with atenolol 50 mg daily      For her hyperlipidemia, she is treated with simvastatin 40 mg daily      For her vitamin-D deficiency, she supplements with 1000 units of vitamin D3 daily      Her most recent DEXA scan performed on March 7, 2019 revealed a T-score of -1 4 and lumbar spine consistent with low bone mineral density-osteopenia   When compared to the prior examination, there was no significant change to the total bone mineral density of the lumbar spine   However, there was a 5% decrease in the total bone mineral density of the left hip   She continues to supplement with calcium and vitamin-D daily   She denies any recent falls or fractures  Review of Systems   Constitutional: Positive for fatigue ( at times)  Negative for chills and fever  HENT: Negative  Negative for trouble swallowing and voice change  Eyes: Negative  Negative for visual disturbance     Respiratory: Positive for shortness of breath ( dyspnea on exertion at times)  Negative for chest tightness  Cardiovascular: Negative  Negative for chest pain  Gastrointestinal: Negative  Negative for abdominal pain, constipation, diarrhea and vomiting  Endocrine: Positive for polyuria (Attributed to Jardiance)  Negative for cold intolerance, heat intolerance, polydipsia and polyphagia  Genitourinary: Negative  Musculoskeletal: Positive for arthralgias ( right hand)  Skin: Negative  Allergic/Immunologic: Negative  Neurological: Negative  Negative for dizziness, syncope, light-headedness and headaches  Hematological: Negative  Psychiatric/Behavioral: Negative  All other systems reviewed and are negative        Historical Information   Past Medical History:   Diagnosis Date    A-fib [facility])     Abnormal ECG     last assessed: 7/14/2015    Acid reflux     resolved    Anxiety     Benign neoplasm of sigmoid colon 09/13/2011    next colon 2012    Bronchitis, asthmatic     last assessed: 3/12/2012    Colon cancer ([facility] [address] )     2012- had colon resection    COPD (chronic obstructive pulmonary disease) ([facility] [address] )     questionable    Depression     Diabetes mellitus ([facility] [address] )     on byetta    Dizziness     occ    Esophageal stricture     last assessed: 9/30/2014    High cholesterol     Hypertension     Hypothyroidism     Iron (Fe) deficiency anemia 12/22/2011    iron pill continue    Irritable bowel syndrome     OA (osteoarthritis)     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    Pacemaker     hx SSS    Rash     labia area- uses cream prn    Restrictive lung disease     Seasonal allergies     Skin scarring/fibrosis     fibrotic scar; last assessed: 3/22/2013    MONA (stress urinary incontinence, female)     Urinary frequency     last assessed: 9/10/2012    Wears glasses     reading     Past Surgical History:   Procedure Laterality Date    BREAST EXCISIONAL BIOPSY Right 1990    benign, best guess on year    CARDIAC PACEMAKER PLACEMENT      does not know type  Dr Divya Argueta is cariologist     SECTION      COLON SURGERY      resection  removed 25 In     COLONOSCOPY  2012    proctosigmoidectomy    ESOPHAGOGASTRODUODENOSCOPY N/A 2016    Procedure: ESOPHAGOGASTRODUODENOSCOPY (EGD); Surgeon: Davian Pollard MD;  [facility];  Service:    Messi Hero HYSTERECTOMY      IL ARTHROPLASTY I-P JT Right 2018    Procedure: ARTHROPLASTY PIP/FINGER - RIGHT RING;  Surgeon: Wilmer Alcala MD;  [facility];  Service: Orthopedics    IL CYSTOURETHROSCOPY N/A 2018    Procedure: Thurnell Livers;  Surgeon: Franky Benavides MD;  [facility];  Service: UroGynecology           IL LAP, REPAIR PARAESOPHAGEAL HERNIA, INCL FUNDOPLASTY W/ MESH N/A 2016    Procedure: LAPAROSCOPIC PARAESOPHAGEAL HERNIA REPAIR with mesh; toupet  FUNDOPLICATION ;  Surgeon: Kunal Chappell MD;  [facility];  Service: Thoracic    IL REPAIR INTERCARP/CARP-METACARP JT Right 1/10/2019    Procedure: Cyrilla Schilder;  Surgeon: Wilmer Alcala MD;  [facility];  Service: Orthopedics    IL SLING OPER STRES INCONTINENCE N/A 2018    Procedure: INSERTION PUBOVAGINAL SLING (FEMALE);   Surgeon: Franky Benavides MD;  [facility];  Service: UroGynecology           TOTAL ABDOMINAL HYSTERECTOMY      WRIST TENDON TRANSFER Right 1/10/2019    Procedure: TRANSFER TENDON FLEXOR CARPI RADIALIS FROM FOREARM TO HAND;  Surgeon: Wilmer Alcala MD;  [facility];  Service: Orthopedics     Social History   Social History     Substance and Sexual Activity   Alcohol Use No     Social History     Substance and Sexual Activity   Drug Use No     Social History     Tobacco Use   Smoking Status Former Smoker    Types: Cigarettes    Last attempt to quit:     Years since quittin 7   Smokeless Tobacco Former User    Quit date:      Family History:   Family History   Problem Relation Age of Onset    Heart disease Mother     Diabetes Mother     Asthma Father     Stomach cancer Father         gastrkic cancer    Cancer Paternal Grandmother     Cancer Paternal Grandfather     No Known Problems Brother     Breast cancer Sister 76    Breast cancer Sister 77    Pancreatic cancer Sister 72    No Known Problems Maternal Aunt     No Known Problems Maternal Aunt     No Known Problems Maternal Aunt     No Known Problems Paternal Aunt     No Known Problems Daughter     No Known Problems Maternal Grandmother     No Known Problems Maternal Grandfather     Prostate cancer Paternal Uncle         age unknown    Substance Abuse Neg Hx     Mental illness Neg Hx     Alcohol abuse Neg Hx     Colon cancer Neg Hx     Colon polyps Neg Hx        Meds/Allergies   Current Outpatient Medications   Medication Sig Dispense Refill    acetaminophen (TYLENOL) 650 mg CR tablet Take 650 mg by mouth every 8 (eight) hours as needed for mild pain      apixaban (ELIQUIS) 5 mg Take 1 tablet (5 mg total) by mouth 2 (two) times a day 64 tablet 0    ascorbic acid (VITAMIN C) 500 mg tablet Take 500 mg by mouth daily      atenolol (TENORMIN) 50 mg tablet TAKE 1 TABLET AT BEDTIME 90 tablet 3    BYETTA 10 MCG PEN 10 MCG/0 04ML SOPN Inject 0 04 mL under the skin 2 (two) times a day 3 pen 1    Cholecalciferol (VITAMIN D-3) 1000 units CAPS Take 1 capsule by mouth daily      clobetasol (TEMOVATE) 0 05 % cream       Coenzyme Q10 (COQ10) 200 MG CAPS Take 200 mg by mouth daily        diclofenac sodium (VOLTAREN) 1 % Apply 2 g topically 4 (four) times a day (Patient taking differently: Apply 2 g topically as needed  ) 1 Tube 1    DULoxetine (CYMBALTA) 20 mg capsule TAKE 1 CAPSULE DAILY 90 capsule 3    estradiol (ESTRACE) 0 1 mg/g vaginal cream       Ferrous Sulfate 27 MG TABS Take 1 tablet by mouth daily      FREESTYLE LITE test strip Test 4 times daily   400 each 5    JARDIANCE 25 MG TABS TAKE 1 TABLET EVERY MORNING 90 tablet 2  levothyroxine 125 mcg tablet TAKE 1 TABLET 6 DAYS OF THE WEEK AND 2 TABLETS ON SUNDAY (Patient taking differently: TAKE 1 TABLET 6 DAYS OF THE WEEK AND 1/2 TABLETS ON SUNDAY) 102 tablet 4    MEGARED OMEGA-3 KRILL OIL PO Take 1 capsule by mouth daily      metFORMIN (GLUCOPHAGE-XR) 500 mg 24 hr tablet Take 1 tablet (500 mg total) by mouth 3 (three) times a day with meals 270 tablet 2    PROAIR  (90 Base) MCG/ACT inhaler USE 2 INHALATIONS EVERY 4 HOURS AS NEEDED FOR SHORTNESS OF BREATH 8 5 g 0    simvastatin (ZOCOR) 20 mg tablet TAKE 2 TABLETS DAILY AT BEDTIME 180 tablet 4    Misc Natural Products (OSTEO BI-FLEX ADV JOINT SHIELD PO) Take 1 tablet by mouth daily        Multiple Vitamins-Minerals (CENTRUM SILVER 50+MEN) TABS Take 1 tablet by mouth daily       No current facility-administered medications for this visit  Allergies   Allergen Reactions    Fluconazole Dizziness       Objective   Vitals: Blood pressure 130/90, pulse 76, height 5' 3 5" (1 613 m), weight 72 kg (158 lb 12 8 oz), not currently breastfeeding  Physical Exam   Constitutional: She is oriented to person, place, and time  She appears well-developed and well-nourished  HENT:   Head: Normocephalic and atraumatic  Mouth/Throat: Oropharynx is clear and moist    Eyes: Pupils are equal, round, and reactive to light  Conjunctivae and EOM are normal    Neck: Normal range of motion  Neck supple  Cardiovascular: Normal rate, regular rhythm, normal heart sounds and intact distal pulses  Pacemaker left chest wall   Pulmonary/Chest: Effort normal and breath sounds normal    Abdominal: Soft  Bowel sounds are normal    Musculoskeletal: Normal range of motion  Neurological: She is alert and oriented to person, place, and time  Skin: Skin is warm and dry  Dry skin   Psychiatric: She has a normal mood and affect  Her behavior is normal  Judgment and thought content normal    Vitals reviewed      Lab Results:   Lab Results Component Value Date/Time    Hemoglobin A1C 6 7 (H) 08/19/2019 07:22 AM    Hemoglobin A1C 7 2 (H) 05/28/2019 07:14 AM    Hemoglobin A1C 7 3 (H) 02/06/2019 07:47 AM    WBC 7 62 07/22/2019 07:29 AM    WBC 7 64 12/12/2018 07:50 AM    Hemoglobin 15 5 (H) 07/22/2019 07:29 AM    Hemoglobin 15 5 (H) 12/12/2018 07:50 AM    Hematocrit 49 2 (H) 07/22/2019 07:29 AM    Hematocrit 49 7 (H) 12/12/2018 07:50 AM    MCV 90 07/22/2019 07:29 AM    MCV 93 12/12/2018 07:50 AM    Platelets 847 91/32/8334 07:29 AM    Platelets 717 95/24/2089 07:50 AM    BUN 14 07/22/2019 07:29 AM    BUN 12 05/28/2019 07:14 AM    BUN 13 02/06/2019 07:47 AM    Potassium 4 4 07/22/2019 07:29 AM    Potassium 4 5 05/28/2019 07:14 AM    Potassium 4 6 02/06/2019 07:47 AM    Chloride 102 07/22/2019 07:29 AM    Chloride 105 05/28/2019 07:14 AM    Chloride 102 02/06/2019 07:47 AM    CO2 30 07/22/2019 07:29 AM    CO2 30 05/28/2019 07:14 AM    CO2 29 02/06/2019 07:47 AM    Creatinine 0 64 07/22/2019 07:29 AM    Creatinine 0 67 05/28/2019 07:14 AM    Creatinine 0 71 02/06/2019 07:47 AM    AST 17 07/22/2019 07:29 AM    AST 20 05/28/2019 07:14 AM    AST 19 02/06/2019 07:47 AM    ALT 23 07/22/2019 07:29 AM    ALT 24 05/28/2019 07:14 AM    ALT 27 02/06/2019 07:47 AM    Albumin 4 0 07/22/2019 07:29 AM    Albumin 3 8 05/28/2019 07:14 AM    Albumin 4 2 02/06/2019 07:47 AM    HDL, Direct 26 (L) 07/22/2019 07:29 AM    Triglycerides 262 (H) 07/22/2019 07:29 AM     Portions of the record may have been created with voice recognition software  Occasional wrong word or "sound a like" substitutions may have occurred due to the inherent limitations of voice recognition software  Read the chart carefully and recognize, using context, where substitutions have occurred

## 2019-09-19 NOTE — PATIENT INSTRUCTIONS
Be mindful of diet       Stay active and stay hydrated       Continue Metformin 500 mg to three times per day (breakfast, lunch and dinner)       Continue Byetta and Jardiance       Please check your blood sugars 2 times daily and for the record to the office in 2 weeks for review and adjustment, if necessary       Continue levothyroxine 125 mcg daily Monday through Saturday with 1/2 tablet on Sunday  Recheck thyroid function in approximately 2 weeks to reassess  Will contact you with the results and any further changes, if necessary      Continue atenolol and simvastatin       Continue with regular supplementation of vitamin D3 daily       Obtain lab work as prescribed

## 2019-09-20 ENCOUNTER — IMMUNIZATIONS (OUTPATIENT)
Dept: FAMILY MEDICINE CLINIC | Facility: CLINIC | Age: 79
End: 2019-09-20
Payer: MEDICARE

## 2019-09-20 DIAGNOSIS — Z23 ENCOUNTER FOR IMMUNIZATION: ICD-10-CM

## 2019-09-20 PROCEDURE — 90662 IIV NO PRSV INCREASED AG IM: CPT

## 2019-09-20 PROCEDURE — G0008 ADMIN INFLUENZA VIRUS VAC: HCPCS

## 2019-10-09 ENCOUNTER — LAB (OUTPATIENT)
Dept: LAB | Facility: CLINIC | Age: 79
End: 2019-10-09
Payer: MEDICARE

## 2019-10-09 DIAGNOSIS — E06.3 HYPOTHYROIDISM DUE TO HASHIMOTO'S THYROIDITIS: ICD-10-CM

## 2019-10-09 DIAGNOSIS — E03.8 HYPOTHYROIDISM DUE TO HASHIMOTO'S THYROIDITIS: ICD-10-CM

## 2019-10-09 LAB
T4 FREE SERPL-MCNC: 1.18 NG/DL (ref 0.76–1.46)
TSH SERPL DL<=0.05 MIU/L-ACNC: 0.49 UIU/ML (ref 0.36–3.74)

## 2019-10-09 PROCEDURE — 84439 ASSAY OF FREE THYROXINE: CPT

## 2019-10-09 PROCEDURE — 36415 COLL VENOUS BLD VENIPUNCTURE: CPT

## 2019-10-09 PROCEDURE — 84443 ASSAY THYROID STIM HORMONE: CPT

## 2019-10-10 NOTE — RESULT ENCOUNTER NOTE
Please call the patient regarding result  TSH and free T4 are now normal   Continue levothyroxine at current dose

## 2019-10-17 ENCOUNTER — TELEPHONE (OUTPATIENT)
Dept: ENDOCRINOLOGY | Facility: HOSPITAL | Age: 79
End: 2019-10-17

## 2019-10-17 NOTE — TELEPHONE ENCOUNTER
Reviewed Aide's blood sugars from September 22 through October 5, 2019  She is relatively controlled throughout the day with some variability at bedtime most likely related to diet  For now continue current regimen and continue to check blood sugars regularly  Please be mindful of diet

## 2019-11-07 ENCOUNTER — OFFICE VISIT (OUTPATIENT)
Dept: FAMILY MEDICINE CLINIC | Facility: CLINIC | Age: 79
End: 2019-11-07
Payer: MEDICARE

## 2019-11-07 VITALS
HEART RATE: 69 BPM | RESPIRATION RATE: 15 BRPM | BODY MASS INDEX: 28.08 KG/M2 | HEIGHT: 63 IN | SYSTOLIC BLOOD PRESSURE: 124 MMHG | WEIGHT: 158.5 LBS | DIASTOLIC BLOOD PRESSURE: 80 MMHG

## 2019-11-07 DIAGNOSIS — L82.1 SEBORRHEIC KERATOSIS: ICD-10-CM

## 2019-11-07 DIAGNOSIS — L72.0 MILIA: ICD-10-CM

## 2019-11-07 DIAGNOSIS — L70.0 COMEDO: ICD-10-CM

## 2019-11-07 PROCEDURE — 99213 OFFICE O/P EST LOW 20 MIN: CPT | Performed by: FAMILY MEDICINE

## 2019-11-07 NOTE — PROGRESS NOTES
Assessment/Plan:    [place]ed[place]  Milia  Seborrhea keratosis    All benign patient reassured recheck as scheduled         Subjective:   Timo Arroyo is a 67 y  o female  Chief Complaint   Patient presents with   Malachy Beery on neck/face     Patient has some spots on her face and some pimple she could feel in the back of her not get wants to make sure that they are okay    If not she wants to see a dermatologist      Past Medical History:   Diagnosis Date    A-fib [facility])     Abnormal ECG     last assessed: 2015    Acid reflux     resolved    Anxiety     Benign neoplasm of sigmoid colon 2011    next colon     Bronchitis, asthmatic     last assessed: 3/12/2012    Colon cancer ([facility] [address] )     2012- had colon resection    COPD (chronic obstructive pulmonary disease) ([facility] [address] )     questionable    Depression     Diabetes mellitus ([facility] [address] )     on byetta    Dizziness     occ    Esophageal stricture     last assessed: 2014    High cholesterol     Hypertension     Hypothyroidism     Iron (Fe) deficiency anemia 2011    iron pill continue    Irritable bowel syndrome     OA (osteoarthritis)     Organoaxial gastric volvulus 2016    Pacemaker     hx SSS    Rash     labia area- uses cream prn    Restrictive lung disease     Seasonal allergies     Skin scarring/fibrosis     fibrotic scar; last assessed: 3/22/2013    MONA (stress urinary incontinence, female)     Urinary frequency     last assessed: 9/10/2012    Wears glasses     reading     Social History     Tobacco Use    Smoking status: Former Smoker     Types: Cigarettes     Last attempt to quit:      Years since quittin 8    Smokeless tobacco: Former User     Quit date:    Substance Use Topics    Alcohol use: No    Drug use: No     Family History   Problem Relation Age of Onset    Heart disease Mother     Diabetes Mother     Asthma Father     Stomach cancer Father         gastrkic cancer    Cancer Paternal Grandmother  Cancer Paternal Grandfather     No Known Problems Brother     Breast cancer Sister 76    Breast cancer Sister 77    Pancreatic cancer Sister 72    No Known Problems Maternal Aunt     No Known Problems Maternal Aunt     No Known Problems Maternal Aunt     No Known Problems Paternal Aunt     No Known Problems Daughter     No Known Problems Maternal Grandmother     No Known Problems Maternal Grandfather     Prostate cancer Paternal Uncle         age unknown    Substance Abuse Neg Hx     Mental illness Neg Hx     Alcohol abuse Neg Hx     Colon cancer Neg Hx     Colon polyps Neg Hx        MEDICATIONS REVIEWED AND UPDATED    10 point review of systems performed, the remainder of the ROS is negative except for what is noted in the history of chief complaint    Objective:    Vitals:    11/07/19 1256   BP: 124/80   Pulse: 69   Resp: 15     Body mass index is 28 08 kg/m²  Physical Exam    General  Patient in no acute distress, well appearing, well nourished and appears stated age    Mental status  Good judgment and insight, oriented to time person and place, recent and remote memory is intact, mood and affect are normal, cooperative, and patient is reasonable      Skin  Bilateral temporal area has macular as which are skin damage from son over the decades  On her back of her neck which she feels are milia that were removed  On her chest was a close call me do not that was also removed  The rest of the lesions are benign and her seborrhea keratosis she was reassured

## 2019-11-07 NOTE — PROGRESS NOTES
BMI Counseling: Body mass index is 28 08 kg/m²  The BMI is above normal  Nutrition recommendations include reducing portion sizes

## 2019-11-15 ENCOUNTER — LAB (OUTPATIENT)
Dept: LAB | Facility: CLINIC | Age: 79
End: 2019-11-15
Payer: MEDICARE

## 2019-11-15 DIAGNOSIS — E06.3 HYPOTHYROIDISM DUE TO HASHIMOTO'S THYROIDITIS: ICD-10-CM

## 2019-11-15 DIAGNOSIS — E11.40 TYPE 2 DIABETES MELLITUS WITH DIABETIC NEUROPATHY, WITHOUT LONG-TERM CURRENT USE OF INSULIN (HCC): ICD-10-CM

## 2019-11-15 DIAGNOSIS — E78.2 MIXED HYPERLIPIDEMIA: ICD-10-CM

## 2019-11-15 DIAGNOSIS — I10 ESSENTIAL HYPERTENSION: ICD-10-CM

## 2019-11-15 DIAGNOSIS — E03.8 HYPOTHYROIDISM DUE TO HASHIMOTO'S THYROIDITIS: ICD-10-CM

## 2019-11-15 LAB
ALBUMIN SERPL BCP-MCNC: 3.9 G/DL (ref 3.5–5)
ALP SERPL-CCNC: 72 U/L (ref 46–116)
ALT SERPL W P-5'-P-CCNC: 25 U/L (ref 12–78)
ANION GAP SERPL CALCULATED.3IONS-SCNC: 6 MMOL/L (ref 4–13)
AST SERPL W P-5'-P-CCNC: 18 U/L (ref 5–45)
BILIRUB SERPL-MCNC: 0.92 MG/DL (ref 0.2–1)
BUN SERPL-MCNC: 14 MG/DL (ref 5–25)
CALCIUM SERPL-MCNC: 9.4 MG/DL (ref 8.3–10.1)
CHLORIDE SERPL-SCNC: 106 MMOL/L (ref 100–108)
CHOLEST SERPL-MCNC: 139 MG/DL (ref 50–200)
CO2 SERPL-SCNC: 28 MMOL/L (ref 21–32)
CREAT SERPL-MCNC: 0.68 MG/DL (ref 0.6–1.3)
EST. AVERAGE GLUCOSE BLD GHB EST-MCNC: 151 MG/DL
GFR SERPL CREATININE-BSD FRML MDRD: 84 ML/MIN/1.73SQ M
GLUCOSE P FAST SERPL-MCNC: 135 MG/DL (ref 65–99)
HBA1C MFR BLD: 6.9 % (ref 4.2–6.3)
HDLC SERPL-MCNC: 34 MG/DL
LDLC SERPL CALC-MCNC: 71 MG/DL (ref 0–100)
NONHDLC SERPL-MCNC: 105 MG/DL
POTASSIUM SERPL-SCNC: 4.6 MMOL/L (ref 3.5–5.3)
PROT SERPL-MCNC: 7.5 G/DL (ref 6.4–8.2)
SODIUM SERPL-SCNC: 140 MMOL/L (ref 136–145)
T4 FREE SERPL-MCNC: 1.05 NG/DL (ref 0.76–1.46)
TRIGL SERPL-MCNC: 169 MG/DL
TSH SERPL DL<=0.05 MIU/L-ACNC: 0.96 UIU/ML (ref 0.36–3.74)

## 2019-11-15 PROCEDURE — 84439 ASSAY OF FREE THYROXINE: CPT

## 2019-11-15 PROCEDURE — 36415 COLL VENOUS BLD VENIPUNCTURE: CPT

## 2019-11-15 PROCEDURE — 83036 HEMOGLOBIN GLYCOSYLATED A1C: CPT

## 2019-11-15 PROCEDURE — 80053 COMPREHEN METABOLIC PANEL: CPT

## 2019-11-15 PROCEDURE — 80061 LIPID PANEL: CPT

## 2019-11-15 PROCEDURE — 84443 ASSAY THYROID STIM HORMONE: CPT

## 2019-11-19 NOTE — RESULT ENCOUNTER NOTE
Please call the patient regarding result  Hemoglobin A1c is 6 9  Thyroid function is normal   Fasting glucose is elevated on comprehensive metabolic panel  Triglycerides are improved on most recent fasting lipid panel

## 2019-11-21 ENCOUNTER — OFFICE VISIT (OUTPATIENT)
Dept: CARDIOLOGY CLINIC | Facility: CLINIC | Age: 79
End: 2019-11-21
Payer: MEDICARE

## 2019-11-21 ENCOUNTER — IN-CLINIC DEVICE VISIT (OUTPATIENT)
Dept: CARDIOLOGY CLINIC | Facility: CLINIC | Age: 79
End: 2019-11-21
Payer: MEDICARE

## 2019-11-21 VITALS
WEIGHT: 155.4 LBS | SYSTOLIC BLOOD PRESSURE: 110 MMHG | HEART RATE: 74 BPM | DIASTOLIC BLOOD PRESSURE: 70 MMHG | BODY MASS INDEX: 27.54 KG/M2 | HEIGHT: 63 IN

## 2019-11-21 DIAGNOSIS — Z95.0 MRI SAFE CARDIAC PACEMAKER IN SITU: ICD-10-CM

## 2019-11-21 DIAGNOSIS — Z95.0 CARDIAC PACEMAKER IN SITU: Primary | ICD-10-CM

## 2019-11-21 DIAGNOSIS — I48.91 ATRIAL FIBRILLATION, UNSPECIFIED TYPE (HCC): Primary | ICD-10-CM

## 2019-11-21 DIAGNOSIS — I10 ESSENTIAL HYPERTENSION: ICD-10-CM

## 2019-11-21 DIAGNOSIS — E78.2 MIXED HYPERLIPIDEMIA: ICD-10-CM

## 2019-11-21 PROCEDURE — 99214 OFFICE O/P EST MOD 30 MIN: CPT | Performed by: INTERNAL MEDICINE

## 2019-11-21 PROCEDURE — 93000 ELECTROCARDIOGRAM COMPLETE: CPT | Performed by: INTERNAL MEDICINE

## 2019-11-21 PROCEDURE — 93280 PM DEVICE PROGR EVAL DUAL: CPT | Performed by: INTERNAL MEDICINE

## 2019-11-21 NOTE — PROGRESS NOTES
Results for orders placed or performed in visit on 11/21/19   Cardiac EP device report    Narrative    MDT-DUAL CHAMBER PPM  DEVICE INTERROGATED IN THE [employer] OFFICE  BATTERY VOLTAGE ADEQUATE (5 5 YRS)  AP-81%, -1%  ALL LEAD PARAMETERS WITHIN NORMAL LIMITS  1 NEW NSVT EPISODE FOR 7 BEATS, AVG CL~300MS  EF-55% (ECHO 3/7/19)  965 AF EPISODES SINCE 11/28/18 MAX DURATION 17 HRS  HX: PAF & ON ELIQUIS & ATENOLOL  AF BURDEN-3 8%  290 FAST A&V EPISODES SINCE 11/28/18 MAX DURATION 17 MINS- AF W/ RVR ON AVAILABLE EGM'S; MOST RECENT EPISODE OCCURRED ON DAY OF ORAL PROCEDURE  NORMAL DEVICE FUNCTION  PT TO SEE DR COCHRAN TODAY   GV

## 2019-11-21 NOTE — PROGRESS NOTES
EPS Progress Note - Ivy Bradshaw 67 y o  female MRN: [id number]           ASSESSMENT:  1  Atrial fibrillation, unspecified type ([address] )  POCT ECG   2  Essential hypertension     3  Mixed hyperlipidemia     4  MRI safe cardiac pacemaker in situ             PLAN:  1  PAF rate controlled   On proper anticoagulation with Eliquis 5 mg twice daily     2  Sick sinus syndrome MRI safe pacemaker in situ  S/p Medtronic dual chamber pacemaker, working appropriately  LVEF 55% from echo in March 2019    3  Hypertension  Well controlled     4  Hyperlipidemia   On statin       Follow up in 1 year     HPI:   Ivy Bradshaw is a 67 y o  female who presents to the office today for a one year follow up  Patient has a history of diabetes type 2, hypertension, hypothyroidism, CAD, paroxysmal atrial fibrillation, sick sinus syndrome s/p pacemaker, mitral regurgitation, and hyperlipidemia  Patient reports doing well with no cardiac symptoms  She denies chest pain, palpitations, shortness of breath, LE edema, pre syncope or syncope  ROS:   Negative for palpitations, shortness of breath, chest discomfort, presyncope or syncope, lower extremity swelling  All other 12 point ROS negative     Objective:     Vitals: Blood pressure 110/70, pulse 74, height 5' 3" (1 6 m), weight 70 5 kg (155 lb 6 4 oz), not currently breastfeeding , Body mass index is 27 53 kg/m²  ,        Physical Exam:    GEN: Ivy Bradshaw appears well, alert and oriented x 3, pleasant and cooperative   HEENT: pupils equal, round, and reactive to light; extraocular muscles intact  NECK: supple, no carotid bruits   HEART: regular rhythm, normal S1 and S2, no murmurs, clicks, gallops or rubs   LUNGS: clear to auscultation bilaterally; no wheezes, rales, or rhonchi   ABDOMEN: normal bowel sounds, soft, no tenderness, no distention  EXTREMITIES: peripheral pulses normal; no clubbing, cyanosis, or edema  NEURO: no focal findings   SKIN: normal without suspicious lesions on exposed skin    Medications:      Current Outpatient Medications:     acetaminophen (TYLENOL) 650 mg CR tablet, Take 650 mg by mouth every 8 (eight) hours as needed for mild pain, Disp: , Rfl:     apixaban (ELIQUIS) 5 mg, Take 1 tablet (5 mg total) by mouth 2 (two) times a day, Disp: 64 tablet, Rfl: 0    ascorbic acid (VITAMIN C) 500 mg tablet, Take 500 mg by mouth daily, Disp: , Rfl:     atenolol (TENORMIN) 50 mg tablet, TAKE 1 TABLET AT BEDTIME, Disp: 90 tablet, Rfl: 3    BYETTA 10 MCG PEN 10 MCG/0 04ML SOPN, Inject 0 04 mL under the skin 2 (two) times a day, Disp: 3 pen, Rfl: 1    Cholecalciferol (VITAMIN D-3) 1000 units CAPS, Take 1 capsule by mouth daily, Disp: , Rfl:     clobetasol (TEMOVATE) 0 05 % cream, , Disp: , Rfl:     Coenzyme Q10 (COQ10) 200 MG CAPS, Take 200 mg by mouth daily  , Disp: , Rfl:     diclofenac sodium (VOLTAREN) 1 %, Apply 2 g topically 4 (four) times a day (Patient taking differently: Apply 2 g topically as needed  ), Disp: 1 Tube, Rfl: 1    DULoxetine (CYMBALTA) 20 mg capsule, TAKE 1 CAPSULE DAILY, Disp: 90 capsule, Rfl: 3    estradiol (ESTRACE) 0 1 mg/g vaginal cream, , Disp: , Rfl:     Ferrous Sulfate 27 MG TABS, Take 1 tablet by mouth daily, Disp: , Rfl:     FREESTYLE LITE test strip, Test 4 times daily  , Disp: 400 each, Rfl: 5    JARDIANCE 25 MG TABS, TAKE 1 TABLET EVERY MORNING, Disp: 90 tablet, Rfl: 2    levothyroxine 125 mcg tablet, TAKE 1 TABLET 6 DAYS OF THE WEEK AND 2 TABLETS ON SUNDAY (Patient taking differently: TAKE 1 TABLET 6 DAYS OF THE WEEK AND 1/2 TABLETS ON SUNDAY), Disp: 102 tablet, Rfl: 4    MEGARED OMEGA-3 KRILL OIL PO, Take 1 capsule by mouth daily, Disp: , Rfl:     metFORMIN (GLUCOPHAGE-XR) 500 mg 24 hr tablet, Take 1 tablet (500 mg total) by mouth 3 (three) times a day with meals, Disp: 270 tablet, Rfl: 2    Misc Natural Products (OSTEO BI-FLEX ADV JOINT SHIELD PO), Take 1 tablet by mouth daily  , Disp: , Rfl:     PROAIR  90 Base) MCG/ACT inhaler, USE 2 INHALATIONS EVERY 4 HOURS AS NEEDED FOR SHORTNESS OF BREATH, Disp: 8 5 g, Rfl: 0    simvastatin (ZOCOR) 20 mg tablet, TAKE 2 TABLETS DAILY AT BEDTIME, Disp: 180 tablet, Rfl: 4     Family History   Problem Relation Age of Onset    Heart disease Mother     Diabetes Mother     Asthma Father     Stomach cancer Father         gastrkic cancer    Cancer Paternal Grandmother     Cancer Paternal Grandfather     No Known Problems Brother     Breast cancer Sister 76    Breast cancer Sister 77    Pancreatic cancer Sister 72    No Known Problems Maternal Aunt     No Known Problems Maternal Aunt     No Known Problems Maternal Aunt     No Known Problems Paternal Aunt     No Known Problems Daughter     No Known Problems Maternal Grandmother     No Known Problems Maternal Grandfather     Prostate cancer Paternal Uncle         age unknown    Substance Abuse Neg Hx     Mental illness Neg Hx     Alcohol abuse Neg Hx     Colon cancer Neg Hx     Colon polyps Neg Hx      Social History     Socioeconomic History    Marital status: /Civil Union     Spouse name: Not on file    Number of children: Not on file    Years of education: Not on file    Highest education level: Not on file   Occupational History    Not on file   Social Needs    Financial resource strain: Not on file    Food insecurity:     Worry: Not on file     Inability: Not on file    Transportation needs:     Medical: Not on file     Non-medical: Not on file   Tobacco Use    Smoking status: Former Smoker     Types: Cigarettes     Last attempt to quit:      Years since quittin 8    Smokeless tobacco: Former User     Quit date:    Substance and Sexual Activity    Alcohol use: No    Drug use: No    Sexual activity: Never   Lifestyle    Physical activity:     Days per week: Not on file     Minutes per session: Not on file    Stress: Not on file   Relationships    Social connections:     Talks on phone: Not on file     Gets together: Not on file     Attends Islam service: Not on file     Active member of club or organization: Not on file     Attends meetings of clubs or organizations: Not on file     Relationship status: Not on file    Intimate partner violence:     Fear of current or ex partner: Not on file     Emotionally abused: Not on file     Physically abused: Not on file     Forced sexual activity: Not on file   Other Topics Concern    Not on file   Social History Narrative    Always uses seat belt    Copy of advanced directive obtained    Drinks coffee-drinks 5 cups a day    Has smoke detectors    Uses safety equipment-seatbelts     Social History     Tobacco Use   Smoking Status Former Smoker    Types: Cigarettes    Last attempt to quit:     Years since quittin 8   Smokeless Tobacco Former User    Quit date:      Social History     Substance and Sexual Activity   Alcohol Use No       Labs & Results:  Below is the patient's most recent value for Albumin, ALT, AST, BUN, Calcium, Chloride, Cholesterol, CO2, Creatinine, GFR, Glucose, HDL, Hematocrit, Hemoglobin, Hemoglobin A1C, LDL, Magnesium, Phosphorus, Platelets, Potassium, PSA, Sodium, Triglycerides, and WBC  Lab Results   Component Value Date    ALT 25 11/15/2019    AST 18 11/15/2019    BUN 14 11/15/2019    CALCIUM 9 4 11/15/2019     11/15/2019    CHOL 147 2015    CO2 28 11/15/2019    CREATININE 0 68 11/15/2019    HDL 34 (L) 11/15/2019    HCT 49 2 (H) 2019    HGB 15 5 (H) 2019    HGBA1C 6 9 (H) 11/15/2019    MG 1 8 2016     2019    K 4 6 11/15/2019     2015    TRIG 169 (H) 11/15/2019    WBC 7 62 2019     Note: for a comprehensive list of the patient's lab results, access the Results Review activity              Cardiac testing:   Results for orders placed during the hospital encounter of 19   Echo complete with contrast if indicated    Narrative [facility] [address] [place] [address]    Transthoracic Echocardiogram  2D, M-mode, Doppler, and Color Doppler    Study date:  07-Mar-2019    Patient: Salima Villareal  MR number: [id number]  Account number: [de-identified]  : 1940  Age: 69 years  Gender: Female  Status: Outpatient  Location: [facility]  Height: 65 in  Weight: 169 lb  BP: 102/ 68 mmHg    Indications: Valvular disorder  Diagnoses: I34 0 - Nonrheumatic mitral (valve) insufficiency    Sonographer:  Carmela CHRISTIANSEN, [facility]  Primary Physician:  Owen House MD  Referring Physician:  Santino Hudson DO  Group:  [facility]  Interpreting Physician:  lEly Shaver MD    SUMMARY    LEFT VENTRICLE:  Systolic function was normal by visual assessment  Ejection fraction was estimated to be 55 %  Although no diagnostic regional wall motion abnormality was identified, this possibility cannot be completely excluded on the basis of this study  Wall thickness was mildly increased  Doppler parameters were consistent with abnormal left ventricular relaxation (grade 1 diastolic dysfunction)  LEFT ATRIUM:  The atrium was mildly dilated  RIGHT ATRIUM:  The atrium was mildly dilated  MITRAL VALVE:  There was mild annular calcification  There was trace regurgitation  AORTIC VALVE:  The valve was trileaflet  Leaflets exhibited normal thickness, moderate calcification, and mildly reduced cuspal separation  TRICUSPID VALVE:  There was mild regurgitation  AORTA:  The root exhibited mild dilatation  HISTORY: PRIOR HISTORY: CAD, Cardiomegaly, Pacemaker, SSS, Hypertension  PROCEDURE: The study was performed in the [facility]  This was a routine study  The transthoracic approach was used  The study included complete 2D imaging, M-mode, complete spectral Doppler, and color Doppler   Images were  obtained from the parasternal, apical, subcostal, and suprasternal notch acoustic windows  Image quality was adequate  LEFT VENTRICLE: Size was normal  Systolic function was normal by visual assessment  Ejection fraction was estimated to be 55 %  Although no diagnostic regional wall motion abnormality was identified, this possibility cannot be completely  excluded on the basis of this study  Wall thickness was mildly increased  DOPPLER: There was an increased relative contribution of atrial contraction to ventricular filling  Doppler parameters were consistent with abnormal left ventricular  relaxation (grade 1 diastolic dysfunction)  RIGHT VENTRICLE: The size was normal  Systolic function was normal  DOPPLER: Systolic pressure was within the normal range  Estimated peak pressure was 25 mmHg  LEFT ATRIUM: The atrium was mildly dilated  RIGHT ATRIUM: The atrium was mildly dilated  MITRAL VALVE: There was mild annular calcification  Valve structure was normal  There was normal leaflet separation  DOPPLER: The transmitral velocity was within the normal range  There was no evidence for stenosis  There was trace  regurgitation  AORTIC VALVE: The valve was trileaflet  Leaflets exhibited normal thickness, moderate calcification, and mildly reduced cuspal separation  DOPPLER: Transaortic velocity was within the normal range  There was no evidence for stenosis  There  was no regurgitation  TRICUSPID VALVE: The valve structure was normal  There was normal leaflet separation  DOPPLER: The transtricuspid velocity was within the normal range  There was no evidence for stenosis  There was mild regurgitation  PULMONIC VALVE: Leaflets exhibited normal thickness, no calcification, and normal cuspal separation  DOPPLER: The transpulmonic velocity was within the normal range  There was no regurgitation  PERICARDIUM: There was no pericardial effusion  AORTA: The root exhibited mild dilatation  SYSTEMIC VEINS: IVC: The inferior vena cava was normal in size and course  Respirophasic changes were normal     SYSTEM MEASUREMENT TABLES    2D  %FS: 31 32 %  Ao Diam: 4 03 cm  EDV(Teich): 72 8 ml  EF(Teich): 59 66 %  ESV(Teich): 29 37 ml  IVSd: 1 31 cm  LA Area: 17 69 cm2  LA Diam: 3 89 cm  LVEDV MOD A4C: 103 59 ml  LVEF MOD A4C: 64 83 %  LVESV MOD A4C: 36 44 ml  LVIDd: 4 07 cm  LVIDs: 2 79 cm  LVLd A4C: 7 99 cm  LVLs A4C: 6 44 cm  LVOT Diam: 2 3 cm  LVPWd: 0 97 cm  RA Area: 17 11 cm2  RVIDd: 4 27 cm  SV MOD A4C: 67 15 ml  SV(Teich): 43 43 ml    CW  TR Vmax: 2 35 m/s  TR maxP 04 mmHg    MM  TAPSE: 1 92 cm    PW  E': 0 04 m/s  E/E': 16 01  MV A Stephen: 0 87 m/s  MV Dec Meagher: 1 78 m/s2  MV DecT: 322 07 ms  MV E Stephen: 0 57 m/s  MV E/A Ratio: 0 65  MV PHT: 93 4 ms  MVA By PHT: 2 36 cm2    [facility] Accredited [facility]    Prepared and electronically signed by    Danielle Marino MD  Signed 07-Mar-2019 14:49:39       No results found for this or any previous visit  No results found for this or any previous visit  No results found for this or any previous visit                Scribe Attestation    I,:   Charissa Call am acting as a scribe while in the presence of the attending physician :        I,:   Samantha Rincon DO personally performed the services described in this documentation    as scribed in my presence :

## 2019-11-29 DIAGNOSIS — Z79.4 TYPE 2 DIABETES MELLITUS WITHOUT COMPLICATION, WITH LONG-TERM CURRENT USE OF INSULIN (HCC): Primary | ICD-10-CM

## 2019-11-29 DIAGNOSIS — E11.9 TYPE 2 DIABETES MELLITUS WITHOUT COMPLICATION, WITH LONG-TERM CURRENT USE OF INSULIN (HCC): Primary | ICD-10-CM

## 2019-11-29 RX ORDER — BLOOD-GLUCOSE METER
KIT MISCELLANEOUS ONCE
Qty: 1 EACH | Refills: 0 | Status: SHIPPED | OUTPATIENT
Start: 2019-11-29 | End: 2021-04-08

## 2019-12-06 DIAGNOSIS — Z79.4 TYPE 2 DIABETES MELLITUS WITHOUT COMPLICATION, WITH LONG-TERM CURRENT USE OF INSULIN (HCC): ICD-10-CM

## 2019-12-06 DIAGNOSIS — E11.9 TYPE 2 DIABETES MELLITUS WITHOUT COMPLICATION, WITH LONG-TERM CURRENT USE OF INSULIN (HCC): ICD-10-CM

## 2019-12-08 DIAGNOSIS — E11.9 TYPE 2 DIABETES MELLITUS WITHOUT COMPLICATION, WITH LONG-TERM CURRENT USE OF INSULIN (HCC): ICD-10-CM

## 2019-12-08 DIAGNOSIS — Z79.4 TYPE 2 DIABETES MELLITUS WITHOUT COMPLICATION, WITH LONG-TERM CURRENT USE OF INSULIN (HCC): ICD-10-CM

## 2019-12-08 RX ORDER — EXENATIDE 250 UG/ML
INJECTION SUBCUTANEOUS
Qty: 3 PEN | Refills: 3 | Status: SHIPPED | OUTPATIENT
Start: 2019-12-08 | End: 2019-12-08 | Stop reason: SDUPTHER

## 2019-12-08 RX ORDER — EXENATIDE 250 UG/ML
INJECTION SUBCUTANEOUS
Qty: 1 PEN | Refills: 0 | Status: SHIPPED | OUTPATIENT
Start: 2019-12-08 | End: 2019-12-08 | Stop reason: SDUPTHER

## 2019-12-08 RX ORDER — EXENATIDE 250 UG/ML
INJECTION SUBCUTANEOUS
Qty: 7.2 ML | Refills: 4 | Status: SHIPPED | OUTPATIENT
Start: 2019-12-08 | End: 2019-12-08 | Stop reason: SDUPTHER

## 2019-12-08 RX ORDER — EXENATIDE 250 UG/ML
INJECTION SUBCUTANEOUS
Qty: 3 PEN | Refills: 0 | Status: SHIPPED | OUTPATIENT
Start: 2019-12-08 | End: 2021-02-23

## 2019-12-09 ENCOUNTER — OFFICE VISIT (OUTPATIENT)
Dept: GYNECOLOGY | Facility: CLINIC | Age: 79
End: 2019-12-09
Payer: MEDICARE

## 2019-12-09 VITALS
SYSTOLIC BLOOD PRESSURE: 130 MMHG | DIASTOLIC BLOOD PRESSURE: 90 MMHG | WEIGHT: 160.8 LBS | BODY MASS INDEX: 29.59 KG/M2 | HEIGHT: 62 IN | HEART RATE: 98 BPM

## 2019-12-09 DIAGNOSIS — L90.0 LICHEN SCLEROSUS: ICD-10-CM

## 2019-12-09 DIAGNOSIS — L30.4 INTERTRIGO: Primary | ICD-10-CM

## 2019-12-09 PROCEDURE — 99213 OFFICE O/P EST LOW 20 MIN: CPT | Performed by: OBSTETRICS & GYNECOLOGY

## 2019-12-09 RX ORDER — KETOCONAZOLE 20 MG/G
CREAM TOPICAL 2 TIMES DAILY
Qty: 60 G | Refills: 2 | Status: SHIPPED | OUTPATIENT
Start: 2019-12-09 | End: 2020-01-02 | Stop reason: SDUPTHER

## 2019-12-09 NOTE — PROGRESS NOTES
Assessment/Plan:    Although, I do feel patient would benefit most from oral antifungal and oral steroid, she has an allergy to Diflucan and an oral steroid will elevate blood sugars  Will treat with Ketoconazole 2% mixed with hydrocortisone 2 5% bid for 1 month  Patient will be RTO in 1 month at which time she will have a vulvar biopsy  She will continue to use barriers to protect skin and call if sx worsen  She is aware that improvement will take time and persistence with treatment considering severity of symptoms  I have spent 20 mins with patient today in which greater than 50% of this time was spent in counseling  Diagnoses and all orders for this visit:    Intertrigo  -     ketoconazole (NIZORAL) 2 % cream; Apply topically 2 (two) times a day Mix with hydrocortisone and apply to vulva bid for 1 month  -     hydrocortisone 2 5 % cream; Apply topically 2 (two) times a day Mix with ketoconazole cream and apply to vulva twice daily for 1 month    Lichen sclerosus        Subjective:      Patient ID: Charlotte Martins is a 67 y o  female  Patient presents today for persistent vulvar itching/burning/irritation that she has had for years with almost constant symptoms despite multiple treatments including clobetasol and estrace creams  Patient is a diabetic that is well controlled  Last Hgb A1c 6 9  Symptoms are worse at night and interrupt sleep  Unfortunately, patient reacted to oral fluconazole with dizziness on 2 separate occasions  Patient also has persistent OAB sx  MONA sx have resolved, but patient continues with daytime urge and UUI although she admits to waiting longer than 3-4 hours to void  The following portions of the patient's history were reviewed and updated as appropriate: allergies, current medications, past family history, past medical history, past social history, past surgical history and problem list     Review of Systems   Constitutional: Negative  Respiratory: Negative  Cardiovascular: Negative  Gastrointestinal: Negative  Endocrine: Negative  Genitourinary: Positive for urgency  Negative for dysuria, frequency, pelvic pain, vaginal bleeding, vaginal discharge and vaginal pain  Musculoskeletal: Negative  Skin: Negative  Neurological: Negative  Psychiatric/Behavioral: Negative  Objective:      /90 (BP Location: Right arm)   Pulse 98   Ht 5' 2" (1 575 m)   Wt 72 9 kg (160 lb 12 8 oz)   BMI 29 41 kg/m²          Physical Exam   Constitutional: She appears well-developed and well-nourished  HENT:   Head: Normocephalic and atraumatic  Genitourinary: There is rash, tenderness and lesion on the right labia  There is rash, tenderness and lesion on the left labia  Genitourinary Comments: Severe vulvar swelling, erythema with fissures and ulcerations noted  Red punctate satellite lesions noted throughout vulva consistent with tinea  Erythema extends to groin folds and to anus  Agglutination of labia with thinning of tissue noted  Nursing note and vitals reviewed

## 2019-12-09 NOTE — PATIENT INSTRUCTIONS
Although, I do feel patient would benefit most from oral antifungal and oral steroid, she has an allergy to Diflucan and an oral steroid will elevate blood sugars  Will treat with Ketoconazole 2% mixed with hydrocortisone 2 5% bid for 1 month  Patient will be RTO in 1 month at which time she will have a vulvar biopsy  She will continue to use barriers to protect skin and call if sx worsen  She is aware that improvement will take time and persistence with treatment considering severity of symptoms

## 2019-12-16 DIAGNOSIS — E78.5 HYPERLIPIDEMIA, UNSPECIFIED HYPERLIPIDEMIA TYPE: Primary | ICD-10-CM

## 2019-12-16 LAB
LEFT EYE DIABETIC RETINOPATHY: NORMAL
RIGHT EYE DIABETIC RETINOPATHY: NORMAL

## 2019-12-16 NOTE — TELEPHONE ENCOUNTER
Pt called for refill of Simvastatin  Pt saw you in September and has a follow up in January with you

## 2019-12-17 RX ORDER — SIMVASTATIN 20 MG
40 TABLET ORAL
Qty: 180 TABLET | Refills: 3 | Status: SHIPPED | OUTPATIENT
Start: 2019-12-17 | End: 2020-06-16 | Stop reason: SDUPTHER

## 2020-01-02 ENCOUNTER — PROCEDURE VISIT (OUTPATIENT)
Dept: GYNECOLOGY | Facility: CLINIC | Age: 80
End: 2020-01-02
Payer: MEDICARE

## 2020-01-02 VITALS
WEIGHT: 162 LBS | SYSTOLIC BLOOD PRESSURE: 110 MMHG | HEIGHT: 62 IN | DIASTOLIC BLOOD PRESSURE: 72 MMHG | HEART RATE: 81 BPM | BODY MASS INDEX: 29.81 KG/M2

## 2020-01-02 DIAGNOSIS — N90.89 VULVAR LESION: Primary | ICD-10-CM

## 2020-01-02 DIAGNOSIS — L30.4 INTERTRIGO: ICD-10-CM

## 2020-01-02 PROCEDURE — 88305 TISSUE EXAM BY PATHOLOGIST: CPT | Performed by: PATHOLOGY

## 2020-01-02 PROCEDURE — 56605 BIOPSY OF VULVA/PERINEUM: CPT | Performed by: OBSTETRICS & GYNECOLOGY

## 2020-01-02 RX ORDER — KETOCONAZOLE 20 MG/G
CREAM TOPICAL 2 TIMES DAILY
Qty: 60 G | Refills: 2 | Status: SHIPPED | OUTPATIENT
Start: 2020-01-02 | End: 2020-07-21

## 2020-01-02 NOTE — PROGRESS NOTES
Biopsy  Date/Time: 1/2/2020 9:03 AM  Performed by: Honor Sandifer  Authorized by: SHUN Eldridge     Procedure Details - Skin Biopsy:     Biopsy tissue type: skin    Biopsy method: punch biopsy      Body area: Anogenital    Anogenital location:  Vulva    Initial size (mm):  3    Final defect size (mm):  3    Malignancy: benign lesion       See last note - patient has been using ketoconazole and hydrocortisone creams bid for the last month and feels "90% better "      Risks/benefits/alternatives reviewed and written/verbal consent obtained prior to procedure  Patient tolerated procedure well without complication

## 2020-01-10 ENCOUNTER — TELEPHONE (OUTPATIENT)
Dept: GYNECOLOGY | Facility: CLINIC | Age: 80
End: 2020-01-10

## 2020-01-10 NOTE — TELEPHONE ENCOUNTER
Patient made aware of biopsy results  She is still doing well and will continue using keto/hydro combo once per week given the chronicity of sx

## 2020-01-16 ENCOUNTER — OFFICE VISIT (OUTPATIENT)
Dept: ENDOCRINOLOGY | Facility: HOSPITAL | Age: 80
End: 2020-01-16
Payer: MEDICARE

## 2020-01-16 VITALS
BODY MASS INDEX: 30.03 KG/M2 | DIASTOLIC BLOOD PRESSURE: 70 MMHG | HEART RATE: 88 BPM | WEIGHT: 163.2 LBS | SYSTOLIC BLOOD PRESSURE: 114 MMHG | HEIGHT: 62 IN

## 2020-01-16 DIAGNOSIS — M81.0 AGE-RELATED OSTEOPOROSIS WITHOUT CURRENT PATHOLOGICAL FRACTURE: ICD-10-CM

## 2020-01-16 DIAGNOSIS — E03.8 HYPOTHYROIDISM DUE TO HASHIMOTO'S THYROIDITIS: ICD-10-CM

## 2020-01-16 DIAGNOSIS — E11.40 TYPE 2 DIABETES MELLITUS WITH DIABETIC NEUROPATHY, WITHOUT LONG-TERM CURRENT USE OF INSULIN (HCC): Primary | ICD-10-CM

## 2020-01-16 DIAGNOSIS — E55.9 VITAMIN D DEFICIENCY: ICD-10-CM

## 2020-01-16 DIAGNOSIS — I10 ESSENTIAL HYPERTENSION: ICD-10-CM

## 2020-01-16 DIAGNOSIS — E78.2 MIXED HYPERLIPIDEMIA: ICD-10-CM

## 2020-01-16 DIAGNOSIS — E06.3 HYPOTHYROIDISM DUE TO HASHIMOTO'S THYROIDITIS: ICD-10-CM

## 2020-01-16 PROCEDURE — 99214 OFFICE O/P EST MOD 30 MIN: CPT | Performed by: NURSE PRACTITIONER

## 2020-01-16 NOTE — PROGRESS NOTES
Jennifer Ruth 67 y o  female MRN: [id number]    Encounter: [id number]      Assessment/Plan     Assessment: This is a 67y o -year-old female with type 2 diabetes, hypothyroidism, hypertension, hyperlipidemia and vitamin-D deficiency    Plan:  1   Type 2 diabetes:  Her most recent hemoglobin A1c is improved to 6 9   For now, she will continue  metformin, Jardiance and Byetta at the current dose   I have asked her to continue checking her blood sugars twice daily at alternating times and for the record to the office in 2 weeks for review and further adjustment, if necessary  She is up-to-date with her annual diabetic eye exams and is seeing the podiatrist every 12 weeks  Check hemoglobin A1c prior to next visit      2   Hypothyroidism:  Her most recent TSH free T4 were normal   Continue levothyroxine 125 mcg Monday through Saturday with a half tablet on Sunday   Check TSH and free T4 prior to next office visit      3   Hypertension:  she is normotensive in the office today  Continue atenolol   Check comprehensive metabolic panel prior to next visit      4   Hyperlipidemia:  Triglycerides are slightly elevated on most recent fasting lipid panel   Continue simvastatin       5   Vitamin-D deficiency:  Continue supplement with 1000 units of vitamin D3 daily         6   Osteopenia:  Recent DEXA scan reveals osteopenia to the lumbar spine and left hip   She will continue with calcium and vitamin-D supplementation   Stressed staying active reviewed fall risk and safety at the time of the visit  CC:  Type 2 Diabetes follow-up    History of Present Illness     HPI:  76-year-old female presents in the office today for follow-up of type 2 diabetes, hypothyroidism, hypertension and hyperlipidemia  Federico Dowd states she has been diagnosed with type 2 diabetes for 8 years   She presents with no blood sugar records or meter for download   She checks her blood sugars once daily in the AM and states she is in the 150-160 range  Federico Dowd is currently taking metformin 500 mg three times daily, Byetta 10 mg twice daily and Jardiance 25 mg daily   She denies any recent episodes of hypoglycemia, polyuria, polydipsia or polyphagia   Her most recent hemoglobin A1c from November 15, 2019 of 6 9  She states she is up-to-date with her annual diabetic eye exam with Dr Marilin Du of June 11, 2019  She complains of some numbness and tingling to her feet at times and follows Podiatry every 12 weeks for regular diabetic foot care       For her hypothyroidism, she is treated with levothyroxine 125 mcg daily Monday through Saturday and half tablet on Sunday   Her most recent TSH from November 15, 2019 Elizabeth Whitt a free T4 of 1 05   She complains of some constipation at times but otherwise has no symptoms/complaints of hypo or hyperthyroidism      Her hypertension is treated with atenolol 50 mg daily      For her hyperlipidemia, she is treated with simvastatin 40 mg daily      For her vitamin-D deficiency, she supplements with 1000 units of vitamin D3 daily      Her most recent DEXA scan performed on March 7, 2019 revealed a T-score of -1 4 and lumbar spine consistent with low bone mineral density-osteopenia   When compared to the prior examination, there was no significant change to the total bone mineral density of the lumbar spine   However, there was a 5% decrease in the total bone mineral density of the left hip   She continues to supplement with calcium and vitamin-D daily   She denies any recent falls or fractures       Review of Systems   Constitutional: Positive for fatigue  Negative for chills and fever  HENT: Negative  Negative for trouble swallowing and voice change  Eyes: Negative  Negative for visual disturbance  Respiratory: Positive for shortness of breath ( dyspnea on exertion)  Negative for chest tightness  Cardiovascular: Negative  Negative for chest pain and palpitations  Gastrointestinal: Negative    Negative for abdominal pain, constipation, diarrhea and vomiting  Endocrine: Positive for polyuria (Attributed to Jardiance)  Negative for cold intolerance, heat intolerance, polydipsia and polyphagia  Genitourinary: Negative  Musculoskeletal: Positive for arthralgias ( right hand)  Skin: Negative  Allergic/Immunologic: Negative  Neurological: Negative  Negative for dizziness, syncope, light-headedness and headaches  Hematological: Negative  Psychiatric/Behavioral: Negative  All other systems reviewed and are negative        Historical Information   Past Medical History:   Diagnosis Date    [facility])     Abnormal ECG     last assessed: 7/14/2015    Acid reflux     resolved    Anxiety     Benign neoplasm of sigmoid colon 09/13/2011    next colon 2012    Bronchitis, asthmatic     last assessed: 3/12/2012    [facility] [address] )     2012- had colon resection    COPD (chronic obstructive pulmonary disease) ([facility] [address] )     questionable    Depression     [facility] ([facility] )     on byetta    Dizziness     occ    Esophageal stricture     last assessed: 9/30/2014    High cholesterol     Hypertension     Hypothyroidism     Iron (Fe) deficiency anemia 12/22/2011    iron pill continue    Irritable bowel syndrome     OA (osteoarthritis)     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    Pacemaker     hx SSS    Rash     labia area- uses cream prn    Restrictive lung disease     Seasonal allergies     Skin scarring/fibrosis     fibrotic scar; last assessed: 3/22/2013    MONA (stress urinary incontinence, female)     Urinary frequency     last assessed: 9/10/2012    Wears glasses     reading     Past Surgical History:   Procedure Laterality Date    BREAST EXCISIONAL BIOPSY Right 1990    benign, best guess on year   [address]      does not know type  Dr Kaushal Walters is cariologist    [address]      resection  removed 25 In     COLONOSCOPY  06/01/2012    proctosigmoidectomy    ESOPHAGOGASTRODUODENOSCOPY N/A 2016    Procedure: ESOPHAGOGASTRODUODENOSCOPY (EGD); Surgeon: Marycruz Cooper MD;  [facility];  Service:     HYSTERECTOMY  1978    MS ARTHROPLASTY I-P JT Right 2018    Procedure: ARTHROPLASTY PIP/FINGER - RIGHT RING;  Surgeon: Chin Carreno MD;  [facility];  Service: Orthopedics    MS CYSTOURETHROSCOPY N/A 2018    Procedure: Chelle Dutta;  Surgeon: Michael Oliva MD;  [facility];  Service: UroGynecology           MS LAP, REPAIR PARAESOPHAGEAL HERNIA, INCL FUNDOPLASTY W/ MESH N/A 2016    Procedure: LAPAROSCOPIC PARAESOPHAGEAL HERNIA REPAIR with mesh; toupet  FUNDOPLICATION ;  Surgeon: Jd Hayden MD;  [facility];  Service: Thoracic    MS REPAIR INTERCARP/CARP-METACARP JT Right 1/10/2019    Procedure: Dominga Spencer;  Surgeon: Chin Carreno MD;  [facility];  Service: Orthopedics    MS SLING OPER STRES INCONTINENCE N/A 2018    Procedure: INSERTION PUBOVAGINAL SLING (FEMALE);   Surgeon: Michael Oliva MD;  [facility];  Service: UroGynecology           TOTAL ABDOMINAL HYSTERECTOMY      WRIST TENDON TRANSFER Right 1/10/2019    Procedure: TRANSFER TENDON FLEXOR CARPI RADIALIS FROM FOREARM TO HAND;  Surgeon: Chin Carreno MD;  [facility];  Service: Orthopedics     Social History   Social History     Substance and Sexual Activity   Alcohol Use No     Social History     Substance and Sexual Activity   Drug Use No     Social History     Tobacco Use   Smoking Status Former Smoker    Types: Cigarettes    Last attempt to quit:     Years since quittin 0   Smokeless Tobacco Former User    Quit date:      Family History:   Family History   Problem Relation Age of Onset    Heart disease Mother     Diabetes Mother     Asthma Father     Stomach cancer Father         gastrkic cancer    Cancer Paternal Grandmother     Cancer Paternal Grandfather     No Known Problems Brother     Breast cancer Sister 76    Breast cancer Sister 77    Pancreatic cancer Sister 72    No Known Problems Maternal Aunt     No Known Problems Maternal Aunt     No Known Problems Maternal Aunt     No Known Problems Paternal Aunt     No Known Problems Daughter     No Known Problems Maternal Grandmother     No Known Problems Maternal Grandfather     Prostate cancer Paternal Uncle         age unknown    Substance Abuse Neg Hx     Mental illness Neg Hx     Alcohol abuse Neg Hx     Colon cancer Neg Hx     Colon polyps Neg Hx        Meds/Allergies   Current Outpatient Medications   Medication Sig Dispense Refill    acetaminophen (TYLENOL) 650 mg CR tablet Take 650 mg by mouth every 8 (eight) hours as needed for mild pain      apixaban (ELIQUIS) 5 mg Take 1 tablet (5 mg total) by mouth 2 (two) times a day 64 tablet 0    ascorbic acid (VITAMIN C) 500 mg tablet Take 500 mg by mouth daily      atenolol (TENORMIN) 50 mg tablet TAKE 1 TABLET AT BEDTIME 90 tablet 3    Blood Glucose Monitoring Suppl (FREESTYLE FREEDOM LITE) w/Device KIT by Does not apply route once for 1 dose Use meter to check blood sugars daily  1 each 0    BYETTA 10 MCG PEN 10 MCG/0 04ML SOPN 10 mcg bid 3 pen 0    Cholecalciferol (VITAMIN D-3) 1000 units CAPS Take 1 capsule by mouth daily      clobetasol (TEMOVATE) 0 05 % cream       Coenzyme Q10 (COQ10) 200 MG CAPS Take 200 mg by mouth daily        diclofenac sodium (VOLTAREN) 1 % Apply 2 g topically 4 (four) times a day (Patient taking differently: Apply 2 g topically as needed  ) 1 Tube 1    DULoxetine (CYMBALTA) 20 mg capsule TAKE 1 CAPSULE DAILY 90 capsule 3    estradiol (ESTRACE) 0 1 mg/g vaginal cream       Ferrous Sulfate 27 MG TABS Take 1 tablet by mouth daily      FREESTYLE LITE test strip Test 4 times daily   400 each 5    hydrocortisone 2 5 % cream Apply topically 2 (two) times a day Mix with ketoconazole cream and apply to vulva twice daily for 1 month 60 g 2    JARDIANCE 25 MG TABS TAKE 1 TABLET EVERY MORNING 90 tablet 2    ketoconazole (NIZORAL) 2 % cream Apply topically 2 (two) times a day Mix with hydrocortisone and apply to vulva bid for 1 month 60 g 2    levothyroxine 125 mcg tablet TAKE 1 TABLET 6 DAYS OF THE WEEK AND 2 TABLETS ON SUNDAY (Patient taking differently: TAKE 1 TABLET 6 DAYS OF THE WEEK AND 1/2 TABLETS ON SUNDAY) 102 tablet 4    MEGARED OMEGA-3 KRILL OIL PO Take 1 capsule by mouth daily      metFORMIN (GLUCOPHAGE-XR) 500 mg 24 hr tablet Take 1 tablet (500 mg total) by mouth 3 (three) times a day with meals 270 tablet 2    Misc Natural Products (OSTEO BI-FLEX ADV JOINT SHIELD PO) Take 1 tablet by mouth daily        PROAIR  (90 Base) MCG/ACT inhaler USE 2 INHALATIONS EVERY 4 HOURS AS NEEDED FOR SHORTNESS OF BREATH 8 5 g 0    simvastatin (ZOCOR) 20 mg tablet Take 2 tablets (40 mg total) by mouth daily at bedtime 180 tablet 3     No current facility-administered medications for this visit  Allergies   Allergen Reactions    Fluconazole Dizziness       Objective   Vitals: not currently breastfeeding  Physical Exam   Constitutional: She is oriented to person, place, and time  She appears well-developed and well-nourished  HENT:   Head: Normocephalic and atraumatic  Mouth/Throat: Oropharynx is clear and moist    Eyes: Pupils are equal, round, and reactive to light  Conjunctivae and EOM are normal    Neck: Normal range of motion  Neck supple  Cardiovascular: Normal rate, regular rhythm, normal heart sounds and intact distal pulses  Pacemaker to left chest wall   Pulmonary/Chest: Effort normal and breath sounds normal    Abdominal: Soft  Bowel sounds are normal    Musculoskeletal: Normal range of motion  Neurological: She is alert and oriented to person, place, and time  Skin: Skin is warm and dry  Dry skin   Psychiatric: She has a normal mood and affect   Her behavior is normal  Judgment and thought content normal    Vitals reviewed  Lab Results:   Lab Results   Component Value Date/Time    Hemoglobin A1C 6 9 (H) 11/15/2019 07:57 AM    Hemoglobin A1C 6 7 (H) 08/19/2019 07:22 AM    Hemoglobin A1C 7 2 (H) 05/28/2019 07:14 AM    WBC 7 62 07/22/2019 07:29 AM    Hemoglobin 15 5 (H) 07/22/2019 07:29 AM    Hematocrit 49 2 (H) 07/22/2019 07:29 AM    MCV 90 07/22/2019 07:29 AM    Platelets 142 37/44/8317 07:29 AM    BUN 14 11/15/2019 07:57 AM    BUN 14 07/22/2019 07:29 AM    BUN 12 05/28/2019 07:14 AM    Potassium 4 6 11/15/2019 07:57 AM    Potassium 4 4 07/22/2019 07:29 AM    Potassium 4 5 05/28/2019 07:14 AM    Chloride 106 11/15/2019 07:57 AM    Chloride 102 07/22/2019 07:29 AM    Chloride 105 05/28/2019 07:14 AM    CO2 28 11/15/2019 07:57 AM    CO2 30 07/22/2019 07:29 AM    CO2 30 05/28/2019 07:14 AM    Creatinine 0 68 11/15/2019 07:57 AM    Creatinine 0 64 07/22/2019 07:29 AM    Creatinine 0 67 05/28/2019 07:14 AM    AST 18 11/15/2019 07:57 AM    AST 17 07/22/2019 07:29 AM    AST 20 05/28/2019 07:14 AM    ALT 25 11/15/2019 07:57 AM    ALT 23 07/22/2019 07:29 AM    ALT 24 05/28/2019 07:14 AM    Albumin 3 9 11/15/2019 07:57 AM    Albumin 4 0 07/22/2019 07:29 AM    Albumin 3 8 05/28/2019 07:14 AM    HDL, Direct 34 (L) 11/15/2019 07:57 AM    HDL, Direct 26 (L) 07/22/2019 07:29 AM    Triglycerides 169 (H) 11/15/2019 07:57 AM    Triglycerides 262 (H) 07/22/2019 07:29 AM     Portions of the record may have been created with voice recognition software  Occasional wrong word or "sound a like" substitutions may have occurred due to the inherent limitations of voice recognition software  Read the chart carefully and recognize, using context, where substitutions have occurred

## 2020-01-16 NOTE — PATIENT INSTRUCTIONS
Be mindful of diet       Stay active and stay hydrated       Continue Metformin 500 mg to three times per day (breakfast, lunch and dinner)       Continue Byetta and Jardiance       Please check your blood sugars 2 times daily and for the record to the office in 2 weeks for review and adjustment, if necessary       Continue levothyroxine 125 mcg daily Monday through Saturday with 1/2 tablet on Sunday        Continue atenolol and simvastatin       Continue with regular supplementation of vitamin D3 daily       Obtain lab work as prescribed

## 2020-01-19 DIAGNOSIS — I10 ESSENTIAL HYPERTENSION: ICD-10-CM

## 2020-01-19 RX ORDER — ATENOLOL 50 MG/1
TABLET ORAL
Qty: 90 TABLET | Refills: 3 | Status: SHIPPED | OUTPATIENT
Start: 2020-01-19 | End: 2021-03-14

## 2020-01-20 ENCOUNTER — OFFICE VISIT (OUTPATIENT)
Dept: FAMILY MEDICINE CLINIC | Facility: CLINIC | Age: 80
End: 2020-01-20
Payer: MEDICARE

## 2020-01-20 VITALS
TEMPERATURE: 97.5 F | WEIGHT: 161.1 LBS | HEART RATE: 60 BPM | DIASTOLIC BLOOD PRESSURE: 70 MMHG | HEIGHT: 63 IN | SYSTOLIC BLOOD PRESSURE: 130 MMHG | RESPIRATION RATE: 16 BRPM | BODY MASS INDEX: 28.54 KG/M2

## 2020-01-20 DIAGNOSIS — H92.02 OTALGIA OF LEFT EAR: Primary | ICD-10-CM

## 2020-01-20 DIAGNOSIS — H61.22 IMPACTED CERUMEN OF LEFT EAR: ICD-10-CM

## 2020-01-20 PROCEDURE — 69210 REMOVE IMPACTED EAR WAX UNI: CPT | Performed by: FAMILY MEDICINE

## 2020-01-20 PROCEDURE — 99213 OFFICE O/P EST LOW 20 MIN: CPT | Performed by: FAMILY MEDICINE

## 2020-01-20 NOTE — PROGRESS NOTES
Assessment/Plan:    Otalgia  Secondary to obstruction cerumen impaction    Ear cerumen removal  Date/Time: 1/20/2020 11:15 AM  Performed by: Dale Navarro DO  Authorized by: Dale Navarro DO     Patient location:  Clinic  Other Assisting Provider: No    Consent:     Consent obtained:  Verbal    Consent given by:  Patient    Risks discussed:  Infection, pain, TM perforation and incomplete removal    Alternatives discussed:  No treatment  Universal protocol:     Patient identity confirmed:  Verbally with patient  Procedure details:     Local anesthetic:  None    Location:  L ear    Procedure type: irrigation with insturmentation      Insturmentation: forceps      Approach:  Natural orifice  Post-procedure details:     Complication:  None    Hearing quality:  Normal    Patient tolerance of procedure: Tolerated well, no immediate complications  Comments:      TM intact and pain gone  BMI Counseling: Body mass index is 28 31 kg/m²  The BMI is above normal  Nutrition recommendations include decreasing portion sizes and encouraging healthy choices of fruits and vegetables  Exercise recommendations include exercising 3-5 times per week  Subjective:   Anastasia Kolb is a 67 y  o female  Chief Complaint   Patient presents with    Earache     Left     Patient is here with 2 days ear pain  She uses earwax drops about every 4 weeks  This time she put a Q-tip in her left ear and since that time it has been painful to touch  No discharge  No fevers no chills        Past Medical History:   Diagnosis Date    A-fib [facility])     Abnormal ECG     last assessed: 7/14/2015    Acid reflux     resolved    Anxiety     Benign neoplasm of sigmoid colon 09/13/2011    next colon 2012    Bronchitis, asthmatic     last assessed: 3/12/2012    Colon cancer ([facility] [address] )     2012- had colon resection    COPD (chronic obstructive pulmonary disease) ([facility] [address] )     questionable    Depression     Diabetes mellitus ([facility] [address] )     on byetta    Dizziness     occ    Esophageal stricture     last assessed: 2014    High cholesterol     Hypertension     Hypothyroidism     Iron (Fe) deficiency anemia 2011    iron pill continue    Irritable bowel syndrome     OA (osteoarthritis)     Organoaxial gastric volvulus 2016    Pacemaker     hx SSS    Rash     labia area- uses cream prn    Restrictive lung disease     Seasonal allergies     Skin scarring/fibrosis     fibrotic scar; last assessed: 3/22/2013    MONA (stress urinary incontinence, female)     Urinary frequency     last assessed: 9/10/2012    Wears glasses     reading     Social History     Tobacco Use    Smoking status: Former Smoker     Types: Cigarettes     Last attempt to quit:      Years since quittin 0    Smokeless tobacco: Former User     Quit date:    Substance Use Topics    Alcohol use: No    Drug use: No     Family History   Problem Relation Age of Onset    Heart disease Mother     Diabetes Mother     Asthma Father     Stomach cancer Father         gastrkic cancer    Cancer Paternal Grandmother     Cancer Paternal Grandfather     No Known Problems Brother     Breast cancer Sister 76    Breast cancer Sister 77    Pancreatic cancer Sister 72    No Known Problems Maternal Aunt     No Known Problems Maternal Aunt     No Known Problems Maternal Aunt     No Known Problems Paternal Aunt     No Known Problems Daughter     No Known Problems Maternal Grandmother     No Known Problems Maternal Grandfather     Prostate cancer Paternal Uncle         age unknown    Substance Abuse Neg Hx     Mental illness Neg Hx     Alcohol abuse Neg Hx     Colon cancer Neg Hx     Colon polyps Neg Hx        MEDICATIONS REVIEWED AND UPDATED    10 point review of systems performed, the remainder of the ROS is negative except for what is noted in the history of chief complaint    Objective:    Vitals:    20 1049   BP: 130/70   Pulse: 60   Resp: 16   Temp: 97 5 °F (36 4 °C)     Body mass index is 28 31 kg/m²  Physical Exam    General  Patient in no acute distress, well appearing, well nourished and appears stated age    Mental status  Good judgment and insight, oriented to time person and place, recent and remote memory is intact, mood and affect are normal, cooperative, and patient is reasonable  HEENT  Normocephalic atraumatic without facial weakness  Right TM is clear left TM obstructed by cerumen, no tragal pain  After irrigation left TM is intact and patient feels better

## 2020-02-04 ENCOUNTER — TELEPHONE (OUTPATIENT)
Dept: ENDOCRINOLOGY | Facility: HOSPITAL | Age: 80
End: 2020-02-04

## 2020-02-04 NOTE — TELEPHONE ENCOUNTER
Reviewed blood sugars from January 18 through February 2, 2020  She is checking her blood sugars 2-3 times daily and appears to be relatively controlled  For now, continue current regimen and continue to be mindful of diet

## 2020-02-10 ENCOUNTER — LAB (OUTPATIENT)
Dept: LAB | Facility: CLINIC | Age: 80
End: 2020-02-10
Payer: MEDICARE

## 2020-02-10 DIAGNOSIS — E03.8 HYPOTHYROIDISM DUE TO HASHIMOTO'S THYROIDITIS: ICD-10-CM

## 2020-02-10 DIAGNOSIS — E11.40 TYPE 2 DIABETES MELLITUS WITH DIABETIC NEUROPATHY, WITHOUT LONG-TERM CURRENT USE OF INSULIN (HCC): ICD-10-CM

## 2020-02-10 DIAGNOSIS — E06.3 HYPOTHYROIDISM DUE TO HASHIMOTO'S THYROIDITIS: ICD-10-CM

## 2020-02-10 LAB
ALBUMIN SERPL BCP-MCNC: 3.8 G/DL (ref 3.5–5)
ALP SERPL-CCNC: 82 U/L (ref 46–116)
ALT SERPL W P-5'-P-CCNC: 24 U/L (ref 12–78)
ANION GAP SERPL CALCULATED.3IONS-SCNC: 6 MMOL/L (ref 4–13)
AST SERPL W P-5'-P-CCNC: 20 U/L (ref 5–45)
BILIRUB SERPL-MCNC: 0.86 MG/DL (ref 0.2–1)
BUN SERPL-MCNC: 14 MG/DL (ref 5–25)
CALCIUM SERPL-MCNC: 9.5 MG/DL (ref 8.3–10.1)
CHLORIDE SERPL-SCNC: 105 MMOL/L (ref 100–108)
CO2 SERPL-SCNC: 28 MMOL/L (ref 21–32)
CREAT SERPL-MCNC: 0.65 MG/DL (ref 0.6–1.3)
EST. AVERAGE GLUCOSE BLD GHB EST-MCNC: 160 MG/DL
GFR SERPL CREATININE-BSD FRML MDRD: 85 ML/MIN/1.73SQ M
GLUCOSE P FAST SERPL-MCNC: 129 MG/DL (ref 65–99)
HBA1C MFR BLD: 7.2 % (ref 4.2–6.3)
POTASSIUM SERPL-SCNC: 4.5 MMOL/L (ref 3.5–5.3)
PROT SERPL-MCNC: 7.6 G/DL (ref 6.4–8.2)
SODIUM SERPL-SCNC: 139 MMOL/L (ref 136–145)
T4 FREE SERPL-MCNC: 1.2 NG/DL (ref 0.76–1.46)
TSH SERPL DL<=0.05 MIU/L-ACNC: 0.3 UIU/ML (ref 0.36–3.74)

## 2020-02-10 PROCEDURE — 83036 HEMOGLOBIN GLYCOSYLATED A1C: CPT

## 2020-02-10 PROCEDURE — 84439 ASSAY OF FREE THYROXINE: CPT

## 2020-02-10 PROCEDURE — 84443 ASSAY THYROID STIM HORMONE: CPT

## 2020-02-10 PROCEDURE — 36415 COLL VENOUS BLD VENIPUNCTURE: CPT

## 2020-02-10 PROCEDURE — 80053 COMPREHEN METABOLIC PANEL: CPT

## 2020-02-13 DIAGNOSIS — E06.3 HYPOTHYROIDISM DUE TO HASHIMOTO'S THYROIDITIS: Primary | ICD-10-CM

## 2020-02-13 DIAGNOSIS — E03.8 HYPOTHYROIDISM DUE TO HASHIMOTO'S THYROIDITIS: Primary | ICD-10-CM

## 2020-02-13 NOTE — RESULT ENCOUNTER NOTE
Please call the patient regarding abnormal result  Hemoglobin A1c elevated slightly to 7 2  Fasting glucose is elevated on comprehensive metabolic panel at 022  TSH is low with a normal free T4  Continue levothyroxine 125 mcg daily Monday through Saturday but discontinue half tablet on Sunday  Recheck TSH and free T4 in 6 weeks to reassess

## 2020-02-24 NOTE — PROGRESS NOTES
ASSESSMENT/PLAN:    Type 2 diabetes  A1c 7 2 from 6 9  Follows with Endocrine and is on metformin Jardiance in [place]    Lichen simplex chronicus  Now followed by gyn  Failed estrogen cream and clobetasol  Now on Nizoral and as nonsteroidal  Started when she 1st was on Victoza and had excess glucose in her urine secondary to the medication      Osteopenia  DEXA shows no change since 2014 in DEXA from March 2019  Patient will continue calcium vitamin-D and will now start exercising since she has a dog     Adenocarcinoma of the colon, 2012  Next colonoscopy 2021 no evidence of disease     Emphysema by CT   Patient only on Ventolin as needed as her pulmonary function test and walk test are okay   Follows with Pulmonary only as needed        Insomnia/anxiety   Continue duloxetine        Hashimoto's thyroiditis   Also following with endocrine for suppression of her thyroid with medication         Sick sinus syndrome/cardiomegaly/coronary artery disease/AFib  Patient has a pacer and takes Eliquis for anticoagulation  Atenolol for rate control  Follows with Cardiology      Vitamin D deficiency   Continue vitamin D 2000 units daily          History of large paraesophageal hernia   Had surgery and no longer has Pacheco's or her reflux and no longer on any PPIs        Hyperlipidemia   Simvastatin and CoQ10 and diet     Thyroid goiter   Followed by endocrine who orders ultrasounds         Rechec 6 months or sooner if needed  Cholesterol and vitamin-D before next visit             Health Maintenance   Topic Date Due    SLP PLAN OF CARE  1940    DTaP,Tdap,and Td Vaccines (1 - Tdap) 10/22/1952    OT PLAN OF CARE  03/06/2019    Diabetic Foot Exam  05/07/2020    URINE MICROALBUMIN  07/22/2020    Fall Risk  08/06/2020    Depression Screening PHQ  08/06/2020    Medicare Annual Wellness Visit (AWV)  08/06/2020    HEMOGLOBIN A1C  08/10/2020    BMI: Followup Plan  01/20/2021    BMI: Adult  01/20/2021    CRC Screening: Colonoscopy  06/11/2021    DM Eye Exam  12/16/2021    DXA SCAN  03/07/2022    Influenza Vaccine  Completed    Pneumococcal Vaccine: 65+ Years  Completed    Pneumococcal Vaccine: Pediatrics (0 to 5 Years) and At-Risk Patients (6 to 59 Years)  Aged Out    HIB Vaccine  Aged Out    Hepatitis B Vaccine  Aged Out    IPV Vaccine  Aged Out    Hepatitis A Vaccine  Aged Out    Meningococcal ACWY Vaccine  Aged Out    HPV Vaccine  Aged Out         Problem List as of 2/25/2020 Reviewed: 1/20/2020 11:09 AM by Stevenson Poon, DO    Abnormal echocardiogram    Adenocarcinoma of colon ([facility] [address] )    Age-related osteoporosis without current pathological fracture    Atrial fibrillation ([address] )    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    Cardiomegaly    CMC DJD(carpometacarpal degenerative joint disease), localized primary, right    Coronary artery disease involving native coronary artery of native heart without angina pectoris    Diffuse nontoxic goiter    Essential hypertension    Feeling anxious    Gastro-esophageal reflux disease without esophagitis    Hematuria, microscopic    History of cardiac pacemaker in situ    History of permanent cardiac pacemaker placement    Insomnia    Lichen simplex chronicus    Malignant tumor of colon ([address] )    Mitral regurgitation    Mixed hyperlipidemia    MRI safe cardiac pacemaker in situ    Other specified hypothyroidism    Paraesophageal hernia    Primary osteoarthritis involving multiple joints    Restrictive lung disease    Sick sinus syndrome ([address] )    Stress incontinence in female    Type 2 diabetes mellitus without complication, with long-term current use of insulin ([facility])    Vitamin D deficiency            Subjective:   No chief complaint on file  Since last visit patient saw Endocrinology in January 2020  Her A1c was 6 9 so they are continuing the metformin, [place] and [place]    Regarding hypothyroidism she was to continue levothyroxine Monday through Saturday and with a half tablet on Sunday    She saw gyn since last visit who treated her for through try go Nizoral cream    She also was seen by cardiology in November 2019 for paroxysmal atrial fibrillation on anticoagulation with Eliquis  Sick sinus syndrome with a pacer in place  Nothing was changed recheck in 1 year    Patient has dysuria  Waiting to give us a urine      patient ID: Chasity Amaro is a 67 y o  female  Past Medical History:   Diagnosis Date    A-fib [facility])     Abnormal ECG     last assessed: 7/14/2015    Acid reflux     resolved    Anxiety     Benign neoplasm of sigmoid colon 09/13/2011    next colon 2012    Bronchitis, asthmatic     last assessed: 3/12/2012    Colon cancer ([facility] [address] )     2012- had colon resection    COPD (chronic obstructive pulmonary disease) ([facility] [address] )     questionable    Depression     Diabetes mellitus (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    Pacemaker     hx SSS    Rash     labia area- uses cream prn    Restrictive lung disease     Seasonal allergies     Skin scarring/fibrosis     fibrotic scar; last assessed: 3/22/2013    MONA (stress urinary incontinence, female)     Urinary frequency     last assessed: 9/10/2012    Wears glasses     reading     Past Surgical History:   Procedure Laterality Date    BREAST EXCISIONAL BIOPSY Right 1990    benign, best guess on year   [address]      does not know type  Dr Guera Yoo is cariologist   [address]      resection  removed 25 In     COLONOSCOPY  06/01/2012    proctosigmoidectomy    ESOPHAGOGASTRODUODENOSCOPY N/A 1/9/2016    Procedure: ESOPHAGOGASTRODUODENOSCOPY (EGD);   Surgeon: Zena Miranda MD;  [facility];  Service:    [facility] HYSTERECTOMY  1978    AZ ARTHROPLASTY I-P JT Right 8/16/2018    Procedure: ARTHROPLASTY PIP/FINGER - RIGHT RING;  Surgeon: Dinah Parra MD;  [facility];  Service: Orthopedics    AZ CYSTOURETHROSCOPY N/A 5/14/2018    Procedure: Maria Teresa Muro;  Surgeon: Dennie Dolphin, MD;  [facility];  Service: UroGynecology           AZ LAP, REPAIR PARAESOPHAGEAL HERNIA, INCL FUNDOPLASTY W/ MESH N/A 1/27/2016    Procedure: LAPAROSCOPIC PARAESOPHAGEAL HERNIA REPAIR with mesh; toupet  FUNDOPLICATION ;  Surgeon: Dorcas Summers MD;  [facility];  Service: Thoracic    AZ REPAIR INTERCARP/CARP-METACARP JT Right 1/10/2019    Procedure: Annette Cavanaugh;  Surgeon: Dinah Parra MD;  [facility];  Service: Orthopedics    AZ SLING OPER STRES INCONTINENCE N/A 5/14/2018    Procedure: INSERTION PUBOVAGINAL SLING (FEMALE);   Surgeon: Dennie Dolphin, MD;  [facility];  Service: UroGynecology           TOTAL ABDOMINAL HYSTERECTOMY      WRIST TENDON TRANSFER Right 1/10/2019    Procedure: TRANSFER TENDON FLEXOR CARPI RADIALIS FROM FOREARM TO HAND;  Surgeon: Dinah Parra MD;  [facility];  Service: Orthopedics     Family History   Problem Relation Age of Onset    Heart disease Mother     Diabetes Mother     Asthma Father     Stomach cancer Father         gastrkic cancer    Cancer Paternal Grandmother     Cancer Paternal Grandfather     No Known Problems Brother     Breast cancer Sister 76    Breast cancer Sister 77    Pancreatic cancer Sister 72    No Known Problems Maternal Aunt     No Known Problems Maternal Aunt     No Known Problems Maternal Aunt     No Known Problems Paternal Aunt     No Known Problems Daughter     No Known Problems Maternal Grandmother     No Known Problems Maternal Grandfather     Prostate cancer Paternal Uncle         age unknown    Substance Abuse Neg Hx     Mental illness Neg Hx     Alcohol abuse Neg Hx     Colon cancer Neg Hx     Colon polyps Neg Hx      Social History     Tobacco Use    Smoking status: Former Smoker     Types: Cigarettes     Last attempt to quit:      Years since quittin 1    Smokeless tobacco: Former User     Quit date:    Substance Use Topics    Alcohol use: No    Drug use: No     Social History     Tobacco Use   Smoking Status Former Smoker    Types: Cigarettes    Last attempt to quit:     Years since quittin 1   Smokeless Tobacco Former User    Quit date:         MED LIST WAS REVIEWED AND UPDATED       ROS    Constitutional  No fever chills no fatigue no weight loss no weight gain    Mental status  No anxiety or depression and no sleep disturbances no changes in personality or emotional problems no suicidal or homicidal ideations    Eyes  No eye pain no red eyes no visual disturbances no discharge no eye itch    ENT  No earache no hearing loss nasal discharge no sore throat no hoarseness no postnasal drip     Cardio  No chest pain no palpitations no leg edema no claudication no dyspnea on exertion no nocturnal dyspnea    Respiratory  No shortness of breath or wheeze no cough no orthopnea no dyspnea on exertion no hemoptysis no sputum production    GI  No abdominal pain no nausea no vomiting no diarrhea or constipation no bloody stools no change in bowel habits no change in weight      no dysuria hematuria no pyuria no incontinence no pelvic pain    Musculoskeletal  No myalgias arthralgias no joint swelling or stiffness no limb pain or swelling    Skin  No rashes or lesions no itchiness and no wounds    Neuro  No headache dizziness lightheadedness syncope numbness paresthesias or confusion    Heme  No swollen glands no easy bruising            Objective:      VITALS:  Wt Readings from Last 3 Encounters:   20 73 1 kg (161 lb 1 6 oz)   20 74 kg (163 lb 3 2 oz)   20 73 5 kg (162 lb)     BP Readings from Last 3 Encounters:   20 130/70   20 114/70 01/02/20 110/72     Pulse Readings from Last 3 Encounters:   01/20/20 60   01/16/20 88   01/02/20 81     There is no height or weight on file to calculate BMI  Laboratory Results:    All pertinent labs and studies were reviewed with patient  during this office visit  including the following:    Lab Results   Component Value Date    WBC 7 62 07/22/2019    WBC 7 64 12/12/2018    WBC 8 65 08/15/2018    HGB 15 5 (H) 07/22/2019    HGB 15 5 (H) 12/12/2018    HGB 14 7 08/15/2018    HCT 49 2 (H) 07/22/2019    HCT 49 7 (H) 12/12/2018    HCT 47 5 (H) 08/15/2018    MCV 90 07/22/2019    MCV 93 12/12/2018    MCV 92 08/15/2018     07/22/2019     12/12/2018     08/15/2018     Lab Results   Component Value Date     12/21/2015     08/01/2015     07/31/2015    K 4 5 02/10/2020    K 4 6 11/15/2019    K 4 4 07/22/2019     02/10/2020     11/15/2019     07/22/2019    CO2 28 02/10/2020    CO2 28 11/15/2019    CO2 30 07/22/2019    BUN 14 02/10/2020    BUN 14 11/15/2019    BUN 14 07/22/2019     Lab Results   Component Value Date    GLUCOSE 245 (H) 01/27/2016    ALT 24 02/10/2020    AST 20 02/10/2020    ALKPHOS 82 02/10/2020    EGFR 85 02/10/2020    CALCIUM 9 5 02/10/2020     No results found for: TSHRFT4        Lipid Profile:   Lab Results   Component Value Date    CHOL 147 12/21/2015    CHOL 195 08/21/2015    CHOL 185 03/03/2015     Lab Results   Component Value Date    HDL 34 (L) 11/15/2019    HDL 26 (L) 07/22/2019    HDL 18 (L) 05/16/2018     Lab Results   Component Value Date    LDLCALC 71 11/15/2019    LDLCALC 55 07/22/2019    LDLCALC 85 05/16/2018     Lab Results   Component Value Date    TRIG 169 (H) 11/15/2019    TRIG 262 (H) 07/22/2019    TRIG 206 (H) 05/16/2018       Diabetic labs (if applicable)  Lab Results   Component Value Date    HGBA1C 7 2 (H) 02/10/2020    HGBA1C 6 9 (H) 11/15/2019    HGBA1C 6 7 (H) 08/19/2019     Lab Results   Component Value Date    GLUF 129 (H) 02/10/2020    LDLCALC 71 11/15/2019    CREATININE 0 65 02/10/2020          Physical Exam    Gen  No acute distress well-appearing well-nourished appears stated age    Mental status  Good judgment and insight oriented to time person and place, recent and remote memory intact mood and affect normal cooperative and patient is reasonable    HEENT  PERRLA 3 mm, EOMI without nystagmus, TMs clear left cerumen right, turbinates open pink no exudate, pharynx benign, tongue midline    Neck   supple no masses trachea midline positive click normal carotid upstrokes with no bruits    Cor  Regular rhythm without ectopy or murmur, no S3-S4, normal palpation that is no heave lift or thrill    Vascular  No edema, good pedal pulses    Lungs  CTA bilaterally in no respiratory distress no wheezes rhonchi or rales, normal to palpation no tactile fremitus    Lymphatics  No palpable nodes in the neck, supraclavicular area, axilla, or groin     Musculoskeletal  No clubbing cyanosis or edema muscle tone normal    Skin  no rashes or abnormal appearing lesions    Neuro  Normal ambulation, cranial nerves 2-12 grossly intact, higher functioning with reasoning intact

## 2020-02-25 ENCOUNTER — OFFICE VISIT (OUTPATIENT)
Dept: FAMILY MEDICINE CLINIC | Facility: CLINIC | Age: 80
End: 2020-02-25
Payer: MEDICARE

## 2020-02-25 VITALS
DIASTOLIC BLOOD PRESSURE: 78 MMHG | HEIGHT: 63 IN | RESPIRATION RATE: 16 BRPM | SYSTOLIC BLOOD PRESSURE: 120 MMHG | TEMPERATURE: 97.5 F | HEART RATE: 64 BPM | WEIGHT: 161.4 LBS | BODY MASS INDEX: 28.6 KG/M2

## 2020-02-25 DIAGNOSIS — M81.0 AGE-RELATED OSTEOPOROSIS WITHOUT CURRENT PATHOLOGICAL FRACTURE: ICD-10-CM

## 2020-02-25 DIAGNOSIS — I25.10 CORONARY ARTERY DISEASE INVOLVING NATIVE CORONARY ARTERY OF NATIVE HEART WITHOUT ANGINA PECTORIS: ICD-10-CM

## 2020-02-25 DIAGNOSIS — Z79.4 TYPE 2 DIABETES MELLITUS WITHOUT COMPLICATION, WITH LONG-TERM CURRENT USE OF INSULIN (HCC): ICD-10-CM

## 2020-02-25 DIAGNOSIS — E78.2 MIXED HYPERLIPIDEMIA: ICD-10-CM

## 2020-02-25 DIAGNOSIS — I10 ESSENTIAL HYPERTENSION: ICD-10-CM

## 2020-02-25 DIAGNOSIS — E11.9 TYPE 2 DIABETES MELLITUS WITHOUT COMPLICATION, WITH LONG-TERM CURRENT USE OF INSULIN (HCC): ICD-10-CM

## 2020-02-25 DIAGNOSIS — L28.0 LICHEN SIMPLEX CHRONICUS: ICD-10-CM

## 2020-02-25 DIAGNOSIS — I48.91 ATRIAL FIBRILLATION, UNSPECIFIED TYPE (HCC): ICD-10-CM

## 2020-02-25 DIAGNOSIS — E55.9 VITAMIN D DEFICIENCY: ICD-10-CM

## 2020-02-25 DIAGNOSIS — C18.9 ADENOCARCINOMA OF COLON (HCC): Primary | ICD-10-CM

## 2020-02-25 DIAGNOSIS — C18.9 MALIGNANT NEOPLASM OF COLON, UNSPECIFIED PART OF COLON (HCC): ICD-10-CM

## 2020-02-25 DIAGNOSIS — K22.70 BARRETT'S ESOPHAGUS WITHOUT DYSPLASIA: ICD-10-CM

## 2020-02-25 DIAGNOSIS — Z95.0 HISTORY OF CARDIAC PACEMAKER IN SITU: ICD-10-CM

## 2020-02-25 DIAGNOSIS — K21.9 GASTRO-ESOPHAGEAL REFLUX DISEASE WITHOUT ESOPHAGITIS: ICD-10-CM

## 2020-02-25 DIAGNOSIS — R39.9 URINARY SYMPTOM OR SIGN: ICD-10-CM

## 2020-02-25 PROCEDURE — 1160F RVW MEDS BY RX/DR IN RCRD: CPT | Performed by: FAMILY MEDICINE

## 2020-02-25 PROCEDURE — 87086 URINE CULTURE/COLONY COUNT: CPT | Performed by: FAMILY MEDICINE

## 2020-02-25 PROCEDURE — 3078F DIAST BP <80 MM HG: CPT | Performed by: FAMILY MEDICINE

## 2020-02-25 PROCEDURE — 3074F SYST BP LT 130 MM HG: CPT | Performed by: FAMILY MEDICINE

## 2020-02-25 PROCEDURE — 3051F HG A1C>EQUAL 7.0%<8.0%: CPT | Performed by: FAMILY MEDICINE

## 2020-02-25 PROCEDURE — 3008F BODY MASS INDEX DOCD: CPT | Performed by: FAMILY MEDICINE

## 2020-02-25 PROCEDURE — 4040F PNEUMOC VAC/ADMIN/RCVD: CPT | Performed by: FAMILY MEDICINE

## 2020-02-25 PROCEDURE — 1036F TOBACCO NON-USER: CPT | Performed by: FAMILY MEDICINE

## 2020-02-25 PROCEDURE — 99214 OFFICE O/P EST MOD 30 MIN: CPT | Performed by: FAMILY MEDICINE

## 2020-02-25 PROCEDURE — 2022F DILAT RTA XM EVC RTNOPTHY: CPT | Performed by: FAMILY MEDICINE

## 2020-02-25 RX ORDER — TETRACYCLINE HCL 500 MG
CAPSULE ORAL DAILY
COMMUNITY

## 2020-02-25 NOTE — PATIENT INSTRUCTIONS
The we will keep everything the same    I will see you back in 6 months with fasting blood work it 1 week prior

## 2020-02-26 ENCOUNTER — TELEPHONE (OUTPATIENT)
Dept: FAMILY MEDICINE CLINIC | Facility: CLINIC | Age: 80
End: 2020-02-26

## 2020-02-27 LAB — BACTERIA UR CULT: NORMAL

## 2020-03-02 ENCOUNTER — REMOTE DEVICE CLINIC VISIT (OUTPATIENT)
Dept: CARDIOLOGY CLINIC | Facility: CLINIC | Age: 80
End: 2020-03-02
Payer: MEDICARE

## 2020-03-02 DIAGNOSIS — Z95.0 CARDIAC PACEMAKER IN SITU: Primary | ICD-10-CM

## 2020-03-02 PROCEDURE — 93296 REM INTERROG EVL PM/IDS: CPT | Performed by: INTERNAL MEDICINE

## 2020-03-02 PROCEDURE — 93294 REM INTERROG EVL PM/LDLS PM: CPT | Performed by: INTERNAL MEDICINE

## 2020-03-02 NOTE — PROGRESS NOTES
Results for orders placed or performed in visit on 03/02/20   Cardiac EP device report    Narrative    MDT-DUAL CHAMBER PPM/ ACTIVE SYSTEM IS MRI CONDITIONAL  CARELINK TRANSMISSION: BATTERY VOLTAGE ADEQUATE (4 5 YRS)  AP-74%, -0 9%  ALL AVAILABLE LEAD PARAMETERS WITHIN NORMAL LIMITS  3 VHR EPISODES, MOST RECENT FOR 25 BEAT NSVT, AVG CL~350MS; PROB SVT ON OTHER EGM'S  EF-55% (ECHO 3/7/19)  214 AF EPISODES MAX DURATION 6 HRS  38 FAST A&V EPISODES MAX DURATION 4 MINS- AF W/ RVR ON AVAILABLE EGM'S  HX: PAF & ON ELIQUIS & ATENOLOL  AF BURDEN-3%  NORMAL DEVICE FUNCTION   GV

## 2020-03-20 ENCOUNTER — LAB (OUTPATIENT)
Dept: LAB | Facility: CLINIC | Age: 80
End: 2020-03-20
Payer: MEDICARE

## 2020-03-20 DIAGNOSIS — E03.8 HYPOTHYROIDISM DUE TO HASHIMOTO'S THYROIDITIS: ICD-10-CM

## 2020-03-20 DIAGNOSIS — E06.3 HYPOTHYROIDISM DUE TO HASHIMOTO'S THYROIDITIS: ICD-10-CM

## 2020-03-20 LAB
T4 FREE SERPL-MCNC: 1.1 NG/DL (ref 0.76–1.46)
TSH SERPL DL<=0.05 MIU/L-ACNC: 1.6 UIU/ML (ref 0.36–3.74)

## 2020-03-20 PROCEDURE — 84443 ASSAY THYROID STIM HORMONE: CPT

## 2020-03-20 PROCEDURE — 84439 ASSAY OF FREE THYROXINE: CPT

## 2020-03-20 PROCEDURE — 36415 COLL VENOUS BLD VENIPUNCTURE: CPT

## 2020-04-20 DIAGNOSIS — F41.9 ANXIETY: ICD-10-CM

## 2020-04-21 ENCOUNTER — TELEMEDICINE (OUTPATIENT)
Dept: ENDOCRINOLOGY | Facility: HOSPITAL | Age: 80
End: 2020-04-21
Payer: MEDICARE

## 2020-04-21 DIAGNOSIS — E06.3 HYPOTHYROIDISM DUE TO HASHIMOTO'S THYROIDITIS: ICD-10-CM

## 2020-04-21 DIAGNOSIS — M81.0 AGE-RELATED OSTEOPOROSIS WITHOUT CURRENT PATHOLOGICAL FRACTURE: ICD-10-CM

## 2020-04-21 DIAGNOSIS — E55.9 VITAMIN D DEFICIENCY: ICD-10-CM

## 2020-04-21 DIAGNOSIS — E11.40 TYPE 2 DIABETES MELLITUS WITH DIABETIC NEUROPATHY, WITHOUT LONG-TERM CURRENT USE OF INSULIN (HCC): Primary | ICD-10-CM

## 2020-04-21 DIAGNOSIS — Z79.4 TYPE 2 DIABETES MELLITUS WITHOUT COMPLICATION, WITH LONG-TERM CURRENT USE OF INSULIN (HCC): ICD-10-CM

## 2020-04-21 DIAGNOSIS — I10 ESSENTIAL HYPERTENSION: ICD-10-CM

## 2020-04-21 DIAGNOSIS — E11.9 TYPE 2 DIABETES MELLITUS WITHOUT COMPLICATION, WITH LONG-TERM CURRENT USE OF INSULIN (HCC): ICD-10-CM

## 2020-04-21 DIAGNOSIS — E78.2 MIXED HYPERLIPIDEMIA: ICD-10-CM

## 2020-04-21 DIAGNOSIS — E03.8 HYPOTHYROIDISM DUE TO HASHIMOTO'S THYROIDITIS: ICD-10-CM

## 2020-04-21 PROCEDURE — 99443 PR PHYS/QHP TELEPHONE EVALUATION 21-30 MIN: CPT | Performed by: NURSE PRACTITIONER

## 2020-04-21 RX ORDER — DULOXETIN HYDROCHLORIDE 20 MG/1
CAPSULE, DELAYED RELEASE ORAL
Qty: 90 CAPSULE | Refills: 3 | Status: SHIPPED | OUTPATIENT
Start: 2020-04-21 | End: 2021-02-09

## 2020-04-21 RX ORDER — METFORMIN HYDROCHLORIDE 500 MG/1
500 TABLET, EXTENDED RELEASE ORAL
Qty: 270 TABLET | Refills: 2 | Status: SHIPPED | OUTPATIENT
Start: 2020-04-21 | End: 2020-12-10

## 2020-05-08 ENCOUNTER — TELEPHONE (OUTPATIENT)
Dept: FAMILY MEDICINE CLINIC | Facility: CLINIC | Age: 80
End: 2020-05-08

## 2020-05-08 DIAGNOSIS — J98.4 RESTRICTIVE LUNG DISEASE: ICD-10-CM

## 2020-05-08 RX ORDER — ALBUTEROL SULFATE 90 UG/1
2 AEROSOL, METERED RESPIRATORY (INHALATION) EVERY 6 HOURS PRN
Qty: 8.5 G | Refills: 0 | Status: SHIPPED | OUTPATIENT
Start: 2020-05-08 | End: 2022-07-11 | Stop reason: SDUPTHER

## 2020-05-26 ENCOUNTER — LAB (OUTPATIENT)
Dept: LAB | Facility: HOSPITAL | Age: 80
End: 2020-05-26
Payer: MEDICARE

## 2020-05-26 ENCOUNTER — TELEPHONE (OUTPATIENT)
Dept: ENDOCRINOLOGY | Facility: HOSPITAL | Age: 80
End: 2020-05-26

## 2020-05-26 DIAGNOSIS — E11.40 TYPE 2 DIABETES MELLITUS WITH DIABETIC NEUROPATHY, WITHOUT LONG-TERM CURRENT USE OF INSULIN (HCC): ICD-10-CM

## 2020-05-26 DIAGNOSIS — I10 ESSENTIAL HYPERTENSION: ICD-10-CM

## 2020-05-26 DIAGNOSIS — E03.8 HYPOTHYROIDISM DUE TO HASHIMOTO'S THYROIDITIS: ICD-10-CM

## 2020-05-26 DIAGNOSIS — E06.3 HYPOTHYROIDISM DUE TO HASHIMOTO'S THYROIDITIS: ICD-10-CM

## 2020-05-26 LAB
ALBUMIN SERPL BCP-MCNC: 3.9 G/DL (ref 3.5–5)
ALP SERPL-CCNC: 77 U/L (ref 46–116)
ALT SERPL W P-5'-P-CCNC: 26 U/L (ref 12–78)
ANION GAP SERPL CALCULATED.3IONS-SCNC: 5 MMOL/L (ref 4–13)
AST SERPL W P-5'-P-CCNC: 20 U/L (ref 5–45)
BILIRUB SERPL-MCNC: 0.93 MG/DL (ref 0.2–1)
BUN SERPL-MCNC: 12 MG/DL (ref 5–25)
CALCIUM SERPL-MCNC: 9.1 MG/DL (ref 8.3–10.1)
CHLORIDE SERPL-SCNC: 103 MMOL/L (ref 100–108)
CO2 SERPL-SCNC: 28 MMOL/L (ref 21–32)
CREAT SERPL-MCNC: 0.69 MG/DL (ref 0.6–1.3)
EST. AVERAGE GLUCOSE BLD GHB EST-MCNC: 163 MG/DL
GFR SERPL CREATININE-BSD FRML MDRD: 84 ML/MIN/1.73SQ M
GLUCOSE P FAST SERPL-MCNC: 155 MG/DL (ref 65–99)
HBA1C MFR BLD: 7.3 %
POTASSIUM SERPL-SCNC: 4.2 MMOL/L (ref 3.5–5.3)
PROT SERPL-MCNC: 7.3 G/DL (ref 6.4–8.2)
SODIUM SERPL-SCNC: 136 MMOL/L (ref 136–145)
T4 FREE SERPL-MCNC: 1.12 NG/DL (ref 0.76–1.46)
TSH SERPL DL<=0.05 MIU/L-ACNC: 1.1 UIU/ML (ref 0.36–3.74)

## 2020-05-26 PROCEDURE — 36415 COLL VENOUS BLD VENIPUNCTURE: CPT

## 2020-05-26 PROCEDURE — 80053 COMPREHEN METABOLIC PANEL: CPT

## 2020-05-26 PROCEDURE — 83036 HEMOGLOBIN GLYCOSYLATED A1C: CPT

## 2020-05-26 PROCEDURE — 84439 ASSAY OF FREE THYROXINE: CPT

## 2020-05-26 PROCEDURE — 84443 ASSAY THYROID STIM HORMONE: CPT

## 2020-06-01 ENCOUNTER — REMOTE DEVICE CLINIC VISIT (OUTPATIENT)
Dept: CARDIOLOGY CLINIC | Facility: CLINIC | Age: 80
End: 2020-06-01
Payer: MEDICARE

## 2020-06-01 DIAGNOSIS — Z95.0 CARDIAC PACEMAKER IN SITU: Primary | ICD-10-CM

## 2020-06-01 PROCEDURE — 93296 REM INTERROG EVL PM/IDS: CPT | Performed by: INTERNAL MEDICINE

## 2020-06-01 PROCEDURE — 93294 REM INTERROG EVL PM/LDLS PM: CPT | Performed by: INTERNAL MEDICINE

## 2020-06-16 DIAGNOSIS — E78.5 HYPERLIPIDEMIA, UNSPECIFIED HYPERLIPIDEMIA TYPE: Primary | ICD-10-CM

## 2020-06-16 RX ORDER — SIMVASTATIN 20 MG
40 TABLET ORAL
Qty: 180 TABLET | Refills: 3 | Status: SHIPPED | OUTPATIENT
Start: 2020-06-16 | End: 2021-06-28 | Stop reason: SDUPTHER

## 2020-06-23 ENCOUNTER — TELEPHONE (OUTPATIENT)
Dept: ENDOCRINOLOGY | Facility: HOSPITAL | Age: 80
End: 2020-06-23

## 2020-07-01 DIAGNOSIS — I48.91 ATRIAL FIBRILLATION, UNSPECIFIED TYPE (HCC): ICD-10-CM

## 2020-07-01 RX ORDER — APIXABAN 5 MG/1
TABLET, FILM COATED ORAL
Qty: 180 TABLET | Refills: 3 | Status: SHIPPED | OUTPATIENT
Start: 2020-07-01 | End: 2020-11-25 | Stop reason: SDUPTHER

## 2020-07-19 DIAGNOSIS — E11.9 TYPE 2 DIABETES MELLITUS WITHOUT COMPLICATION, WITH LONG-TERM CURRENT USE OF INSULIN (HCC): ICD-10-CM

## 2020-07-19 DIAGNOSIS — Z79.4 TYPE 2 DIABETES MELLITUS WITHOUT COMPLICATION, WITH LONG-TERM CURRENT USE OF INSULIN (HCC): ICD-10-CM

## 2020-07-20 DIAGNOSIS — L30.4 INTERTRIGO: ICD-10-CM

## 2020-07-21 ENCOUNTER — OFFICE VISIT (OUTPATIENT)
Dept: ENDOCRINOLOGY | Facility: HOSPITAL | Age: 80
End: 2020-07-21
Payer: MEDICARE

## 2020-07-21 VITALS
SYSTOLIC BLOOD PRESSURE: 114 MMHG | TEMPERATURE: 97.1 F | WEIGHT: 161.2 LBS | DIASTOLIC BLOOD PRESSURE: 70 MMHG | BODY MASS INDEX: 28.56 KG/M2 | HEART RATE: 92 BPM | HEIGHT: 63 IN

## 2020-07-21 DIAGNOSIS — E06.3 HYPOTHYROIDISM DUE TO HASHIMOTO'S THYROIDITIS: ICD-10-CM

## 2020-07-21 DIAGNOSIS — M81.0 AGE-RELATED OSTEOPOROSIS WITHOUT CURRENT PATHOLOGICAL FRACTURE: ICD-10-CM

## 2020-07-21 DIAGNOSIS — E78.5 HYPERLIPIDEMIA, UNSPECIFIED HYPERLIPIDEMIA TYPE: ICD-10-CM

## 2020-07-21 DIAGNOSIS — E55.9 VITAMIN D DEFICIENCY: ICD-10-CM

## 2020-07-21 DIAGNOSIS — I10 ESSENTIAL HYPERTENSION: ICD-10-CM

## 2020-07-21 DIAGNOSIS — E11.9 TYPE 2 DIABETES MELLITUS WITHOUT COMPLICATION, WITH LONG-TERM CURRENT USE OF INSULIN (HCC): Primary | ICD-10-CM

## 2020-07-21 DIAGNOSIS — E03.8 HYPOTHYROIDISM DUE TO HASHIMOTO'S THYROIDITIS: ICD-10-CM

## 2020-07-21 DIAGNOSIS — Z79.4 TYPE 2 DIABETES MELLITUS WITHOUT COMPLICATION, WITH LONG-TERM CURRENT USE OF INSULIN (HCC): Primary | ICD-10-CM

## 2020-07-21 PROCEDURE — 4040F PNEUMOC VAC/ADMIN/RCVD: CPT | Performed by: NURSE PRACTITIONER

## 2020-07-21 PROCEDURE — 2022F DILAT RTA XM EVC RTNOPTHY: CPT | Performed by: NURSE PRACTITIONER

## 2020-07-21 PROCEDURE — 1160F RVW MEDS BY RX/DR IN RCRD: CPT | Performed by: NURSE PRACTITIONER

## 2020-07-21 PROCEDURE — 3051F HG A1C>EQUAL 7.0%<8.0%: CPT | Performed by: NURSE PRACTITIONER

## 2020-07-21 PROCEDURE — 99214 OFFICE O/P EST MOD 30 MIN: CPT | Performed by: NURSE PRACTITIONER

## 2020-07-21 PROCEDURE — 3008F BODY MASS INDEX DOCD: CPT | Performed by: NURSE PRACTITIONER

## 2020-07-21 PROCEDURE — 3078F DIAST BP <80 MM HG: CPT | Performed by: NURSE PRACTITIONER

## 2020-07-21 PROCEDURE — 3074F SYST BP LT 130 MM HG: CPT | Performed by: NURSE PRACTITIONER

## 2020-07-21 PROCEDURE — 1036F TOBACCO NON-USER: CPT | Performed by: NURSE PRACTITIONER

## 2020-07-21 RX ORDER — EMPAGLIFLOZIN 25 MG/1
TABLET, FILM COATED ORAL
Qty: 90 TABLET | Refills: 3 | Status: SHIPPED | OUTPATIENT
Start: 2020-07-21 | End: 2021-08-02

## 2020-07-21 RX ORDER — FLASH GLUCOSE SCANNING READER
1 EACH MISCELLANEOUS
Qty: 1 DEVICE | Refills: 0 | Status: SHIPPED | OUTPATIENT
Start: 2020-07-21 | End: 2021-12-15 | Stop reason: SDUPTHER

## 2020-07-21 RX ORDER — KETOCONAZOLE 20 MG/G
CREAM TOPICAL
Qty: 60 G | Refills: 11 | Status: SHIPPED | OUTPATIENT
Start: 2020-07-21 | End: 2021-12-17 | Stop reason: SDUPTHER

## 2020-07-21 RX ORDER — FLASH GLUCOSE SENSOR
1 KIT MISCELLANEOUS
Qty: 2 EACH | Refills: 6 | Status: SHIPPED | OUTPATIENT
Start: 2020-07-21 | End: 2021-12-15 | Stop reason: SDUPTHER

## 2020-07-21 NOTE — PATIENT INSTRUCTIONS
Be mindful of diet       Stay active and stay hydrated       Continue Metformin 500 mg - three times per day (breakfast, lunch and dinner)       Continue [place] and [place]       Please check your blood sugars 2 times daily and for the record to the office in 2 weeks for review and adjustment, if necessary       Continue levothyroxine 125 mcg daily Monday through Saturday and no tablet on Sunday      Continue atenolol and simvastatin       Continue with regular supplementation of vitamin D3 daily       Obtain lab work as prescribed

## 2020-07-21 NOTE — PROGRESS NOTES
Debby Aguirre 67 y o  female MRN: [id number]    Encounter: [id number]      Assessment/Plan     Assessment: This is a 67y o -year-old female with type 2 diabetes, hypothyroidism, hypertension, hyperlipidemia and vitamin-D deficiency    Plan:  1   Type 2 diabetes:  Her most recent hemoglobin A1c is reasonable at 7 3   For now, she will continue  metformin, Jardiance and Byetta at the current dose   I have asked her to continue checking her blood sugars twice daily at alternating times and for the record to the office in 2 weeks for review and further adjustment, if necessary  She is up-to-date with her annual diabetic eye exams and is seeing the podiatrist every 12 weeks  Check hemoglobin A1c prior to next visit      2   Hypothyroidism:  Her most recent TSH free T4 were normal   Continue levothyroxine 125 mcg Monday through Saturday with no tablet on Sunday   Check TSH and free T4 prior to next office visit      3   Hypertension: Continue atenolol   Check comprehensive metabolic panel prior to next visit      4   Hyperlipidemia: Continue simvastatin       5   Vitamin-D deficiency:  Continue supplement with 1000 units of vitamin D3 daily         6   Osteopenia: Synzander Coburn will continue with calcium and vitamin-D supplementation   Stressed staying active reviewed fall risk and safety at the time of the visit  CC:  Type 2 Diabetes follow-up    History of Present Illness     HPI:  67 y o  female for follow-up of type 2 diabetes, hypothyroidism, hypertension and hyperlipidemia   She states she has been diagnosed with type 2 diabetes for approximately 8 years  There are no blood sugar records available for review  She is currently taking metformin 500 mg - three times daily, Byetta 10 mg twice daily and Jardiance 25 mg daily  She had 1 episode of hypoglycemia overnight in the past 2 weeks at 66  She was awoken by symptoms and treated the episode appropriately with juice    She denies any other episodes of hypoglycemia recently  Jhonny Valenzuela denies any recent polyuria, polydipsia or polyphagia   Her most recent hemoglobin A1c from May 25, 2020 of 7 3  She states she is up-to-date with her annual diabetic eye exam with Dr Gordy Ndiaye of June 11, 2019  She complains of some numbness and tingling to her feet at times and follows Podiatry every 12 weeks for regular diabetic foot care       For her hypothyroidism, she is treated with levothyroxine 125 mcg daily Monday through Saturday and no tablet on Sunday   Her most recent TSH from  May 26, 2020 was 1 100 with a free T4 of 1  12   She complains of some constipation at times but otherwise has no symptoms/complaints of hypo or hyperthyroidism      Her hypertension is treated with atenolol 50 mg daily      For her hyperlipidemia, she is treated with simvastatin 40 mg daily      For her vitamin-D deficiency, she supplements with 1000 units of vitamin D3 daily      Her most recent DEXA scan performed on March 7, 2019 revealed a T-score of -1 4 and lumbar spine consistent with low bone mineral density-osteopenia   When compared to the prior examination, there was no significant change to the total bone mineral density of the lumbar spine   However, there was a 5% decrease in the total bone mineral density of the left hip   She continues to supplement with calcium and vitamin-D daily   She denies any recent falls or fractures         Review of Systems   Constitutional: Positive for fatigue  Negative for chills and fever  HENT: Negative  Negative for trouble swallowing and voice change  Eyes: Negative  Negative for photophobia, pain, discharge, redness, itching and visual disturbance  Respiratory: Negative  Negative for chest tightness and shortness of breath  Cardiovascular: Negative  Negative for chest pain  Gastrointestinal: Negative  Negative for abdominal pain, constipation, diarrhea and vomiting  Endocrine: Positive for polyuria (Attributed to Jardiance)   Negative for cold intolerance, heat intolerance, polydipsia and polyphagia  Genitourinary: Negative  Musculoskeletal: Positive for arthralgias ( right hand)  Skin: Negative  Allergic/Immunologic: Negative  Neurological: Negative  Negative for dizziness, syncope, light-headedness and headaches  Hematological: Negative  Psychiatric/Behavioral: Negative  All other systems reviewed and are negative  Historical Information   Past Medical History:   Diagnosis Date    A-fib [facility])     Abnormal ECG     last assessed: 7/14/2015    Acid reflux     resolved    Anxiety     Benign neoplasm of sigmoid colon 09/13/2011    next colon 2012    Bronchitis, asthmatic     last assessed: 3/12/2012    [facility] [address] )     2012- had colon resection    COPD (chronic obstructive pulmonary disease) ([facility] [address] )     questionable    Depression     Diabetes mellitus ([facility] [address] )     on byetta    Dizziness     occ    Esophageal stricture     last assessed: 9/30/2014    High cholesterol     Hypertension     Hypothyroidism     Iron (Fe) deficiency anemia 12/22/2011    iron pill continue    Irritable bowel syndrome     OA (osteoarthritis)     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    Pacemaker     hx SSS    Rash     labia area- uses cream prn    Restrictive lung disease     Seasonal allergies     Skin scarring/fibrosis     fibrotic scar; last assessed: 3/22/2013    MONA (stress urinary incontinence, female)     Urinary frequency     last assessed: 9/10/2012    Wears glasses     reading     Past Surgical History:   Procedure Laterality Date    BREAST EXCISIONAL BIOPSY Right 1990    benign, best guess on year   [address] [facility]      does not know type  Dr Bharathi Alford is cariologist   [address]      resection  removed 25 In     COLONOSCOPY  06/01/2012    proctosigmoidectomy    ESOPHAGOGASTRODUODENOSCOPY N/A 1/9/2016    Procedure: ESOPHAGOGASTRODUODENOSCOPY (EGD);   Surgeon: Odalys Shook MD; [facility];  Service:     HYSTERECTOMY  1978    RI ARTHROPLASTY I-P JT Right 2018    Procedure: ARTHROPLASTY PIP/FINGER - RIGHT RING;  Surgeon: Louis Kendrick MD;  [facility];  Service: Orthopedics    RI CYSTOURETHROSCOPY N/A 2018    Procedure: Minnie Sandhoff;  Surgeon: Ale Jones MD;  [facility];  Service: UroGynecology           RI LAP, REPAIR PARAESOPHAGEAL HERNIA, INCL FUNDOPLASTY W/ MESH N/A 2016    Procedure: LAPAROSCOPIC PARAESOPHAGEAL HERNIA REPAIR with mesh; toupet  FUNDOPLICATION ;  Surgeon: Antonieta Casillas MD;  [facility];  Service: Thoracic    RI REPAIR INTERCARP/CARP-METACARP JT Right 1/10/2019    Procedure: Francoise Gaitan;  Surgeon: Louis Kendrick MD;  [facility];  Service: Orthopedics    RI SLING OPER STRES INCONTINENCE N/A 2018    Procedure: INSERTION PUBOVAGINAL SLING (FEMALE);   Surgeon: Ale Jones MD;  [facility];  Service: UroGynecology           TOTAL ABDOMINAL HYSTERECTOMY      WRIST TENDON TRANSFER Right 1/10/2019    Procedure: TRANSFER TENDON FLEXOR CARPI RADIALIS FROM FOREARM TO HAND;  Surgeon: Louis Kendrick MD;  [facility];  Service: Orthopedics     Social History   Social History     Substance and Sexual Activity   Alcohol Use No     Social History     Substance and Sexual Activity   Drug Use No     Social History     Tobacco Use   Smoking Status Former Smoker    Types: Cigarettes    Last attempt to quit:     Years since quittin 5   Smokeless Tobacco Former User    Quit date:      Family History:   Family History   Problem Relation Age of Onset    Heart disease Mother     Diabetes Mother    [place] Asthma Father     Stomach cancer Father         gastrkic cancer    Cancer Paternal Grandmother     Cancer Paternal Grandfather     No Known Problems Brother     Breast cancer Sister 76    Breast cancer Sister 77    Pancreatic cancer Sister 72    No Known Problems Maternal Aunt     No Known Problems Maternal Aunt     No Known Problems Maternal Aunt     No Known Problems Paternal Aunt     No Known Problems Daughter     No Known Problems Maternal Grandmother     No Known Problems Maternal Grandfather     Prostate cancer Paternal Uncle         age unknown    Substance Abuse Neg Hx     Mental illness Neg Hx     Alcohol abuse Neg Hx     Colon cancer Neg Hx     Colon polyps Neg Hx        Meds/Allergies   Current Outpatient Medications   Medication Sig Dispense Refill    acetaminophen (TYLENOL) 650 mg CR tablet Take 650 mg by mouth every 8 (eight) hours as needed for mild pain      albuterol (ProAir HFA) 90 mcg/act inhaler Inhale 2 puffs every 6 (six) hours as needed for wheezing or shortness of breath 8 5 g 0    Apple Cider Vinegar 500 MG TABS Take by mouth daily      ascorbic acid (VITAMIN C) 500 mg tablet Take 500 mg by mouth daily      atenolol (TENORMIN) 50 mg tablet TAKE 1 TABLET AT BEDTIME 90 tablet 3    BYETTA 10 MCG PEN 10 MCG/0 04ML SOPN 10 mcg bid 3 pen 0    Cholecalciferol (VITAMIN D-3) 1000 units CAPS Take 1 capsule by mouth daily      clobetasol (TEMOVATE) 0 05 % cream       Coenzyme Q10 (COQ10) 200 MG CAPS Take 200 mg by mouth daily        diclofenac sodium (VOLTAREN) 1 % Apply 2 g topically 4 (four) times a day 1 Tube 1    DULoxetine (CYMBALTA) 20 mg capsule TAKE 1 CAPSULE DAILY 90 capsule 3    ELIQUIS 5 MG TAKE 1 TABLET TWICE A  tablet 3    Ferrous Sulfate 27 MG TABS Take 1 tablet by mouth daily      FREESTYLE LITE test strip Test 4 times daily  400 each 5    Glucosamine-Chondroitin (MOVE FREE PO) Take by mouth daily      JARDIANCE 25 MG TABS TAKE 1 TABLET EVERY MORNING 90 tablet 3    ketoconazole (NIZORAL) 2 % cream APPLY TOPICALLY TWICE A DAY    MIX WITH HYDROCORTISONE AND APPLY TO VULVA TWICE A DAY FOR 1 MONTH 60 g 11    levothyroxine 125 mcg tablet TAKE 1 TABLET 6 DAYS OF THE WEEK AND 2 TABLETS ON SUNDAY (Patient taking differently: TAKE 1 TABLET 6 DAYS OF THE WEEK) 102 tablet 4    MEGARED OMEGA-3 KRILL OIL PO Take 1 capsule by mouth daily      metFORMIN (GLUCOPHAGE-XR) 500 mg 24 hr tablet Take 1 tablet (500 mg total) by mouth 3 (three) times a day with meals 270 tablet 2    simvastatin (ZOCOR) 20 mg tablet Take 2 tablets (40 mg total) by mouth daily at bedtime 180 tablet 3    Blood Glucose Monitoring Suppl (FREESTYLE FREEDOM LITE) w/Device KIT by Does not apply route once for 1 dose Use meter to check blood sugars daily  1 each 0    estradiol (ESTRACE) 0 1 mg/g vaginal cream       hydrocortisone 2 5 % cream Apply topically 2 (two) times a day Mix with ketoconazole cream and apply to vulva twice daily for 1 month 60 g 2    Misc Natural Products (OSTEO BI-FLEX ADV JOINT SHIELD PO) Take 1 tablet by mouth daily         No current facility-administered medications for this visit  Allergies   Allergen Reactions    Amoxicillin     Fluconazole Dizziness       Objective   Vitals: Blood pressure 114/70, pulse 92, temperature (!) 97 1 °F (36 2 °C), height 5' 3 25" (1 607 m), weight 73 1 kg (161 lb 3 2 oz), not currently breastfeeding  Physical Exam   Constitutional: She is oriented to person, place, and time  She appears well-developed and well-nourished  HENT:   Head: Normocephalic and atraumatic  Mouth/Throat: Oropharynx is clear and moist    Eyes: Pupils are equal, round, and reactive to light  Conjunctivae and EOM are normal    Neck: Normal range of motion  Neck supple  Cardiovascular: Normal rate, regular rhythm, normal heart sounds and intact distal pulses  Pulmonary/Chest: Effort normal and breath sounds normal    Abdominal: Soft  Bowel sounds are normal    Musculoskeletal: Normal range of motion  Neurological: She is alert and oriented to person, place, and time  Skin: Skin is warm and dry  Psychiatric: She has a normal mood and affect   Her behavior is normal  Judgment and thought content normal    Vitals reviewed  Lab Results:   Lab Results   Component Value Date/Time    Hemoglobin A1C 7 3 (H) 05/26/2020 07:27 AM    Hemoglobin A1C 7 2 (H) 02/10/2020 07:17 AM    Hemoglobin A1C 6 9 (H) 11/15/2019 07:57 AM    BUN 12 05/26/2020 07:27 AM    BUN 14 02/10/2020 07:17 AM    BUN 14 11/15/2019 07:57 AM    Potassium 4 2 05/26/2020 07:27 AM    Potassium 4 5 02/10/2020 07:17 AM    Potassium 4 6 11/15/2019 07:57 AM    Chloride 103 05/26/2020 07:27 AM    Chloride 105 02/10/2020 07:17 AM    Chloride 106 11/15/2019 07:57 AM    CO2 28 05/26/2020 07:27 AM    CO2 28 02/10/2020 07:17 AM    CO2 28 11/15/2019 07:57 AM    Creatinine 0 69 05/26/2020 07:27 AM    Creatinine 0 65 02/10/2020 07:17 AM    Creatinine 0 68 11/15/2019 07:57 AM    AST 20 05/26/2020 07:27 AM    AST 20 02/10/2020 07:17 AM    AST 18 11/15/2019 07:57 AM    ALT 26 05/26/2020 07:27 AM    ALT 24 02/10/2020 07:17 AM    ALT 25 11/15/2019 07:57 AM    Albumin 3 9 05/26/2020 07:27 AM    Albumin 3 8 02/10/2020 07:17 AM    Albumin 3 9 11/15/2019 07:57 AM    HDL, Direct 34 (L) 11/15/2019 07:57 AM    Triglycerides 169 (H) 11/15/2019 07:57 AM       Portions of the record may have been created with voice recognition software  Occasional wrong word or "sound a like" substitutions may have occurred due to the inherent limitations of voice recognition software  Read the chart carefully and recognize, using context, where substitutions have occurred

## 2020-07-29 ENCOUNTER — TELEPHONE (OUTPATIENT)
Dept: ENDOCRINOLOGY | Facility: HOSPITAL | Age: 80
End: 2020-07-29

## 2020-07-29 NOTE — TELEPHONE ENCOUNTER
Patient left a message  She notes that the Earl Reyna is not covered by her insurance and will either need a prior auth or we could try ordering a Dexcom for her  What would you like us to do?

## 2020-07-30 NOTE — TELEPHONE ENCOUNTER
She has medicare  For the [place], she has to be on 3 injections daily  And she is not on insulin  Do you still want to try the prior auth?

## 2020-07-30 NOTE — TELEPHONE ENCOUNTER
I think it will be denied for both the [place] and Dexcom then   The other option is if she has a smart phone, she can pay out of pocket for the [employer] sensors and use her phone as the reader if she wishes though I am not sure exactly how much that will cost

## 2020-08-06 ENCOUNTER — APPOINTMENT (OUTPATIENT)
Dept: LAB | Facility: HOSPITAL | Age: 80
End: 2020-08-06
Payer: MEDICARE

## 2020-08-06 DIAGNOSIS — E78.2 MIXED HYPERLIPIDEMIA: ICD-10-CM

## 2020-08-06 DIAGNOSIS — E55.9 VITAMIN D DEFICIENCY: ICD-10-CM

## 2020-08-06 LAB
25(OH)D3 SERPL-MCNC: 35.2 NG/ML (ref 30–100)
CHOLEST SERPL-MCNC: 141 MG/DL (ref 50–200)
HDLC SERPL-MCNC: 26 MG/DL
LDLC SERPL CALC-MCNC: 67 MG/DL (ref 0–100)
NONHDLC SERPL-MCNC: 115 MG/DL
TRIGL SERPL-MCNC: 241 MG/DL

## 2020-08-06 PROCEDURE — 80061 LIPID PANEL: CPT

## 2020-08-06 PROCEDURE — 36415 COLL VENOUS BLD VENIPUNCTURE: CPT

## 2020-08-06 PROCEDURE — 82306 VITAMIN D 25 HYDROXY: CPT

## 2020-08-08 PROBLEM — C18.9 MALIGNANT TUMOR OF COLON (HCC): Status: RESOLVED | Noted: 2019-01-04 | Resolved: 2020-08-08

## 2020-08-08 PROBLEM — Z95.0 HISTORY OF CARDIAC PACEMAKER IN SITU: Status: RESOLVED | Noted: 2019-11-21 | Resolved: 2020-08-08

## 2020-08-08 NOTE — PROGRESS NOTES
ASSESSMENT/PLAN:    Type 2 diabetes  A1c stable at 7 3 from 7 2  Is on metformin Jardiance and Destiny  Follows with Endocrine    Lichen simplex chronicus  Patient failed estrogen clobetasol  So right now she is on eyes Rel and a nonsteroidal   However she could go about 2 weeks before it bothers her  Patient will start using it 2 to 3 times a week consistently to see if she does not get bothered    Osteopenia  Continue calcium and vitamin-D    Adenocarcinoma of the colon 2012  Next colonoscopy 2021    Emphysema by CT scan  Ventolin as needed     Insomnia/anxiety   Continue duloxetine        Hashimoto's thyroiditis   Also following with endocrine for suppression of her thyroid with medication         Sick sinus syndrome/cardiomegaly/coronary artery disease/AFib  Patient has a pacer and takes Eliquis for anticoagulation  Atenolol for rate control  Follows with Cardiology      Vitamin D deficiency   Continue vitamin D 2000 units daily          History of large paraesophageal hernia   Had surgery and no longer has Pacheco's or her reflux and no longer on any PPIs        Hyperlipidemia   Simvastatin and CoQ10 and diet     Thyroid goiter   Followed by endocrine who orders ultrasounds         Rechec 6 months or sooner if needed  Patient only took 1 sheet of her lab test in so she only had her cholesterol and A1c  She will get her mammogram done and get the rest of her blood work done and we will review at next visit  Physical done today          Health Maintenance   Topic Date Due    SLP PLAN OF CARE  1940    DTaP,Tdap,and Td Vaccines (1 - Tdap) 10/22/1962    OT PLAN OF CARE  03/06/2019    Influenza Vaccine  07/01/2020    URINE MICROALBUMIN  07/22/2020    Medicare Annual Wellness Visit (AWV)  08/06/2020    BMI: Followup Plan  01/20/2021    HEMOGLOBIN A1C  11/26/2020    Fall Risk  02/25/2021    Depression Screening PHQ  02/25/2021    Diabetic Foot Exam  03/12/2021    Colonoscopy Surveillance  06/11/2021  BMI: Adult  08/10/2021    DM Eye Exam  12/16/2021    DXA SCAN  03/07/2022    Pneumococcal Vaccine: 65+ Years  Completed    HIB Vaccine  Aged Out    Hepatitis B Vaccine  Aged Out    IPV Vaccine  Aged Out    Hepatitis A Vaccine  Aged Out    Meningococcal ACWY Vaccine  Aged Out    HPV Vaccine  Aged Out         Problem List as of 8/10/2020 Reviewed: 7/21/2020  3:22 PM by Levi Oppenheim, CRNP    Abnormal echocardiogram    Adenocarcinoma of colon ([facility] [address] )    Age-related osteoporosis without current pathological fracture    Atrial fibrillation ([facility] [address] )    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    Cardiomegaly    CMC DJD(carpometacarpal degenerative joint disease), localized primary, right    Coronary artery disease involving native coronary artery of native heart without angina pectoris    Diffuse nontoxic goiter    Essential hypertension    Feeling anxious    Gastro-esophageal reflux disease without esophagitis    Hematuria, microscopic    History of cardiac pacemaker in situ    History of permanent cardiac pacemaker placement    Insomnia    Lichen simplex chronicus    Malignant tumor of colon (HCC)    Mitral regurgitation    Mixed hyperlipidemia    MRI safe cardiac pacemaker in situ    Other specified hypothyroidism    Paraesophageal hernia    Primary osteoarthritis involving multiple joints    Restrictive lung disease    Sick sinus syndrome ([facility] [address] )    Stress incontinence in female    Type 2 diabetes mellitus without complication, with long-term current use of insulin (HCC)    Vitamin D deficiency            Subjective:   Chief Complaint   Patient presents with    Medicare Wellness Visit    Diabetes    Vitamin D Deficiency     Patient is here for recheck on her blood pressure  She follows with Endocrine for diabetes and her last A1c was 7 3  She is on metformin Januvia and Byetta  Overall she feels well without any complaints    Patient tries to walk      patient ID: Leila Shah is a 67 y o  female      Past Medical History:   Diagnosis Date    A-fib [facility])     Abnormal ECG     last assessed: 7/14/2015    Acid reflux     resolved    Anxiety     Benign neoplasm of sigmoid colon 09/13/2011    next colon 2012    Bronchitis, asthmatic     last assessed: 3/12/2012    Colon cancer ([facility] [address] )     2012- had colon resection    COPD (chronic obstructive pulmonary disease) ([facility] [address] )     questionable    Depression     Diabetes mellitus ([facility] [address] )     on byetta    Dizziness     occ    Esophageal stricture     last assessed: 9/30/2014    High cholesterol     Hypertension     Hypothyroidism     Iron (Fe) deficiency anemia 12/22/2011    iron pill continue    Irritable bowel syndrome     OA (osteoarthritis)     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    Pacemaker     hx SSS    Rash     labia area- uses cream prn    Restrictive lung disease     Seasonal allergies     Skin scarring/fibrosis     fibrotic scar; last assessed: 3/22/2013    MONA (stress urinary incontinence, female)     Urinary frequency     last assessed: 9/10/2012    Wears glasses     reading     Past Surgical History:   Procedure Laterality Date    BREAST EXCISIONAL BIOPSY Right 1990    benign, best guess on year   [address]       does not know type  Dr Sanjuana Acosta is cariologist   [address] [facility] [address]      resection  removed 25 In     COLONOSCOPY  06/01/2012    proctosigmoidectomy    ESOPHAGOGASTRODUODENOSCOPY N/A 1/9/2016    Procedure: ESOPHAGOGASTRODUODENOSCOPY (EGD);   Surgeon: Alise Nobles MD;  [facility];  Service:    Dias HYSTERECTOMY  1978    SC ARTHROPLASTY I-P JT Right 8/16/2018    Procedure: ARTHROPLASTY PIP/FINGER - RIGHT RING;  Surgeon: Leopold Mitts, MD;  [facility];  Service: Orthopedics    SC CYSTOURETHROSCOPY N/A 5/14/2018    Procedure: Harris Carney;  Surgeon: Elona Osler, MD;  [facility];  Service: UroGynecology           SC LAP, REPAIR PARAESOPHAGEAL HERNIA, INCL FUNDOPLASTY W/ MESH N/A 2016    Procedure: LAPAROSCOPIC PARAESOPHAGEAL HERNIA REPAIR with mesh; toupet  FUNDOPLICATION ;  Surgeon: Mehul Culver MD;  [facility];  Service: Thoracic    WA REPAIR INTERCARP/CARP-METACARP JT Right 1/10/2019    Procedure: Marie Brewster;  Surgeon: Jhon Bautista MD;  [facility];  Service: Orthopedics    WA SLING OPER STRES INCONTINENCE N/A 2018    Procedure: INSERTION PUBOVAGINAL SLING (FEMALE);   Surgeon: Deanne Carroll MD;  [facility];  Service: UroGynecology           TOTAL ABDOMINAL HYSTERECTOMY      WRIST TENDON TRANSFER Right 1/10/2019    Procedure: TRANSFER TENDON FLEXOR CARPI RADIALIS FROM FOREARM TO HAND;  Surgeon: Jhon Bautista MD;  [facility];  Service: Orthopedics     Family History   Problem Relation Age of Onset    Heart disease Mother     Diabetes Mother     Asthma Father     Stomach cancer Father         gastrkic cancer    Cancer Paternal Grandmother     Cancer Paternal Grandfather     No Known Problems Brother     Breast cancer Sister 76    Breast cancer Sister 77    Pancreatic cancer Sister 72    No Known Problems Maternal Aunt     No Known Problems Maternal Aunt     No Known Problems Maternal Aunt     No Known Problems Paternal Aunt     No Known Problems Daughter     No Known Problems Maternal Grandmother     No Known Problems Maternal Grandfather     Prostate cancer Paternal Uncle         age unknown    Substance Abuse Neg Hx     Mental illness Neg Hx     Alcohol abuse Neg Hx     Colon cancer Neg Hx     Colon polyps Neg Hx      Social History     Tobacco Use    Smoking status: Former Smoker     Types: Cigarettes     Last attempt to quit:      Years since quittin 6    Smokeless tobacco: Former User     Quit date:    Substance Use Topics    Alcohol use: No    Drug use: No     Social History     Tobacco Use   Smoking Status Former Smoker    Types: Cigarettes    Last attempt to quit:     Years since quittin 6   Smokeless Tobacco Former User    Quit date:         MED LIST WAS REVIEWED AND UPDATED       ROS  As per HPI  Rest of 12 point review of systems negative     Objective:      VITALS:  Wt Readings from Last 3 Encounters:   08/10/20 73 6 kg (162 lb 4 8 oz)   20 73 1 kg (161 lb 3 2 oz)   20 73 2 kg (161 lb 6 4 oz)     BP Readings from Last 3 Encounters:   08/10/20 110/72   20 114/70   20 120/78     Pulse Readings from Last 3 Encounters:   08/10/20 80   20 92   20 64     Body mass index is 28 52 kg/m²  Laboratory Results: All pertinent labs and studies were reviewed with patient during this office visit  with highlights of the results contained in this notes ASSESSMENT AND PLAN section       Physical Exam      Gen    No acute distress well-appearing well-nourished appears stated age    Mental status  Good judgment and insight oriented to time person and place, recent and remote memory intact mood and affect normal cooperative and patient is reasonable    HEENT  PERRLA 3 mm, EOMI without nystagmus, normocephalic atraumatic without facial weakness      Neck   supple no masses trachea midline positive click normal carotid upstrokes with no bruits    Cor  Regular rhythm without ectopy or murmur, no S3-S4, normal palpation that is no heave lift or thrill    Vascular  No edema, good pedal pulses    Lungs  CTA bilaterally in no respiratory distress no wheezes rhonchi or rales, normal to palpation no tactile fremitus    Abdomen  Soft, no palpable masses, no hepatosplenomegaly, normal bowel sounds, nontender    Lymphatics  No palpable nodes in the neck, supraclavicular area, axilla, or groin     Musculoskeletal  No clubbing cyanosis or edema muscle tone normal    Skin  no rashes or abnormal appearing lesions    Neuro  Normal ambulation, cranial nerves 2-12 grossly intact, higher functioning with reasoning intact

## 2020-08-10 ENCOUNTER — OFFICE VISIT (OUTPATIENT)
Dept: FAMILY MEDICINE CLINIC | Facility: CLINIC | Age: 80
End: 2020-08-10
Payer: MEDICARE

## 2020-08-10 VITALS
WEIGHT: 162.3 LBS | DIASTOLIC BLOOD PRESSURE: 72 MMHG | HEIGHT: 63 IN | SYSTOLIC BLOOD PRESSURE: 110 MMHG | HEART RATE: 80 BPM | RESPIRATION RATE: 15 BRPM | BODY MASS INDEX: 28.76 KG/M2 | TEMPERATURE: 97.7 F

## 2020-08-10 DIAGNOSIS — K22.70 BARRETT'S ESOPHAGUS WITHOUT DYSPLASIA: ICD-10-CM

## 2020-08-10 DIAGNOSIS — Z95.0 HISTORY OF CARDIAC PACEMAKER IN SITU: ICD-10-CM

## 2020-08-10 DIAGNOSIS — E06.3 HYPOTHYROIDISM DUE TO HASHIMOTO'S THYROIDITIS: ICD-10-CM

## 2020-08-10 DIAGNOSIS — Z12.31 ENCOUNTER FOR SCREENING MAMMOGRAM FOR MALIGNANT NEOPLASM OF BREAST: ICD-10-CM

## 2020-08-10 DIAGNOSIS — I10 ESSENTIAL HYPERTENSION: ICD-10-CM

## 2020-08-10 DIAGNOSIS — M81.0 AGE-RELATED OSTEOPOROSIS WITHOUT CURRENT PATHOLOGICAL FRACTURE: ICD-10-CM

## 2020-08-10 DIAGNOSIS — Z00.00 MEDICARE ANNUAL WELLNESS VISIT, SUBSEQUENT: Primary | ICD-10-CM

## 2020-08-10 DIAGNOSIS — Z79.4 TYPE 2 DIABETES MELLITUS WITHOUT COMPLICATION, WITH LONG-TERM CURRENT USE OF INSULIN (HCC): ICD-10-CM

## 2020-08-10 DIAGNOSIS — E04.0 DIFFUSE NONTOXIC GOITER: ICD-10-CM

## 2020-08-10 DIAGNOSIS — C18.9 ADENOCARCINOMA OF COLON (HCC): ICD-10-CM

## 2020-08-10 DIAGNOSIS — K21.9 GASTRO-ESOPHAGEAL REFLUX DISEASE WITHOUT ESOPHAGITIS: ICD-10-CM

## 2020-08-10 DIAGNOSIS — E11.9 TYPE 2 DIABETES MELLITUS WITHOUT COMPLICATION, WITH LONG-TERM CURRENT USE OF INSULIN (HCC): ICD-10-CM

## 2020-08-10 DIAGNOSIS — Z95.0 HISTORY OF PERMANENT CARDIAC PACEMAKER PLACEMENT: ICD-10-CM

## 2020-08-10 DIAGNOSIS — I25.10 CORONARY ARTERY DISEASE INVOLVING NATIVE CORONARY ARTERY OF NATIVE HEART WITHOUT ANGINA PECTORIS: ICD-10-CM

## 2020-08-10 DIAGNOSIS — Z95.0 MRI SAFE CARDIAC PACEMAKER IN SITU: ICD-10-CM

## 2020-08-10 DIAGNOSIS — E03.8 HYPOTHYROIDISM DUE TO HASHIMOTO'S THYROIDITIS: ICD-10-CM

## 2020-08-10 DIAGNOSIS — E55.9 VITAMIN D DEFICIENCY: ICD-10-CM

## 2020-08-10 PROCEDURE — 99214 OFFICE O/P EST MOD 30 MIN: CPT | Performed by: FAMILY MEDICINE

## 2020-08-10 PROCEDURE — 1160F RVW MEDS BY RX/DR IN RCRD: CPT | Performed by: FAMILY MEDICINE

## 2020-08-10 PROCEDURE — 3074F SYST BP LT 130 MM HG: CPT | Performed by: FAMILY MEDICINE

## 2020-08-10 PROCEDURE — G0439 PPPS, SUBSEQ VISIT: HCPCS | Performed by: FAMILY MEDICINE

## 2020-08-10 PROCEDURE — 1170F FXNL STATUS ASSESSED: CPT | Performed by: FAMILY MEDICINE

## 2020-08-10 PROCEDURE — 1125F AMNT PAIN NOTED PAIN PRSNT: CPT | Performed by: FAMILY MEDICINE

## 2020-08-10 PROCEDURE — 1123F ACP DISCUSS/DSCN MKR DOCD: CPT | Performed by: FAMILY MEDICINE

## 2020-08-10 PROCEDURE — 2022F DILAT RTA XM EVC RTNOPTHY: CPT | Performed by: FAMILY MEDICINE

## 2020-08-10 PROCEDURE — 3078F DIAST BP <80 MM HG: CPT | Performed by: FAMILY MEDICINE

## 2020-08-10 PROCEDURE — 3008F BODY MASS INDEX DOCD: CPT | Performed by: FAMILY MEDICINE

## 2020-08-10 PROCEDURE — 4040F PNEUMOC VAC/ADMIN/RCVD: CPT | Performed by: FAMILY MEDICINE

## 2020-08-10 PROCEDURE — 3051F HG A1C>EQUAL 7.0%<8.0%: CPT | Performed by: FAMILY MEDICINE

## 2020-08-10 PROCEDURE — 1036F TOBACCO NON-USER: CPT | Performed by: FAMILY MEDICINE

## 2020-08-10 NOTE — PATIENT INSTRUCTIONS
1  When you get her mammogram done take her lab slip dress a ends have all of them done  Ask the phlebotomist to check in the computer to make sure she has all of the orders    See you back in 6 months or sooner if needed                  Medicare Preventive Visit Patient Instructions  Thank you for completing your Welcome to Medicare Visit or Medicare Annual Wellness Visit today  Your next wellness visit will be due in one year (8/10/2021)  The screening/preventive services that you may require over the next 5-10 years are detailed below  Some tests may not apply to you based off risk factors and/or age  Screening tests ordered at today's visit but not completed yet may show as past due  Also, please note that scanned in results may not display below  Preventive Screenings:  Service Recommendations Previous Testing/Comments   Colorectal Cancer Screening  * Colonoscopy    * Fecal Occult Blood Test (FOBT)/Fecal Immunochemical Test (FIT)  * Fecal DNA/Cologuard Test  * Flexible Sigmoidoscopy Age: 54-65 years old   Colonoscopy: every 10 years (may be performed more frequently if at higher risk)  OR  FOBT/FIT: every 1 year  OR  Cologuard: every 3 years  OR  Sigmoidoscopy: every 5 years  Screening may be recommended earlier than age 48 if at higher risk for colorectal cancer  Also, an individualized decision between you and your healthcare provider will decide whether screening between the ages of 74-80 would be appropriate  Colonoscopy: 06/11/2018  FOBT/FIT: Not on file  Cologuard: Not on file  Sigmoidoscopy: Not on file    History Colorectal Cancer     Breast Cancer Screening Age: 37 years old  Frequency: every 1-2 years  Not required if history of left and right mastectomy Mammogram: 07/18/2019    Screening Current   Cervical Cancer Screening Between the ages of 21-29, pap smear recommended once every 3 years  Between the ages of 33-67, can perform pap smear with HPV co-testing every 5 years     Recommendations may differ for women with a history of total hysterectomy, cervical cancer, or abnormal pap smears in past  Pap Smear: Not on file    Screening Not Indicated   Hepatitis C Screening Once for adults born between 1945 and 1965  More frequently in patients at high risk for Hepatitis C Hep C Antibody: Not on file       Diabetes Screening 1-2 times per year if you're at risk for diabetes or have pre-diabetes Fasting glucose: 155 mg/dL   A1C: 7 3 %    Screening Not Indicated  History Diabetes   Cholesterol Screening Once every 5 years if you don't have a lipid disorder  May order more often based on risk factors  Lipid panel: 08/06/2020    Screening Not Indicated  History Lipid Disorder     Other Preventive Screenings Covered by Medicare:  1  Abdominal Aortic Aneurysm (AAA) Screening: covered once if your at risk  You're considered to be at risk if you have a family history of AAA  2  Lung Cancer Screening: covers low dose CT scan once per year if you meet all of the following conditions: (1) Age 50-69; (2) No signs or symptoms of lung cancer; (3) Current smoker or have quit smoking within the last 15 years; (4) You have a tobacco smoking history of at least 30 pack years (packs per day multiplied by number of years you smoked); (5) You get a written order from a healthcare provider  3  Glaucoma Screening: covered annually if you're considered high risk: (1) You have diabetes OR (2) Family history of glaucoma OR (3)  aged 48 and older OR (3)  American aged 72 and older  3  Osteoporosis Screening: covered every 2 years if you meet one of the following conditions: (1) You're estrogen deficient and at risk for osteoporosis based off medical history and other findings; (2) Have a vertebral abnormality; (3) On glucocorticoid therapy for more than 3 months; (4) Have primary hyperparathyroidism; (5) On osteoporosis medications and need to assess response to drug therapy     · Last bone density test (DXA Scan): 03/07/2019   5  HIV Screening: covered annually if you're between the age of 15-65  Also covered annually if you are younger than 13 and older than 72 with risk factors for HIV infection  For pregnant patients, it is covered up to 3 times per pregnancy  Immunizations:  Immunization Recommendations   Influenza Vaccine Annual influenza vaccination during flu season is recommended for all persons aged >= 6 months who do not have contraindications   Pneumococcal Vaccine (Prevnar and Pneumovax)  * Prevnar = PCV13  * Pneumovax = PPSV23   Adults 25-60 years old: 1-3 doses may be recommended based on certain risk factors  Adults 72 years old: Prevnar (PCV13) vaccine recommended followed by Pneumovax (PPSV23) vaccine  If already received PPSV23 since turning 65, then PCV13 recommended at least one year after PPSV23 dose  Hepatitis B Vaccine 3 dose series if at intermediate or high risk (ex: diabetes, end stage renal disease, liver disease)   Tetanus (Td) Vaccine - COST NOT COVERED BY MEDICARE PART B Following completion of primary series, a booster dose should be given every 10 years to maintain immunity against tetanus  Td may also be given as tetanus wound prophylaxis  Tdap Vaccine - COST NOT COVERED BY MEDICARE PART B Recommended at least once for all adults  For pregnant patients, recommended with each pregnancy  Shingles Vaccine (Shingrix) - COST NOT COVERED BY MEDICARE PART B  2 shot series recommended in those aged 48 and above     Health Maintenance Due:      Topic Date Due    DXA SCAN  03/07/2022     Immunizations Due:      Topic Date Due    DTaP,Tdap,and Td Vaccines (1 - Tdap) 10/22/1961    Influenza Vaccine  07/01/2020     Advance Directives   What are advance directives? Advance directives are legal documents that state your wishes and plans for medical care  These plans are made ahead of time in case you lose your ability to make decisions for yourself   Advance directives can apply to any medical decision, such as the treatments you want, and if you want to donate organs  What are the types of advance directives? There are many types of advance directives, and each state has rules about how to use them  You may choose a combination of any of the following:  · Living will: This is a written record of the treatment you want  You can also choose which treatments you do not want, which to limit, and which to stop at a certain time  This includes surgery, medicine, IV fluid, and tube feedings  · Durable power of  for healthcare [facility]): This is a written record that states who you want to make healthcare choices for you when you are unable to make them for yourself  This person, called a proxy, is usually a family member or a friend  You may choose more than 1 proxy  · Do not resuscitate (DNR) order:  A DNR order is used in case your heart stops beating or you stop breathing  It is a request not to have certain forms of treatment, such as CPR  A DNR order may be included in other types of advance directives  · Medical directive: This covers the care that you want if you are in a coma, near death, or unable to make decisions for yourself  You can list the treatments you want for each condition  Treatment may include pain medicine, surgery, blood transfusions, dialysis, IV or tube feedings, and a ventilator (breathing machine)  · Values history: This document has questions about your views, beliefs, and how you feel and think about life  This information can help others choose the care that you would choose  Why are advance directives important? An advance directive helps you control your care  Although spoken wishes may be used, it is better to have your wishes written down  Spoken wishes can be misunderstood, or not followed  Treatments may be given even if you do not want them  An advance directive may make it easier for your family to make difficult choices about your care     Fall Prevention    Fall prevention  includes ways to make your home and other areas safer  It also includes ways you can move more carefully to prevent a fall  Health conditions that cause changes in your blood pressure, vision, or muscle strength and coordination may increase your risk for falls  Medicines may also increase your risk for falls if they make you dizzy, weak, or sleepy  Fall prevention tips:   · Stand or sit up slowly  · Use assistive devices as directed  · Wear shoes that fit well and have soles that   · Wear a personal alarm  · Stay active  · Manage your medical conditions  Home Safety Tips:  · Add items to prevent falls in the bathroom  · Keep paths clear  · Install bright lights in your home  · Keep items you use often on shelves within reach  · Paint or place reflective tape on the edges of your stairs  Urinary Incontinence   Urinary incontinence (UI)  is when you lose control of your bladder  UI develops because your bladder cannot store or empty urine properly  The 3 most common types of UI are stress incontinence, urge incontinence, or both  Medicines:   · May be given to help strengthen your bladder control  Report any side effects of medication to your healthcare provider  Do pelvic muscle exercises often:  Your pelvic muscles help you stop urinating  Squeeze these muscles tight for 5 seconds, then relax for 5 seconds  Gradually work up to squeezing for 10 seconds  Do 3 sets of 15 repetitions a day, or as directed  This will help strengthen your pelvic muscles and improve bladder control  Train your bladder:  Go to the bathroom at set times, such as every 2 hours, even if you do not feel the urge to go  You can also try to hold your urine when you feel the urge to go  For example, hold your urine for 5 minutes when you feel the urge to go  As that becomes easier, hold your urine for 10 minutes  Self-care:   · Keep a UI record    Write down how often you leak urine and how much you leak  Make a note of what you were doing when you leaked urine  · Drink liquids as directed  You may need to limit the amount of liquid you drink to help control your urine leakage  Do not drink any liquid right before you go to bed  Limit or do not have drinks that contain caffeine or alcohol  · Prevent constipation  Eat a variety of high-fiber foods  Good examples are high-fiber cereals, beans, vegetables, and whole-grain breads  Walking is the best way to trigger your intestines to have a bowel movement  · Exercise regularly and maintain a healthy weight  Weight loss and exercise will decrease pressure on your bladder and help you control your leakage  · Use a catheter as directed  to help empty your bladder  A catheter is a tiny, plastic tube that is put into your bladder to drain your urine  · Go to behavior therapy as directed  Behavior therapy may be used to help you learn to control your urge to urinate  Weight Management   Why it is important to manage your weight:  Being overweight increases your risk of health conditions such as heart disease, high blood pressure, type 2 diabetes, and certain types of cancer  It can also increase your risk for osteoarthritis, sleep apnea, and other respiratory problems  Aim for a slow, steady weight loss  Even a small amount of weight loss can lower your risk of health problems  How to lose weight safely:  A safe and healthy way to lose weight is to eat fewer calories and get regular exercise  You can lose up about 1 pound a week by decreasing the number of calories you eat by 500 calories each day  Healthy meal plan for weight management:  A healthy meal plan includes a variety of foods, contains fewer calories, and helps you stay healthy  A healthy meal plan includes the following:  · Eat whole-grain foods more often  A healthy meal plan should contain fiber   Fiber is the part of grains, fruits, and vegetables that is not broken down by your body  Whole-grain foods are healthy and provide extra fiber in your diet  Some examples of whole-grain foods are whole-wheat breads and pastas, oatmeal, brown rice, and bulgur  · Eat a variety of vegetables every day  Include dark, leafy greens such as spinach, kale, pam greens, and mustard greens  Eat yellow and orange vegetables such as carrots, sweet potatoes, and winter squash  · Eat a variety of fruits every day  Choose fresh or canned fruit (canned in its own juice or light syrup) instead of juice  Fruit juice has very little or no fiber  · Eat low-fat dairy foods  Drink fat-free (skim) milk or 1% milk  Eat fat-free yogurt and low-fat cottage cheese  Try low-fat cheeses such as mozzarella and other reduced-fat cheeses  · Choose meat and other protein foods that are low in fat  Choose beans or other legumes such as split peas or lentils  Choose fish, skinless poultry (chicken or turkey), or lean cuts of red meat (beef or pork)  Before you cook meat or poultry, cut off any visible fat  · Use less fat and oil  Try baking foods instead of frying them  Add less fat, such as margarine, sour cream, regular salad dressing and mayonnaise to foods  Eat fewer high-fat foods  Some examples of high-fat foods include french fries, doughnuts, ice cream, and cakes  · Eat fewer sweets  Limit foods and drinks that are high in sugar  This includes candy, cookies, regular soda, and sweetened drinks  Exercise:  Exercise at least 30 minutes per day on most days of the week  Some examples of exercise include walking, biking, dancing, and swimming  You can also fit in more physical activity by taking the stairs instead of the elevator or parking farther away from stores  Ask your healthcare provider about the best exercise plan for you  © Copyright [employer] 2018 Information is for End User's use only and may not be sold, redistributed or otherwise used for commercial purposes   All illustrations and images included in CareNotes® are the copyrighted property of [employer]  or [employer]

## 2020-08-10 NOTE — PROGRESS NOTES
Assessment and Plan:   All up to date  Problem List Items Addressed This Visit        Digestive    Adenocarcinoma of colon ([address] ) - Primary    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    Gastro-esophageal reflux disease without esophagitis       Endocrine    Type 2 diabetes mellitus without complication, with long-term current use of insulin ([address] )    Diffuse nontoxic goiter    Hypothyroidism due to Hashimoto's thyroiditis       Cardiovascular and Mediastinum    Essential hypertension    Coronary artery disease involving native coronary artery of native heart without angina pectoris       Musculoskeletal and Integument    Age-related osteoporosis without current pathological fracture       Other    Vitamin D deficiency    MRI safe cardiac pacemaker in situ    RESOLVED: History of cardiac pacemaker in situ      Other Visit Diagnoses     History of permanent cardiac pacemaker placement               Preventive health issues were discussed with patient, and age appropriate screening tests were ordered as noted in patient's After Visit Summary  Personalized health advice and appropriate referrals for health education or preventive services given if needed, as noted in patient's After Visit Summary       History of Present Illness:     Patient presents for Medicare Annual Wellness visit    Patient Care Team:  Mattie Reece DO as PCP - General  Bridget Duenas DO as PCP - Endocrinology (Endocrinology)  DO Anabell Ricks Caro, MD Sylvester Kindle, MD     Problem List:     Patient Active Problem List   Diagnosis    Type 2 diabetes mellitus without complication, with long-term current use of insulin ([address] )    Essential hypertension    Coronary artery disease involving native coronary artery of native heart without angina pectoris    Abnormal echocardiogram    Adenocarcinoma of colon ([address] )    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    Cardiomegaly    Diffuse nontoxic goiter    Feeling anxious    Hematuria, microscopic  Mixed hyperlipidemia    Hypothyroidism due to Hashimoto's thyroiditis    Insomnia    Mitral regurgitation    Age-related osteoporosis without current pathological fracture    Paraesophageal hernia    Atrial fibrillation (HCC)    Restrictive lung disease    Primary osteoarthritis involving multiple joints    Sick sinus syndrome (HCC)    Vitamin D deficiency    Stress incontinence in female    [facility](carpometacarpal degenerative joint disease), localized primary, right    Gastro-esophageal reflux disease without esophagitis    Lichen simplex chronicus    MRI safe cardiac pacemaker in situ      Past Medical and Surgical History:     Past Medical History:   Diagnosis Date    A-fib ([facility] [address] [facility]     Abnormal ECG     last assessed: 7/14/2015    Acid reflux     resolved    Anxiety     Benign neoplasm of sigmoid colon 09/13/2011    next colon 2012    Bronchitis, asthmatic     last assessed: 3/12/2012    Colon cancer ([facility] [address] )     2012- had colon resection    COPD (chronic obstructive pulmonary disease) ([facility] [address] )     questionable    Depression     Diabetes mellitus ([facility] [address] )     on byetta    Dizziness     occ    Esophageal stricture     last assessed: 9/30/2014    High cholesterol     Hypertension     Hypothyroidism     Iron (Fe) deficiency anemia 12/22/2011    iron pill continue    Irritable bowel syndrome     OA (osteoarthritis)     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    Pacemaker     hx SSS    Rash     labia area- uses cream prn    Restrictive lung disease     Seasonal allergies     Skin scarring/fibrosis     fibrotic scar; last assessed: 3/22/2013    MONA (stress urinary incontinence, female)     Urinary frequency     last assessed: 9/10/2012    Wears glasses     reading     Past Surgical History:   Procedure Laterality Date    BREAST EXCISIONAL BIOPSY Right 1990    benign, best guess on year   [address]      does not know type  Dr Jose Mercado is [facility] Joseph  COLON SURGERY      resection  removed 18 In     COLONOSCOPY  06/01/2012    proctosigmoidectomy    ESOPHAGOGASTRODUODENOSCOPY N/A 1/9/2016    Procedure: ESOPHAGOGASTRODUODENOSCOPY (EGD); Surgeon: Riccardo Burgos MD;  [facility];  Service:     HYSTERECTOMY  1978    [place] ARTHROPLASTY I-P JT Right 8/16/2018    Procedure: ARTHROPLASTY PIP/FINGER - RIGHT RING;  Surgeon: Jhon Bautista MD;  [facility];  Service: Orthopedics    [place] CYSTOURETHROSCOPY N/A 5/14/2018    Procedure: Priscilla Velazquez;  Surgeon: Deanne Carroll MD;  [facility];  Service: UroGynecology           NY LAP, REPAIR PARAESOPHAGEAL HERNIA, INCL FUNDOPLASTY W/ MESH N/A 1/27/2016    Procedure: LAPAROSCOPIC PARAESOPHAGEAL HERNIA REPAIR with mesh; toupet  FUNDOPLICATION ;  Surgeon: Mehul Culver MD;  [facility];  Service: Thoracic    [place] REPAIR INTERCARP/CARP-METACARP JT Right 1/10/2019    Procedure: Marie Brewster;  Surgeon: Jhon Bautista MD;  [facility];  Service: Orthopedics    [place] SLING OPER STRES INCONTINENCE N/A 5/14/2018    Procedure: INSERTION PUBOVAGINAL SLING (FEMALE);   Surgeon: Deanne Carroll MD;  [facility];  Service: UroGynecology           TOTAL ABDOMINAL HYSTERECTOMY      WRIST TENDON TRANSFER Right 1/10/2019    Procedure: TRANSFER TENDON FLEXOR CARPI RADIALIS FROM FOREARM TO HAND;  Surgeon: Jhon Bautista MD;  [facility];  Service: Orthopedics      Family History:     Family History   Problem Relation Age of Onset    Heart disease Mother     Diabetes Mother     Asthma Father     Stomach cancer Father         gastrkic cancer    Cancer Paternal Grandmother     Cancer Paternal Grandfather     No Known Problems Brother     Breast cancer Sister 76    Breast cancer Sister 77    Pancreatic cancer Sister 72    No Known Problems Maternal Aunt     No Known Problems Maternal Aunt     No Known Problems Maternal Aunt     No Known Problems Paternal Aunt     No Known Problems Daughter     No Known Problems Maternal Grandmother     No Known Problems Maternal Grandfather     Prostate cancer Paternal Uncle         age unknown    Substance Abuse Neg Hx     Mental illness Neg Hx     Alcohol abuse Neg Hx     Colon cancer Neg Hx     Colon polyps Neg Hx       Social History:        Social History     Socioeconomic History    Marital status: /Civil Union     Spouse name: None    Number of children: None    Years of education: None    Highest education level: None   Occupational History    None   Social Needs    Financial resource strain: None    Food insecurity     Worry: None     Inability: None    Transportation needs     Medical: None     Non-medical: None   Tobacco Use    Smoking status: Former Smoker     Types: Cigarettes     Last attempt to quit:      Years since quittin 6    Smokeless tobacco: Former User     Quit date:    Substance and Sexual Activity    Alcohol use: No    Drug use: No    Sexual activity: Not Currently   Lifestyle    Physical activity     Days per week: None     Minutes per session: None    Stress: None   Relationships    Social connections     Talks on phone: None     Gets together: None     Attends Bahai service: None     Active member of club or organization: None     Attends meetings of clubs or organizations: None     Relationship status: None    Intimate partner violence     Fear of current or ex partner: None     Emotionally abused: None     Physically abused: None     Forced sexual activity: None   Other Topics Concern    None   Social History Narrative    Always uses seat belt    Copy of advanced directive obtained    Drinks coffee-drinks 5 cups a day    Has smoke detectors    Uses safety equipment-seatbelts      Medications and Allergies:     Current Outpatient Medications   Medication Sig Dispense Refill    albuterol (ProAir HFA) 90 mcg/act inhaler Inhale 2 puffs every 6 (six) hours as needed for wheezing or shortness of breath 8 5 g 0    Apple Cider Vinegar 500 MG TABS Take by mouth daily      ascorbic acid (VITAMIN C) 500 mg tablet Take 500 mg by mouth daily      atenolol (TENORMIN) 50 mg tablet TAKE 1 TABLET AT BEDTIME 90 tablet 3    BYETTA 10 MCG PEN 10 MCG/0 04ML SOPN 10 mcg bid 3 pen 0    Cholecalciferol (VITAMIN D-3) 1000 units CAPS Take 1 capsule by mouth daily      clobetasol (TEMOVATE) 0 05 % cream       Coenzyme Q10 (COQ10) 200 MG CAPS Take 200 mg by mouth daily        Continuous Blood Gluc  (FREESTYLE VICTORINA 14 DAY READER) SVETLANA 1 Device by Does not apply route 4 (four) times a day (before meals and at bedtime) 1 Device 0    Continuous Blood Gluc Sensor (FREESTYLE VICTORINA 14 DAY SENSOR) MISC 1 application by Does not apply route every 14 (fourteen) days 2 each 6    DULoxetine (CYMBALTA) 20 mg capsule TAKE 1 CAPSULE DAILY 90 capsule 3    ELIQUIS 5 MG TAKE 1 TABLET TWICE A  tablet 3    estradiol (ESTRACE) 0 1 mg/g vaginal cream       Ferrous Sulfate 27 MG TABS Take 1 tablet by mouth daily      FREESTYLE LITE test strip Test 4 times daily  400 each 5    Glucosamine-Chondroitin (MOVE FREE PO) Take by mouth daily      JARDIANCE 25 MG TABS TAKE 1 TABLET EVERY MORNING 90 tablet 3    ketoconazole (NIZORAL) 2 % cream APPLY TOPICALLY TWICE A DAY    MIX WITH HYDROCORTISONE AND APPLY TO VULVA TWICE A DAY FOR 1 MONTH 60 g 11    levothyroxine 125 mcg tablet TAKE 1 TABLET 6 DAYS OF THE WEEK AND 2 TABLETS ON SUNDAY (Patient taking differently: TAKE 1 TABLET 6 DAYS OF THE WEEK) 102 tablet 4    MEGARED OMEGA-3 KRILL OIL PO Take 1 capsule by mouth daily      metFORMIN (GLUCOPHAGE-XR) 500 mg 24 hr tablet Take 1 tablet (500 mg total) by mouth 3 (three) times a day with meals 270 tablet 2    simvastatin (ZOCOR) 20 mg tablet Take 2 tablets (40 mg total) by mouth daily at bedtime 180 tablet 3    Blood Glucose Monitoring Suppl (FREESTYLE FREEDOM LITE) w/Device KIT by Does not apply route once for 1 dose Use meter to check blood sugars daily  1 each 0    hydrocortisone 2 5 % cream Apply topically 2 (two) times a day Mix with ketoconazole cream and apply to vulva twice daily for 1 month 60 g 2     No current facility-administered medications for this visit  Allergies   Allergen Reactions    Amoxicillin     Fluconazole Dizziness      Immunizations:     Immunization History   Administered Date(s) Administered    INFLUENZA 09/30/2015, 09/19/2016, 10/02/2017    Influenza Split High Dose Preservative Free IM 09/30/2013, 09/30/2014, 09/30/2015, 09/19/2016, 10/02/2017    Influenza TIV (IM) 10/24/2008, 12/08/2009, 09/14/2010, 09/13/2011, 09/10/2012, 09/01/2015, 10/05/2016    Influenza, high dose seasonal 0 5 mL 09/20/2019    Influenza, injectable, quadrivalent, preservative free 0 5 mL 10/23/2018    Pneumococcal Conjugate 13-Valent 10/29/2015    Pneumococcal Polysaccharide PPV23 12/01/2005, 03/15/2011, 10/01/2015    Zoster 01/07/2014    Zoster Vaccine Recombinant 11/15/2019, 01/30/2020      Health Maintenance:         Topic Date Due    DXA SCAN  03/07/2022         Topic Date Due    DTaP,Tdap,and Td Vaccines (1 - Tdap) 10[birth year]/1962    Influenza Vaccine  07/01/2020      Medicare Health Risk Assessment:     /72 (BP Location: Left arm, Patient Position: Sitting, Cuff Size: Standard)   Pulse 80   Temp 97 7 °F (36 5 °C) (Temporal)   Resp 15   Ht 5' 3 25" (1 607 m)   Wt 73 6 kg (162 lb 4 8 oz)   BMI 28 52 kg/m²      Ute Tam is here for her Subsequent Wellness visit  Health Risk Assessment:   Patient rates overall health as good  Patient feels that their physical health rating is same  Eyesight was rated as same  Hearing was rated as same  Patient feels that their emotional and mental health rating is same  Pain experienced in the last 7 days has been some  Patient's pain rating has been 4/10   Patient states that she has experienced no weight loss or gain in last 6 months  Fall Risk Screening: In the past year, patient has experienced: history of falling in past year    Number of falls: 1  Injured during fall?: No    Feels unsteady when standing or walking?: No    Worried about falling?: No      Urinary Incontinence Screening:   Patient has not leaked urine accidently in the last six months  Home Safety:  Patient does not have trouble with stairs inside or outside of their home  Patient has working smoke alarms and has working carbon monoxide detector  Home safety hazards include: none  Nutrition:   Current diet is Regular and Limited junk food  Medications:   Patient is not currently taking any over-the-counter supplements  Patient is able to manage medications  Activities of Daily Living (ADLs)/Instrumental Activities of Daily Living (IADLs):   Walk and transfer into and out of bed and chair?: Yes  Dress and groom yourself?: Yes    Bathe or shower yourself?: Yes    Feed yourself?  Yes  Do your laundry/housekeeping?: Yes  Manage your money, pay your bills and track your expenses?: Yes  Make your own meals?: Yes    Do your own shopping?: Yes    Previous Hospitalizations:   Any hospitalizations or ED visits within the last 12 months?: No      Advance Care Planning:   Living will: Yes    Advanced directive: Yes    End of Life Decisions reviewed with patient: Yes      Cognitive Screening:   Provider or family/friend/caregiver concerned regarding cognition?: No    PREVENTIVE SCREENINGS      Cardiovascular Screening:    General: Screening Not Indicated and History Lipid Disorder      Diabetes Screening:     General: Screening Not Indicated and History Diabetes      Colorectal Cancer Screening:     General: History Colorectal Cancer      Breast Cancer Screening:     General: Screening Current      Cervical Cancer Screening:    General: Screening Not Indicated      Osteoporosis Screening:    General: Screening Not Indicated and History Osteoporosis      Abdominal Aortic Aneurysm (AAA) Screening:        General: Screening Not Indicated      Lung Cancer Screening:     General: Screening Not Indicated      Hepatitis C Screening:    General: Screening Not Indicated    Other Counseling Topics:   Calcium and vitamin D intake and regular weightbearing exercise         Marques Martinez DO

## 2020-08-14 ENCOUNTER — HOSPITAL ENCOUNTER (OUTPATIENT)
Dept: MAMMOGRAPHY | Facility: CLINIC | Age: 80
Discharge: HOME | End: 2020-08-14
Payer: MEDICARE

## 2020-08-14 DIAGNOSIS — Z12.31 ENCOUNTER FOR SCREENING MAMMOGRAM FOR MALIGNANT NEOPLASM OF BREAST: ICD-10-CM

## 2020-08-14 PROCEDURE — 77067 SCR MAMMO BI INCL CAD: CPT

## 2020-08-14 PROCEDURE — 77063 BREAST TOMOSYNTHESIS BI: CPT

## 2020-08-17 ENCOUNTER — TELEPHONE (OUTPATIENT)
Dept: ENDOCRINOLOGY | Facility: HOSPITAL | Age: 80
End: 2020-08-17

## 2020-08-17 ENCOUNTER — APPOINTMENT (OUTPATIENT)
Dept: LAB | Facility: HOSPITAL | Age: 80
End: 2020-08-17
Payer: MEDICARE

## 2020-08-17 DIAGNOSIS — E03.8 HYPOTHYROIDISM DUE TO HASHIMOTO'S THYROIDITIS: ICD-10-CM

## 2020-08-17 DIAGNOSIS — Z79.4 TYPE 2 DIABETES MELLITUS WITHOUT COMPLICATION, WITH LONG-TERM CURRENT USE OF INSULIN (HCC): ICD-10-CM

## 2020-08-17 DIAGNOSIS — E78.5 HYPERLIPIDEMIA, UNSPECIFIED HYPERLIPIDEMIA TYPE: ICD-10-CM

## 2020-08-17 DIAGNOSIS — E06.3 HYPOTHYROIDISM DUE TO HASHIMOTO'S THYROIDITIS: ICD-10-CM

## 2020-08-17 DIAGNOSIS — E11.9 TYPE 2 DIABETES MELLITUS WITHOUT COMPLICATION, WITH LONG-TERM CURRENT USE OF INSULIN (HCC): ICD-10-CM

## 2020-08-17 DIAGNOSIS — I10 ESSENTIAL HYPERTENSION: ICD-10-CM

## 2020-08-17 DIAGNOSIS — E55.9 VITAMIN D DEFICIENCY: ICD-10-CM

## 2020-08-17 DIAGNOSIS — I10 ESSENTIAL HYPERTENSION: Primary | ICD-10-CM

## 2020-08-17 LAB
25(OH)D3 SERPL-MCNC: 34.3 NG/ML (ref 30–100)
ALBUMIN SERPL BCP-MCNC: 4.1 G/DL (ref 3.5–5)
ALP SERPL-CCNC: 71 U/L (ref 46–116)
ALT SERPL W P-5'-P-CCNC: 23 U/L (ref 12–78)
ANION GAP SERPL CALCULATED.3IONS-SCNC: 5 MMOL/L (ref 4–13)
AST SERPL W P-5'-P-CCNC: 17 U/L (ref 5–45)
BACTERIA UR QL AUTO: ABNORMAL /HPF
BASOPHILS # BLD AUTO: 0.03 THOUSANDS/ΜL (ref 0–0.1)
BASOPHILS NFR BLD AUTO: 0 % (ref 0–1)
BILIRUB SERPL-MCNC: 0.97 MG/DL (ref 0.2–1)
BILIRUB UR QL STRIP: NEGATIVE
BUN SERPL-MCNC: 12 MG/DL (ref 5–25)
CALCIUM SERPL-MCNC: 9.3 MG/DL (ref 8.3–10.1)
CHLORIDE SERPL-SCNC: 104 MMOL/L (ref 100–108)
CHOLEST SERPL-MCNC: 123 MG/DL (ref 50–200)
CLARITY UR: CLEAR
CO2 SERPL-SCNC: 29 MMOL/L (ref 21–32)
COLOR UR: YELLOW
CREAT SERPL-MCNC: 0.73 MG/DL (ref 0.6–1.3)
CREAT UR-MCNC: 38.9 MG/DL
EOSINOPHIL # BLD AUTO: 0.26 THOUSAND/ΜL (ref 0–0.61)
EOSINOPHIL NFR BLD AUTO: 3 % (ref 0–6)
ERYTHROCYTE [DISTWIDTH] IN BLOOD BY AUTOMATED COUNT: 12.7 % (ref 11.6–15.1)
EST. AVERAGE GLUCOSE BLD GHB EST-MCNC: 154 MG/DL
GFR SERPL CREATININE-BSD FRML MDRD: 79 ML/MIN/1.73SQ M
GLUCOSE P FAST SERPL-MCNC: 182 MG/DL (ref 65–99)
GLUCOSE UR STRIP-MCNC: ABNORMAL MG/DL
HBA1C MFR BLD: 7 %
HCT VFR BLD AUTO: 49.3 % (ref 34.8–46.1)
HDLC SERPL-MCNC: 29 MG/DL
HGB BLD-MCNC: 15.5 G/DL (ref 11.5–15.4)
HGB UR QL STRIP.AUTO: NEGATIVE
IMM GRANULOCYTES # BLD AUTO: 0.02 THOUSAND/UL (ref 0–0.2)
IMM GRANULOCYTES NFR BLD AUTO: 0 % (ref 0–2)
KETONES UR STRIP-MCNC: NEGATIVE MG/DL
LDLC SERPL CALC-MCNC: 51 MG/DL (ref 0–100)
LEUKOCYTE ESTERASE UR QL STRIP: ABNORMAL
LYMPHOCYTES # BLD AUTO: 3.07 THOUSANDS/ΜL (ref 0.6–4.47)
LYMPHOCYTES NFR BLD AUTO: 38 % (ref 14–44)
MCH RBC QN AUTO: 28.8 PG (ref 26.8–34.3)
MCHC RBC AUTO-ENTMCNC: 31.4 G/DL (ref 31.4–37.4)
MCV RBC AUTO: 92 FL (ref 82–98)
MICROALBUMIN UR-MCNC: 10 MG/L (ref 0–20)
MICROALBUMIN/CREAT 24H UR: 26 MG/G CREATININE (ref 0–30)
MONOCYTES # BLD AUTO: 0.62 THOUSAND/ΜL (ref 0.17–1.22)
MONOCYTES NFR BLD AUTO: 8 % (ref 4–12)
NEUTROPHILS # BLD AUTO: 4.01 THOUSANDS/ΜL (ref 1.85–7.62)
NEUTS SEG NFR BLD AUTO: 51 % (ref 43–75)
NITRITE UR QL STRIP: NEGATIVE
NON-SQ EPI CELLS URNS QL MICRO: ABNORMAL /HPF
NONHDLC SERPL-MCNC: 94 MG/DL
NRBC BLD AUTO-RTO: 0 /100 WBCS
PH UR STRIP.AUTO: 6 [PH]
PLATELET # BLD AUTO: 200 THOUSANDS/UL (ref 149–390)
PMV BLD AUTO: 10.6 FL (ref 8.9–12.7)
POTASSIUM SERPL-SCNC: 4.6 MMOL/L (ref 3.5–5.3)
PROT SERPL-MCNC: 7.5 G/DL (ref 6.4–8.2)
PROT UR STRIP-MCNC: NEGATIVE MG/DL
RBC # BLD AUTO: 5.39 MILLION/UL (ref 3.81–5.12)
RBC #/AREA URNS AUTO: ABNORMAL /HPF
SODIUM SERPL-SCNC: 138 MMOL/L (ref 136–145)
SP GR UR STRIP.AUTO: 1.03 (ref 1–1.03)
T4 FREE SERPL-MCNC: 1.28 NG/DL (ref 0.76–1.46)
TRIGL SERPL-MCNC: 213 MG/DL
TSH SERPL DL<=0.05 MIU/L-ACNC: 0.44 UIU/ML (ref 0.36–3.74)
UROBILINOGEN UR QL STRIP.AUTO: 0.2 E.U./DL
WBC # BLD AUTO: 8.01 THOUSAND/UL (ref 4.31–10.16)
WBC #/AREA URNS AUTO: ABNORMAL /HPF

## 2020-08-17 PROCEDURE — 83036 HEMOGLOBIN GLYCOSYLATED A1C: CPT

## 2020-08-17 PROCEDURE — 80061 LIPID PANEL: CPT

## 2020-08-17 PROCEDURE — 80053 COMPREHEN METABOLIC PANEL: CPT

## 2020-08-17 PROCEDURE — 36415 COLL VENOUS BLD VENIPUNCTURE: CPT

## 2020-08-17 PROCEDURE — 85025 COMPLETE CBC W/AUTO DIFF WBC: CPT

## 2020-08-17 PROCEDURE — 82043 UR ALBUMIN QUANTITATIVE: CPT

## 2020-08-17 PROCEDURE — 84439 ASSAY OF FREE THYROXINE: CPT

## 2020-08-17 PROCEDURE — 81001 URINALYSIS AUTO W/SCOPE: CPT

## 2020-08-17 PROCEDURE — 84443 ASSAY THYROID STIM HORMONE: CPT

## 2020-08-17 PROCEDURE — 82570 ASSAY OF URINE CREATININE: CPT

## 2020-08-17 PROCEDURE — 82306 VITAMIN D 25 HYDROXY: CPT

## 2020-08-17 NOTE — TELEPHONE ENCOUNTER
As an FYI, pt went to lab today to get pcp's bw done but the lab didn't have pcp's b/w on file so pcp told her to just get the labs done that you ordered so that is why she got them done early

## 2020-08-31 ENCOUNTER — REMOTE DEVICE CLINIC VISIT (OUTPATIENT)
Dept: CARDIOLOGY CLINIC | Facility: CLINIC | Age: 80
End: 2020-08-31
Payer: MEDICARE

## 2020-08-31 DIAGNOSIS — Z95.0 CARDIAC PACEMAKER IN SITU: Primary | ICD-10-CM

## 2020-08-31 PROCEDURE — 93294 REM INTERROG EVL PM/LDLS PM: CPT | Performed by: INTERNAL MEDICINE

## 2020-08-31 PROCEDURE — 93296 REM INTERROG EVL PM/IDS: CPT | Performed by: INTERNAL MEDICINE

## 2020-08-31 NOTE — PROGRESS NOTES
Results for orders placed or performed in visit on 08/31/20   Cardiac EP device report    Narrative    MDT-DUAL [id number]: BATTERY STATUS "OK"  AP 85%  1%  ALL AVAILABLE LEAD PARAMETERS WITHIN NORMAL LIMITS  160 AT/AF NOTED; 3% BURDEN  AVAIL EGRAMS SHOW AF  PT ON ATENOLOL & ELIQUIS  NORMAL DEVICE FUNCTION  NC       Current Outpatient Medications:     albuterol (ProAir HFA) 90 mcg/act inhaler, Inhale 2 puffs every 6 (six) hours as needed for wheezing or shortness of breath, Disp: 8 5 g, Rfl: 0    Apple Cider Vinegar 500 MG TABS, Take by mouth daily, Disp: , Rfl:     ascorbic acid (VITAMIN C) 500 mg tablet, Take 500 mg by mouth daily, Disp: , Rfl:     atenolol (TENORMIN) 50 mg tablet, TAKE 1 TABLET AT BEDTIME, Disp: 90 tablet, Rfl: 3    Blood Glucose Monitoring Suppl (FREESTYLE FREEDOM LITE) w/Device KIT, by Does not apply route once for 1 dose Use meter to check blood sugars daily  , Disp: 1 each, Rfl: 0    BYETTA 10 MCG PEN 10 MCG/0 04ML SOPN, 10 mcg bid, Disp: 3 pen, Rfl: 0    Cholecalciferol (VITAMIN D-3) 1000 units CAPS, Take 1 capsule by mouth daily, Disp: , Rfl:     clobetasol (TEMOVATE) 0 05 % cream, , Disp: , Rfl:     Coenzyme Q10 (COQ10) 200 MG CAPS, Take 200 mg by mouth daily  , Disp: , Rfl:     Continuous Blood Gluc  (FREESTYLE VICTORINA 14 DAY READER) SVETLANA, 1 Device by Does not apply route 4 (four) times a day (before meals and at bedtime), Disp: 1 Device, Rfl: 0    Continuous Blood Gluc Sensor (FREESTYLE VICTORINA 14 DAY SENSOR) MISC, 1 application by Does not apply route every 14 (fourteen) days, Disp: 2 each, Rfl: 6    DULoxetine (CYMBALTA) 20 mg capsule, TAKE 1 CAPSULE DAILY, Disp: 90 capsule, Rfl: 3    ELIQUIS 5 MG, TAKE 1 TABLET TWICE A DAY, Disp: 180 tablet, Rfl: 3    estradiol (ESTRACE) 0 1 mg/g vaginal cream, , Disp: , Rfl:     Ferrous Sulfate 27 MG TABS, Take 1 tablet by mouth daily, Disp: , Rfl:     FREESTYLE LITE test strip, Test 4 times daily  , Disp: 400 each, Rfl: 5    Glucosamine-Chondroitin (MOVE FREE PO), Take by mouth daily, Disp: , Rfl:     hydrocortisone 2 5 % cream, Apply topically 2 (two) times a day Mix with ketoconazole cream and apply to vulva twice daily for 1 month, Disp: 60 g, Rfl: 2    JARDIANCE 25 MG TABS, TAKE 1 TABLET EVERY MORNING, Disp: 90 tablet, Rfl: 3    ketoconazole (NIZORAL) 2 % cream, APPLY TOPICALLY TWICE A DAY    MIX WITH HYDROCORTISONE AND APPLY TO VULVA TWICE A DAY FOR 1 MONTH, Disp: 60 g, Rfl: 11    levothyroxine 125 mcg tablet, TAKE 1 TABLET 6 DAYS OF THE WEEK AND 2 TABLETS ON SUNDAY (Patient taking differently: TAKE 1 TABLET 6 DAYS OF THE WEEK), Disp: 102 tablet, Rfl: 4    MEGARED OMEGA-3 KRILL OIL PO, Take 1 capsule by mouth daily, Disp: , Rfl:     metFORMIN (GLUCOPHAGE-XR) 500 mg 24 hr tablet, Take 1 tablet (500 mg total) by mouth 3 (three) times a day with meals, Disp: 270 tablet, Rfl: 2    simvastatin (ZOCOR) 20 mg tablet, Take 2 tablets (40 mg total) by mouth daily at bedtime, Disp: 180 tablet, Rfl: 3

## 2020-09-02 ENCOUNTER — TELEPHONE (OUTPATIENT)
Dept: GASTROENTEROLOGY | Facility: CLINIC | Age: 80
End: 2020-09-02

## 2020-09-02 NOTE — TELEPHONE ENCOUNTER
Dr James Shin called to sched recall colon -I told pt she is not due until 2021  Pt states you told her she is due now-I did not see any recent ov's with you  Please review report from 6/11/18 and confirm pt is not due until 2021   Thank you

## 2020-09-10 NOTE — TELEPHONE ENCOUNTER
Spoke w/pt's  that dr Madelyn Blount reviewed the report and 3 yr recall is correct   Pt is not due until 2021

## 2020-10-05 ENCOUNTER — IMMUNIZATIONS (OUTPATIENT)
Dept: FAMILY MEDICINE CLINIC | Facility: CLINIC | Age: 80
End: 2020-10-05
Payer: MEDICARE

## 2020-10-05 DIAGNOSIS — Z23 NEED FOR VACCINATION: Primary | ICD-10-CM

## 2020-10-05 PROCEDURE — 90662 IIV NO PRSV INCREASED AG IM: CPT

## 2020-10-05 PROCEDURE — G0008 ADMIN INFLUENZA VIRUS VAC: HCPCS

## 2020-11-02 DIAGNOSIS — E03.8 HYPOTHYROIDISM DUE TO HASHIMOTO'S THYROIDITIS: ICD-10-CM

## 2020-11-02 DIAGNOSIS — E06.3 HYPOTHYROIDISM DUE TO HASHIMOTO'S THYROIDITIS: ICD-10-CM

## 2020-11-03 DIAGNOSIS — L90.0 LICHEN SCLEROSUS: Primary | ICD-10-CM

## 2020-11-03 RX ORDER — ESTRADIOL 0.1 MG/G
1 CREAM VAGINAL WEEKLY
Qty: 42.5 G | Refills: 2 | Status: SHIPPED | OUTPATIENT
Start: 2020-11-03 | End: 2021-05-12

## 2020-11-04 RX ORDER — LEVOTHYROXINE SODIUM 0.12 MG/1
TABLET ORAL
Qty: 102 TABLET | Refills: 3 | Status: SHIPPED | OUTPATIENT
Start: 2020-11-04 | End: 2021-11-19

## 2020-11-06 ENCOUNTER — TELEPHONE (OUTPATIENT)
Dept: ENDOCRINOLOGY | Facility: HOSPITAL | Age: 80
End: 2020-11-06

## 2020-11-25 ENCOUNTER — LAB (OUTPATIENT)
Dept: LAB | Facility: HOSPITAL | Age: 80
End: 2020-11-25
Payer: MEDICARE

## 2020-11-25 ENCOUNTER — IN-CLINIC DEVICE VISIT (OUTPATIENT)
Dept: CARDIOLOGY CLINIC | Facility: CLINIC | Age: 80
End: 2020-11-25
Payer: MEDICARE

## 2020-11-25 DIAGNOSIS — Z79.4 TYPE 2 DIABETES MELLITUS WITHOUT COMPLICATION, WITH LONG-TERM CURRENT USE OF INSULIN (HCC): ICD-10-CM

## 2020-11-25 DIAGNOSIS — Z95.0 CARDIAC PACEMAKER IN SITU: Primary | ICD-10-CM

## 2020-11-25 DIAGNOSIS — I48.91 ATRIAL FIBRILLATION, UNSPECIFIED TYPE (HCC): ICD-10-CM

## 2020-11-25 DIAGNOSIS — E06.3 HYPOTHYROIDISM DUE TO HASHIMOTO'S THYROIDITIS: ICD-10-CM

## 2020-11-25 DIAGNOSIS — E03.8 HYPOTHYROIDISM DUE TO HASHIMOTO'S THYROIDITIS: ICD-10-CM

## 2020-11-25 DIAGNOSIS — E11.9 TYPE 2 DIABETES MELLITUS WITHOUT COMPLICATION, WITH LONG-TERM CURRENT USE OF INSULIN (HCC): ICD-10-CM

## 2020-11-25 DIAGNOSIS — I10 ESSENTIAL HYPERTENSION: ICD-10-CM

## 2020-11-25 LAB
ALBUMIN SERPL BCP-MCNC: 3.8 G/DL (ref 3.5–5)
ALP SERPL-CCNC: 82 U/L (ref 46–116)
ALT SERPL W P-5'-P-CCNC: 23 U/L (ref 12–78)
ANION GAP SERPL CALCULATED.3IONS-SCNC: 4 MMOL/L (ref 4–13)
AST SERPL W P-5'-P-CCNC: 17 U/L (ref 5–45)
BILIRUB SERPL-MCNC: 0.75 MG/DL (ref 0.2–1)
BUN SERPL-MCNC: 8 MG/DL (ref 5–25)
CALCIUM SERPL-MCNC: 9.3 MG/DL (ref 8.3–10.1)
CHLORIDE SERPL-SCNC: 107 MMOL/L (ref 100–108)
CO2 SERPL-SCNC: 30 MMOL/L (ref 21–32)
CREAT SERPL-MCNC: 0.67 MG/DL (ref 0.6–1.3)
EST. AVERAGE GLUCOSE BLD GHB EST-MCNC: 163 MG/DL
GFR SERPL CREATININE-BSD FRML MDRD: 84 ML/MIN/1.73SQ M
GLUCOSE P FAST SERPL-MCNC: 153 MG/DL (ref 65–99)
HBA1C MFR BLD: 7.3 %
POTASSIUM SERPL-SCNC: 4.6 MMOL/L (ref 3.5–5.3)
PROT SERPL-MCNC: 7.2 G/DL (ref 6.4–8.2)
SODIUM SERPL-SCNC: 141 MMOL/L (ref 136–145)
T4 FREE SERPL-MCNC: 1.02 NG/DL (ref 0.76–1.46)
TSH SERPL DL<=0.05 MIU/L-ACNC: 1.22 UIU/ML (ref 0.36–3.74)

## 2020-11-25 PROCEDURE — 84439 ASSAY OF FREE THYROXINE: CPT

## 2020-11-25 PROCEDURE — 80053 COMPREHEN METABOLIC PANEL: CPT

## 2020-11-25 PROCEDURE — 84443 ASSAY THYROID STIM HORMONE: CPT

## 2020-11-25 PROCEDURE — 36415 COLL VENOUS BLD VENIPUNCTURE: CPT

## 2020-11-25 PROCEDURE — 93280 PM DEVICE PROGR EVAL DUAL: CPT | Performed by: INTERNAL MEDICINE

## 2020-11-25 PROCEDURE — 83036 HEMOGLOBIN GLYCOSYLATED A1C: CPT

## 2020-12-03 ENCOUNTER — OFFICE VISIT (OUTPATIENT)
Dept: ENDOCRINOLOGY | Facility: HOSPITAL | Age: 80
End: 2020-12-03
Payer: MEDICARE

## 2020-12-03 VITALS
BODY MASS INDEX: 28.53 KG/M2 | HEART RATE: 85 BPM | WEIGHT: 161 LBS | SYSTOLIC BLOOD PRESSURE: 132 MMHG | DIASTOLIC BLOOD PRESSURE: 80 MMHG | HEIGHT: 63 IN

## 2020-12-03 DIAGNOSIS — E11.9 TYPE 2 DIABETES MELLITUS WITHOUT COMPLICATION, WITH LONG-TERM CURRENT USE OF INSULIN (HCC): Primary | ICD-10-CM

## 2020-12-03 DIAGNOSIS — M81.0 AGE-RELATED OSTEOPOROSIS WITHOUT CURRENT PATHOLOGICAL FRACTURE: ICD-10-CM

## 2020-12-03 DIAGNOSIS — E06.3 HYPOTHYROIDISM DUE TO HASHIMOTO'S THYROIDITIS: ICD-10-CM

## 2020-12-03 DIAGNOSIS — I10 ESSENTIAL HYPERTENSION: ICD-10-CM

## 2020-12-03 DIAGNOSIS — Z79.4 TYPE 2 DIABETES MELLITUS WITHOUT COMPLICATION, WITH LONG-TERM CURRENT USE OF INSULIN (HCC): Primary | ICD-10-CM

## 2020-12-03 DIAGNOSIS — E55.9 VITAMIN D DEFICIENCY: ICD-10-CM

## 2020-12-03 DIAGNOSIS — E03.8 HYPOTHYROIDISM DUE TO HASHIMOTO'S THYROIDITIS: ICD-10-CM

## 2020-12-03 DIAGNOSIS — E78.5 HYPERLIPIDEMIA, UNSPECIFIED HYPERLIPIDEMIA TYPE: ICD-10-CM

## 2020-12-03 PROCEDURE — 99214 OFFICE O/P EST MOD 30 MIN: CPT | Performed by: NURSE PRACTITIONER

## 2020-12-10 DIAGNOSIS — E11.9 TYPE 2 DIABETES MELLITUS WITHOUT COMPLICATION, WITH LONG-TERM CURRENT USE OF INSULIN (HCC): ICD-10-CM

## 2020-12-10 DIAGNOSIS — Z79.4 TYPE 2 DIABETES MELLITUS WITHOUT COMPLICATION, WITH LONG-TERM CURRENT USE OF INSULIN (HCC): ICD-10-CM

## 2020-12-10 RX ORDER — METFORMIN HYDROCHLORIDE 500 MG/1
TABLET, EXTENDED RELEASE ORAL
Qty: 270 TABLET | Refills: 3 | Status: SHIPPED | OUTPATIENT
Start: 2020-12-10 | End: 2021-12-22 | Stop reason: SDUPTHER

## 2020-12-22 DIAGNOSIS — E11.40 TYPE 2 DIABETES MELLITUS WITH DIABETIC NEUROPATHY, WITHOUT LONG-TERM CURRENT USE OF INSULIN (HCC): Primary | ICD-10-CM

## 2020-12-22 RX ORDER — BLOOD-GLUCOSE METER
KIT MISCELLANEOUS
Qty: 300 EACH | Refills: 3 | Status: SHIPPED | OUTPATIENT
Start: 2020-12-22 | End: 2022-02-22

## 2021-01-05 ENCOUNTER — OFFICE VISIT (OUTPATIENT)
Dept: CARDIOLOGY CLINIC | Facility: CLINIC | Age: 81
End: 2021-01-05
Payer: MEDICARE

## 2021-01-05 VITALS
TEMPERATURE: 97.7 F | WEIGHT: 163.1 LBS | DIASTOLIC BLOOD PRESSURE: 84 MMHG | HEIGHT: 63 IN | SYSTOLIC BLOOD PRESSURE: 110 MMHG | BODY MASS INDEX: 28.9 KG/M2 | HEART RATE: 73 BPM

## 2021-01-05 DIAGNOSIS — I48.91 ATRIAL FIBRILLATION, UNSPECIFIED TYPE (HCC): Primary | ICD-10-CM

## 2021-01-05 DIAGNOSIS — I25.10 CORONARY ARTERY DISEASE INVOLVING NATIVE CORONARY ARTERY OF NATIVE HEART WITHOUT ANGINA PECTORIS: ICD-10-CM

## 2021-01-05 DIAGNOSIS — I10 ESSENTIAL HYPERTENSION: ICD-10-CM

## 2021-01-05 DIAGNOSIS — I51.7 CARDIOMEGALY: ICD-10-CM

## 2021-01-05 DIAGNOSIS — E78.2 MIXED HYPERLIPIDEMIA: ICD-10-CM

## 2021-01-05 DIAGNOSIS — I49.5 SICK SINUS SYNDROME (HCC): ICD-10-CM

## 2021-01-05 PROBLEM — Z79.01 ANTICOAGULATED BY ANTICOAGULATION TREATMENT: Status: ACTIVE | Noted: 2021-01-05

## 2021-01-05 PROCEDURE — 93000 ELECTROCARDIOGRAM COMPLETE: CPT | Performed by: INTERNAL MEDICINE

## 2021-01-05 PROCEDURE — 99214 OFFICE O/P EST MOD 30 MIN: CPT | Performed by: INTERNAL MEDICINE

## 2021-01-05 NOTE — PROGRESS NOTES
EPS Progress Note - Mitra Nova 79 y o  female MRN: [id number]           ASSESSMENT:  1  Atrial fibrillation, unspecified type ([facility] [address] )  POCT ECG   2  Cardiomegaly     3  Coronary artery disease involving native coronary artery of native heart without angina pectoris     4  Essential hypertension     5  Sick sinus syndrome ([address] )     6  Mixed hyperlipidemia             PLAN:  1  Paroxysmal atrial fibrillation with rate controlled  On atenolol 50 mg daily and anticoagulation with Eliquis 5 mg twice daily for stroke prevention     2  Sick sinus syndrome   S/p Medtronic dual chamber pacemaker, working appropriately   MRI safe pacer    3  Hypertension  Well controlled with current medications    4  Hyperlipidemia   Tolerating statin     5  Anticoagulated with apixaban at appropriate dose        Follow up in one year       HPI:   Mitra Nova is a 79 y o  female who presents to the office today for a one year follow up  Patient reports experiencing occasional lightheadedness but overall she is doing well  She denies any chest pain, palpitations, shortness of breath, dizziness, or syncope  ROS:   Review of Systems   Constitution: Negative  HENT: Negative  Eyes: Negative for blurred vision and double vision  Cardiovascular: Negative for chest pain, dyspnea on exertion, near-syncope, palpitations and syncope  Respiratory: Negative for cough, shortness of breath and wheezing  Endocrine: Negative  Hematologic/Lymphatic: Negative  Skin: Negative  Musculoskeletal: Negative  Gastrointestinal: Negative  Genitourinary: Negative  Neurological: Positive for dizziness  Psychiatric/Behavioral: Negative  Allergic/Immunologic: Negative  Objective:     Vitals: Blood pressure 110/84, pulse 73, temperature 97 7 °F (36 5 °C), temperature source Temporal, height 5' 3 25" (1 607 m), weight 74 kg (163 lb 1 6 oz), not currently breastfeeding , Body mass index is 28 66 kg/m²  , Physical Exam:    GEN: Sindhu A Genoe appears well, alert and oriented x 3, pleasant and cooperative   HEENT: pupils equal, round, and reactive to light; extraocular muscles intact  NECK: supple, no carotid bruits   HEART: regular rhythm, normal S1 and S2, no murmurs, clicks, gallops or rubs   LUNGS: clear to auscultation bilaterally; no wheezes, rales, or rhonchi   ABDOMEN: normal bowel sounds, soft, no tenderness, no distention  EXTREMITIES: peripheral pulses normal; no clubbing, cyanosis, or edema  NEURO: no focal findings   SKIN: normal without suspicious lesions on exposed skin    Medications:      Current Outpatient Medications:     albuterol (ProAir HFA) 90 mcg/act inhaler, Inhale 2 puffs every 6 (six) hours as needed for wheezing or shortness of breath, Disp: 8 5 g, Rfl: 0    apixaban (Eliquis) 5 mg, Take 1 tablet (5 mg total) by mouth 2 (two) times a day, Disp: 56 tablet, Rfl: 0    Apple Cider Vinegar 500 MG TABS, Take by mouth daily, Disp: , Rfl:     ascorbic acid (VITAMIN C) 500 mg tablet, Take 500 mg by mouth daily, Disp: , Rfl:     atenolol (TENORMIN) 50 mg tablet, TAKE 1 TABLET AT BEDTIME, Disp: 90 tablet, Rfl: 3    BYETTA 10 MCG PEN 10 MCG/0 04ML SOPN, 10 mcg bid, Disp: 3 pen, Rfl: 0    Cholecalciferol (VITAMIN D-3) 1000 units CAPS, Take 1 capsule by mouth daily, Disp: , Rfl:     clobetasol (TEMOVATE) 0 05 % cream, , Disp: , Rfl:     Coenzyme Q10 (COQ10) 200 MG CAPS, Take 200 mg by mouth daily  , Disp: , Rfl:     DULoxetine (CYMBALTA) 20 mg capsule, TAKE 1 CAPSULE DAILY, Disp: 90 capsule, Rfl: 3    estradiol (ESTRACE) 0 1 mg/g vaginal cream, Insert 1 g into the vagina once a week (Patient taking differently: Insert 1 g into the vagina daily ), Disp: 42 5 g, Rfl: 2    Ferrous Sulfate 27 MG TABS, Take 1 tablet by mouth daily, Disp: , Rfl:     Glucosamine-Chondroitin (MOVE FREE PO), Take by mouth daily, Disp: , Rfl:     JARDIANCE 25 MG TABS, TAKE 1 TABLET EVERY MORNING, Disp: 90 tablet, Rfl: 3    levothyroxine 125 mcg tablet, TAKE 1 TABLET 6 DAYS OF THE WEEK, Disp: 102 tablet, Rfl: 3    MEGARED OMEGA-3 KRILL OIL PO, Take 1 capsule by mouth daily, Disp: , Rfl:     metFORMIN (GLUCOPHAGE-XR) 500 mg 24 hr tablet, TAKE 1 TABLET THREE TIMES A DAY WITH MEALS, Disp: 270 tablet, Rfl: 3    simvastatin (ZOCOR) 20 mg tablet, Take 2 tablets (40 mg total) by mouth daily at bedtime, Disp: 180 tablet, Rfl: 3    Blood Glucose Monitoring Suppl (FREESTYLE FREEDOM LITE) w/Device KIT, by Does not apply route once for 1 dose Use meter to check blood sugars daily  , Disp: 1 each, Rfl: 0    Continuous Blood Gluc  (FREESTYLE VICTORINA 14 DAY READER) SVETLANA, 1 Device by Does not apply route 4 (four) times a day (before meals and at bedtime) (Patient not taking: Reported on 1/5/2021), Disp: 1 Device, Rfl: 0    Continuous Blood Gluc Sensor (FREESTYLE VICTORINA 14 DAY SENSOR) MISC, 1 application by Does not apply route every 14 (fourteen) days (Patient not taking: Reported on 12/3/2020), Disp: 2 each, Rfl: 6    FREESTYLE LITE test strip, Test 3 times daily  (Patient not taking: Reported on 1/5/2021), Disp: 300 each, Rfl: 3    hydrocortisone 2 5 % cream, Apply topically 2 (two) times a day Mix with ketoconazole cream and apply to vulva twice daily for 1 month, Disp: 60 g, Rfl: 2    ketoconazole (NIZORAL) 2 % cream, APPLY TOPICALLY TWICE A DAY    MIX WITH HYDROCORTISONE AND APPLY TO VULVA TWICE A DAY FOR 1 MONTH (Patient not taking: Reported on 1/5/2021), Disp: 60 g, Rfl: 6     Family History   Problem Relation Age of Onset    Heart disease Mother     Diabetes Mother    [facility] Asthma Father     Stomach cancer Father         gastrkic cancer    Cancer Paternal Grandmother     Cancer Paternal Grandfather     No Known Problems Brother     Breast cancer Sister 76    Breast cancer Sister 77    Pancreatic cancer Sister 72    No Known Problems Maternal Aunt     No Known Problems Maternal Aunt     No Known Problems Maternal Aunt     No Known Problems Paternal Aunt     No Known Problems Daughter     No Known Problems Maternal Grandmother     No Known Problems Maternal Grandfather     Prostate cancer Paternal Uncle         age unknown    Substance Abuse Neg Hx     Mental illness Neg Hx     Alcohol abuse Neg Hx     Colon cancer Neg Hx     Colon polyps Neg Hx      Social History     Socioeconomic History    Marital status: /Civil Union     Spouse name: Not on file    Number of children: Not on file    Years of education: Not on file    Highest education level: Not on file   Occupational History    Not on file   Social Needs    Financial resource strain: Not on file    Food insecurity     Worry: Not on file     Inability: Not on file   [employer] needs     Medical: Not on file     Non-medical: Not on file   Tobacco Use    Smoking status: Former Smoker     Types: Cigarettes     Quit date:      Years since quittin 0    Smokeless tobacco: Former User     Quit date:    Substance and Sexual Activity    Alcohol use: No    Drug use: No    Sexual activity: Not Currently   Lifestyle    Physical activity     Days per week: Not on file     Minutes per session: Not on file    Stress: Not on file   Relationships    Social connections     Talks on phone: Not on file     Gets together: Not on file     Attends Jehov[employer]'s witness service: Not on file     Active member of club or organization: Not on file     Attends meetings of clubs or organizations: Not on file     Relationship status: Not on file    Intimate partner violence     Fear of current or ex partner: Not on file     Emotionally abused: Not on file     Physically abused: Not on file     Forced sexual activity: Not on file   Other Topics Concern    Not on file   Social History Narrative    Always uses seat belt    Copy of advanced directive obtained    Drinks coffee-drinks 5 cups a day    Has smoke detectors    Uses safety equipment-seatbelts Social History     Tobacco Use   Smoking Status Former Smoker    Types: Cigarettes    Quit date:     Years since quittin 0   Smokeless Tobacco Former User    Quit date:      Social History     Substance and Sexual Activity   Alcohol Use No       Labs & Results:  Below is the patient's most recent value for Albumin, ALT, AST, BUN, Calcium, Chloride, Cholesterol, CO2, Creatinine, GFR, Glucose, HDL, Hematocrit, Hemoglobin, Hemoglobin A1C, LDL, Magnesium, Phosphorus, Platelets, Potassium, PSA, Sodium, Triglycerides, and WBC  Lab Results   Component Value Date    ALT 23 2020    AST 17 2020    BUN 8 2020    CALCIUM 9 3 2020     2020    CHOL 147 2015    CO2 30 2020    CREATININE 0 67 2020    HDL 29 (L) 2020    HCT 49 3 (H) 2020    HGB 15 5 (H) 2020    HGBA1C 7 3 (H) 2020    MG 1 8 2016     2020    K 4 6 2020     2015    TRIG 213 (H) 2020    WBC 8 01 2020     Note: for a comprehensive list of the patient's lab results, access the Results Review activity  Cardiac testing:   Results for orders placed during the hospital encounter of 19   Echo complete with contrast if indicated    Narrative [address] [place] [address]    Transthoracic Echocardiogram  2D, M-mode, Doppler, and Color Doppler    Study date:  07-Mar-2019    Patient: Elizabeth Lindo  MR number: [id number]  Account number: [de-identified]  : 1940  Age: 69 years  Gender: Female  Status: Outpatient  Location: [facility]  Height: 65 in  Weight: 169 lb  BP: 102/ 68 mmHg    Indications: Valvular disorder      Diagnoses: I34 0 - Nonrheumatic mitral (valve) insufficiency    Sonographer:  Livier CHRISTIANSEN, RCS  Primary Physician:  Eliseo Alamo MD  Referring Physician:  Jonna Colvin DO  Group:  [facility]  Interpreting Physician:  Doe Stuart MD    SUMMARY    LEFT VENTRICLE:  Systolic function was normal by visual assessment  Ejection fraction was estimated to be 55 %  Although no diagnostic regional wall motion abnormality was identified, this possibility cannot be completely excluded on the basis of this study  Wall thickness was mildly increased  Doppler parameters were consistent with abnormal left ventricular relaxation (grade 1 diastolic dysfunction)  LEFT ATRIUM:  The atrium was mildly dilated  RIGHT ATRIUM:  The atrium was mildly dilated  MITRAL VALVE:  There was mild annular calcification  There was trace regurgitation  AORTIC VALVE:  The valve was trileaflet  Leaflets exhibited normal thickness, moderate calcification, and mildly reduced cuspal separation  TRICUSPID VALVE:  There was mild regurgitation  AORTA:  The root exhibited mild dilatation  HISTORY: PRIOR HISTORY: CAD, Cardiomegaly, Pacemaker, SSS, Hypertension  PROCEDURE: The study was performed in the [facility]  This was a routine study  The transthoracic approach was used  The study included complete 2D imaging, M-mode, complete spectral Doppler, and color Doppler  Images were  obtained from the parasternal, apical, subcostal, and suprasternal notch acoustic windows  Image quality was adequate  LEFT VENTRICLE: Size was normal  Systolic function was normal by visual assessment  Ejection fraction was estimated to be 55 %  Although no diagnostic regional wall motion abnormality was identified, this possibility cannot be completely  excluded on the basis of this study  Wall thickness was mildly increased  DOPPLER: There was an increased relative contribution of atrial contraction to ventricular filling  Doppler parameters were consistent with abnormal left ventricular  relaxation (grade 1 diastolic dysfunction)      RIGHT VENTRICLE: The size was normal  Systolic function was normal  DOPPLER: Systolic pressure was within the normal range  Estimated peak pressure was 25 mmHg  LEFT ATRIUM: The atrium was mildly dilated  RIGHT ATRIUM: The atrium was mildly dilated  MITRAL VALVE: There was mild annular calcification  Valve structure was normal  There was normal leaflet separation  DOPPLER: The transmitral velocity was within the normal range  There was no evidence for stenosis  There was trace  regurgitation  AORTIC VALVE: The valve was trileaflet  Leaflets exhibited normal thickness, moderate calcification, and mildly reduced cuspal separation  DOPPLER: Transaortic velocity was within the normal range  There was no evidence for stenosis  There  was no regurgitation  TRICUSPID VALVE: The valve structure was normal  There was normal leaflet separation  DOPPLER: The transtricuspid velocity was within the normal range  There was no evidence for stenosis  There was mild regurgitation  PULMONIC VALVE: Leaflets exhibited normal thickness, no calcification, and normal cuspal separation  DOPPLER: The transpulmonic velocity was within the normal range  There was no regurgitation  PERICARDIUM: There was no pericardial effusion  AORTA: The root exhibited mild dilatation  SYSTEMIC VEINS: IVC: The inferior vena cava was normal in size and course   Respirophasic changes were normal     SYSTEM MEASUREMENT TABLES    2D  %FS: 31 32 %  Ao Diam: 4 03 cm  EDV(Teich): 72 8 ml  EF(Teich): 59 66 %  ESV(Teich): 29 37 ml  IVSd: 1 31 cm  LA Area: 17 69 cm2  LA Diam: 3 89 cm  LVEDV MOD A4C: 103 59 ml  LVEF MOD A4C: 64 83 %  LVESV MOD A4C: 36 44 ml  LVIDd: 4 07 cm  LVIDs: 2 79 cm  LVLd A4C: 7 99 cm  LVLs A4C: 6 44 cm  LVOT Diam: 2 3 cm  LVPWd: 0 97 cm  RA Area: 17 11 cm2  RVIDd: 4 27 cm  SV MOD A4C: 67 15 ml  SV(Teich): 43 43 ml    CW  TR Vmax: 2 35 m/s  TR maxP 04 mmHg    MM  TAPSE: 1 92 cm    PW  E': 0 04 m/s  E/E': 16 01  MV A Stephen: 0 87 m/s  MV Dec [place]: 1 78 m/s2  MV DecT: 322 07 ms  MV E Stephen: 0 57 m/s  MV E/A Ratio: 0 65  MV PHT: 93 4 ms  MVA By PHT: 2 36 cm2    [facility] Accredited Echocardiography Laboratory    Prepared and electronically signed by    Liza Silveira MD  Signed 07-Mar-2019 14:49:39           Cardiac device report November 2020  Result Narrative    MDT-DUAL CHAMBER PPM/ ACTIVE SYSTEM IS MRI CONDITIONAL   DEVICE INTERROGATED IN THE [place] OFFICE: BATTERY VOLTAGE ADEQUATE  AP 83%  1 2%  ALL AVAILABLE LEAD PARAMETERS WITHIN NORMAL LIMITS  1 NSVT NOTED; APPROX 14 BEATS @ 176 BPM  MULTIPLE AHRS & SVT/AF NOTED; 3% BURDEN  AVAIL EGRAMS SHOW AF & RVR  PT ON ELIQUIS & ATENOLOL  EF 55% (2019)  NO PROGRAMMING CHANGES MADE TO DEVICE PARAMETERS  NORMAL DEVICE FUNCTION   NC               Scribe Attestation    I,:  Caitlyn Reasons am acting as a scribe while in the presence of the attending physician :       I,:  Melissa Ness, DO personally performed the services described in this documentation    as scribed in my presence :

## 2021-01-25 DIAGNOSIS — L30.4 INTERTRIGO: ICD-10-CM

## 2021-02-09 ENCOUNTER — OFFICE VISIT (OUTPATIENT)
Dept: FAMILY MEDICINE CLINIC | Facility: CLINIC | Age: 81
End: 2021-02-09
Payer: MEDICARE

## 2021-02-09 VITALS
HEIGHT: 63 IN | WEIGHT: 163.4 LBS | HEART RATE: 68 BPM | TEMPERATURE: 97.9 F | SYSTOLIC BLOOD PRESSURE: 124 MMHG | RESPIRATION RATE: 16 BRPM | DIASTOLIC BLOOD PRESSURE: 74 MMHG | BODY MASS INDEX: 28.95 KG/M2

## 2021-02-09 DIAGNOSIS — E11.9 TYPE 2 DIABETES MELLITUS WITHOUT COMPLICATION, WITH LONG-TERM CURRENT USE OF INSULIN (HCC): Primary | ICD-10-CM

## 2021-02-09 DIAGNOSIS — C18.9 ADENOCARCINOMA OF COLON (HCC): ICD-10-CM

## 2021-02-09 DIAGNOSIS — Z12.11 SCREENING FOR MALIGNANT NEOPLASM OF COLON: ICD-10-CM

## 2021-02-09 DIAGNOSIS — E11.40 TYPE 2 DIABETES MELLITUS WITH DIABETIC NEUROPATHY, WITHOUT LONG-TERM CURRENT USE OF INSULIN (HCC): ICD-10-CM

## 2021-02-09 DIAGNOSIS — E78.2 MIXED HYPERLIPIDEMIA: ICD-10-CM

## 2021-02-09 DIAGNOSIS — I10 ESSENTIAL HYPERTENSION: ICD-10-CM

## 2021-02-09 DIAGNOSIS — I48.91 ATRIAL FIBRILLATION, UNSPECIFIED TYPE (HCC): ICD-10-CM

## 2021-02-09 DIAGNOSIS — Z79.4 TYPE 2 DIABETES MELLITUS WITHOUT COMPLICATION, WITH LONG-TERM CURRENT USE OF INSULIN (HCC): Primary | ICD-10-CM

## 2021-02-09 DIAGNOSIS — M19.031 CMC DJD(CARPOMETACARPAL DEGENERATIVE JOINT DISEASE), LOCALIZED PRIMARY, RIGHT: ICD-10-CM

## 2021-02-09 DIAGNOSIS — M81.0 AGE-RELATED OSTEOPOROSIS WITHOUT CURRENT PATHOLOGICAL FRACTURE: ICD-10-CM

## 2021-02-09 DIAGNOSIS — E06.3 HYPOTHYROIDISM DUE TO HASHIMOTO'S THYROIDITIS: ICD-10-CM

## 2021-02-09 DIAGNOSIS — N39.3 STRESS INCONTINENCE IN FEMALE: ICD-10-CM

## 2021-02-09 DIAGNOSIS — E03.8 HYPOTHYROIDISM DUE TO HASHIMOTO'S THYROIDITIS: ICD-10-CM

## 2021-02-09 DIAGNOSIS — I25.10 CORONARY ARTERY DISEASE INVOLVING NATIVE CORONARY ARTERY OF NATIVE HEART WITHOUT ANGINA PECTORIS: ICD-10-CM

## 2021-02-09 DIAGNOSIS — K22.70 BARRETT'S ESOPHAGUS WITHOUT DYSPLASIA: ICD-10-CM

## 2021-02-09 DIAGNOSIS — E55.9 VITAMIN D DEFICIENCY: ICD-10-CM

## 2021-02-09 DIAGNOSIS — K21.9 GASTRO-ESOPHAGEAL REFLUX DISEASE WITHOUT ESOPHAGITIS: ICD-10-CM

## 2021-02-09 DIAGNOSIS — F41.9 ANXIETY: ICD-10-CM

## 2021-02-09 DIAGNOSIS — J44.9 CHRONIC OBSTRUCTIVE PULMONARY DISEASE, UNSPECIFIED COPD TYPE (HCC): ICD-10-CM

## 2021-02-09 DIAGNOSIS — I49.5 SICK SINUS SYNDROME (HCC): ICD-10-CM

## 2021-02-09 LAB — SL AMB POCT HEMOGLOBIN AIC: 7.4 (ref ?–6.5)

## 2021-02-09 PROCEDURE — 99215 OFFICE O/P EST HI 40 MIN: CPT | Performed by: FAMILY MEDICINE

## 2021-02-09 PROCEDURE — 83036 HEMOGLOBIN GLYCOSYLATED A1C: CPT | Performed by: FAMILY MEDICINE

## 2021-02-09 RX ORDER — DULOXETINE 40 MG/1
40 CAPSULE, DELAYED RELEASE ORAL DAILY
Qty: 90 CAPSULE | Refills: 1 | Status: SHIPPED | OUTPATIENT
Start: 2021-02-09 | End: 2021-02-12 | Stop reason: SDUPTHER

## 2021-02-09 NOTE — PATIENT INSTRUCTIONS
1  Please double your duloxetine and take 40 mg daily  The new prescription from [facility] will be 40 mg so take 1 a day    2  Please call us in 6 weeks if her not better  If you are better continue the same dose it call    3   I will see you in 6 months with fasting blood work in urine 1 week prior    I will see you sooner if needed

## 2021-02-09 NOTE — PROGRESS NOTES
ASSESSMENT/PLAN:    Anxiety  We will increase her duloxetine from 20-40 mg daily  In 6 weeks if patient does not feel any better she will call us  We will adjust the doses over the phone  Hopefully this will give her motivation to start exercising and watching her diet again    Type 2 diabetes  A1c the same 7 4 from 7 3  She is on metformin Jardiance and Byetta  She also follows with Endocrine who she will see in April    Atrial fibrillation/sick sinus syndrome/cardiomegaly/coronary artery disease  Is on apixaban for anticoagulation and atenolol for rate control  She also has a pacer in and has regular rhythm today most likely 100% paced    Osteopenia  Is on calcium and vitamin-D  Hopefully we could get her back to exercising to build her bones  Read he was last DEXA March 2019 next 1 is due now     Adenocarcinoma of the colon 2012  Next colonoscopy 2021, June     Emphysema by CT scan  Ventolin as needed      Hashimoto's thyroiditis   Also following with endocrine for suppression of her thyroid with medication         Vitamin D deficiency   Continue vitamin D 2000 units daily          History of large paraesophageal hernia   Had surgery and no longer has Pacheco's or her reflux and no longer on any PPIs        Hyperlipidemia   Simvastatin and CoQ10 and diet     Thyroid goiter   Followed by endocrine who orders ultrasounds         Recheck in 6 months  DEXA and colonoscopy prior  Screening labs prior         BMI Counseling: Body mass index is 28 72 kg/m²  The BMI is above normal  Nutrition recommendations include decreasing portion sizes and encouraging healthy choices of fruits and vegetables  Exercise recommendations include exercising 3-5 times per week                Health Maintenance   Topic Date Due    SLP PLAN OF CARE  1940    DTaP,Tdap,and Td Vaccines (1 - Tdap) 10/22/1962    OT PLAN OF CARE  03/06/2019    BMI: Followup Plan  01/20/2021    COVID-19 Vaccine (2 of 2 - Moderna series) 02/17/2021    HEMOGLOBIN A1C  05/25/2021    Colonoscopy Surveillance  06/11/2021    Fall Risk  08/10/2021    Medicare Annual Wellness Visit (AWV)  08/10/2021    URINE MICROALBUMIN  08/17/2021    DM Eye Exam  12/16/2021    Diabetic Foot Exam  01/08/2022    Depression Screening PHQ  02/09/2022    BMI: Adult  02/09/2022    DXA SCAN  03/07/2022    Pneumococcal Vaccine: 65+ Years  Completed    Influenza Vaccine  Completed    HIB Vaccine  Aged Out    Hepatitis B Vaccine  Aged Out    IPV Vaccine  Aged Out    Hepatitis A Vaccine  Aged Out    Meningococcal ACWY Vaccine  Aged Out    HPV Vaccine  Aged Out         Problem List as of 2/9/2021 Reviewed: 12/3/2020  8:38 AM by SHUN Zheng    Abnormal echocardiogram    Adenocarcinoma of colon ([facility] [address] )    Age-related osteoporosis without current pathological fracture    Anticoagulated by anticoagulation treatment    Atrial fibrillation ([address] )    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    Cardiomegaly    CMC DJD(carpometacarpal degenerative joint disease), localized primary, right    Coronary artery disease involving native coronary artery of native heart without angina pectoris    Diffuse nontoxic goiter    Essential hypertension    Feeling anxious    Gastro-esophageal reflux disease without esophagitis    Hematuria, microscopic    Hypothyroidism due to Hashimoto's thyroiditis    Insomnia    Lichen simplex chronicus    Mitral regurgitation    Mixed hyperlipidemia    MRI safe cardiac pacemaker in situ    Paraesophageal hernia    Primary osteoarthritis involving multiple joints    Restrictive lung disease    Sick sinus syndrome ([address] )    Stress incontinence in female    Type 2 diabetes mellitus without complication, with long-term current use of insulin ([facility])    Vitamin D deficiency            Subjective:   Chief Complaint   Patient presents with    Diabetes    Hypertension    Heartburn    Vitamin D Deficiency     Patient is here for 6 month recheck  She is complaining of her nerves not being very good lately  As a result she is not watching her diet she is not exercising she is not doing anything she should do  Her sugar this morning was 142    Since last visit she saw Cardiology who is keeping everything the same  She has a history of AFib but she has a pacer and is regular today most likely 100% paced  She also saw Endocrine in December and her A1c earlier was 7 3      patient ID: Sowmya Bermudez is a 79 y o  female      Past Medical History:   Diagnosis Date    A-fib [facility])     Abnormal ECG     last assessed: 7/14/2015    Acid reflux     resolved    Anxiety     Benign neoplasm of sigmoid colon 09/13/2011    next colon 2012    Bronchitis, asthmatic     last assessed: 3/12/2012    Colon cancer ([facility] [address] )     2012- had colon resection    COPD (chronic obstructive pulmonary disease) ([facility] [address] )     questionable    Depression     Diabetes mellitus ([facility] [address] )     on byetta    Dizziness     occ    Esophageal stricture     last assessed: 9/30/2014    High cholesterol     Hypertension     Hypothyroidism     Iron (Fe) deficiency anemia 12/22/2011    iron pill continue    Irritable bowel syndrome     OA (osteoarthritis)     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    Pacemaker     hx SSS    Rash     labia area- uses cream prn    Restrictive lung disease     Seasonal allergies     Skin scarring/fibrosis     fibrotic scar; last assessed: 3/22/2013    MONA (stress urinary incontinence, female)     Urinary frequency     last assessed: 9/10/2012    Wears glasses     reading     Past Surgical History:   Procedure Laterality Date    BREAST EXCISIONAL BIOPSY Right 1990    benign, best guess on year   [address]      does not know type  Dr Mclaughlin Person is cariologist   [address]      resection  removed 25 In     COLONOSCOPY  06/01/2012    proctosigmoidectomy    ESOPHAGOGASTRODUODENOSCOPY N/A 1/9/2016    Procedure: ESOPHAGOGASTRODUODENOSCOPY (EGD); Surgeon: Tawanna Parks MD;  [facility];  Service:     HYSTERECTOMY  1978    KS ARTHROPLASTY I-P JT Right 8/16/2018    Procedure: ARTHROPLASTY PIP/FINGER - RIGHT RING;  Surgeon: Felicity Cranker, MD;  [facility];  Service: Orthopedics    KS CYSTOURETHROSCOPY N/A 5/14/2018    Procedure: Cassondra Councilman;  Surgeon: Azeb Lindo MD;  [facility];  Service: UroGynecology           KS LAP, REPAIR PARAESOPHAGEAL HERNIA, INCL FUNDOPLASTY W/ MESH N/A 1/27/2016    Procedure: LAPAROSCOPIC PARAESOPHAGEAL HERNIA REPAIR with mesh; toupet  FUNDOPLICATION ;  Surgeon: Kira Brandon MD;  [facility];  Service: Thoracic    KS REPAIR INTERCARP/CARP-METACARP JT Right 1/10/2019    Procedure: Miley Angeles;  Surgeon: Felicity Cranker, MD;  [facility];  Service: Orthopedics    KS SLING OPER STRES INCONTINENCE N/A 5/14/2018    Procedure: INSERTION PUBOVAGINAL SLING (FEMALE);   Surgeon: Azeb Lindo MD;  [facility];  Service: UroGynecology           TOTAL ABDOMINAL HYSTERECTOMY      WRIST TENDON TRANSFER Right 1/10/2019    Procedure: TRANSFER TENDON FLEXOR CARPI RADIALIS FROM FOREARM TO HAND;  Surgeon: Felicity Cranker, MD;  [facility];  Service: Orthopedics     Family History   Problem Relation Age of Onset    Heart disease Mother     Diabetes Mother     Asthma Father     Stomach cancer Father         gastrkic cancer    Cancer Paternal Grandmother     Cancer Paternal Grandfather     No Known Problems Brother     Breast cancer Sister 76    Breast cancer Sister 77    Pancreatic cancer Sister 72    No Known Problems Maternal Aunt     No Known Problems Maternal Aunt     No Known Problems Maternal Aunt     No Known Problems Paternal Aunt     No Known Problems Daughter     No Known Problems Maternal Grandmother     No Known Problems Maternal Grandfather     Prostate cancer Paternal Uncle         age unknown    Substance Abuse Neg Hx     Mental illness Neg Hx     Alcohol abuse Neg Hx     Colon cancer Neg Hx     Colon polyps Neg Hx      Social History     Tobacco Use    Smoking status: Former Smoker     Types: Cigarettes     Quit date:      Years since quittin 1    Smokeless tobacco: Former User     Quit date:    Substance Use Topics    Alcohol use: No    Drug use: No     Social History     Tobacco Use   Smoking Status Former Smoker    Types: Cigarettes    Quit date:     Years since quittin 1   Smokeless Tobacco Former User    Quit date:         MED LIST WAS REVIEWED AND UPDATED       ROS  As per HPI  Rest of 12 point review of systems negative     Objective:      VITALS:  Wt Readings from Last 3 Encounters:   21 74 1 kg (163 lb 6 4 oz)   21 74 kg (163 lb 1 6 oz)   20 73 kg (161 lb)     BP Readings from Last 3 Encounters:   21 124/74   21 110/84   20 132/80     Pulse Readings from Last 3 Encounters:   21 68   21 73   20 85     Body mass index is 28 72 kg/m²  Laboratory Results:    All pertinent labs and studies were reviewed with patient during this office visit  with highlights of the results contained in this notes ASSESSMENT AND PLAN section       Physical Exam  Constitutional  Appears healthy, Looks well, Appearance consistent with age    Mental Status  Alert, Oriented, Cooperative, Memory function normal , clean, and reasonable    Neck  No neck mass, No thyromegaly, Good carotid upstrokes bilaterally, trachea midline positive click    Respiratory  Breath sounds normal, No rales, No rhonchi, No wheezing, normal palpation    Cardiac   Regular rhythm without ectopy or murmur no S3-S4, no heave lift or thrill to palpation    Vascular  No leg edema, No pedal edema    Muscular skeletal  No clubbing cyanosis , muscle tone normal    Skin  No appreciable rashes or abnormal appearing lesions

## 2021-02-12 ENCOUNTER — TELEPHONE (OUTPATIENT)
Dept: FAMILY MEDICINE CLINIC | Facility: CLINIC | Age: 81
End: 2021-02-12

## 2021-02-12 DIAGNOSIS — F41.9 ANXIETY: ICD-10-CM

## 2021-02-12 RX ORDER — DULOXETINE 40 MG/1
40 CAPSULE, DELAYED RELEASE ORAL DAILY
Qty: 90 CAPSULE | Refills: 1 | Status: SHIPPED | OUTPATIENT
Start: 2021-02-12 | End: 2021-04-28 | Stop reason: SDUPTHER

## 2021-02-12 NOTE — TELEPHONE ENCOUNTER
I spoke to debi Wei from [facility] and she stated the manufacture is out of the Duloxetine, that's why they could not fill it  Sanjuana stating you could send a 90 day Rx to the pt's local pharmacy instead  [facility] in [place]

## 2021-02-12 NOTE — TELEPHONE ENCOUNTER
Pt called stating she received a call from her mail order stating the Duloxetine is not covered by her insurance  Pt stated the generic will be covered  Can you send in another Rx for Cymbalta for the pt?      Call back number: [phone]

## 2021-02-22 DIAGNOSIS — Z79.4 TYPE 2 DIABETES MELLITUS WITHOUT COMPLICATION, WITH LONG-TERM CURRENT USE OF INSULIN (HCC): ICD-10-CM

## 2021-02-22 DIAGNOSIS — E11.9 TYPE 2 DIABETES MELLITUS WITHOUT COMPLICATION, WITH LONG-TERM CURRENT USE OF INSULIN (HCC): ICD-10-CM

## 2021-02-23 RX ORDER — EXENATIDE 250 UG/ML
INJECTION SUBCUTANEOUS
Qty: 7.2 ML | Refills: 3 | Status: SHIPPED | OUTPATIENT
Start: 2021-02-23 | End: 2022-03-29

## 2021-02-24 ENCOUNTER — TELEPHONE (OUTPATIENT)
Dept: ENDOCRINOLOGY | Facility: HOSPITAL | Age: 81
End: 2021-02-24

## 2021-02-24 ENCOUNTER — LAB (OUTPATIENT)
Dept: LAB | Facility: HOSPITAL | Age: 81
End: 2021-02-24
Payer: MEDICARE

## 2021-02-24 DIAGNOSIS — E11.9 TYPE 2 DIABETES MELLITUS WITHOUT COMPLICATION, WITH LONG-TERM CURRENT USE OF INSULIN (HCC): ICD-10-CM

## 2021-02-24 DIAGNOSIS — I10 ESSENTIAL HYPERTENSION: ICD-10-CM

## 2021-02-24 DIAGNOSIS — E06.3 HYPOTHYROIDISM DUE TO HASHIMOTO'S THYROIDITIS: ICD-10-CM

## 2021-02-24 DIAGNOSIS — Z79.4 TYPE 2 DIABETES MELLITUS WITHOUT COMPLICATION, WITH LONG-TERM CURRENT USE OF INSULIN (HCC): ICD-10-CM

## 2021-02-24 DIAGNOSIS — E55.9 VITAMIN D DEFICIENCY: ICD-10-CM

## 2021-02-24 DIAGNOSIS — E03.8 HYPOTHYROIDISM DUE TO HASHIMOTO'S THYROIDITIS: ICD-10-CM

## 2021-02-24 DIAGNOSIS — E78.5 HYPERLIPIDEMIA, UNSPECIFIED HYPERLIPIDEMIA TYPE: ICD-10-CM

## 2021-02-24 LAB
25(OH)D3 SERPL-MCNC: 38.6 NG/ML (ref 30–100)
BACTERIA UR QL AUTO: ABNORMAL /HPF
BASOPHILS # BLD AUTO: 0.04 THOUSANDS/ΜL (ref 0–0.1)
BASOPHILS NFR BLD AUTO: 0 % (ref 0–1)
BILIRUB UR QL STRIP: NEGATIVE
CHOLEST SERPL-MCNC: 159 MG/DL (ref 50–200)
CLARITY UR: CLEAR
COLOR UR: YELLOW
CREAT UR-MCNC: 47.2 MG/DL
EOSINOPHIL # BLD AUTO: 0.39 THOUSAND/ΜL (ref 0–0.61)
EOSINOPHIL NFR BLD AUTO: 4 % (ref 0–6)
ERYTHROCYTE [DISTWIDTH] IN BLOOD BY AUTOMATED COUNT: 12.8 % (ref 11.6–15.1)
GLUCOSE UR STRIP-MCNC: ABNORMAL MG/DL
HCT VFR BLD AUTO: 51 % (ref 34.8–46.1)
HDLC SERPL-MCNC: 33 MG/DL
HGB BLD-MCNC: 15.9 G/DL (ref 11.5–15.4)
HGB UR QL STRIP.AUTO: NEGATIVE
IMM GRANULOCYTES # BLD AUTO: 0.03 THOUSAND/UL (ref 0–0.2)
IMM GRANULOCYTES NFR BLD AUTO: 0 % (ref 0–2)
KETONES UR STRIP-MCNC: NEGATIVE MG/DL
LDLC SERPL CALC-MCNC: 76 MG/DL (ref 0–100)
LEUKOCYTE ESTERASE UR QL STRIP: ABNORMAL
LYMPHOCYTES # BLD AUTO: 3.85 THOUSANDS/ΜL (ref 0.6–4.47)
LYMPHOCYTES NFR BLD AUTO: 40 % (ref 14–44)
MCH RBC QN AUTO: 28.5 PG (ref 26.8–34.3)
MCHC RBC AUTO-ENTMCNC: 31.2 G/DL (ref 31.4–37.4)
MCV RBC AUTO: 91 FL (ref 82–98)
MICROALBUMIN UR-MCNC: 9.7 MG/L (ref 0–20)
MICROALBUMIN/CREAT 24H UR: 21 MG/G CREATININE (ref 0–30)
MONOCYTES # BLD AUTO: 0.73 THOUSAND/ΜL (ref 0.17–1.22)
MONOCYTES NFR BLD AUTO: 8 % (ref 4–12)
NEUTROPHILS # BLD AUTO: 4.56 THOUSANDS/ΜL (ref 1.85–7.62)
NEUTS SEG NFR BLD AUTO: 48 % (ref 43–75)
NITRITE UR QL STRIP: NEGATIVE
NON-SQ EPI CELLS URNS QL MICRO: ABNORMAL /HPF
NONHDLC SERPL-MCNC: 126 MG/DL
NRBC BLD AUTO-RTO: 0 /100 WBCS
OTHER STN SPEC: ABNORMAL
PH UR STRIP.AUTO: 7.5 [PH]
PLATELET # BLD AUTO: 248 THOUSANDS/UL (ref 149–390)
PMV BLD AUTO: 10.3 FL (ref 8.9–12.7)
PROT UR STRIP-MCNC: NEGATIVE MG/DL
RBC # BLD AUTO: 5.58 MILLION/UL (ref 3.81–5.12)
RBC #/AREA URNS AUTO: ABNORMAL /HPF
SP GR UR STRIP.AUTO: 1.03 (ref 1–1.03)
T4 FREE SERPL-MCNC: 1.03 NG/DL (ref 0.76–1.46)
TRIGL SERPL-MCNC: 248 MG/DL
TSH SERPL DL<=0.05 MIU/L-ACNC: 2.13 UIU/ML (ref 0.36–3.74)
UROBILINOGEN UR QL STRIP.AUTO: 0.2 E.U./DL
WBC # BLD AUTO: 9.6 THOUSAND/UL (ref 4.31–10.16)
WBC #/AREA URNS AUTO: ABNORMAL /HPF

## 2021-02-24 PROCEDURE — 85025 COMPLETE CBC W/AUTO DIFF WBC: CPT

## 2021-02-24 PROCEDURE — 36415 COLL VENOUS BLD VENIPUNCTURE: CPT

## 2021-02-24 PROCEDURE — 82306 VITAMIN D 25 HYDROXY: CPT

## 2021-02-24 PROCEDURE — 81001 URINALYSIS AUTO W/SCOPE: CPT

## 2021-02-24 PROCEDURE — 82043 UR ALBUMIN QUANTITATIVE: CPT

## 2021-02-24 PROCEDURE — 80061 LIPID PANEL: CPT

## 2021-02-24 PROCEDURE — 84439 ASSAY OF FREE THYROXINE: CPT

## 2021-02-24 PROCEDURE — 82570 ASSAY OF URINE CREATININE: CPT

## 2021-02-24 PROCEDURE — 84443 ASSAY THYROID STIM HORMONE: CPT

## 2021-02-24 NOTE — RESULT ENCOUNTER NOTE
Please call the patient regarding result  Most recent TSH and free T4 are normal   LDL and total cholesterol are at goal with an elevated triglyceride level

## 2021-02-24 NOTE — TELEPHONE ENCOUNTER
Reviewed blood sugars checked 3 times daily between January 31 and February 13, 2021  She appears to be relatively well controlled in the morning and at lunch but is sometimes elevated greater than 200 mg/dL at supper  For now continue current regimen and be more mindful of diet at dinner especially

## 2021-03-04 ENCOUNTER — OFFICE VISIT (OUTPATIENT)
Dept: GASTROENTEROLOGY | Facility: CLINIC | Age: 81
End: 2021-03-04
Payer: MEDICARE

## 2021-03-04 ENCOUNTER — TELEPHONE (OUTPATIENT)
Dept: GASTROENTEROLOGY | Facility: CLINIC | Age: 81
End: 2021-03-04

## 2021-03-04 ENCOUNTER — REMOTE DEVICE CLINIC VISIT (OUTPATIENT)
Dept: CARDIOLOGY CLINIC | Facility: CLINIC | Age: 81
End: 2021-03-04
Payer: MEDICARE

## 2021-03-04 VITALS
WEIGHT: 165 LBS | SYSTOLIC BLOOD PRESSURE: 138 MMHG | DIASTOLIC BLOOD PRESSURE: 82 MMHG | HEIGHT: 63 IN | BODY MASS INDEX: 29.23 KG/M2

## 2021-03-04 DIAGNOSIS — K22.70 BARRETT'S ESOPHAGUS WITHOUT DYSPLASIA: ICD-10-CM

## 2021-03-04 DIAGNOSIS — C18.9 ADENOCARCINOMA OF COLON (HCC): Primary | ICD-10-CM

## 2021-03-04 DIAGNOSIS — Z86.010 HISTORY OF COLON POLYPS: ICD-10-CM

## 2021-03-04 DIAGNOSIS — Z71.89 ENCOUNTER FOR ANTICOAGULATION DISCUSSION AND COUNSELING: ICD-10-CM

## 2021-03-04 DIAGNOSIS — Z95.0 CARDIAC PACEMAKER IN SITU: Primary | ICD-10-CM

## 2021-03-04 PROCEDURE — 93296 REM INTERROG EVL PM/IDS: CPT | Performed by: INTERNAL MEDICINE

## 2021-03-04 PROCEDURE — 93294 REM INTERROG EVL PM/LDLS PM: CPT | Performed by: INTERNAL MEDICINE

## 2021-03-04 PROCEDURE — 99214 OFFICE O/P EST MOD 30 MIN: CPT | Performed by: INTERNAL MEDICINE

## 2021-03-04 NOTE — PATIENT INSTRUCTIONS
Continue with reflux diet and usual medications  Schedule EGD and colon[facility] at [facility]  Will verify with Cardiology that holding Eliquis for 2 days prior to procedures is adequate and no additional recommendations are needed

## 2021-03-04 NOTE — PROGRESS NOTES
[id number] - Outpatient Follow-up Note  Isai Oakley 79 y o  female MRN: [id number]  Encounter: [id number]    ASSESSMENT AND PLAN:      1  Adenocarcinoma of colon ([address] )  2  History of colon polyps   Personal history of colon cancer status post resection and subsequent adenomatous polyps  No lower GI symptoms presently  Due for 3 year surveillance  ·   Schedule colonoscopy    3  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   Intestinal metaplasia without dysplasia identified on upper endoscopy  Last EGD stable  No new GI symptoms or alarm symptoms  Plan 3 year surveillance exam at the same time as her colonoscopy  ·   Schedule EGD    4  Encounter for anticoagulation discussion and counseling   On Eliquis for atrial fibrillation  Has history of pacer  Denies any symptoms of angina or CHF  Discussed the risks of bleeding during endoscopy if patient is on anticoagulation  Discussed the risk of blood clots and thromboembolic disease including pulmonary embolism stroke and MI if anticoagulation is held  For endoscopic evaluation recommend holding Eliquis for 2 days, proceeding with endoscopic  Procedures and then restarting as soon as possible pending the results  Will confer with Cardiology to verify that no bridging anticoagulation is needed or any other recommendations  Followup Appointment:   One year or sooner if needed  ______________________________________________________________________    Chief Complaint   Patient presents with    clearance for colonoscopy     on eliquis     HPI:  Feels well  No GI symptoms  On Eliquis  Denies bleeding, chest pain, syncope  States she has always with some shortness of breath but nothing beyond her usual baseline  S/p resection for distal colon cancer  Had polyp on last exam, anastomosis OK  Also with hiatal hernia and Pacheco's  No dysplasia  Due for three year surveillance    Discussed anticoagulation and risks of clot and bleeding  Historical Information   Past Medical History:   Diagnosis Date    A-fib [facility])     Abnormal ECG     last assessed: 7/14/2015    Acid reflux     resolved    Anxiety     Pacheco esophagus     Benign neoplasm of sigmoid colon 09/13/2011    next colon 2012    Bronchitis, asthmatic     last assessed: 3/12/2012    Colon cancer ([facility] [address] )     2012- had colon resection    Colon polyp     COPD (chronic obstructive pulmonary disease) (HCC)     questionable    Depression     Diabetes mellitus ([facility] [address] )     on byetta    Dizziness     occ    Esophageal stricture     last assessed: 9/30/2014    High cholesterol     Hypertension     Hypothyroidism     Iron (Fe) deficiency anemia 12/22/2011    iron pill continue    Irritable bowel syndrome     OA (osteoarthritis)     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    Pacemaker     hx SSS    Rash     labia area- uses cream prn    Restrictive lung disease     Seasonal allergies     Skin scarring/fibrosis     fibrotic scar; last assessed: 3/22/2013    MONA (stress urinary incontinence, female)     Urinary frequency     last assessed: 9/10/2012    Wears glasses     reading     Past Surgical History:   Procedure Laterality Date    BREAST EXCISIONAL BIOPSY Right 1990    benign, best guess on year   [address] [facility]      does not know type  Dr Daksha Marte is cariologist    [facility]      resection  removed 25 In     COLONOSCOPY  06/01/2012 June 2018: Adenomatous polyp, colorectal anastomosis, internal hemorrhoids  06/01/2012:  proctosigmoidectomy    ESOPHAGOGASTRODUODENOSCOPY N/A 1/9/2016    Procedure: ESOPHAGOGASTRODUODENOSCOPY (EGD);   Surgeon: Allan Lim MD;  Location:  MAIN OR;  Service:    [facility] HYSTERECTOMY  1978    MS ARTHROPLASTY I-P JT Right 8/16/2018    Procedure: ARTHROPLASTY PIP/FINGER - RIGHT RING;  Surgeon: Bebo Patel MD;  Location:  MAIN OR;  Service: Orthopedics    MS CYSTOURETHROSCOPY N/A 2018    Procedure: Bridget Beckfordnu;  Surgeon: Arlette Locke MD;  [facility];  Service: UroGynecology           NE LAP, REPAIR PARAESOPHAGEAL HERNIA, INCL FUNDOPLASTY W/ MESH N/A 2016    Procedure: LAPAROSCOPIC PARAESOPHAGEAL HERNIA REPAIR with mesh; toupet  FUNDOPLICATION ;  Surgeon: Raymundo London MD;  Location: [facility];  Service: Thoracic    NE REPAIR INTERCARP/CARP-METACARP JT Right 1/10/2019    Procedure: Madeline Chew;  Surgeon: Jeanna Avery MD;  [facility];  Service: Orthopedics    NE SLING OPER STRES INCONTINENCE N/A 2018    Procedure: INSERTION PUBOVAGINAL SLING (FEMALE); Surgeon: Arlette Locke MD;  [facility];  Service: UroGynecology           TOTAL ABDOMINAL HYSTERECTOMY      UPPER GASTROINTESTINAL ENDOSCOPY      2018:  Irregular Z-line positive for intestinal metaplasia/ Pacheco's, no dysplasia  Gastritis H pylori negative      WRIST TENDON TRANSFER Right 1/10/2019    Procedure: TRANSFER TENDON FLEXOR CARPI RADIALIS FROM FOREARM TO HAND;  Surgeon: Jeanna Avery MD;  [facility];  Service: Orthopedics     Social History     Substance and Sexual Activity   Alcohol Use No     Social History     Substance and Sexual Activity   Drug Use No     Social History     Tobacco Use   Smoking Status Former Smoker    Types: Cigarettes    Quit date:     Years since quittin 1   Smokeless Tobacco Former User    Quit date:      Family History   Problem Relation Age of Onset    Heart disease Mother     Diabetes Mother     Asthma Father     Stomach cancer Father         gastrkic cancer    Cancer Paternal Grandmother     Cancer Paternal Grandfather     No Known Problems Brother     Breast cancer Sister 76    Breast cancer Sister 77    Pancreatic cancer Sister 72    No Known Problems Maternal Aunt     No Known Problems Maternal Aunt     No Known Problems Maternal Aunt     No Known Problems Paternal Aunt     No Known Problems Daughter     No Known Problems Maternal Grandmother     No Known Problems Maternal Grandfather     Prostate cancer Paternal Uncle         age unknown   Julieann Frankel Substance Abuse Neg Hx     Mental illness Neg Hx     Alcohol abuse Neg Hx     Colon cancer Neg Hx     Colon polyps Neg Hx          Current Outpatient Medications:     albuterol (ProAir HFA) 90 mcg/act inhaler    apixaban (Eliquis) 5 mg    Apple Cider Vinegar 500 MG TABS    ascorbic acid (VITAMIN C) 500 mg tablet    atenolol (TENORMIN) 50 mg tablet    Blood Glucose Monitoring Suppl (FREESTYLE FREEDOM LITE) w/Device KIT    Byetta 10 MCG Pen 10 MCG/0 04ML SOPN    Cholecalciferol (VITAMIN D-3) 1000 units CAPS    clobetasol (TEMOVATE) 0 05 % cream    Coenzyme Q10 (COQ10) 200 MG CAPS    Continuous Blood Gluc  (IppiesYLE VICTORINA 14 DAY READER) SVETLANA    Continuous Blood Gluc Sensor (Bluff WarsSTYLE VICTORINA 14 DAY SENSOR) MISC    DULoxetine HCl 40 MG CPEP    estradiol (ESTRACE) 0 1 mg/g vaginal cream    Ferrous Sulfate 27 MG TABS    Bluff WarsSTYLE LITE test strip    Glucosamine-Chondroitin (MOVE FREE PO)    hydrocortisone 2 5 % cream    JARDIANCE 25 MG TABS    ketoconazole (NIZORAL) 2 % cream    levothyroxine 125 mcg tablet    MEGARED OMEGA-3 KRILL OIL PO    metFORMIN (GLUCOPHAGE-XR) 500 mg 24 hr tablet    simvastatin (ZOCOR) 20 mg tablet  Allergies   Allergen Reactions    Amoxicillin     Fluconazole Dizziness     Reviewed medications and allergies and updated as indicated    PHYSICAL EXAM:    Blood pressure 138/82, height 5' 3 25" (1 607 m), weight 74 8 kg (165 lb), not currently breastfeeding  Body mass index is 29 kg/m²  General Appearance: NAD, cooperative, alert  Eyes: Anicteric, PERRLA, EOMI  ENT:  Normocephalic, atraumatic, normal mucosa      Neck:  Supple, symmetrical, trachea midline  Resp:  Clear to auscultation bilaterally; no rales, rhonchi or wheezing; respirations unlabored   CV:  S1 S2, Regular rate and rhythm; no murmur, rub, or gallop  GI:  Soft, non-tender, non-distended; normal bowel sounds; no masses, no organomegaly   Rectal: Deferred  Musculoskeletal: No cyanosis, clubbing or edema  Normal ROM  Skin:  No jaundice, rashes, or lesions   Heme/Lymph: No palpable cervical lymphadenopathy  Psych: Normal affect, good eye contact  Neuro: No gross deficits, AAOx3    Lab Results:   Lab Results   Component Value Date    WBC 9 60 02/24/2021    HGB 15 9 (H) 02/24/2021    HCT 51 0 (H) 02/24/2021    MCV 91 02/24/2021     02/24/2021     Lab Results   Component Value Date     12/21/2015    K 4 6 11/25/2020     11/25/2020    CO2 30 11/25/2020    ANIONGAP 7 12/21/2015    BUN 8 11/25/2020    CREATININE 0 67 11/25/2020    GLUCOSE 245 (H) 01/27/2016    GLUF 153 (H) 11/25/2020    CALCIUM 9 3 11/25/2020    AST 17 11/25/2020    ALT 23 11/25/2020    ALKPHOS 82 11/25/2020    PROT 7 6 12/21/2015    BILITOT 0 83 12/21/2015    EGFR 84 11/25/2020     Lab Results   Component Value Date    FERRITIN 61 3 12/26/2014     No results found for: LIPASE    Radiology Results:   No results found

## 2021-03-04 NOTE — LETTER
March 4, 2021     Nathen Zamudio [address] [address]    Patient: Abundio Tinajero   YOB: 1940   Date of Visit: 3/4/2021       Dear Dr Rajani Clark: Thank you for referring Faustino Lundberg to me for evaluation  Below are my notes for this consultation  If you have questions, please do not hesitate to call me  I look forward to following your patient along with you  Sincerely,        Spence Leventhal, MD        CC: Edith Matthews MD  3/4/2021  2:35 PM  Sign when Signing Visit  [address] [facility] - Outpatient Follow-up Note  Abundio Tinajero 79 y o  female MRN: [id number]  Encounter: [id number]    ASSESSMENT AND PLAN:      1  Adenocarcinoma of colon ([facility] [address] )  2  History of colon polyps   Personal history of colon cancer status post resection and subsequent adenomatous polyps  No lower GI symptoms presently  Due for 3 year surveillance  ·   Schedule colonoscopy    3  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   Intestinal metaplasia without dysplasia identified on upper endoscopy  Last EGD stable  No new GI symptoms or alarm symptoms  Plan 3 year surveillance exam at the same time as her colonoscopy  ·   Schedule EGD    4  Encounter for anticoagulation discussion and counseling   On Eliquis for atrial fibrillation  Has history of pacer  Denies any symptoms of angina or CHF  Discussed the risks of bleeding during endoscopy if patient is on anticoagulation  Discussed the risk of blood clots and thromboembolic disease including pulmonary embolism stroke and MI if anticoagulation is held  For endoscopic evaluation recommend holding Eliquis for 2 days, proceeding with endoscopic  Procedures and then restarting as soon as possible pending the results  Will confer with Cardiology to verify that no bridging anticoagulation is needed or any other recommendations        Followup Appointment:   One year or sooner if needed  ______________________________________________________________________    Chief Complaint   Patient presents with    clearance for colonoscopy     on eliquis     HPI:  Feels well  No GI symptoms  On Eliquis  Denies bleeding, chest pain, syncope  States she has always with some shortness of breath but nothing beyond her usual baseline  S/p resection for distal colon cancer  Had polyp on last exam, anastomosis OK  Also with hiatal hernia and Pacheco's  No dysplasia  Due for three year surveillance  Discussed anticoagulation and risks of clot and bleeding        Historical Information   Past Medical History:   Diagnosis Date    A-fib [facility])     Abnormal ECG     last assessed: 7/14/2015    Acid reflux     resolved    Anxiety     Pacheco esophagus     Benign neoplasm of sigmoid colon 09/13/2011    next colon 2012    Bronchitis, asthmatic     last assessed: 3/12/2012    Colon cancer ([facility] )     2012- had colon resection    Colon polyp     COPD (chronic obstructive pulmonary disease) (HCC)     questionable    Depression     Diabetes mellitus ([facility] [address] )     on byetta    Dizziness     occ    Esophageal stricture     last assessed: 9/30/2014    High cholesterol     Hypertension     Hypothyroidism     Iron (Fe) deficiency anemia 12/22/2011    iron pill continue    Irritable bowel syndrome     OA (osteoarthritis)     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    Pacemaker     hx SSS    Rash     labia area- uses cream prn    Restrictive lung disease     Seasonal allergies     Skin scarring/fibrosis     fibrotic scar; last assessed: 3/22/2013    MONA (stress urinary incontinence, female)     Urinary frequency     last assessed: 9/10/2012    Wears glasses     reading     Past Surgical History:   Procedure Laterality Date    BREAST EXCISIONAL BIOPSY Right 1990    benign, best guess on year   [address]       does not know type  Dr Yaneth Wiseman is [place] COLON SURGERY      resection  removed 18 In     COLONOSCOPY  06/01/2012 June 2018: Adenomatous polyp, colorectal anastomosis, internal hemorrhoids  06/01/2012:  proctosigmoidectomy    ESOPHAGOGASTRODUODENOSCOPY N/A 1/9/2016    Procedure: ESOPHAGOGASTRODUODENOSCOPY (EGD); Surgeon: Claudia De La Paz MD;  Location: [facility] MA[facility];  Service:     HYSTERECTOMY  1978    SD ARTHROPLASTY I-P JT Right 8/16/2018    Procedure: ARTHROPLASTY PIP/FINGER - RIGHT RING;  Surgeon: Ankit England MD;  [facility];  Service: Orthopedics    SD CYSTOURETHROSCOPY N/A 5/14/2018    Procedure: Neda Fore;  Surgeon: Salina Mendiola MD;  [facility];  Service: UroGynecology           SD LAP, REPAIR PARAESOPHAGEAL HERNIA, INCL FUNDOPLASTY W/ MESH N/A 1/27/2016    Procedure: LAPAROSCOPIC PARAESOPHAGEAL HERNIA REPAIR with mesh; toupet  FUNDOPLICATION ;  Surgeon: Gretchen Birmingham MD;  Location: [facility] MA[facility] OR;  Service: Thoracic    SD REPAIR INTERCARP/CARP-METACARP JT Right 1/10/2019    Procedure: Garsia Philip;  Surgeon: Ankit England MD;  [facility];  Service: Orthopedics    SD SLING OPER STRES INCONTINENCE N/A 5/14/2018    Procedure: INSERTION PUBOVAGINAL SLING (FEMALE); Surgeon: Salina Mendiola MD;  [facility];  Service: UroGynecology           TOTAL ABDOMINAL HYSTERECTOMY      UPPER GASTROINTESTINAL ENDOSCOPY      June 2018:  Irregular Z-line positive for intestinal metaplasia/ Pacheco's, no dysplasia  Gastritis H pylori negative      WRIST TENDON TRANSFER Right 1/10/2019    Procedure: TRANSFER TENDON FLEXOR CARPI RADIALIS FROM FOREARM TO HAND;  Surgeon: Ankit England MD;  [facility];  Service: Orthopedics     Social History     Substance and Sexual Activity   Alcohol Use No     Social History     Substance and Sexual Activity   Drug Use No     Social History     Tobacco Use   Smoking Status Former Smoker    Types: Cigarettes    Quit date: 2002    Years since quittin 1   Smokeless Tobacco Former User    Quit date:      Family History   Problem Relation Age of Onset    Heart disease Mother     Diabetes Mother     Asthma Father     Stomach cancer Father         gastrkic cancer    Cancer Paternal Grandmother     Cancer Paternal Grandfather     No Known Problems Brother     Breast cancer Sister 76    Breast cancer Sister 77    Pancreatic cancer Sister 72    No Known Problems Maternal Aunt     No Known Problems Maternal Aunt     No Known Problems Maternal Aunt     No Known Problems Paternal Aunt     No Known Problems Daughter     No Known Problems Maternal Grandmother     No Known Problems Maternal Grandfather     Prostate cancer Paternal Uncle         age unknown    Substance Abuse Neg Hx     Mental illness Neg Hx     Alcohol abuse Neg Hx     Colon cancer Neg Hx     Colon polyps Neg Hx          Current Outpatient Medications:     albuterol (ProAir HFA) 90 mcg/act inhaler    apixaban (Eliquis) 5 mg    Apple Cider Vinegar 500 MG TABS    ascorbic acid (VITAMIN C) 500 mg tablet    atenolol (TENORMIN) 50 mg tablet    Blood Glucose Monitoring Suppl (FREESTYLE FREEDOM LITE) w/Device KIT    Byetta 10 MCG Pen 10 MCG/0 04ML SOPN    Cholecalciferol (VITAMIN D-3) 1000 units CAPS    clobetasol (TEMOVATE) 0 05 % cream    Coenzyme Q10 (COQ10) 200 MG CAPS    Continuous Blood Gluc  (FREESTYLE VICTORINA 14 DAY READER) SVETLANA    Continuous Blood Gluc Sensor (FREESTYLE VICTORINA 14 DAY SENSOR) MISC    DULoxetine HCl 40 MG CPEP    estradiol (ESTRACE) 0 1 mg/g vaginal cream    Ferrous Sulfate 27 MG TABS    FREESTYLE LITE test strip    Glucosamine-Chondroitin (MOVE FREE PO)    hydrocortisone 2 5 % cream    JARDIANCE 25 MG TABS    ketoconazole (NIZORAL) 2 % cream    levothyroxine 125 mcg tablet    MEGARED OMEGA-3 KRILL OIL PO    metFORMIN (GLUCOPHAGE-XR) 500 mg 24 hr tablet    simvastatin (ZOCOR) 20 mg tablet  Allergies   Allergen Reactions    Amoxicillin     Fluconazole Dizziness     Reviewed medications and allergies and updated as indicated    PHYSICAL EXAM:    Blood pressure 138/82, height 5' 3 25" (1 607 m), weight 74 8 kg (165 lb), not currently breastfeeding  Body mass index is 29 kg/m²  General Appearance: NAD, cooperative, alert  Eyes: Anicteric, PERRLA, EOMI  ENT:  Normocephalic, atraumatic, normal mucosa  Neck:  Supple, symmetrical, trachea midline  Resp:  Clear to auscultation bilaterally; no rales, rhonchi or wheezing; respirations unlabored   CV:  S1 S2, Regular rate and rhythm; no murmur, rub, or gallop  GI:  Soft, non-tender, non-distended; normal bowel sounds; no masses, no organomegaly   Rectal: Deferred  Musculoskeletal: No cyanosis, clubbing or edema  Normal ROM  Skin:  No jaundice, rashes, or lesions   Heme/Lymph: No palpable cervical lymphadenopathy  Psych: Normal affect, good eye contact  Neuro: No gross deficits, AAOx3    Lab Results:   Lab Results   Component Value Date    WBC 9 60 02/24/2021    HGB 15 9 (H) 02/24/2021    HCT 51 0 (H) 02/24/2021    MCV 91 02/24/2021     02/24/2021     Lab Results   Component Value Date     12/21/2015    K 4 6 11/25/2020     11/25/2020    CO2 30 11/25/2020    ANIONGAP 7 12/21/2015    BUN 8 11/25/2020    CREATININE 0 67 11/25/2020    GLUCOSE 245 (H) 01/27/2016    GLUF 153 (H) 11/25/2020    CALCIUM 9 3 11/25/2020    AST 17 11/25/2020    ALT 23 11/25/2020    ALKPHOS 82 11/25/2020    PROT 7 6 12/21/2015    BILITOT 0 83 12/21/2015    EGFR 84 11/25/2020     Lab Results   Component Value Date    FERRITIN 61 3 12/26/2014     No results found for: LIPASE    Radiology Results:   No results found

## 2021-03-04 NOTE — PROGRESS NOTES
Results for orders placed or performed in visit on 03/04/21   Cardiac EP device report    Narrative    MDT-DUAL CHAMBER PPM/ ACTIVE SYSTEM IS MRI CONDITIONAL  CARELINK TRANSMISSION: BATTERY STATUS "OK"  AP 87%  1 5%  ALL AVAILABLE LEAD PARAMETERS WITHIN NORMAL LIMITS  2 NSVT NOTED  PT ON ATENOLOL & EF 55% (2019)  9 FAST A/V NOTED  119 AT/AF NOTED; 3% BURDEN  PT ON ELIQUIS  AVAIL EGRAMS PRESENT AS AF W RVR  NORMAL DEVICE FUNCTION  NC       Current Outpatient Medications:     albuterol (ProAir HFA) 90 mcg/act inhaler, Inhale 2 puffs every 6 (six) hours as needed for wheezing or shortness of breath, Disp: 8 5 g, Rfl: 0    apixaban (Eliquis) 5 mg, Take 1 tablet (5 mg total) by mouth 2 (two) times a day, Disp: 56 tablet, Rfl: 0    Apple Cider Vinegar 500 MG TABS, Take by mouth daily, Disp: , Rfl:     ascorbic acid (VITAMIN C) 500 mg tablet, Take 500 mg by mouth daily, Disp: , Rfl:     atenolol (TENORMIN) 50 mg tablet, TAKE 1 TABLET AT BEDTIME, Disp: 90 tablet, Rfl: 3    Blood Glucose Monitoring Suppl (FREESTYLE FREEDOM LITE) w/Device KIT, by Does not apply route once for 1 dose Use meter to check blood sugars daily  , Disp: 1 each, Rfl: 0    Byetta 10 MCG Pen 10 MCG/0 04ML SOPN, INJECT 0 04 ML UNDER THE SKIN TWICE A DAY, Disp: 7 2 mL, Rfl: 3    Cholecalciferol (VITAMIN D-3) 1000 units CAPS, Take 1 capsule by mouth daily, Disp: , Rfl:     clobetasol (TEMOVATE) 0 05 % cream, , Disp: , Rfl:     Coenzyme Q10 (COQ10) 200 MG CAPS, Take 200 mg by mouth daily  , Disp: , Rfl:     Continuous Blood Gluc  (FREESTYLE VICTORINA 14 DAY READER) SVETLANA, 1 Device by Does not apply route 4 (four) times a day (before meals and at bedtime), Disp: 1 Device, Rfl: 0    Continuous Blood Gluc Sensor (FREESTYLE VICTORINA 14 DAY SENSOR) MISC, 1 application by Does not apply route every 14 (fourteen) days, Disp: 2 each, Rfl: 6    DULoxetine HCl 40 MG CPEP, Take 1 capsule (40 mg total) by mouth daily, Disp: 90 capsule, Rfl: 1   estradiol (ESTRACE) 0 1 mg/g vaginal cream, Insert 1 g into the vagina once a week (Patient taking differently: Insert 1 g into the vagina daily ), Disp: 42 5 g, Rfl: 2    Ferrous Sulfate 27 MG TABS, Take 1 tablet by mouth daily, Disp: , Rfl:     FREESTYLE LITE test strip, Test 3 times daily  , Disp: 300 each, Rfl: 3    Glucosamine-Chondroitin (MOVE FREE PO), Take by mouth daily, Disp: , Rfl:     hydrocortisone 2 5 % cream, APPLY TOPICALLY TWICE A DAY  MIX WITH KETOCONAZOLE CREAM AND APPLY TO VULVA TWICE A DAY FOR 1 MONTH, Disp: 60 g, Rfl: 11    JARDIANCE 25 MG TABS, TAKE 1 TABLET EVERY MORNING, Disp: 90 tablet, Rfl: 3    ketoconazole (NIZORAL) 2 % cream, APPLY TOPICALLY TWICE A DAY    MIX WITH HYDROCORTISONE AND APPLY TO VULVA TWICE A DAY FOR 1 MONTH, Disp: 60 g, Rfl: 11    levothyroxine 125 mcg tablet, TAKE 1 TABLET 6 DAYS OF THE WEEK, Disp: 102 tablet, Rfl: 3    MEGARED OMEGA-3 KRILL OIL PO, Take 1 capsule by mouth daily, Disp: , Rfl:     metFORMIN (GLUCOPHAGE-XR) 500 mg 24 hr tablet, TAKE 1 TABLET THREE TIMES A DAY WITH MEALS, Disp: 270 tablet, Rfl: 3    simvastatin (ZOCOR) 20 mg tablet, Take 2 tablets (40 mg total) by mouth daily at bedtime, Disp: 180 tablet, Rfl: 3

## 2021-03-04 NOTE — H&P (VIEW-ONLY)
[address] [facility] - Outpatient Follow-up Note  Chasity Amaro 79 y o  female MRN: [id number]  Encounter: [id number]    ASSESSMENT AND PLAN:      1  Adenocarcinoma of colon ([address] )  2  History of colon polyps   Personal history of colon cancer status post resection and subsequent adenomatous polyps  No lower GI symptoms presently  Due for 3 year surveillance  ·   Schedule colonoscopy    3  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   Intestinal metaplasia without dysplasia identified on upper endoscopy  Last EGD stable  No new GI symptoms or alarm symptoms  Plan 3 year surveillance exam at the same time as her colonoscopy  ·   Schedule EGD    4  Encounter for anticoagulation discussion and counseling   On Eliquis for atrial fibrillation  Has history of pacer  Denies any symptoms of angina or CHF  Discussed the risks of bleeding during endoscopy if patient is on anticoagulation  Discussed the risk of blood clots and thromboembolic disease including pulmonary embolism stroke and MI if anticoagulation is held  For endoscopic evaluation recommend holding Eliquis for 2 days, proceeding with endoscopic  Procedures and then restarting as soon as possible pending the results  Will confer with Cardiology to verify that no bridging anticoagulation is needed or any other recommendations  Followup Appointment:   One year or sooner if needed  ______________________________________________________________________    Chief Complaint   Patient presents with    clearance for colonoscopy     on eliquis     HPI:  Feels well  No GI symptoms  On Eliquis  Denies bleeding, chest pain, syncope  States she has always with some shortness of breath but nothing beyond her usual baseline  S/p resection for distal colon cancer  Had polyp on last exam, anastomosis OK  Also with hiatal hernia and Pacheco's  No dysplasia  Due for three year surveillance    Discussed anticoagulation and risks of clot and bleeding  Historical Information   Past Medical History:   Diagnosis Date    A-fib [facility])     Abnormal ECG     last assessed: 7/14/2015    Acid reflux     resolved    Anxiety     Pacheco esophagus     Benign neoplasm of sigmoid colon 09/13/2011    next colon 2012    Bronchitis, asthmatic     last assessed: 3/12/2012    Colon cancer ([facility] [address] )     2012- had colon resection    Colon polyp     COPD (chronic obstructive pulmonary disease) (HCC)     questionable    Depression     Diabetes mellitus ([facility] [address] )     on byetta    Dizziness     occ    Esophageal stricture     last assessed: 9/30/2014    High cholesterol     Hypertension     Hypothyroidism     Iron (Fe) deficiency anemia 12/22/2011    iron pill continue    Irritable bowel syndrome     OA (osteoarthritis)     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    Pacemaker     hx SSS    Rash     labia area- uses cream prn    Restrictive lung disease     Seasonal allergies     Skin scarring/fibrosis     fibrotic scar; last assessed: 3/22/2013    MONA (stress urinary incontinence, female)     Urinary frequency     last assessed: 9/10/2012    Wears glasses     reading     Past Surgical History:   Procedure Laterality Date    BREAST EXCISIONAL BIOPSY Right 1990    benign, best guess on year   [address]      does not know type  Dr Rene Ambrosio is cariologist    [address]      resection  removed 25 In     COLONOSCOPY  06/01/2012 June 2018: Adenomatous polyp, colorectal anastomosis, internal hemorrhoids  06/01/2012:  proctosigmoidectomy    ESOPHAGOGASTRODUODENOSCOPY N/A 1/9/2016    Procedure: ESOPHAGOGASTRODUODENOSCOPY (EGD);   Surgeon: Qasim Murphy MD;  Location: [facility];  Service:    Dias HYSTERECTOMY  1978    DC ARTHROPLASTY I-P JT Right 8/16/2018    Procedure: ARTHROPLASTY PIP/FINGER - RIGHT RING;  Surgeon: Fox Machuca MD;  Location:  MAIN OR;  Service: Orthopedics    DC CYSTOURETHROSCOPY N/A 2018    Procedure: Manuelaord Gallery;  Surgeon: Belen Kiser MD;  [facility];  Service: UroGynecology           [place] LAP, REPAIR PARAESOPHAGEAL HERNIA, INCL FUNDOPLASTY W/ MESH N/A 2016    Procedure: LAPAROSCOPIC PARAESOPHAGEAL HERNIA REPAIR with mesh; toupet  FUNDOPLICATION ;  Surgeon: Albin De La Paz MD;  Location: [facility];  Service: Thoracic    [place] REPAIR INTERCARP/CARP-METACARP JT Right 1/10/2019    Procedure: Dillon Ramos;  Surgeon: Ashok Bailey MD;  [facility];  Service: Orthopedics    [place] SLING OPER STRES INCONTINENCE N/A 2018    Procedure: INSERTION PUBOVAGINAL SLING (FEMALE); Surgeon: Belen Kiser MD;  [facility];  Service: UroGynecology           TOTAL ABDOMINAL HYSTERECTOMY      UPPER GASTROINTESTINAL ENDOSCOPY      2018:  Irregular Z-line positive for intestinal metaplasia/ Pacheco's, no dysplasia  Gastritis H pylori negative      WRIST TENDON TRANSFER Right 1/10/2019    Procedure: TRANSFER TENDON FLEXOR CARPI RADIALIS FROM FOREARM TO HAND;  Surgeon: Ashok Bailey MD;  [facility];  Service: Orthopedics     Social History     Substance and Sexual Activity   Alcohol Use No     Social History     Substance and Sexual Activity   Drug Use No     Social History     Tobacco Use   Smoking Status Former Smoker    Types: Cigarettes    Quit date:     Years since quittin 1   Smokeless Tobacco Former User    Quit date:      Family History   Problem Relation Age of Onset    Heart disease Mother     Diabetes Mother     Asthma Father     Stomach cancer Father         gastrkic cancer    Cancer Paternal Grandmother     Cancer Paternal Grandfather     No Known Problems Brother     Breast cancer Sister 76    Breast cancer Sister 77    Pancreatic cancer Sister 72    No Known Problems Maternal Aunt     No Known Problems Maternal Aunt     No Known Problems Maternal Aunt     No Known Problems Paternal Aunt     No Known Problems Daughter     No Known Problems Maternal Grandmother     No Known Problems Maternal Grandfather     Prostate cancer Paternal Uncle         age unknown   Jono Gutierrez Substance Abuse Neg Hx     Mental illness Neg Hx     Alcohol abuse Neg Hx     Colon cancer Neg Hx     Colon polyps Neg Hx          Current Outpatient Medications:     albuterol (ProAir HFA) 90 mcg/act inhaler    apixaban (Eliquis) 5 mg    Apple Cider Vinegar 500 MG TABS    ascorbic acid (VITAMIN C) 500 mg tablet    atenolol (TENORMIN) 50 mg tablet    Blood Glucose Monitoring Suppl (FREESTYLE FREEDOM LITE) w/Device KIT    Byetta 10 MCG Pen 10 MCG/0 04ML SOPN    Cholecalciferol (VITAMIN D-3) 1000 units CAPS    clobetasol (TEMOVATE) 0 05 % cream    Coenzyme Q10 (COQ10) 200 MG CAPS    Continuous Blood Gluc  (real trendsYLE VICTORINA 14 DAY READER) SVETLANA    Continuous Blood Gluc Sensor ([employer] BioDiagnosticsSTYLE VICTORINA 14 DAY SENSOR) MISC    DULoxetine HCl 40 MG CPEP    estradiol (ESTRACE) 0 1 mg/g vaginal cream    Ferrous Sulfate 27 MG TABS    FREESTYLE LITE test strip    Glucosamine-Chondroitin (MOVE FREE PO)    hydrocortisone 2 5 % cream    JARDIANCE 25 MG TABS    ketoconazole (NIZORAL) 2 % cream    levothyroxine 125 mcg tablet    MEGARED OMEGA-3 KRILL OIL PO    metFORMIN (GLUCOPHAGE-XR) 500 mg 24 hr tablet    simvastatin (ZOCOR) 20 mg tablet  Allergies   Allergen Reactions    Amoxicillin     Fluconazole Dizziness     Reviewed medications and allergies and updated as indicated    PHYSICAL EXAM:    Blood pressure 138/82, height 5' 3 25" (1 607 m), weight 74 8 kg (165 lb), not currently breastfeeding  Body mass index is 29 kg/m²  General Appearance: NAD, cooperative, alert  Eyes: Anicteric, PERRLA, EOMI  ENT:  Normocephalic, atraumatic, normal mucosa      Neck:  Supple, symmetrical, trachea midline  Resp:  Clear to auscultation bilaterally; no rales, rhonchi or wheezing; respirations unlabored   CV:  S1 S2, Regular rate and rhythm; no murmur, rub, or gallop  GI:  Soft, non-tender, non-distended; normal bowel sounds; no masses, no organomegaly   Rectal: Deferred  Musculoskeletal: No cyanosis, clubbing or edema  Normal ROM  Skin:  No jaundice, rashes, or lesions   Heme/Lymph: No palpable cervical lymphadenopathy  Psych: Normal affect, good eye contact  Neuro: No gross deficits, AAOx3    Lab Results:   Lab Results   Component Value Date    WBC 9 60 02/24/2021    HGB 15 9 (H) 02/24/2021    HCT 51 0 (H) 02/24/2021    MCV 91 02/24/2021     02/24/2021     Lab Results   Component Value Date     12/21/2015    K 4 6 11/25/2020     11/25/2020    CO2 30 11/25/2020    ANIONGAP 7 12/21/2015    BUN 8 11/25/2020    CREATININE 0 67 11/25/2020    GLUCOSE 245 (H) 01/27/2016    GLUF 153 (H) 11/25/2020    CALCIUM 9 3 11/25/2020    AST 17 11/25/2020    ALT 23 11/25/2020    ALKPHOS 82 11/25/2020    PROT 7 6 12/21/2015    BILITOT 0 83 12/21/2015    EGFR 84 11/25/2020     Lab Results   Component Value Date    FERRITIN 61 3 12/26/2014     No results found for: LIPASE    Radiology Results:   No results found

## 2021-03-13 DIAGNOSIS — I10 ESSENTIAL HYPERTENSION: ICD-10-CM

## 2021-03-14 RX ORDER — ATENOLOL 50 MG/1
TABLET ORAL
Qty: 90 TABLET | Refills: 3 | Status: SHIPPED | OUTPATIENT
Start: 2021-03-14 | End: 2022-03-22

## 2021-03-18 VITALS — BODY MASS INDEX: 29.23 KG/M2 | WEIGHT: 165 LBS | HEIGHT: 63 IN

## 2021-03-18 DIAGNOSIS — Z85.038 HISTORY OF COLON CANCER: Primary | ICD-10-CM

## 2021-03-18 NOTE — TELEPHONE ENCOUNTER
Why does your doctor want you to have this procedure? Hx  Colon CA, dysphagia    Do you have kidney disease?  no  If yes, are you on dialysis :     Have you had diverticulitis within the past 2 months? no    Are you diabetic?  yes  If yes, insulin dependent: If yes, provide diabetic instructions sheet     Do take iron supplements? yes  If yes, instruct patient to hold iron supplement for 7 days prior    Are you on a blood thinner? yes   Was the blood thinner sheet complete and faxed to cardiologist no  Plavix (clopidogrel), Coumadin (warfarin), Lovenox (enoxaparin), Xarelto (rivaroxaban), Pradaxa(dabigatran), Eliquis(apixaban) Savaysa/Lixiana (edoxapan)    Do you have an automatic implantable cardiac defibrillator (AICD)/pacemaker ([facility])? no  Was AICD/pacemaker sheet completed and faxed to cardiologist? no    Are you on home oxygen? no  If yes, continuous or nocturnal:     Have you been treated for MRSA, VRE or any communicable diseases? no    Heart attack, stroke, or stent within 3 months? no  Schedule at Hospital if within 3-6 months   Use nitroglycerin for chest pain in the last 6 months? no    History of organ  transplant?  no   If yes, notify Endo      History of neck/throat/tongue surgery or cancer? no  IF yes, notify Endo      Any problems with anesthesia in the past? no     Was stool C diff ordered?  no Stool specimen needs to be completed prior to procedure    Do have any facial or body piercings?no     Do you have a latex allergy? no     Do have an allergy to metals? (Bravo study only) no     If pediatric patient, was consent faxed to pediatrician no     Patient rights reviewed yes    Rx sent for Suprep to provider for signature  Instructions emailed to patient  Sample given to patient

## 2021-03-23 ENCOUNTER — ANESTHESIA EVENT (OUTPATIENT)
Dept: GASTROENTEROLOGY | Facility: AMBULATORY SURGERY CENTER | Age: 81
End: 2021-03-23

## 2021-03-23 ENCOUNTER — HOSPITAL ENCOUNTER (OUTPATIENT)
Dept: GASTROENTEROLOGY | Facility: AMBULATORY SURGERY CENTER | Age: 81
Discharge: HOME | End: 2021-03-23
Payer: MEDICARE

## 2021-03-23 ENCOUNTER — ANESTHESIA (OUTPATIENT)
Dept: GASTROENTEROLOGY | Facility: AMBULATORY SURGERY CENTER | Age: 81
End: 2021-03-23

## 2021-03-23 VITALS
SYSTOLIC BLOOD PRESSURE: 110 MMHG | TEMPERATURE: 98.6 F | RESPIRATION RATE: 18 BRPM | OXYGEN SATURATION: 95 % | HEART RATE: 78 BPM | DIASTOLIC BLOOD PRESSURE: 54 MMHG

## 2021-03-23 DIAGNOSIS — C18.9 ADENOCARCINOMA OF COLON (HCC): ICD-10-CM

## 2021-03-23 DIAGNOSIS — Z86.010 HISTORY OF COLON POLYPS: ICD-10-CM

## 2021-03-23 DIAGNOSIS — K22.70 BARRETT'S ESOPHAGUS WITHOUT DYSPLASIA: ICD-10-CM

## 2021-03-23 PROCEDURE — 88305 TISSUE EXAM BY PATHOLOGIST: CPT | Performed by: PATHOLOGY

## 2021-03-23 PROCEDURE — 43239 EGD BIOPSY SINGLE/MULTIPLE: CPT | Performed by: INTERNAL MEDICINE

## 2021-03-23 PROCEDURE — 45380 COLONOSCOPY AND BIOPSY: CPT | Performed by: INTERNAL MEDICINE

## 2021-03-23 RX ORDER — SODIUM CHLORIDE 9 MG/ML
50 INJECTION, SOLUTION INTRAVENOUS CONTINUOUS
Status: DISCONTINUED | OUTPATIENT
Start: 2021-03-23 | End: 2021-03-27 | Stop reason: HOSPADM

## 2021-03-23 RX ORDER — PROPOFOL 10 MG/ML
INJECTION, EMULSION INTRAVENOUS AS NEEDED
Status: DISCONTINUED | OUTPATIENT
Start: 2021-03-23 | End: 2021-03-23

## 2021-03-23 RX ORDER — EPHEDRINE SULFATE 50 MG/ML
INJECTION INTRAVENOUS AS NEEDED
Status: DISCONTINUED | OUTPATIENT
Start: 2021-03-23 | End: 2021-03-23

## 2021-03-23 RX ADMIN — EPHEDRINE SULFATE 10 MG: 50 INJECTION INTRAVENOUS at 07:42

## 2021-03-23 RX ADMIN — PROPOFOL 20 MG: 10 INJECTION, EMULSION INTRAVENOUS at 07:42

## 2021-03-23 RX ADMIN — PROPOFOL 20 MG: 10 INJECTION, EMULSION INTRAVENOUS at 07:47

## 2021-03-23 RX ADMIN — EPHEDRINE SULFATE 10 MG: 50 INJECTION INTRAVENOUS at 07:52

## 2021-03-23 RX ADMIN — SODIUM CHLORIDE 50 ML/HR: 9 INJECTION, SOLUTION INTRAVENOUS at 07:21

## 2021-03-23 RX ADMIN — PROPOFOL 50 MG: 10 INJECTION, EMULSION INTRAVENOUS at 07:36

## 2021-03-23 RX ADMIN — PROPOFOL 100 MG: 10 INJECTION, EMULSION INTRAVENOUS at 07:33

## 2021-03-23 RX ADMIN — PROPOFOL 50 MG: 10 INJECTION, EMULSION INTRAVENOUS at 07:40

## 2021-03-23 NOTE — ANESTHESIA PREPROCEDURE EVALUATION
Procedure:  COLONOSCOPY  EGD    Relevant Problems   CARDIO   (+) Atrial fibrillation (HCC)   (+) Coronary artery disease involving native coronary artery of native heart without angina pectoris   (+) Essential hypertension   (+) Mitral regurgitation   (+) Mixed hyperlipidemia   (+) Sick sinus syndrome (HCC)      ENDO   (+) Hypothyroidism due to Hashimoto's thyroiditis   (+) Type 2 diabetes mellitus with diabetic neuropathy, without long-term current use of insulin (HCC)      GI/HEPATIC   (+) Adenocarcinoma of colon (HCC)   (+) Gastro-esophageal reflux disease without esophagitis   (+) Paraesophageal hernia      MUSCULOSKELETAL   (+) CMC DJD(carpometacarpal degenerative joint disease), localized primary, right   (+) Primary osteoarthritis involving multiple joints      NEURO/PSYCH   (+) Feeling anxious      PULMONARY   (+) Chronic obstructive pulmonary disease (HCC)        Physical Exam    Airway    Mallampati score: II  TM Distance: >3 FB  Neck ROM: full     Dental   No notable dental hx     Cardiovascular  Rhythm: irregular, Rate: normal,     Pulmonary  Pulmonary exam normal     Other Findings        Anesthesia Plan  ASA Score- 3     Anesthesia Type- IV sedation with anesthesia with ASA Monitors  Additional Monitors:   Airway Plan:     Comment: Benefits and risks of planned anesthetic discussed with patient, and agrees to proceed  I, Dr Martin Henson, the attending anesthesiologist, have personally seen and evaluated the patient prior to anesthetic care  I have reviewed the preanesthetic record, and other medical records if appropriate to the anesthetic care  If a CRNA is involved in the case, I have reviewed the CRNA assessment, if present, and agree  The patient is in a suitable condition to proceed with my formulated anesthetic plan          Plan Factors-    Chart reviewed  Patient is not a current smoker  Induction- intravenous      Postoperative Plan-     Informed Consent- Anesthetic plan and risks discussed with patient

## 2021-03-23 NOTE — DISCHARGE INSTRUCTIONS
Gastritis   WHAT YOU NEED TO KNOW:   What is gastritis? Gastritis is inflammation or irritation of the lining of your stomach  What increases my risk for gastritis? · Infection with bacteria, a virus, or a parasite    · NSAIDs, aspirin, or steroid medicine    · Use of tobacco products or alcohol    · Trauma such as an injury to your stomach or intestine    · Autoimmune disorders such as diabetes, thyroid disease, or Crohn disease    · Stress    · Age older than 60 years    · Illegal drugs, such as cocaine    What are the signs and symptoms of gastritis? · Stomach pain, burning, or tenderness when you press on it    · Stomach fullness or tightness    · Nausea or vomiting    · Loss of appetite, or feeling full quickly when you eat    · Bad breath    · Fatigue or feeling more tired than usual    · Heartburn    How is gastritis diagnosed? Your healthcare provider will ask about your signs and symptoms and examine you  You may need any of the following:  · Blood tests  may be used to show an infection, dehydration, or anemia (low red blood cell levels)  · A bowel movement sample  may be tested for blood or the germ that may be causing your gastritis  · A breath test  may show if H pylori is causing your gastritis  You will be given a liquid to drink  Then you will breathe into a bag  Your healthcare provider will measure the amount of carbon dioxide in your breath  Extra amounts of carbon dioxide may mean you have an H pylori infection  · An endoscopy  may be used to look for irritation or bleeding in your stomach  Your healthcare provider will use an endoscope (tube with a light and camera on the end) during the procedure  He or she may take a sample from your stomach to be tested  How is gastritis treated? Your symptoms may go away without treatment  Treatment will depend on what is causing your gastritis  Your healthcare provider may recommend changes to the medicines you take   Medicines may be given to help treat a bacterial infection or decrease stomach acid  How can I manage or prevent gastritis? · Do not smoke  Nicotine and other chemicals in cigarettes and cigars can make your symptoms worse and cause lung damage  Ask your healthcare provider for information if you currently smoke and need help to quit  E-cigarettes or smokeless tobacco still contain nicotine  Talk to your healthcare provider before you use these products  · Do not drink alcohol  Alcohol can prevent healing and make your gastritis worse  Talk to your healthcare provider if you need help to stop drinking  · Do not take NSAIDs or aspirin unless directed  These and similar medicines can cause irritation of your stomach lining  If your healthcare provider says it is okay to take NSAIDs, take them with food  · Do not eat foods that cause irritation  Foods such as oranges and salsa can cause burning or pain  Eat a variety of healthy foods  Examples include fruits (not citrus), vegetables, low-fat dairy products, beans, whole-grain breads, and lean meats and fish  Try to eat small meals, and drink water with your meals  Do not eat for at least 3 hours before you go to bed  · Find ways to relax and decrease stress  Stress can increase stomach acid and make gastritis worse  Activities such as yoga, meditation, or listening to music can help you relax  Spend time with friends, or do things you enjoy  Call 911 for any of the following:   · You develop chest pain or shortness of breath  When should I seek immediate care? · You vomit blood  · You have black or bloody bowel movements  · You have severe stomach or back pain  When should I contact my healthcare provider? · You have a fever  · You have new or worsening symptoms, even after treatment  · You have questions or concerns about your condition or care  CARE AGREEMENT:   You have the right to help plan your care   Learn about your health condition and how it may be treated  Discuss treatment options with your healthcare providers to decide what care you want to receive  You always have the right to refuse treatment  The above information is an  only  It is not intended as medical advice for individual conditions or treatments  Talk to your doctor, nurse or pharmacist before following any medical regimen to see if it is safe and effective for you  © Copyright [address] Information is for End User's use only and may not be sold, redistributed or otherwise used for commercial purposes  All illustrations and images included in CareNotes® are the copyrighted property of [employer]  or [address] [place] [address] [employer]   Hemorrhoids   WHAT YOU NEED TO KNOW:   What are hemorrhoids? Hemorrhoids are swollen blood vessels inside your rectum (internal hemorrhoids) or on your anus (external hemorrhoids)  Sometimes a hemorrhoid may prolapse  This means it extends out of your anus  What increases my risk for hemorrhoids? · Pregnancy or obesity    · Straining or sitting for a long time during bowel movements    · Liver disease    · Weak muscles around the anus caused by older age, rectal surgery, or anal intercourse    · A lack of physical activity    · Chronic diarrhea or constipation    · A low-fiber diet    What are the signs and symptoms of hemorrhoids? · Pain or itching around your anus or inside your rectum    · Swelling or bumps around your anus    · Bright red blood in your bowel movement, on the toilet paper, or in the toilet bowl    · Tissue bulging out of your anus (prolapsed hemorrhoids)    · Incontinence (poor control over urine or bowel movements)    How are hemorrhoids diagnosed? Your healthcare provider will ask about your symptoms, the foods you eat, and your bowel movements  He or she will examine your anus for external hemorrhoids  You may need the following:  · A digital rectal exam  is a test to check for hemorrhoids   Your healthcare provider will put a gloved finger inside your anus to feel for the hemorrhoids  · An anoscopy  is a test that uses a scope (small tube with a light and camera on the end) to look at your hemorrhoids  How are hemorrhoids treated? Treatment will depend on your symptoms  You may need any of the following:  · Medicines  can help decrease pain and swelling, and soften your bowel movement  The medicine may be a pill, pad, cream, or ointment  · Procedures  may be used to shrink or remove your hemorrhoid  Examples include rubber-band ligation, sclerotherapy, and photocoagulation  These procedures may be done in your healthcare provider's office  Ask your healthcare provider for more information about these procedures  · Surgery  may be needed to shrink or remove your hemorrhoids  How can I manage my symptoms? · Apply ice on your anus for 15 to 20 minutes every hour or as directed  Use an ice pack, or put crushed ice in a plastic bag  Cover it with a towel before you apply it to your anus  Ice helps prevent tissue damage and decreases swelling and pain  · Take a sitz bath  Fill a bathtub with 4 to 6 inches of warm water  You may also use a sitz bath pan that fits inside a toilet bowl  Sit in the sitz bath for 15 minutes  Do this 3 times a day, and after each bowel movement  The warm water can help decrease pain and swelling  · Keep your anal area clean  Gently wash the area with warm water daily  Soap may irritate the area  After a bowel movement, wipe with moist towelettes or wet toilet paper  Dry toilet paper can irritate the area  How can I help prevent hemorrhoids? · Do not strain to have a bowel movement  Do not sit on the toilet too long  These actions can increase pressure on the tissues in your rectum and anus  · Drink plenty of liquids  Liquids can help prevent constipation  Ask how much liquid to drink each day and which liquids are best for you       · Eat a variety of high-fiber foods  Examples include fruits, vegetables, and whole grains  Ask your healthcare provider how much fiber you need each day  You may need to take a fiber supplement  · Exercise as directed  Exercise, such as walking, may make it easier to have a bowel movement  Ask your healthcare provider to help you create an exercise plan  · Do not have anal sex  Anal sex can weaken the skin around your rectum and anus  · Avoid heavy lifting  This can cause straining and increase your risk for another hemorrhoid  When should I seek immediate care? · You have severe pain in your rectum or around your anus  · You have severe pain in your abdomen and you are vomiting  · You have bleeding from your anus that soaks through your underwear  When should I contact my healthcare provider? · You have frequent and painful bowel movements  · Your hemorrhoid looks or feels more swollen than usual      · You do not have a bowel movement for 2 days or more  · You see or feel tissue coming through your anus  · You have questions or concerns about your condition or care  Colorectal Polyps   WHAT YOU NEED TO KNOW:   What are colorectal polyps? Colorectal polyps are small growths of tissue in the lining of the colon and rectum  Most polyps are hyperplastic polyps and are usually benign (noncancerous)  Certain types of polyps, called adenomatous polyps, may turn into cancer  What increases my risk of colorectal polyps? The exact cause of colorectal polyps is unknown  The following may increase your risk:  · Older age    · A diet of foods high in fat and low in fiber     · Family history of polyps    · Intestinal diseases, such as Crohn's disease or ulcerative colitis    · An unhealthy lifestyle, such as physical inactivity, smoking, or drinking alcohol    · Obesity    What are the signs and symptoms of colorectal polyps?    · Blood in your bowel movement or bleeding from the rectum    · Change in bowel movement habits, such as diarrhea and constipation    · Abdominal pain    How are colorectal polyps diagnosed? You should have fecal blood screening once a year for colorectal disease if you are over 48years old  You should be screened earlier if you have an intestinal disease or a family history of polyps or colorectal cancer  During this screening, a sample of your bowel movement is checked for blood, which may be an early sign of colorectal polyps or cancer  You may also need any of the following tests:  · Digital rectal exam:  Your healthcare provider will examine your anus and use a finger to check your rectum for polyps  · Barium enema: A barium enema is an x-ray of the colon  A tube is put into your anus, and a liquid called barium is put through the tube  Barium is used so that healthcare providers can see your colon better on the x-ray film  · Virtual colonoscopy: This is a CT scan that takes pictures of the inside of your colon and rectum  A small, flexible tube is put into your rectum and air or carbon dioxide (gas) is used to expand your colon  This lets healthcare providers clearly see your colon and any polyps on a monitor  · Colonoscopy or sigmoidoscopy: These procedures help your healthcare provider see the inside of your colon using a flexible tube with a small light and camera on the end  During a sigmoidoscopy, your healthcare provider will only look at rectum and lower colon  During a colonoscopy, healthcare providers will look at the full length of your colon  Healthcare providers may remove a small amount of tissue from the colon for a biopsy  How are colorectal polyps treated? A polypectomy is a minimally invasive procedure to remove your polyps  They may be removed during a colonoscopy or sigmoidoscopy  Your healthcare provider may need to remove the polyps with a laparoscope   Laparoscopy is done by inserting a small, flexible scope into incisions made on your abdomen  What are the risks of colorectal polyps? You may bleed during a colonoscopy procedure  Your bowel may be perforated (torn) when polyps are removed  This may lead to an open abdominal surgery  During surgery, you may bleed too much or get an infection  Adenomatous polyps that are not removed may turn into cancer and become more difficult to treat  Where can I find support and more information? · [address] ([facility])  [address], [place] [address]  Phone: [phone]  Web Address: Anabell Ortiz  [facility] gov    When should I contact my healthcare provider? · You have a fever  · You have chills, a cough, or feel weak and achy  · You have abdominal pain that does not go away or gets worse after you take medicine  · Your abdomen is swollen  · You are losing weight without trying  · You have questions or concerns about your condition or care  When should I seek immediate care or call 911? · You have sudden shortness of breath  · You have a fast heart rate, fast breathing, or are too dizzy to stand up  · You have severe abdominal pain  · You see blood in your bowel movement  CARE AGREEMENT:   You have the right to help plan your care  Learn about your health condition and how it may be treated  Discuss treatment options with your healthcare providers to decide what care you want to receive  You always have the right to refuse treatment  The above information is an  only  It is not intended as medical advice for individual conditions or treatments  Talk to your doctor, nurse or pharmacist before following any medical regimen to see if it is safe and effective for you  © Copyright [address] Information is for End User's use only and may not be sold, redistributed or otherwise used for commercial purposes   All illustrations and images included in CareNotes® are the copyrighted property of [employer]  or [address] [place], [place] [address]

## 2021-03-23 NOTE — INTERVAL H&P NOTE
H&P reviewed  After examining the patient I find no changes in the patients condition since the H&P had been written      Vitals:    03/23/21 0714   BP: 149/73   Pulse: 77   Resp: 22   Temp:    SpO2: 96%

## 2021-03-23 NOTE — PROGRESS NOTES
Pt given written and verbal d/c instructions  Dr Arabella Henriquez spoke with pt and reviewed results  Instructed pt to take eliquis and meds today

## 2021-03-24 NOTE — RESULT ENCOUNTER NOTE
Please check to see if colonoscopy was charted in health maintenance with a 3 year recall    Thank you

## 2021-04-04 DIAGNOSIS — F41.9 ANXIETY: ICD-10-CM

## 2021-04-05 RX ORDER — DULOXETIN HYDROCHLORIDE 20 MG/1
CAPSULE, DELAYED RELEASE ORAL
Qty: 90 CAPSULE | Refills: 3 | OUTPATIENT
Start: 2021-04-05

## 2021-04-08 ENCOUNTER — OFFICE VISIT (OUTPATIENT)
Dept: ENDOCRINOLOGY | Facility: HOSPITAL | Age: 81
End: 2021-04-08
Payer: MEDICARE

## 2021-04-08 ENCOUNTER — TELEPHONE (OUTPATIENT)
Dept: ENDOCRINOLOGY | Facility: HOSPITAL | Age: 81
End: 2021-04-08

## 2021-04-08 VITALS
WEIGHT: 164.2 LBS | DIASTOLIC BLOOD PRESSURE: 80 MMHG | HEART RATE: 88 BPM | SYSTOLIC BLOOD PRESSURE: 132 MMHG | HEIGHT: 63 IN | BODY MASS INDEX: 29.09 KG/M2

## 2021-04-08 DIAGNOSIS — E03.8 HYPOTHYROIDISM DUE TO HASHIMOTO'S THYROIDITIS: ICD-10-CM

## 2021-04-08 DIAGNOSIS — E11.40 TYPE 2 DIABETES MELLITUS WITH DIABETIC NEUROPATHY, WITHOUT LONG-TERM CURRENT USE OF INSULIN (HCC): Primary | ICD-10-CM

## 2021-04-08 DIAGNOSIS — I10 ESSENTIAL HYPERTENSION: ICD-10-CM

## 2021-04-08 DIAGNOSIS — E06.3 HYPOTHYROIDISM DUE TO HASHIMOTO'S THYROIDITIS: ICD-10-CM

## 2021-04-08 DIAGNOSIS — M81.0 AGE-RELATED OSTEOPOROSIS WITHOUT CURRENT PATHOLOGICAL FRACTURE: ICD-10-CM

## 2021-04-08 DIAGNOSIS — E78.5 HYPERLIPIDEMIA, UNSPECIFIED HYPERLIPIDEMIA TYPE: ICD-10-CM

## 2021-04-08 DIAGNOSIS — E55.9 VITAMIN D DEFICIENCY: ICD-10-CM

## 2021-04-08 PROCEDURE — 99214 OFFICE O/P EST MOD 30 MIN: CPT | Performed by: NURSE PRACTITIONER

## 2021-04-08 NOTE — PATIENT INSTRUCTIONS
Be mindful of diet       Stay active and stay hydrated       Continue Metformin 500 mg - three times per day (breakfast, lunch and dinner)       Continue Byetta and Ronen       Please check your blood sugars 2 times daily and for the record to the office in 2 weeks for review and adjustment, if necessary       Continue levothyroxine 125 mcg daily Monday through [address] 25 no tablet on Sunday      Continue atenolol and simvastatin       Continue with regular supplementation of vitamin D3 daily       Obtain lab work as prescribed

## 2021-04-08 NOTE — PROGRESS NOTES
Tito Sherwood 79 y o  female MRN: [id number]    Encounter: [id number]      Assessment/Plan     Assessment: This is a 79y o -year-old female with type 2 diabetes, hypothyroidism, hypertension, hyperlipidemia and vitamin-D deficiency     Plan:  1   Type 2 diabetes:  Her most recent hemoglobin A1c is reasonable at 7 4    Recent blood sugars are relatively controlled for her age and medical condition  For now, she will continue  metformin, Jardiance and Byetta at the current dose   I have asked her to continue checking her blood sugars twice daily at alternating times and for the record to the office in 2 weeks for review and further adjustment, if necessary  She is up-to-date with her annual diabetic eye exams and is seeing the podiatrist every 12 weeks  Check hemoglobin A1c prior to next visit      2   Hypothyroidism:  Her most recent TSH free T4 were normal   Continue levothyroxine 125 mcg Monday through Saturday with no tablet on Sunday   Check TSH and free T4 prior to next office visit      3   Hypertension: Continue atenolol   Check comprehensive metabolic panel prior to next visit      4   Hyperlipidemia:   Triglycerides are slightly elevated on recent fasting lipid panel  Discussed the relationship between hyperglycemia and hypertriglyceridemia  Continue simvastatin      5   Vitamin-D deficiency:  Most recent vitamin-D level is normal   Continue supplement with 1000 units of vitamin D3 daily         6   Osteopenia: Anselmo Burr will continue with calcium and vitamin-D supplementation   Stressed staying active reviewed fall risk and safety at the time of the visit        CC: Type 2 Diabetes follow up    History of Present Illness     HPI:  79 y o  female for follow-up of type 2 diabetes, hypothyroidism, hypertension and hyperlipidemia   She states she has been diagnosed with type 2 diabetes for approximately 8 years   There are no blood sugar records available for review  She is currently taking metformin 500 mg - three times daily, Byetta 10 mg twice daily and Jardiance 25 mg daily   She denies any other episodes of hypoglycemia recently   She denies any recent polyuria, polydipsia or polyphagia   Her most recent hemoglobin A1c from February 9, 2021 of 7 4  She states she is up-to-date with her annual diabetic eye exam with Dr Guevara  of  December 2020  She complains of some numbness and tingling to her feet at times and follows Podiatry every 12 weeks for regular diabetic foot care  Her most recent diabetic foot exam was performed on April 1, 2021       For her hypothyroidism, she is treated with levothyroxine 125 mcg daily Monday through Saturday and no tablet on Sunday   Her most recent TSH from February 24, 2021 was 2 130 with a free T4 of 1 03   She complains of some constipation at times but otherwise has no symptoms/complaints of hypo or hyperthyroidism      Her hypertension is treated with atenolol 50 mg daily      For her hyperlipidemia, she is treated with simvastatin 40 mg daily      For her vitamin-D deficiency, she supplements with 1000 units of vitamin D3 daily  Her most recent 25 hydroxy vitamin-D level from February 24, 2021 is 45  6      Her most recent DEXA scan performed on March 7, 2019 revealed a T-score of -1 4 and lumbar spine consistent with low bone mineral density-osteopenia   When compared to the prior examination, there was no significant change to the total bone mineral density of the lumbar spine   However, there was a 5% decrease in the total bone mineral density of the left hip   She continues to supplement with calcium and vitamin-D daily   She denies any recent falls or fractures        Review of Systems   Constitutional: Positive for fatigue ( at times)  Negative for chills and fever  HENT: Negative  Negative for trouble swallowing and voice change  Eyes: Negative  Negative for visual disturbance  Respiratory: Negative  Negative for chest tightness and shortness of breath  Cardiovascular: Negative  Negative for chest pain  Gastrointestinal: Negative  Negative for abdominal pain, constipation, diarrhea and vomiting  Endocrine: Positive for polyuria ( attributed to Jardiance)  Negative for cold intolerance, heat intolerance, polydipsia and polyphagia  Genitourinary: Negative  Musculoskeletal: Positive for arthralgias ( right hand)  Skin: Negative  Allergic/Immunologic: Negative  Neurological: Negative  Negative for dizziness, syncope, light-headedness and headaches  Hematological: Negative  Psychiatric/Behavioral: Negative  All other systems reviewed and are negative        Historical Information   Past Medical History:   Diagnosis Date    A-fib [facility])     Abnormal ECG     last assessed: 7/14/2015    Acid reflux     resolved    Anxiety     Pacheco esophagus     Benign neoplasm of sigmoid colon 09/13/2011    next colon 2012    Bronchitis, asthmatic     last assessed: 3/12/2012    [facility] ([facility] )     2012- had colon resection    Colon polyp     COPD (chronic obstructive pulmonary disease) (HCC)     questionable    Depression     [facility]     on byetta    Dizziness     occ    Esophageal stricture     last assessed: 9/30/2014    High cholesterol     Hypertension     Hypothyroidism     Iron (Fe) deficiency anemia 12/22/2011    iron pill continue    Irritable bowel syndrome     OA (osteoarthritis)     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    Pacemaker     hx SSS    Rash     labia area- uses cream prn    Restrictive lung disease     Seasonal allergies     Skin scarring/fibrosis     fibrotic scar; last assessed: 3/22/2013    MONA (stress urinary incontinence, female)     Urinary frequency     last assessed: 9/10/2012    Wears glasses     reading     Past Surgical History:   Procedure Laterality Date    BREAST EXCISIONAL BIOPSY Right 1990    benign, best guess on year   [address]      does not know type  Dr Geovanna Mcrae is cariologist    [address] [place] [address] SURGERY      resection  removed 25 In     COLONOSCOPY  06/01/2012 June 2018: Adenomatous polyp, colorectal anastomosis, internal hemorrhoids  06/01/2012:  proctosigmoidectomy    ESOPHAGOGASTRODUODENOSCOPY N/A 1/9/2016    Procedure: ESOPHAGOGASTRODUODENOSCOPY (EGD); Surgeon: Aline Vázquez MD;  [facility];  Service:     HYSTERECTOMY  1978    CA ARTHROPLASTY I-P JT Right 8/16/2018    Procedure: ARTHROPLASTY PIP/FINGER - RIGHT RING;  Surgeon: Daniela Macario MD;  [facility];  Service: Orthopedics    CA CYSTOURETHROSCOPY N/A 5/14/2018    Procedure: Harsamantha Rubinstein;  Surgeon: Jossie Live MD;  [facility];  Service: UroGynecology           CA LAP, REPAIR PARAESOPHAGEAL HERNIA, INCL FUNDOPLASTY W/ MESH N/A 1/27/2016    Procedure: LAPAROSCOPIC PARAESOPHAGEAL HERNIA REPAIR with mesh; toupet  FUNDOPLICATION ;  Surgeon: Benuel Gosselin, MD;  [facility];  Service: Thoracic    CA REPAIR INTERCARP/CARP-METACARP JT Right 1/10/2019    Procedure: Brianda Hoit;  Surgeon: Daniela Macario MD;  [facility];  Service: Orthopedics    CA SLING OPER STRES INCONTINENCE N/A 5/14/2018    Procedure: INSERTION PUBOVAGINAL SLING (FEMALE); Surgeon: Jossie Live MD;  [facility];  Service: UroGynecology           TOTAL ABDOMINAL HYSTERECTOMY      UPPER GASTROINTESTINAL ENDOSCOPY      June 2018:  Irregular Z-line positive for intestinal metaplasia/ Pacheco's, no dysplasia  Gastritis H pylori negative      WRIST TENDON TRANSFER Right 1/10/2019    Procedure: TRANSFER TENDON FLEXOR CARPI RADIALIS FROM FOREARM TO HAND;  Surgeon: Daniela Macario MD;  [facility];  Service: Orthopedics     Social History   Social History     Substance and Sexual Activity   Alcohol Use No     Social History     Substance and Sexual Activity   Drug Use No     Social History     Tobacco Use   Smoking Status Former Smoker    Types: Cigarettes    Quit date:     Years since quittin 2   Smokeless Tobacco Former User    Quit date:      Family History:   Family History   Problem Relation Age of Onset    Heart disease Mother     Diabetes Mother     Asthma Father     Stomach cancer Father         gastrkic cancer    Cancer Paternal Grandmother     Cancer Paternal Grandfather     No Known Problems Brother     Breast cancer Sister 76    Breast cancer Sister 77    Pancreatic cancer Sister 72    No Known Problems Maternal Aunt     No Known Problems Maternal Aunt     No Known Problems Maternal Aunt     No Known Problems Paternal Aunt     No Known Problems Daughter     No Known Problems Maternal Grandmother     No Known Problems Maternal Grandfather     Prostate cancer Paternal Uncle         age unknown    Substance Abuse Neg Hx     Mental illness Neg Hx     Alcohol abuse Neg Hx     Colon cancer Neg Hx     Colon polyps Neg Hx        Meds/Allergies   Current Outpatient Medications   Medication Sig Dispense Refill    albuterol (ProAir HFA) 90 mcg/act inhaler Inhale 2 puffs every 6 (six) hours as needed for wheezing or shortness of breath 8 5 g 0    apixaban (Eliquis) 5 mg Take 1 tablet (5 mg total) by mouth 2 (two) times a day 56 tablet 0    Apple Cider Vinegar 500 MG TABS Take by mouth daily      ascorbic acid (VITAMIN C) 500 mg tablet Take 500 mg by mouth daily      atenolol (TENORMIN) 50 mg tablet TAKE 1 TABLET AT BEDTIME 90 tablet 3    Blood Glucose Monitoring Suppl (FREESTYLE FREEDOM LITE) w/Device KIT by Does not apply route once for 1 dose Use meter to check blood sugars daily   1 each 0    Byetta 10 MCG Pen 10 MCG/0 04ML SOPN INJECT 0 04 ML UNDER THE SKIN TWICE A DAY 7 2 mL 3    Cholecalciferol (VITAMIN D-3) 1000 units CAPS Take 1 capsule by mouth daily      clobetasol (TEMOVATE) 0 05 % cream       Coenzyme Q10 (COQ10) 200 MG CAPS Take 200 mg by mouth daily        DULoxetine HCl 40 MG CPEP Take 1 capsule (40 mg total) by mouth daily 90 capsule 1    estradiol (ESTRACE) 0 1 mg/g vaginal cream Insert 1 g into the vagina once a week (Patient taking differently: Insert 1 g into the vagina daily ) 42 5 g 2    Ferrous Sulfate 27 MG TABS Take 1 tablet by mouth daily      FREESTYLE LITE test strip Test 3 times daily  300 each 3    Glucosamine-Chondroitin (MOVE FREE PO) Take by mouth daily      hydrocortisone 2 5 % cream APPLY TOPICALLY TWICE A DAY  MIX WITH KETOCONAZOLE CREAM AND APPLY TO VULVA TWICE A DAY FOR 1 MONTH 60 g 11    JARDIANCE 25 MG TABS TAKE 1 TABLET EVERY MORNING 90 tablet 3    ketoconazole (NIZORAL) 2 % cream APPLY TOPICALLY TWICE A DAY  MIX WITH HYDROCORTISONE AND APPLY TO VULVA TWICE A DAY FOR 1 MONTH 60 g 11    levothyroxine 125 mcg tablet TAKE 1 TABLET 6 DAYS OF THE WEEK 102 tablet 3    MEGARED OMEGA-3 KRILL OIL PO Take 1 capsule by mouth daily      metFORMIN (GLUCOPHAGE-XR) 500 mg 24 hr tablet TAKE 1 TABLET THREE TIMES A DAY WITH MEALS 270 tablet 3    simvastatin (ZOCOR) 20 mg tablet Take 2 tablets (40 mg total) by mouth daily at bedtime 180 tablet 3    Continuous Blood Gluc  (FREESTYLE VICTORINA 14 DAY READER) SVETLANA 1 Device by Does not apply route 4 (four) times a day (before meals and at bedtime) (Patient not taking: Reported on 4/8/2021) 1 Device 0    Continuous Blood Gluc Sensor (FREESTYLE VICTORINA 14 DAY SENSOR) MISC 1 application by Does not apply route every 14 (fourteen) days (Patient not taking: Reported on 4/8/2021) 2 each 6     No current facility-administered medications for this visit  Allergies   Allergen Reactions    Fluconazole Dizziness       Objective   Vitals: Height 5' 3 25" (1 607 m), weight 74 5 kg (164 lb 3 2 oz), not currently breastfeeding  Physical Exam  Vitals signs reviewed  Constitutional:       Appearance: She is well-developed  HENT:      Head: Normocephalic and atraumatic     Eyes:      Conjunctiva/sclera: Conjunctivae normal       Pupils: Pupils are equal, round, and reactive to light  Neck:      Musculoskeletal: Normal range of motion and neck supple  Cardiovascular:      Rate and Rhythm: Normal rate and regular rhythm  Heart sounds: Normal heart sounds  Pulmonary:      Effort: Pulmonary effort is normal       Breath sounds: Normal breath sounds  Abdominal:      General: Bowel sounds are normal       Palpations: Abdomen is soft  Musculoskeletal: Normal range of motion  Skin:     General: Skin is warm and dry  Neurological:      Mental Status: She is alert and oriented to person, place, and time  Psychiatric:         Behavior: Behavior normal          Thought Content:  Thought content normal          Judgment: Judgment normal        Lab Results:   Lab Results   Component Value Date/Time    Hemoglobin A1C 7 4 (A) 02/09/2021 08:01 AM    Hemoglobin A1C 7 3 (H) 11/25/2020 07:23 AM    Hemoglobin A1C 7 0 (H) 08/17/2020 07:41 AM    Hemoglobin A1C 7 3 (H) 05/26/2020 07:27 AM    WBC 9 60 02/24/2021 07:02 AM    WBC 8 01 08/17/2020 07:41 AM    Hemoglobin 15 9 (H) 02/24/2021 07:02 AM    Hemoglobin 15 5 (H) 08/17/2020 07:41 AM    Hematocrit 51 0 (H) 02/24/2021 07:02 AM    Hematocrit 49 3 (H) 08/17/2020 07:41 AM    MCV 91 02/24/2021 07:02 AM    MCV 92 08/17/2020 07:41 AM    Platelets 505 68/33/2906 07:02 AM    Platelets 370 50/94/8355 07:41 AM    BUN 8 11/25/2020 07:23 AM    BUN 12 08/17/2020 07:41 AM    BUN 12 05/26/2020 07:27 AM    Potassium 4 6 11/25/2020 07:23 AM    Potassium 4 6 08/17/2020 07:41 AM    Potassium 4 2 05/26/2020 07:27 AM    Chloride 107 11/25/2020 07:23 AM    Chloride 104 08/17/2020 07:41 AM    Chloride 103 05/26/2020 07:27 AM    CO2 30 11/25/2020 07:23 AM    CO2 29 08/17/2020 07:41 AM    CO2 28 05/26/2020 07:27 AM    Creatinine 0 67 11/25/2020 07:23 AM    Creatinine 0 73 08/17/2020 07:41 AM    Creatinine 0 69 05/26/2020 07:27 AM    AST 17 11/25/2020 07:23 AM    AST 17 08/17/2020 07:41 AM    AST 20 05/26/2020 07:27 AM    ALT 23 11/25/2020 07:23 AM    ALT 23 08/17/2020 07:41 AM    ALT 26 05/26/2020 07:27 AM    Albumin 3 8 11/25/2020 07:23 AM    Albumin 4 1 08/17/2020 07:41 AM    Albumin 3 9 05/26/2020 07:27 AM    HDL, Direct 33 (L) 02/24/2021 07:02 AM    HDL, Direct 29 (L) 08/17/2020 07:41 AM    HDL, Direct 26 (L) 08/06/2020 07:10 AM    Triglycerides 248 (H) 02/24/2021 07:02 AM    Triglycerides 213 (H) 08/17/2020 07:41 AM    Triglycerides 241 (H) 08/06/2020 07:10 AM     Portions of the record may have been created with voice recognition software  Occasional wrong word or "sound a like" substitutions may have occurred due to the inherent limitations of voice recognition software  Read the chart carefully and recognize, using context, where substitutions have occurred

## 2021-04-28 DIAGNOSIS — F41.9 ANXIETY: ICD-10-CM

## 2021-04-29 RX ORDER — DULOXETINE 40 MG/1
40 CAPSULE, DELAYED RELEASE ORAL DAILY
Qty: 90 CAPSULE | Refills: 1 | Status: SHIPPED | OUTPATIENT
Start: 2021-04-29 | End: 2021-05-06

## 2021-05-06 ENCOUNTER — PROCEDURE VISIT (OUTPATIENT)
Dept: FAMILY MEDICINE CLINIC | Facility: CLINIC | Age: 81
End: 2021-05-06
Payer: MEDICARE

## 2021-05-06 VITALS
HEART RATE: 74 BPM | DIASTOLIC BLOOD PRESSURE: 78 MMHG | TEMPERATURE: 98.3 F | WEIGHT: 162.2 LBS | SYSTOLIC BLOOD PRESSURE: 122 MMHG | HEIGHT: 63 IN | RESPIRATION RATE: 16 BRPM | BODY MASS INDEX: 28.74 KG/M2

## 2021-05-06 DIAGNOSIS — F41.9 ANXIETY: ICD-10-CM

## 2021-05-06 DIAGNOSIS — H61.21 IMPACTED CERUMEN OF RIGHT EAR: Primary | ICD-10-CM

## 2021-05-06 PROCEDURE — 69210 REMOVE IMPACTED EAR WAX UNI: CPT | Performed by: FAMILY MEDICINE

## 2021-05-06 RX ORDER — DULOXETIN HYDROCHLORIDE 30 MG/1
30 CAPSULE, DELAYED RELEASE ORAL DAILY
Qty: 90 CAPSULE | Refills: 3 | Status: SHIPPED | OUTPATIENT
Start: 2021-05-06 | End: 2021-08-10

## 2021-05-06 NOTE — PROGRESS NOTES
Ear cerumen removal    Date/Time: 5/6/2021 12:52 PM  Performed by: Rose Marie Fuentes DO  Authorized by: Rose Marie Fuentes DO   Universal Protocol:  Consent: Verbal consent obtained  Risks and benefits: risks, benefits and alternatives were discussed  Consent given by: patient  Patient understanding: patient states understanding of the procedure being performed  Patient identity confirmed: verbally with patient      Patient location:  Clinic  Procedure details:     Local anesthetic:  None    Location:  R ear    Procedure type: irrigation with instrumentation      Instrumentation: forceps      Approach:  Natural orifice endoscopic    Visualization (free text):   once well x-rays removed TM was intact  Post-procedure details:     Complication:  None    Hearing quality:  Normal    Patient tolerance of procedure:   Tolerated well, no immediate complications

## 2021-05-11 DIAGNOSIS — L90.0 LICHEN SCLEROSUS: ICD-10-CM

## 2021-05-12 RX ORDER — ESTRADIOL 0.1 MG/G
0.5 CREAM VAGINAL 2 TIMES WEEKLY
Qty: 42.5 G | Refills: 1 | Status: SHIPPED | OUTPATIENT
Start: 2021-05-13

## 2021-05-19 NOTE — PROGRESS NOTES
Assessment/Plan:  Patient will continue ketoconazole/hydrocortisone creams externally twice a week and vaginal estrogen cream internally twice a week  Recommended monthly SBE, annual CBE and annual screening mammo  DEXA and colonoscopy noted to be up to date  Reviewed diet/activity recommendations Calcium 5051-7569 mg and Vit D 600-1000 IU daily  Discussed postmenopausal considerations and symptoms to report  Kegel exercises as instructed  RTO in one year for routine annual gyn exam or sooner PRN  Diagnoses and all orders for this visit:    Encounter for gynecological examination (general) (routine) with abnormal findings    Screening mammogram, encounter for  -     Mammo screening bilateral w 3d & cad; Future    Screening for osteoporosis  -     DXA bone density spine hip and pelvis; Future    Menopause  -     DXA bone density spine hip and pelvis; Future    Chronic vulvitis    Intertrigo    Lichen sclerosus        Subjective:      Patient ID: Florence Fisher is a 79 y o  female  This patient presents for routine annual gyn exam   Patient has a hx of vulvitis  Vulvar biopsy showed inflammation, not specific lichens  She is a diabetic  Last Hbg A1c 2/2021 7 4  She has been prescribed ketoconazole and hydrocortisone combination for external use twice per week unless a flare up occurs and estrace cream 1/2 gm 2x per week internally  Symptoms are much improved  She denies  bleeding or spotting, VM sx, pelvic pain,  breast concerns, abnormal discharge, bowel/bladder dysfunction, depression/anx  Mammography up to date and normal   Osteoporosis screening up to date  Colonoscopy up to date and normal         The following portions of the patient's history were reviewed and updated as appropriate: allergies, current medications, past family history, past medical history, past social history, past surgical history and problem list     Review of Systems   Constitutional: Negative  Respiratory: Negative  Cardiovascular: Negative  Gastrointestinal: Negative  Endocrine: Negative  Genitourinary: Negative for dyspareunia, dysuria, frequency, pelvic pain, urgency, vaginal bleeding, vaginal discharge and vaginal pain  Musculoskeletal: Negative  Skin: Negative  Neurological: Negative  Psychiatric/Behavioral: Negative  Objective:      /74 (BP Location: Left arm, Patient Position: Sitting, Cuff Size: Standard)   Pulse 85   Ht 5' 3" (1 6 m)   Wt 74 8 kg (165 lb)   BMI 29 23 kg/m²          Physical Exam  Vitals signs and nursing note reviewed  Exam conducted with a chaperone present  Constitutional:       Appearance: Normal appearance  She is well-developed  HENT:      Head: Normocephalic and atraumatic  Neck:      Musculoskeletal: Normal range of motion and neck supple  Thyroid: No thyroid mass or thyromegaly  Cardiovascular:      Rate and Rhythm: Normal rate and regular rhythm  Heart sounds: Normal heart sounds  Pulmonary:      Effort: Pulmonary effort is normal       Breath sounds: Normal breath sounds  Chest:      Breasts: Breasts are symmetrical          Right: No inverted nipple, mass, nipple discharge, skin change or tenderness  Left: No inverted nipple, mass, nipple discharge, skin change or tenderness  Abdominal:      General: Bowel sounds are normal       Palpations: Abdomen is soft  Tenderness: There is no abdominal tenderness  Hernia: There is no hernia in the left inguinal area or right inguinal area  Genitourinary:     Exam position: Supine  Pubic Area: Rash (mild erythema, much improved) present  Labia:         Right: No rash, tenderness, lesion or injury  Left: No rash, tenderness, lesion or injury  Urethra: No prolapse, urethral pain, urethral swelling or urethral lesion  Vagina: Normal  No signs of injury and foreign body   No vaginal discharge, erythema, tenderness, bleeding, lesions or prolapsed vaginal walls  Cervix: No cervical motion tenderness, discharge, friability, lesion, erythema, cervical bleeding or eversion  Uterus: Not deviated, not enlarged, not fixed, not tender and no uterine prolapse  Adnexa:         Right: No mass, tenderness or fullness  Left: No mass, tenderness or fullness  Rectum: No external hemorrhoid  Comments: Urethra normal without lesions  No bladder tenderness  Musculoskeletal: Normal range of motion  Lymphadenopathy:      Lower Body: No right inguinal adenopathy  No left inguinal adenopathy  Skin:     General: Skin is warm and dry  Neurological:      Mental Status: She is alert and oriented to person, place, and time  Psychiatric:         Speech: Speech normal          Behavior: Behavior normal  Behavior is cooperative

## 2021-05-21 ENCOUNTER — OFFICE VISIT (OUTPATIENT)
Dept: GYNECOLOGY | Facility: CLINIC | Age: 81
End: 2021-05-21
Payer: MEDICARE

## 2021-05-21 VITALS
BODY MASS INDEX: 29.23 KG/M2 | SYSTOLIC BLOOD PRESSURE: 122 MMHG | HEIGHT: 63 IN | WEIGHT: 165 LBS | HEART RATE: 85 BPM | DIASTOLIC BLOOD PRESSURE: 74 MMHG

## 2021-05-21 DIAGNOSIS — N76.3 CHRONIC VULVITIS: ICD-10-CM

## 2021-05-21 DIAGNOSIS — Z12.31 SCREENING MAMMOGRAM, ENCOUNTER FOR: ICD-10-CM

## 2021-05-21 DIAGNOSIS — L30.4 INTERTRIGO: ICD-10-CM

## 2021-05-21 DIAGNOSIS — Z01.411 ENCOUNTER FOR GYNECOLOGICAL EXAMINATION (GENERAL) (ROUTINE) WITH ABNORMAL FINDINGS: Primary | ICD-10-CM

## 2021-05-21 DIAGNOSIS — Z78.0 MENOPAUSE: ICD-10-CM

## 2021-05-21 DIAGNOSIS — Z13.820 SCREENING FOR OSTEOPOROSIS: ICD-10-CM

## 2021-05-21 DIAGNOSIS — L90.0 LICHEN SCLEROSUS: ICD-10-CM

## 2021-05-21 PROCEDURE — G0101 CA SCREEN;PELVIC/BREAST EXAM: HCPCS | Performed by: OBSTETRICS & GYNECOLOGY

## 2021-06-03 ENCOUNTER — REMOTE DEVICE CLINIC VISIT (OUTPATIENT)
Dept: CARDIOLOGY CLINIC | Facility: CLINIC | Age: 81
End: 2021-06-03
Payer: MEDICARE

## 2021-06-03 DIAGNOSIS — Z95.0 CARDIAC PACEMAKER IN SITU: Primary | ICD-10-CM

## 2021-06-03 PROCEDURE — 93294 REM INTERROG EVL PM/LDLS PM: CPT | Performed by: INTERNAL MEDICINE

## 2021-06-03 PROCEDURE — 93296 REM INTERROG EVL PM/IDS: CPT | Performed by: INTERNAL MEDICINE

## 2021-06-03 NOTE — PROGRESS NOTES
Results for orders placed or performed in visit on 06/03/21   Cardiac EP device report    Narrative    MDT-DUAL CHAMBER PPM/ ACTIVE SYSTEM IS MRI CONDITIONAL  CARELINK TRANSMISSION: BATTERY VOLTAGE ADEQUATE (3 YRS)  AP-87%, -3%  ALL AVAILABLE LEAD PARAMETERS WITHIN NORMAL LIMITS  2 NSVT EPISODES- 5 BEATS, AVG CL~305MS & 8 BEATS, AVG CL~360MS  EF-55% (ECHO 3/7/19)  121 AF EPISODES MAX DURATION 10 HRS  8 FAST A&V EPISODES @ 155-188 BPM MAX DURATION 57 SEC- AF W/ RVR ON EGM'S  HX: PAF & ON ELIQUIS & ATENOLOL  AF BURDEN-6 2%  NORMAL DEVICE FUNCTION   GV

## 2021-06-14 ENCOUNTER — LAB (OUTPATIENT)
Dept: LAB | Facility: CLINIC | Age: 81
End: 2021-06-14
Payer: MEDICARE

## 2021-06-14 DIAGNOSIS — E78.5 HYPERLIPIDEMIA, UNSPECIFIED HYPERLIPIDEMIA TYPE: ICD-10-CM

## 2021-06-14 DIAGNOSIS — M81.0 AGE-RELATED OSTEOPOROSIS WITHOUT CURRENT PATHOLOGICAL FRACTURE: ICD-10-CM

## 2021-06-14 DIAGNOSIS — I10 ESSENTIAL HYPERTENSION: ICD-10-CM

## 2021-06-14 DIAGNOSIS — E06.3 HYPOTHYROIDISM DUE TO HASHIMOTO'S THYROIDITIS: ICD-10-CM

## 2021-06-14 DIAGNOSIS — E55.9 VITAMIN D DEFICIENCY: ICD-10-CM

## 2021-06-14 DIAGNOSIS — E03.8 HYPOTHYROIDISM DUE TO HASHIMOTO'S THYROIDITIS: ICD-10-CM

## 2021-06-14 DIAGNOSIS — E11.40 TYPE 2 DIABETES MELLITUS WITH DIABETIC NEUROPATHY, WITHOUT LONG-TERM CURRENT USE OF INSULIN (HCC): ICD-10-CM

## 2021-06-14 LAB
ALBUMIN SERPL BCP-MCNC: 3.9 G/DL (ref 3.5–5)
ALP SERPL-CCNC: 73 U/L (ref 46–116)
ALT SERPL W P-5'-P-CCNC: 24 U/L (ref 12–78)
ANION GAP SERPL CALCULATED.3IONS-SCNC: 5 MMOL/L (ref 4–13)
AST SERPL W P-5'-P-CCNC: 23 U/L (ref 5–45)
BILIRUB SERPL-MCNC: 0.77 MG/DL (ref 0.2–1)
BUN SERPL-MCNC: 16 MG/DL (ref 5–25)
CALCIUM SERPL-MCNC: 9.1 MG/DL (ref 8.3–10.1)
CHLORIDE SERPL-SCNC: 107 MMOL/L (ref 100–108)
CO2 SERPL-SCNC: 28 MMOL/L (ref 21–32)
CREAT SERPL-MCNC: 0.72 MG/DL (ref 0.6–1.3)
EST. AVERAGE GLUCOSE BLD GHB EST-MCNC: 174 MG/DL
GFR SERPL CREATININE-BSD FRML MDRD: 80 ML/MIN/1.73SQ M
GLUCOSE P FAST SERPL-MCNC: 159 MG/DL (ref 65–99)
HBA1C MFR BLD: 7.7 %
POTASSIUM SERPL-SCNC: 4.4 MMOL/L (ref 3.5–5.3)
PROT SERPL-MCNC: 7.2 G/DL (ref 6.4–8.2)
SODIUM SERPL-SCNC: 140 MMOL/L (ref 136–145)
T4 FREE SERPL-MCNC: 0.96 NG/DL (ref 0.76–1.46)
TSH SERPL DL<=0.05 MIU/L-ACNC: 2.08 UIU/ML (ref 0.36–3.74)

## 2021-06-14 PROCEDURE — 36415 COLL VENOUS BLD VENIPUNCTURE: CPT

## 2021-06-14 PROCEDURE — 80053 COMPREHEN METABOLIC PANEL: CPT

## 2021-06-14 PROCEDURE — 84439 ASSAY OF FREE THYROXINE: CPT

## 2021-06-14 PROCEDURE — 84443 ASSAY THYROID STIM HORMONE: CPT

## 2021-06-14 PROCEDURE — 83036 HEMOGLOBIN GLYCOSYLATED A1C: CPT

## 2021-06-15 NOTE — RESULT ENCOUNTER NOTE
Please call the patient regarding abnormal result  Hemoglobin A1c has elevated to 7 7  Fasting glucose is elevated on comprehensive metabolic panel at 258    TSH and free T4 are normal

## 2021-06-28 ENCOUNTER — OFFICE VISIT (OUTPATIENT)
Dept: DERMATOLOGY | Facility: CLINIC | Age: 81
End: 2021-06-28
Payer: MEDICARE

## 2021-06-28 VITALS — HEIGHT: 63 IN | WEIGHT: 162 LBS | TEMPERATURE: 96.1 F | BODY MASS INDEX: 28.7 KG/M2

## 2021-06-28 DIAGNOSIS — E78.5 HYPERLIPIDEMIA, UNSPECIFIED HYPERLIPIDEMIA TYPE: ICD-10-CM

## 2021-06-28 DIAGNOSIS — L57.0 ACTINIC KERATOSIS: Primary | ICD-10-CM

## 2021-06-28 PROCEDURE — 17000 DESTRUCT PREMALG LESION: CPT | Performed by: STUDENT IN AN ORGANIZED HEALTH CARE EDUCATION/TRAINING PROGRAM

## 2021-06-28 PROCEDURE — 99203 OFFICE O/P NEW LOW 30 MIN: CPT | Performed by: STUDENT IN AN ORGANIZED HEALTH CARE EDUCATION/TRAINING PROGRAM

## 2021-06-28 PROCEDURE — 17003 DESTRUCT PREMALG LES 2-14: CPT | Performed by: STUDENT IN AN ORGANIZED HEALTH CARE EDUCATION/TRAINING PROGRAM

## 2021-06-28 RX ORDER — SIMVASTATIN 20 MG
40 TABLET ORAL
Qty: 180 TABLET | Refills: 3 | Status: SHIPPED | OUTPATIENT
Start: 2021-06-28 | End: 2022-07-18

## 2021-06-28 NOTE — PROGRESS NOTES
[facility] Note     Patient Name: Nora Mares  Encounter Date: 06/28/21     Have you been cared for by a [facility] Dermatologist in the last 3 years and, if so, which one? No    · Have you traveled outside of the [address] [place] [address] in the past 3 months or outside of the [facility] in the last 2 weeks? No     May we call your Preferred Phone number to discuss your specific medical information? Yes     May we leave a detailed message that includes your specific medical information? Yes      Today's Chief Concerns:   Concern #1:  Face check    Past Medical History:  Have you personally ever had or currently have any of the following? · Skin cancer (such as Melanoma, Basal Cell Carcinoma, Squamous Cell Carcinoma? (If Yes, please provide more detail)- No  · Eczema: No  · Psoriasis: No  · HIV/AIDS: No  · Hepatitis B or C: No  · Tuberculosis: no  · Systemic Immunosuppression such as Diabetes, Biologic or Immunotherapy, Chemotherapy, Organ Transplantation, Bone Marrow Transplantation (If YES, please provide more detail): YES, diabetes  · Radiation Treatment (If YES, please provide more detail): No  · Any other major medical conditions/concerns? (If Yes, which types)- YES, colon cancer    Social History:     What is/was your primary occupation? retired     What are your hobbies/past-times? gardening    Family History:  Have any of your "first degree relatives" (parent, brother, sister, or child) had any of the following       · Skin cancer such as Melanoma or Merkel Cell Carcinoma or Pancreatic Cancer? No  · Eczema, Asthma, Hay Fever or Seasonal Allergies: No  · Psoriasis or Psoriatic Arthritis: No  · Do any other medical conditions seem to run in your family? If Yes, what condition and which relatives?   YES, mother had hx of diabetes    Current Medications:         Current Outpatient Medications:     albuterol (ProAir HFA) 90 mcg/act inhaler, Inhale 2 puffs every 6 (six) hours as needed for wheezing or shortness of breath, Disp: 8 5 g, Rfl: 0    apixaban (Eliquis) 5 mg, Take 1 tablet (5 mg total) by mouth 2 (two) times a day, Disp: 56 tablet, Rfl: 0    Apple Cider Vinegar 500 MG TABS, Take by mouth daily, Disp: , Rfl:     ascorbic acid (VITAMIN C) 500 mg tablet, Take 500 mg by mouth daily, Disp: , Rfl:     atenolol (TENORMIN) 50 mg tablet, TAKE 1 TABLET AT BEDTIME, Disp: 90 tablet, Rfl: 3    Byetta 10 MCG Pen 10 MCG/0 04ML SOPN, INJECT 0 04 ML UNDER THE SKIN TWICE A DAY, Disp: 7 2 mL, Rfl: 3    Cholecalciferol (VITAMIN D-3) 1000 units CAPS, Take 1 capsule by mouth daily, Disp: , Rfl:     clobetasol (TEMOVATE) 0 05 % cream, , Disp: , Rfl:     Coenzyme Q10 (COQ10) 200 MG CAPS, Take 200 mg by mouth daily  , Disp: , Rfl:     Continuous Blood Gluc  (FREESTYLE VICTORINA 14 DAY READER) SVETLANA, 1 Device by Does not apply route 4 (four) times a day (before meals and at bedtime), Disp: 1 Device, Rfl: 0    Continuous Blood Gluc Sensor (FREESTYLE VICTORINA 14 DAY SENSOR) MISC, 1 application by Does not apply route every 14 (fourteen) days, Disp: 2 each, Rfl: 6    DULoxetine (CYMBALTA) 30 mg delayed release capsule, Take 1 capsule (30 mg total) by mouth daily, Disp: 90 capsule, Rfl: 3    estradiol (ESTRACE VAGINAL) 0 1 mg/g vaginal cream, Insert 0 5 g into the vagina 2 (two) times a week Patient is NOT to be using this daily, Disp: 42 5 g, Rfl: 1    Ferrous Sulfate 27 MG TABS, Take 1 tablet by mouth daily, Disp: , Rfl:     FREESTYLE LITE test strip, Test 3 times daily  , Disp: 300 each, Rfl: 3    hydrocortisone 2 5 % cream, APPLY TOPICALLY TWICE A DAY  MIX WITH KETOCONAZOLE CREAM AND APPLY TO VULVA TWICE A DAY FOR 1 MONTH, Disp: 60 g, Rfl: 11    JARDIANCE 25 MG TABS, TAKE 1 TABLET EVERY MORNING, Disp: 90 tablet, Rfl: 3    ketoconazole (NIZORAL) 2 % cream, APPLY TOPICALLY TWICE A DAY    MIX WITH HYDROCORTISONE AND APPLY TO VULVA TWICE A DAY FOR 1 MONTH, Disp: 60 g, Rfl: 11    levothyroxine 125 mcg tablet, TAKE 1 TABLET 6 DAYS OF THE WEEK, Disp: 102 tablet, Rfl: 3    MEGARED OMEGA-3 KRILL OIL PO, Take 1 capsule by mouth daily, Disp: , Rfl:     metFORMIN (GLUCOPHAGE-XR) 500 mg 24 hr tablet, TAKE 1 TABLET THREE TIMES A DAY WITH MEALS, Disp: 270 tablet, Rfl: 3    simvastatin (ZOCOR) 20 mg tablet, Take 2 tablets (40 mg total) by mouth daily at bedtime, Disp: 180 tablet, Rfl: 3    Blood Glucose Monitoring Suppl (FREESTYLE FREEDOM LITE) w/Device KIT, by Does not apply route once for 1 dose Use meter to check blood sugars daily  , Disp: 1 each, Rfl: 0    Glucosamine-Chondroitin (MOVE FREE PO), Take by mouth daily, Disp: , Rfl:       Review of Systems:  Have you recently had or currently have any of the following? If YES, what are you doing for the problem? · Fever, chills or unintended weight loss: No  · Sudden loss or change in your vision: No  · Nausea, vomiting or blood in your stool: No  · Painful or swollen joints: YES, arthiritis  · Wheezing or cough:  Yes, allergies  · Changing mole or non-healing wound: No  · Nosebleeds: No  · Excessive sweating: No  · Easy or prolonged bleeding? No  · Over the last 2 weeks, how often have you been bothered by the following problems? · Taking little interest or pleasure in doing things: 1 - Not at All  · Feeling down, depressed, or hopeless: 1 - Not at All  · Rapid heartbeat with epinephrine:  No    · FEMALES ONLY:    · Are you pregnant or planning to become pregnant? No  · Are you currently or planning to be nursing or breast feeding? No    · Any known allergies? Allergies   Allergen Reactions    Fluconazole Dizziness         Physical Exam:     Was a chaperone (Derm Clinical Assistant) present throughout the entire Physical Exam? Yes     Did the Dermatology Team specifically  the patient on the importance of a Full Skin Exam to be sure that nothing is missed clinically?  Yes}  o Did the patient ultimately request or accept a Full Skin Exam?  NO  o Did the patient specifically refuse to have the areas "under-the-bra" examined by the Dermatologist? No  o Did the patient specifically refuse to have the areas "under-the-underwear" examined by the Dermatologist? No    CONSTITUTIONAL:   Vitals:    06/28/21 0828   Temp: (!) 96 1 °F (35 6 °C)   TempSrc: Tympanic   Weight: 73 5 kg (162 lb)   Height: 5' 3" (1 6 m)       PSYCH: Normal mood and affect  EYES: Normal conjunctiva  ENT: Normal lips and oral mucosa  CARDIOVASCULAR: No edema  RESPIRATORY: Normal respirations  HEME/LYMPH/IMMUNO:  No regional lymphadenopathy except as noted below in "ASSESSMENT AND PLAN BY DIAGNOSIS"    SKIN:  FOCUSED ORGAN SYSTEM EXAM   Hair, Face Normal except as noted below in Assessment   Neck Normal except as noted below in Assessment   Right Arm/Hand/Fingers Normal except as noted below in Assessment   Left Arm/Hand/Fingers Normal except as noted below in Assessment   Chest Viewed areas Normal except as noted below in Assessment       Assessment and Plan by Diagnosis:    History of Present Condition:     Duration:  How long has this been an issue for you?    o  about a year   Location Affected:  Where on the body is this affecting you?    o  face   Quality:  Is there any bleeding, pain, itch, burning/irritation, or redness associated with the skin lesion? o  denies   Severity:  Describe any bleeding, pain, itch, burning/irritation, or redness on a scale of 1 to 10 (with 10 being the worst)    o  0   Timing:  Does this condition seem to be there pretty constantly or do you notice it more at specific times throughout the day?    o  denies   Context:  Have you ever noticed that this condition seems to be associated with specific activities you do?    o  denies   Modifying Factors:    o Anything that seems to make the condition worse?    -  denies  o What have you tried to do to make the condition better?    -  denies   Associated Signs and Symptoms: Does this skin lesion seem to be associated with any of the followin  ACTINIC KERATOSIS  Physical Exam:   Anatomic Location Affected:  Left and right malar cheeks   Morphological Description: Thin pink papule(s) with gritty scale       Assessment and Plan:  Based on a thorough discussion of this condition and the management approach to it (including a comprehensive discussion of the known risks, side effects and potential benefits of treatment), the patient (family) agrees to implement the following specific plan:   Treated with cryotherapy today; written and verbal consent obtained     PROCEDURE:  DESTRUCTION OF PRE-MALIGNANT LESIONS  After a thorough discussion of treatment options and risk/benefits/alternatives (including but not limited to local pain, scarring, dyspigmentation, blistering, and possible superinfection), verbal and written consent were obtained and the aforementioned lesions were treated on with cryotherapy using liquid nitrogen x 2 cycles for 5-10 seconds  The patient tolerated the procedure well, and after-care instructions were provided   TOTAL NUMBER of 2 pre-malignant lesions were treated today on the ANATOMIC LOCATION: left and right malar cheek  Patient instructions: Your pre-cancerous lesions (called actinic keratosis) were treated with liquid nitrogen today  The treated areas will get more red, crusted over the next few days  There might be some blistering  Apply vaseline to the treated area for the next week to help it heal fully  Do not pick at the area  Return in 3-4 weeks for another round of liquid nitrogen treatment if lesion(s)  fails to fully resolve  2  XEROSIS ("DRY SKIN")    Physical Exam:   Anatomic Location Affected:  bilateral arms   Morphological Description:  xerosis   Pertinent Positives:   Pertinent Negatives:     Additional History of Present Condition:  Pt notes dry and itchy skin    Assessment and Plan:  Based on a thorough discussion of this condition and the management approach to it (including a comprehensive discussion of the known risks, side effects and potential benefits of treatment), the patient (family) agrees to implement the following specific plan: Advise gentle skin care as follows: Shower with lukewarm water less than 10 minutes   Use Dove unscented soap to groin and armpits and neck  Pat dry after shower  Do not harshly rub  Immediately moisturize with heavy emollient   BEST - OINTMENTS, such as Vaseline, Aquaphor, Cerave healing ointment      BETTER - CREAMS, such as Cerave, Cetaphil, VaniCREAM, Aveeno, Eucerin  AVOID LOTIONS, too thin, most things in pump  Moisturize twice a day  Can use cool pack to areas that are very itchy    Dry skin refers to skin that feels dry to touch  Dry skin has a dull surface with a rough, scaly quality  The skin is less pliable and cracked  When dryness is severe, the skin may become inflamed and fissured  Although any body site can be dry, dry skin tends to affect the shins more than any other site  Dry skin is lacking moisture in the outer horny cell layer (stratum corneum) and this results in cracks in the skin surface  Dry skin is also called xerosis, xeroderma or asteatosis (lack of fat)  It can affect males and females of all ages  There is some racial variability in water and lipid content of the skin   Dry skin that starts in early childhood may be one of about 20 types of ichthyosis (fish-scale skin)  There is often a family history of dry skin   Dry skin is commonly seen in people with atopic dermatitis   Nearly everyone > 60 years has dry skin  Dry skin that begins later may be seen in people with certain diseases and conditions     Postmenopausal women   Hypothyroidism   Chronic renal disease    Malnutrition and weight loss    Subclinical dermatitis    Treatment with certain drugs such as oral retinoids, diuretics and epidermal growth factor receptor inhibitors    People exposed to a dry environment may experience dry skin   Low humidity: in desert climates or cool, windy conditions    Excessive air conditioning    Direct heat from a fire or fan heater    Excessive bathing    Contact with soap, detergents and solvents    Inappropriate topical agents such as alcohol    Frictional irritation from rough clothing or abrasives    Dry skin is due to abnormalities in the integrity of the barrier function of the stratum corneum, which is made up of corneocytes   There is an overall reduction in the lipids in the stratum corneum   Ratio of ceramides, cholesterol and free fatty acids may be normal or altered   There may be a reduction in the proliferation of keratinocytes   Keratinocyte subtypes change in dry skin with a decrease in keratins K1, K10 and increase in K5, K14     Involucrin (a protein) may be expressed early, increasing cell stiffness   The result is retention of corneocytes and reduced water-holding capacity  The inherited forms of ichthyosis are due to loss of function mutations in various genes (listed in parentheses below)  The clinical features of ichthyosis depend on the specific type of ichthyosis:   Ichthyosis vulgaris (FLG)   Recessive X-linked ichthyosis (STS)    Autosomal recessive congenital ichthyosis (ABCA12, TGM1, ALOXE3)    Keratinopathic ichthyoses (KRT1, KRT10, KRT2)    Acquired ichthyosis may be due to:   Metabolic factors: thyroid deficiency    Illness: lymphoma, internal malignancy, sarcoidosis, HIV infection    Drugs: nicotinic acid, kava, protein kinase inhibitors (eg EGFR inhibitors), hydroxyurea  Complications of dry skin:  Dry areas of skin may become itchy, indicating a form of eczema/dermatitis has developed   Atopic eczema -- especially in people with ichthyosis vulgaris    Eczema craquelé -- especially in elderly people   Also called asteatotic eczema    A dry form of nummular dermatitis/discoid eczema -- especially in people that wash their skin excessively  When the dry skin of an elderly person is itchy without a visible rash, it is sometimes called winter itch, 7th age itch, senile pruritus or chronic pruritus of the elderly  Other complications of dry skin may include:   Skin infection when bacteria or viruses penetrate a break in the skin surface    Overheating, especially in some forms of ichthyosis    Food allergy, eg, to peanuts, has been associated with filaggrin mutations    Contact allergy, eg, to nickel, has also been correlated with barrier function defects  How is the type of dry skin diagnosed? The type of dry skin is diagnosed by careful history and examination  In children:   Family history    Age of onset    Appearance at birth, if known    Distribution of dry skin    Other features, eg eczema, abnormal nails, hair, dentition, sight, hearing  In adults:   Medical history    Medications and topical preparations    Bathing frequency and use of soap    Evaluation of environmental factors that may contribute to dry skin  What is the treatment for dry skin? The mainstay of treatment of dry skin and ichthyosis is moisturisers/emollients  They should be applied liberally and often enough to:   Reduce itch    Improve the barrier function    Prevent entry of irritants, bacteria    Reduce transepidermal water loss  When considering which emollient is most suitable, consider:   Severity of the dryness    Tolerance    Personal preference    Cost and availability  Emollients generally work best if applied to damp skin, if pH is below 7 (acidic), and if containing humectants such as urea or propylene glycol  Additional treatments include:   Topical steroid if itchy or there is dermatitis -- choose an emollient base    Topical calcineurin inhibitors if topical steroids are unsuitable  How can dry skin be prevented?   Eliminate aggravating factors:   Reduce the frequency of bathing   A humidifier in winter and air conditioner in summer    Compare having a short shower with a prolonged soak in a bath   Use lukewarm, not hot, water   Replace standard soap with a substitute such as a synthetic detergent cleanser, water-miscible emollient, bath oil, anti-pruritic tar oil, colloidal oatmeal etc     Apply an emollient liberally and often, particularly shortly after bathing, and when itchy  The drier the skin, the thicker this should be, especially on the hands  What is the outlook for dry skin? A tendency to dry skin may persist life-long, or it may improve once contributing factors are controlled      Scribe Attestation    I,:  Bay Alvarez am acting as a scribe while in the presence of the attending physician :       I,:  Treasa Epley, MD personally performed the services described in this documentation    as scribed in my presence :

## 2021-06-28 NOTE — PATIENT INSTRUCTIONS
1  ACTINIC KERATOSIS    Assessment and Plan:  Based on a thorough discussion of this condition and the management approach to it (including a comprehensive discussion of the known risks, side effects and potential benefits of treatment), the patient (family) agrees to implement the following specific plan:   Treated with cryotherapy today; written and verbal consent obtained       Patient instructions: Your pre-cancerous lesions (called actinic keratosis) were treated with liquid nitrogen today  The treated areas will get more red, crusted over the next few days  There might be some blistering  Apply vaseline to the treated area for the next week to help it heal fully  Do not pick at the area  Return in 3-4 weeks for another round of liquid nitrogen treatment if lesion(s)  fails to fully resolve  2  XEROSIS ("DRY SKIN")    Assessment and Plan:  Based on a thorough discussion of this condition and the management approach to it (including a comprehensive discussion of the known risks, side effects and potential benefits of treatment), the patient (family) agrees to implement the following specific plan: Advise gentle skin care as follows: Shower with lukewarm water less than 10 minutes   Use Dove unscented soap to groin and armpits and neck  Pat dry after shower  Do not harshly rub  Immediately moisturize with heavy emollient   BEST - OINTMENTS, such as Vaseline, Aquaphor, Cerave healing ointment      BETTER - CREAMS, such as Cerave, Cetaphil, VaniCREAM, Aveeno, Eucerin  AVOID LOTIONS, too thin, most things in pump  Moisturize twice a day  Can use cool pack to areas that are very itchy    Dry skin refers to skin that feels dry to touch  Dry skin has a dull surface with a rough, scaly quality  The skin is less pliable and cracked  When dryness is severe, the skin may become inflamed and fissured  Although any body site can be dry, dry skin tends to affect the shins more than any other site      Dry skin is lacking moisture in the outer horny cell layer (stratum corneum) and this results in cracks in the skin surface  Dry skin is also called xerosis, xeroderma or asteatosis (lack of fat)  It can affect males and females of all ages  There is some racial variability in water and lipid content of the skin   Dry skin that starts in early childhood may be one of about 20 types of ichthyosis (fish-scale skin)  There is often a family history of dry skin   Dry skin is commonly seen in people with atopic dermatitis   Nearly everyone > 60 years has dry skin  Dry skin that begins later may be seen in people with certain diseases and conditions   Postmenopausal women   Hypothyroidism   Chronic renal disease    Malnutrition and weight loss    Subclinical dermatitis    Treatment with certain drugs such as oral retinoids, diuretics and epidermal growth factor receptor inhibitors    People exposed to a dry environment may experience dry skin   Low humidity: in desert climates or cool, windy conditions    Excessive air conditioning    Direct heat from a fire or fan heater    Excessive bathing    Contact with soap, detergents and solvents    Inappropriate topical agents such as alcohol    Frictional irritation from rough clothing or abrasives    Dry skin is due to abnormalities in the integrity of the barrier function of the stratum corneum, which is made up of corneocytes   There is an overall reduction in the lipids in the stratum corneum   Ratio of ceramides, cholesterol and free fatty acids may be normal or altered   There may be a reduction in the proliferation of keratinocytes   Keratinocyte subtypes change in dry skin with a decrease in keratins K1, K10 and increase in K5, K14     Involucrin (a protein) may be expressed early, increasing cell stiffness   The result is retention of corneocytes and reduced water-holding capacity      The inherited forms of ichthyosis are due to loss of function mutations in various genes (listed in parentheses below)  The clinical features of ichthyosis depend on the specific type of ichthyosis:   Ichthyosis vulgaris (FLG)   Recessive X-linked ichthyosis (STS)    Autosomal recessive congenital ichthyosis (ABCA12, TGM1, ALOXE3)    Keratinopathic ichthyoses (KRT1, KRT10, KRT2)    Acquired ichthyosis may be due to:   Metabolic factors: thyroid deficiency    Illness: lymphoma, internal malignancy, sarcoidosis, HIV infection    Drugs: nicotinic acid, kava, protein kinase inhibitors (eg EGFR inhibitors), hydroxyurea  Complications of dry skin:  Dry areas of skin may become itchy, indicating a form of eczema/dermatitis has developed   Atopic eczema -- especially in people with ichthyosis vulgaris    Eczema craquelé -- especially in elderly people  Also called asteatotic eczema    A dry form of nummular dermatitis/discoid eczema -- especially in people that wash their skin excessively  When the dry skin of an elderly person is itchy without a visible rash, it is sometimes called winter itch, 7th age itch, senile pruritus or chronic pruritus of the elderly  Other complications of dry skin may include:   Skin infection when bacteria or viruses penetrate a break in the skin surface    Overheating, especially in some forms of ichthyosis    Food allergy, eg, to peanuts, has been associated with filaggrin mutations    Contact allergy, eg, to nickel, has also been correlated with barrier function defects  How is the type of dry skin diagnosed? The type of dry skin is diagnosed by careful history and examination  In children:   Family history    Age of onset    Appearance at birth, if known    Distribution of dry skin    Other features, eg eczema, abnormal nails, hair, dentition, sight, hearing      In adults:   Medical history    Medications and topical preparations    Bathing frequency and use of soap    Evaluation of environmental factors that may contribute to dry skin  What is the treatment for dry skin? The mainstay of treatment of dry skin and ichthyosis is moisturisers/emollients  They should be applied liberally and often enough to:   Reduce itch    Improve the barrier function    Prevent entry of irritants, bacteria    Reduce transepidermal water loss  When considering which emollient is most suitable, consider:   Severity of the dryness    Tolerance    Personal preference    Cost and availability  Emollients generally work best if applied to damp skin, if pH is below 7 (acidic), and if containing humectants such as urea or propylene glycol  Additional treatments include:   Topical steroid if itchy or there is dermatitis -- choose an emollient base    Topical calcineurin inhibitors if topical steroids are unsuitable  How can dry skin be prevented? Eliminate aggravating factors:   Reduce the frequency of bathing   A humidifier in winter and air conditioner in summer    Compare having a short shower with a prolonged soak in a bath   Use lukewarm, not hot, water   Replace standard soap with a substitute such as a synthetic detergent cleanser, water-miscible emollient, bath oil, anti-pruritic tar oil, colloidal oatmeal etc     Apply an emollient liberally and often, particularly shortly after bathing, and when itchy  The drier the skin, the thicker this should be, especially on the hands  What is the outlook for dry skin? A tendency to dry skin may persist life-long, or it may improve once contributing factors are controlled

## 2021-07-01 ENCOUNTER — HOSPITAL ENCOUNTER (OUTPATIENT)
Dept: BONE DENSITY | Facility: IMAGING CENTER | Age: 81
Discharge: HOME | End: 2021-07-01
Payer: MEDICARE

## 2021-07-01 DIAGNOSIS — Z13.820 SCREENING FOR OSTEOPOROSIS: ICD-10-CM

## 2021-07-01 DIAGNOSIS — Z78.0 MENOPAUSE: ICD-10-CM

## 2021-07-01 PROCEDURE — 77080 DXA BONE DENSITY AXIAL: CPT

## 2021-07-22 DIAGNOSIS — I48.91 ATRIAL FIBRILLATION, UNSPECIFIED TYPE (HCC): ICD-10-CM

## 2021-07-22 RX ORDER — APIXABAN 5 MG/1
TABLET, FILM COATED ORAL
Qty: 180 TABLET | Refills: 3 | Status: SHIPPED | OUTPATIENT
Start: 2021-07-22

## 2021-08-01 DIAGNOSIS — Z79.4 TYPE 2 DIABETES MELLITUS WITHOUT COMPLICATION, WITH LONG-TERM CURRENT USE OF INSULIN (HCC): ICD-10-CM

## 2021-08-01 DIAGNOSIS — E11.9 TYPE 2 DIABETES MELLITUS WITHOUT COMPLICATION, WITH LONG-TERM CURRENT USE OF INSULIN (HCC): ICD-10-CM

## 2021-08-02 RX ORDER — EMPAGLIFLOZIN 25 MG/1
TABLET, FILM COATED ORAL
Qty: 90 TABLET | Refills: 3 | Status: SHIPPED | OUTPATIENT
Start: 2021-08-02 | End: 2022-08-09

## 2021-08-05 ENCOUNTER — OFFICE VISIT (OUTPATIENT)
Dept: DERMATOLOGY | Facility: CLINIC | Age: 81
End: 2021-08-05
Payer: MEDICARE

## 2021-08-05 VITALS — HEIGHT: 63 IN | TEMPERATURE: 96.3 F | BODY MASS INDEX: 28.35 KG/M2 | WEIGHT: 160 LBS

## 2021-08-05 DIAGNOSIS — L82.1 SEBORRHEIC KERATOSIS: ICD-10-CM

## 2021-08-05 DIAGNOSIS — L57.0 ACTINIC KERATOSIS: Primary | ICD-10-CM

## 2021-08-05 DIAGNOSIS — D22.60 MULTIPLE BENIGN MELANOCYTIC NEVI OF UPPER AND LOWER EXTREMITIES AND TRUNK: ICD-10-CM

## 2021-08-05 DIAGNOSIS — D18.01 CHERRY ANGIOMA: ICD-10-CM

## 2021-08-05 DIAGNOSIS — D22.70 MULTIPLE BENIGN MELANOCYTIC NEVI OF UPPER AND LOWER EXTREMITIES AND TRUNK: ICD-10-CM

## 2021-08-05 DIAGNOSIS — D22.5 MULTIPLE BENIGN MELANOCYTIC NEVI OF UPPER AND LOWER EXTREMITIES AND TRUNK: ICD-10-CM

## 2021-08-05 PROCEDURE — 99213 OFFICE O/P EST LOW 20 MIN: CPT | Performed by: STUDENT IN AN ORGANIZED HEALTH CARE EDUCATION/TRAINING PROGRAM

## 2021-08-05 NOTE — PATIENT INSTRUCTIONS
ACTINIC KERATOSIS-(recheck)    Assessment and Plan:  Based on a thorough discussion of this condition and the management approach to it (including a comprehensive discussion of the known risks, side effects and potential benefits of treatment), the patient (family) agrees to implement the following specific plan:     Will monitor    Actinic keratoses are very common on sites repeatedly exposed to the sun, especially the backs of the hands and the face, most often affecting the ears, nose, cheeks, upper lip, vermilion of the lower lip, temples, forehead and balding scalp  In severely chronically sun-damaged individuals, they may also be found on the upper trunk, upper and lower limbs, and dorsum of feet  We discussed the theoretical premalignant (pre-cancerous) nature and etiology of these growths  We discussed the prevailing notion that actinic keratoses are a reflection of abnormal skin cell development due to DNA damage by short wavelength UVB  They are more likely to appear if the immune function is poor, due to aging, recent sun exposure, predisposing disease or certain drugs  We discussed that the main concern is that actinic keratoses may predispose to squamous cell carcinoma  It is rare for a solitary actinic keratosis to evolve to squamous cell carcinoma (SCC), but the risk of SCC occurring at some stage in a patient with more than 10 actinic keratoses is thought to be about 10 to 15%  A tender, thickened, ulcerated or enlarging actinic keratosis is suspicious of SCC  Actinic keratoses may be prevented by strict sun protection  If already present, keratoses may improve with a very high sun protection factor (50+) broad-spectrum sunscreen applied at least daily to affected areas, year-round    We recommend that UPF-rated clothing and hats and sunglasses be worn whenever possible and that a sunscreen-moisturizer combination product such as Neutrogena Daily Defense be applied at least three times a day     We performed a thorough discussion of treatment options and specific risk/benefits/alternatives including but not limited to medical field treatment with medications such as the following:     Topical field area medications such as 5-fluorouracil or Aldara (specifically, the trouble with long-term compliance, blistering and local skin reaction versus the convenience of at-home therapy and that field therapy gets what is not yet seen)   Cryotherapy (specifically, local pain, scarring, dyspigmentation, blistering, possible superinfection, and treats only what we see versus directed treatment today)   Photodynamic therapy (specifically, local pain, scarring, dyspigmentation, blistering, possible superinfection, need to schedule for a later date, and time spent in the office versus field therapy that gets what is not yet seen)  BLACKWELL ANGIOMAS     Assessment and Plan:  Based on a thorough discussion of this condition and the management approach to it (including a comprehensive discussion of the known risks, side effects and potential benefits of treatment), the patient (family) agrees to implement the following specific plan:  · Reassure benign        SEBORRHEIC KERATOSIS; NON-INFLAMED     Assessment and Plan:  Based on a thorough discussion of this condition and the management approach to it (including a comprehensive discussion of the known risks, side effects and potential benefits of treatment), the patient (family) agrees to implement the following specific plan:  · Reassure benign  · Use sun protection  Apply SPF 30 or higher at least three times a day  Wear sun protecting clothing and hats            MULTIPLE MELANOCYTIC NEVI ("Moles")     Assessment and Plan:  Based on a thorough discussion of this condition and the management approach to it (including a comprehensive discussion of the known risks, side effects and potential benefits of treatment), the patient (family) agrees to implement the following specific plan:  · Reassure benign  · Monitor for changes  · Use sun protection  Apply SPF 30 or higher at least three times a day  Wear sun protecting clothing and hats  Worrisome signs of skin malignancy discussed, questions answered  Regular self-skin check discussed  Advised to call or return to office if patient notices any spots of concern, rapidly growing/changing lesions, bleeding lesions, non-healing lesions  Advised regular SPF use

## 2021-08-05 NOTE — PROGRESS NOTES
[facility] Follow Up Note    Patient Name: Viridiana Miranda  Encounter Date: 08/05/21    Today's Chief Concerns:   Concern #1:  Follow up to actinic keratosis on face      Current Medications:    Current Outpatient Medications:     albuterol (ProAir HFA) 90 mcg/act inhaler, Inhale 2 puffs every 6 (six) hours as needed for wheezing or shortness of breath, Disp: 8 5 g, Rfl: 0    Apple Cider Vinegar 500 MG TABS, Take by mouth daily, Disp: , Rfl:     ascorbic acid (VITAMIN C) 500 mg tablet, Take 500 mg by mouth daily, Disp: , Rfl:     atenolol (TENORMIN) 50 mg tablet, TAKE 1 TABLET AT BEDTIME, Disp: 90 tablet, Rfl: 3    Byetta 10 MCG Pen 10 MCG/0 04ML SOPN, INJECT 0 04 ML UNDER THE SKIN TWICE A DAY, Disp: 7 2 mL, Rfl: 3    Cholecalciferol (VITAMIN D-3) 1000 units CAPS, Take 1 capsule by mouth daily, Disp: , Rfl:     clobetasol (TEMOVATE) 0 05 % cream, , Disp: , Rfl:     Coenzyme Q10 (COQ10) 200 MG CAPS, Take 200 mg by mouth daily  , Disp: , Rfl:     Continuous Blood Gluc  (FREESTYLE VICTORINA 14 DAY READER) SVETLANA, 1 Device by Does not apply route 4 (four) times a day (before meals and at bedtime), Disp: 1 Device, Rfl: 0    Continuous Blood Gluc Sensor (FREESTYLE VICTORINA 14 DAY SENSOR) MISC, 1 application by Does not apply route every 14 (fourteen) days, Disp: 2 each, Rfl: 6    DULoxetine (CYMBALTA) 30 mg delayed release capsule, Take 1 capsule (30 mg total) by mouth daily, Disp: 90 capsule, Rfl: 3    Eliquis 5 MG, TAKE 1 TABLET TWICE A DAY, Disp: 180 tablet, Rfl: 3    estradiol (ESTRACE VAGINAL) 0 1 mg/g vaginal cream, Insert 0 5 g into the vagina 2 (two) times a week Patient is NOT to be using this daily, Disp: 42 5 g, Rfl: 1    Ferrous Sulfate 27 MG TABS, Take 1 tablet by mouth daily, Disp: , Rfl:     FREESTYLE LITE test strip, Test 3 times daily  , Disp: 300 each, Rfl: 3    Glucosamine-Chondroitin (MOVE FREE PO), Take by mouth daily, Disp: , Rfl:     hydrocortisone 2 5 % cream, APPLY TOPICALLY TWICE A DAY  MIX WITH KETOCONAZOLE CREAM AND APPLY TO VULVA TWICE A DAY FOR 1 MONTH, Disp: 60 g, Rfl: 11    Jardiance 25 MG TABS, TAKE 1 TABLET EVERY MORNING, Disp: 90 tablet, Rfl: 3    ketoconazole (NIZORAL) 2 % cream, APPLY TOPICALLY TWICE A DAY  MIX WITH HYDROCORTISONE AND APPLY TO VULVA TWICE A DAY FOR 1 MONTH, Disp: 60 g, Rfl: 11    levothyroxine 125 mcg tablet, TAKE 1 TABLET 6 DAYS OF THE WEEK, Disp: 102 tablet, Rfl: 3    MEGARED OMEGA-3 KRILL OIL PO, Take 1 capsule by mouth daily, Disp: , Rfl:     metFORMIN (GLUCOPHAGE-XR) 500 mg 24 hr tablet, TAKE 1 TABLET THREE TIMES A DAY WITH MEALS, Disp: 270 tablet, Rfl: 3    simvastatin (ZOCOR) 20 mg tablet, Take 2 tablets (40 mg total) by mouth daily at bedtime, Disp: 180 tablet, Rfl: 3    Blood Glucose Monitoring Suppl (FREESTYLE FREEDOM LITE) w/Device KIT, by Does not apply route once for 1 dose Use meter to check blood sugars daily  , Disp: 1 each, Rfl: 0    CONSTITUTIONAL:   Vitals:    08/05/21 0820   Temp: (!) 96 3 °F (35 7 °C)   TempSrc: Temporal   Weight: 72 6 kg (160 lb)   Height: 5' 3" (1 6 m)       Specific Alerts:    Have you been seen by a [facility] in the last 3 years? YES    Are you pregnant or planning to become pregnant? No    Are you currently or planning to be nursing or breast feeding? No    Allergies   Allergen Reactions    Fluconazole Dizziness       May we call your Preferred Phone number to discuss your specific medical information? YES    May we leave a detailed message that includes your specific medical information? YES    Have you traveled outside of the [facility] in the past 3 months? YES    Do you currently have a pacemaker or defibrillator? yes    Do you have any artificial heart valves, joints, plates, screws, rods, stents, pins, etc? No   - If Yes, were any placed within the last 2 years? Do you require any medications prior to a surgical procedure?  No       Are you taking any medications that cause you to bleed more easily ("blood thinners") No    Have you ever experienced a rapid heartbeat with epinephrine? No    Have you ever been treated with "gold" (gold sodium thiomalate) therapy? No    [facility] can help with wrinkles, "laugh lines," facial volume loss, "double chin," "love handles," age spots, and more  Are you interested in learning today about some of the skin enhancement procedures that we offer? (If Yes, please provide more detail) No    Review of Systems:  Have you recently had or currently have any of the following?     · Fever or chills: No  · Night Sweats: No  · Headaches: No  · Weight Gain: No  · Weight Loss: No  · Blurry Vision: No  · Nausea: No  · Vomiting: No  · Diarrhea: No  · Blood in Stool: No  · Abdominal Pain: No  · Itchy Skin: No  · Painful Joints: No  · Swollen Joints: No  · Muscle Pain: No  · Irregular Mole: No  · Sun Burn: No  · Dry Skin: YES  · Skin Color Changes: No  · Scar or Keloid: No  · Cold Sores/Fever Blisters: No  · Bacterial Infections/MRSA: No  · Anxiety: No  · Depression: No  · Suicidal or Homicidal Thoughts: No      PSYCH: Normal mood and affect  EYES: Normal conjunctiva  ENT: Normal lips and oral mucosa  CARDIOVASCULAR: No edema  RESPIRATORY: Normal respirations  HEME/LYMPH/IMMUNO:  No regional lymphadenopathy except as noted below in ASSESSMENT AND PLAN BY DIAGNOSIS    FOCUSED ORGAN SYSTEM SKIN EXAM (SKIN)   Hair, Scalp, Ears, Face Normal except as noted below in Assessment   Neck, Cervical Chain Nodes Normal except as noted below in Assessment   Right Arm/Hand/Fingers Normal except as noted below in Assessment   Left Arm/Hand/Fingers Normal except as noted below in Assessment   Back/Spine Normal except as noted below in Assessment   Right Leg Normal except as noted below in Assessment   Left Leg Normal except as noted below in Assessment     ACTINIC KERATOSIS-(recheck)    Physical Exam:   Anatomic Location Affected: face   Morphological Description:  2 spots--> CLEAR    Additional History of Present Condition:  Patient had cryo at previous visit    Assessment and Plan:  Based on a thorough discussion of this condition and the management approach to it (including a comprehensive discussion of the known risks, side effects and potential benefits of treatment), the patient (family) agrees to implement the following specific plan:     Will monitor  LESIONS HAVE RESOLVED  Actinic keratoses are very common on sites repeatedly exposed to the sun, especially the backs of the hands and the face, most often affecting the ears, nose, cheeks, upper lip, vermilion of the lower lip, temples, forehead and balding scalp  In severely chronically sun-damaged individuals, they may also be found on the upper trunk, upper and lower limbs, and dorsum of feet  We discussed the theoretical premalignant (pre-cancerous) nature and etiology of these growths  We discussed the prevailing notion that actinic keratoses are a reflection of abnormal skin cell development due to DNA damage by short wavelength UVB  They are more likely to appear if the immune function is poor, due to aging, recent sun exposure, predisposing disease or certain drugs  We discussed that the main concern is that actinic keratoses may predispose to squamous cell carcinoma  It is rare for a solitary actinic keratosis to evolve to squamous cell carcinoma (SCC), but the risk of SCC occurring at some stage in a patient with more than 10 actinic keratoses is thought to be about 10 to 15%  A tender, thickened, ulcerated or enlarging actinic keratosis is suspicious of SCC  Actinic keratoses may be prevented by strict sun protection  If already present, keratoses may improve with a very high sun protection factor (50+) broad-spectrum sunscreen applied at least daily to affected areas, year-round    We recommend that UPF-rated clothing and hats and sunglasses be worn whenever possible and that a sunscreen-moisturizer combination product such as Neutrogena Daily Defense be applied at least three times a day  We performed a thorough discussion of treatment options and specific risk/benefits/alternatives including but not limited to medical field treatment with medications such as the following:     Topical field area medications such as 5-fluorouracil or Aldara (specifically, the trouble with long-term compliance, blistering and local skin reaction versus the convenience of at-home therapy and that field therapy gets what is not yet seen)   Cryotherapy (specifically, local pain, scarring, dyspigmentation, blistering, possible superinfection, and treats only what we see versus directed treatment today)   Photodynamic therapy (specifically, local pain, scarring, dyspigmentation, blistering, possible superinfection, need to schedule for a later date, and time spent in the office versus field therapy that gets what is not yet seen)  BLACKWELL ANGIOMAS     Physical Exam:  · Anatomic Location Affected:  Trunk and extremites  · Morphological Description:  Scattered cherry red papules  · Denies pain, itch, bleeding  No treatments tried  Present for years  Present constantly; no modifying factors which make it worse or better  Assessment and Plan:  Based on a thorough discussion of this condition and the management approach to it (including a comprehensive discussion of the known risks, side effects and potential benefits of treatment), the patient (family) agrees to implement the following specific plan:  · Reassure benign        SEBORRHEIC KERATOSIS; NON-INFLAMED     Physical Exam:  · Anatomic Location Affected:  Trunk and extremities  · Morphological Description:  Waxy, smooth to warty textured, yellow to brownish-grey to dark brown to blackish, discrete, "stuck-on" appearing papules  · Present for years  Denies pain, itch, bleeding        Additional History of Present Condition:  Present constantly; no modifying factors which make it worse or better  No prior treatment  Assessment and Plan:  Based on a thorough discussion of this condition and the management approach to it (including a comprehensive discussion of the known risks, side effects and potential benefits of treatment), the patient (family) agrees to implement the following specific plan:  · Reassure benign  · Use sun protection  Apply SPF 30 or higher at least three times a day  Wear sun protecting clothing and hats  MULTIPLE MELANOCYTIC NEVI ("Moles")     Physical Exam:  · Anatomic Location Affected: Trunk and extremities  · Morphological Description:  Scattered, round to ovoid, symmetrical-appearing, even bordered, skin colored to dark brown macules/papules  · Denies pain, itch, bleeding  No treatments tried  Present for years  Present constantly; no modifying factors which make it worse or better  Denies actively changing or growing moles  Assessment and Plan:  Based on a thorough discussion of this condition and the management approach to it (including a comprehensive discussion of the known risks, side effects and potential benefits of treatment), the patient (family) agrees to implement the following specific plan:  · Reassure benign  · Monitor for changes  · Use sun protection  Apply SPF 30 or higher at least three times a day  Wear sun protecting clothing and hats  Worrisome signs of skin malignancy discussed, questions answered  Regular self-skin check discussed  Advised to call or return to office if patient notices any spots of concern, rapidly growing/changing lesions, bleeding lesions, non-healing lesions  Advised regular SPF use       Scribe Attestation    I,:  Selena Jhon am acting as a scribe while in the presence of the attending physician :       I,:  Gudelia Owen MD personally performed the services described in this documentation    as scribed in my presence :

## 2021-08-10 ENCOUNTER — OFFICE VISIT (OUTPATIENT)
Dept: FAMILY MEDICINE CLINIC | Facility: CLINIC | Age: 81
End: 2021-08-10
Payer: MEDICARE

## 2021-08-10 VITALS
RESPIRATION RATE: 17 BRPM | WEIGHT: 162.3 LBS | HEIGHT: 63 IN | BODY MASS INDEX: 28.76 KG/M2 | DIASTOLIC BLOOD PRESSURE: 80 MMHG | HEART RATE: 70 BPM | SYSTOLIC BLOOD PRESSURE: 120 MMHG

## 2021-08-10 DIAGNOSIS — E55.9 VITAMIN D DEFICIENCY: ICD-10-CM

## 2021-08-10 DIAGNOSIS — K21.9 GASTRO-ESOPHAGEAL REFLUX DISEASE WITHOUT ESOPHAGITIS: ICD-10-CM

## 2021-08-10 DIAGNOSIS — I25.10 CORONARY ARTERY DISEASE INVOLVING NATIVE CORONARY ARTERY OF NATIVE HEART WITHOUT ANGINA PECTORIS: ICD-10-CM

## 2021-08-10 DIAGNOSIS — E04.0 DIFFUSE NONTOXIC GOITER: ICD-10-CM

## 2021-08-10 DIAGNOSIS — E03.8 HYPOTHYROIDISM DUE TO HASHIMOTO'S THYROIDITIS: ICD-10-CM

## 2021-08-10 DIAGNOSIS — C18.9 ADENOCARCINOMA OF COLON (HCC): ICD-10-CM

## 2021-08-10 DIAGNOSIS — I10 ESSENTIAL HYPERTENSION: ICD-10-CM

## 2021-08-10 DIAGNOSIS — J44.9 CHRONIC OBSTRUCTIVE PULMONARY DISEASE, UNSPECIFIED COPD TYPE (HCC): ICD-10-CM

## 2021-08-10 DIAGNOSIS — F41.9 ANXIETY: ICD-10-CM

## 2021-08-10 DIAGNOSIS — I48.91 ATRIAL FIBRILLATION, UNSPECIFIED TYPE (HCC): ICD-10-CM

## 2021-08-10 DIAGNOSIS — E78.2 MIXED HYPERLIPIDEMIA: ICD-10-CM

## 2021-08-10 DIAGNOSIS — I49.5 SICK SINUS SYNDROME (HCC): ICD-10-CM

## 2021-08-10 DIAGNOSIS — Z95.0 MRI SAFE CARDIAC PACEMAKER IN SITU: ICD-10-CM

## 2021-08-10 DIAGNOSIS — E06.3 HYPOTHYROIDISM DUE TO HASHIMOTO'S THYROIDITIS: ICD-10-CM

## 2021-08-10 DIAGNOSIS — Z00.00 MEDICARE ANNUAL WELLNESS VISIT, SUBSEQUENT: Primary | ICD-10-CM

## 2021-08-10 DIAGNOSIS — K22.70 BARRETT'S ESOPHAGUS WITHOUT DYSPLASIA: ICD-10-CM

## 2021-08-10 DIAGNOSIS — E11.40 TYPE 2 DIABETES MELLITUS WITH DIABETIC NEUROPATHY, WITHOUT LONG-TERM CURRENT USE OF INSULIN (HCC): ICD-10-CM

## 2021-08-10 PROCEDURE — 99215 OFFICE O/P EST HI 40 MIN: CPT | Performed by: FAMILY MEDICINE

## 2021-08-10 PROCEDURE — G0439 PPPS, SUBSEQ VISIT: HCPCS | Performed by: FAMILY MEDICINE

## 2021-08-10 RX ORDER — DULOXETIN HYDROCHLORIDE 60 MG/1
60 CAPSULE, DELAYED RELEASE ORAL DAILY
Qty: 90 CAPSULE | Refills: 1 | Status: SHIPPED | OUTPATIENT
Start: 2021-08-10 | End: 2022-05-01

## 2021-08-10 NOTE — PROGRESS NOTES
ASSESSMENT/PLAN:    Anxiety/fatigue  We will increase duloxetine from 30-60 mg daily  Hopefully that will give her increase energy and get her motivated to walk every day to help her diabetes, her osteopenia, and her fatigue    Type 2 diabetes  A1c has gone up 7 7 from 7 4  She is on metformin, Jardiance, and Byetta, she follows and is managed by Endocrine     Atrial fibrillation/sick sinus syndrome/cardiomegaly/coronary artery disease  Is on Eliquis for anticoagulation and atenolol for rate control  She also has a pacer in and has regular rhythm today most likely 100% paced     Osteopenia  Is on calcium and vitamin-D  DEXA shows no change  Hopefully patient will start walking  She will continue calcium and vitamin-D     Adenocarcinoma of the colon 2012  Colonoscopy showed a small polyp 2021  Patient's next colonoscopy in 3 years     Emphysema by CT scan  Ventolin as needed      Hashimoto's thyroiditis   Also following with endocrine for suppression of her thyroid with medication  Continue levothyroxine 125 mcg daily      Vitamin D deficiency   Continue vitamin D 2000 units daily          History of large paraesophageal hernia   Had surgery and no longer has Pacheco's or her reflux and no longer on any PPIs        Hyperlipidemia   Simvastatin and CoQ10 and diet     Thyroid goiter   Followed by endocrine who orders ultrasounds         Recheck in 6 months  Screening labs prior  Prn otherwise                Health Maintenance   Topic Date Due    Hepatitis C Screening  Never done    SLP PLAN OF CARE  Never done    DTaP,Tdap,and Td Vaccines (1 - Tdap) Never done    OT PLAN OF CARE  03/06/2019    Medicare Annual Wellness Visit (AWV)  08/10/2021    Breast Cancer Screening: Mammogram  08/14/2021    Influenza Vaccine (1) 09/01/2021    BMI: Followup Plan  02/09/2022    HEMOGLOBIN A1C  12/14/2021    DM Eye Exam  12/16/2021    URINE MICROALBUMIN  02/24/2022    Depression Screening PHQ  05/06/2022    Diabetic Foot Exam  06/24/2022    BMI: Adult  08/05/2022    Fall Risk  08/10/2022    DXA SCAN  07/01/2024    Pneumococcal Vaccine: 65+ Years  Completed    COVID-19 Vaccine  Completed    HIB Vaccine  Aged Out    Hepatitis B Vaccine  Aged Out    IPV Vaccine  Aged Out    Hepatitis A Vaccine  Aged Out    Meningococcal ACWY Vaccine  Aged Out    HPV Vaccine  Aged Out         Problem List as of 8/10/2021 Reviewed: 4/8/2021  8:10 AM by SHUN Becerra    Abnormal echocardiogram    Adenocarcinoma of colon ([facility] )    Age-related osteoporosis without current pathological fracture    Anticoagulated by anticoagulation treatment    Atrial fibrillation ([facility] )    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    Cardiomegaly    Chronic [facility] )    Aia 16 DJD(carpometacarpal degenerative joint disease), localized primary, right    Coronary artery disease involving native coronary artery of native heart without angina pectoris    Diffuse nontoxic goiter    Essential hypertension    Feeling anxious    Gastro-esophageal reflux disease without esophagitis    Hematuria, microscopic    Hypothyroidism due to Hashimoto's thyroiditis    Insomnia    Lichen simplex chronicus    Mitral regurgitation    Mixed hyperlipidemia    MRI safe cardiac pacemaker in situ    Paraesophageal hernia    Primary osteoarthritis involving multiple joints    Restrictive lung disease    Sick sinus syndrome ([facility] )    Stress incontinence in female    Type 2 diabetes mellitus with diabetic neuropathy, without long-term current use of insulin ([facility])    Vitamin D deficiency            Subjective:   Chief Complaint   Patient presents with    Medicare Wellness Visit     Patient is here for 6 month recheck  She complains of always being tired which is not a new complaint  She does have diarrhea on and off and she defines that is loose BMs for 5 times a day as opposed to formed BMs 1 or 2 times a day  But she does not she is eating a lot of fruit vegetables because it is that time of here    She has seen Derm since last visit, podiatry for foot exam, 2 or 3 pacer checks, gyn,  She had her colonoscopy which had a small polyp in will be done again in 3 years because of rectal cancer  She had her EGD done that does not need to be repeated    She had her A1c done  She had her DEXA completed as well  Her mammogram is ordered      patient ID: Viridiana Miranda is a 79 y o  female  Patient's past medical history, surgical history, family history, social history, and Tobacco history reviewed with patient  MED LIST WAS REVIEWED AND UPDATED    ROS  As per HPI  Rest of 12 point review of systems negative     Objective:      VITALS:  Wt Readings from Last 3 Encounters:   08/10/21 73 6 kg (162 lb 4 8 oz)   08/05/21 72 6 kg (160 lb)   06/28/21 73 5 kg (162 lb)     BP Readings from Last 3 Encounters:   08/10/21 120/80   05/21/21 122/74   05/06/21 122/78     Pulse Readings from Last 3 Encounters:   08/10/21 70   05/21/21 85   05/06/21 74     Body mass index is 28 98 kg/m²  Laboratory Results:    All pertinent labs and studies were reviewed with patient during this office visit with highlights of the results contained in this note in the ASSESSMENT AND PLAN section       Physical Exam    Constitutional  Appears healthy, Looks well, Appearance consistent with age    Mental Status  Alert, Oriented, Cooperative, Memory function normal , clean, and reasonable    Neck  No neck mass, No thyromegaly, Good carotid upstrokes bilaterally, trachea midline positive click    Respiratory  Breath sounds normal, No rales, No rhonchi, No wheezing, normal palpation    Cardiac   Regular rhythm without ectopy or murmur no S3-S4, no heave lift or thrill to palpation    Vascular  No leg edema, No pedal edema    Muscular skeletal  No clubbing cyanosis , muscle tone normal    Skin  No appreciable rashes or abnormal appearing lesions

## 2021-08-10 NOTE — PROGRESS NOTES
Assessment and Plan:   All up to date  Problem List Items Addressed This Visit     None           Preventive health issues were discussed with patient, and age appropriate screening tests were ordered as noted in patient's After Visit Summary  Personalized health advice and appropriate referrals for health education or preventive services given if needed, as noted in patient's After Visit Summary       History of Present Illness:     Patient presents for Medicare Annual Wellness visit    Patient Care Team:  Lisa Saleh DO as PCP - General  Katharine Eller DO as PCP - Endocrinology (Endocrinology)  DO Mando Wong MD Sharman Fuller, MD     Problem List:     Patient Active Problem List   Diagnosis    Type 2 diabetes mellitus with diabetic neuropathy, without long-term current use of insulin ([address] )    Essential hypertension    Coronary artery disease involving native coronary artery of native heart without angina pectoris    Abnormal echocardiogram    Adenocarcinoma of colon ([address] )    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    Cardiomegaly    Diffuse nontoxic goiter    Feeling anxious    Hematuria, microscopic    Mixed hyperlipidemia    Hypothyroidism due to Hashimoto's thyroiditis    Insomnia    Mitral regurgitation    Age-related osteoporosis without current pathological fracture    Paraesophageal hernia    Atrial fibrillation ([address]    Restrictive lung disease    Primary osteoarthritis involving multiple joints    Sick sinus syndrome ([address] )    Vitamin D deficiency    Stress incontinence in female    [facility](carpometacarpal degenerative joint disease), localized primary, right    Gastro-esophageal reflux disease without esophagitis    Lichen simplex chronicus    MRI safe cardiac pacemaker in situ    Anticoagulated by anticoagulation treatment    Chronic obstructive pulmonary disease ([facility] )      Past Medical and Surgical History:     Past Medical History:   Diagnosis Date    A-fib ([facility] )     Abnormal ECG     last assessed: 7/14/2015    Acid reflux     resolved    Anxiety     Pacheco esophagus     Benign neoplasm of sigmoid colon 09/13/2011    next colon 2012    Bronchitis, asthmatic     last assessed: 3/12/2012    Colon cancer ([facility] )     2012- had colon resection    Colon polyp     COPD (chronic obstructive pulmonary disease) (HCC)     questionable    Depression     Diabetes mellitus ([facility] )     on byetta    Dizziness     occ    Esophageal stricture     last assessed: 9/30/2014    High cholesterol     Hypertension     Hypothyroidism     Iron (Fe) deficiency anemia 12/22/2011    iron pill continue    Irritable bowel syndrome     OA (osteoarthritis)     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    Pacemaker     hx SSS    Rash     labia area- uses cream prn    Restrictive lung disease     Seasonal allergies     Skin scarring/fibrosis     fibrotic scar; last assessed: 3/22/2013    MONA (stress urinary incontinence, female)     Urinary frequency     last assessed: 9/10/2012    Wears glasses     reading     Past Surgical History:   Procedure Laterality Date    BREAST EXCISIONAL BIOPSY Right 1990    benign, best guess on year   [address] [facility]      does not know type  Dr Boland Bivalve is cariologist    [address]      resection  removed 25 In     COLONOSCOPY  06/01/2012 June 2018: Adenomatous polyp, colorectal anastomosis, internal hemorrhoids  06/01/2012:  proctosigmoidectomy    ESOPHAGOGASTRODUODENOSCOPY N/A 1/9/2016    Procedure: ESOPHAGOGASTRODUODENOSCOPY (EGD);   Surgeon: Rachel Austin MD;  [facility];  Service:    Bertha Hinojosa HYSTERECTOMY  1977    WY ARTHROPLASTY I-P JT Right 8/16/2018    Procedure: ARTHROPLASTY PIP/FINGER - RIGHT RING;  Surgeon: Doris Oneil MD;  [facility];  Service: Orthopedics    WY CYSTOURETHROSCOPY N/A 5/14/2018    Procedure: Gigi Chavez;  Surgeon: Alyssa Brooks MD;  [facility];  Service: UroGynecology           NE LAP, REPAIR PARAESOPHAGEAL HERNIA, INCL FUNDOPLASTY W/ MESH N/A 1/27/2016    Procedure: LAPAROSCOPIC PARAESOPHAGEAL HERNIA REPAIR with mesh; toupet  FUNDOPLICATION ;  Surgeon: Abdirahman Jacobson MD;  [facility];  Service: Thoracic    NE REPAIR INTERCARP/CARP-METACARP JT Right 1/10/2019    Procedure: Brandon Madden;  Surgeon: Ferdinand Anguiano MD;  [facility];  Service: Orthopedics    NE SLING OPER STRES INCONTINENCE N/A 5/14/2018    Procedure: INSERTION PUBOVAGINAL SLING (FEMALE); Surgeon: Ignacio Field MD;  [facility];  Service: UroGynecology           TOTAL ABDOMINAL HYSTERECTOMY      UPPER GASTROINTESTINAL ENDOSCOPY      June 2018:  Irregular Z-line positive for intestinal metaplasia/ Pacheco's, no dysplasia  Gastritis H pylori negative      WRIST TENDON TRANSFER Right 1/10/2019    Procedure: TRANSFER TENDON FLEXOR CARPI RADIALIS FROM FOREARM TO HAND;  Surgeon: Ferdinand Anguiano MD;  [facility];  Service: Orthopedics      Family History:     Family History   Problem Relation Age of Onset    Heart disease Mother     Diabetes Mother     Asthma Father     Stomach cancer Father         gastrkic cancer    Cancer Paternal Grandmother     Cancer Paternal Grandfather     No Known Problems Brother     Breast cancer Sister 76    Breast cancer Sister 77    Pancreatic cancer Sister 72    No Known Problems Maternal Aunt     No Known Problems Maternal Aunt     No Known Problems Maternal Aunt     No Known Problems Paternal Aunt     No Known Problems Daughter     No Known Problems Maternal Grandmother     No Known Problems Maternal Grandfather     Prostate cancer Paternal Uncle         age unknown    Substance Abuse Neg Hx     Mental illness Neg Hx     Alcohol abuse Neg Hx     Colon cancer Neg Hx     Colon polyps Neg Hx       Social History:     Social History     Socioeconomic History    Marital status: /Civil [place] [employer]     Spouse name: None    Number of children: None    Years of education: None    Highest education level: None   Occupational History    None   Tobacco Use    Smoking status: Former Smoker     Types: Cigarettes     Quit date:      Years since quittin 6    Smokeless tobacco: Former User     Quit date:    Vaping Use    Vaping Use: Never used   Substance and Sexual Activity    Alcohol use: No    Drug use: No    Sexual activity: Not Currently   Other Topics Concern    None   Social History Narrative    Always uses seat belt    Copy of advanced directive obtained    Drinks coffee-drinks 5 cups a day    Has smoke detectors    Uses safety equipment-seatbelts     Social Determinants of Health     Financial Resource Strain:     Difficulty of Paying Living Expenses:    Food Insecurity:     Worried About Running Out of Food in the Last Year:     Ran Out of Food in the Last Year:    Transportation Needs:     Lack of Transportation (Medical):      Lack of Transportation (Non-Medical):    Physical Activity:     Days of Exercise per Week:     Minutes of Exercise per Session:    Stress:     Feeling of Stress :    Social Connections:     Frequency of Communication with Friends and Family:     Frequency of Social Gatherings with Friends and Family:     Attends [employer] Services:     Active Member of Clubs or Organizations:     Attends Club or Organization Meetings:     Marital Status:    Intimate Partner Violence:     Fear of Current or Ex-Partner:     Emotionally Abused:     Physically Abused:     Sexually Abused:       Medications and Allergies:     Current Outpatient Medications   Medication Sig Dispense Refill    albuterol (ProAir HFA) 90 mcg/act inhaler Inhale 2 puffs every 6 (six) hours as needed for wheezing or shortness of breath 8 5 g 0    Apple Cider Vinegar 500 MG TABS Take by mouth daily      ascorbic acid (VITAMIN C) 500 mg tablet Take 500 mg by mouth daily  atenolol (TENORMIN) 50 mg tablet TAKE 1 TABLET AT BEDTIME 90 tablet 3    Byetta 10 MCG Pen 10 MCG/0 04ML SOPN INJECT 0 04 ML UNDER THE SKIN TWICE A DAY 7 2 mL 3    Cholecalciferol (VITAMIN D-3) 1000 units CAPS Take 1 capsule by mouth daily      clobetasol (TEMOVATE) 0 05 % cream       Coenzyme Q10 (COQ10) 200 MG CAPS Take 200 mg by mouth daily        Continuous Blood Gluc  (FREESTYLE VICTORINA 14 DAY READER) SVETLANA 1 Device by Does not apply route 4 (four) times a day (before meals and at bedtime) 1 Device 0    Continuous Blood Gluc Sensor (FREESTYLE VICTORINA 14 DAY SENSOR) MISC 1 application by Does not apply route every 14 (fourteen) days 2 each 6    DULoxetine (CYMBALTA) 30 mg delayed release capsule Take 1 capsule (30 mg total) by mouth daily 90 capsule 3    Eliquis 5 MG TAKE 1 TABLET TWICE A  tablet 3    estradiol (ESTRACE VAGINAL) 0 1 mg/g vaginal cream Insert 0 5 g into the vagina 2 (two) times a week Patient is NOT to be using this daily 42 5 g 1    Ferrous Sulfate 27 MG TABS Take 1 tablet by mouth daily      FREESTYLE LITE test strip Test 3 times daily  300 each 3    Glucosamine-Chondroitin (MOVE FREE PO) Take by mouth daily      hydrocortisone 2 5 % cream APPLY TOPICALLY TWICE A DAY  MIX WITH KETOCONAZOLE CREAM AND APPLY TO VULVA TWICE A DAY FOR 1 MONTH 60 g 11    Jardiance 25 MG TABS TAKE 1 TABLET EVERY MORNING 90 tablet 3    ketoconazole (NIZORAL) 2 % cream APPLY TOPICALLY TWICE A DAY    MIX WITH HYDROCORTISONE AND APPLY TO VULVA TWICE A DAY FOR 1 MONTH 60 g 11    levothyroxine 125 mcg tablet TAKE 1 TABLET 6 DAYS OF THE WEEK 102 tablet 3    MEGARED OMEGA-3 KRILL OIL PO Take 1 capsule by mouth daily      metFORMIN (GLUCOPHAGE-XR) 500 mg 24 hr tablet TAKE 1 TABLET THREE TIMES A DAY WITH MEALS 270 tablet 3    simvastatin (ZOCOR) 20 mg tablet Take 2 tablets (40 mg total) by mouth daily at bedtime 180 tablet 3    Blood Glucose Monitoring Suppl (FREESTYLE FREEDOM LITE) w/Device KIT by Does not apply route once for 1 dose Use meter to check blood sugars daily  1 each 0     No current facility-administered medications for this visit  Allergies   Allergen Reactions    Fluconazole Dizziness      Immunizations:     Immunization History   Administered Date(s) Administered    INFLUENZA 09/30/2015, 09/19/2016, 10/02/2017    Influenza Split High Dose Preservative Free IM 09/30/2013, 09/30/2014, 09/30/2015, 09/19/2016, 10/02/2017    Influenza, high dose seasonal 0 7 mL 09/20/2019, 10/05/2020    Influenza, injectable, quadrivalent, preservative free 0 5 mL 10/23/2018    Influenza, seasonal, injectable 10/24/2008, 12/08/2009, 09/14/2010, 09/13/2011, 09/10/2012, 09/01/2015, 10/05/2016    Pneumococcal Conjugate 13-Valent 10/29/2015    Pneumococcal Polysaccharide PPV23 12/01/2005, 03/15/2011, 10/01/2015    SARS-CoV-2 / COVID-19 mRNA IM (Moderna) 01/20/2021, 02/17/2021    Zoster 01/07/2014    Zoster Vaccine Recombinant 11/15/2019, 01/30/2020      Health Maintenance:         Topic Date Due    Hepatitis C Screening  Never done    Breast Cancer Screening: Mammogram  08/14/2021    DXA SCAN  07/01/2024         Topic Date Due    DTaP,Tdap,and Td Vaccines (1 - Tdap) Never done    Influenza Vaccine (1) 09/01/2021      Medicare Health Risk Assessment:     There were no vitals taken for this visit  Abby Arroyo is here for her Subsequent Wellness visit  Health Risk Assessment:   Patient rates overall health as good  Patient feels that their physical health rating is same  Patient is satisfied with their life  Eyesight was rated as slightly worse  Hearing was rated as same  Patient feels that their emotional and mental health rating is same  Patients states they are never, rarely angry  Patient states they are always unusually tired/fatigued  Pain experienced in the last 7 days has been a lot  Patient's pain rating has been 8/10   Patient states that she has experienced no weight loss or gain in last 6 months  Fall Risk Screening: In the past year, patient has experienced: no history of falling in past year      Urinary Incontinence Screening:   Patient has leaked urine accidently in the last six months  Home Safety:  Patient does not have trouble with stairs inside or outside of their home  Patient has working smoke alarms and has working carbon monoxide detector  Home safety hazards include: none  Nutrition:   Current diet is Regular and Limited junk food  Medications:   Patient is currently taking over-the-counter supplements  OTC medications include: see medication list  Patient is able to manage medications  Activities of Daily Living (ADLs)/Instrumental Activities of Daily Living (IADLs):   Walk and transfer into and out of bed and chair?: Yes  Dress and groom yourself?: Yes    Bathe or shower yourself?: Yes    Feed yourself?  Yes  Do your laundry/housekeeping?: Yes  Manage your money, pay your bills and track your expenses?: Yes  Make your own meals?: Yes    Do your own shopping?: Yes    Previous Hospitalizations:   Any hospitalizations or ED visits within the last 12 months?: No      Advance Care Planning:   Living will: Yes    Advanced directive: Yes    End of Life Decisions reviewed with patient: Yes      Cognitive Screening:   Provider or family/friend/caregiver concerned regarding cognition?: No    PREVENTIVE SCREENINGS      Cardiovascular Screening:    General: Screening Not Indicated and History Lipid Disorder      Diabetes Screening:     General: Screening Not Indicated and History Diabetes      Colorectal Cancer Screening:     General: History Colorectal Cancer      Breast Cancer Screening:     General: Screening Current      Cervical Cancer Screening:    General: Screening Not Indicated      Osteoporosis Screening:    General: Screening Not Indicated and History Osteoporosis      Abdominal Aortic Aneurysm (AAA) Screening:        General: Screening Not Indicated Lung Cancer Screening:     General: Screening Not Indicated      Hepatitis C Screening:    General: Screening Current    Screening, Brief Intervention, and Referral to Treatment (SBIRT)    Screening    Typical number of drinks in a week: 0    Single Item Drug Screening:  How often have you used an illegal drug (including marijuana) or a prescription medication for non-medical reasons in the past year? never    Single Item Drug Screen Score: 0  Interpretation: Negative screen for possible drug use disorder    Brief Intervention  Alcohol & drug use screenings were reviewed  No concerns regarding substance use disorder identified  Other Counseling Topics:   Calcium and vitamin D intake and regular weightbearing exercise         Sukumar Mitchell, DO

## 2021-08-10 NOTE — PATIENT INSTRUCTIONS
1  Increase your duloxetine/Cymbalta from 30-60 mg a day  Any 30 mg pills you have, take 2 together to equal 60 mg  The new prescription will be 60 mg pills, so only take 1 daily  I am looking for to give you more energy and to feel better    2  Please walk every day  This will help her osteopenia and her diabetes as well as her energy    I will see you back in 6 months with fasting blood work in urine 1 or 2 weeks prior            Medicare Preventive Visit Patient Instructions  Thank you for completing your Welcome to Medicare Visit or Medicare Annual Wellness Visit today  Your next wellness visit will be due in one year (8/11/2022)  The screening/preventive services that you may require over the next 5-10 years are detailed below  Some tests may not apply to you based off risk factors and/or age  Screening tests ordered at today's visit but not completed yet may show as past due  Also, please note that scanned in results may not display below  Preventive Screenings:  Service Recommendations Previous Testing/Comments   Colorectal Cancer Screening  * Colonoscopy    * Fecal Occult Blood Test (FOBT)/Fecal Immunochemical Test (FIT)  * Fecal DNA/Cologuard Test  * Flexible Sigmoidoscopy Age: 54-65 years old   Colonoscopy: every 10 years (may be performed more frequently if at higher risk)  OR  FOBT/FIT: every 1 year  OR  Cologuard: every 3 years  OR  Sigmoidoscopy: every 5 years  Screening may be recommended earlier than age 48 if at higher risk for colorectal cancer  Also, an individualized decision between you and your healthcare provider will decide whether screening between the ages of 74-80 would be appropriate   Colonoscopy: 03/23/2021  FOBT/FIT: Not on file  Cologuard: Not on file  Sigmoidoscopy: Not on file    History Colorectal Cancer     Breast Cancer Screening Age: 36 years old  Frequency: every 1-2 years  Not required if history of left and right mastectomy Mammogram: 08/14/2020    Screening Current Cervical Cancer Screening Between the ages of 18-28, pap smear recommended once every 3 years  Between the ages of 33-67, can perform pap smear with HPV co-testing every 5 years  Recommendations may differ for women with a history of total hysterectomy, cervical cancer, or abnormal pap smears in past  Pap Smear: 05/21/2021    Screening Not Indicated   Hepatitis C Screening Once for adults born between 1945 and 1965  More frequently in patients at high risk for Hepatitis C Hep C Antibody: Not on file        Diabetes Screening 1-2 times per year if you're at risk for diabetes or have pre-diabetes Fasting glucose: 159 mg/dL   A1C: 7 7 %    Screening Not Indicated  History Diabetes   Cholesterol Screening Once every 5 years if you don't have a lipid disorder  May order more often based on risk factors  Lipid panel: 02/24/2021    Screening Not Indicated  History Lipid Disorder     Other Preventive Screenings Covered by Medicare:  1  Abdominal Aortic Aneurysm (AAA) Screening: covered once if your at risk  You're considered to be at risk if you have a family history of AAA  2  Lung Cancer Screening: covers low dose CT scan once per year if you meet all of the following conditions: (1) Age 50-69; (2) No signs or symptoms of lung cancer; (3) Current smoker or have quit smoking within the last 15 years; (4) You have a tobacco smoking history of at least 30 pack years (packs per day multiplied by number of years you smoked); (5) You get a written order from a healthcare provider  3  Glaucoma Screening: covered annually if you're considered high risk: (1) You have diabetes OR (2) Family history of glaucoma OR (3)  aged 48 and older OR (3)  American aged 72 and older  3   Osteoporosis Screening: covered every 2 years if you meet one of the following conditions: (1) You're estrogen deficient and at risk for osteoporosis based off medical history and other findings; (2) Have a vertebral abnormality; (3) On glucocorticoid therapy for more than 3 months; (4) Have primary hyperparathyroidism; (5) On osteoporosis medications and need to assess response to drug therapy  · Last bone density test (DXA Scan): 07/01/2021   5  HIV Screening: covered annually if you're between the age of 15-65  Also covered annually if you are younger than 13 and older than 72 with risk factors for HIV infection  For pregnant patients, it is covered up to 3 times per pregnancy  Immunizations:  Immunization Recommendations   Influenza Vaccine Annual influenza vaccination during flu season is recommended for all persons aged >= 6 months who do not have contraindications   Pneumococcal Vaccine (Prevnar and Pneumovax)  * Prevnar = PCV13  * Pneumovax = PPSV23   Adults 25-60 years old: 1-3 doses may be recommended based on certain risk factors  Adults 72 years old: Prevnar (PCV13) vaccine recommended followed by Pneumovax (PPSV23) vaccine  If already received PPSV23 since turning 65, then PCV13 recommended at least one year after PPSV23 dose  Hepatitis B Vaccine 3 dose series if at intermediate or high risk (ex: diabetes, end stage renal disease, liver disease)   Tetanus (Td) Vaccine - COST NOT COVERED BY MEDICARE PART B Following completion of primary series, a booster dose should be given every 10 years to maintain immunity against tetanus  Td may also be given as tetanus wound prophylaxis  Tdap Vaccine - COST NOT COVERED BY MEDICARE PART B Recommended at least once for all adults  For pregnant patients, recommended with each pregnancy     Shingles Vaccine (Shingrix) - COST NOT COVERED BY MEDICARE PART B  2 shot series recommended in those aged 48 and above     Health Maintenance Due:      Topic Date Due    Hepatitis C Screening  Never done    Breast Cancer Screening: Mammogram  08/14/2021    DXA SCAN  07/01/2024     Immunizations Due:      Topic Date Due    DTaP,Tdap,and Td Vaccines (1 - Tdap) Never done    Influenza Vaccine (1) 09/01/2021     Advance Directives   What are advance directives? Advance directives are legal documents that state your wishes and plans for medical care  These plans are made ahead of time in case you lose your ability to make decisions for yourself  Advance directives can apply to any medical decision, such as the treatments you want, and if you want to donate organs  What are the types of advance directives? There are many types of advance directives, and each state has rules about how to use them  You may choose a combination of any of the following:  · Living will: This is a written record of the treatment you want  You can also choose which treatments you do not want, which to limit, and which to stop at a certain time  This includes surgery, medicine, IV fluid, and tube feedings  · Durable power of  for healthcare [facility]): This is a written record that states who you want to make healthcare choices for you when you are unable to make them for yourself  This person, called a proxy, is usually a family member or a friend  You may choose more than 1 proxy  · Do not resuscitate (DNR) order:  A DNR order is used in case your heart stops beating or you stop breathing  It is a request not to have certain forms of treatment, such as CPR  A DNR order may be included in other types of advance directives  · Medical directive: This covers the care that you want if you are in a coma, near death, or unable to make decisions for yourself  You can list the treatments you want for each condition  Treatment may include pain medicine, surgery, blood transfusions, dialysis, IV or tube feedings, and a ventilator (breathing machine)  · Values history: This document has questions about your views, beliefs, and how you feel and think about life  This information can help others choose the care that you would choose  Why are advance directives important? An advance directive helps you control your care  Although spoken wishes may be used, it is better to have your wishes written down  Spoken wishes can be misunderstood, or not followed  Treatments may be given even if you do not want them  An advance directive may make it easier for your family to make difficult choices about your care  Urinary Incontinence   Urinary incontinence (UI)  is when you lose control of your bladder  UI develops because your bladder cannot store or empty urine properly  The 3 most common types of UI are stress incontinence, urge incontinence, or both  Medicines:   · May be given to help strengthen your bladder control  Report any side effects of medication to your healthcare provider  Do pelvic muscle exercises often:  Your pelvic muscles help you stop urinating  Squeeze these muscles tight for 5 seconds, then relax for 5 seconds  Gradually work up to squeezing for 10 seconds  Do 3 sets of 15 repetitions a day, or as directed  This will help strengthen your pelvic muscles and improve bladder control  Train your bladder:  Go to the bathroom at set times, such as every 2 hours, even if you do not feel the urge to go  You can also try to hold your urine when you feel the urge to go  For example, hold your urine for 5 minutes when you feel the urge to go  As that becomes easier, hold your urine for 10 minutes  Self-care:   · Keep a UI record  Write down how often you leak urine and how much you leak  Make a note of what you were doing when you leaked urine  · Drink liquids as directed  You may need to limit the amount of liquid you drink to help control your urine leakage  Do not drink any liquid right before you go to bed  Limit or do not have drinks that contain caffeine or alcohol  · Prevent constipation  Eat a variety of high-fiber foods  Good examples are high-fiber cereals, beans, vegetables, and whole-grain breads  Walking is the best way to trigger your intestines to have a bowel movement    · Exercise regularly and maintain a healthy weight  Weight loss and exercise will decrease pressure on your bladder and help you control your leakage  · Use a catheter as directed  to help empty your bladder  A catheter is a tiny, plastic tube that is put into your bladder to drain your urine  · Go to behavior therapy as directed  Behavior therapy may be used to help you learn to control your urge to urinate  Weight Management   Why it is important to manage your weight:  Being overweight increases your risk of health conditions such as heart disease, high blood pressure, type 2 diabetes, and certain types of cancer  It can also increase your risk for osteoarthritis, sleep apnea, and other respiratory problems  Aim for a slow, steady weight loss  Even a small amount of weight loss can lower your risk of health problems  How to lose weight safely:  A safe and healthy way to lose weight is to eat fewer calories and get regular exercise  You can lose up about 1 pound a week by decreasing the number of calories you eat by 500 calories each day  Healthy meal plan for weight management:  A healthy meal plan includes a variety of foods, contains fewer calories, and helps you stay healthy  A healthy meal plan includes the following:  · Eat whole-grain foods more often  A healthy meal plan should contain fiber  Fiber is the part of grains, fruits, and vegetables that is not broken down by your body  Whole-grain foods are healthy and provide extra fiber in your diet  Some examples of whole-grain foods are whole-wheat breads and pastas, oatmeal, brown rice, and bulgur  · Eat a variety of vegetables every day  Include dark, leafy greens such as spinach, kale, pam greens, and mustard greens  Eat yellow and orange vegetables such as carrots, sweet potatoes, and winter squash  · Eat a variety of fruits every day  Choose fresh or canned fruit (canned in its own juice or light syrup) instead of juice   Fruit juice has very little or no fiber   · Eat low-fat dairy foods  Drink fat-free (skim) milk or 1% milk  Eat fat-free yogurt and low-fat cottage cheese  Try low-fat cheeses such as mozzarella and other reduced-fat cheeses  · Choose meat and other protein foods that are low in fat  Choose beans or other legumes such as split peas or lentils  Choose fish, skinless poultry (chicken or turkey), or lean cuts of red meat (beef or pork)  Before you cook meat or poultry, cut off any visible fat  · Use less fat and oil  Try baking foods instead of frying them  Add less fat, such as margarine, sour cream, regular salad dressing and mayonnaise to foods  Eat fewer high-fat foods  Some examples of high-fat foods include french fries, doughnuts, ice cream, and cakes  · Eat fewer sweets  Limit foods and drinks that are high in sugar  This includes candy, cookies, regular soda, and sweetened drinks  Exercise:  Exercise at least 30 minutes per day on most days of the week  Some examples of exercise include walking, biking, dancing, and swimming  You can also fit in more physical activity by taking the stairs instead of the elevator or parking farther away from stores  Ask your healthcare provider about the best exercise plan for you  © Copyright [employer] 2018 Information is for End User's use only and may not be sold, redistributed or otherwise used for commercial purposes   All illustrations and images included in CareNotes® are the copyrighted property of [employer]  or [address] [place] [address]

## 2021-08-11 ENCOUNTER — OFFICE VISIT (OUTPATIENT)
Dept: ENDOCRINOLOGY | Facility: HOSPITAL | Age: 81
End: 2021-08-11
Payer: MEDICARE

## 2021-08-11 VITALS
SYSTOLIC BLOOD PRESSURE: 134 MMHG | DIASTOLIC BLOOD PRESSURE: 80 MMHG | BODY MASS INDEX: 28.81 KG/M2 | HEART RATE: 84 BPM | WEIGHT: 162.6 LBS | HEIGHT: 63 IN

## 2021-08-11 DIAGNOSIS — M81.0 AGE-RELATED OSTEOPOROSIS WITHOUT CURRENT PATHOLOGICAL FRACTURE: ICD-10-CM

## 2021-08-11 DIAGNOSIS — E55.9 VITAMIN D DEFICIENCY: ICD-10-CM

## 2021-08-11 DIAGNOSIS — E06.3 HYPOTHYROIDISM DUE TO HASHIMOTO'S THYROIDITIS: ICD-10-CM

## 2021-08-11 DIAGNOSIS — E03.8 HYPOTHYROIDISM DUE TO HASHIMOTO'S THYROIDITIS: ICD-10-CM

## 2021-08-11 DIAGNOSIS — E11.40 TYPE 2 DIABETES MELLITUS WITH DIABETIC NEUROPATHY, WITHOUT LONG-TERM CURRENT USE OF INSULIN (HCC): Primary | ICD-10-CM

## 2021-08-11 DIAGNOSIS — I10 ESSENTIAL HYPERTENSION: ICD-10-CM

## 2021-08-11 DIAGNOSIS — E78.2 MIXED HYPERLIPIDEMIA: ICD-10-CM

## 2021-08-11 PROCEDURE — 99214 OFFICE O/P EST MOD 30 MIN: CPT | Performed by: NURSE PRACTITIONER

## 2021-08-11 PROCEDURE — 1123F ACP DISCUSS/DSCN MKR DOCD: CPT | Performed by: NURSE PRACTITIONER

## 2021-08-11 NOTE — PATIENT INSTRUCTIONS
Be mindful of diet       Stay active and stay hydrated       Increase Metformin 500 mg - 2 tablets twice daily (breakfast and dinner)       Continue Destiny and Ronen       Please check your blood sugars 2 times daily and for the record to the office in 2 weeks for review and adjustment, if necessary       Continue levothyroxine 125 mcg daily Monday through Rua D 25 no tablet on Sunday      Continue atenolol and simvastatin       Continue with regular supplementation of vitamin D3 daily       Obtain lab work as prescribed

## 2021-08-16 ENCOUNTER — HOSPITAL ENCOUNTER (OUTPATIENT)
Dept: MAMMOGRAPHY | Facility: CLINIC | Age: 81
Discharge: HOME | End: 2021-08-16
Payer: MEDICARE

## 2021-08-16 VITALS — HEIGHT: 63 IN | BODY MASS INDEX: 28.7 KG/M2 | WEIGHT: 162 LBS

## 2021-08-16 DIAGNOSIS — Z12.31 SCREENING MAMMOGRAM, ENCOUNTER FOR: ICD-10-CM

## 2021-08-16 PROCEDURE — 77063 BREAST TOMOSYNTHESIS BI: CPT

## 2021-08-16 PROCEDURE — 77067 SCR MAMMO BI INCL CAD: CPT

## 2021-09-02 ENCOUNTER — REMOTE DEVICE CLINIC VISIT (OUTPATIENT)
Dept: CARDIOLOGY CLINIC | Facility: CLINIC | Age: 81
End: 2021-09-02
Payer: MEDICARE

## 2021-09-02 DIAGNOSIS — Z95.0 PRESENCE OF CARDIAC PACEMAKER: Primary | ICD-10-CM

## 2021-09-02 PROCEDURE — 93294 REM INTERROG EVL PM/LDLS PM: CPT | Performed by: INTERNAL MEDICINE

## 2021-09-02 PROCEDURE — 93296 REM INTERROG EVL PM/IDS: CPT | Performed by: INTERNAL MEDICINE

## 2021-09-16 ENCOUNTER — OFFICE VISIT (OUTPATIENT)
Dept: FAMILY MEDICINE CLINIC | Facility: CLINIC | Age: 81
End: 2021-09-16
Payer: MEDICARE

## 2021-09-16 ENCOUNTER — TELEPHONE (OUTPATIENT)
Dept: FAMILY MEDICINE CLINIC | Facility: CLINIC | Age: 81
End: 2021-09-16

## 2021-09-16 VITALS
HEIGHT: 64 IN | HEART RATE: 84 BPM | RESPIRATION RATE: 15 BRPM | BODY MASS INDEX: 28.07 KG/M2 | TEMPERATURE: 98 F | DIASTOLIC BLOOD PRESSURE: 82 MMHG | SYSTOLIC BLOOD PRESSURE: 136 MMHG | WEIGHT: 164.4 LBS

## 2021-09-16 DIAGNOSIS — B37.9 CANDIDA INFECTION: Primary | ICD-10-CM

## 2021-09-16 PROBLEM — L90.0 LICHEN SCLEROSUS ET ATROPHICUS: Status: ACTIVE | Noted: 2021-09-16

## 2021-09-16 PROCEDURE — 99214 OFFICE O/P EST MOD 30 MIN: CPT | Performed by: FAMILY MEDICINE

## 2021-09-16 RX ORDER — FLUCONAZOLE 150 MG/1
150 TABLET ORAL ONCE
Qty: 2 TABLET | Refills: 0 | Status: SHIPPED | OUTPATIENT
Start: 2021-09-16 | End: 2021-09-16

## 2021-09-16 NOTE — TELEPHONE ENCOUNTER
You ordered Fluconazole for the patient today, this is the only drug she happens to be allergic to  Please order something else for her    Andreyt     Deo Micheline

## 2021-09-16 NOTE — PATIENT INSTRUCTIONS
Candida infection  Diflucan one today then 1 in 3 days from now  Gold Bond medicated powder daily under the breast and in the perineum area every day after washing  Avoid as much as possible things made with sugar and flour

## 2021-09-16 NOTE — PROGRESS NOTES
Assessment/Plan:    Candida infection  Diflucan one today then 1 in 3 days from now  Gold Bond medicated powder daily under the breast and in the perineum area every day after washing  Avoid as much as possible things made with sugar and flour           Subjective:   Sindhu Rouse is a 79 y  o female  Chief Complaint   Patient presents with    Rash     HPI    Past medical history, social history, and family history reviewed as appropriate for the complaint of this patient  MEDICATIONS REVIEWED AND UPDATED    10 point review of systems performed, the remainder of the ROS is negative except for what is noted in the history of chief complaint    Objective:    Vitals:    09/16/21 0843   BP: 136/82   Pulse: 84   Resp: 15   Temp: 98 °F (36 7 °C)     Body mass index is 28 67 kg/m²      Physical Exam

## 2021-09-18 ENCOUNTER — NURSE TRIAGE (OUTPATIENT)
Dept: OTHER | Facility: OTHER | Age: 81
End: 2021-09-18

## 2021-09-18 NOTE — TELEPHONE ENCOUNTER
Regarding: Medication problem - Fluconazole  ----- Message from Vladimir Nunez sent at 9/18/2021  9:47 AM EDT -----  "I was prescribed Fluconazole for an infection but I am allergic and my doctor was supposed to change the medication and has yet to do so "

## 2021-09-18 NOTE — TELEPHONE ENCOUNTER
Sent message to on call via TC  On call advised to continue gold bond and follow up on Monday  Patient using her prescription cream at home  Reason for Disposition   [1] Caller has URGENT medicine question about med that PCP or specialist prescribed AND [2] triager unable to answer question    Answer Assessment - Initial Assessment Questions  1  NAME of MEDICATION: "What medicine are you calling about?"      Fluconazole     2  QUESTION: "What is your question?" (e g , medication refill, side effect)      I am allergic and the doctor never ordered a new medicine  3  PRESCRIBING HCP: "Who prescribed it?" Reason: if prescribed by specialist, call should be referred to that group  PCP     4  SYMPTOMS: "Do you have any symptoms?"      Yes     5   SEVERITY: If symptoms are present, ask "Are they mild, moderate or severe?"      Mild    Protocols used: MEDICATION QUESTION CALL-ADULT-

## 2021-09-20 NOTE — TELEPHONE ENCOUNTER
I ordered the pill fluconazole to help or rash she has   Her only side effect is dizziness which is not an allergy  Ask her to take 1 at bedtime tonight and see how she feels tomorrow  If she is fine then ask her to take the 2nd 1 in 3 days as prescribed

## 2021-10-29 ENCOUNTER — LAB (OUTPATIENT)
Dept: LAB | Facility: CLINIC | Age: 81
End: 2021-10-29
Payer: MEDICARE

## 2021-10-29 DIAGNOSIS — E06.3 HYPOTHYROIDISM DUE TO HASHIMOTO'S THYROIDITIS: ICD-10-CM

## 2021-10-29 DIAGNOSIS — E11.40 TYPE 2 DIABETES MELLITUS WITH DIABETIC NEUROPATHY, WITHOUT LONG-TERM CURRENT USE OF INSULIN (HCC): ICD-10-CM

## 2021-10-29 DIAGNOSIS — E55.9 VITAMIN D DEFICIENCY: ICD-10-CM

## 2021-10-29 DIAGNOSIS — E78.2 MIXED HYPERLIPIDEMIA: ICD-10-CM

## 2021-10-29 DIAGNOSIS — E03.8 HYPOTHYROIDISM DUE TO HASHIMOTO'S THYROIDITIS: ICD-10-CM

## 2021-10-29 DIAGNOSIS — I10 ESSENTIAL HYPERTENSION: ICD-10-CM

## 2021-10-29 DIAGNOSIS — M81.0 AGE-RELATED OSTEOPOROSIS WITHOUT CURRENT PATHOLOGICAL FRACTURE: ICD-10-CM

## 2021-10-29 LAB
ALBUMIN SERPL BCP-MCNC: 3.9 G/DL (ref 3.5–5)
ALP SERPL-CCNC: 90 U/L (ref 46–116)
ALT SERPL W P-5'-P-CCNC: 26 U/L (ref 12–78)
ANION GAP SERPL CALCULATED.3IONS-SCNC: 4 MMOL/L (ref 4–13)
AST SERPL W P-5'-P-CCNC: 22 U/L (ref 5–45)
BASOPHILS # BLD AUTO: 0.04 THOUSANDS/ΜL (ref 0–0.1)
BASOPHILS NFR BLD AUTO: 0 % (ref 0–1)
BILIRUB SERPL-MCNC: 0.84 MG/DL (ref 0.2–1)
BUN SERPL-MCNC: 14 MG/DL (ref 5–25)
CALCIUM SERPL-MCNC: 9.7 MG/DL (ref 8.3–10.1)
CHLORIDE SERPL-SCNC: 101 MMOL/L (ref 100–108)
CHOLEST SERPL-MCNC: 145 MG/DL (ref 50–200)
CO2 SERPL-SCNC: 30 MMOL/L (ref 21–32)
CREAT SERPL-MCNC: 0.78 MG/DL (ref 0.6–1.3)
EOSINOPHIL # BLD AUTO: 0.41 THOUSAND/ΜL (ref 0–0.61)
EOSINOPHIL NFR BLD AUTO: 4 % (ref 0–6)
ERYTHROCYTE [DISTWIDTH] IN BLOOD BY AUTOMATED COUNT: 12.8 % (ref 11.6–15.1)
EST. AVERAGE GLUCOSE BLD GHB EST-MCNC: 186 MG/DL
GFR SERPL CREATININE-BSD FRML MDRD: 72 ML/MIN/1.73SQ M
GLUCOSE P FAST SERPL-MCNC: 162 MG/DL (ref 65–99)
HBA1C MFR BLD: 8.1 %
HCT VFR BLD AUTO: 51 % (ref 34.8–46.1)
HDLC SERPL-MCNC: 37 MG/DL
HGB BLD-MCNC: 15.8 G/DL (ref 11.5–15.4)
IMM GRANULOCYTES # BLD AUTO: 0.03 THOUSAND/UL (ref 0–0.2)
IMM GRANULOCYTES NFR BLD AUTO: 0 % (ref 0–2)
LDLC SERPL CALC-MCNC: 66 MG/DL (ref 0–100)
LYMPHOCYTES # BLD AUTO: 3.53 THOUSANDS/ΜL (ref 0.6–4.47)
LYMPHOCYTES NFR BLD AUTO: 36 % (ref 14–44)
MCH RBC QN AUTO: 28.7 PG (ref 26.8–34.3)
MCHC RBC AUTO-ENTMCNC: 31 G/DL (ref 31.4–37.4)
MCV RBC AUTO: 93 FL (ref 82–98)
MONOCYTES # BLD AUTO: 0.7 THOUSAND/ΜL (ref 0.17–1.22)
MONOCYTES NFR BLD AUTO: 7 % (ref 4–12)
NEUTROPHILS # BLD AUTO: 5.23 THOUSANDS/ΜL (ref 1.85–7.62)
NEUTS SEG NFR BLD AUTO: 53 % (ref 43–75)
NONHDLC SERPL-MCNC: 108 MG/DL
NRBC BLD AUTO-RTO: 0 /100 WBCS
PLATELET # BLD AUTO: 281 THOUSANDS/UL (ref 149–390)
PMV BLD AUTO: 9.9 FL (ref 8.9–12.7)
POTASSIUM SERPL-SCNC: 4.2 MMOL/L (ref 3.5–5.3)
PROT SERPL-MCNC: 8 G/DL (ref 6.4–8.2)
RBC # BLD AUTO: 5.5 MILLION/UL (ref 3.81–5.12)
SODIUM SERPL-SCNC: 135 MMOL/L (ref 136–145)
T4 FREE SERPL-MCNC: 1.08 NG/DL (ref 0.76–1.46)
TRIGL SERPL-MCNC: 211 MG/DL
TSH SERPL DL<=0.05 MIU/L-ACNC: 3.14 UIU/ML (ref 0.36–3.74)
WBC # BLD AUTO: 9.94 THOUSAND/UL (ref 4.31–10.16)

## 2021-10-29 PROCEDURE — 85025 COMPLETE CBC W/AUTO DIFF WBC: CPT

## 2021-10-29 PROCEDURE — 36415 COLL VENOUS BLD VENIPUNCTURE: CPT

## 2021-10-29 PROCEDURE — 84443 ASSAY THYROID STIM HORMONE: CPT

## 2021-10-29 PROCEDURE — 80053 COMPREHEN METABOLIC PANEL: CPT

## 2021-10-29 PROCEDURE — 80061 LIPID PANEL: CPT

## 2021-10-29 PROCEDURE — 84439 ASSAY OF FREE THYROXINE: CPT

## 2021-10-29 PROCEDURE — 83036 HEMOGLOBIN GLYCOSYLATED A1C: CPT

## 2021-11-08 ENCOUNTER — IMMUNIZATIONS (OUTPATIENT)
Dept: FAMILY MEDICINE CLINIC | Facility: CLINIC | Age: 81
End: 2021-11-08
Payer: MEDICARE

## 2021-11-08 DIAGNOSIS — Z23 ENCOUNTER FOR IMMUNIZATION: Primary | ICD-10-CM

## 2021-11-08 PROCEDURE — 90686 IIV4 VACC NO PRSV 0.5 ML IM: CPT

## 2021-11-08 PROCEDURE — G0008 ADMIN INFLUENZA VIRUS VAC: HCPCS

## 2021-11-11 ENCOUNTER — TELEPHONE (OUTPATIENT)
Dept: GYNECOLOGY | Facility: CLINIC | Age: 81
End: 2021-11-11

## 2021-11-15 ENCOUNTER — TELEPHONE (OUTPATIENT)
Dept: ENDOCRINOLOGY | Facility: HOSPITAL | Age: 81
End: 2021-11-15

## 2021-11-16 ENCOUNTER — TELEPHONE (OUTPATIENT)
Dept: OTHER | Facility: OTHER | Age: 81
End: 2021-11-16

## 2021-11-19 DIAGNOSIS — E06.3 HYPOTHYROIDISM DUE TO HASHIMOTO'S THYROIDITIS: ICD-10-CM

## 2021-11-19 DIAGNOSIS — E03.8 HYPOTHYROIDISM DUE TO HASHIMOTO'S THYROIDITIS: ICD-10-CM

## 2021-11-19 RX ORDER — LEVOTHYROXINE SODIUM 0.12 MG/1
TABLET ORAL
Qty: 90 TABLET | Refills: 3 | Status: SHIPPED | OUTPATIENT
Start: 2021-11-19

## 2021-12-02 LAB
LEFT EYE DIABETIC RETINOPATHY: NORMAL
RIGHT EYE DIABETIC RETINOPATHY: NORMAL

## 2021-12-06 ENCOUNTER — IN-CLINIC DEVICE VISIT (OUTPATIENT)
Dept: CARDIOLOGY CLINIC | Facility: CLINIC | Age: 81
End: 2021-12-06
Payer: MEDICARE

## 2021-12-06 DIAGNOSIS — Z95.0 CARDIAC PACEMAKER IN SITU: Primary | ICD-10-CM

## 2021-12-06 PROCEDURE — 93280 PM DEVICE PROGR EVAL DUAL: CPT | Performed by: INTERNAL MEDICINE

## 2021-12-15 ENCOUNTER — OFFICE VISIT (OUTPATIENT)
Dept: ENDOCRINOLOGY | Facility: HOSPITAL | Age: 81
End: 2021-12-15
Payer: MEDICARE

## 2021-12-15 VITALS
BODY MASS INDEX: 27.25 KG/M2 | SYSTOLIC BLOOD PRESSURE: 122 MMHG | WEIGHT: 159.6 LBS | DIASTOLIC BLOOD PRESSURE: 80 MMHG | HEIGHT: 64 IN | HEART RATE: 87 BPM

## 2021-12-15 DIAGNOSIS — I10 ESSENTIAL HYPERTENSION: ICD-10-CM

## 2021-12-15 DIAGNOSIS — E03.8 HYPOTHYROIDISM DUE TO HASHIMOTO'S THYROIDITIS: ICD-10-CM

## 2021-12-15 DIAGNOSIS — E11.9 TYPE 2 DIABETES MELLITUS WITHOUT COMPLICATION, WITH LONG-TERM CURRENT USE OF INSULIN (HCC): ICD-10-CM

## 2021-12-15 DIAGNOSIS — E78.2 MIXED HYPERLIPIDEMIA: ICD-10-CM

## 2021-12-15 DIAGNOSIS — E55.9 VITAMIN D DEFICIENCY: ICD-10-CM

## 2021-12-15 DIAGNOSIS — M81.0 AGE-RELATED OSTEOPOROSIS WITHOUT CURRENT PATHOLOGICAL FRACTURE: ICD-10-CM

## 2021-12-15 DIAGNOSIS — Z79.4 TYPE 2 DIABETES MELLITUS WITHOUT COMPLICATION, WITH LONG-TERM CURRENT USE OF INSULIN (HCC): ICD-10-CM

## 2021-12-15 DIAGNOSIS — E06.3 HYPOTHYROIDISM DUE TO HASHIMOTO'S THYROIDITIS: ICD-10-CM

## 2021-12-15 DIAGNOSIS — E11.40 TYPE 2 DIABETES MELLITUS WITH DIABETIC NEUROPATHY, WITHOUT LONG-TERM CURRENT USE OF INSULIN (HCC): Primary | ICD-10-CM

## 2021-12-15 PROCEDURE — 99214 OFFICE O/P EST MOD 30 MIN: CPT | Performed by: NURSE PRACTITIONER

## 2021-12-15 RX ORDER — FLASH GLUCOSE SCANNING READER
1 EACH MISCELLANEOUS
Qty: 1 EACH | Refills: 0 | Status: SHIPPED | OUTPATIENT
Start: 2021-12-15

## 2021-12-15 RX ORDER — FLASH GLUCOSE SENSOR
1 KIT MISCELLANEOUS
Qty: 2 EACH | Refills: 6 | Status: SHIPPED | OUTPATIENT
Start: 2021-12-15

## 2021-12-17 ENCOUNTER — OFFICE VISIT (OUTPATIENT)
Dept: GYNECOLOGY | Facility: CLINIC | Age: 81
End: 2021-12-17
Payer: MEDICARE

## 2021-12-17 VITALS
HEIGHT: 64 IN | BODY MASS INDEX: 27.14 KG/M2 | HEART RATE: 87 BPM | SYSTOLIC BLOOD PRESSURE: 122 MMHG | WEIGHT: 159 LBS | DIASTOLIC BLOOD PRESSURE: 80 MMHG

## 2021-12-17 DIAGNOSIS — Z79.4 TYPE 2 DIABETES MELLITUS WITHOUT COMPLICATION, WITH LONG-TERM CURRENT USE OF INSULIN (HCC): ICD-10-CM

## 2021-12-17 DIAGNOSIS — E11.9 TYPE 2 DIABETES MELLITUS WITHOUT COMPLICATION, WITH LONG-TERM CURRENT USE OF INSULIN (HCC): ICD-10-CM

## 2021-12-17 DIAGNOSIS — L30.4 INTERTRIGO: ICD-10-CM

## 2021-12-17 PROCEDURE — 99214 OFFICE O/P EST MOD 30 MIN: CPT | Performed by: OBSTETRICS & GYNECOLOGY

## 2021-12-17 RX ORDER — LANCETS
EACH MISCELLANEOUS
Qty: 100 EACH | Refills: 2 | Status: SHIPPED | OUTPATIENT
Start: 2021-12-17

## 2021-12-17 RX ORDER — BLOOD-GLUCOSE METER
EACH MISCELLANEOUS
Qty: 1 KIT | Refills: 0 | Status: SHIPPED | OUTPATIENT
Start: 2021-12-17

## 2021-12-17 RX ORDER — KETOCONAZOLE 20 MG/G
CREAM TOPICAL 2 TIMES DAILY
Qty: 60 G | Refills: 11 | Status: SHIPPED | OUTPATIENT
Start: 2021-12-17 | End: 2022-02-07 | Stop reason: SDUPTHER

## 2021-12-17 RX ORDER — BLOOD SUGAR DIAGNOSTIC
STRIP MISCELLANEOUS
Qty: 100 STRIP | Refills: 2 | Status: SHIPPED | OUTPATIENT
Start: 2021-12-17

## 2021-12-22 DIAGNOSIS — E11.9 TYPE 2 DIABETES MELLITUS WITHOUT COMPLICATION, WITH LONG-TERM CURRENT USE OF INSULIN (HCC): ICD-10-CM

## 2021-12-22 DIAGNOSIS — Z79.4 TYPE 2 DIABETES MELLITUS WITHOUT COMPLICATION, WITH LONG-TERM CURRENT USE OF INSULIN (HCC): ICD-10-CM

## 2021-12-22 RX ORDER — METFORMIN HYDROCHLORIDE 500 MG/1
1000 TABLET, EXTENDED RELEASE ORAL 2 TIMES DAILY WITH MEALS
Qty: 120 TABLET | Refills: 0 | Status: SHIPPED | OUTPATIENT
Start: 2021-12-22 | End: 2021-12-23 | Stop reason: SDUPTHER

## 2021-12-23 DIAGNOSIS — E11.9 TYPE 2 DIABETES MELLITUS WITHOUT COMPLICATION, WITH LONG-TERM CURRENT USE OF INSULIN (HCC): ICD-10-CM

## 2021-12-23 DIAGNOSIS — Z79.4 TYPE 2 DIABETES MELLITUS WITHOUT COMPLICATION, WITH LONG-TERM CURRENT USE OF INSULIN (HCC): ICD-10-CM

## 2021-12-23 RX ORDER — METFORMIN HYDROCHLORIDE 500 MG/1
1000 TABLET, EXTENDED RELEASE ORAL 2 TIMES DAILY WITH MEALS
Qty: 120 TABLET | Refills: 0 | Status: SHIPPED | OUTPATIENT
Start: 2021-12-23 | End: 2022-01-07

## 2022-01-07 DIAGNOSIS — E11.9 TYPE 2 DIABETES MELLITUS WITHOUT COMPLICATION, WITH LONG-TERM CURRENT USE OF INSULIN (HCC): ICD-10-CM

## 2022-01-07 DIAGNOSIS — Z79.4 TYPE 2 DIABETES MELLITUS WITHOUT COMPLICATION, WITH LONG-TERM CURRENT USE OF INSULIN (HCC): ICD-10-CM

## 2022-01-07 RX ORDER — METFORMIN HYDROCHLORIDE 500 MG/1
TABLET, EXTENDED RELEASE ORAL
Qty: 120 TABLET | Refills: 11 | Status: SHIPPED | OUTPATIENT
Start: 2022-01-07 | End: 2022-02-15

## 2022-01-12 ENCOUNTER — OFFICE VISIT (OUTPATIENT)
Dept: CARDIOLOGY CLINIC | Facility: CLINIC | Age: 82
End: 2022-01-12
Payer: MEDICARE

## 2022-01-12 VITALS
WEIGHT: 156.2 LBS | BODY MASS INDEX: 26.67 KG/M2 | SYSTOLIC BLOOD PRESSURE: 110 MMHG | HEART RATE: 72 BPM | HEIGHT: 64 IN | DIASTOLIC BLOOD PRESSURE: 70 MMHG

## 2022-01-12 DIAGNOSIS — I48.91 ATRIAL FIBRILLATION, UNSPECIFIED TYPE (HCC): Primary | ICD-10-CM

## 2022-01-12 DIAGNOSIS — I51.7 CARDIOMEGALY: ICD-10-CM

## 2022-01-12 DIAGNOSIS — I34.0 MITRAL VALVE INSUFFICIENCY, UNSPECIFIED ETIOLOGY: ICD-10-CM

## 2022-01-12 DIAGNOSIS — I10 ESSENTIAL HYPERTENSION: ICD-10-CM

## 2022-01-12 DIAGNOSIS — I49.5 SICK SINUS SYNDROME (HCC): ICD-10-CM

## 2022-01-12 DIAGNOSIS — I25.10 CORONARY ARTERY DISEASE INVOLVING NATIVE CORONARY ARTERY OF NATIVE HEART WITHOUT ANGINA PECTORIS: ICD-10-CM

## 2022-01-12 DIAGNOSIS — R93.1 ABNORMAL ECHOCARDIOGRAM: ICD-10-CM

## 2022-01-12 DIAGNOSIS — Z95.0 MRI SAFE CARDIAC PACEMAKER IN SITU: ICD-10-CM

## 2022-01-12 DIAGNOSIS — E78.2 MIXED HYPERLIPIDEMIA: ICD-10-CM

## 2022-01-12 DIAGNOSIS — Z79.01 ANTICOAGULATED BY ANTICOAGULATION TREATMENT: ICD-10-CM

## 2022-01-12 PROCEDURE — 99214 OFFICE O/P EST MOD 30 MIN: CPT | Performed by: INTERNAL MEDICINE

## 2022-01-12 PROCEDURE — 93000 ELECTROCARDIOGRAM COMPLETE: CPT | Performed by: INTERNAL MEDICINE

## 2022-01-12 NOTE — PROGRESS NOTES
EPS Progress Note - Pollo Quinteros [de-identified] y o  female MRN: [id number]           ASSESSMENT:  1  Atrial fibrillation, unspecified type ([address] )  POCT ECG   2  Cardiomegaly     3  Essential hypertension     4  MRI safe cardiac pacemaker in situ     5  Coronary artery disease involving native coronary artery of native heart without angina pectoris     6  Anticoagulated by anticoagulation treatment     7  Abnormal echocardiogram     8  Sick sinus syndrome ([address] )     9  Mitral valve insufficiency, unspecified etiology             PLAN:    1  Essential Hypertension- controlled     2  Mitral Regurgitation trace no murmur appreciated today    3  Atrial Fibrillation-  paroxysmal tolerating anticoagulant    4  Shortness of breath- only when walking up the steps does not think this is unchanged and feels fine on flat ground for the most part she is not interested in pursuing stress test or further testing    5  Pacemaker in [address] working appropriately       HPI:   Interim history patient presents to the office today with no complaints  She said she feels good  Patient gets shortness of breath when walking up the steps and just a little bit when walking flat  Patient feels this is nothing to worry about just wanted to inform  Patient said the only pains she feels in her body is her arthritis      Patient denies chest pain,Headache,Double vision, palpitations, dizziness, lightheadedness, fatigue and syncope      ROS:  Positive for shortness of breath only when going up the steps and all other 12 point ROS negative      Objective:     Vitals: /70 (BP Location: Left arm, Patient Position: Sitting, Cuff Size: Adult)   Pulse 72   Ht 5' 3 5" (1 613 m)   Wt 70 9 kg (156 lb 3 2 oz)   BMI 27 24 kg/m²        Physical Exam:    GEN: Sindhu A Genoe appears well, alert and oriented x 3, pleasant and cooperative   HEENT: pupils equal, round, and reactive to light; extraocular muscles intact  NECK: supple, no carotid bruits   HEART: regular rhythm, normal S1 and S2, 2/6 holosystolic murmur, clicks, gallops or rubs   LUNGS: clear to auscultation bilaterally; no wheezes, rales, or rhonchi   ABDOMEN: normal bowel sounds, soft, no tenderness, no distention  EXTREMITIES: peripheral pulses normal; no clubbing, cyanosis, or edema  NEURO: no focal findings   SKIN: normal without suspicious lesions on exposed skin    Medications:      Current Outpatient Medications:     albuterol (ProAir HFA) 90 mcg/act inhaler, Inhale 2 puffs every 6 (six) hours as needed for wheezing or shortness of breath, Disp: 8 5 g, Rfl: 0    Apple Cider Vinegar 500 MG TABS, Take by mouth daily, Disp: , Rfl:     ascorbic acid (VITAMIN C) 500 mg tablet, Take 500 mg by mouth daily, Disp: , Rfl:     atenolol (TENORMIN) 50 mg tablet, TAKE 1 TABLET AT BEDTIME, Disp: 90 tablet, Rfl: 3    Byetta 10 MCG Pen 10 MCG/0 04ML SOPN, INJECT 0 04 ML UNDER THE SKIN TWICE A DAY, Disp: 7 2 mL, Rfl: 3    Cholecalciferol (VITAMIN D-3) 1000 units CAPS, Take 1 capsule by mouth daily, Disp: , Rfl:     Coenzyme Q10 (COQ10) 200 MG CAPS, Take 200 mg by mouth daily  , Disp: , Rfl:     DULoxetine (CYMBALTA) 60 mg delayed release capsule, Take 1 capsule (60 mg total) by mouth daily, Disp: 90 capsule, Rfl: 1    Eliquis 5 MG, TAKE 1 TABLET TWICE A DAY, Disp: 180 tablet, Rfl: 3    estradiol (ESTRACE VAGINAL) 0 1 mg/g vaginal cream, Insert 0 5 g into the vagina 2 (two) times a week Patient is NOT to be using this daily, Disp: 42 5 g, Rfl: 1    Ferrous Sulfate 27 MG TABS, Take 1 tablet by mouth daily, Disp: , Rfl:     Glucosamine-Chondroitin (MOVE FREE PO), Take by mouth daily, Disp: , Rfl:     Jardiance 25 MG TABS, TAKE 1 TABLET EVERY MORNING, Disp: 90 tablet, Rfl: 3    levothyroxine 125 mcg tablet, TAKE 1 TABLET SIX DAYS A WEEK, NOTHING ON SUNDAY, Disp: 90 tablet, Rfl: 3    MEGARED OMEGA-3 KRILL OIL PO, Take 1 capsule by mouth daily , Disp: , Rfl:     metFORMIN (GLUCOPHAGE-XR) 500 mg 24 hr tablet, TAKE 2 TABLETS TWICE A DAY WITH MEALS (Patient taking differently: 500 mg 4 (four) times a day  ), Disp: 120 tablet, Rfl: 11    simvastatin (ZOCOR) 20 mg tablet, Take 2 tablets (40 mg total) by mouth daily at bedtime, Disp: 180 tablet, Rfl: 3    Triamcinolone Acetonide (Nasacort Allergy 24HR) 55 MCG/ACT AERO, 2 Act (110 mcg total) into each nostril every morning, Disp: 16 5 mL, Rfl: 3    Blood Glucose Monitoring Suppl (FREESTYLE FREEDOM LITE) w/Device KIT, by Does not apply route once for 1 dose Use meter to check blood sugars daily  , Disp: 1 each, Rfl: 0    Blood Glucose Monitoring Suppl (OneTouch Verio) w/Device KIT, Check blood sugars once a day, Disp: 1 kit, Rfl: 0    clobetasol (TEMOVATE) 0 05 % cream, , Disp: , Rfl:     Continuous Blood Gluc  (FreeStyle Julian 14 Day [place]) [facility], Use 1 Device 4 (four) times a day (before meals and at bedtime), Disp: 1 each, Rfl: 0    Continuous Blood Gluc Sensor (FreeStyle Julian 14 Day Sensor) MISC, Use 1 application every 14 (fourteen) days, Disp: 2 each, Rfl: 6    FREESTYLE LITE test strip, Test 3 times daily  , Disp: 300 each, Rfl: 3    glucose blood (OneTouch Verio) test strip, Check blood sugars once a day, Disp: 100 strip, Rfl: 2    hydrocortisone 2 5 % cream, Apply topically 2 (two) times a day, Disp: 60 g, Rfl: 2    ketoconazole (NIZORAL) 2 % cream, Apply topically 2 (two) times a day for 14 days, Disp: 60 g, Rfl: 11    Lancets (onetouch ultrasoft) lancets, Check blood sugars once a day, Disp: 100 each, Rfl: 2     Family History   Problem Relation Age of Onset    Heart disease Mother     Diabetes Mother     Asthma Father     Stomach cancer Father         gastrkic cancer    Cancer Paternal Grandmother     Cancer Paternal Grandfather     No Known Problems Brother     Breast cancer Sister 76    Breast cancer Sister 77    Pancreatic cancer Sister 72    No Known Problems Maternal Aunt     No Known Problems Maternal Aunt     No Known Problems Maternal Aunt     No Known Problems Paternal Aunt     No Known Problems Daughter     No Known Problems Maternal Grandmother     No Known Problems Maternal Grandfather     Prostate cancer Paternal Uncle         age unknown    Substance Abuse Neg Hx     Mental illness Neg Hx     Alcohol abuse Neg Hx     Colon cancer Neg Hx     Colon polyps Neg Hx      Social History     Socioeconomic History    Marital status: /Civil Union     Spouse name: Not on file    Number of children: Not on file    Years of education: Not on file    Highest education level: Not on file   Occupational History    Not on file   Tobacco Use    Smoking status: Former Smoker     Types: Cigarettes     Quit date:      Years since quittin 0    Smokeless tobacco: Former User     Quit date:    Vaping Use    Vaping Use: Never used   Substance and Sexual Activity    Alcohol use: No    Drug use: No    Sexual activity: Not Currently   Other Topics Concern    Not on file   Social History Narrative    Always uses seat belt    Copy of advanced directive obtained    Drinks coffee-drinks 5 cups a day    Has smoke detectors    Uses safety equipment-seatbelts     Social Determinants of Health     Financial Resource Strain: Not on file   Food Insecurity: Not on file   Transportation Needs: Not on file   Physical Activity: Not on file   Stress: Not on file   Social Connections: Not on file   Intimate Partner Violence: Not on file   Housing Stability: Not on file     Social History     Tobacco Use   Smoking Status Former Smoker    Types: Cigarettes    Quit date:     Years since quittin 0   Smokeless Tobacco Former User    Quit date:      Social History     Substance and Sexual Activity   Alcohol Use No       Labs & Results:  Below is the patient's most recent value for Albumin, ALT, AST, BUN, Calcium, Chloride, Cholesterol, CO2, Creatinine, GFR, Glucose, HDL, Hematocrit, Hemoglobin, Hemoglobin A1C, LDL, Magnesium, Phosphorus, Platelets, Potassium, PSA, Sodium, Triglycerides, and WBC  Lab Results   Component Value Date    ALT 26 10/29/2021    AST 22 10/29/2021    BUN 14 10/29/2021    CALCIUM 9 7 10/29/2021     10/29/2021    CHOL 147 12/21/2015    CO2 30 10/29/2021    CREATININE 0 78 10/29/2021    HDL 37 (L) 10/29/2021    HCT 51 0 (H) 10/29/2021    HGB 15 8 (H) 10/29/2021    HGBA1C 8 1 (H) 10/29/2021    MG 1 8 01/27/2016     10/29/2021    K 4 2 10/29/2021     12/21/2015    TRIG 211 (H) 10/29/2021    WBC 9 94 10/29/2021     Note: for a comprehensive list of the patient's lab results, access the Results Review activity  No results found for this or any previous visit  Cardiac testing:   No results found for this or any previous visit  No results found for this or any previous visit  No results found for this or any previous visit  No results found for this or any previous visit  EKG personally reviewed by Marce Lynch

## 2022-02-04 ENCOUNTER — TELEPHONE (OUTPATIENT)
Dept: GYNECOLOGY | Facility: CLINIC | Age: 82
End: 2022-02-04

## 2022-02-04 NOTE — TELEPHONE ENCOUNTER
Patient needs [facility]  She states she does not want it sent to [facility]  Please resend

## 2022-02-07 ENCOUNTER — OFFICE VISIT (OUTPATIENT)
Dept: DERMATOLOGY | Facility: CLINIC | Age: 82
End: 2022-02-07
Payer: MEDICARE

## 2022-02-07 ENCOUNTER — LAB (OUTPATIENT)
Dept: LAB | Facility: CLINIC | Age: 82
End: 2022-02-07
Payer: MEDICARE

## 2022-02-07 VITALS — TEMPERATURE: 98.3 F | HEIGHT: 64 IN | BODY MASS INDEX: 26.69 KG/M2 | WEIGHT: 156.31 LBS

## 2022-02-07 DIAGNOSIS — Z79.4 TYPE 2 DIABETES MELLITUS WITHOUT COMPLICATION, WITH LONG-TERM CURRENT USE OF INSULIN (HCC): ICD-10-CM

## 2022-02-07 DIAGNOSIS — E55.9 VITAMIN D DEFICIENCY: ICD-10-CM

## 2022-02-07 DIAGNOSIS — I10 ESSENTIAL HYPERTENSION: ICD-10-CM

## 2022-02-07 DIAGNOSIS — E06.3 HYPOTHYROIDISM DUE TO HASHIMOTO'S THYROIDITIS: ICD-10-CM

## 2022-02-07 DIAGNOSIS — D48.5 NEOPLASM OF UNCERTAIN BEHAVIOR OF SKIN: Primary | ICD-10-CM

## 2022-02-07 DIAGNOSIS — E11.40 TYPE 2 DIABETES MELLITUS WITH DIABETIC NEUROPATHY, WITHOUT LONG-TERM CURRENT USE OF INSULIN (HCC): ICD-10-CM

## 2022-02-07 DIAGNOSIS — E78.2 MIXED HYPERLIPIDEMIA: ICD-10-CM

## 2022-02-07 DIAGNOSIS — E03.8 HYPOTHYROIDISM DUE TO HASHIMOTO'S THYROIDITIS: ICD-10-CM

## 2022-02-07 DIAGNOSIS — M81.0 AGE-RELATED OSTEOPOROSIS WITHOUT CURRENT PATHOLOGICAL FRACTURE: ICD-10-CM

## 2022-02-07 DIAGNOSIS — E11.9 TYPE 2 DIABETES MELLITUS WITHOUT COMPLICATION, WITH LONG-TERM CURRENT USE OF INSULIN (HCC): ICD-10-CM

## 2022-02-07 LAB
25(OH)D3 SERPL-MCNC: 32.6 NG/ML (ref 30–100)
ALBUMIN SERPL BCP-MCNC: 4.5 G/DL (ref 3.5–5)
ALP SERPL-CCNC: 80 U/L (ref 46–116)
ALT SERPL W P-5'-P-CCNC: 25 U/L (ref 12–78)
ANION GAP SERPL CALCULATED.3IONS-SCNC: 5 MMOL/L (ref 4–13)
AST SERPL W P-5'-P-CCNC: 18 U/L (ref 5–45)
BACTERIA UR QL AUTO: ABNORMAL /HPF
BASOPHILS # BLD AUTO: 0.03 THOUSANDS/ΜL (ref 0–0.1)
BASOPHILS NFR BLD AUTO: 0 % (ref 0–1)
BILIRUB SERPL-MCNC: 0.84 MG/DL (ref 0.2–1)
BILIRUB UR QL STRIP: NEGATIVE
BUN SERPL-MCNC: 14 MG/DL (ref 5–25)
CALCIUM SERPL-MCNC: 10.4 MG/DL (ref 8.3–10.1)
CHLORIDE SERPL-SCNC: 101 MMOL/L (ref 100–108)
CHOLEST SERPL-MCNC: 144 MG/DL
CLARITY UR: CLEAR
CO2 SERPL-SCNC: 29 MMOL/L (ref 21–32)
COLOR UR: YELLOW
CREAT SERPL-MCNC: 0.77 MG/DL (ref 0.6–1.3)
CREAT UR-MCNC: 42.4 MG/DL
EOSINOPHIL # BLD AUTO: 0.4 THOUSAND/ΜL (ref 0–0.61)
EOSINOPHIL NFR BLD AUTO: 4 % (ref 0–6)
ERYTHROCYTE [DISTWIDTH] IN BLOOD BY AUTOMATED COUNT: 13.2 % (ref 11.6–15.1)
GFR SERPL CREATININE-BSD FRML MDRD: 73 ML/MIN/1.73SQ M
GLUCOSE P FAST SERPL-MCNC: 146 MG/DL (ref 65–99)
GLUCOSE UR STRIP-MCNC: ABNORMAL MG/DL
HCT VFR BLD AUTO: 53.6 % (ref 34.8–46.1)
HDLC SERPL-MCNC: 33 MG/DL
HGB BLD-MCNC: 16.7 G/DL (ref 11.5–15.4)
HGB UR QL STRIP.AUTO: NEGATIVE
HYALINE CASTS #/AREA URNS LPF: ABNORMAL /LPF
IMM GRANULOCYTES # BLD AUTO: 0.02 THOUSAND/UL (ref 0–0.2)
IMM GRANULOCYTES NFR BLD AUTO: 0 % (ref 0–2)
KETONES UR STRIP-MCNC: NEGATIVE MG/DL
LDLC SERPL CALC-MCNC: 66 MG/DL (ref 0–100)
LEUKOCYTE ESTERASE UR QL STRIP: ABNORMAL
LYMPHOCYTES # BLD AUTO: 3.96 THOUSANDS/ΜL (ref 0.6–4.47)
LYMPHOCYTES NFR BLD AUTO: 40 % (ref 14–44)
MCH RBC QN AUTO: 28.7 PG (ref 26.8–34.3)
MCHC RBC AUTO-ENTMCNC: 31.2 G/DL (ref 31.4–37.4)
MCV RBC AUTO: 92 FL (ref 82–98)
MICROALBUMIN UR-MCNC: 9.3 MG/L (ref 0–20)
MICROALBUMIN/CREAT 24H UR: 22 MG/G CREATININE (ref 0–30)
MONOCYTES # BLD AUTO: 0.8 THOUSAND/ΜL (ref 0.17–1.22)
MONOCYTES NFR BLD AUTO: 8 % (ref 4–12)
NEUTROPHILS # BLD AUTO: 4.58 THOUSANDS/ΜL (ref 1.85–7.62)
NEUTS SEG NFR BLD AUTO: 48 % (ref 43–75)
NITRITE UR QL STRIP: NEGATIVE
NON-SQ EPI CELLS URNS QL MICRO: ABNORMAL /HPF
NONHDLC SERPL-MCNC: 111 MG/DL
NRBC BLD AUTO-RTO: 0 /100 WBCS
PH UR STRIP.AUTO: 6.5 [PH]
PLATELET # BLD AUTO: 252 THOUSANDS/UL (ref 149–390)
PMV BLD AUTO: 10.3 FL (ref 8.9–12.7)
POTASSIUM SERPL-SCNC: 4.8 MMOL/L (ref 3.5–5.3)
PROT SERPL-MCNC: 8.5 G/DL (ref 6.4–8.2)
PROT UR STRIP-MCNC: NEGATIVE MG/DL
RBC # BLD AUTO: 5.81 MILLION/UL (ref 3.81–5.12)
RBC #/AREA URNS AUTO: ABNORMAL /HPF
SODIUM SERPL-SCNC: 135 MMOL/L (ref 136–145)
SP GR UR STRIP.AUTO: 1.01 (ref 1–1.03)
T4 FREE SERPL-MCNC: 1.04 NG/DL (ref 0.76–1.46)
TRIGL SERPL-MCNC: 225 MG/DL
TSH SERPL DL<=0.05 MIU/L-ACNC: 0.79 UIU/ML (ref 0.36–3.74)
UROBILINOGEN UR QL STRIP.AUTO: 0.2 E.U./DL
WBC # BLD AUTO: 9.79 THOUSAND/UL (ref 4.31–10.16)
WBC #/AREA URNS AUTO: ABNORMAL /HPF

## 2022-02-07 PROCEDURE — 80061 LIPID PANEL: CPT

## 2022-02-07 PROCEDURE — 11102 TANGNTL BX SKIN SINGLE LES: CPT | Performed by: DERMATOLOGY

## 2022-02-07 PROCEDURE — 84439 ASSAY OF FREE THYROXINE: CPT

## 2022-02-07 PROCEDURE — 88305 TISSUE EXAM BY PATHOLOGIST: CPT | Performed by: STUDENT IN AN ORGANIZED HEALTH CARE EDUCATION/TRAINING PROGRAM

## 2022-02-07 PROCEDURE — 99214 OFFICE O/P EST MOD 30 MIN: CPT | Performed by: DERMATOLOGY

## 2022-02-07 PROCEDURE — 82570 ASSAY OF URINE CREATININE: CPT

## 2022-02-07 PROCEDURE — 82043 UR ALBUMIN QUANTITATIVE: CPT

## 2022-02-07 PROCEDURE — 83036 HEMOGLOBIN GLYCOSYLATED A1C: CPT

## 2022-02-07 PROCEDURE — 80053 COMPREHEN METABOLIC PANEL: CPT

## 2022-02-07 PROCEDURE — 82306 VITAMIN D 25 HYDROXY: CPT

## 2022-02-07 PROCEDURE — 36415 COLL VENOUS BLD VENIPUNCTURE: CPT

## 2022-02-07 PROCEDURE — 81001 URINALYSIS AUTO W/SCOPE: CPT

## 2022-02-07 PROCEDURE — 84443 ASSAY THYROID STIM HORMONE: CPT

## 2022-02-07 PROCEDURE — 85025 COMPLETE CBC W/AUTO DIFF WBC: CPT

## 2022-02-07 NOTE — PROGRESS NOTES
[facility] Follow Up Note    Patient Name: Cecil Evans  Encounter Date: 2/7/20222    Today's Chief Concerns:   Concern #1:  Spot of concern on upper left eyebrow   Concern #2:  AK's on side of right temple    Current Medications:    Current Outpatient Medications:     albuterol (ProAir HFA) 90 mcg/act inhaler, Inhale 2 puffs every 6 (six) hours as needed for wheezing or shortness of breath, Disp: 8 5 g, Rfl: 0    Apple Cider Vinegar 500 MG TABS, Take by mouth daily, Disp: , Rfl:     ascorbic acid (VITAMIN C) 500 mg tablet, Take 500 mg by mouth daily, Disp: , Rfl:     atenolol (TENORMIN) 50 mg tablet, TAKE 1 TABLET AT BEDTIME, Disp: 90 tablet, Rfl: 3    Blood Glucose Monitoring Suppl (OneTouch Verio) w/Device KIT, Check blood sugars once a day, Disp: 1 kit, Rfl: 0    Byetta 10 MCG Pen 10 MCG/0 04ML SOPN, INJECT 0 04 ML UNDER THE SKIN TWICE A DAY, Disp: 7 2 mL, Rfl: 3    Cholecalciferol (VITAMIN D-3) 1000 units CAPS, Take 1 capsule by mouth daily, Disp: , Rfl:     clobetasol (TEMOVATE) 0 05 % cream, , Disp: , Rfl:     Coenzyme Q10 (COQ10) 200 MG CAPS, Take 200 mg by mouth daily  , Disp: , Rfl:     Continuous Blood Gluc  (FreeStyle Julian 14 Day [place]) [facility], Use 1 Device 4 (four) times a day (before meals and at bedtime), Disp: 1 each, Rfl: 0    Continuous Blood Gluc Sensor (FreeStyle Julian 14 Day Sensor) MISC, Use 1 application every 14 (fourteen) days, Disp: 2 each, Rfl: 6    DULoxetine (CYMBALTA) 60 mg delayed release capsule, Take 1 capsule (60 mg total) by mouth daily, Disp: 90 capsule, Rfl: 1    Eliquis 5 MG, TAKE 1 TABLET TWICE A DAY, Disp: 180 tablet, Rfl: 3    estradiol (ESTRACE VAGINAL) 0 1 mg/g vaginal cream, Insert 0 5 g into the vagina 2 (two) times a week Patient is NOT to be using this daily, Disp: 42 5 g, Rfl: 1    Ferrous Sulfate 27 MG TABS, Take 1 tablet by mouth daily, Disp: , Rfl:     FREESTYLE LITE test strip, Test 3 times daily  , Disp: 300 each, Rfl: 3    Glucosamine-Chondroitin (MOVE FREE PO), Take by mouth daily, Disp: , Rfl:     glucose blood (OneTouch Verio) test strip, Check blood sugars once a day, Disp: 100 strip, Rfl: 2    hydrocortisone 2 5 % cream, Apply topically 2 (two) times a day, Disp: 60 g, Rfl: 2    Jardiance 25 MG TABS, TAKE 1 TABLET EVERY MORNING, Disp: 90 tablet, Rfl: 3    ketoconazole (NIZORAL) 2 % cream, Apply topically 2 (two) times a day for 14 days, Disp: 60 g, Rfl: 1    Lancets (onetouch ultrasoft) lancets, Check blood sugars once a day, Disp: 100 each, Rfl: 2    levothyroxine 125 mcg tablet, TAKE 1 TABLET SIX DAYS A WEEK, NOTHING ON SUNDAY, Disp: 90 tablet, Rfl: 3    MEGARED OMEGA-3 KRILL OIL PO, Take 1 capsule by mouth daily , Disp: , Rfl:     metFORMIN (GLUCOPHAGE-XR) 500 mg 24 hr tablet, TAKE 2 TABLETS TWICE A DAY WITH MEALS (Patient taking differently: 500 mg 4 (four) times a day  ), Disp: 120 tablet, Rfl: 11    simvastatin (ZOCOR) 20 mg tablet, Take 2 tablets (40 mg total) by mouth daily at bedtime, Disp: 180 tablet, Rfl: 3    Triamcinolone Acetonide (Nasacort Allergy 24HR) 55 MCG/ACT AERO, 2 Act (110 mcg total) into each nostril every morning, Disp: 16 5 mL, Rfl: 3    Blood Glucose Monitoring Suppl (FREESTYLE FREEDOM LITE) w/Device KIT, by Does not apply route once for 1 dose Use meter to check blood sugars daily  , Disp: 1 each, Rfl: 0    CONSTITUTIONAL:   Vitals:    02/07/22 1241   Temp: 98 3 °F (36 8 °C)   TempSrc: Temporal   Weight: 70 9 kg (156 lb 4 9 oz)   Height: 5' 3 5" (1 613 m)       Specific Alerts:    Have you been seen by a [facility]atologist in the last 3 years? YES    Are you pregnant or planning to become pregnant? No    Are you currently or planning to be nursing or breast feeding? No    Allergies   Allergen Reactions    Fluconazole Dizziness       May we call your Preferred Phone number to discuss your specific medical information?  YES    May we leave a detailed message that includes your specific medical information? YES    Have you traveled outside of the [facility] in the past 3 months? No    Do you currently have a pacemaker or defibrillator? No    Do you have any artificial heart valves, joints, plates, screws, rods, stents, pins, etc? No   - If Yes, were any placed within the last 2 years? Do you require any medications prior to a surgical procedure? No    Are you taking any medications that cause you to bleed more easily ("blood thinners") YES    Have you ever experienced a rapid heartbeat with epinephrine? No    Have you ever been treated with "gold" (gold sodium thiomalate) therapy? No    [facility] can help with wrinkles, "laugh lines," facial volume loss, "double chin," "love handles," age spots, and more  Are you interested in learning today about some of the skin enhancement procedures that we offer? (If Yes, please provide more detail) No    Review of Systems:  Have you recently had or currently have any of the following? · Fever or chills: No  · Night Sweats: No  · Headaches: No  · Weight Gain: No  · Weight Loss: YES  · Blurry Vision: YES  · Nausea: No  · Vomiting: No  · Diarrhea: YES  · Blood in Stool: No  · Abdominal Pain: No  · Itchy Skin: YES  · Painful Joints: No  · Swollen Joints: No  · Muscle Pain: No  · Irregular Mole: No  · Sun Burn: No  · Dry Skin: No  · Skin Color Changes: No  · Scar or Keloid: No  · Cold Sores/Fever Blisters: No  · Bacterial Infections/MRSA: No  · Anxiety: YES  · Depression: YES  · Suicidal or Homicidal Thoughts: No      PSYCH: Normal mood and affect  EYES: Normal conjunctiva  RESPIRATORY: Normal respirations    FULL ORGAN SYSTEM SKIN EXAM (SKIN)   Face Normal except as noted below in Assessment       1   NEOPLASM OF UNCERTAIN BEHAVIOR OF SKIN    Physical Exam:   (Anatomic Location); (Size and Morphological Description); (Differential Diagnosis):  o Left temple above lateral eyebrow; 0 65 cm scaly pink papule; Differential diagnosis; Verruca vulgaris vs inflamed Seborrheic keratosis vs SCCIS    Additional History of Present Condition:  Patient stated the lesion has been present for 4 weeks  Patient stated she was itching the area and part of the lesion fell off  Bleeding, itching, and irritation are present  Patient denies using any topical or oral treatment for the lesion  No history of skin cancer  Assessment and Plan:   I have discussed with the patient that a sample of skin via a "skin biopsy would be potentially helpful to further make a specific diagnosis under the microscope   Based on a thorough discussion of this condition and the management approach to it (including a comprehensive discussion of the known risks, side effects and potential benefits of treatment), the patient (family) agrees to implement the following specific plan:    o Procedure:  Skin Biopsy  After a thorough discussion of treatment options and risk/benefits/alternatives (including but not limited to local pain, scarring, dyspigmentation, blistering, possible superinfection, and inability to confirm a diagnosis via histopathology), verbal and written consent were obtained and portion of the rash was biopsied for tissue sample  See below for consent that was obtained from patient and subsequent Procedure Note  PROCEDURE SHAVE BIOPSY NOTE:     Performing Physician: Dr Joaquin   Anatomic Location; Clinical Description with size (cm); Pre-Op Diagnosis:   o Left temple above lateral eyebrow; 0 65 cm scaly pink papule; Differential diagnosis; Verruca vulgaris vs inflamed Seborrheic keratosis vs SCCIS   Post-op diagnosis: Same      Local anesthesia: 1% xylocaine with epi       Topical anesthesia: None     Hemostasis: Aluminum chloride       After obtaining informed consent  at which time there was a discussion about the purpose of biopsy  and low risks of infection and bleeding  The area was prepped and draped in the usual fashion  Anesthesia was obtained with 1% lidocaine with epinephrine  A shave biopsy to an appropriate sampling depth was obtained with a sterile blade (such as a 15-blade or DermaBlade)  The resulting wound was covered with surgical ointment and bandaged appropriately  The patient tolerated the procedure well without complications and was without signs of functional compromise  Specimen has been sent for review by Dermatopathology  Standard post-procedure care has been explained and has been included in written form within the patient's copy of Informed Consent  INFORMED CONSENT DISCUSSION AND POST-OPERATIVE INSTRUCTIONS FOR PATIENT    I   RATIONALE FOR PROCEDURE  I understand that a skin biopsy allows the Dermatologist to test a lesion or rash under the microscope to obtain a diagnosis  It usually involves numbing the area with numbing medication and removing a small piece of skin; sometimes the area will be closed with sutures  In this specific procedure, sutures are not usually needed  If any sutures are placed, then they are usually need to be removed in 2 weeks or less  I understand that my Dermatologist recommends that a skin "shave" biopsy be performed today  A local anesthetic, similar to the kind that a dentist uses when filling a cavity, will be injected with a very small needle into the skin area to be sampled  The injected skin and tissue underneath "will go to sleep and become numb so no pain should be felt afterwards  An instrument shaped like a tiny "razor blade" (shave biopsy instrument) will be used to cut a small piece of tissue and skin from the area so that a sample of tissue can be taken and examined more closely under the microscope  A slight amount of bleeding will occur, but it will be stopped with direct pressure and a pressure bandage and any other appropriate methods  I understands that a scar will form where the wound was created    Surgical ointment will be applied to help protect the wound  Sutures are not usually needed  II   RISKS AND POTENTIAL COMPLICATIONS   I understand the risks and potential complications of a skin biopsy include but are not limited to the following:   Bleeding   Infection   Pain   Scar/keloid   Skin discoloration   Incomplete Removal   Recurrence   Nerve Damage/Numbness/Loss of Function   Allergic Reaction to Anesthesia   Biopsies are diagnostic procedures and based on findings additional treatment or evaluation may be required   Loss or destruction of specimen resulting in no additional findings    My Dermatologist has explained to me the nature of the condition, the nature of the procedure, and the benefits to be reasonably expected compared with alternative approaches  My Dermatologist has discussed the likelihood of major risks or complications of this procedure including the specific risks listed above, such as bleeding, infection, and scarring/keloid  I understand that a scar is expected after this procedure  I understand that my physician cannot predict if the scar will form a "keloid," which extends beyond the borders of the wound that is created  A keloid is a thick, painful, and bumpy scar  A keloid can be difficult to treat, as it does not always respond well to therapy, which includes injecting cortisone directly into the keloid every few weeks  While this usually reduces the pain and size of the scar, it does not eliminate it  I understand that photographs may be taken before and after the procedure  These will be maintained as part of the medical providers confidential records and may not be made available to me  I further authorize the medical provider to use the photographs for teaching purposes or to illustrate scientific papers, books, or lectures if in his/her judgment, medical research, education, or science may benefit from its use      I have had an opportunity to fully inquire about the risks and benefits of this procedure and its alternatives  I have been given ample time and opportunity to ask questions and to seek a second opinion if I wished to do so  I acknowledge that there have specifically been no guarantees as to the cosmetic results from the procedure  I am aware that with any procedure there is always the possibility of an unexpected complication  III  POST-PROCEDURAL CARE (WHAT YOU WILL NEED TO DO "AFTER THE BIOPSY" TO OPTIMIZE HEALING)     Keep the area clean and dry  Try NOT to remove the bandage or get it wet for the first 24 hours   Gently clean the area and apply surgical ointment (such as Vaseline petrolatum ointment, which is available "over the counter" and not a prescription) to the biopsy site for up to 2 weeks straight  This acts to protect the wound from the outside world   Sutures are not usually placed in this procedure  If any sutures were placed, return for suture removal as instructed (generally 1 week for the face, 2 weeks for the body)   Take Acetaminophen (Tylenol) for discomfort, if no contraindications  Ibuprofen or aspirin could make bleeding worse   Call our office immediately for signs of infection: fever, chills, increased redness, warmth, tenderness, discomfort/pain, or pus or foul smell coming from the wound  WHAT TO DO IF THERE IS ANY BLEEDING? If a small amount of bleeding is noticed, place a clean cloth over the area and apply firm pressure for ten minutes  Check the wound after 10 minutes of direct pressure  If bleeding persists, try one more time for an additional 10 minutes of direct pressure on the area  If the bleeding becomes heavier or does not stop after the second attempt, or if you have any other questions about this procedure, then please call your [facility] by calling [phone] (SKIN)       I hereby acknowledge that I have reviewed and verified the site with my Dermatologist and have requested and authorized my Dermatologist to proceed with the procedure          Scribe Attestation    I,:  Alyssa Law am acting as a scribe while in the presence of the attending physician :       I,:  Ludy Hansen MD personally performed the services described in this documentation    as scribed in my presence :

## 2022-02-07 NOTE — PATIENT INSTRUCTIONS
INFORMED CONSENT DISCUSSION AND POST-OPERATIVE INSTRUCTIONS FOR PATIENT    I   RATIONALE FOR PROCEDURE  I understand that a skin biopsy allows the Dermatologist to test a lesion or rash under the microscope to obtain a diagnosis  It usually involves numbing the area with numbing medication and removing a small piece of skin; sometimes the area will be closed with sutures  In this specific procedure, sutures are not usually needed  If any sutures are placed, then they are usually need to be removed in 2 weeks or less  I understand that my Dermatologist recommends that a skin "shave" biopsy be performed today  A local anesthetic, similar to the kind that a dentist uses when filling a cavity, will be injected with a very small needle into the skin area to be sampled  The injected skin and tissue underneath "will go to sleep and become numb so no pain should be felt afterwards  An instrument shaped like a tiny "razor blade" (shave biopsy instrument) will be used to cut a small piece of tissue and skin from the area so that a sample of tissue can be taken and examined more closely under the microscope  A slight amount of bleeding will occur, but it will be stopped with direct pressure and a pressure bandage and any other appropriate methods  I understands that a scar will form where the wound was created  Surgical ointment will be applied to help protect the wound  Sutures are not usually needed      II   RISKS AND POTENTIAL COMPLICATIONS   I understand the risks and potential complications of a skin biopsy include but are not limited to the following:   Bleeding   Infection   Pain   Scar/keloid   Skin discoloration   Incomplete Removal   Recurrence   Nerve Damage/Numbness/Loss of Function   Allergic Reaction to Anesthesia   Biopsies are diagnostic procedures and based on findings additional treatment or evaluation may be required   Loss or destruction of specimen resulting in no additional findings    My Dermatologist has explained to me the nature of the condition, the nature of the procedure, and the benefits to be reasonably expected compared with alternative approaches  My Dermatologist has discussed the likelihood of major risks or complications of this procedure including the specific risks listed above, such as bleeding, infection, and scarring/keloid  I understand that a scar is expected after this procedure  I understand that my physician cannot predict if the scar will form a "keloid," which extends beyond the borders of the wound that is created  A keloid is a thick, painful, and bumpy scar  A keloid can be difficult to treat, as it does not always respond well to therapy, which includes injecting cortisone directly into the keloid every few weeks  While this usually reduces the pain and size of the scar, it does not eliminate it  I understand that photographs may be taken before and after the procedure  These will be maintained as part of the medical providers confidential records and may not be made available to me  I further authorize the medical provider to use the photographs for teaching purposes or to illustrate scientific papers, books, or lectures if in his/her judgment, medical research, education, or science may benefit from its use  I have had an opportunity to fully inquire about the risks and benefits of this procedure and its alternatives  I have been given ample time and opportunity to ask questions and to seek a second opinion if I wished to do so  I acknowledge that there have specifically been no guarantees as to the cosmetic results from the procedure  I am aware that with any procedure there is always the possibility of an unexpected complication  III  POST-PROCEDURAL CARE (WHAT YOU WILL NEED TO DO "AFTER THE BIOPSY" TO OPTIMIZE HEALING)     Keep the area clean and dry    Try NOT to remove the bandage or get it wet for the first 24 hours      Gently clean the area and apply surgical ointment (such as Vaseline petrolatum ointment, which is available "over the counter" and not a prescription) to the biopsy site for up to 2 weeks straight  This acts to protect the wound from the outside world   Sutures are not usually placed in this procedure  If any sutures were placed, return for suture removal as instructed (generally 1 week for the face, 2 weeks for the body)   Take Acetaminophen (Tylenol) for discomfort, if no contraindications  Ibuprofen or aspirin could make bleeding worse   Call our office immediately for signs of infection: fever, chills, increased redness, warmth, tenderness, discomfort/pain, or pus or foul smell coming from the wound  WHAT TO DO IF THERE IS ANY BLEEDING? If a small amount of bleeding is noticed, place a clean cloth over the area and apply firm pressure for ten minutes  Check the wound after 10 minutes of direct pressure  If bleeding persists, try one more time for an additional 10 minutes of direct pressure on the area  If the bleeding becomes heavier or does not stop after the second attempt, or if you have any other questions about this procedure, then please call your [address] [place] [address]'s Dermatologist by calling [phone] (SKIN)

## 2022-02-08 LAB
EST. AVERAGE GLUCOSE BLD GHB EST-MCNC: 160 MG/DL
HBA1C MFR BLD: 7.2 %

## 2022-02-09 NOTE — RESULT ENCOUNTER NOTE
Please call the patient regarding result  Hemoglobin A1c has improved to 7 2  Keep up the good work!    Urine microalbumin creatinine ratio is normal   Fasting glucose is elevated on comprehensive metabolic panel  LDL and total cholesterol are stable with a slight elevation in triglycerides on fasting lipid panel    TSH and free T4 are normal

## 2022-02-15 ENCOUNTER — OFFICE VISIT (OUTPATIENT)
Dept: FAMILY MEDICINE CLINIC | Facility: CLINIC | Age: 82
End: 2022-02-15
Payer: MEDICARE

## 2022-02-15 VITALS
DIASTOLIC BLOOD PRESSURE: 74 MMHG | BODY MASS INDEX: 27.46 KG/M2 | SYSTOLIC BLOOD PRESSURE: 118 MMHG | HEIGHT: 63 IN | RESPIRATION RATE: 15 BRPM | WEIGHT: 155 LBS | OXYGEN SATURATION: 99 % | HEART RATE: 84 BPM

## 2022-02-15 DIAGNOSIS — K21.9 GASTRO-ESOPHAGEAL REFLUX DISEASE WITHOUT ESOPHAGITIS: ICD-10-CM

## 2022-02-15 DIAGNOSIS — J98.4 RESTRICTIVE LUNG DISEASE: ICD-10-CM

## 2022-02-15 DIAGNOSIS — E55.9 VITAMIN D DEFICIENCY: ICD-10-CM

## 2022-02-15 DIAGNOSIS — I25.10 CORONARY ARTERY DISEASE INVOLVING NATIVE CORONARY ARTERY OF NATIVE HEART WITHOUT ANGINA PECTORIS: ICD-10-CM

## 2022-02-15 DIAGNOSIS — C18.9 ADENOCARCINOMA OF COLON (HCC): ICD-10-CM

## 2022-02-15 DIAGNOSIS — I48.0 PAROXYSMAL ATRIAL FIBRILLATION (HCC): ICD-10-CM

## 2022-02-15 DIAGNOSIS — I49.5 SICK SINUS SYNDROME (HCC): ICD-10-CM

## 2022-02-15 DIAGNOSIS — I10 ESSENTIAL HYPERTENSION: ICD-10-CM

## 2022-02-15 DIAGNOSIS — E04.0 DIFFUSE NONTOXIC GOITER: ICD-10-CM

## 2022-02-15 DIAGNOSIS — B37.9 CANDIDA INFECTION: ICD-10-CM

## 2022-02-15 DIAGNOSIS — E06.3 HYPOTHYROIDISM DUE TO HASHIMOTO'S THYROIDITIS: ICD-10-CM

## 2022-02-15 DIAGNOSIS — E78.2 MIXED HYPERLIPIDEMIA: ICD-10-CM

## 2022-02-15 DIAGNOSIS — E11.9 TYPE 2 DIABETES MELLITUS WITHOUT COMPLICATION, WITH LONG-TERM CURRENT USE OF INSULIN (HCC): ICD-10-CM

## 2022-02-15 DIAGNOSIS — K22.70 BARRETT'S ESOPHAGUS WITHOUT DYSPLASIA: ICD-10-CM

## 2022-02-15 DIAGNOSIS — Z79.4 TYPE 2 DIABETES MELLITUS WITHOUT COMPLICATION, WITH LONG-TERM CURRENT USE OF INSULIN (HCC): ICD-10-CM

## 2022-02-15 DIAGNOSIS — E03.8 HYPOTHYROIDISM DUE TO HASHIMOTO'S THYROIDITIS: ICD-10-CM

## 2022-02-15 DIAGNOSIS — E11.40 TYPE 2 DIABETES MELLITUS WITH DIABETIC NEUROPATHY, WITHOUT LONG-TERM CURRENT USE OF INSULIN (HCC): Primary | ICD-10-CM

## 2022-02-15 DIAGNOSIS — J44.9 CHRONIC OBSTRUCTIVE PULMONARY DISEASE, UNSPECIFIED COPD TYPE (HCC): ICD-10-CM

## 2022-02-15 PROCEDURE — 99214 OFFICE O/P EST MOD 30 MIN: CPT | Performed by: FAMILY MEDICINE

## 2022-02-15 RX ORDER — METFORMIN HYDROCHLORIDE 500 MG/1
1000 TABLET, EXTENDED RELEASE ORAL 2 TIMES DAILY WITH MEALS
Qty: 120 TABLET | Refills: 11
Start: 2022-02-15 | End: 2022-03-30 | Stop reason: SDUPTHER

## 2022-02-15 RX ORDER — CYCLOSPORINE 0.5 MG/ML
EMULSION OPHTHALMIC
COMMUNITY
Start: 2021-12-02

## 2022-02-15 NOTE — PATIENT INSTRUCTIONS
1  Keep the duloxetine 60 mg a day    2   Please try to cut down on the sweets to help her diabetes and to get rid of the sugar in her urine    See you back in 6 months  I will look at the blood work set endocrine is doing I do not need to order any other  See me sooner if needed

## 2022-02-15 NOTE — PROGRESS NOTES
ASSESSMENT/PLAN:    Anxiety and fatigue  On duloxetine 60 mg  Patient does not think it is helping but she seems much happier to me at about    Type 2 diabetes  A1c is improving 7 2 from 8 1  This was within increase of her metformin  She is also on Jardiance, and Byetta     Atrial fibrillation/sick sinus syndrome/cardiomegaly/coronary artery disease  Is on Eliquis for anticoagulation and atenolol for rate control  She also has a pacer in and has regular rhythm today     Osteopenia  Is on calcium and vitamin-D  DEXA shows no change  Hopefully patient will start walking  She will continue calcium and vitamin-D     Adenocarcinoma of the colon 2012  Colonoscopy showed a small polyp 2021  Patient's next colonoscopy in 3 years     Emphysema by CT scan  Ventolin as needed      Hashimoto's thyroiditis   Also following with endocrine for suppression of her thyroid with medication  Continue levothyroxine 125 mcg daily      Vitamin D deficiency   Continue vitamin D 2000 units daily          History of large paraesophageal hernia   Had surgery and no longer has Pacheco's or her reflux and no longer on any PPIs        Hyperlipidemia   Simvastatin and CoQ10 and diet     Thyroid goiter   Followed by endocrine who orders ultrasounds         Recheck in 6 months  A [place] that day and blood pressure check  Prn otherwise      BMI Counseling: Body mass index is 27 68 kg/m²  The BMI is above normal  Nutrition recommendations include decreasing portion sizes and encouraging healthy choices of fruits and vegetables  Exercise recommendations include exercising 3-5 times per week  Rationale for BMI follow-up plan is due to patient being overweight or obese              Health Maintenance   Topic Date Due    SLP PLAN OF CARE  Never done    OT PLAN OF CARE  03/06/2019    DM Eye Exam  12/16/2021    BMI: Followup Plan  02/09/2022    Depression Screening  05/06/2022    DTaP,Tdap,and Td Vaccines (1 - Tdap) 08/10/2022 (Originally 10/22/1961)   Hailey Gore HEMOGLOBIN A1C  08/07/2022    Fall Risk  08/10/2022    Medicare Annual Wellness Visit (AWV)  08/10/2022    Breast Cancer Screening: Mammogram  08/16/2022    Diabetic Foot Exam  12/28/2022    URINE MICROALBUMIN  02/07/2023    BMI: Adult  02/15/2023    DXA SCAN  07/01/2024    Osteoporosis Screening  Completed    Pneumococcal Vaccine: 65+ Years  Completed    Influenza Vaccine  Completed    COVID-19 Vaccine  Completed    HIB Vaccine  Aged Out    Hepatitis B Vaccine  Aged Out    IPV Vaccine  Aged Out    Hepatitis A Vaccine  Aged Out    Meningococcal ACWY Vaccine  Aged Out    HPV Vaccine  Aged Out         Problem List as of 2/15/2022 Reviewed: 12/15/2021  7:25 AM by SHUN Lopes    Abnormal echocardiogram    Adenocarcinoma of colon ([facility] [address] )    Anticoagulated by anticoagulation treatment    Atrial fibrillation ([facility] [address] )    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    Candida infection    Cardiomegaly    Chronic obstructive pulmonary disease ([facility] [address] )    [facility] DJD(carpometacarpal degenerative joint disease), localized primary, right    Coronary artery disease involving native coronary artery of native heart without angina pectoris    Diffuse nontoxic goiter    Essential hypertension    Feeling anxious    Gastro-esophageal reflux disease without esophagitis    Hematuria, microscopic    Hypothyroidism due to Hashimoto's thyroiditis    Insomnia    Lichen sclerosus et atrophicus    Lichen simplex chronicus    Mitral regurgitation    Mixed hyperlipidemia    MRI safe cardiac pacemaker in situ    Paraesophageal hernia    Primary osteoarthritis involving multiple joints    Restrictive lung disease    Sick sinus syndrome ([address] )    Stress incontinence in female    Type 2 diabetes mellitus with diabetic neuropathy, without long-term current use of insulin (HCC)    Vitamin D deficiency            Subjective:   Chief Complaint   Patient presents with    Diabetes    Hypertension    Vitamin D Deficiency     Patient is here for her 6 month recheck  Since last visit she has seen Cardiology for her AFib, gyn for her intertrigo us, and Endocrine a couple times for her diabetes    She also got blood work done for us and for Endocrine  patient ID: Seth Kaufman is a [de-identified] y o  female  Patient's past medical history, surgical history, family history, social history, and Tobacco history reviewed with patient  MED LIST WAS REVIEWED AND UPDATED    ROS  As per HPI  Rest of 12 point review of systems negative     Objective:      VITALS:  Wt Readings from Last 3 Encounters:   02/15/22 70 3 kg (155 lb)   02/07/22 70 9 kg (156 lb 4 9 oz)   01/12/22 70 9 kg (156 lb 3 2 oz)     BP Readings from Last 3 Encounters:   02/15/22 118/74   01/12/22 110/70   12/17/21 122/80     Pulse Readings from Last 3 Encounters:   02/15/22 84   01/12/22 72   12/17/21 87     Body mass index is 27 68 kg/m²  Laboratory Results: All pertinent labs and studies were reviewed with patient during this office visit with highlights of the results contained in this note in the ASSESSMENT AND PLAN section       Physical Exam      Gen    No acute distress well-appearing well-nourished appears stated age    Mental status  Good judgment and insight oriented to time person and place, recent and remote memory intact mood and affect normal cooperative and patient is reasonable    HEENT  PERRLA 3 mm, EOMI without nystagmus, normocephalic atraumatic without facial weakness      Neck   supple no masses trachea midline positive click normal carotid upstrokes with no bruits    Cor  Regular rhythm without ectopy or murmur, no S3-S4, normal palpation that is no heave lift or thrill    Vascular  No edema, good pedal pulses    Lungs  CTA bilaterally in no respiratory distress no wheezes rhonchi or rales, normal to palpation no tactile fremitus    Abdomen  Soft, no palpable masses, no hepatosplenomegaly, normal bowel sounds, nontender    Lymphatics  No palpable nodes in the neck, supraclavicular area, axilla, or groin     Musculoskeletal  No clubbing cyanosis or edema muscle tone normal    Skin  no rashes or abnormal appearing lesions    Neuro  Normal ambulation, cranial nerves 2-12 grossly intact, higher functioning with reasoning intact

## 2022-02-17 ENCOUNTER — TELEPHONE (OUTPATIENT)
Dept: ADMINISTRATIVE | Facility: OTHER | Age: 82
End: 2022-02-17

## 2022-02-17 NOTE — TELEPHONE ENCOUNTER
----- Message from Doug Crockett MA sent at 2/17/2022  7:23 AM EST -----  Regarding: care gap request  02/17/22 7:23 AM    Hello, our patient attached above has had eye exam  completed/performed   Please assist in updating the patient chart is in media  The date of service is 12/21/2021    Thank you,  Doug Crockett MA  [facility]

## 2022-02-18 NOTE — TELEPHONE ENCOUNTER
Upon review of the In Basket request we were able to locate, review, and update the patient chart as requested for Diabetic Eye Exam     Any additional questions or concerns should be emailed to the Practice Liaisons via [email or web address]  org email, please do not reply via In Basket      Thank you  Cristine Schmidt

## 2022-02-22 DIAGNOSIS — E11.40 TYPE 2 DIABETES MELLITUS WITH DIABETIC NEUROPATHY, WITHOUT LONG-TERM CURRENT USE OF INSULIN (HCC): ICD-10-CM

## 2022-02-22 RX ORDER — BLOOD-GLUCOSE METER
KIT MISCELLANEOUS
Qty: 300 STRIP | Refills: 3 | Status: SHIPPED | OUTPATIENT
Start: 2022-02-22

## 2022-03-22 DIAGNOSIS — I10 ESSENTIAL HYPERTENSION: ICD-10-CM

## 2022-03-22 RX ORDER — ATENOLOL 50 MG/1
TABLET ORAL
Qty: 90 TABLET | Refills: 3 | Status: SHIPPED | OUTPATIENT
Start: 2022-03-22

## 2022-03-24 ENCOUNTER — REMOTE DEVICE CLINIC VISIT (OUTPATIENT)
Dept: CARDIOLOGY CLINIC | Facility: CLINIC | Age: 82
End: 2022-03-24
Payer: MEDICARE

## 2022-03-24 DIAGNOSIS — Z95.0 CARDIAC PACEMAKER IN SITU: Primary | ICD-10-CM

## 2022-03-24 PROCEDURE — 93296 REM INTERROG EVL PM/IDS: CPT | Performed by: INTERNAL MEDICINE

## 2022-03-24 PROCEDURE — 93294 REM INTERROG EVL PM/LDLS PM: CPT | Performed by: INTERNAL MEDICINE

## 2022-03-25 DIAGNOSIS — E11.9 TYPE 2 DIABETES MELLITUS WITHOUT COMPLICATION, WITH LONG-TERM CURRENT USE OF INSULIN (HCC): ICD-10-CM

## 2022-03-25 DIAGNOSIS — Z79.4 TYPE 2 DIABETES MELLITUS WITHOUT COMPLICATION, WITH LONG-TERM CURRENT USE OF INSULIN (HCC): ICD-10-CM

## 2022-03-29 RX ORDER — EXENATIDE 250 UG/ML
INJECTION SUBCUTANEOUS
Qty: 7.2 ML | Refills: 3 | Status: SHIPPED | OUTPATIENT
Start: 2022-03-29

## 2022-03-30 DIAGNOSIS — E11.9 TYPE 2 DIABETES MELLITUS WITHOUT COMPLICATION, WITH LONG-TERM CURRENT USE OF INSULIN (HCC): ICD-10-CM

## 2022-03-30 DIAGNOSIS — Z79.4 TYPE 2 DIABETES MELLITUS WITHOUT COMPLICATION, WITH LONG-TERM CURRENT USE OF INSULIN (HCC): ICD-10-CM

## 2022-03-30 RX ORDER — METFORMIN HYDROCHLORIDE 500 MG/1
1000 TABLET, EXTENDED RELEASE ORAL 2 TIMES DAILY WITH MEALS
Qty: 180 TABLET | Refills: 3
Start: 2022-03-30 | End: 2022-03-30 | Stop reason: SDUPTHER

## 2022-03-30 RX ORDER — METFORMIN HYDROCHLORIDE 500 MG/1
1000 TABLET, EXTENDED RELEASE ORAL 2 TIMES DAILY WITH MEALS
Qty: 60 TABLET | Refills: 0
Start: 2022-03-30 | End: 2022-03-30 | Stop reason: SDUPTHER

## 2022-03-30 RX ORDER — METFORMIN HYDROCHLORIDE 500 MG/1
1000 TABLET, EXTENDED RELEASE ORAL 2 TIMES DAILY WITH MEALS
Qty: 60 TABLET | Refills: 0
Start: 2022-03-30 | End: 2022-03-31 | Stop reason: SDUPTHER

## 2022-03-31 DIAGNOSIS — Z79.4 TYPE 2 DIABETES MELLITUS WITHOUT COMPLICATION, WITH LONG-TERM CURRENT USE OF INSULIN (HCC): Primary | ICD-10-CM

## 2022-03-31 DIAGNOSIS — E11.9 TYPE 2 DIABETES MELLITUS WITHOUT COMPLICATION, WITH LONG-TERM CURRENT USE OF INSULIN (HCC): Primary | ICD-10-CM

## 2022-03-31 DIAGNOSIS — Z79.4 TYPE 2 DIABETES MELLITUS WITHOUT COMPLICATION, WITH LONG-TERM CURRENT USE OF INSULIN (HCC): ICD-10-CM

## 2022-03-31 DIAGNOSIS — E11.9 TYPE 2 DIABETES MELLITUS WITHOUT COMPLICATION, WITH LONG-TERM CURRENT USE OF INSULIN (HCC): ICD-10-CM

## 2022-03-31 RX ORDER — METFORMIN HYDROCHLORIDE 500 MG/1
1000 TABLET, EXTENDED RELEASE ORAL 2 TIMES DAILY WITH MEALS
Qty: 120 TABLET | Refills: 0 | Status: SHIPPED | OUTPATIENT
Start: 2022-03-31

## 2022-03-31 RX ORDER — METFORMIN HYDROCHLORIDE 500 MG/1
1000 TABLET, EXTENDED RELEASE ORAL 2 TIMES DAILY WITH MEALS
Qty: 360 TABLET | Refills: 3 | Status: SHIPPED | OUTPATIENT
Start: 2022-03-31

## 2022-03-31 RX ORDER — METFORMIN HYDROCHLORIDE 500 MG/1
1000 TABLET, EXTENDED RELEASE ORAL 2 TIMES DAILY WITH MEALS
Qty: 360 TABLET | Refills: 3
Start: 2022-03-31 | End: 2022-03-31 | Stop reason: SDUPTHER

## 2022-03-31 NOTE — TELEPHONE ENCOUNTER
Patients Metformin got sent over wrong again  She needs a 30 day to go to the local pharmacy and a 90 day to go to mail order

## 2022-03-31 NOTE — TELEPHONE ENCOUNTER
The script you sent for Metformin must be a 90 day supply, you only sent a 15 day supply  Please send

## 2022-04-11 ENCOUNTER — OFFICE VISIT (OUTPATIENT)
Dept: FAMILY MEDICINE CLINIC | Facility: CLINIC | Age: 82
End: 2022-04-11
Payer: MEDICARE

## 2022-04-11 VITALS
DIASTOLIC BLOOD PRESSURE: 80 MMHG | SYSTOLIC BLOOD PRESSURE: 110 MMHG | HEIGHT: 63 IN | BODY MASS INDEX: 27.43 KG/M2 | TEMPERATURE: 97.9 F | HEART RATE: 82 BPM | OXYGEN SATURATION: 97 % | WEIGHT: 154.8 LBS | RESPIRATION RATE: 15 BRPM

## 2022-04-11 DIAGNOSIS — J02.9 SORE THROAT: ICD-10-CM

## 2022-04-11 DIAGNOSIS — R29.810 FACIAL WEAKNESS: Primary | ICD-10-CM

## 2022-04-11 DIAGNOSIS — T14.8XXA HEMATOMA: ICD-10-CM

## 2022-04-11 LAB — S PYO AG THROAT QL: NEGATIVE

## 2022-04-11 PROCEDURE — 87880 STREP A ASSAY W/OPTIC: CPT | Performed by: FAMILY MEDICINE

## 2022-04-11 PROCEDURE — 99214 OFFICE O/P EST MOD 30 MIN: CPT | Performed by: FAMILY MEDICINE

## 2022-04-11 NOTE — PROGRESS NOTES
Assessment/Plan:    Benson facial numbness  Unsure of etiology  Carotid upstrokes are strong and no murmur  We will continue to watch and monitor  MRI of the brain to make sure we are not missing anything causing this numbness    Head hematoma  This will pass in time          Depression Screening and Follow-up Plan: Patient was screened for depression during today's encounter  They screened negative with a PHQ-2 score of 0  Subjective:   Britt Padilla is a [de-identified] y  o female  Chief Complaint   Patient presents with    Facial Numbness    Sore Throat     Patient here because she days ago she had facial numbness on the left side outlining an area that includes below her eye up to her ear down to her chin around her lipid back up again  It felt numb for 2 hours and noted is normal  Never happened before  None since    Also was bumped to the head on the right side as her  was moving some metal objects  And has a painful lump        Past medical history, social history, and family history reviewed as appropriate for the complaint of this patient  MEDICATIONS REVIEWED AND UPDATED    10 point review of systems performed, the remainder of the ROS is negative except for what is noted in the history of chief complaint    Objective:    Vitals:    04/11/22 1026   BP: 110/80   Pulse: 82   Resp: 15   Temp: 97 9 °F (36 6 °C)   SpO2: 97%     Body mass index is 27 42 kg/m²  Physical Exam    General  Patient in no acute distress, well appearing, well nourished and appears stated age    Mental status  Good judgment and insight, oriented to time person and place, recent and remote memory is intact, mood and affect are normal, cooperative, and patient is reasonable      HEENT normocephalic atraumatic no facial weakness  No numbness on either side of her face  Eyelid skin closed tightly evenly tongue midline  Right parietal area is alone cm raised tender nodule where her pain as

## 2022-04-11 NOTE — PATIENT INSTRUCTIONS
1  We will call you with the results of the CT scan of your head  If it happens again please go to the emergency room to be checked

## 2022-04-13 ENCOUNTER — HOSPITAL ENCOUNTER (OUTPATIENT)
Dept: CT IMAGING | Facility: HOSPITAL | Age: 82
Discharge: HOME | End: 2022-04-13
Payer: MEDICARE

## 2022-04-13 ENCOUNTER — TELEPHONE (OUTPATIENT)
Dept: FAMILY MEDICINE CLINIC | Facility: CLINIC | Age: 82
End: 2022-04-13

## 2022-04-13 DIAGNOSIS — R29.810 FACIAL WEAKNESS: ICD-10-CM

## 2022-04-13 PROCEDURE — 70450 CT HEAD/BRAIN W/O DYE: CPT

## 2022-04-18 ENCOUNTER — TELEPHONE (OUTPATIENT)
Dept: FAMILY MEDICINE CLINIC | Facility: CLINIC | Age: 82
End: 2022-04-18

## 2022-04-18 NOTE — RESULT ENCOUNTER NOTE
Call pt to inform her that the CT of her head is normal  I do not knowwhy she had facial weakness  If it returns I can send her to Neuro

## 2022-04-18 NOTE — TELEPHONE ENCOUNTER
----- Message from Donavon Wagner DO sent at 4/18/2022  7:40 AM EDT -----  Call pt to inform her that the CT of her head is normal  I do not knowwhy she had facial weakness  If it returns I can send her to Neuro      Patient was informed

## 2022-04-19 ENCOUNTER — OFFICE VISIT (OUTPATIENT)
Dept: ENDOCRINOLOGY | Facility: HOSPITAL | Age: 82
End: 2022-04-19
Payer: MEDICARE

## 2022-04-19 VITALS
BODY MASS INDEX: 27.61 KG/M2 | SYSTOLIC BLOOD PRESSURE: 114 MMHG | HEIGHT: 63 IN | HEART RATE: 70 BPM | DIASTOLIC BLOOD PRESSURE: 80 MMHG | WEIGHT: 155.8 LBS

## 2022-04-19 DIAGNOSIS — M81.0 AGE-RELATED OSTEOPOROSIS WITHOUT CURRENT PATHOLOGICAL FRACTURE: ICD-10-CM

## 2022-04-19 DIAGNOSIS — E03.8 HYPOTHYROIDISM DUE TO HASHIMOTO'S THYROIDITIS: ICD-10-CM

## 2022-04-19 DIAGNOSIS — I10 ESSENTIAL HYPERTENSION: ICD-10-CM

## 2022-04-19 DIAGNOSIS — E11.40 TYPE 2 DIABETES MELLITUS WITH DIABETIC NEUROPATHY, WITHOUT LONG-TERM CURRENT USE OF INSULIN (HCC): Primary | ICD-10-CM

## 2022-04-19 DIAGNOSIS — E55.9 VITAMIN D DEFICIENCY: ICD-10-CM

## 2022-04-19 DIAGNOSIS — E06.3 HYPOTHYROIDISM DUE TO HASHIMOTO'S THYROIDITIS: ICD-10-CM

## 2022-04-19 DIAGNOSIS — E78.2 MIXED HYPERLIPIDEMIA: ICD-10-CM

## 2022-04-19 PROCEDURE — 99214 OFFICE O/P EST MOD 30 MIN: CPT | Performed by: NURSE PRACTITIONER

## 2022-04-19 NOTE — PATIENT INSTRUCTIONS
Be mindful of diet       Stay active and stay hydrated       Continue Metformin 500 mg - 2 tablets twice daily (breakfast and dinner)       Continue Mariah       Utilize the [facility], if able      Please check your blood sugars at least 2 times daily and for the record to the office in 2 weeks for review and adjustment, if necessary       Continue levothyroxine 125 mcg daily Monday through [address] no tablet on Sunday      Continue atenolol and simvastatin       Continue with regular supplementation of vitamin D3 daily       Obtain lab work as prescribed

## 2022-04-19 NOTE — PROGRESS NOTES
Billy Martin [de-identified] y o  female MRN: [id number]    Encounter: [id number]      Assessment/Plan     Assessment: This is a [de-identified]y o -year-old female with type 2 diabetes, hypothyroidism, hypertension, hyperlipidemia and vitamin-D deficiency     Plan:  1   Type 2 diabetes:  Her most recent hemoglobin A1c is improved to 7  2    Recent blood sugars are relatively controlled for her age and medical condition  For now, she will continue current regimen   I have asked her to continue checking her blood sugars twice daily at alternating times and for the record to the office in 2 weeks for review and further adjustment, if necessary  I encouraged her to be mindful of her diet and to be more active with walking  She is up-to-date with her annual diabetic eye exams and is seeing the podiatrist every 12 weeks  Check hemoglobin A1c prior to next visit      2   Hypothyroidism:  Her most recent TSH free T4 were normal   Continue levothyroxine 125 mcg Monday through Saturday with no tablet on Sunday   Check TSH and free T4 prior to next office visit      3   Hypertension: She is normotensive in the office today   Continue atenolol   Check comprehensive metabolic panel prior to next visit      4   Hyperlipidemia: Triglycerides remain elevated   Discussed the relationship between hyperglycemia and hypertriglyceridemia   Continue simvastatin      5   Vitamin-D deficiency:  Most recent vitamin-D level is normal   Continue supplement with 1000 units of vitamin D3 daily         6   Osteopenia: Primo Olguin will continue with calcium and vitamin-D supplementation   Stressed staying active reviewed fall risk and safety at the time of the visit  CC:  Type 2 Diabetes follow-up    History of Present Illness     HPI:  [de-identified]  o  female for follow-up of type 2 diabetes, hypothyroidism, hypertension and hyperlipidemia   She states she has been diagnosed with type 2 diabetes for approximately 8 years   There are no blood sugar records available for review  She is currently taking metformin 500 mg - 2 tablets twice daily, Byetta 10 mg twice daily and Jardiance 25 mg daily   She denies any other episodes of hypoglycemia recently   She denies any recent polyuria, polydipsia or polyphagia   Her most recent hemoglobin A1c from February 7, 2022 is 7 2  She states she has not been as active and diligent with her diet over the past few months  She states she is up-to-date with her annual diabetic eye exam with Dr Scherer Course of  December 2021   She complains of some numbness and tingling to her feet at times and follows Podiatry every 12 weeks for regular diabetic foot care  Her most recent diabetic foot exam was performed on March 22, 2022   Of note:  She had recent episode of left-sided facial weakness and numbness which was investigated by her PCP with a head CT that was normal      For her hypothyroidism, she is treated with levothyroxine 125 mcg daily Monday through Saturday and no tablet on Sunday   Her most recent TSH from February 7, 2022 is 0 787 with a free T4 of 1 04   She complains of some constipation at times but otherwise has no symptoms/complaints of hypo or hyperthyroidism      Her hypertension is treated with atenolol 50 mg daily      For her hyperlipidemia, she is treated with simvastatin 40 mg daily      For her vitamin-D deficiency, she supplements with 1000 units of vitamin D3 daily  Her most recent 25 hydroxy vitamin-D level from very 7, 2022 is 32  6      Her most recent DEXA scan performed on July 1, 2021 revealed a T-score of -1 3 and lumbar spine consistent with low bone mineral density-osteopenia  She continues to supplement with calcium and vitamin-D daily   She denies any recent falls or fractures        Review of Systems   Constitutional: Positive for fatigue  Negative for chills and fever  HENT: Negative  Negative for trouble swallowing and voice change  Eyes: Negative    Negative for photophobia, pain, discharge, redness, itching and visual disturbance  Respiratory: Negative  Negative for chest tightness and shortness of breath  Cardiovascular: Negative  Negative for chest pain  Gastrointestinal: Negative  Negative for abdominal pain, constipation, diarrhea and vomiting  Endocrine: Positive for polyuria (1-2 times per night nocturia)  Negative for cold intolerance, heat intolerance, polydipsia and polyphagia  Genitourinary: Negative  Musculoskeletal: Positive for arthralgias ( right hand)  Skin: Negative  Allergic/Immunologic: Negative  Neurological: Negative  Negative for dizziness, syncope, light-headedness and headaches  Hematological: Negative  Psychiatric/Behavioral: Negative  All other systems reviewed and are negative        Historical Information   Past Medical History:   Diagnosis Date    A-fib [facility])     Abnormal ECG     last assessed: 7/14/2015    Acid reflux     resolved    Anxiety     Pacheco esophagus     Benign neoplasm of sigmoid colon 09/13/2011    next colon 2012    Bronchitis, asthmatic     last assessed: 3/12/2012    Colon [facility] ([facility] )     2012- had colon resection    Colon polyp     COPD (chronic obstructive pulmonary disease) (HCC)     questionable    Depression     Diabetes mellitus ([facility] )     on byetta    Dizziness     occ    Esophageal stricture     last assessed: 9/30/2014    High cholesterol     Hypertension     Hypothyroidism     Iron (Fe) deficiency anemia 12/22/2011    iron pill continue    Irritable bowel syndrome     OA (osteoarthritis)     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    Pacemaker     hx SSS    Rash     labia area- uses cream prn    Restrictive lung disease     Seasonal allergies     Skin scarring/fibrosis     fibrotic scar; last assessed: 3/22/2013    MONA (stress urinary incontinence, female)     Urinary frequency     last assessed: 9/10/2012    Wears glasses     reading     Past Surgical History:   Procedure Laterality Date    BREAST EXCISIONAL BIOPSY Right     benign, best guess on year   [address]      does not know type  Dr Daksha Marte is cariologist     SECTION      COLON SURGERY      resection  removed 18 In     COLONOSCOPY  2012: Adenomatous polyp, colorectal anastomosis, internal hemorrhoids  2012:  proctosigmoidectomy    ESOPHAGOGASTRODUODENOSCOPY N/A 2016    Procedure: ESOPHAGOGASTRODUODENOSCOPY (EGD); Surgeon: Allan Lim MD;  [facility];  Service:     HYSTERECTOMY  1978    CA ARTHROPLASTY I-P JT Right 2018    Procedure: ARTHROPLASTY PIP/FINGER - RIGHT RING;  Surgeon: Bebo Patel MD;  [facility];  Service: Orthopedics    CA CYSTOURETHROSCOPY N/A 2018    Procedure: Marilin Orange;  Surgeon: Ada Garner MD;  [facility];  Service: UroGynecology           CA LAP, REPAIR PARAESOPHAGEAL HERNIA, INCL FUNDOPLASTY W/ MESH N/A 2016    Procedure: LAPAROSCOPIC PARAESOPHAGEAL HERNIA REPAIR with mesh; toupet  FUNDOPLICATION ;  Surgeon: Rowena Addison MD;  [facility];  Service: Thoracic    CA REPAIR INTERCARP/CARP-METACARP JT Right 1/10/2019    Procedure: Campos Abby;  Surgeon: Bebo Patel MD;  [facility];  Service: Orthopedics    CA SLING OPER STRES INCONTINENCE N/A 2018    Procedure: INSERTION PUBOVAGINAL SLING (FEMALE); Surgeon: Ada Garner MD;  [facility];  Service: UroGynecology           TOTAL ABDOMINAL HYSTERECTOMY      UPPER GASTROINTESTINAL ENDOSCOPY      2018:  Irregular Z-line positive for intestinal metaplasia/ Pacheco's, no dysplasia  Gastritis H pylori negative      WRIST TENDON TRANSFER Right 1/10/2019    Procedure: TRANSFER TENDON FLEXOR CARPI RADIALIS FROM FOREARM TO HAND;  Surgeon: Bebo Patel MD;  [facility];  Service: Orthopedics     Social History   Social History     Substance and Sexual Activity   Alcohol Use No     Social History     Substance and Sexual Activity   Drug Use No     Social History     Tobacco Use   Smoking Status Former Smoker    Types: Cigarettes    Quit date:     Years since quittin 3   Smokeless Tobacco Former User    Quit date:      Family History:   Family History   Problem Relation Age of Onset    Heart disease Mother     Diabetes Mother     Asthma Father     Stomach cancer Father         gastrkic cancer    Cancer Paternal Grandmother     Cancer Paternal Grandfather     No Known Problems Brother     Breast cancer Sister 76    Breast cancer Sister 77    Pancreatic cancer Sister 72    No Known Problems Maternal Aunt     No Known Problems Maternal Aunt     No Known Problems Maternal Aunt     No Known Problems Paternal Aunt     No Known Problems Daughter     No Known Problems Maternal Grandmother     No Known Problems Maternal Grandfather     Prostate cancer Paternal Uncle         age unknown    Substance Abuse Neg Hx     Mental illness Neg Hx     Alcohol abuse Neg Hx     Colon cancer Neg Hx     Colon polyps Neg Hx        Meds/Allergies   Current Outpatient Medications   Medication Sig Dispense Refill    albuterol (ProAir HFA) 90 mcg/act inhaler Inhale 2 puffs every 6 (six) hours as needed for wheezing or shortness of breath 8 5 g 0    Apple Cider Vinegar 500 MG TABS Take by mouth daily      ascorbic acid (VITAMIN C) 500 mg tablet Take 500 mg by mouth daily      atenolol (TENORMIN) 50 mg tablet TAKE 1 TABLET AT BEDTIME 90 tablet 3    Blood Glucose Monitoring Suppl (OneTouch Verio) w/Device KIT Check blood sugars once a day 1 kit 0    Byetta 10 MCG Pen 10 MCG/0 04ML SOPN INJECT 0 04 ML UNDER THE SKIN TWICE A DAY 7 2 mL 3    Cholecalciferol (VITAMIN D-3) 1000 units CAPS Take 1 capsule by mouth daily      clobetasol (TEMOVATE) 0 05 % cream       Coenzyme Q10 (COQ10) 200 MG CAPS Take 200 mg by mouth daily        cycloSPORINE (Restasis) 0 05 % ophthalmic emulsion       DULoxetine (CYMBALTA) 60 mg delayed release capsule Take 1 capsule (60 mg total) by mouth daily 90 capsule 1    Eliquis 5 MG TAKE 1 TABLET TWICE A  tablet 3    estradiol (ESTRACE VAGINAL) 0 1 mg/g vaginal cream Insert 0 5 g into the vagina 2 (two) times a week Patient is NOT to be using this daily 42 5 g 1    Ferrous Sulfate 27 MG TABS Take 1 tablet by mouth daily      FREESTYLE LITE test strip TEST THREE TIMES A  strip 3    Glucosamine-Chondroitin (MOVE FREE PO) Take by mouth daily      hydrocortisone 2 5 % cream Apply topically 2 (two) times a day 60 g 2    Jardiance 25 MG TABS TAKE 1 TABLET EVERY MORNING 90 tablet 3    Lancets (onetouch ultrasoft) lancets Check blood sugars once a day 100 each 2    levothyroxine 125 mcg tablet TAKE 1 TABLET SIX DAYS A WEEK, NOTHING ON SUNDAY 90 tablet 3    MEGARED OMEGA-3 KRILL OIL PO Take 1 capsule by mouth daily       metFORMIN (GLUCOPHAGE-XR) 500 mg 24 hr tablet Take 2 tablets (1,000 mg total) by mouth 2 (two) times a day with meals 120 tablet 0    simvastatin (ZOCOR) 20 mg tablet Take 2 tablets (40 mg total) by mouth daily at bedtime 180 tablet 3    Triamcinolone Acetonide (Nasacort Allergy 24HR) 55 MCG/ACT AERO 2 Act (110 mcg total) into each nostril every morning 16 5 mL 3    Continuous Blood Gluc  (FreeStyle Julian 14 Day [place]) SVETLANA Use 1 Device 4 (four) times a day (before meals and at bedtime) (Patient not taking: Reported on 4/19/2022 ) 1 each 0    Continuous Blood Gluc Sensor (FreeStyle Julian 14 Day Sensor) MISC Use 1 application every 14 (fourteen) days (Patient not taking: Reported on 4/19/2022 ) 2 each 6    glucose blood (OneTouch Verio) test strip Check blood sugars once a day (Patient not taking: Reported on 4/19/2022 ) 100 strip 2    ketoconazole (NIZORAL) 2 % cream Apply topically 2 (two) times a day for 14 days 60 g 1    metFORMIN (GLUCOPHAGE-XR) 500 mg 24 hr tablet Take 2 tablets (1,000 mg total) by mouth 2 (two) times a day with meals (Patient not taking: Reported on 4/19/2022 ) 360 tablet 3     No current facility-administered medications for this visit  Allergies   Allergen Reactions    Fluconazole Dizziness       Objective   Vitals: Blood pressure 114/80, pulse 70, height 5' 3" (1 6 m), weight 70 7 kg (155 lb 12 8 oz), not currently breastfeeding  Physical Exam  Vitals reviewed  Constitutional:       Appearance: She is well-developed  HENT:      Head: Normocephalic and atraumatic  Eyes:      Conjunctiva/sclera: Conjunctivae normal       Pupils: Pupils are equal, round, and reactive to light  Cardiovascular:      Rate and Rhythm: Normal rate and regular rhythm  Heart sounds: Normal heart sounds  Pulmonary:      Effort: Pulmonary effort is normal       Breath sounds: Normal breath sounds  Abdominal:      General: Bowel sounds are normal       Palpations: Abdomen is soft  Musculoskeletal:         General: Normal range of motion  Cervical back: Normal range of motion and neck supple  Skin:     General: Skin is warm and dry  Neurological:      Mental Status: She is alert and oriented to person, place, and time  Psychiatric:         Behavior: Behavior normal          Thought Content:  Thought content normal          Judgment: Judgment normal        Lab Results:   Lab Results   Component Value Date/Time    Hemoglobin A1C 7 2 (H) 02/07/2022 07:10 AM    Hemoglobin A1C 8 1 (H) 10/29/2021 07:52 AM    Hemoglobin A1C 7 7 (H) 06/14/2021 07:08 AM    WBC 9 79 02/07/2022 07:10 AM    WBC 9 94 10/29/2021 07:52 AM    Hemoglobin 16 7 (H) 02/07/2022 07:10 AM    Hemoglobin 15 8 (H) 10/29/2021 07:52 AM    Hematocrit 53 6 (H) 02/07/2022 07:10 AM    Hematocrit 51 0 (H) 10/29/2021 07:52 AM    MCV 92 02/07/2022 07:10 AM    MCV 93 10/29/2021 07:52 AM    Platelets 289 03/76/6056 07:10 AM    Platelets 764 34/57/9311 07:52 AM    BUN 14 02/07/2022 07:10 AM    BUN 14 10/29/2021 07:52 AM    BUN 16 06/14/2021 07:08 AM Potassium 4 8 02/07/2022 07:10 AM    Potassium 4 2 10/29/2021 07:52 AM    Potassium 4 4 06/14/2021 07:08 AM    Chloride 101 02/07/2022 07:10 AM    Chloride 101 10/29/2021 07:52 AM    Chloride 107 06/14/2021 07:08 AM    CO2 29 02/07/2022 07:10 AM    CO2 30 10/29/2021 07:52 AM    CO2 28 06/14/2021 07:08 AM    Creatinine 0 77 02/07/2022 07:10 AM    Creatinine 0 78 10/29/2021 07:52 AM    Creatinine 0 72 06/14/2021 07:08 AM    AST 18 02/07/2022 07:10 AM    AST 22 10/29/2021 07:52 AM    AST 23 06/14/2021 07:08 AM    ALT 25 02/07/2022 07:10 AM    ALT 26 10/29/2021 07:52 AM    ALT 24 06/14/2021 07:08 AM    Albumin 4 5 02/07/2022 07:10 AM    Albumin 3 9 10/29/2021 07:52 AM    Albumin 3 9 06/14/2021 07:08 AM    HDL, Direct 33 (L) 02/07/2022 07:10 AM    HDL, Direct 37 (L) 10/29/2021 07:52 AM    Triglycerides 225 (H) 02/07/2022 07:10 AM    Triglycerides 211 (H) 10/29/2021 07:52 AM     Portions of the record may have been created with voice recognition software  Occasional wrong word or "sound a like" substitutions may have occurred due to the inherent limitations of voice recognition software  Read the chart carefully and recognize, using context, where substitutions have occurred

## 2022-04-30 DIAGNOSIS — F41.9 ANXIETY: ICD-10-CM

## 2022-05-01 RX ORDER — DULOXETIN HYDROCHLORIDE 60 MG/1
CAPSULE, DELAYED RELEASE ORAL
Qty: 90 CAPSULE | Refills: 3 | Status: SHIPPED | OUTPATIENT
Start: 2022-05-01

## 2022-05-03 NOTE — PROGRESS NOTES
Daily Note     Today's date: 10/17/2018  Patient name: Geraldine Dandy  : 1940  MRN: [id number]  Referring provider: Raz Chen MD  Dx:   Encounter Diagnosis     ICD-10-CM    1  Aftercare following right finger joint replacement surgery Z47 1     Z96 [id number]                   Subjective: It is much straighter  Objective: See treatment diary below        Manual  9/17 9/24 9/26 10/1 10/5 10/15 10/17      retrograde 3m 3m 3m 3m 3min 3m 3m      Scar STM 4m 4m 4m 4m 4min 4m 4m      Grade 1 MOB 3m 3m 3m 3m 3min 3m 3m                                    Exercise Diary  9/17 9/24 9/26 10/1 10/5 10/15 10/17      AROM digits 10x 10x 10x 10x 10x 10x 10x      Blocking PIP/DIP 10x 10x 10x 10x 10x 10x 10x      Pegs   30x 10x 10x grooved pegs 20x       Wrist e/f   Dowel  3x10 Dowel  3x10 2#  3x10 #2 3x10 2#  3x10 #3 3x10      Hook fist  6x6 6x6 6x6 skinny pegs Skinny  pegs skinny pegs      powerweb     Yellow  3x10 yellow 3x10 Yellow  3x10 Red 3x10      flexbar p/s    Yellow  3x10 yellow 3x10 Yellow  3x10 rec 3x10                                                                                                                                                                  HEP ROM   5x day                Modalities  9/17 9/24 9/26 10/1 10/5 10/15 10/17      MH pre 10x 10x 10x 10  10m       Pdip w/flex     10m        Fluido       10min                Assessment: Tolerated treatment well  Patient has improved ROM   PIP -      Plan: Continue per plan of care  Dr. Suarez, [facility]. Continuous monitoring.

## 2022-06-09 ENCOUNTER — LAB (OUTPATIENT)
Dept: LAB | Facility: CLINIC | Age: 82
End: 2022-06-09
Payer: MEDICARE

## 2022-06-09 DIAGNOSIS — E06.3 HYPOTHYROIDISM DUE TO HASHIMOTO'S THYROIDITIS: ICD-10-CM

## 2022-06-09 DIAGNOSIS — M81.0 AGE-RELATED OSTEOPOROSIS WITHOUT CURRENT PATHOLOGICAL FRACTURE: ICD-10-CM

## 2022-06-09 DIAGNOSIS — E78.2 MIXED HYPERLIPIDEMIA: ICD-10-CM

## 2022-06-09 DIAGNOSIS — E55.9 VITAMIN D DEFICIENCY: ICD-10-CM

## 2022-06-09 DIAGNOSIS — E11.40 TYPE 2 DIABETES MELLITUS WITH DIABETIC NEUROPATHY, WITHOUT LONG-TERM CURRENT USE OF INSULIN (HCC): ICD-10-CM

## 2022-06-09 DIAGNOSIS — I10 ESSENTIAL HYPERTENSION: ICD-10-CM

## 2022-06-09 DIAGNOSIS — E03.8 HYPOTHYROIDISM DUE TO HASHIMOTO'S THYROIDITIS: ICD-10-CM

## 2022-06-09 LAB
ALBUMIN SERPL BCP-MCNC: 3.6 G/DL (ref 3.5–5)
ALP SERPL-CCNC: 70 U/L (ref 46–116)
ALT SERPL W P-5'-P-CCNC: 29 U/L (ref 12–78)
ANION GAP SERPL CALCULATED.3IONS-SCNC: 7 MMOL/L (ref 4–13)
AST SERPL W P-5'-P-CCNC: 21 U/L (ref 5–45)
BASOPHILS # BLD AUTO: 0.03 THOUSANDS/ΜL (ref 0–0.1)
BASOPHILS NFR BLD AUTO: 0 % (ref 0–1)
BILIRUB SERPL-MCNC: 0.81 MG/DL (ref 0.2–1)
BUN SERPL-MCNC: 9 MG/DL (ref 5–25)
CALCIUM SERPL-MCNC: 9.6 MG/DL (ref 8.3–10.1)
CHLORIDE SERPL-SCNC: 105 MMOL/L (ref 100–108)
CO2 SERPL-SCNC: 28 MMOL/L (ref 21–32)
CREAT SERPL-MCNC: 0.71 MG/DL (ref 0.6–1.3)
EOSINOPHIL # BLD AUTO: 0.29 THOUSAND/ΜL (ref 0–0.61)
EOSINOPHIL NFR BLD AUTO: 3 % (ref 0–6)
ERYTHROCYTE [DISTWIDTH] IN BLOOD BY AUTOMATED COUNT: 13.1 % (ref 11.6–15.1)
EST. AVERAGE GLUCOSE BLD GHB EST-MCNC: 174 MG/DL
GFR SERPL CREATININE-BSD FRML MDRD: 80 ML/MIN/1.73SQ M
GLUCOSE P FAST SERPL-MCNC: 172 MG/DL (ref 65–99)
HBA1C MFR BLD: 7.7 %
HCT VFR BLD AUTO: 53.4 % (ref 34.8–46.1)
HGB BLD-MCNC: 16.2 G/DL (ref 11.5–15.4)
IMM GRANULOCYTES # BLD AUTO: 0.02 THOUSAND/UL (ref 0–0.2)
IMM GRANULOCYTES NFR BLD AUTO: 0 % (ref 0–2)
LYMPHOCYTES # BLD AUTO: 3.67 THOUSANDS/ΜL (ref 0.6–4.47)
LYMPHOCYTES NFR BLD AUTO: 43 % (ref 14–44)
MCH RBC QN AUTO: 28.3 PG (ref 26.8–34.3)
MCHC RBC AUTO-ENTMCNC: 30.3 G/DL (ref 31.4–37.4)
MCV RBC AUTO: 93 FL (ref 82–98)
MONOCYTES # BLD AUTO: 0.61 THOUSAND/ΜL (ref 0.17–1.22)
MONOCYTES NFR BLD AUTO: 7 % (ref 4–12)
NEUTROPHILS # BLD AUTO: 3.94 THOUSANDS/ΜL (ref 1.85–7.62)
NEUTS SEG NFR BLD AUTO: 47 % (ref 43–75)
NRBC BLD AUTO-RTO: 0 /100 WBCS
PLATELET # BLD AUTO: 223 THOUSANDS/UL (ref 149–390)
PMV BLD AUTO: 10.5 FL (ref 8.9–12.7)
POTASSIUM SERPL-SCNC: 4.8 MMOL/L (ref 3.5–5.3)
PROT SERPL-MCNC: 7.4 G/DL (ref 6.4–8.2)
RBC # BLD AUTO: 5.72 MILLION/UL (ref 3.81–5.12)
SODIUM SERPL-SCNC: 140 MMOL/L (ref 136–145)
T4 FREE SERPL-MCNC: 1.02 NG/DL (ref 0.76–1.46)
TSH SERPL DL<=0.05 MIU/L-ACNC: 1.96 UIU/ML (ref 0.45–4.5)
WBC # BLD AUTO: 8.56 THOUSAND/UL (ref 4.31–10.16)

## 2022-06-09 PROCEDURE — 36415 COLL VENOUS BLD VENIPUNCTURE: CPT

## 2022-06-09 PROCEDURE — 83036 HEMOGLOBIN GLYCOSYLATED A1C: CPT

## 2022-06-09 PROCEDURE — 85025 COMPLETE CBC W/AUTO DIFF WBC: CPT

## 2022-06-09 PROCEDURE — 80053 COMPREHEN METABOLIC PANEL: CPT

## 2022-06-09 PROCEDURE — 84443 ASSAY THYROID STIM HORMONE: CPT

## 2022-06-09 PROCEDURE — 84439 ASSAY OF FREE THYROXINE: CPT

## 2022-06-13 ENCOUNTER — OFFICE VISIT (OUTPATIENT)
Dept: FAMILY MEDICINE CLINIC | Facility: CLINIC | Age: 82
End: 2022-06-13
Payer: MEDICARE

## 2022-06-13 VITALS
DIASTOLIC BLOOD PRESSURE: 78 MMHG | WEIGHT: 153.7 LBS | SYSTOLIC BLOOD PRESSURE: 120 MMHG | OXYGEN SATURATION: 98 % | HEIGHT: 63 IN | HEART RATE: 80 BPM | RESPIRATION RATE: 17 BRPM | BODY MASS INDEX: 27.23 KG/M2

## 2022-06-13 DIAGNOSIS — I10 ESSENTIAL HYPERTENSION: ICD-10-CM

## 2022-06-13 DIAGNOSIS — I48.0 PAROXYSMAL ATRIAL FIBRILLATION (HCC): ICD-10-CM

## 2022-06-13 DIAGNOSIS — E04.0 DIFFUSE NONTOXIC GOITER: ICD-10-CM

## 2022-06-13 DIAGNOSIS — K21.9 GASTRO-ESOPHAGEAL REFLUX DISEASE WITHOUT ESOPHAGITIS: ICD-10-CM

## 2022-06-13 DIAGNOSIS — E03.8 HYPOTHYROIDISM DUE TO HASHIMOTO'S THYROIDITIS: ICD-10-CM

## 2022-06-13 DIAGNOSIS — I51.7 CARDIOMEGALY: ICD-10-CM

## 2022-06-13 DIAGNOSIS — J98.4 RESTRICTIVE LUNG DISEASE: ICD-10-CM

## 2022-06-13 DIAGNOSIS — E11.40 TYPE 2 DIABETES MELLITUS WITH DIABETIC NEUROPATHY, WITHOUT LONG-TERM CURRENT USE OF INSULIN (HCC): ICD-10-CM

## 2022-06-13 DIAGNOSIS — E78.2 MIXED HYPERLIPIDEMIA: ICD-10-CM

## 2022-06-13 DIAGNOSIS — N39.3 STRESS INCONTINENCE IN FEMALE: ICD-10-CM

## 2022-06-13 DIAGNOSIS — I25.10 CORONARY ARTERY DISEASE INVOLVING NATIVE CORONARY ARTERY OF NATIVE HEART WITHOUT ANGINA PECTORIS: ICD-10-CM

## 2022-06-13 DIAGNOSIS — E06.3 HYPOTHYROIDISM DUE TO HASHIMOTO'S THYROIDITIS: ICD-10-CM

## 2022-06-13 DIAGNOSIS — J44.9 CHRONIC OBSTRUCTIVE PULMONARY DISEASE, UNSPECIFIED COPD TYPE (HCC): ICD-10-CM

## 2022-06-13 DIAGNOSIS — I49.5 SICK SINUS SYNDROME (HCC): ICD-10-CM

## 2022-06-13 DIAGNOSIS — Z95.0 MRI SAFE CARDIAC PACEMAKER IN SITU: ICD-10-CM

## 2022-06-13 DIAGNOSIS — Z01.818 PRE-OP EXAMINATION: Primary | ICD-10-CM

## 2022-06-13 DIAGNOSIS — Z79.01 ANTICOAGULATED BY ANTICOAGULATION TREATMENT: ICD-10-CM

## 2022-06-13 DIAGNOSIS — C18.9 ADENOCARCINOMA OF COLON (HCC): ICD-10-CM

## 2022-06-13 PROBLEM — B37.9 CANDIDA INFECTION: Status: RESOLVED | Noted: 2021-09-16 | Resolved: 2022-06-13

## 2022-06-13 PROCEDURE — 99214 OFFICE O/P EST MOD 30 MIN: CPT | Performed by: FAMILY MEDICINE

## 2022-06-17 NOTE — RESULT ENCOUNTER NOTE
Please call the patient regarding abnormal result  Hemoglobin A1c is elevated to 7 7  Fasting glucose is elevated on comprehensive metabolic panel    TSH and free T4 are normal

## 2022-06-21 ENCOUNTER — TELEPHONE (OUTPATIENT)
Dept: ENDOCRINOLOGY | Facility: HOSPITAL | Age: 82
End: 2022-06-21

## 2022-06-21 NOTE — TELEPHONE ENCOUNTER
Reviewed blood sugars supplied from 06/01 - 6/14  Overall blood sugars are doing okay with occasional values that are on the higher side above 200  No hypoglycemia seen  For now continue current regimen  No changes

## 2022-06-24 ENCOUNTER — REMOTE DEVICE CLINIC VISIT (OUTPATIENT)
Dept: CARDIOLOGY CLINIC | Facility: CLINIC | Age: 82
End: 2022-06-24
Payer: MEDICARE

## 2022-06-24 DIAGNOSIS — Z95.0 CARDIAC PACEMAKER IN SITU: Primary | ICD-10-CM

## 2022-06-24 PROCEDURE — 93294 REM INTERROG EVL PM/LDLS PM: CPT | Performed by: INTERNAL MEDICINE

## 2022-06-24 PROCEDURE — 93296 REM INTERROG EVL PM/IDS: CPT | Performed by: INTERNAL MEDICINE

## 2022-06-24 NOTE — PROGRESS NOTES
MDT-DUAL CHAMBER PPM/ ACTIVE SYSTEM IS MRI CONDITIONAL   CARELINK TRANSMISSION: BATTERY VOLTAGE ADEQUATE (2 5 YRS)  AP 80 6%   2 4% (AAIR-DDDR 60)  ALL AVAILABLE LEAD PARAMETERS WITHIN NORMAL LIMITS  1 DEVICE CLASSIFIED NSVT EPISODE @ 164 BPM WITH RVR ON EGM  138 AT/AF EPISODES W/PAF ON EGM'S - MAX EPISODE DURATION 8 HRS  17 FAST A&V EPISODES (SVT-AF)  @ 158-200 BPM MAX DURATION 4 MINS, 32 SECS WITH RVR ON EGM'S  AF BURDEN 5 6%  HX: PAF & PATIENT ON ELIQUIS, ATENOLOL   NORMAL DEVICE FUNCTION    ES

## 2022-07-11 DIAGNOSIS — J98.4 RESTRICTIVE LUNG DISEASE: ICD-10-CM

## 2022-07-11 RX ORDER — ALBUTEROL SULFATE 90 UG/1
2 AEROSOL, METERED RESPIRATORY (INHALATION) EVERY 6 HOURS PRN
Qty: 8.5 G | Refills: 0 | Status: SHIPPED | OUTPATIENT
Start: 2022-07-11

## 2022-07-13 ENCOUNTER — RA CDI HCC (OUTPATIENT)
Dept: OTHER | Facility: HOSPITAL | Age: 82
End: 2022-07-13

## 2022-07-13 NOTE — PROGRESS NOTES
E11 65  [facility]  coding opportunities          Chart Reviewed number of suggestions sent to Provider: 1     Patients Insurance     Medicare Insurance: [employer]

## 2022-07-16 DIAGNOSIS — E78.5 HYPERLIPIDEMIA, UNSPECIFIED HYPERLIPIDEMIA TYPE: ICD-10-CM

## 2022-07-18 RX ORDER — SIMVASTATIN 20 MG
TABLET ORAL
Qty: 180 TABLET | Refills: 3 | Status: SHIPPED | OUTPATIENT
Start: 2022-07-18

## 2022-08-07 DIAGNOSIS — Z79.4 TYPE 2 DIABETES MELLITUS WITHOUT COMPLICATION, WITH LONG-TERM CURRENT USE OF INSULIN (HCC): ICD-10-CM

## 2022-08-07 DIAGNOSIS — E11.9 TYPE 2 DIABETES MELLITUS WITHOUT COMPLICATION, WITH LONG-TERM CURRENT USE OF INSULIN (HCC): ICD-10-CM

## 2022-08-09 RX ORDER — EMPAGLIFLOZIN 25 MG/1
TABLET, FILM COATED ORAL
Qty: 90 TABLET | Refills: 3 | Status: SHIPPED | OUTPATIENT
Start: 2022-08-09

## 2022-08-12 DIAGNOSIS — I48.91 ATRIAL FIBRILLATION, UNSPECIFIED TYPE (HCC): ICD-10-CM

## 2022-08-12 RX ORDER — APIXABAN 5 MG/1
TABLET, FILM COATED ORAL
Qty: 180 TABLET | Refills: 3 | Status: SHIPPED | OUTPATIENT
Start: 2022-08-12

## 2022-08-12 NOTE — TELEPHONE ENCOUNTER
Requested medication(s) are due for refill today: Yes  Patient has already received a courtesy refill: No  Other reason request has been forwarded to provider: per rx protocol

## 2022-08-16 ENCOUNTER — OFFICE VISIT (OUTPATIENT)
Dept: ENDOCRINOLOGY | Facility: HOSPITAL | Age: 82
End: 2022-08-16
Payer: MEDICARE

## 2022-08-16 VITALS
WEIGHT: 157.2 LBS | DIASTOLIC BLOOD PRESSURE: 70 MMHG | HEIGHT: 63 IN | BODY MASS INDEX: 27.85 KG/M2 | SYSTOLIC BLOOD PRESSURE: 108 MMHG | HEART RATE: 92 BPM

## 2022-08-16 DIAGNOSIS — E78.2 MIXED HYPERLIPIDEMIA: ICD-10-CM

## 2022-08-16 DIAGNOSIS — E11.40 TYPE 2 DIABETES MELLITUS WITH DIABETIC NEUROPATHY, WITHOUT LONG-TERM CURRENT USE OF INSULIN (HCC): Primary | ICD-10-CM

## 2022-08-16 DIAGNOSIS — E04.0 DIFFUSE NONTOXIC GOITER: ICD-10-CM

## 2022-08-16 DIAGNOSIS — E03.8 HYPOTHYROIDISM DUE TO HASHIMOTO'S THYROIDITIS: ICD-10-CM

## 2022-08-16 DIAGNOSIS — E06.3 HYPOTHYROIDISM DUE TO HASHIMOTO'S THYROIDITIS: ICD-10-CM

## 2022-08-16 DIAGNOSIS — E55.9 VITAMIN D DEFICIENCY: ICD-10-CM

## 2022-08-16 DIAGNOSIS — M81.0 AGE-RELATED OSTEOPOROSIS WITHOUT CURRENT PATHOLOGICAL FRACTURE: ICD-10-CM

## 2022-08-16 PROCEDURE — 99214 OFFICE O/P EST MOD 30 MIN: CPT | Performed by: NURSE PRACTITIONER

## 2022-08-16 NOTE — PROGRESS NOTES
Nestor Harada [de-identified] y o  female MRN: [id number]    Encounter: [id number]      Assessment/Plan     Assessment: This is a [de-identified]y o -year-old female with type 2 diabetes, hypothyroidism, hypertension, hyperlipidemia and vitamin-D deficiency     Plan:  1   Type 2 diabetes:  Her most recent hemoglobin A1c is elevated elevated to 7 7    Recent blood sugars are relatively controlled for her age and medical condition however, the elevation in her blood sugars is most likely due to her increased consumption of fruits over the summer and a decrease in her physical activity due to the heat  For now, she will continue current regimen   I have asked her to continue checking her blood sugars twice daily at alternating times and for the record to the office in 2 weeks for review and further adjustment, if necessary  I encouraged her to be mindful of her diet and to be more active with walking  She is up-to-date with her annual diabetic eye exams and is seeing the podiatrist every 12 weeks  Check hemoglobin A1c prior to next visit      2   Hypothyroidism:  Her most recent TSH free T4 were normal   Continue levothyroxine 125 mcg Monday through Saturday with no tablet on Sunday   Check TSH and free T4 prior to next office visit      3   Hypertension: She is normotensive in the office today   Continue atenolol   Check comprehensive metabolic panel prior to next visit      4   Hyperlipidemia:  Continue simvastatin  Check fasting lipid panel prior to next visit      5   Vitamin-D deficiency:  Most recent vitamin-D level is normal   Continue supplement with 1000 units of vitamin D3 daily         6   Osteopenia: Lizbeth Hutson will continue with calcium and vitamin-D supplementation   Stressed staying active reviewed fall risk and safety at the time of the visit  CC:  Type 2 Diabetes follow-up    History of Present Illness     HPI:  [de-identified]  o  female for follow-up of type 2 diabetes, hypothyroidism, hypertension and hyperlipidemia  Lizbeth Hutson states she has been diagnosed with type 2 diabetes for approximately 10 years   There are no blood sugar records available for review  She is currently taking metformin 500 mg - 2 tablets twice daily, Byetta 10 mg twice daily and Jardiance 25 mg daily   She denies any other episodes of hypoglycemia recently   She denies any recent polyuria, polydipsia or polyphagia   Her most recent hemoglobin A1c from June 9, 2022 is 7 7   She states she has not been as active and has been eating large amounts of fruits (cherries and peaches) during the summer  She states she is up-to-date with her annual diabetic eye exam with Dr Erick Taylor of  December 2021   She complains of some numbness and tingling to her feet at times and follows Podiatry every 12 weeks for regular diabetic foot care  Her most recent diabetic foot exam was performed on March 22, 2022       For her hypothyroidism, she is treated with levothyroxine 125 mcg daily Monday through Saturday and no tablet on Sunday   Her most recent TSH from June 9, 2022 is 1 960 with a free T4 of 1 02   She complains of some constipation at times but otherwise has no symptoms/complaints of hypo or hyperthyroidism      Her hypertension is treated with atenolol 50 mg daily      For her hyperlipidemia, she is treated with simvastatin 40 mg daily      For her vitamin-D deficiency, she supplements with 1000 units of vitamin D3 daily  Her most recent 25 hydroxy vitamin-D level from  February 7, 2022 is 32  6      Her most recent DEXA scan performed on July 1, 2021 revealed a T-score of -1 3 and lumbar spine consistent with low bone mineral density-osteopenia  She continues to supplement with calcium and vitamin-D daily   She denies any recent falls or fractures        Review of Systems   Constitutional: Negative  Negative for chills, fatigue and fever  HENT: Negative  Negative for trouble swallowing and voice change  Eyes: Negative    Negative for photophobia, pain, discharge, redness, itching and visual disturbance  Respiratory: Negative  Negative for chest tightness and shortness of breath  Cardiovascular: Negative  Negative for chest pain  Gastrointestinal: Negative  Negative for abdominal pain, constipation, diarrhea and vomiting  Endocrine: Positive for polyuria (1-2 times per night nocturia)  Negative for cold intolerance, heat intolerance, polydipsia and polyphagia  Genitourinary: Negative  Musculoskeletal: Positive for arthralgias ( right hand)  Skin: Negative  Allergic/Immunologic: Negative  Neurological: Negative  Negative for dizziness, syncope, light-headedness and headaches  Hematological: Negative  Psychiatric/Behavioral: Negative  All other systems reviewed and are negative        Historical Information   Past Medical History:   Diagnosis Date    A-fib [facility])     Abnormal ECG     last assessed: 7/14/2015    Acid reflux     resolved    Anxiety     Pacheco esophagus     Benign neoplasm of sigmoid colon 09/13/2011    next colon 2012    Bronchitis, asthmatic     last assessed: 3/12/2012    Colon cancer ([facility] )     2012- had colon resection    Colon polyp     COPD (chronic obstructive pulmonary disease) (HCC)     questionable    Depression     Diabetes mellitus ([facility] [address] )     on byetta    Dizziness     occ    Esophageal stricture     last assessed: 9/30/2014    High cholesterol     Hypertension     Hypothyroidism     Iron (Fe) deficiency anemia 12/22/2011    iron pill continue    Irritable bowel syndrome     OA (osteoarthritis)     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    Pacemaker     hx SSS    Rash     labia area- uses cream prn    Restrictive lung disease     Seasonal allergies     Skin scarring/fibrosis     fibrotic scar; last assessed: 3/22/2013    MONA (stress urinary incontinence, female)     Urinary frequency     last assessed: 9/10/2012    Wears glasses     reading     Past Surgical History:   Procedure Laterality Date    BREAST EXCISIONAL BIOPSY Right     benign, best guess on year   [address]      does not know type  Dr Nito Agustin is cariologist     SECTION      COLON SURGERY      resection  removed 18 In     COLONOSCOPY  2012: Adenomatous polyp, colorectal anastomosis, internal hemorrhoids  2012:  proctosigmoidectomy    ESOPHAGOGASTRODUODENOSCOPY N/A 2016    Procedure: ESOPHAGOGASTRODUODENOSCOPY (EGD); Surgeon: Dmitry Hicks MD;  [facility];  Service:     HYSTERECTOMY  1978    WV ARTHROPLASTY I-P JT Right 2018    Procedure: ARTHROPLASTY PIP/FINGER - RIGHT RING;  Surgeon: Shannon Chery MD;  [facility];  Service: Orthopedics    WV CYSTOURETHROSCOPY N/A 2018    Procedure: Zohreh Morin;  Surgeon: Miguel Angel Lara MD;  [facility];  Service: UroGynecology           WV LAP, REPAIR PARAESOPHAGEAL HERNIA, INCL FUNDOPLASTY W/ MESH N/A 2016    Procedure: LAPAROSCOPIC PARAESOPHAGEAL HERNIA REPAIR with mesh; toupet  FUNDOPLICATION ;  Surgeon: Pollo Reyna MD;  [facility];  Service: Thoracic    WV REPAIR INTERCARP/CARP-METACARP JT Right 1/10/2019    Procedure: Tommy Kelly;  Surgeon: Shannon Chery MD;  [facility];  Service: Orthopedics    WV SLING OPER STRES INCONTINENCE N/A 2018    Procedure: INSERTION PUBOVAGINAL SLING (FEMALE); Surgeon: Miguel Angel Lara MD;  [facility];  Service: UroGynecology           TOTAL ABDOMINAL HYSTERECTOMY      UPPER GASTROINTESTINAL ENDOSCOPY      2018:  Irregular Z-line positive for intestinal metaplasia/ Pacheco's, no dysplasia  Gastritis H pylori negative      WRIST TENDON TRANSFER Right 1/10/2019    Procedure: TRANSFER TENDON FLEXOR CARPI RADIALIS FROM FOREARM TO HAND;  Surgeon: Shannon Chery MD;  [facility];  Service: Orthopedics     Social History   Social History     Substance and Sexual Activity   Alcohol Use No     Social History     Substance and Sexual Activity   Drug Use No     Social History     Tobacco Use   Smoking Status Former Smoker    Types: Cigarettes    Quit date:     Years since quittin 6   Smokeless Tobacco Former User    Quit date:      Family History:   Family History   Problem Relation Age of Onset    Heart disease Mother     Diabetes Mother     Asthma Father     Stomach cancer Father         gastrkic cancer    Cancer Paternal Grandmother     Cancer Paternal Grandfather     No Known Problems Brother     Breast cancer Sister 76    Breast cancer Sister 77    Pancreatic cancer Sister 72    No Known Problems Maternal Aunt     No Known Problems Maternal Aunt     No Known Problems Maternal Aunt     No Known Problems Paternal Aunt     No Known Problems Daughter     No Known Problems Maternal Grandmother     No Known Problems Maternal Grandfather     Prostate cancer Paternal Uncle         age unknown    Substance Abuse Neg Hx     Mental illness Neg Hx     Alcohol abuse Neg Hx     Colon cancer Neg Hx     Colon polyps Neg Hx        Meds/Allergies   Current Outpatient Medications   Medication Sig Dispense Refill    albuterol (ProAir HFA) 90 mcg/act inhaler Inhale 2 puffs every 6 (six) hours as needed for wheezing or shortness of breath 8 5 g 0    Apple Cider Vinegar 500 MG TABS Take by mouth daily      ascorbic acid (VITAMIN C) 500 mg tablet Take 500 mg by mouth daily      atenolol (TENORMIN) 50 mg tablet TAKE 1 TABLET AT BEDTIME 90 tablet 3    Blood Glucose Monitoring Suppl (OneTouch Verio) w/Device KIT Check blood sugars once a day 1 kit 0    Byetta 10 MCG Pen 10 MCG/0 04ML SOPN INJECT 0 04 ML UNDER THE SKIN TWICE A DAY 7 2 mL 3    Cholecalciferol (VITAMIN D-3) 1000 units CAPS Take 1 capsule by mouth daily      clobetasol (TEMOVATE) 0 05 % cream       Coenzyme Q10 (COQ10) 200 MG CAPS Take 200 mg by mouth daily        Continuous Blood Gluc  (FreeStyle Julian 14 Day Shumway) SVETLANA Use 1 Device 4 (four) times a day (before meals and at bedtime) 1 each 0    Continuous Blood Gluc Sensor (FreeStyle Julian 14 Day Sensor) MISC Use 1 application every 14 (fourteen) days 2 each 6    cycloSPORINE (Restasis) 0 05 % ophthalmic emulsion       DULoxetine (CYMBALTA) 60 mg delayed release capsule TAKE 1 CAPSULE DAILY 90 capsule 3    Eliquis 5 MG TAKE 1 TABLET TWICE A  tablet 3    estradiol (ESTRACE VAGINAL) 0 1 mg/g vaginal cream Insert 0 5 g into the vagina 2 (two) times a week Patient is NOT to be using this daily 42 5 g 1    Ferrous Sulfate 27 MG TABS Take 1 tablet by mouth daily      FREESTYLE LITE test strip TEST THREE TIMES A  strip 3    Glucosamine-Chondroitin (MOVE FREE PO) Take by mouth daily      glucose blood (OneTouch Verio) test strip Check blood sugars once a day 100 strip 2    hydrocortisone 2 5 % cream Apply topically 2 (two) times a day 60 g 2    Jardiance 25 MG TABS TAKE 1 TABLET EVERY MORNING 90 tablet 3    Lancets (onetouch ultrasoft) lancets Check blood sugars once a day 100 each 2    levothyroxine 125 mcg tablet TAKE 1 TABLET SIX DAYS A WEEK, NOTHING ON SUNDAY 90 tablet 3    MEGARED OMEGA-3 KRILL OIL PO Take 1 capsule by mouth daily       metFORMIN (GLUCOPHAGE-XR) 500 mg 24 hr tablet Take 2 tablets (1,000 mg total) by mouth 2 (two) times a day with meals 120 tablet 0    metFORMIN (GLUCOPHAGE-XR) 500 mg 24 hr tablet Take 2 tablets (1,000 mg total) by mouth 2 (two) times a day with meals 360 tablet 3    simvastatin (ZOCOR) 20 mg tablet TAKE 2 TABLETS DAILY AT BEDTIME 180 tablet 3    Triamcinolone Acetonide (Nasacort Allergy 24HR) 55 MCG/ACT AERO 2 Act (110 mcg total) into each nostril every morning 16 5 mL 3    ketoconazole (NIZORAL) 2 % cream Apply topically 2 (two) times a day for 14 days 60 g 1     No current facility-administered medications for this visit       Allergies   Allergen Reactions    Fluconazole Dizziness       Objective Vitals: Blood pressure 108/70, pulse 92, height 5' 3" (1 6 m), weight 71 3 kg (157 lb 3 2 oz), not currently breastfeeding  Physical Exam  Vitals reviewed  Constitutional:       Appearance: She is well-developed  HENT:      Head: Normocephalic and atraumatic  Eyes:      Conjunctiva/sclera: Conjunctivae normal       Pupils: Pupils are equal, round, and reactive to light  Cardiovascular:      Rate and Rhythm: Normal rate and regular rhythm  Heart sounds: Normal heart sounds  Pulmonary:      Effort: Pulmonary effort is normal       Breath sounds: Normal breath sounds  Abdominal:      General: Bowel sounds are normal       Palpations: Abdomen is soft  Musculoskeletal:         General: Normal range of motion  Cervical back: Normal range of motion and neck supple  Skin:     General: Skin is warm and dry  Neurological:      Mental Status: She is alert and oriented to person, place, and time  Psychiatric:         Behavior: Behavior normal          Thought Content:  Thought content normal          Judgment: Judgment normal        Lab Results:   Lab Results   Component Value Date/Time    Hemoglobin A1C 7 7 (H) 06/09/2022 07:04 AM    Hemoglobin A1C 7 2 (H) 02/07/2022 07:10 AM    Hemoglobin A1C 8 1 (H) 10/29/2021 07:52 AM    WBC 8 56 06/09/2022 07:04 AM    WBC 9 79 02/07/2022 07:10 AM    WBC 9 94 10/29/2021 07:52 AM    Hemoglobin 16 2 (H) 06/09/2022 07:04 AM    Hemoglobin 16 7 (H) 02/07/2022 07:10 AM    Hemoglobin 15 8 (H) 10/29/2021 07:52 AM    Hematocrit 53 4 (H) 06/09/2022 07:04 AM    Hematocrit 53 6 (H) 02/07/2022 07:10 AM    Hematocrit 51 0 (H) 10/29/2021 07:52 AM    MCV 93 06/09/2022 07:04 AM    MCV 92 02/07/2022 07:10 AM    MCV 93 10/29/2021 07:52 AM    Platelets 179 88/19/0308 07:04 AM    Platelets 631 66/48/8433 07:10 AM    Platelets 956 17/45/9944 07:52 AM    BUN 9 06/09/2022 07:04 AM    BUN 14 02/07/2022 07:10 AM    BUN 14 10/29/2021 07:52 AM    Potassium 4 8 06/09/2022 07:04 AM Potassium 4 8 02/07/2022 07:10 AM    Potassium 4 2 10/29/2021 07:52 AM    Chloride 105 06/09/2022 07:04 AM    Chloride 101 02/07/2022 07:10 AM    Chloride 101 10/29/2021 07:52 AM    CO2 28 06/09/2022 07:04 AM    CO2 29 02/07/2022 07:10 AM    CO2 30 10/29/2021 07:52 AM    Creatinine 0 71 06/09/2022 07:04 AM    Creatinine 0 77 02/07/2022 07:10 AM    Creatinine 0 78 10/29/2021 07:52 AM    AST 21 06/09/2022 07:04 AM    AST 18 02/07/2022 07:10 AM    AST 22 10/29/2021 07:52 AM    ALT 29 06/09/2022 07:04 AM    ALT 25 02/07/2022 07:10 AM    ALT 26 10/29/2021 07:52 AM    Albumin 3 6 06/09/2022 07:04 AM    Albumin 4 5 02/07/2022 07:10 AM    Albumin 3 9 10/29/2021 07:52 AM    HDL, Direct 33 (L) 02/07/2022 07:10 AM    HDL, Direct 37 (L) 10/29/2021 07:52 AM    Triglycerides 225 (H) 02/07/2022 07:10 AM    Triglycerides 211 (H) 10/29/2021 07:52 AM     Portions of the record may have been created with voice recognition software  Occasional wrong word or "sound a like" substitutions may have occurred due to the inherent limitations of voice recognition software  Read the chart carefully and recognize, using context, where substitutions have occurred

## 2022-08-16 NOTE — PATIENT INSTRUCTIONS
Be mindful of diet  Watch Fruit servings  Stay active and stay hydrated  Continue Metformin 500 mg - 2 tablets twice daily (breakfast and dinner)  Continue Mariah  Utilize the [facility], if able  Please check your blood sugars at least 2 times daily and for the record to the office in 2 weeks for review and adjustment, if necessary  Continue levothyroxine 125 mcg daily Monday through Saturday and no tablet on Sunday  Continue atenolol and simvastatin  Continue with regular supplementation of vitamin D3 daily  Obtain lab work as prescribed

## 2022-08-23 ENCOUNTER — OFFICE VISIT (OUTPATIENT)
Dept: FAMILY MEDICINE CLINIC | Facility: CLINIC | Age: 82
End: 2022-08-23
Payer: MEDICARE

## 2022-08-23 VITALS
OXYGEN SATURATION: 98 % | BODY MASS INDEX: 26.46 KG/M2 | SYSTOLIC BLOOD PRESSURE: 110 MMHG | HEIGHT: 64 IN | WEIGHT: 155 LBS | HEART RATE: 82 BPM | DIASTOLIC BLOOD PRESSURE: 74 MMHG | RESPIRATION RATE: 15 BRPM

## 2022-08-23 DIAGNOSIS — E55.9 VITAMIN D DEFICIENCY: ICD-10-CM

## 2022-08-23 DIAGNOSIS — J98.4 RESTRICTIVE LUNG DISEASE: ICD-10-CM

## 2022-08-23 DIAGNOSIS — C18.9 ADENOCARCINOMA OF COLON (HCC): ICD-10-CM

## 2022-08-23 DIAGNOSIS — E11.40 TYPE 2 DIABETES MELLITUS WITH DIABETIC NEUROPATHY, WITHOUT LONG-TERM CURRENT USE OF INSULIN (HCC): ICD-10-CM

## 2022-08-23 DIAGNOSIS — Z00.00 MEDICARE ANNUAL WELLNESS VISIT, SUBSEQUENT: Primary | ICD-10-CM

## 2022-08-23 DIAGNOSIS — E03.8 HYPOTHYROIDISM DUE TO HASHIMOTO'S THYROIDITIS: ICD-10-CM

## 2022-08-23 DIAGNOSIS — I10 ESSENTIAL HYPERTENSION: ICD-10-CM

## 2022-08-23 DIAGNOSIS — J44.9 CHRONIC OBSTRUCTIVE PULMONARY DISEASE, UNSPECIFIED COPD TYPE (HCC): ICD-10-CM

## 2022-08-23 DIAGNOSIS — K21.9 GASTRO-ESOPHAGEAL REFLUX DISEASE WITHOUT ESOPHAGITIS: ICD-10-CM

## 2022-08-23 DIAGNOSIS — E04.0 DIFFUSE NONTOXIC GOITER: ICD-10-CM

## 2022-08-23 DIAGNOSIS — E78.2 MIXED HYPERLIPIDEMIA: ICD-10-CM

## 2022-08-23 DIAGNOSIS — I48.0 PAROXYSMAL ATRIAL FIBRILLATION (HCC): ICD-10-CM

## 2022-08-23 DIAGNOSIS — I49.5 SICK SINUS SYNDROME (HCC): ICD-10-CM

## 2022-08-23 DIAGNOSIS — Z12.31 ENCOUNTER FOR SCREENING MAMMOGRAM FOR MALIGNANT NEOPLASM OF BREAST: ICD-10-CM

## 2022-08-23 DIAGNOSIS — E06.3 HYPOTHYROIDISM DUE TO HASHIMOTO'S THYROIDITIS: ICD-10-CM

## 2022-08-23 DIAGNOSIS — K22.70 BARRETT'S ESOPHAGUS WITHOUT DYSPLASIA: ICD-10-CM

## 2022-08-23 DIAGNOSIS — Z95.0 MRI SAFE CARDIAC PACEMAKER IN SITU: ICD-10-CM

## 2022-08-23 DIAGNOSIS — I25.10 CORONARY ARTERY DISEASE INVOLVING NATIVE CORONARY ARTERY OF NATIVE HEART WITHOUT ANGINA PECTORIS: ICD-10-CM

## 2022-08-23 PROCEDURE — 99214 OFFICE O/P EST MOD 30 MIN: CPT | Performed by: FAMILY MEDICINE

## 2022-08-23 PROCEDURE — G0439 PPPS, SUBSEQ VISIT: HCPCS | Performed by: FAMILY MEDICINE

## 2022-08-23 NOTE — PROGRESS NOTES
ASSESSMENT/PLAN:  PE next visit      Anxiety and fatigue  On duloxetine 60 mg  Continue today     Type 2 diabetes  A1c 7 7 from 7 2 in June  Patient is metformin  She is also on Jardiance, and Byetta     Atrial fibrillation/sick sinus syndrome/cardiomegaly/coronary artery disease  Is on Eliquis for anticoagulation and atenolol for rate control  She also has a pacer in and has regular rhythm today     Osteopenia  Is on calcium and vitamin-D  DEXA shows no change  Continue the same  Next DEXA July 2023    Adenocarcinoma of the colon 2012  Colonoscopy showed a small polyp 2021  Patient's next colonoscopy in 3 years 2023     Emphysema by CT scan and by exam  Ventolin as needed      Hashimoto's thyroiditis   Also following with endocrine for suppression of her thyroid with medication  Continue levothyroxine 125 mcg daily      Vitamin D deficiency   Continue vitamin D 2000 units daily          History of large paraesophageal hernia  Surgery on esophagus now she does not need PPI     Hyperlipidemia   Simvastatin and CoQ10 and diet  Recheck cholesterol before next visit     Thyroid goiter   Followed by endocrine who orders ultrasounds         Recheck in 6 months  Blood work and PE that day  Prn otherwise    Depression Screening and Follow-up Plan: Patient was screened for depression during today's encounter  They screened negative with a PHQ-2 score of 0             Health Maintenance   Topic Date Due    SLP PLAN OF CARE  Never done    OT PLAN OF CARE  03/06/2019    Medicare Annual Wellness Visit (AWV)  08/10/2022    Breast Cancer Screening: Mammogram  08/16/2022    Influenza Vaccine (1) 09/01/2022    BMI: Followup Plan  02/15/2023    HEMOGLOBIN A1C  12/09/2022    URINE MICROALBUMIN  02/07/2023    Diabetic Foot Exam  03/22/2023    Fall Risk  08/23/2023    Depression Screening  08/23/2023    Urinary Incontinence Screening  08/23/2023    BMI: Adult  08/23/2023    DM Eye Exam  12/02/2023    DXA SCAN  07/01/2024  Osteoporosis Screening  Completed    Pneumococcal Vaccine: 65+ Years  Completed    COVID-19 Vaccine  Completed    HIB Vaccine  Aged Out    Hepatitis B Vaccine  Aged Out    IPV Vaccine  Aged Out    Hepatitis A Vaccine  Aged Out    Meningococcal ACWY Vaccine  Aged Out    HPV Vaccine  Aged Out         Problem List as of 8/23/2022 Reviewed: 8/16/2022  7:59 AM by SHUN Lopes    Abnormal echocardiogram    Adenocarcinoma of colon ([facility] [address] )    Anticoagulated by anticoagulation treatment    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    Cardiomegaly    Chronic obstructive pulmonary disease ([facility] [address] )    Aia 16 DJD(carpometacarpal degenerative joint disease), localized primary, right    Coronary artery disease involving native coronary artery of native heart without angina pectoris    Diffuse nontoxic goiter    Essential hypertension    Feeling anxious    Gastro-esophageal reflux disease without esophagitis    Hematuria, microscopic    Hypothyroidism due to Hashimoto's thyroiditis    Insomnia    Lichen sclerosus et atrophicus    Lichen simplex chronicus    Mitral regurgitation    Mixed hyperlipidemia    MRI safe cardiac pacemaker in situ    Paraesophageal hernia    Paroxysmal atrial fibrillation (HCC)    Primary osteoarthritis involving multiple joints    Restrictive lung disease    Sick sinus syndrome ([facility] [address] )    Stress incontinence in female    Type 2 diabetes mellitus with diabetic neuropathy, without long-term current use of insulin (HCC)    Vitamin D deficiency            Subjective:   Chief Complaint   Patient presents with    Medicare Wellness Visit     Patient is here for 6 month recheck  She had blood work done in June for endocrine    She had her pacer checked a few times    She has no complaints  patient ID: Pollo Quinteros is a [de-identified] y o  female  Patient's past medical history, surgical history, family history, social history, and Tobacco history reviewed with patient       MED LIST WAS REVIEWED AND UPDATED    ROS  As per HPI  Rest of 12 point review of systems negative     Objective:      VITALS:  Wt Readings from Last 3 Encounters:   08/23/22 70 3 kg (155 lb)   08/16/22 71 3 kg (157 lb 3 2 oz)   06/13/22 69 7 kg (153 lb 11 2 oz)     BP Readings from Last 3 Encounters:   08/23/22 110/74   08/16/22 108/70   06/13/22 120/78     Pulse Readings from Last 3 Encounters:   08/23/22 82   08/16/22 92   06/13/22 80     Body mass index is 27 03 kg/m²  Laboratory Results:    All pertinent labs and studies were reviewed with patient during this office visit with highlights of the results contained in this note in the ASSESSMENT AND PLAN section       Physical Exam    Constitutional  Appears healthy, Looks well, Appearance consistent with age    Mental Status  Alert, Oriented, Cooperative, Memory function normal , clean, and reasonable    Neck  No neck mass, No thyromegaly, Good carotid upstrokes bilaterally, trachea midline positive click    Respiratory  Breath sounds very hypo aerated, No rales, No rhonchi, No wheezing, normal palpation    Cardiac   Regular rhythm without ectopy or murmur no S3-S4, no heave lift or thrill to palpation    Vascular  No leg edema, No pedal edema    Muscular skeletal  No clubbing cyanosis , muscle tone normal    Skin  No appreciable rashes or abnormal appearing lesions

## 2022-08-23 NOTE — PROGRESS NOTES
Assessment and Plan:   Up to date  Problem List Items Addressed This Visit    None     Visit Diagnoses     Encounter for screening mammogram for malignant neoplasm of breast    -  Primary    Relevant Orders    Mammo screening bilateral w 3d & cad          Depression Screening and Follow-up Plan: Patient was screened for depression during today's encounter  They screened negative with a PHQ-2 score of 0  Urinary Incontinence Plan of Care: counseling topics discussed: practice Kegel (pelvic floor strengthening) exercises, limit alcohol, caffeine, spicy foods, and acidic foods, limiting fluid intake 3-4 hours before bed, preventing constipation and improving blood sugar control  Preventive health issues were discussed with patient, and age appropriate screening tests were ordered as noted in patient's After Visit Summary  Personalized health advice and appropriate referrals for health education or preventive services given if needed, as noted in patient's After Visit Summary       History of Present Illness:     Patient presents for a Medicare Wellness Visit    HPI   Patient Care Team:  Angeles Pacheco DO as PCP - General  Norma Murphy DO as PCP - Endocrinology (Endocrinology)  Rhetta Gallop, DO Torrie Rhodes, MD Sherryle Mule, MD Quincy Adam, CRNP (Endocrinology)     Review of Systems:     Review of Systems     Problem List:     Patient Active Problem List   Diagnosis    Type 2 diabetes mellitus with diabetic neuropathy, without long-term current use of insulin ([facility] )    Essential hypertension    Coronary artery disease involving native coronary artery of native heart without angina pectoris    Abnormal echocardiogram    Adenocarcinoma of colon ([address] )    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    Cardiomegaly    Diffuse nontoxic goiter    Feeling anxious    Hematuria, microscopic    Mixed hyperlipidemia    Hypothyroidism due to Hashimoto's thyroiditis    Insomnia    Mitral regurgitation    Paraesophageal hernia    Paroxysmal atrial fibrillation (HCC)    Restrictive lung disease    Primary osteoarthritis involving multiple joints    Sick sinus syndrome (HCC)    Vitamin D deficiency    Stress incontinence in female    [facility](carpometacarpal degenerative joint disease), localized primary, right    Gastro-esophageal reflux disease without esophagitis    Lichen simplex chronicus    MRI safe cardiac pacemaker in situ    Anticoagulated by anticoagulation treatment    Chronic obstructive pulmonary disease (HCC)    Lichen sclerosus et atrophicus      Past Medical and Surgical History:     Past Medical History:   Diagnosis Date    A-fib ([facility] [address] )     Abnormal ECG     last assessed: 7/14/2015    Acid reflux     resolved    Anxiety     Pacheco esophagus     Benign neoplasm of sigmoid colon 09/13/2011    next colon 2012    Bronchitis, asthmatic     last assessed: 3/12/2012    Colon cancer ([facility] )     2012- had colon resection    Colon polyp     COPD (chronic obstructive pulmonary disease) ([facility])     questionable    Depression     Diabetes mellitus ([facility] )     on byetta    Dizziness     occ    Esophageal stricture     last assessed: 9/30/2014    High cholesterol     Hypertension     Hypothyroidism     Iron (Fe) deficiency anemia 12/22/2011    iron pill continue    Irritable bowel syndrome     OA (osteoarthritis)     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    Pacemaker     hx SSS    Rash     labia area- uses cream prn    Restrictive lung disease     Seasonal allergies     Skin scarring/fibrosis     fibrotic scar; last assessed: 3/22/2013    MONA (stress urinary incontinence, female)     Urinary frequency     last assessed: 9/10/2012    Wears glasses     reading     Past Surgical History:   Procedure Laterality Date    BREAST EXCISIONAL BIOPSY Right 1990    benign, best guess on year 83 João Jean      does not know type  Dr Minerva Cabrera is She[facility] Dill  COLON SURGERY      resection  removed 18 In     COLONOSCOPY  06/01/2012 June 2018: Adenomatous polyp, colorectal anastomosis, internal hemorrhoids  06/01/2012:  proctosigmoidectomy    ESOPHAGOGASTRODUODENOSCOPY N/A 1/9/2016    Procedure: ESOPHAGOGASTRODUODENOSCOPY (EGD); Surgeon: Ignacio Soliman MD;  [facility];  Service:     HYSTERECTOMY  1978    IA ARTHROPLASTY I-P JT Right 8/16/2018    Procedure: ARTHROPLASTY PIP/FINGER - RIGHT RING;  Surgeon: Mary Chew MD;  [facility];  Service: Orthopedics    IA CYSTOURETHROSCOPY N/A 5/14/2018    Procedure: Jhonathan Schools;  Surgeon: Mohsen Palma MD;  [facility];  Service: UroGynecology           IA LAP, REPAIR PARAESOPHAGEAL HERNIA, INCL FUNDOPLASTY W/ MESH N/A 1/27/2016    Procedure: LAPAROSCOPIC PARAESOPHAGEAL HERNIA REPAIR with mesh; toupet  FUNDOPLICATION ;  Surgeon: Anuradha Ayala MD;  [facility];  Service: Thoracic    IA REPAIR INTERCARP/CARP-METACARP JT Right 1/10/2019    Procedure: Soo Ellis;  Surgeon: Mary Chew MD;  [facility];  Service: Orthopedics    IA SLING OPER STRES INCONTINENCE N/A 5/14/2018    Procedure: INSERTION PUBOVAGINAL SLING (FEMALE); Surgeon: Mohsen Palma MD;  [facility];  Service: UroGynecology           TOTAL ABDOMINAL HYSTERECTOMY      UPPER GASTROINTESTINAL ENDOSCOPY      June 2018:  Irregular Z-line positive for intestinal metaplasia/ Pacheco's, no dysplasia  Gastritis H pylori negative      WRIST TENDON TRANSFER Right 1/10/2019    Procedure: TRANSFER TENDON FLEXOR CARPI RADIALIS FROM FOREARM TO HAND;  Surgeon: Mary Chew MD;  [facility];  Service: Orthopedics      Family History:     Family History   Problem Relation Age of Onset    Heart disease Mother     Diabetes Mother     Asthma Father     Stomach cancer Father         gastrkic cancer    Cancer Paternal Grandmother     Cancer Paternal Grandfather     No Known Problems Brother     Breast cancer Sister 76    Breast cancer Sister 77    Pancreatic cancer Sister 72    No Known Problems Maternal Aunt     No Known Problems Maternal Aunt     No Known Problems Maternal Aunt     No Known Problems Paternal Aunt     No Known Problems Daughter     No Known Problems Maternal Grandmother     No Known Problems Maternal Grandfather     Prostate cancer Paternal Uncle         age unknown    Substance Abuse Neg Hx     Mental illness Neg Hx     Alcohol abuse Neg Hx     Colon cancer Neg Hx     Colon polyps Neg Hx       Social History:     Social History     Socioeconomic History    Marital status: /Civil Union     Spouse name: Not on file    Number of children: Not on file    Years of education: Not on file    Highest education level: Not on file   Occupational History    Not on file   Tobacco Use    Smoking status: Former Smoker     Types: Cigarettes     Quit date:      Years since quittin 6    Smokeless tobacco: Former User     Quit date:    Vaping Use    Vaping Use: Never used   Substance and Sexual Activity    Alcohol use: No    Drug use: No    Sexual activity: Not Currently   Other Topics Concern    Not on file   Social History Narrative    Always uses seat belt    Copy of advanced directive obtained    Drinks coffee-drinks 5 cups a day    Has smoke detectors    Uses safety equipment-seatbelts     Social Determinants of Health     Financial Resource Strain: Not on file   Food Insecurity: Not on file   Transportation Needs: Not on file   Physical Activity: Not on file   Stress: Not on file   Social Connections: Not on file   Intimate Partner Violence: Not on file   Housing Stability: Not on file      Medications and Allergies:     Current Outpatient Medications   Medication Sig Dispense Refill    albuterol (ProAir HFA) 90 mcg/act inhaler Inhale 2 puffs every 6 (six) hours as needed for wheezing or shortness of breath 8 5 g 0    Apple Cider Vinegar 500 MG TABS Take by mouth daily      ascorbic acid (VITAMIN C) 500 mg tablet Take 500 mg by mouth daily      atenolol (TENORMIN) 50 mg tablet TAKE 1 TABLET AT BEDTIME 90 tablet 3    Blood Glucose Monitoring Suppl (OneTouch Verio) w/Device KIT Check blood sugars once a day 1 kit 0    Byetta 10 MCG Pen 10 MCG/0 04ML SOPN INJECT 0 04 ML UNDER THE SKIN TWICE A DAY 7 2 mL 3    Cholecalciferol (VITAMIN D-3) 1000 units CAPS Take 1 capsule by mouth daily      clobetasol (TEMOVATE) 0 05 % cream       Coenzyme Q10 (COQ10) 200 MG CAPS Take 200 mg by mouth daily        Continuous Blood Gluc  (FreeStyle Julian 14 Day [place]) SVETLANA Use 1 Device 4 (four) times a day (before meals and at bedtime) 1 each 0    Continuous Blood Gluc Sensor (FreeStyle Julian 14 Day Sensor) MISC Use 1 application every 14 (fourteen) days 2 each 6    cycloSPORINE (Restasis) 0 05 % ophthalmic emulsion       DULoxetine (CYMBALTA) 60 mg delayed release capsule TAKE 1 CAPSULE DAILY 90 capsule 3    Eliquis 5 MG TAKE 1 TABLET TWICE A  tablet 3    estradiol (ESTRACE VAGINAL) 0 1 mg/g vaginal cream Insert 0 5 g into the vagina 2 (two) times a week Patient is NOT to be using this daily 42 5 g 1    Ferrous Sulfate 27 MG TABS Take 1 tablet by mouth daily      FREESTYLE LITE test strip TEST THREE TIMES A  strip 3    Glucosamine-Chondroitin (MOVE FREE PO) Take by mouth daily      glucose blood (OneTouch Verio) test strip Check blood sugars once a day 100 strip 2    hydrocortisone 2 5 % cream Apply topically 2 (two) times a day 60 g 2    Jardiance 25 MG TABS TAKE 1 TABLET EVERY MORNING 90 tablet 3    Lancets (onetouch ultrasoft) lancets Check blood sugars once a day 100 each 2    levothyroxine 125 mcg tablet TAKE 1 TABLET SIX DAYS A WEEK, NOTHING ON SUNDAY 90 tablet 3    MEGARED OMEGA-3 KRILL OIL PO Take 1 capsule by mouth daily       metFORMIN (GLUCOPHAGE-XR) 500 mg 24 hr tablet Take 2 tablets (1,000 mg total) by mouth 2 (two) times a day with meals 120 tablet 0    metFORMIN (GLUCOPHAGE-XR) 500 mg 24 hr tablet Take 2 tablets (1,000 mg total) by mouth 2 (two) times a day with meals 360 tablet 3    simvastatin (ZOCOR) 20 mg tablet TAKE 2 TABLETS DAILY AT BEDTIME 180 tablet 3    Triamcinolone Acetonide (Nasacort Allergy 24HR) 55 MCG/ACT AERO 2 Act (110 mcg total) into each nostril every morning 16 5 mL 3    ketoconazole (NIZORAL) 2 % cream Apply topically 2 (two) times a day for 14 days 60 g 1     No current facility-administered medications for this visit  Allergies   Allergen Reactions    Fluconazole Dizziness      Immunizations:     Immunization History   Administered Date(s) Administered    COVID-19 MODERNA VACC 0 5 ML IM 01/20/2021, 02/17/2021, 10/20/2021, 11/12/2021    INFLUENZA 09/30/2015, 09/19/2016, 10/02/2017    Influenza Split High Dose Preservative Free IM 09/30/2013, 09/30/2014, 09/30/2015, 09/19/2016, 10/02/2017    Influenza, high dose seasonal 0 7 mL 09/20/2019, 10/05/2020    Influenza, injectable, quadrivalent, preservative free 0 5 mL 10/23/2018, 11/08/2021    Influenza, seasonal, injectable 10/24/2008, 12/08/2009, 09/14/2010, 09/13/2011, 09/10/2012, 09/01/2015, 10/05/2016    Pneumococcal Conjugate 13-Valent 10/29/2015    Pneumococcal Polysaccharide PPV23 12/01/2005, 03/15/2011, 10/01/2015    Zoster 01/07/2014    Zoster Vaccine Recombinant 11/15/2019, 01/30/2020      Health Maintenance:         Topic Date Due    Breast Cancer Screening: Mammogram  08/16/2022    DXA SCAN  07/01/2024         Topic Date Due    Influenza Vaccine (1) 09/01/2022      Medicare Screening Tests and Risk Assessments:     Jeannine Allen is here for her Subsequent Wellness visit  Health Risk Assessment:   Patient rates overall health as good  Patient feels that their physical health rating is same  Patient is satisfied with their life  Eyesight was rated as same  Hearing was rated as same   Patient feels that their emotional and mental health rating is same  Patients states they are never, rarely angry  Patient states they are often unusually tired/fatigued  Pain experienced in the last 7 days has been none  Patient states that she has experienced no weight loss or gain in last 6 months  In the morning     Depression Screening:   PHQ-2 Score: 0      Fall Risk Screening: In the past year, patient has experienced: no history of falling in past year      Urinary Incontinence Screening:   Patient has not leaked urine accidently in the last six months  Home Safety:  Patient does not have trouble with stairs inside or outside of their home  Patient has working smoke alarms and has working carbon monoxide detector  Home safety hazards include: none  Nutrition:   Current diet is Limited junk food and Regular  Medications:   Patient is currently taking over-the-counter supplements  OTC medications include: see medication list  Patient is able to manage medications  Activities of Daily Living (ADLs)/Instrumental Activities of Daily Living (IADLs):   Walk and transfer into and out of bed and chair?: Yes  Dress and groom yourself?: Yes    Bathe or shower yourself?: Yes    Feed yourself?  Yes  Do your laundry/housekeeping?: Yes  Manage your money, pay your bills and track your expenses?: Yes  Make your own meals?: Yes    Do your own shopping?: Yes    Previous Hospitalizations:   Any hospitalizations or ED visits within the last 12 months?: Yes    How many hospitalizations have you had in the last year?: 1-2    Advance Care Planning:   Living will: Yes    Advanced directive: Yes    End of Life Decisions reviewed with patient: Yes      Cognitive Screening:   Provider or family/friend/caregiver concerned regarding cognition?: No    PREVENTIVE SCREENINGS      Cardiovascular Screening:    General: Screening Not Indicated and History Lipid Disorder      Diabetes Screening:     General: Screening Not Indicated and History Diabetes      Colorectal Cancer Screening:     General: History Colorectal Cancer      Breast Cancer Screening:     General: Screening Current      Cervical Cancer Screening:    General: Screening Not Indicated      Osteoporosis Screening:    General: Screening Not Indicated and History Osteoporosis      Abdominal Aortic Aneurysm (AAA) Screening:        General: Screening Current      Lung Cancer Screening:     General: Screening Not Indicated      Hepatitis C Screening:    General: Screening Not Indicated    Screening, Brief Intervention, and Referral to Treatment (SBIRT)    Screening  Typical number of drinks in a day: 0  Typical number of drinks in a week: 0  Interpretation: Low risk drinking behavior  Single Item Drug Screening:  How often have you used an illegal drug (including marijuana) or a prescription medication for non-medical reasons in the past year? never    Single Item Drug Screen Score: 0  Interpretation: Negative screen for possible drug use disorder    Brief Intervention  Alcohol & drug use screenings were reviewed  No concerns regarding substance use disorder identified  Other Counseling Topics:   Regular weightbearing exercise  No exam data present     Physical Exam:     There were no vitals taken for this visit      Physical Exam     Ellen Miller DO

## 2022-08-23 NOTE — PATIENT INSTRUCTIONS
1  Mammogram is on or after 8/17/2022, we have an order for you today    2  Please get fasting blood work in urine with 2 or 3 weeks before you come back in 6 months    We will see you as needed prior otherwise                      Medicare Preventive Visit Patient Instructions  Thank you for completing your Welcome to Medicare Visit or Medicare Annual Wellness Visit today  Your next wellness visit will be due in one year (8/24/2023)  The screening/preventive services that you may require over the next 5-10 years are detailed below  Some tests may not apply to you based off risk factors and/or age  Screening tests ordered at today's visit but not completed yet may show as past due  Also, please note that scanned in results may not display below  Preventive Screenings:  Service Recommendations Previous Testing/Comments   Colorectal Cancer Screening  * Colonoscopy    * Fecal Occult Blood Test (FOBT)/Fecal Immunochemical Test (FIT)  * Fecal DNA/Cologuard Test  * Flexible Sigmoidoscopy Age: 39-70 years old   Colonoscopy: every 10 years (may be performed more frequently if at higher risk)  OR  FOBT/FIT: every 1 year  OR  Cologuard: every 3 years  OR  Sigmoidoscopy: every 5 years  Screening may be recommended earlier than age 39 if at higher risk for colorectal cancer  Also, an individualized decision between you and your healthcare provider will decide whether screening between the ages of 74-80 would be appropriate  Colonoscopy: 03/23/2021  FOBT/FIT: Not on file  Cologuard: Not on file  Sigmoidoscopy: Not on file    History Colorectal Cancer     Breast Cancer Screening Age: 37 years old  Frequency: every 1-2 years  Not required if history of left and right mastectomy Mammogram: 08/16/2021    Screening Current   Cervical Cancer Screening Between the ages of 21-29, pap smear recommended once every 3 years  Between the ages of 33-67, can perform pap smear with HPV co-testing every 5 years     Recommendations may differ for women with a history of total hysterectomy, cervical cancer, or abnormal pap smears in past  Pap Smear: 05/21/2021    Screening Not Indicated   Hepatitis C Screening Once for adults born between 1945 and 1965  More frequently in patients at high risk for Hepatitis C Hep C Antibody: Not on file        Diabetes Screening 1-2 times per year if you're at risk for diabetes or have pre-diabetes Fasting glucose: 172 mg/dL (6/9/2022)  A1C: 7 7 % (6/9/2022)  Screening Not Indicated  History Diabetes   Cholesterol Screening Once every 5 years if you don't have a lipid disorder  May order more often based on risk factors  Lipid panel: 02/07/2022    Screening Not Indicated  History Lipid Disorder     Other Preventive Screenings Covered by Medicare:  Abdominal Aortic Aneurysm (AAA) Screening: covered once if your at risk  You're considered to be at risk if you have a family history of AAA  Lung Cancer Screening: covers low dose CT scan once per year if you meet all of the following conditions: (1) Age 50-69; (2) No signs or symptoms of lung cancer; (3) Current smoker or have quit smoking within the last 15 years; (4) You have a tobacco smoking history of at least 20 pack years (packs per day multiplied by number of years you smoked); (5) You get a written order from a healthcare provider  Glaucoma Screening: covered annually if you're considered high risk: (1) You have diabetes OR (2) Family history of glaucoma OR (3)  aged 48 and older OR (3)  American aged 72 and older  Osteoporosis Screening: covered every 2 years if you meet one of the following conditions: (1) You're estrogen deficient and at risk for osteoporosis based off medical history and other findings; (2) Have a vertebral abnormality; (3) On glucocorticoid therapy for more than 3 months; (4) Have primary hyperparathyroidism; (5) On osteoporosis medications and need to assess response to drug therapy     Last bone density test (DXA Scan): 07/01/2021  HIV Screening: covered annually if you're between the age of 12-76  Also covered annually if you are younger than 13 and older than 72 with risk factors for HIV infection  For pregnant patients, it is covered up to 3 times per pregnancy  Immunizations:  Immunization Recommendations   Influenza Vaccine Annual influenza vaccination during flu season is recommended for all persons aged >= 6 months who do not have contraindications   Pneumococcal Vaccine   * Pneumococcal conjugate vaccine = PCV13 (Prevnar 13), PCV15 (Vaxneuvance), PCV20 (Prevnar 20)  * Pneumococcal polysaccharide vaccine = PPSV23 (Pneumovax) Adults 25-60 years old: 1-3 doses may be recommended based on certain risk factors  Adults 72 years old: 1-2 doses may be recommended based off what pneumonia vaccine you previously received   Hepatitis B Vaccine 3 dose series if at intermediate or high risk (ex: diabetes, end stage renal disease, liver disease)   Tetanus (Td) Vaccine - COST NOT COVERED BY MEDICARE PART B Following completion of primary series, a booster dose should be given every 10 years to maintain immunity against tetanus  Td may also be given as tetanus wound prophylaxis  Tdap Vaccine - COST NOT COVERED BY MEDICARE PART B Recommended at least once for all adults  For pregnant patients, recommended with each pregnancy  Shingles Vaccine (Shingrix) - COST NOT COVERED BY MEDICARE PART B  2 shot series recommended in those aged 48 and above     Health Maintenance Due:      Topic Date Due    Breast Cancer Screening: Mammogram  08/16/2022    DXA SCAN  07/01/2024     Immunizations Due:      Topic Date Due    Influenza Vaccine (1) 09/01/2022     Advance Directives   What are advance directives? Advance directives are legal documents that state your wishes and plans for medical care  These plans are made ahead of time in case you lose your ability to make decisions for yourself   Advance directives can apply to any medical decision, such as the treatments you want, and if you want to donate organs  What are the types of advance directives? There are many types of advance directives, and each state has rules about how to use them  You may choose a combination of any of the following:  Living will: This is a written record of the treatment you want  You can also choose which treatments you do not want, which to limit, and which to stop at a certain time  This includes surgery, medicine, IV fluid, and tube feedings  [facility] power of  for healthcare [facility]): This is a written record that states who you want to make healthcare choices for you when you are unable to make them for yourself  This person, called a proxy, is usually a family member or a friend  You may choose more than 1 proxy  Do not resuscitate (DNR) order:  A DNR order is used in case your heart stops beating or you stop breathing  It is a request not to have certain forms of treatment, such as CPR  A DNR order may be included in other types of advance directives  Medical directive: This covers the care that you want if you are in a coma, near death, or unable to make decisions for yourself  You can list the treatments you want for each condition  Treatment may include pain medicine, surgery, blood transfusions, dialysis, IV or tube feedings, and a ventilator (breathing machine)  Values history: This document has questions about your views, beliefs, and how you feel and think about life  This information can help others choose the care that you would choose  Why are advance directives important? An advance directive helps you control your care  Although spoken wishes may be used, it is better to have your wishes written down  Spoken wishes can be misunderstood, or not followed  Treatments may be given even if you do not want them  An advance directive may make it easier for your family to make difficult choices about your care     Weight Management   Why it is important to manage your weight:  Being overweight increases your risk of health conditions such as heart disease, high blood pressure, type 2 diabetes, and certain types of cancer  It can also increase your risk for osteoarthritis, sleep apnea, and other respiratory problems  Aim for a slow, steady weight loss  Even a small amount of weight loss can lower your risk of health problems  How to lose weight safely:  A safe and healthy way to lose weight is to eat fewer calories and get regular exercise  You can lose up about 1 pound a week by decreasing the number of calories you eat by 500 calories each day  Healthy meal plan for weight management:  A healthy meal plan includes a variety of foods, contains fewer calories, and helps you stay healthy  A healthy meal plan includes the following:  Eat whole-grain foods more often  A healthy meal plan should contain fiber  Fiber is the part of grains, fruits, and vegetables that is not broken down by your body  Whole-grain foods are healthy and provide extra fiber in your diet  Some examples of whole-grain foods are whole-wheat breads and pastas, oatmeal, brown rice, and bulgur  Eat a variety of vegetables every day  Include dark, leafy greens such as spinach, kale, pam greens, and mustard greens  Eat yellow and orange vegetables such as carrots, sweet potatoes, and winter squash  Eat a variety of fruits every day  Choose fresh or canned fruit (canned in its own juice or light syrup) instead of juice  Fruit juice has very little or no fiber  Eat low-fat dairy foods  Drink fat-free (skim) milk or 1% milk  Eat fat-free yogurt and low-fat cottage cheese  Try low-fat cheeses such as mozzarella and other reduced-fat cheeses  Choose meat and other protein foods that are low in fat  Choose beans or other legumes such as split peas or lentils  Choose fish, skinless poultry (chicken or turkey), or lean cuts of red meat (beef or pork)   Before you cook meat or poultry, cut off any visible fat  Use less fat and oil  Try baking foods instead of frying them  Add less fat, such as margarine, sour cream, regular salad dressing and mayonnaise to foods  Eat fewer high-fat foods  Some examples of high-fat foods include french fries, doughnuts, ice cream, and cakes  Eat fewer sweets  Limit foods and drinks that are high in sugar  This includes candy, cookies, regular soda, and sweetened drinks  Exercise:  Exercise at least 30 minutes per day on most days of the week  Some examples of exercise include walking, biking, dancing, and swimming  You can also fit in more physical activity by taking the stairs instead of the elevator or parking farther away from stores  Ask your healthcare provider about the best exercise plan for you  © Copyright [employer] 2018 Information is for End User's use only and may not be sold, redistributed or otherwise used for commercial purposes   All illustrations and images included in CareNotes® are the copyrighted property of [employer]  or [address] [place] [address]

## 2022-08-31 ENCOUNTER — HOSPITAL ENCOUNTER (OUTPATIENT)
Dept: MAMMOGRAPHY | Facility: CLINIC | Age: 82
Discharge: HOME | End: 2022-08-31
Payer: MEDICARE

## 2022-08-31 VITALS — BODY MASS INDEX: 27.46 KG/M2 | HEIGHT: 63 IN

## 2022-08-31 DIAGNOSIS — Z12.31 ENCOUNTER FOR SCREENING MAMMOGRAM FOR MALIGNANT NEOPLASM OF BREAST: ICD-10-CM

## 2022-08-31 PROCEDURE — 77067 SCR MAMMO BI INCL CAD: CPT

## 2022-08-31 PROCEDURE — 77063 BREAST TOMOSYNTHESIS BI: CPT

## 2022-09-09 ENCOUNTER — OFFICE VISIT (OUTPATIENT)
Dept: GYNECOLOGY | Facility: CLINIC | Age: 82
End: 2022-09-09
Payer: MEDICARE

## 2022-09-09 VITALS
BODY MASS INDEX: 28.02 KG/M2 | SYSTOLIC BLOOD PRESSURE: 114 MMHG | DIASTOLIC BLOOD PRESSURE: 72 MMHG | WEIGHT: 158.2 LBS | HEART RATE: 84 BPM

## 2022-09-09 DIAGNOSIS — L30.4 INTERTRIGO: ICD-10-CM

## 2022-09-09 PROCEDURE — 99213 OFFICE O/P EST LOW 20 MIN: CPT | Performed by: OBSTETRICS & GYNECOLOGY

## 2022-09-09 RX ORDER — KETOCONAZOLE 20 MG/G
CREAM TOPICAL 2 TIMES DAILY
Qty: 60 G | Refills: 1 | Status: SHIPPED | OUTPATIENT
Start: 2022-09-09 | End: 2022-09-23

## 2022-09-09 NOTE — PROGRESS NOTES
Assessment/Plan:    Patient advised to use the ketoconazole/hydrocortisone combination twice a day for 2 weeks  Blood sugar control reviewed  RTO as needed  Diagnoses and all orders for this visit:    Intertrigo  -     hydrocortisone 2 5 % cream; Apply topically 2 (two) times a day  -     ketoconazole (NIZORAL) 2 % cream; Apply topically 2 (two) times a day for 14 days        Subjective:      Patient ID: Janeth Marte is a [de-identified] y o  female  Patient presents with a vulvar lump  She noticed it 2 weeks ago, no pain, discharge  She has a hx of chronic vulvitis and has a flare currently  Has not had a flare for over 6 months  Of note, recent Hgb A1c increased from 7 1 to 7 7  Practices conservative personal hygiene  The following portions of the patient's history were reviewed and updated as appropriate: allergies, current medications, past family history, past medical history, past social history, past surgical history and problem list     Review of Systems   Constitutional: Negative  Respiratory: Negative  Cardiovascular: Negative  Gastrointestinal: Negative  Endocrine: Negative  Genitourinary: Negative for dysuria, frequency, pelvic pain, urgency, vaginal bleeding, vaginal discharge and vaginal pain  Musculoskeletal: Negative  Skin: Negative  Neurological: Negative  Psychiatric/Behavioral: Negative  Objective:      /72   Pulse 84   Wt 71 8 kg (158 lb 3 2 oz)   BMI 28 02 kg/m²          Physical Exam  Vitals and nursing note reviewed  Exam conducted with a chaperone present  Constitutional:       Appearance: Normal appearance  HENT:      Head: Normocephalic and atraumatic  Pulmonary:      Effort: Pulmonary effort is normal    Genitourinary:     Pubic Area: Rash (erythema diffusesly throughout vulva) present  Musculoskeletal:         General: Normal range of motion  Cervical back: Normal range of motion  Skin:     General: Skin is warm and dry  Neurological:      Mental Status: She is alert and oriented to person, place, and time  Psychiatric:         Mood and Affect: Mood normal          Behavior: Behavior normal          Thought Content:  Thought content normal          Judgment: Judgment normal

## 2022-09-26 ENCOUNTER — REMOTE DEVICE CLINIC VISIT (OUTPATIENT)
Dept: CARDIOLOGY CLINIC | Facility: CLINIC | Age: 82
End: 2022-09-26
Payer: MEDICARE

## 2022-09-26 DIAGNOSIS — Z95.0 CARDIAC PACEMAKER IN SITU: Primary | ICD-10-CM

## 2022-09-26 PROCEDURE — 93296 REM INTERROG EVL PM/IDS: CPT | Performed by: INTERNAL MEDICINE

## 2022-09-26 PROCEDURE — 93294 REM INTERROG EVL PM/LDLS PM: CPT | Performed by: INTERNAL MEDICINE

## 2022-09-26 NOTE — PROGRESS NOTES
Results for orders placed or performed in visit on 09/26/22   Cardiac EP device report    Narrative    MDT-DUAL CHAMBER PPM/ ACTIVE SYSTEM IS MRI CONDITIONAL  CARELINK TRANSMISSION: BATTERY VOLTAGE ADEQUATE (2 YRS)  AP-89%, -2%  ALL AVAILABLE LEAD PARAMETERS WITHIN NORMAL LIMITS  1 NSVT EPISODE FOR 20 BEATS, AVG CL~370MS  EF-55% (ECHO 3/7/19)  6 AF EPISODES MAX DURATION 5 2 HRS  HX: PAF & ON ELIQUIS & ATENOLOL  AF BURDEN-4 4%  NORMAL DEVICE FUNCTION   GV

## 2022-10-23 ENCOUNTER — APPOINTMENT (OUTPATIENT)
Dept: RADIOLOGY | Facility: HOSPITAL | Age: 82
End: 2022-10-23
Payer: MEDICARE

## 2022-10-23 ENCOUNTER — APPOINTMENT (EMERGENCY)
Dept: RADIOLOGY | Facility: HOSPITAL | Age: 82
End: 2022-10-23
Payer: MEDICARE

## 2022-10-23 ENCOUNTER — HOSPITAL ENCOUNTER (EMERGENCY)
Facility: HOSPITAL | Age: 82
Discharge: HOME | End: 2022-10-24
Attending: EMERGENCY MEDICINE
Payer: MEDICARE

## 2022-10-23 VITALS
RESPIRATION RATE: 20 BRPM | BODY MASS INDEX: 28.17 KG/M2 | WEIGHT: 159 LBS | HEART RATE: 73 BPM | OXYGEN SATURATION: 96 % | TEMPERATURE: 98 F | DIASTOLIC BLOOD PRESSURE: 88 MMHG | HEIGHT: 63 IN | SYSTOLIC BLOOD PRESSURE: 132 MMHG

## 2022-10-23 DIAGNOSIS — S92.323A: Primary | ICD-10-CM

## 2022-10-23 PROCEDURE — 73610 X-RAY EXAM OF ANKLE: CPT

## 2022-10-23 PROCEDURE — 99284 EMERGENCY DEPT VISIT MOD MDM: CPT

## 2022-10-23 PROCEDURE — 73630 X-RAY EXAM OF FOOT: CPT

## 2022-10-23 RX ORDER — ACETAMINOPHEN 325 MG/1
650 TABLET ORAL ONCE
Status: COMPLETED | OUTPATIENT
Start: 2022-10-23 | End: 2022-10-24

## 2022-10-24 RX ADMIN — ACETAMINOPHEN 650 MG: 325 TABLET, FILM COATED ORAL at 00:00

## 2022-10-24 NOTE — ED PROVIDER NOTES
History  Chief Complaint   Patient presents with   • Foot Injury     L foot pain  Pt tripped on a folded rug and injured left foot  Denies head injury, denies LOC, +thinners  Patient is an 81 YOF presents to the ED for evaluation of left foot pain following a fall from 4 hours prior  Patient states she was in her kitchen and accidentally tripped on a rug causing her to fall over onto her left ankle  She states she has been able to walk since the incident but has been doing [employer] and has increased pain while walking in her foot  She has not tried anything for the pain  She denies any loss of consciousness or head strike, is on eliquis  She states that on the way down she did hit her lower back on a door frame, denies any pain in her lower back at this time  She states her pain is currently localized to her left foot only, denies any left shin pain, knee pain, upper leg pain, or left hip pain  She denies any lightheadedness, headaches, vision changes, chest pain, SOB, abdominal pain, nausea, vomiting, neck pain, or back pain  She denies any other acute concerns or complaints at this time  Prior to Admission Medications   Prescriptions Last Dose Informant Patient Reported? Taking?    Apple Cider Vinegar 500 MG TABS  Self Yes No   Sig: Take by mouth daily   Blood Glucose Monitoring Suppl (OneTouch Verio) w/Device KIT  Self No No   Sig: Check blood sugars once a day   Byetta 10 MCG Pen 10 MCG/0 04ML SOPN  Self No No   Sig: INJECT 0 04 ML UNDER THE SKIN TWICE A DAY   Cholecalciferol (VITAMIN D-3) 1000 units CAPS  Self Yes No   Sig: Take 1 capsule by mouth daily   Coenzyme Q10 (COQ10) 200 MG CAPS  Self Yes No   Sig: Take 200 mg by mouth daily     Continuous Blood Gluc  (FreeStyle Julian 14 Day Euless) SVETLANA  Self No No   Sig: Use 1 Device 4 (four) times a day (before meals and at bedtime)   Continuous Blood Gluc Sensor (FreeStyle Julian 14 Day Sensor) MISC  Self No No   Sig: Use 1 application every 14 (fourteen) days   DULoxetine (CYMBALTA) 60 mg delayed release capsule  Self No No   Sig: TAKE 1 CAPSULE DAILY   Eliquis 5 MG  Self No No   Sig: TAKE 1 TABLET TWICE A DAY   FREESTYLE LITE test strip  Self No No   Sig: TEST THREE TIMES A DAY   Ferrous Sulfate 27 MG TABS  Self Yes No   Sig: Take 1 tablet by mouth daily   Glucosamine-Chondroitin (MOVE FREE PO)  Self Yes No   Sig: Take by mouth daily   Jardiance 25 MG TABS  Self No No   Sig: TAKE 1 TABLET EVERY MORNING   Lancets (onetouch ultrasoft) lancets  Self No No   Sig: Check blood sugars once a day   MEGARED OMEGA-3 KRILL OIL PO  Self Yes No   Sig: Take 1 capsule by mouth daily    Triamcinolone Acetonide (Nasacort Allergy 24HR) 55 MCG/ACT AERO  Self No No   Si Act (110 mcg total) into each nostril every morning   albuterol (ProAir HFA) 90 mcg/act inhaler  Self No No   Sig: Inhale 2 puffs every 6 (six) hours as needed for wheezing or shortness of breath   ascorbic acid (VITAMIN C) 500 mg tablet  Self Yes No   Sig: Take 500 mg by mouth daily   atenolol (TENORMIN) 50 mg tablet  Self No No   Sig: TAKE 1 TABLET AT BEDTIME   clobetasol (TEMOVATE) 0 05 % cream  Self Yes No   cycloSPORINE (Restasis) 0 05 % ophthalmic emulsion  Self Yes No   estradiol (ESTRACE VAGINAL) 0 1 mg/g vaginal cream  Self No No   Sig: Insert 0 5 g into the vagina 2 (two) times a week Patient is NOT to be using this daily   glucose blood (OneTouch Verio) test strip  Self No No   Sig: Check blood sugars once a day   hydrocortisone 2 5 % cream   No No   Sig: Apply topically 2 (two) times a day   ketoconazole (NIZORAL) 2 % cream   No No   Sig: Apply topically 2 (two) times a day for 14 days   levothyroxine 125 mcg tablet  Self No No   Sig: TAKE 1 TABLET SIX DAYS A WEEK, NOTHING ON    metFORMIN (GLUCOPHAGE-XR) 500 mg 24 hr tablet  Self No No   Sig: Take 2 tablets (1,000 mg total) by mouth 2 (two) times a day with meals   Patient not taking: Reported on 2022 metFORMIN (GLUCOPHAGE-XR) 500 mg 24 hr tablet  Self No No   Sig: Take 2 tablets (1,000 mg total) by mouth 2 (two) times a day with meals   simvastatin (ZOCOR) 20 mg tablet  Self No No   Sig: TAKE 2 TABLETS DAILY AT BEDTIME      Facility-Administered Medications: None       Past Medical History:   Diagnosis Date   • A-fib (HCC)    • Abnormal ECG     last assessed: 2015   • Acid reflux     resolved   • Anxiety    • Pacheco esophagus    • Benign neoplasm of sigmoid colon 2011    next colon    • Bronchitis, asthmatic     last assessed: 3/12/2012   • Colon cancer ([facility] )     2012- had colon resection   • Colon polyp    • COPD (chronic obstructive pulmonary disease) ([facility])     questionable   • Depression    • Diabetes mellitus ([facility] )     on byetta   • Dizziness     occ   • Esophageal stricture     last assessed: 2014   • High cholesterol    • Hypertension    • Hypothyroidism    • Iron (Fe) deficiency anemia 2011    iron pill continue   • Irritable bowel syndrome    • OA (osteoarthritis)    • Organoaxial gastric volvulus 2016   • Pacemaker     hx SSS   • Rash     labia area- uses cream prn   • Restrictive lung disease    • Seasonal allergies    • Skin scarring/fibrosis     fibrotic scar; last assessed: 3/22/2013   • MONA (stress urinary incontinence, female)    • Urinary frequency     last assessed: 9/10/2012   • Wears glasses     reading       Past Surgical History:   Procedure Laterality Date   • BREAST EXCISIONAL BIOPSY Right     benign, best guess on year   • [address]      does not know type  Dr Tate Bell is cariologist   •  SECTION     • COLON SURGERY      resection  removed 18 In    • COLONOSCOPY  2012: Adenomatous polyp, colorectal anastomosis, internal hemorrhoids  2012:  proctosigmoidectomy   • ESOPHAGOGASTRODUODENOSCOPY N/A 2016    Procedure: ESOPHAGOGASTRODUODENOSCOPY (EGD);   Surgeon: Erick Jimenez MD;  [facility]: [facility]; Service:    • HYSTERECTOMY  1978   • OK ARTHROPLASTY I-P JT Right 8/16/2018    Procedure: ARTHROPLASTY PIP/FINGER - RIGHT RING;  Surgeon: Carla Toro MD;  Location: [facility];  Service: Orthopedics   • OK CYSTOURETHROSCOPY N/A 5/14/2018    Procedure: Digna Emily;  Surgeon: Emir tSevens MD;  [facility];  Service: UroGynecology          • OK LAP, REPAIR PARAESOPHAGEAL HERNIA, INCL FUNDOPLASTY W/ MESH N/A 1/27/2016    Procedure: LAPAROSCOPIC PARAESOPHAGEAL HERNIA REPAIR with mesh; toupet  FUNDOPLICATION ;  Surgeon: Rick Jean-Baptiste MD;  [facility];  Service: Thoracic   • OK REPAIR INTERCARP/CARP-METACARP JT Right 1/10/2019    Procedure: Reynaldo Murdock;  Surgeon: Carla Toro MD;  [facility];  Service: Orthopedics   • OK SLING OPER STRES INCONTINENCE N/A 5/14/2018    Procedure: INSERTION PUBOVAGINAL SLING (FEMALE); Surgeon: Emir Stevens MD;  [facility];  Service: UroGynecology          • TOTAL ABDOMINAL HYSTERECTOMY     • UPPER GASTROINTESTINAL ENDOSCOPY      June 2018:  Irregular Z-line positive for intestinal metaplasia/ Pacheco's, no dysplasia  Gastritis H pylori negative     • WRIST TENDON TRANSFER Right 1/10/2019    Procedure: TRANSFER TENDON FLEXOR CARPI RADIALIS FROM FOREARM TO HAND;  Surgeon: Carla Toro MD;  [facility];  Service: Orthopedics       Family History   Problem Relation Age of Onset   • Heart disease Mother    • Diabetes Mother    • Asthma Father    • Stomach cancer Father         gastrkic cancer   • Cancer Paternal Grandmother    • Cancer Paternal Grandfather    • No Known Problems Brother    • Breast cancer Sister 76   • Breast cancer Sister 77   • Pancreatic cancer Sister 72   • No Known Problems Maternal Aunt    • No Known Problems Maternal Aunt    • No Known Problems Maternal Aunt    • No Known Problems Paternal Aunt    • No Known Problems Daughter    • No Known Problems Maternal Grandmother    • No Known Problems Maternal Grandfather    • Prostate cancer Paternal Uncle         age unknown   • Substance Abuse Neg Hx    • Mental illness Neg Hx    • Alcohol abuse Neg Hx    • Colon cancer Neg Hx    • Colon polyps Neg Hx      I have reviewed and agree with the history as documented  E-Cigarette/Vaping   • E-Cigarette Use Never User      E-Cigarette/Vaping Substances   • Nicotine No    • THC No    • CBD No    • Flavoring No    • Other No    • Unknown No      Social History     Tobacco Use   • Smoking status: Former Smoker     Types: Cigarettes     Quit date:      Years since quittin 8   • Smokeless tobacco: Former User     Quit date:    Vaping Use   • Vaping Use: Never used   Substance Use Topics   • Alcohol use: No   • Drug use: No       Review of Systems   Eyes: Negative for photophobia and visual disturbance  Respiratory: Negative for shortness of breath  Cardiovascular: Negative for chest pain  Gastrointestinal: Negative for abdominal pain, nausea, rectal pain and vomiting  Genitourinary: Negative for difficulty urinating, dysuria and flank pain  Musculoskeletal: Positive for arthralgias (left foot pain)  Negative for back pain, neck pain and neck stiffness  Skin: Negative for wound  Neurological: Negative for dizziness, syncope, weakness, light-headedness and headaches  All other systems reviewed and are negative  Physical Exam  Physical Exam  Vitals and nursing note reviewed  Constitutional:       General: She is not in acute distress  Appearance: She is well-developed  She is not ill-appearing  HENT:      Head: Normocephalic and atraumatic  Jaw: There is normal jaw occlusion  Eyes:      Extraocular Movements: Extraocular movements intact  Conjunctiva/sclera: Conjunctivae normal       Pupils: Pupils are equal, round, and reactive to light  Cardiovascular:      Rate and Rhythm: Normal rate and regular rhythm  Heart sounds: No murmur heard    Pulmonary: Effort: Pulmonary effort is normal  No respiratory distress  Breath sounds: Normal breath sounds  No decreased breath sounds, wheezing, rhonchi or rales  Chest:      Chest wall: No tenderness  Abdominal:      Palpations: Abdomen is soft  Tenderness: There is no abdominal tenderness  There is no right CVA tenderness or left CVA tenderness  Musculoskeletal:      Right shoulder: Normal       Left shoulder: Normal       Right upper arm: Normal       Left upper arm: Normal       Right elbow: Normal       Left elbow: Normal       Right forearm: Normal       Left forearm: Normal       Right wrist: Normal       Left wrist: Normal       Right hand: Normal       Left hand: Normal       Cervical back: Normal range of motion and neck supple  No swelling, deformity, rigidity or tenderness  Normal range of motion  Thoracic back: No swelling, deformity or tenderness  Normal range of motion  Lumbar back: No swelling, deformity or tenderness  Normal range of motion  Right hip: No deformity or tenderness  Normal range of motion  Normal strength  Left hip: No deformity or tenderness  Normal range of motion  Normal strength  Right upper leg: No swelling, deformity or tenderness  Left upper leg: No swelling, deformity or tenderness  Right knee: No swelling, deformity or effusion  Normal range of motion  No tenderness  Left knee: No swelling, deformity or effusion  Normal range of motion  No tenderness  Right lower leg: No swelling or tenderness  No edema  Left lower leg: No swelling or tenderness  No edema  Right ankle: No swelling or deformity  No tenderness  Normal range of motion  Right Achilles Tendon: Normal       Left ankle: Tenderness present over the lateral malleolus  Normal range of motion  Left Achilles Tendon: Normal       Right foot: Normal capillary refill  No swelling or crepitus  Normal pulse  Left foot: Normal capillary refill  Swelling and tenderness present  No crepitus  Normal pulse  Legs:       Comments: Evaluation of left foot shows diffuse tenderness over mid foot  Patient does also have mild tenderness over lateral malleolus  Range of motion intact in ankle, no signs of crepitus of left ankle  Posterior tibial pulses and dorsal pedal pulse 2 +  Distal sensation intact in foot and toes  Cap refill less than 2 seconds  Skin:     General: Skin is warm and dry  Neurological:      General: No focal deficit present  Mental Status: She is alert and oriented to person, place, and time  Psychiatric:         Mood and Affect: Mood normal          Behavior: Behavior normal          Vital Signs  ED Triage Vitals [10/23/22 1826]   Temperature Pulse Respirations Blood Pressure SpO2   98 °F (36 7 °C) 73 20 132/88 96 %      Temp Source Heart Rate Source Patient Position - Orthostatic VS BP Location FiO2 (%)   Temporal Monitor Sitting Left arm --      Pain Score       10 - Worst Possible Pain           Vitals:    10/23/22 1826   BP: 132/88   Pulse: 73   Patient Position - Orthostatic VS: Sitting         Visual Acuity      ED Medications  Medications   acetaminophen (TYLENOL) tablet 650 mg (has no administration in time range)       Diagnostic Studies  Results Reviewed     None                 XR ankle 3+ views LEFT   ED Interpretation by Emiliano Jorge PA-C (10/23 2327)   ED interpretation: No acute fracture or dislocation  XR foot 3+ views LEFT   Final Result by Kina Edwards MD (10/23 2231)      Findings suspicious for a fracture through the base of the 2nd metacarpal seen best along the medial aspect  Correlate with clinical findings/point tenderness since evaluation is limited by overlapping structures in this region  Marked for immediate notification in Epic              Workstation performed: [id number]                    Procedures  Procedures         ED Course                               SBIRT 20yo+ Flowsheet Row Most Recent Value   SBIRT (25 yo +)    In order to provide better care to our patients, we are screening all of our patients for alcohol and drug use  Would it be okay to ask you these screening questions? No Filed at: 10/23/2022 8656                    [facility]  Number of Diagnoses or Management Options  Closed fracture of 2nd metatarsal: new and requires workup  Diagnosis management comments: Patient presents to the ED for evaluation of left foot pain following a fall  Denies any other injuries at this time  Final read of x-ray suspicious for fracture at base of 2nd metatarsal   ED interpretation of ankle x-ray shows no acute fracture or dislocation  Patient given surgical shoe in ED and crutches for comfort  States she currently follows with Podiatry, Dr Cj Ram  Advised to call his office in the morning for further evaluation and management  Patient advised to follow RICE protocol  Patient given strict ED return precautions  Patient verbalizes understanding and agreement plan  Amount and/or Complexity of Data Reviewed  Tests in the radiology section of CPT®: ordered and reviewed  Discuss the patient with other providers: yes    Patient Progress  Patient progress: stable      Disposition  Final diagnoses:   Closed fracture of 2nd metatarsal     Time reflects when diagnosis was documented in both MDM as applicable and the Disposition within this note     Time User Action Codes Description Comment    10/23/2022 11:28 PM Michelle Edward Add [[id number]] Closed fracture of 2nd metatarsal       ED Disposition     ED Disposition   Discharge    Condition   Stable    Date/Time   Sun Oct 23, 2022 11:35 PM    Comment   Megan Yadav discharge to home/self care                 Follow-up Information     Follow up With Specialties Details Why Contact Info    Suzanne Jamil DPM Podiatry, Wound Care Schedule an appointment as soon as possible for a visit  For re-check [address] [facility] [place] [facility] [address]  [phone]            Patient's Medications   Discharge Prescriptions    No medications on file       No discharge procedures on file      PDMP Review     None          ED Provider  Electronically Signed by           Jassi Bae PA-C  10/23/22 6069

## 2022-10-24 NOTE — DISCHARGE INSTRUCTIONS
Follow-up with Dr Percy Gruber for further evaluation and management  Return to the ED if your symptoms worsen

## 2022-10-28 ENCOUNTER — IMMUNIZATIONS (OUTPATIENT)
Dept: FAMILY MEDICINE CLINIC | Facility: CLINIC | Age: 82
End: 2022-10-28

## 2022-10-28 DIAGNOSIS — Z23 ENCOUNTER FOR IMMUNIZATION: Primary | ICD-10-CM

## 2022-11-07 NOTE — PROGRESS NOTES
EPS Progress Note - Katia Carvalho 81 y o  female MRN: [id number]           ASSESSMENT:  1  Essential hypertension  POCT ECG    atenolol (TENORMIN) 25 mg tablet   2  Paroxysmal atrial fibrillation (HCC)  POCT ECG   3  Cardiac pacemaker in situ     4  Mitral valve insufficiency, unspecified etiology     5  MRI safe cardiac pacemaker in situ     6  Anticoagulated by anticoagulation treatment     7  Accident due to mechanical fall without injury, initial encounter         Pacemaker interrogated today:MDT-DUAL [address] OFFICE  BATTERY VOLTAGE ADEQUATE (2 YRS)  AP-84%, -3%  ALL LEAD PARAMETERS WITHIN NORMAL LIMITS  1 SVT-AF EPISODE @ 167 BPM LASTING 27 SEC  45 NEW AF EPISODES MAX DURATION 38 6 HRS  HX: PAF & ON ELIQUIS & ATENOLOL  AF BURDEN-7 6%  PT TO SEE DR COCHRAN TODAY  NORMAL DEVICE FUNCTION  GV       PLAN:    1  Essential Hypertension- controlled probably experiencing orthostatic hypotension therefore I am decreasing her atenolol to 25 mg daily    2  Mitral Regurgitation no murmur appreciated today 2019 echo showed trace only    3  Atrial Fibrillation-  paroxysmal tolerating anticoagulant    4  Recent mechanical fall with foot fracture she did not pass out    5  Pacemaker in [address] working appropriately       HPI:     Joel Marie presents to the office today for follow up visit  She is being seen same day as her , Jd Miller  Her only complaint today his with changing position when she gets up too fast she gets lightheaded/dizzy  She had a fall on October 23rd but this was mechanical fall she did not have syncope she simply slipped on the carpet unfortunately she broke her left leg  Otherwise she is having no chest pain shortness of breath lightheadedness or dizziness all other 12 point review of systems negative    As stated in my previous note form 1/12/22, interim history patient presents to the office today with no complaints  She said she feels good  Patient gets shortness of breath when walking up the steps and just a little bit when walking flat  Patient feels this is nothing to worry about just wanted to inform  Patient said the only pains she feels in her body is her arthritis      Patient denies chest pain,Headache,Double vision, palpitations, dizziness, lightheadedness, fatigue and syncope      ROS:   lightheadedness and dizziness with change in position only all other 12 point ROS negative      Objective:     Vitals: /72 (BP Location: Left arm, Patient Position: Sitting, Cuff Size: Adult)   Pulse 65   Ht 5' 3" (1 6 m)   Wt 72 1 kg (159 lb)   BMI 28 17 kg/m²        Physical Exam:    GEN: Sindhu A Genoe appears well, alert and oriented x 3, pleasant and cooperative   HEENT: pupils equal, round, and reactive to light; extraocular muscles intact  NECK: supple, no carotid bruits   HEART: regular rhythm, normal S1 and S2, 2/6 holosystolic murmur, clicks, gallops or rubs   LUNGS: clear to auscultation bilaterally; no wheezes, rales, or rhonchi   ABDOMEN: normal bowel sounds, soft, no tenderness, no distention  EXTREMITIES: peripheral pulses normal; no clubbing, cyanosis, or edema  NEURO: no focal findings   SKIN: normal without suspicious lesions on exposed skin    Medications:      Current Outpatient Medications:   •  albuterol (ProAir HFA) 90 mcg/act inhaler, Inhale 2 puffs every 6 (six) hours as needed for wheezing or shortness of breath, Disp: 8 5 g, Rfl: 0  •  Apple Cider Vinegar 500 MG TABS, Take by mouth daily, Disp: , Rfl:   •  ascorbic acid (VITAMIN C) 500 mg tablet, Take 500 mg by mouth daily, Disp: , Rfl:   •  atenolol (TENORMIN) 25 mg tablet, Take 1 tablet (25 mg total) by mouth daily at bedtime, Disp: 90 tablet, Rfl: 3  •  Blood Glucose Monitoring Suppl (OneTouch Verio) w/Device KIT, Check blood sugars once a day, Disp: 1 kit, Rfl: 0  •  Byetta 10 MCG Pen 10 MCG/0 04ML SOPN, INJECT 0 04 ML UNDER THE SKIN TWICE A DAY, Disp: 7 2 mL, Rfl: 3  •  Cholecalciferol (VITAMIN D-3) 1000 units CAPS, Take 1 capsule by mouth daily, Disp: , Rfl:   •  clobetasol (TEMOVATE) 0 05 % cream, , Disp: , Rfl:   •  Coenzyme Q10 (COQ10) 200 MG CAPS, Take 200 mg by mouth daily  , Disp: , Rfl:   •  Continuous Blood Gluc  (FreeStyle Julian 14 Day [place]) SVETLANA, Use 1 Device 4 (four) times a day (before meals and at bedtime), Disp: 1 each, Rfl: 0  •  Continuous Blood Gluc Sensor (FreeStyle Julian 14 Day Sensor) MISC, Use 1 application every 14 (fourteen) days, Disp: 2 each, Rfl: 6  •  cycloSPORINE (Restasis) 0 05 % ophthalmic emulsion, , Disp: , Rfl:   •  DULoxetine (CYMBALTA) 60 mg delayed release capsule, TAKE 1 CAPSULE DAILY, Disp: 90 capsule, Rfl: 3  •  Eliquis 5 MG, TAKE 1 TABLET TWICE A DAY, Disp: 180 tablet, Rfl: 3  •  estradiol (ESTRACE VAGINAL) 0 1 mg/g vaginal cream, Insert 0 5 g into the vagina 2 (two) times a week Patient is NOT to be using this daily, Disp: 42 5 g, Rfl: 1  •  Ferrous Sulfate 27 MG TABS, Take 1 tablet by mouth daily, Disp: , Rfl:   •  FREESTYLE LITE test strip, TEST THREE TIMES A DAY, Disp: 300 strip, Rfl: 3  •  Glucosamine-Chondroitin (MOVE FREE PO), Take by mouth daily, Disp: , Rfl:   •  glucose blood (OneTouch Verio) test strip, Check blood sugars once a day, Disp: 100 strip, Rfl: 2  •  hydrocortisone 2 5 % cream, Apply topically 2 (two) times a day, Disp: 60 g, Rfl: 2  •  Jardiance 25 MG TABS, TAKE 1 TABLET EVERY MORNING, Disp: 90 tablet, Rfl: 3  •  ketoconazole (NIZORAL) 2 % cream, Apply topically 2 (two) times a day for 14 days, Disp: 60 g, Rfl: 1  •  Lancets (onetouch ultrasoft) lancets, Check blood sugars once a day, Disp: 100 each, Rfl: 2  •  levothyroxine 125 mcg tablet, TAKE 1 TABLET SIX DAYS A WEEK, NOTHING ON SUNDAY, Disp: 90 tablet, Rfl: 3  •  MEGARED OMEGA-3 KRILL OIL PO, Take 1 capsule by mouth daily , Disp: , Rfl:   •  metFORMIN (GLUCOPHAGE-XR) 500 mg 24 hr tablet, Take 2 tablets (1,000 mg total) by mouth 2 (two) times a day with meals, Disp: 360 tablet, Rfl: 3  •  simvastatin (ZOCOR) 20 mg tablet, TAKE 2 TABLETS DAILY AT BEDTIME, Disp: 180 tablet, Rfl: 3  •  Triamcinolone Acetonide (Nasacort Allergy 24HR) 55 MCG/ACT AERO, 2 Act (110 mcg total) into each nostril every morning, Disp: 16 5 mL, Rfl: 3  •  metFORMIN (GLUCOPHAGE-XR) 500 mg 24 hr tablet, Take 2 tablets (1,000 mg total) by mouth 2 (two) times a day with meals (Patient not taking: No sig reported), Disp: 120 tablet, Rfl: 0     Family History   Problem Relation Age of Onset   • Heart disease Mother    • Diabetes Mother    • Asthma Father    • Stomach cancer Father         gastrkic cancer   • Cancer Paternal Grandmother    • Cancer Paternal Grandfather    • No Known Problems Brother    • Breast cancer Sister 76   • Breast cancer Sister 77   • Pancreatic cancer Sister 72   • No Known Problems Maternal Aunt    • No Known Problems Maternal Aunt    • No Known Problems Maternal Aunt    • No Known Problems Paternal Aunt    • No Known Problems Daughter    • No Known Problems Maternal Grandmother    • No Known Problems Maternal Grandfather    • Prostate cancer Paternal Uncle         age unknown   • Substance Abuse Neg Hx    • Mental illness Neg Hx    • Alcohol abuse Neg Hx    • Colon cancer Neg Hx    • Colon polyps Neg Hx      Social History     Socioeconomic History   • Marital status: /Civil Union     Spouse name: Not on file   • Number of children: Not on file   • Years of education: Not on file   • Highest education level: Not on file   Occupational History   • Not on file   Tobacco Use   • Smoking status: Former Smoker     Types: Cigarettes     Quit date:      Years since quittin 8   • Smokeless tobacco: Former User     Quit date:    Vaping Use   • Vaping Use: Never used   Substance and Sexual Activity   • Alcohol use: No   • Drug use: No   • Sexual activity: Not Currently   Other Topics Concern   • Not on file   Social History Narrative Always uses seat belt    Copy of advanced directive obtained    Drinks coffee-drinks 5 cups a day    Has smoke detectors    Uses safety equipment-seatbelts     Social Determinants of Health     Financial Resource Strain: Low Risk    • Difficulty of Paying Living Expenses: Not hard at all   Food Insecurity: Not on file   Transportation Needs: No Transportation Needs   • Lack of Transportation (Medical): No   • Lack of Transportation (Non-Medical): No   Physical Activity: Not on file   Stress: Not on file   Social Connections: Not on file   Intimate Partner Violence: Not on file   Housing Stability: Not on file     Social History     Tobacco Use   Smoking Status Former Smoker   • Types: Cigarettes   • Quit date:    • Years since quittin 8   Smokeless Tobacco Former User   • Quit date:      Social History     Substance and Sexual Activity   Alcohol Use No       Labs & Results:  Below is the patient's most recent value for Albumin, ALT, AST, BUN, Calcium, Chloride, Cholesterol, CO2, Creatinine, GFR, Glucose, HDL, Hematocrit, Hemoglobin, Hemoglobin A1C, LDL, Magnesium, Phosphorus, Platelets, Potassium, PSA, Sodium, Triglycerides, and WBC  Lab Results   Component Value Date    ALT 29 2022    AST 21 2022    BUN 9 2022    CALCIUM 9 6 2022     2022    CHOL 147 2015    CO2 28 2022    CREATININE 0 71 2022    HDL 33 (L) 2022    HCT 53 4 (H) 2022    HGB 16 2 (H) 2022    HGBA1C 7 7 (H) 2022    MG 1 8 2016     2022    K 4 8 2022     2015    TRIG 225 (H) 2022    WBC 8 56 2022     Note: for a comprehensive list of the patient's lab results, access the Results Review activity  No results found for this or any previous visit  Cardiac testing:   No results found for this or any previous visit  No results found for this or any previous visit    No results found for this or any previous visit  No results found for this or any previous visit  EKG personally reviewed by Lokesh Davis

## 2022-11-09 ENCOUNTER — OFFICE VISIT (OUTPATIENT)
Dept: CARDIOLOGY CLINIC | Facility: CLINIC | Age: 82
End: 2022-11-09

## 2022-11-09 ENCOUNTER — IN-CLINIC DEVICE VISIT (OUTPATIENT)
Dept: CARDIOLOGY CLINIC | Facility: CLINIC | Age: 82
End: 2022-11-09

## 2022-11-09 VITALS
DIASTOLIC BLOOD PRESSURE: 72 MMHG | SYSTOLIC BLOOD PRESSURE: 114 MMHG | HEART RATE: 65 BPM | HEIGHT: 63 IN | BODY MASS INDEX: 28.17 KG/M2 | WEIGHT: 159 LBS

## 2022-11-09 DIAGNOSIS — I48.0 PAROXYSMAL ATRIAL FIBRILLATION (HCC): ICD-10-CM

## 2022-11-09 DIAGNOSIS — Z95.0 MRI SAFE CARDIAC PACEMAKER IN SITU: ICD-10-CM
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
## 2022-11-09 DIAGNOSIS — Z95.0 CARDIAC PACEMAKER IN SITU: ICD-10-CM

## 2022-11-09 DIAGNOSIS — Z79.01 ANTICOAGULATED BY ANTICOAGULATION TREATMENT: ICD-10-CM

## 2022-11-09 DIAGNOSIS — I10 ESSENTIAL HYPERTENSION: Primary | ICD-10-CM

## 2022-11-09 DIAGNOSIS — Z95.0 CARDIAC PACEMAKER IN SITU: Primary | ICD-10-CM

## 2022-11-09 DIAGNOSIS — I34.0 MITRAL VALVE INSUFFICIENCY, UNSPECIFIED ETIOLOGY: ICD-10-CM

## 2022-11-09 RX ORDER — ATENOLOL 25 MG/1
25 TABLET ORAL
Qty: 90 TABLET | Refills: 3 | Status: SHIPPED | OUTPATIENT
Start: 2022-11-09

## 2022-11-09 NOTE — PROGRESS NOTES
Results for orders placed or performed in visit on 11/09/22   Cardiac EP device report    Narrative    MDT-DUAL [address] INTERROGATED IN THE [facility] OFFICE  BATTERY VOLTAGE ADEQUATE (2 YRS)  AP-84%, -3%  ALL LEAD PARAMETERS WITHIN NORMAL LIMITS  1 SVT-AF EPISODE @ 167 BPM LASTING 27 SEC  45 NEW AF EPISODES MAX DURATION 38 6 HRS  HX: PAF & ON ELIQUIS & ATENOLOL  AF BURDEN-7 6%  PT TO SEE DR COCHRAN TODAY  NORMAL DEVICE FUNCTION   GV

## 2022-11-21 ENCOUNTER — TELEPHONE (OUTPATIENT)
Dept: ENDOCRINOLOGY | Facility: HOSPITAL | Age: 82
End: 2022-11-21

## 2022-11-21 ENCOUNTER — LAB (OUTPATIENT)
Dept: LAB | Facility: CLINIC | Age: 82
End: 2022-11-21

## 2022-11-21 DIAGNOSIS — E06.3 HYPOTHYROIDISM DUE TO HASHIMOTO'S THYROIDITIS: ICD-10-CM

## 2022-11-21 DIAGNOSIS — E11.40 TYPE 2 DIABETES MELLITUS WITH DIABETIC NEUROPATHY, WITHOUT LONG-TERM CURRENT USE OF INSULIN (HCC): ICD-10-CM

## 2022-11-21 DIAGNOSIS — E55.9 VITAMIN D DEFICIENCY: ICD-10-CM

## 2022-11-21 DIAGNOSIS — E04.0 DIFFUSE NONTOXIC GOITER: ICD-10-CM

## 2022-11-21 DIAGNOSIS — M81.0 AGE-RELATED OSTEOPOROSIS WITHOUT CURRENT PATHOLOGICAL FRACTURE: ICD-10-CM

## 2022-11-21 DIAGNOSIS — E03.8 HYPOTHYROIDISM DUE TO HASHIMOTO'S THYROIDITIS: ICD-10-CM

## 2022-11-21 DIAGNOSIS — E78.2 MIXED HYPERLIPIDEMIA: ICD-10-CM

## 2022-11-21 LAB
ALBUMIN SERPL BCP-MCNC: 3.9 G/DL (ref 3.5–5)
ALP SERPL-CCNC: 75 U/L (ref 46–116)
ALT SERPL W P-5'-P-CCNC: 20 U/L (ref 12–78)
ANION GAP SERPL CALCULATED.3IONS-SCNC: 6 MMOL/L (ref 4–13)
AST SERPL W P-5'-P-CCNC: 17 U/L (ref 5–45)
BASOPHILS # BLD AUTO: 0.04 THOUSANDS/ÂΜL (ref 0–0.1)
BASOPHILS NFR BLD AUTO: 1 % (ref 0–1)
BILIRUB SERPL-MCNC: 0.9 MG/DL (ref 0.2–1)
BUN SERPL-MCNC: 12 MG/DL (ref 5–25)
CALCIUM SERPL-MCNC: 9.7 MG/DL (ref 8.3–10.1)
CHLORIDE SERPL-SCNC: 104 MMOL/L (ref 96–108)
CHOLEST SERPL-MCNC: 131 MG/DL
CO2 SERPL-SCNC: 26 MMOL/L (ref 21–32)
CREAT SERPL-MCNC: 0.63 MG/DL (ref 0.6–1.3)
EOSINOPHIL # BLD AUTO: 0.35 THOUSAND/ÂΜL (ref 0–0.61)
EOSINOPHIL NFR BLD AUTO: 5 % (ref 0–6)
ERYTHROCYTE [DISTWIDTH] IN BLOOD BY AUTOMATED COUNT: 13 % (ref 11.6–15.1)
EST. AVERAGE GLUCOSE BLD GHB EST-MCNC: 163 MG/DL
GFR SERPL CREATININE-BSD FRML MDRD: 84 ML/MIN/1.73SQ M
GLUCOSE P FAST SERPL-MCNC: 143 MG/DL (ref 65–99)
HBA1C MFR BLD: 7.3 %
HCT VFR BLD AUTO: 49.1 % (ref 34.8–46.1)
HDLC SERPL-MCNC: 37 MG/DL
HGB BLD-MCNC: 15.1 G/DL (ref 11.5–15.4)
IMM GRANULOCYTES # BLD AUTO: 0.03 THOUSAND/UL (ref 0–0.2)
IMM GRANULOCYTES NFR BLD AUTO: 0 % (ref 0–2)
LDLC SERPL CALC-MCNC: 60 MG/DL (ref 0–100)
LYMPHOCYTES # BLD AUTO: 3 THOUSANDS/ÂΜL (ref 0.6–4.47)
LYMPHOCYTES NFR BLD AUTO: 39 % (ref 14–44)
MCH RBC QN AUTO: 28.7 PG (ref 26.8–34.3)
MCHC RBC AUTO-ENTMCNC: 30.8 G/DL (ref 31.4–37.4)
MCV RBC AUTO: 93 FL (ref 82–98)
MONOCYTES # BLD AUTO: 0.51 THOUSAND/ÂΜL (ref 0.17–1.22)
MONOCYTES NFR BLD AUTO: 7 % (ref 4–12)
NEUTROPHILS # BLD AUTO: 3.84 THOUSANDS/ÂΜL (ref 1.85–7.62)
NEUTS SEG NFR BLD AUTO: 48 % (ref 43–75)
NONHDLC SERPL-MCNC: 94 MG/DL
NRBC BLD AUTO-RTO: 0 /100 WBCS
PLATELET # BLD AUTO: 219 THOUSANDS/UL (ref 149–390)
PMV BLD AUTO: 10.8 FL (ref 8.9–12.7)
POTASSIUM SERPL-SCNC: 4.5 MMOL/L (ref 3.5–5.3)
PROT SERPL-MCNC: 7.3 G/DL (ref 6.4–8.4)
RBC # BLD AUTO: 5.27 MILLION/UL (ref 3.81–5.12)
SODIUM SERPL-SCNC: 136 MMOL/L (ref 135–147)
T4 FREE SERPL-MCNC: 1.09 NG/DL (ref 0.76–1.46)
TRIGL SERPL-MCNC: 172 MG/DL
TSH SERPL DL<=0.05 MIU/L-ACNC: 2.42 UIU/ML (ref 0.45–4.5)
WBC # BLD AUTO: 7.77 THOUSAND/UL (ref 4.31–10.16)

## 2022-11-30 ENCOUNTER — TELEPHONE (OUTPATIENT)
Dept: CARDIOLOGY CLINIC | Facility: CLINIC | Age: 82
End: 2022-11-30

## 2022-11-30 NOTE — TELEPHONE ENCOUNTER
Spoke w patient  Eliquis was not decreased  Atenolol was decreased at office visit to 25mg  Patient verbalized understanding

## 2022-11-30 NOTE — TELEPHONE ENCOUNTER
P/c'd, states the eliquis was decreased at the last appt and she refilled, but it is still the 5 mg pills  Please call her [phone]      Thank you

## 2022-12-09 ENCOUNTER — APPOINTMENT (OUTPATIENT)
Dept: RADIOLOGY | Facility: CLINIC | Age: 82
End: 2022-12-09

## 2022-12-09 ENCOUNTER — OFFICE VISIT (OUTPATIENT)
Dept: PODIATRY | Facility: CLINIC | Age: 82
End: 2022-12-09

## 2022-12-09 VITALS
BODY MASS INDEX: 28.17 KG/M2 | DIASTOLIC BLOOD PRESSURE: 73 MMHG | WEIGHT: 159 LBS | HEART RATE: 80 BPM | HEIGHT: 63 IN | SYSTOLIC BLOOD PRESSURE: 115 MMHG

## 2022-12-09 DIAGNOSIS — E11.42 DIABETIC POLYNEUROPATHY ASSOCIATED WITH TYPE 2 DIABETES MELLITUS (HCC): ICD-10-CM

## 2022-12-09 DIAGNOSIS — M79.672 LEFT FOOT PAIN: ICD-10-CM

## 2022-12-09 DIAGNOSIS — S92.325D CLOSED NONDISPLACED FRACTURE OF SECOND METATARSAL BONE OF LEFT FOOT WITH ROUTINE HEALING, SUBSEQUENT ENCOUNTER: ICD-10-CM

## 2022-12-09 DIAGNOSIS — M79.672 LEFT FOOT PAIN: Primary | ICD-10-CM

## 2022-12-09 NOTE — PROGRESS NOTES
Assessment/Plan:    Closed nondisplaced fracture of second metatarsal bone of left foot with routine healing, subsequent encounter  X-ray foot left 3+ views; Future  May discontinue cam walker and switch to athletic shoe gradually increasing activity  If pain and swelling occurs may transition back to cam walker for another week or so  Diabetic polyneuropathy associated with type 2 diabetes mellitus ([facility] [address] )  Follow-up for regular diabetic foot care in 1 month            Subjective:      Patient ID: Janeth Marte is a 81 y o  female  159/2022: 81year-old type II diabetic presents for follow-up fracture care having sustained closed metatarsal fracture left foot #2 back on Sunday October 23rd  Slipped on the rug at home went to ER and was noted to have fracture  Has been wearing cam walker since then with partial weightbearing and crutches  Transition to full weightbearing without crutches within the past 2 weeks  The following portions of the patient's history were reviewed and updated as appropriate: allergies, current medications, past family history, past medical history, past social history, past surgical history and problem list     Review of Systems   Constitutional: Negative for chills and fever  HENT: Negative for ear pain and sore throat  Eyes: Negative for pain and visual disturbance  Respiratory: Negative for cough and shortness of breath  Cardiovascular: Negative for chest pain and palpitations  Gastrointestinal: Negative for abdominal pain and vomiting  Genitourinary: Negative for dysuria and hematuria  Musculoskeletal: Negative for arthralgias and back pain  Mild achy pain left midfoot, fracture left second metatarsal base   Skin: Negative for color change and rash  Neurological: Negative for seizures and syncope  All other systems reviewed and are negative          Objective:      /73   Pulse 80   Ht 5' 3" (1 6 m)   Wt 72 1 kg (159 lb)   BMI 28 17 kg/m²          Physical Exam  Constitutional:       Appearance: Normal appearance  HENT:      Head: Normocephalic  Right Ear: Tympanic membrane normal       Left Ear: Tympanic membrane normal       Nose: No congestion  Mouth/Throat:      Mouth: Mucous membranes are moist       Pharynx: No oropharyngeal exudate or posterior oropharyngeal erythema  Eyes:      Extraocular Movements: Extraocular movements intact  Conjunctiva/sclera: Conjunctivae normal       Pupils: Pupils are equal, round, and reactive to light  Cardiovascular:      Rate and Rhythm: Normal rate and regular rhythm  Pulses:           Dorsalis pedis pulses are 1+ on the right side and 1+ on the left side  Posterior tibial pulses are 0 on the right side and 0 on the left side  Pulmonary:      Effort: Pulmonary effort is normal    Abdominal:      Palpations: Abdomen is soft  Feet:      Right foot:      Protective Sensation: 2 sites tested  2 sites sensed  Toenail Condition: Fungal disease present  Left foot:      Protective Sensation: 8 sites tested  8 sites sensed  Toenail Condition: Fungal disease present  Comments: Left foot: Mild midfoot edema, no pain with range of motion or palpation second ray or base of second metatarsal       Radiographs reviewed show good clinical alignment though fracture lines are still present  Skin:     General: Skin is warm and dry  Capillary Refill: Capillary refill takes 2 to 3 seconds  Coloration: Skin is not pale  Findings: No bruising, erythema, lesion or rash  Neurological:      General: No focal deficit present  Mental Status: She is alert  Cranial Nerves: No cranial nerve deficit  Sensory: No sensory deficit  Motor: No weakness        Gait: Gait normal       Deep Tendon Reflexes: Reflexes normal    Psychiatric:         Mood and Affect: Mood normal          Behavior: Behavior normal          Judgment: Judgment normal

## 2022-12-12 ENCOUNTER — OFFICE VISIT (OUTPATIENT)
Dept: FAMILY MEDICINE CLINIC | Facility: CLINIC | Age: 82
End: 2022-12-12

## 2022-12-12 VITALS
DIASTOLIC BLOOD PRESSURE: 82 MMHG | BODY MASS INDEX: 27.11 KG/M2 | HEIGHT: 63 IN | TEMPERATURE: 97.6 F | RESPIRATION RATE: 16 BRPM | WEIGHT: 153 LBS | SYSTOLIC BLOOD PRESSURE: 118 MMHG | OXYGEN SATURATION: 96 % | HEART RATE: 89 BPM

## 2022-12-12 DIAGNOSIS — J20.9 ACUTE BRONCHITIS, UNSPECIFIED ORGANISM: Primary | ICD-10-CM

## 2022-12-12 DIAGNOSIS — R05.1 ACUTE COUGH: ICD-10-CM

## 2022-12-12 LAB
SARS-COV-2 AG UPPER RESP QL IA: NEGATIVE
VALID CONTROL: NORMAL

## 2022-12-12 RX ORDER — AZITHROMYCIN 250 MG/1
TABLET, FILM COATED ORAL
Qty: 10 TABLET | Refills: 0 | Status: SHIPPED | OUTPATIENT
Start: 2022-12-12 | End: 2022-12-22

## 2022-12-12 RX ORDER — PREDNISONE 10 MG/1
TABLET ORAL
Qty: 20 TABLET | Refills: 0 | Status: SHIPPED | OUTPATIENT
Start: 2022-12-12

## 2022-12-12 NOTE — PATIENT INSTRUCTIONS
1   Take azithromycin antibiotic 1 a day for 10 days starting today    2  Starting to take a take prednisone which will increase your blood sugars  Monday today and Tuesday tomorrow take 4 pills at once after eating  Wednesday and Thursday take 3 pills at once after eating  Friday and Saturday and 2 pills at once after eating  Sunday and Monday 1 pill daily after eating    3    Mucinex DM 1 twice a day with 8 ounces of water each time    You should slowly get better  If you become worse please let us know

## 2022-12-12 NOTE — PROGRESS NOTES
Assessment/Plan:    Acute bronchitis  Azithromycin for 10 days  Prednisone taper  Mucinex DM twice daily 8 ounces of water  Call as needed               Subjective:   Geraldine Dandy is a 81 y  o female  Chief Complaint   Patient presents with   • Cough     Symptoms started last Tuesday  Started with sore throat and runny nose  Has been taking Mucinex  COVID negative     Patient is here having been sick for the last 6 days  Started with a sore throat, runny nose, headache and a cough that has stayed  No fever  Increased phlegm  Started Mucinex but is not taking it with enough water  COVID-negative      Past medical history, social history, and family history reviewed as appropriate for the complaint of this patient  MEDICATIONS REVIEWED AND UPDATED    10 point review of systems performed, the remainder of the ROS is negative except for what is noted in the history of chief complaint    Objective:    Vitals:    12/12/22 0926   BP: 118/82   Pulse: 89   Resp: 16   Temp: 97 6 °F (36 4 °C)   SpO2: 96%     Body mass index is 27 1 kg/m²      Physical Exam    Constitutional  Appears healthy, Looks well, Appearance consistent with age  Mental Status  Alert, Oriented, Cooperative, Memory function normal , clean, and reasonable  Neck  No neck mass, No thyromegaly, Good carotid upstrokes bilaterally, trachea midline positive click    Respiratory  Patient has tactile fremitus in the front mid to end expiratory wheezes and rhonchi throughout    Cardiac  Regular rhythm without ectopy or murmur no S3-S4, no heave lift or thrill to palpation  Vascular  No leg edema, No pedal edema  Muscular skeletal  No clubbing cyanosis , muscle tone normal  Skin  No appreciable rashes or abnormal appearing lesions

## 2022-12-15 DIAGNOSIS — E06.3 HYPOTHYROIDISM DUE TO HASHIMOTO'S THYROIDITIS: ICD-10-CM

## 2022-12-15 DIAGNOSIS — E03.8 HYPOTHYROIDISM DUE TO HASHIMOTO'S THYROIDITIS: ICD-10-CM

## 2022-12-16 DIAGNOSIS — E06.3 HYPOTHYROIDISM DUE TO HASHIMOTO'S THYROIDITIS: ICD-10-CM

## 2022-12-16 DIAGNOSIS — E03.8 HYPOTHYROIDISM DUE TO HASHIMOTO'S THYROIDITIS: ICD-10-CM

## 2022-12-16 RX ORDER — LEVOTHYROXINE SODIUM 0.12 MG/1
TABLET ORAL
Qty: 90 TABLET | Refills: 3 | Status: SHIPPED | OUTPATIENT
Start: 2022-12-16 | End: 2022-12-16 | Stop reason: SDUPTHER

## 2022-12-16 RX ORDER — LEVOTHYROXINE SODIUM 0.12 MG/1
TABLET ORAL
Qty: 90 TABLET | Refills: 3 | Status: SHIPPED | OUTPATIENT
Start: 2022-12-16

## 2022-12-20 ENCOUNTER — OFFICE VISIT (OUTPATIENT)
Dept: ENDOCRINOLOGY | Facility: HOSPITAL | Age: 82
End: 2022-12-20

## 2022-12-20 VITALS
BODY MASS INDEX: 27.46 KG/M2 | HEIGHT: 63 IN | WEIGHT: 155 LBS | HEART RATE: 86 BPM | DIASTOLIC BLOOD PRESSURE: 72 MMHG | SYSTOLIC BLOOD PRESSURE: 124 MMHG

## 2022-12-20 DIAGNOSIS — M81.0 AGE-RELATED OSTEOPOROSIS WITHOUT CURRENT PATHOLOGICAL FRACTURE: ICD-10-CM

## 2022-12-20 DIAGNOSIS — I10 ESSENTIAL HYPERTENSION: ICD-10-CM

## 2022-12-20 DIAGNOSIS — E55.9 VITAMIN D DEFICIENCY: ICD-10-CM

## 2022-12-20 DIAGNOSIS — E11.40 TYPE 2 DIABETES MELLITUS WITH DIABETIC NEUROPATHY, WITHOUT LONG-TERM CURRENT USE OF INSULIN (HCC): Primary | ICD-10-CM

## 2022-12-20 DIAGNOSIS — E04.0 DIFFUSE NONTOXIC GOITER: ICD-10-CM

## 2022-12-20 DIAGNOSIS — E78.2 MIXED HYPERLIPIDEMIA: ICD-10-CM

## 2022-12-20 DIAGNOSIS — E03.8 HYPOTHYROIDISM DUE TO HASHIMOTO'S THYROIDITIS: ICD-10-CM

## 2022-12-20 DIAGNOSIS — E06.3 HYPOTHYROIDISM DUE TO HASHIMOTO'S THYROIDITIS: ICD-10-CM

## 2022-12-20 NOTE — PROGRESS NOTES
Kelly Gunter 81 y o  female MRN: [id number]    Encounter: [id number]      Assessment/Plan     Assessment: This is a 81y o -year-old female with type 2 diabetes, hypothyroidism, hypertension, hyperlipidemia and vitamin-D deficiency   Plan:  1   Type 2 diabetes:  Her most recent hemoglobin A1c is improved to 7  3    Recent blood sugars are relatively controlled for her age and medical condition  For now, she will continue current regimen   I have asked her to continue checking her blood sugars twice daily at alternating times and for the record to the office in 2 weeks for review and further adjustment, if necessary  I encouraged her to be mindful of her diet and to be more active with walking  She is up-to-date with her annual diabetic eye exams and is seeing the podiatrist every 12 weeks  Check hemoglobin A1c prior to next visit      2   Hypothyroidism:  Her most recent TSH free T4 were normal   Continue levothyroxine 125 mcg Monday through Saturday with no tablet on Sunday   Check TSH and free T4 prior to next office visit      3   Hypertension: She is normotensive in the office today   Continue atenolol   Check comprehensive metabolic panel prior to next visit      4   Hyperlipidemia: Stable   Continue simvastatin      5   Vitamin-D deficiency:  Most recent vitamin-D level is normal   Continue supplement with 1000 units of vitamin D3 daily         6   Osteopenia: Irene Benz will continue with calcium and vitamin-D supplementation   Stressed staying active reviewed fall risk and safety at the time of the visit  CC: Type 2 Diabetes follow up    History of Present Illness     HPI:  81 y  o  female for follow-up of type 2 diabetes, hypothyroidism, hypertension and hyperlipidemia   She states she has been diagnosed with type 2 diabetes for approximately 10 years   There are no blood sugar records available for review  She is currently taking metformin 500 mg - 2 tablets twice daily, Byetta 10 mg twice daily and Jardiance 25 mg daily   She denies any other episodes of hypoglycemia recently   She denies any recent polyuria, polydipsia or polyphagia   Her most recent hemoglobin A1c from November 21, 2022 is 7  3   She states she has not been as active and has been eating large amounts of fruits (cherries and peaches) during the summer  She states she is up-to-date with her annual diabetic eye exam with Dr Nelson Knox of  December 2021   She complains of some numbness and tingling to her feet at times and follows Podiatry every 12 weeks for regular diabetic foot care  Her most recent diabetic foot exam was performed on March 22, 2022       For her hypothyroidism, she is treated with levothyroxine 125 mcg daily Monday through Saturday and no tablet on Sunday   Her most recent TSH from November 21, 2022 is 2 420 with a free T4 of 1 09   She complains of some constipation at times but otherwise has no symptoms/complaints of hypo or hyperthyroidism      Her hypertension is treated with atenolol 50 mg daily      For her hyperlipidemia, she is treated with simvastatin 40 mg daily      For her vitamin-D deficiency, she supplements with 1000 units of vitamin D3 daily  Her most recent 25 hydroxy vitamin-D level from  February 7, 2022 is 32  6      Her most recent DEXA scan performed on July 1, 2021 revealed a T-score of -1 3 and lumbar spine consistent with low bone mineral density-osteopenia  She continues to supplement with calcium and vitamin-D daily   She denies any recent falls or fractures        Review of Systems   Constitutional: Negative  Negative for chills, fatigue and fever  HENT: Negative  Negative for trouble swallowing and voice change  Eyes: Negative  Negative for photophobia, pain, discharge, redness, itching and visual disturbance  Respiratory: Negative  Negative for chest tightness and shortness of breath  Cardiovascular: Negative  Negative for chest pain  Gastrointestinal: Negative    Negative for abdominal pain, constipation, diarrhea and vomiting  Endocrine: Positive for polyuria (up to twice per night nocturia)  Negative for cold intolerance, heat intolerance, polydipsia and polyphagia  Genitourinary: Negative  Musculoskeletal: Positive for arthralgias (right hand)  Skin: Negative  Allergic/Immunologic: Negative  Neurological: Negative  Negative for dizziness, syncope, light-headedness and headaches  Hematological: Negative  Psychiatric/Behavioral: Negative  All other systems reviewed and are negative        Historical Information   Past Medical History:   Diagnosis Date   • A-fib [facility])    • Abnormal ECG     last assessed: 2015   • Acid reflux     resolved   • Anxiety    • Pacheco esophagus    • Benign neoplasm of sigmoid colon 2011    next colon    • Bronchitis, asthmatic     last assessed: 3/12/2012   • [facility]     2012- had colon resection   • Colon polyp    • COPD (chronic obstructive pulmonary disease) (HCC)     questionable   • Depression    • Diabetes mellitus [facility] )     on byetta   • Dizziness     occ   • Esophageal stricture     last assessed: 2014   • High cholesterol    • Hypertension    • Hypothyroidism    • Iron (Fe) deficiency anemia 2011    iron pill continue   • Irritable bowel syndrome    • OA (osteoarthritis)    • Organoaxial gastric volvulus 2016   • Pacemaker     hx SSS   • Rash     labia area- uses cream prn   • Restrictive lung disease    • Seasonal allergies    • Skin scarring/fibrosis     fibrotic scar; last assessed: 3/22/2013   • MONA (stress urinary incontinence, female)    • Urinary frequency     last assessed: 9/10/2012   • Wears glasses     reading     Past Surgical History:   Procedure Laterality Date   • BREAST EXCISIONAL BIOPSY Right     benign, best guess on year   • 83 [address]      does not know type  Dr Rosie Estrada is cariologist   •  SECTION     • COLON SURGERY      resection  removed 18 In    • COLONOSCOPY  2012: Adenomatous polyp, colorectal anastomosis, internal hemorrhoids  2012:  proctosigmoidectomy   • ESOPHAGOGASTRODUODENOSCOPY N/A 2016    Procedure: ESOPHAGOGASTRODUODENOSCOPY (EGD); Surgeon: Marvin Meza MD;  [facility];  Service:    • HYSTERECTOMY  1978   • UT ARTHROPLASTY I-P JT Right 2018    Procedure: ARTHROPLASTY PIP/FINGER - RIGHT RING;  Surgeon: Claribel Santos MD;  Location: [facility];  Service: Orthopedics   • UT CYSTOURETHROSCOPY N/A 2018    Procedure: CYSTOSCOPY;  Surgeon: Jessika Nicholson MD;  [facility];  Service: UroGynecology          • UT LAP, REPAIR PARAESOPHAGEAL HERNIA, INCL FUNDOPLASTY W/ MESH N/A 2016    Procedure: LAPAROSCOPIC PARAESOPHAGEAL HERNIA REPAIR with mesh; toupet  FUNDOPLICATION ;  Surgeon: Lynn Millan MD;  [facility];  Service: Thoracic   • UT REPAIR INTERCARP/CARP-METACARP JT Right 1/10/2019    Procedure: Arpita Gutierrez;  Surgeon: Claribel Santos MD;  [facility];  Service: Orthopedics   • UT SLING OPER STRES INCONTINENCE N/A 2018    Procedure: INSERTION PUBOVAGINAL SLING (FEMALE); Surgeon: Jessika Nicholson MD;  [facility];  Service: UroGynecology          • TOTAL ABDOMINAL HYSTERECTOMY     • UPPER GASTROINTESTINAL ENDOSCOPY      2018:  Irregular Z-line positive for intestinal metaplasia/ Pacheco's, no dysplasia  Gastritis H pylori negative     • WRIST TENDON TRANSFER Right 1/10/2019    Procedure: TRANSFER TENDON FLEXOR CARPI RADIALIS FROM FOREARM TO HAND;  Surgeon: Claribel Santos MD;  [facility];  Service: Orthopedics     Social History   Social History     Substance and Sexual Activity   Alcohol Use No     Social History     Substance and Sexual Activity   Drug Use No     Social History     Tobacco Use   Smoking Status Former   • Types: Cigarettes   • Quit date:    • Years since quittin 9   Smokeless Tobacco Former   • Quit date: 2002     Family History:   Family History   Problem Relation Age of Onset   • Heart disease Mother    • Diabetes Mother    • Asthma Father    • Stomach cancer Father         gastrkic cancer   • Cancer Paternal Grandmother    • Cancer Paternal Grandfather    • No Known Problems Brother    • Breast cancer Sister 76   • Breast cancer Sister 77   • Pancreatic cancer Sister 72   • No Known Problems Maternal Aunt    • No Known Problems Maternal Aunt    • No Known Problems Maternal Aunt    • No Known Problems Paternal Aunt    • No Known Problems Daughter    • No Known Problems Maternal Grandmother    • No Known Problems Maternal Grandfather    • Prostate cancer Paternal Uncle         age unknown   • Substance Abuse Neg Hx    • Mental illness Neg Hx    • Alcohol abuse Neg Hx    • Colon cancer Neg Hx    • Colon polyps Neg Hx        Meds/Allergies   Current Outpatient Medications   Medication Sig Dispense Refill   • albuterol (ProAir HFA) 90 mcg/act inhaler Inhale 2 puffs every 6 (six) hours as needed for wheezing or shortness of breath 8 5 g 0   • Apple Cider Vinegar 500 MG TABS Take by mouth daily     • ascorbic acid (VITAMIN C) 500 mg tablet Take 500 mg by mouth daily     • atenolol (TENORMIN) 25 mg tablet Take 1 tablet (25 mg total) by mouth daily at bedtime 90 tablet 3   • azithromycin (ZITHROMAX) 250 mg tablet 1 pill daily for 10 days 10 tablet 0   • Blood Glucose Monitoring Suppl (OneTouch Verio) w/Device KIT Check blood sugars once a day 1 kit 0   • Byetta 10 MCG Pen 10 MCG/0 04ML SOPN INJECT 0 04 ML UNDER THE SKIN TWICE A DAY 7 2 mL 3   • Cholecalciferol (VITAMIN D-3) 1000 units CAPS Take 1 capsule by mouth daily     • clobetasol (TEMOVATE) 0 05 % cream      • Coenzyme Q10 (COQ10) 200 MG CAPS Take 200 mg by mouth daily       • Continuous Blood Gluc  (FreeStyle Julian 14 Day [facility]) SVETLANA Use 1 Device 4 (four) times a day (before meals and at bedtime) 1 each 0   • Continuous Blood Gluc Sensor (FreeStyle Julian 14 Day Sensor) MISC Use 1 application every 14 (fourteen) days 2 each 6   • cycloSPORINE (Restasis) 0 05 % ophthalmic emulsion      • DULoxetine (CYMBALTA) 60 mg delayed release capsule TAKE 1 CAPSULE DAILY 90 capsule 3   • Eliquis 5 MG TAKE 1 TABLET TWICE A  tablet 3   • estradiol (ESTRACE VAGINAL) 0 1 mg/g vaginal cream Insert 0 5 g into the vagina 2 (two) times a week Patient is NOT to be using this daily 42 5 g 1   • Ferrous Sulfate 27 MG TABS Take 1 tablet by mouth daily     • FREESTYLE LITE test strip TEST THREE TIMES A  strip 3   • Glucosamine-Chondroitin (MOVE FREE PO) Take by mouth daily     • glucose blood (OneTouch Verio) test strip Check blood sugars once a day 100 strip 2   • hydrocortisone 2 5 % cream Apply topically 2 (two) times a day 60 g 2   • Jardiance 25 MG TABS TAKE 1 TABLET EVERY MORNING 90 tablet 3   • ketoconazole (NIZORAL) 2 % cream Apply topically 2 (two) times a day for 14 days 60 g 1   • Lancets (onetouch ultrasoft) lancets Check blood sugars once a day 100 each 2   • levothyroxine 125 mcg tablet Take one tablet 6 days of the week and nothing on sundays 90 tablet 3   • MEGARED OMEGA-3 KRILL OIL PO Take 1 capsule by mouth daily  (Patient not taking: Reported on 12/12/2022)     • metFORMIN (GLUCOPHAGE-XR) 500 mg 24 hr tablet Take 2 tablets (1,000 mg total) by mouth 2 (two) times a day with meals 120 tablet 0   • metFORMIN (GLUCOPHAGE-XR) 500 mg 24 hr tablet Take 2 tablets (1,000 mg total) by mouth 2 (two) times a day with meals 360 tablet 3   • predniSONE 10 mg tablet Take 4 tablets at once after eating daily for 2 days then decrease by 1 pill every 2 days until prescription is gone 20 tablet 0   • simvastatin (ZOCOR) 20 mg tablet TAKE 2 TABLETS DAILY AT BEDTIME 180 tablet 3   • Triamcinolone Acetonide (Nasacort Allergy 24HR) 55 MCG/ACT AERO 2 Act (110 mcg total) into each nostril every morning 16 5 mL 3     No current facility-administered medications for this visit  Allergies   Allergen Reactions   • Fluconazole Dizziness       Objective   Vitals: not currently breastfeeding  Physical Exam  Vitals reviewed  Constitutional:       Appearance: She is well-developed  HENT:      Head: Normocephalic and atraumatic  Eyes:      Conjunctiva/sclera: Conjunctivae normal       Pupils: Pupils are equal, round, and reactive to light  Cardiovascular:      Rate and Rhythm: Normal rate and regular rhythm  Heart sounds: Normal heart sounds  Pulmonary:      Effort: Pulmonary effort is normal       Breath sounds: Normal breath sounds  Abdominal:      General: Bowel sounds are normal       Palpations: Abdomen is soft  Musculoskeletal:         General: Normal range of motion  Cervical back: Normal range of motion and neck supple  Comments: Post op shoe to left foot   Skin:     General: Skin is warm and dry  Neurological:      Mental Status: She is alert and oriented to person, place, and time  Psychiatric:         Behavior: Behavior normal          Thought Content:  Thought content normal          Judgment: Judgment normal        Lab Results:   Lab Results   Component Value Date/Time    Hemoglobin A1C 7 3 (H) 11/21/2022 08:08 AM    Hemoglobin A1C 7 7 (H) 06/09/2022 07:04 AM    Hemoglobin A1C 7 2 (H) 02/07/2022 07:10 AM    WBC 7 77 11/21/2022 08:08 AM    WBC 8 56 06/09/2022 07:04 AM    WBC 9 79 02/07/2022 07:10 AM    Hemoglobin 15 1 11/21/2022 08:08 AM    Hemoglobin 16 2 (H) 06/09/2022 07:04 AM    Hemoglobin 16 7 (H) 02/07/2022 07:10 AM    Hematocrit 49 1 (H) 11/21/2022 08:08 AM    Hematocrit 53 4 (H) 06/09/2022 07:04 AM    Hematocrit 53 6 (H) 02/07/2022 07:10 AM    MCV 93 11/21/2022 08:08 AM    MCV 93 06/09/2022 07:04 AM    MCV 92 02/07/2022 07:10 AM    Platelets 168 19/09/8497 08:08 AM    Platelets 319 15/09/9182 07:04 AM    Platelets 934 89/74/5719 07:10 AM    BUN 12 11/21/2022 08:08 AM    BUN 9 06/09/2022 07:04 AM    BUN 14 02/07/2022 07:10 AM    Potassium 4 5 11/21/2022 08:08 AM    Potassium 4 8 06/09/2022 07:04 AM    Potassium 4 8 02/07/2022 07:10 AM    Chloride 104 11/21/2022 08:08 AM    Chloride 105 06/09/2022 07:04 AM    Chloride 101 02/07/2022 07:10 AM    CO2 26 11/21/2022 08:08 AM    CO2 28 06/09/2022 07:04 AM    CO2 29 02/07/2022 07:10 AM    Creatinine 0 63 11/21/2022 08:08 AM    Creatinine 0 71 06/09/2022 07:04 AM    Creatinine 0 77 02/07/2022 07:10 AM    AST 17 11/21/2022 08:08 AM    AST 21 06/09/2022 07:04 AM    AST 18 02/07/2022 07:10 AM    ALT 20 11/21/2022 08:08 AM    ALT 29 06/09/2022 07:04 AM    ALT 25 02/07/2022 07:10 AM    Albumin 3 9 11/21/2022 08:08 AM    Albumin 3 6 06/09/2022 07:04 AM    Albumin 4 5 02/07/2022 07:10 AM    HDL, Direct 37 (L) 11/21/2022 08:08 AM    HDL, Direct 33 (L) 02/07/2022 07:10 AM    Triglycerides 172 (H) 11/21/2022 08:08 AM    Triglycerides 225 (H) 02/07/2022 07:10 AM     Portions of the record may have been created with voice recognition software  Occasional wrong word or "sound a like" substitutions may have occurred due to the inherent limitations of voice recognition software  Read the chart carefully and recognize, using context, where substitutions have occurred

## 2023-01-23 ENCOUNTER — OFFICE VISIT (OUTPATIENT)
Dept: PODIATRY | Facility: CLINIC | Age: 83
End: 2023-01-23

## 2023-01-23 ENCOUNTER — APPOINTMENT (OUTPATIENT)
Dept: RADIOLOGY | Facility: CLINIC | Age: 83
End: 2023-01-23

## 2023-01-23 VITALS
BODY MASS INDEX: 27.46 KG/M2 | SYSTOLIC BLOOD PRESSURE: 122 MMHG | HEIGHT: 63 IN | WEIGHT: 155 LBS | HEART RATE: 99 BPM | DIASTOLIC BLOOD PRESSURE: 78 MMHG

## 2023-01-23 DIAGNOSIS — S92.325D CLOSED NONDISPLACED FRACTURE OF SECOND METATARSAL BONE OF LEFT FOOT WITH ROUTINE HEALING, SUBSEQUENT ENCOUNTER: ICD-10-CM

## 2023-01-23 DIAGNOSIS — E11.40 TYPE 2 DIABETES MELLITUS WITH DIABETIC NEUROPATHY, WITHOUT LONG-TERM CURRENT USE OF INSULIN (HCC): Primary | ICD-10-CM

## 2023-01-23 DIAGNOSIS — B35.1 ONYCHOMYCOSIS: ICD-10-CM

## 2023-01-23 NOTE — PROGRESS NOTES
PATIENT:  Gilbert Crawley  1940    ASSESSMENT/PLAN:  1  Type 2 diabetes mellitus with diabetic neuropathy, without long-term current use of insulin ([facility])  Nail removal      2  Onychomycosis        3  Closed nondisplaced fracture of second metatarsal bone of left foot with routine healing, subsequent encounter  X-ray foot left 3+ views            Orders Placed This Encounter   Procedures   • Nail removal   • X-ray foot left 3+ views      X-ray left foot ordered to evaluate fracture healing at base of second metatarsal   Patient advised to return to sneakers and not use the postop shoe anymore  Follow-up in 3 months  Disease prevention and related risk factors of diabetes were identified and discussed  The patient was educated in proper foot wear for diabetics  Also educated in daily foot assessment and routine diabetic foot care  Discussed the importance of controlling BS through diet and exercise  The patient will follow up in 9-12 weeks for further diabetic foot exam and care  PROCEDURE:  All mycotic toenails were reduced and debrided in length, width, and girth using a nail nipper and dremel  All hyperkeratotic skin lesion(s) were sharply pared with a scalpel with no evidence of ulceration  Patient tolerated procedure(s) well without complications  Nail removal    Date/Time: 1/23/2023 9:18 AM  Performed by: Nessa Chopra DPM  Authorized by: Nessa Chopra DPM     Patient location:  Other (comment)Universal Protocol:  Consent: Verbal consent obtained  Risks and benefits: risks, benefits and alternatives were discussed  Consent given by: patient  Patient understanding: patient states understanding of the procedure being performed  Patient identity confirmed: verbally with patient      Nails trimmed:     Number of nails trimmed:  8  Post-procedure details:     Patient tolerance of procedure:   Tolerated well, no immediate complications            HPI:  Gilbert Crawley is a 81 y o year old female seen for diabetic foot exam   The patient has class findings with diabetes  BS is under control  The patient complained of thick toenails, lesions and continued swelling from left foot since fracture of second metatarsal   The patient denied any acute pedal disorder, redness, acute swelling, or recent injury  PAST MEDICAL HISTORY:  Past Medical History:   Diagnosis Date   • A-fib [facility])    • Abnormal ECG     last assessed: 2015   • Acid reflux     resolved   • Anxiety    • Pacheco esophagus    • Benign neoplasm of sigmoid colon 2011    next colon    • Bronchitis, asthmatic     last assessed: 3/12/2012   • [facility]     2012- had colon resection   • Colon polyp    • COPD (chronic obstructive pulmonary disease) (HCC)     questionable   • Depression    • Diabetes mellitus ([facility] )     on byetta   • Dizziness     occ   • Esophageal stricture     last assessed: 2014   • High cholesterol    • Hypertension    • Hypothyroidism    • Iron (Fe) deficiency anemia 2011    iron pill continue   • Irritable bowel syndrome    • OA (osteoarthritis)    • Organoaxial gastric volvulus 2016   • Pacemaker     hx SSS   • Rash     labia area- uses cream prn   • Restrictive lung disease    • Seasonal allergies    • Skin scarring/fibrosis     fibrotic scar; last assessed: 3/22/2013   • MONA (stress urinary incontinence, female)    • Urinary frequency     last assessed: 9/10/2012   • Wears glasses     reading       PAST SURGICAL HISTORY:  Past Surgical History:   Procedure Laterality Date   • BREAST EXCISIONAL BIOPSY Right     benign, best guess on year   • [address]      does not know type  Dr Kavin Spurling is cariologist   •  SECTION     • COLON SURGERY      resection  removed 18 In    • COLONOSCOPY  2012: Adenomatous polyp, colorectal anastomosis, internal hemorrhoids    2012:  proctosigmoidectomy   • ESOPHAGOGASTRODUODENOSCOPY N/A 1/9/2016    Procedure: ESOPHAGOGASTRODUODENOSCOPY (EGD); Surgeon: Arslan Johnson MD;  [facility];  Service:    • HYSTERECTOMY  1978   • ID ARTHROPLASTY INTERPHALANGEAL JOINT EACH Right 8/16/2018    Procedure: ARTHROPLASTY PIP/FINGER - RIGHT RING;  Surgeon: Layton Blackwood MD;  [facility];  Service: Orthopedics   • ID ARTHRP INTERPOS INTERCARPAL/METACARPAL JOINTS Right 1/10/2019    Procedure: ARTHROPLASTY Scottie Bijou;  Surgeon: Layton Blackwood MD;  [facility];  Service: Orthopedics   • ID CYSTOURETHROSCOPY N/A 5/14/2018    Procedure: Marge Kindroberto carlos;  Surgeon: Mk Mauricio MD;  [facility];  Service: UroGynecology          • ID LAPS RPR PARAESPHGL HRNA INCL [address] N/A 1/27/2016    Procedure: LAPAROSCOPIC PARAESOPHAGEAL HERNIA REPAIR with mesh; toupet  FUNDOPLICATION ;  Surgeon: Bernadette Downs MD;  [facility];  Service: Thoracic   • ID SLING OPERATION STRESS INCONTINENCE N/A 5/14/2018    Procedure: INSERTION PUBOVAGINAL SLING (FEMALE); Surgeon: Mk Mauricio MD;  [facility];  Service: UroGynecology          • TOTAL ABDOMINAL HYSTERECTOMY     • UPPER GASTROINTESTINAL ENDOSCOPY      June 2018:  Irregular Z-line positive for intestinal metaplasia/ Pacheco's, no dysplasia  Gastritis H pylori negative     • WRIST TENDON TRANSFER Right 1/10/2019    Procedure: TRANSFER TENDON FLEXOR CARPI RADIALIS FROM FOREARM TO HAND;  Surgeon: Layton Blackwood MD;  [facility];  Service: Orthopedics        ALLERGIES:  Fluconazole    MEDICATIONS:  Current Outpatient Medications   Medication Sig Dispense Refill   • albuterol (ProAir HFA) 90 mcg/act inhaler Inhale 2 puffs every 6 (six) hours as needed for wheezing or shortness of breath 8 5 g 0   • Apple Cider Vinegar 500 MG TABS Take by mouth daily     • ascorbic acid (VITAMIN C) 500 mg tablet Take 500 mg by mouth daily     • atenolol (TENORMIN) 25 mg tablet Take 1 tablet (25 mg total) by mouth daily at bedtime 90 tablet 3   • Blood Glucose Monitoring Suppl (OneTouch Verio) w/Device KIT Check blood sugars once a day 1 kit 0   • Byetta 10 MCG Pen 10 MCG/0 04ML SOPN INJECT 0 04 ML UNDER THE SKIN TWICE A DAY 7 2 mL 3   • Cholecalciferol (VITAMIN D-3) 1000 units CAPS Take 1 capsule by mouth daily     • clobetasol (TEMOVATE) 0 05 % cream      • Coenzyme Q10 (COQ10) 200 MG CAPS Take 200 mg by mouth daily       • Continuous Blood Gluc  (FreeStyle Julian 14 Day [place]) SVETLANA Use 1 Device 4 (four) times a day (before meals and at bedtime) 1 each 0   • Continuous Blood Gluc Sensor (FreeStyle Julian 14 Day Sensor) MISC Use 1 application every 14 (fourteen) days 2 each 6   • cycloSPORINE (Restasis) 0 05 % ophthalmic emulsion      • DULoxetine (CYMBALTA) 60 mg delayed release capsule TAKE 1 CAPSULE DAILY 90 capsule 3   • Eliquis 5 MG TAKE 1 TABLET TWICE A  tablet 3   • estradiol (ESTRACE VAGINAL) 0 1 mg/g vaginal cream Insert 0 5 g into the vagina 2 (two) times a week Patient is NOT to be using this daily 42 5 g 1   • Ferrous Sulfate 27 MG TABS Take 1 tablet by mouth daily     • FREESTYLE LITE test strip TEST THREE TIMES A  strip 3   • Glucosamine-Chondroitin (MOVE FREE PO) Take by mouth daily     • glucose blood (OneTouch Verio) test strip Check blood sugars once a day 100 strip 2   • hydrocortisone 2 5 % cream Apply topically 2 (two) times a day 60 g 2   • Jardiance 25 MG TABS TAKE 1 TABLET EVERY MORNING 90 tablet 3   • Lancets (onetouch ultrasoft) lancets Check blood sugars once a day 100 each 2   • levothyroxine 125 mcg tablet Take one tablet 6 days of the week and nothing on sundays 90 tablet 3   • metFORMIN (GLUCOPHAGE-XR) 500 mg 24 hr tablet Take 2 tablets (1,000 mg total) by mouth 2 (two) times a day with meals 120 tablet 0   • metFORMIN (GLUCOPHAGE-XR) 500 mg 24 hr tablet Take 2 tablets (1,000 mg total) by mouth 2 (two) times a day with meals 360 tablet 3   • predniSONE 10 mg tablet Take 4 tablets at once after eating daily for 2 days then decrease by 1 pill every 2 days until prescription is gone 20 tablet 0   • simvastatin (ZOCOR) 20 mg tablet TAKE 2 TABLETS DAILY AT BEDTIME 180 tablet 3   • Triamcinolone Acetonide (Nasacort Allergy 24HR) 55 MCG/ACT AERO 2 Act (110 mcg total) into each nostril every morning 16 5 mL 3   • ketoconazole (NIZORAL) 2 % cream Apply topically 2 (two) times a day for 14 days 60 g 1   • MEGARED OMEGA-3 KRILL OIL PO Take 1 capsule by mouth daily  (Patient not taking: Reported on 2022)       No current facility-administered medications for this visit  SOCIAL HISTORY:  Social History     Socioeconomic History   • Marital status: /Civil Union     Spouse name: None   • Number of children: None   • Years of education: None   • Highest education level: None   Occupational History   • None   Tobacco Use   • Smoking status: Former     Types: Cigarettes     Quit date:      Years since quittin 0   • Smokeless tobacco: Former     Quit date:    Vaping Use   • Vaping Use: Never used   Substance and Sexual Activity   • Alcohol use: No   • Drug use: No   • Sexual activity: Not Currently   Other Topics Concern   • None   Social History Narrative    Always uses seat belt    Copy of advanced directive obtained    Drinks coffee-drinks 5 cups a day    Has smoke detectors    Uses safety equipment-seatbelts     Social Determinants of Health     Financial Resource Strain: Low Risk    • Difficulty of Paying Living Expenses: Not hard at all   Food Insecurity: Not on file   Transportation Needs: No Transportation Needs   • Lack of Transportation (Medical): No   • Lack of Transportation (Non-Medical):  No   Physical Activity: Not on file   Stress: Not on file   Social Connections: Not on file   Intimate Partner Violence: Not on file   Housing Stability: Not on file        REVIEW OF SYSTEMS:  GENERAL: No fever or chills  HEART: No chest pain, or palpitation  RESPIRATORY:  No acute SOB or cough  GI: No Nausea, vomit or diarrhea  NEUROLOGIC: No syncope or acute weakness    PHYSICAL EXAM:    /78   Pulse 99   Ht 5' 3" (1 6 m) Comment: verbal  Wt 70 3 kg (155 lb)   BMI 27 46 kg/m²     VASCULAR EXAM  Dorsalis pedis  absent, Posterior tibial artery  +1  The patient has class findings with skin atrophy, lack of digital hair, and nail dystrophy  There is trace lower extremity edema on the left  Venous stasis skin changes noted BLE  NEUROLOGIC EXAM  Sensation is intact to light touch  Sensation is absent to 10gm monofilament  Vibratory sensation diminished  No focal neurologic deficit  Tingling numbness paresthesias noted bilateral         DERMATOLOGIC EXAM:   No ulcer or cellulitis noted  The patient has dystrophic/hypertrophic toenails with yellow/white discoloration, onycholysis, and subungal debris  Fungal odor noted  Right foot nails severely dystrophic x1 with 0 5 cm ave thickness-hallux  Left foot nails severely dystrophic x1 with 0 5 cm ave thickness-hallux  Patient has hyperkeratotic lesions noted at right hallux medial IPJ  Texture, Tone and Turgor are diminished? Yes  No notable suspicious skin lesions  MUSCULOSKELETAL EXAM:   No acute joint pain, edema, or redness  No acute musculoskeletal problem  Patient has deformity (mild edema) including continued pain left midfoot  Prior fracture second metatarsal base in October 2022  Reports occasional ache and minimal tenderness with palpation of second metatarsal base  Patient has no ambulation limitations  Patient wears DM shoes? Yes    Risk Category:   1 = Loss of protective sensation    A)  Has the patient had a previous amputation of the foot or integral skeletal portion thereof? No  B)  Does the patient have absent dorsalis pedis or posterior tibial pulse? Yes        Does the patient have three of the following? Yes        1  Hair growth (increased or decreased), 2   Nail changes (thickening) and 3  Pigmentary changes (discoloring)  C)  Does the patient have two of the following and one above? Yes       3  Edema and 4    Paraesthesias (abnormal spontaneous sensations in the feet)

## 2023-01-26 ENCOUNTER — TELEPHONE (OUTPATIENT)
Dept: PODIATRY | Facility: CLINIC | Age: 83
End: 2023-01-26

## 2023-01-26 NOTE — TELEPHONE ENCOUNTER
Caller: Paulina Julien    Doctor/Office : Dr Hale/Richard    Radiology CB# : [phone] option [address] [facility] called to report Significant finding on X-Ray of left foot  Please see Epic for details

## 2023-01-31 ENCOUNTER — TELEPHONE (OUTPATIENT)
Dept: OTHER | Facility: OTHER | Age: 83
End: 2023-01-31

## 2023-02-02 ENCOUNTER — LAB (OUTPATIENT)
Dept: LAB | Facility: CLINIC | Age: 83
End: 2023-02-02

## 2023-02-02 DIAGNOSIS — I10 ESSENTIAL HYPERTENSION: ICD-10-CM

## 2023-02-02 DIAGNOSIS — E78.2 MIXED HYPERLIPIDEMIA: ICD-10-CM

## 2023-02-02 DIAGNOSIS — E55.9 VITAMIN D DEFICIENCY: ICD-10-CM

## 2023-02-02 DIAGNOSIS — M81.0 AGE-RELATED OSTEOPOROSIS WITHOUT CURRENT PATHOLOGICAL FRACTURE: ICD-10-CM

## 2023-02-02 DIAGNOSIS — E03.8 HYPOTHYROIDISM DUE TO HASHIMOTO'S THYROIDITIS: ICD-10-CM

## 2023-02-02 DIAGNOSIS — E06.3 HYPOTHYROIDISM DUE TO HASHIMOTO'S THYROIDITIS: ICD-10-CM

## 2023-02-02 DIAGNOSIS — E04.0 DIFFUSE NONTOXIC GOITER: ICD-10-CM

## 2023-02-02 DIAGNOSIS — E11.40 TYPE 2 DIABETES MELLITUS WITH DIABETIC NEUROPATHY, WITHOUT LONG-TERM CURRENT USE OF INSULIN (HCC): ICD-10-CM

## 2023-02-02 LAB
25(OH)D3 SERPL-MCNC: 43.9 NG/ML (ref 30–100)
ALBUMIN SERPL BCP-MCNC: 3.9 G/DL (ref 3.5–5)
ALP SERPL-CCNC: 74 U/L (ref 46–116)
ALT SERPL W P-5'-P-CCNC: 20 U/L (ref 12–78)
ANION GAP SERPL CALCULATED.3IONS-SCNC: 5 MMOL/L (ref 4–13)
AST SERPL W P-5'-P-CCNC: 18 U/L (ref 5–45)
BACTERIA UR QL AUTO: ABNORMAL /HPF
BASOPHILS # BLD AUTO: 0.02 THOUSANDS/ÂΜL (ref 0–0.1)
BASOPHILS NFR BLD AUTO: 0 % (ref 0–1)
BILIRUB SERPL-MCNC: 0.88 MG/DL (ref 0.2–1)
BILIRUB UR QL STRIP: NEGATIVE
BUN SERPL-MCNC: 13 MG/DL (ref 5–25)
CALCIUM SERPL-MCNC: 9.9 MG/DL (ref 8.3–10.1)
CHLORIDE SERPL-SCNC: 105 MMOL/L (ref 96–108)
CHOLEST SERPL-MCNC: 141 MG/DL
CLARITY UR: CLEAR
CO2 SERPL-SCNC: 28 MMOL/L (ref 21–32)
COLOR UR: ABNORMAL
CREAT SERPL-MCNC: 0.64 MG/DL (ref 0.6–1.3)
EOSINOPHIL # BLD AUTO: 0.27 THOUSAND/ÂΜL (ref 0–0.61)
EOSINOPHIL NFR BLD AUTO: 3 % (ref 0–6)
ERYTHROCYTE [DISTWIDTH] IN BLOOD BY AUTOMATED COUNT: 13.4 % (ref 11.6–15.1)
GFR SERPL CREATININE-BSD FRML MDRD: 83 ML/MIN/1.73SQ M
GLUCOSE P FAST SERPL-MCNC: 135 MG/DL (ref 65–99)
GLUCOSE UR STRIP-MCNC: ABNORMAL MG/DL
HCT VFR BLD AUTO: 51.4 % (ref 34.8–46.1)
HDLC SERPL-MCNC: 36 MG/DL
HGB BLD-MCNC: 15.6 G/DL (ref 11.5–15.4)
HGB UR QL STRIP.AUTO: NEGATIVE
IMM GRANULOCYTES # BLD AUTO: 0.01 THOUSAND/UL (ref 0–0.2)
IMM GRANULOCYTES NFR BLD AUTO: 0 % (ref 0–2)
KETONES UR STRIP-MCNC: NEGATIVE MG/DL
LDLC SERPL CALC-MCNC: 63 MG/DL (ref 0–100)
LEUKOCYTE ESTERASE UR QL STRIP: ABNORMAL
LYMPHOCYTES # BLD AUTO: 3.36 THOUSANDS/ÂΜL (ref 0.6–4.47)
LYMPHOCYTES NFR BLD AUTO: 42 % (ref 14–44)
MCH RBC QN AUTO: 28.1 PG (ref 26.8–34.3)
MCHC RBC AUTO-ENTMCNC: 30.4 G/DL (ref 31.4–37.4)
MCV RBC AUTO: 93 FL (ref 82–98)
MONOCYTES # BLD AUTO: 0.64 THOUSAND/ÂΜL (ref 0.17–1.22)
MONOCYTES NFR BLD AUTO: 8 % (ref 4–12)
MUCOUS THREADS UR QL AUTO: ABNORMAL
NEUTROPHILS # BLD AUTO: 3.76 THOUSANDS/ÂΜL (ref 1.85–7.62)
NEUTS SEG NFR BLD AUTO: 47 % (ref 43–75)
NITRITE UR QL STRIP: NEGATIVE
NON-SQ EPI CELLS URNS QL MICRO: ABNORMAL /HPF
NONHDLC SERPL-MCNC: 105 MG/DL
NRBC BLD AUTO-RTO: 0 /100 WBCS
PH UR STRIP.AUTO: 6 [PH]
PLATELET # BLD AUTO: 246 THOUSANDS/UL (ref 149–390)
PMV BLD AUTO: 10.8 FL (ref 8.9–12.7)
POTASSIUM SERPL-SCNC: 5.2 MMOL/L (ref 3.5–5.3)
PROT SERPL-MCNC: 7.7 G/DL (ref 6.4–8.4)
PROT UR STRIP-MCNC: NEGATIVE MG/DL
RBC # BLD AUTO: 5.55 MILLION/UL (ref 3.81–5.12)
RBC #/AREA URNS AUTO: ABNORMAL /HPF
SODIUM SERPL-SCNC: 138 MMOL/L (ref 135–147)
SP GR UR STRIP.AUTO: 1.03 (ref 1–1.03)
T4 FREE SERPL-MCNC: 1.03 NG/DL (ref 0.76–1.46)
TRIGL SERPL-MCNC: 209 MG/DL
TSH SERPL DL<=0.05 MIU/L-ACNC: 1.19 UIU/ML (ref 0.45–4.5)
UROBILINOGEN UR STRIP-ACNC: <2 MG/DL
WBC # BLD AUTO: 8.06 THOUSAND/UL (ref 4.31–10.16)
WBC #/AREA URNS AUTO: ABNORMAL /HPF

## 2023-02-03 LAB
EST. AVERAGE GLUCOSE BLD GHB EST-MCNC: 186 MG/DL
HBA1C MFR BLD: 8.1 %

## 2023-02-03 NOTE — RESULT ENCOUNTER NOTE
Please call the patient regarding abnormal result  Hemoglobin A1c is elevated to 8 1   TSH and free T4 are normal   Fasting glucose is elevated on recent CMP  Triglycerides are elevated on fasting lipid panel which is most likely related to her hyperglycemia

## 2023-02-15 ENCOUNTER — REMOTE DEVICE CLINIC VISIT (OUTPATIENT)
Dept: CARDIOLOGY CLINIC | Facility: CLINIC | Age: 83
End: 2023-02-15

## 2023-02-15 ENCOUNTER — TELEPHONE (OUTPATIENT)
Dept: GASTROENTEROLOGY | Facility: CLINIC | Age: 83
End: 2023-02-15

## 2023-02-15 ENCOUNTER — OFFICE VISIT (OUTPATIENT)
Dept: GASTROENTEROLOGY | Facility: CLINIC | Age: 83
End: 2023-02-15

## 2023-02-15 VITALS
SYSTOLIC BLOOD PRESSURE: 138 MMHG | WEIGHT: 157 LBS | HEIGHT: 63 IN | BODY MASS INDEX: 27.82 KG/M2 | DIASTOLIC BLOOD PRESSURE: 78 MMHG

## 2023-02-15 DIAGNOSIS — K22.70 BARRETT'S ESOPHAGUS WITHOUT DYSPLASIA: ICD-10-CM

## 2023-02-15 DIAGNOSIS — R13.19 ESOPHAGEAL DYSPHAGIA: Primary | ICD-10-CM

## 2023-02-15 DIAGNOSIS — Z95.0 PRESENCE OF PERMANENT CARDIAC PACEMAKER: Primary | ICD-10-CM

## 2023-02-15 DIAGNOSIS — C18.9 ADENOCARCINOMA OF COLON (HCC): ICD-10-CM

## 2023-02-15 NOTE — LETTER
February 15, 2023     Hany Gallardo Kimmiguel [address] [address]    Patient: Cass Lao   YOB: 1940   Date of Visit: 2/15/2023       Dear Dr Doroteo Ayala: Thank you for referring Reema Peterson to me for evaluation  Below are my notes for this consultation  If you have questions, please do not hesitate to call me  I look forward to following your patient along with you  Sincerely,        Pham Miguel MD        CC: DO Pham Gray MD  2/15/2023  2:49 PM  Sign when Signing Visit  [address] [facility] - Outpatient Follow-up Note  Cass Lao 81 y o  female MRN: [id number]  Encounter: [id number]    ASSESSMENT AND PLAN:      1  Esophageal dysphagia  Increasing issues with solid food dysphagia   - EGD; Future    2  Chronic anticoagulation  On Eliquis secondary to atrial fibrillation  Followed by Dr Medhat Wooten  -Will hold Eliquis for 2 days prior to procedure if okay with cardiology  Patient understands the potential thromboembolic risks    3  Adenocarcinoma of colon [facility])  Last EGD March 2021  Will reevaluate next year decide whether we proceed with a follow-up colonoscopy at age 81      Followup Appointment: 1 year  ______________________________________________________________________    Chief Complaint   Patient presents with   • clearance for colonoscopy     On blood thinner     HPI: Back in the office  He had GERD/Pacheco's and colon cancer and was last seen by us in March 2021  She has history of paraesophageal hiatal hernia with fundoplication in 3030  She had an EGD and colonoscopy done by Dr Jennyfer Garcia  There was no evidence of intestinal metaplasia on biopsies  There was no plans to repeat her EGD  We are then to see her in the office in 3 years to evaluate whether she was able enough for colonoscopy  She returns today with increasing issues with dysphagia    She reports solid centrically apples have been getting hung up when she tries to swallow  She reports a significant amount of phlegm and it eventually goes down  She denies any heartburn or indigestion  Denies any chest or abdominal pain  She is moving her bowels regularly  Her weight is stable  She is on chronic anticoagulation secondary to atrial fibrillation followed by Dr Melgar in [place] [id number]   Past Medical History:   Diagnosis Date   • A-fib [facility])    • Abnormal ECG     last assessed: 2015   • Acid reflux     resolved   • Anxiety    • Pacheco esophagus    • Benign neoplasm of sigmoid colon 2011    next colon    • Bronchitis, asthmatic     last assessed: 3/12/2012   • [facility]     2012- had colon resection   • Colon polyp    • COPD (chronic obstructive pulmonary disease) (HCC)     questionable   • Depression    • Diabetes mellitus ([facility] )     on byetta   • Dizziness     occ   • Esophageal stricture     last assessed: 2014   • High cholesterol    • Hypertension    • Hypothyroidism    • Iron (Fe) deficiency anemia 2011    iron pill continue   • Irritable bowel syndrome    • OA (osteoarthritis)    • Organoaxial gastric volvulus 2016   • Pacemaker     hx SSS   • Rash     labia area- uses cream prn   • Restrictive lung disease    • Seasonal allergies    • Skin scarring/fibrosis     fibrotic scar; last assessed: 3/22/2013   • MONA (stress urinary incontinence, female)    • Urinary frequency     last assessed: 9/10/2012   • Wears glasses     reading     Past Surgical History:   Procedure Laterality Date   • BREAST EXCISIONAL BIOPSY Right     benign, best guess on year   • [address]      does not know type  Dr Barcenas  is cariologist   •  SECTION     • COLON SURGERY      resection  removed 18 In    • COLONOSCOPY  2012 2018: Adenomatous polyp, colorectal anastomosis, internal hemorrhoids    2012:  proctosigmoidectomy   • ESOPHAGOGASTRODUODENOSCOPY N/A 2016    Procedure: ESOPHAGOGASTRODUODENOSCOPY (EGD); Surgeon: Darlene Chau MD;  [facility];  Service:    • HYSTERECTOMY  1978   • WY ARTHROPLASTY INTERPHALANGEAL JOINT EACH Right 2018    Procedure: ARTHROPLASTY PIP/FINGER - RIGHT RING;  Surgeon: Sravani Lemus MD;  [facility];  Service: Orthopedics   • WY ARTHRP INTERPOS INTERCARPAL/METACARPAL JOINTS Right 1/10/2019    Procedure: ARTHROPLASTY Wynonia Rim;  Surgeon: Sravani Lemus MD;  [facility];  Service: Orthopedics   • WY CYSTOURETHROSCOPY N/A 2018    Procedure: Jennifer Moses;  Surgeon: Sky Dean MD;  [facility];  Service: UroGynecology          • WY LAPS RPR PARAESPHGL HRNA INCL [address] N/A 2016    Procedure: LAPAROSCOPIC PARAESOPHAGEAL HERNIA REPAIR with mesh; toupet  FUNDOPLICATION ;  Surgeon: Travis Marley MD;  [facility];  Service: Thoracic   • WY SLING OPERATION STRESS INCONTINENCE N/A 2018    Procedure: INSERTION PUBOVAGINAL SLING (FEMALE); Surgeon: Sky Dean MD;  [facility];  Service: UroGynecology          • TOTAL ABDOMINAL HYSTERECTOMY     • UPPER GASTROINTESTINAL ENDOSCOPY      2018:  Irregular Z-line positive for intestinal metaplasia/ Pacheco's, no dysplasia  Gastritis H pylori negative     • WRIST TENDON TRANSFER Right 1/10/2019    Procedure: TRANSFER TENDON FLEXOR CARPI RADIALIS FROM FOREARM TO HAND;  Surgeon: Sravani Lemus MD;  [facility];  Service: Orthopedics     Social History     Substance and Sexual Activity   Alcohol Use No     Social History     Substance and Sexual Activity   Drug Use No     Social History     Tobacco Use   Smoking Status Former   • Types: Cigarettes   • Quit date:    • Years since quittin 1   Smokeless Tobacco Former   • Quit date:      Family History   Problem Relation Age of Onset   • Heart disease Mother    • Diabetes Mother    • Asthma Father • Stomach cancer Father         gastrkic cancer   • Cancer Paternal Grandmother    • Cancer Paternal Grandfather    • No Known Problems Brother    • Breast cancer Sister 76   • Breast cancer Sister 77   • Pancreatic cancer Sister 72   • No Known Problems Maternal Aunt    • No Known Problems Maternal Aunt    • No Known Problems Maternal Aunt    • No Known Problems Paternal Aunt    • No Known Problems Daughter    • No Known Problems Maternal Grandmother    • No Known Problems Maternal Grandfather    • Prostate cancer Paternal Uncle         age unknown   • Substance Abuse Neg Hx    • Mental illness Neg Hx    • Alcohol abuse Neg Hx    • Colon cancer Neg Hx    • Colon polyps Neg Hx          Current Outpatient Medications:   •  albuterol (ProAir HFA) 90 mcg/act inhaler  •  Apple Cider Vinegar 500 MG TABS  •  ascorbic acid (VITAMIN C) 500 mg tablet  •  atenolol (TENORMIN) 25 mg tablet  •  Blood Glucose Monitoring Suppl (OneTouch Verio) w/Device KIT  •  Byetta 10 MCG Pen 10 MCG/0 04ML SOPN  •  Cholecalciferol (VITAMIN D-3) 1000 units CAPS  •  clobetasol (TEMOVATE) 0 05 % cream  •  Coenzyme Q10 (COQ10) 200 MG CAPS  •  Continuous Blood Gluc  (FreeStyle Julian 14 Day [place]) SVETLANA  •  Continuous Blood Gluc Sensor (Notch Wearable Movement CaptureStyle Julian 14 Day Sensor) MISC  •  cycloSPORINE (Restasis) 0 05 % ophthalmic emulsion  •  DULoxetine (CYMBALTA) 60 mg delayed release capsule  •  Eliquis 5 MG  •  estradiol (ESTRACE VAGINAL) 0 1 mg/g vaginal cream  •  Ferrous Sulfate 27 MG TABS  •  FREESTYLE LITE test strip  •  Glucosamine-Chondroitin (MOVE FREE PO)  •  glucose blood (OneTouch Verio) test strip  •  hydrocortisone 2 5 % cream  •  Jardiance 25 MG TABS  •  Lancets (onetouch ultrasoft) lancets  •  levothyroxine 125 mcg tablet  •  metFORMIN (GLUCOPHAGE-XR) 500 mg 24 hr tablet  •  metFORMIN (GLUCOPHAGE-XR) 500 mg 24 hr tablet  •  predniSONE 10 mg tablet  •  simvastatin (ZOCOR) 20 mg tablet  •  Triamcinolone Acetonide (Nasacort Allergy 24HR) 55 MCG/ACT AERO  •  ketoconazole (NIZORAL) 2 % cream  •  MEGARED OMEGA-3 KRILL OIL PO  Allergies   Allergen Reactions   • Fluconazole Dizziness     Reviewed medications and allergies and updated as indicated    PHYSICAL EXAM:    Blood pressure 138/78, height 5' 3" (1 6 m), weight 71 2 kg (157 lb), not currently breastfeeding  Body mass index is 27 81 kg/m²  General Appearance: NAD, cooperative, alert  Eyes: Anicteric, PERRLA, EOMI  ENT:  Normocephalic, atraumatic, normal mucosa  Neck:  Supple, symmetrical, trachea midline  Resp:  Clear to auscultation bilaterally; no rales, rhonchi or wheezing; respirations unlabored   CV:  S1 S2, Regular rate and rhythm; no murmur, rub, or gallop  GI:  Soft, non-tender, non-distended; normal bowel sounds; no masses, no organomegaly   Rectal: Deferred  Musculoskeletal: No cyanosis, clubbing or edema  Normal ROM    Skin:  No jaundice, rashes, or lesions   Heme/Lymph: No palpable cervical lymphadenopathy  Psych: Normal affect, good eye contact  Neuro: No gross deficits, AAOx3    Lab Results:   Lab Results   Component Value Date    WBC 8 06 02/02/2023    HGB 15 6 (H) 02/02/2023    HCT 51 4 (H) 02/02/2023    MCV 93 02/02/2023     02/02/2023     Lab Results   Component Value Date     12/21/2015    K 5 2 02/02/2023     02/02/2023    CO2 28 02/02/2023    ANIONGAP 7 12/21/2015    BUN 13 02/02/2023    CREATININE 0 64 02/02/2023    GLUCOSE 245 (H) 01/27/2016    GLUF 135 (H) 02/02/2023    CALCIUM 9 9 02/02/2023    AST 18 02/02/2023    ALT 20 02/02/2023    ALKPHOS 74 02/02/2023    PROT 7 6 12/21/2015    BILITOT 0 83 12/21/2015    EGFR 83 02/02/2023     Lab Results   Component Value Date    FERRITIN 61 3 12/26/2014     No results found for: LIPASE    Radiology Results:   None recently

## 2023-02-15 NOTE — PROGRESS NOTES
[address] [facility] / [facility] - Outpatient Follow-up Note  Giblert Crawley 81 y o  female MRN: [id number]  Encounter: [id number]    ASSESSMENT AND PLAN:      1  Esophageal dysphagia  Increasing issues with solid food dysphagia   - EGD; Future    2  Chronic anticoagulation  On Eliquis secondary to atrial fibrillation  Followed by Dr Narinder Yepez  -Will hold Eliquis for 2 days prior to procedure if okay with cardiology  Patient understands the potential thromboembolic risks    3  Adenocarcinoma of colon [facility])  Last EGD 2021  Will reevaluate next year decide whether we proceed with a follow-up colonoscopy at age 81      Followup Appointment: 1 year  ______________________________________________________________________    Chief Complaint   Patient presents with   • clearance for colonoscopy     On blood thinner     HPI: Back in the office  He had GERD/Pacheco's and colon cancer and was last seen by us in 2021  She has history of paraesophageal hiatal hernia with fundoplication in 180  She had an EGD and colonoscopy done by Dr Brock [place]  There was no evidence of intestinal metaplasia on biopsies  There was no plans to repeat her EGD  We are then to see her in the office in 3 years to evaluate whether she was able enough for colonoscopy  She returns today with increasing issues with dysphagia  She reports solid centrically apples have been getting hung up when she tries to swallow  She reports a significant amount of phlegm and it eventually goes down  She denies any heartburn or indigestion  Denies any chest or abdominal pain  She is moving her bowels regularly  Her weight is stable    She is on chronic anticoagulation secondary to atrial [facility]   Past Medical History:   Diagnosis Date   • A-fib [facility])    • Abnormal ECG     last assessed: 2015   • Acid reflux     resolved   • Anxiety    • Pacheco esophagus    • Benign neoplasm of sigmoid colon 2011    next colon    • Bronchitis, asthmatic     last assessed: 3/12/2012   • [facility]     2012- had colon resection   • Colon polyp    • COPD (chronic obstructive pulmonary disease) (HCC)     questionable   • Depression    • Diabetes mellitus ([facility] )     on byetta   • Dizziness     occ   • Esophageal stricture     last assessed: 2014   • High cholesterol    • Hypertension    • Hypothyroidism    • Iron (Fe) deficiency anemia 2011    iron pill continue   • Irritable bowel syndrome    • OA (osteoarthritis)    • Organoaxial gastric volvulus 2016   • Pacemaker     hx SSS   • Rash     labia area- uses cream prn   • Restrictive lung disease    • Seasonal allergies    • Skin scarring/fibrosis     fibrotic scar; last assessed: 3/22/2013   • MONA (stress urinary incontinence, female)    • Urinary frequency     last assessed: 9/10/2012   • Wears glasses     reading     Past Surgical History:   Procedure Laterality Date   • BREAST EXCISIONAL BIOPSY Right     benign, best guess on year   • [address]      does not know type  Dr Bradley Baker is cariologist   •  SECTION     • COLON SURGERY      resection  removed 18 In    • COLONOSCOPY  2012: Adenomatous polyp, colorectal anastomosis, internal hemorrhoids  2012:  proctosigmoidectomy   • ESOPHAGOGASTRODUODENOSCOPY N/A 2016    Procedure: ESOPHAGOGASTRODUODENOSCOPY (EGD);   Surgeon: Dimple Dunlap MD;  Location[facility];  Service:    • HYSTERECTOMY  1978   • GA ARTHROPLASTY INTERPHALANGEAL JOINT EACH Right 2018    Procedure: ARTHROPLASTY PIP/FINGER - RIGHT RING;  Surgeon: Holly Esteves MD;  [facility];  Service: Orthopedics   • GA ARTHRP INTERPOS INTERCARPAL/METACARPAL JOINTS Right 1/10/2019    Procedure: Alma Cotton;  Surgeon: Holly Esteves MD;  [facility];  Service: Orthopedics   • GA CYSTOURETHROSCOPY N/A 2018    Procedure: Florence Latif;  Surgeon: William Bryant MD;  [facility];  Service: UroGynecology          • AR LAPS RPR PARAESPHGL HRNA INCL FUNDPLSTY [address] N/A 2016    Procedure: LAPAROSCOPIC PARAESOPHAGEAL HERNIA REPAIR with mesh; toupet  FUNDOPLICATION ;  Surgeon: Lolis Baez MD;  Location: [facility];  Service: Thoracic   • AR SLING OPERATION STRESS INCONTINENCE N/A 2018    Procedure: INSERTION PUBOVAGINAL SLING (FEMALE); Surgeon: William Bryant MD;  [facility];  Service: UroGynecology          • TOTAL ABDOMINAL HYSTERECTOMY     • UPPER GASTROINTESTINAL ENDOSCOPY      2018:  Irregular Z-line positive for intestinal metaplasia/ Pacheco's, no dysplasia  Gastritis H pylori negative     • WRIST TENDON TRANSFER Right 1/10/2019    Procedure: TRANSFER TENDON FLEXOR CARPI RADIALIS FROM FOREARM TO HAND;  Surgeon: Odette Spivey MD;  [facility];  Service: Orthopedics     Social History     Substance and Sexual Activity   Alcohol Use No     Social History     Substance and Sexual Activity   Drug Use No     Social History     Tobacco Use   Smoking Status Former   • Types: Cigarettes   • Quit date:    • Years since quittin 1   Smokeless Tobacco Former   • Quit date:      Family History   Problem Relation Age of Onset   • Heart disease Mother    • Diabetes Mother    • Asthma Father    • Stomach cancer Father         gastrkic cancer   • Cancer Paternal Grandmother    • Cancer Paternal Grandfather    • No Known Problems Brother    • Breast cancer Sister 76   • Breast cancer Sister 77   • Pancreatic cancer Sister 72   • No Known Problems Maternal Aunt    • No Known Problems Maternal Aunt    • No Known Problems Maternal Aunt    • No Known Problems Paternal Aunt    • No Known Problems Daughter    • No Known Problems Maternal Grandmother    • No Known Problems Maternal Grandfather    • Prostate cancer Paternal Uncle         age unknown   • Substance Abuse Neg Hx    • Mental illness Neg Hx    • Alcohol abuse Neg Hx    • Colon cancer Neg Hx    • Colon polyps Neg Hx          Current Outpatient Medications:   •  albuterol (ProAir HFA) 90 mcg/act inhaler  •  Apple Cider Vinegar 500 MG TABS  •  ascorbic acid (VITAMIN C) 500 mg tablet  •  atenolol (TENORMIN) 25 mg tablet  •  Blood Glucose Monitoring Suppl (OneTouch Verio) w/Device KIT  •  Byetta 10 MCG Pen 10 MCG/0 04ML SOPN  •  Cholecalciferol (VITAMIN D-3) 1000 units CAPS  •  clobetasol (TEMOVATE) 0 05 % cream  •  Coenzyme Q10 (COQ10) 200 MG CAPS  •  Continuous Blood Gluc  (FreeStyle Julian 14 Day [place]) SVETLANA  •  Continuous Blood Gluc Sensor (RheonixStyle Julian 14 Day Sensor) MISC  •  cycloSPORINE (Restasis) 0 05 % ophthalmic emulsion  •  DULoxetine (CYMBALTA) 60 mg delayed release capsule  •  Eliquis 5 MG  •  estradiol (ESTRACE VAGINAL) 0 1 mg/g vaginal cream  •  Ferrous Sulfate 27 MG TABS  •  FREESTYLE LITE test strip  •  Glucosamine-Chondroitin (MOVE FREE PO)  •  glucose blood (OneTouch Verio) test strip  •  hydrocortisone 2 5 % cream  •  Jardiance 25 MG TABS  •  Lancets (onetouch ultrasoft) lancets  •  levothyroxine 125 mcg tablet  •  metFORMIN (GLUCOPHAGE-XR) 500 mg 24 hr tablet  •  metFORMIN (GLUCOPHAGE-XR) 500 mg 24 hr tablet  •  predniSONE 10 mg tablet  •  simvastatin (ZOCOR) 20 mg tablet  •  Triamcinolone Acetonide (Nasacort Allergy 24HR) 55 MCG/ACT AERO  •  ketoconazole (NIZORAL) 2 % cream  •  MEGARED OMEGA-3 KRILL OIL PO  Allergies   Allergen Reactions   • Fluconazole Dizziness     Reviewed medications and allergies and updated as indicated    PHYSICAL EXAM:    Blood pressure 138/78, height 5' 3" (1 6 m), weight 71 2 kg (157 lb), not currently breastfeeding  Body mass index is 27 81 kg/m²  General Appearance: NAD, cooperative, alert  Eyes: Anicteric, PERRLA, EOMI  ENT:  Normocephalic, atraumatic, normal mucosa      Neck:  Supple, symmetrical, trachea midline  Resp:  Clear to auscultation bilaterally; no rales, rhonchi or wheezing; respirations unlabored   CV:  S1 S2, Regular rate and rhythm; no murmur, rub, or gallop  GI:  Soft, non-tender, non-distended; normal bowel sounds; no masses, no organomegaly   Rectal: Deferred  Musculoskeletal: No cyanosis, clubbing or edema  Normal ROM    Skin:  No jaundice, rashes, or lesions   Heme/Lymph: No palpable cervical lymphadenopathy  Psych: Normal affect, good eye contact  Neuro: No gross deficits, AAOx3    Lab Results:   Lab Results   Component Value Date    WBC 8 06 02/02/2023    HGB 15 6 (H) 02/02/2023    HCT 51 4 (H) 02/02/2023    MCV 93 02/02/2023     02/02/2023     Lab Results   Component Value Date     12/21/2015    K 5 2 02/02/2023     02/02/2023    CO2 28 02/02/2023    ANIONGAP 7 12/21/2015    BUN 13 02/02/2023    CREATININE 0 64 02/02/2023    GLUCOSE 245 (H) 01/27/2016    GLUF 135 (H) 02/02/2023    CALCIUM 9 9 02/02/2023    AST 18 02/02/2023    ALT 20 02/02/2023    ALKPHOS 74 02/02/2023    PROT 7 6 12/21/2015    BILITOT 0 83 12/21/2015    EGFR 83 02/02/2023     Lab Results   Component Value Date    FERRITIN 61 3 12/26/2014     No results found for: LIPASE    Radiology Results:   None recently

## 2023-02-15 NOTE — TELEPHONE ENCOUNTER
Scheduled date of EGD(as of today):4/6/23  Physician performing EGD:KHOA  Location of EGD:[facility]  Instructions reviewed with patient by:LISA  Clearances:  Ferny Francisco

## 2023-02-15 NOTE — PROGRESS NOTES
MDT-DUAL CHAMBER PPM/ ACTIVE SYSTEM IS MRI CONDITIONAL   CARELINK TRANSMISSION:  BATTERY VOLTAGE ADEQUATE (16 MONTHS)   AP 47 0%  24 8%   ALL LEAD PARAMETERS WITHIN NORMAL LIMITS   1 VT-NS, 10 FAST A&V, AND 1,930 AT/AF EPISODES WITH ALL AVAILABLE EGMS SHOWING AF WITH RVR UP  BPM, INT   AF UNDERSENSING WITH COMPETITIVE PACING, AND AF BURDEN 52 6% SINCE 11/9/2022   LONGEST AF EPISODE IN PROGRESS SINCE 2/14/2023   PATIENT TAKES ELIQUIS AND ATENOLOL   NORMAL DEVICE FUNCTION   RG

## 2023-02-16 ENCOUNTER — RA CDI HCC (OUTPATIENT)
Dept: OTHER | Facility: HOSPITAL | Age: 83
End: 2023-02-16

## 2023-02-17 ENCOUNTER — TELEPHONE (OUTPATIENT)
Dept: GASTROENTEROLOGY | Facility: CLINIC | Age: 83
End: 2023-02-17

## 2023-02-17 ENCOUNTER — TELEPHONE (OUTPATIENT)
Dept: ENDOCRINOLOGY | Facility: HOSPITAL | Age: 83
End: 2023-02-17

## 2023-02-17 NOTE — TELEPHONE ENCOUNTER
Our mutual patient is scheduled for procedure: EGD    On: 4/06/23     With: Dr Mercedez Park is taking the following blood thinner: Eliquis    Can this be stopped __2____ days prior to the procedure?       Physician Approving clearance: Dr Carri Lynn

## 2023-02-23 ENCOUNTER — OFFICE VISIT (OUTPATIENT)
Dept: FAMILY MEDICINE CLINIC | Facility: CLINIC | Age: 83
End: 2023-02-23

## 2023-02-23 VITALS
SYSTOLIC BLOOD PRESSURE: 122 MMHG | HEIGHT: 63 IN | HEART RATE: 76 BPM | OXYGEN SATURATION: 98 % | WEIGHT: 157 LBS | RESPIRATION RATE: 15 BRPM | DIASTOLIC BLOOD PRESSURE: 78 MMHG | BODY MASS INDEX: 27.82 KG/M2

## 2023-02-23 DIAGNOSIS — I10 ESSENTIAL HYPERTENSION: ICD-10-CM

## 2023-02-23 DIAGNOSIS — E55.9 VITAMIN D DEFICIENCY: ICD-10-CM

## 2023-02-23 DIAGNOSIS — Z78.0 MENOPAUSE: ICD-10-CM

## 2023-02-23 DIAGNOSIS — K21.9 GASTRO-ESOPHAGEAL REFLUX DISEASE WITHOUT ESOPHAGITIS: ICD-10-CM

## 2023-02-23 DIAGNOSIS — J98.4 RESTRICTIVE LUNG DISEASE: ICD-10-CM

## 2023-02-23 DIAGNOSIS — E11.40 TYPE 2 DIABETES MELLITUS WITH DIABETIC NEUROPATHY, WITHOUT LONG-TERM CURRENT USE OF INSULIN (HCC): ICD-10-CM

## 2023-02-23 DIAGNOSIS — I25.10 CORONARY ARTERY DISEASE INVOLVING NATIVE CORONARY ARTERY OF NATIVE HEART WITHOUT ANGINA PECTORIS: ICD-10-CM

## 2023-02-23 DIAGNOSIS — E04.0 DIFFUSE NONTOXIC GOITER: ICD-10-CM

## 2023-02-23 DIAGNOSIS — F41.9 ANXIETY: ICD-10-CM

## 2023-02-23 DIAGNOSIS — E06.3 HYPOTHYROIDISM DUE TO HASHIMOTO'S THYROIDITIS: ICD-10-CM

## 2023-02-23 DIAGNOSIS — I49.5 SICK SINUS SYNDROME (HCC): ICD-10-CM

## 2023-02-23 DIAGNOSIS — E03.8 HYPOTHYROIDISM DUE TO HASHIMOTO'S THYROIDITIS: ICD-10-CM

## 2023-02-23 DIAGNOSIS — R31.29 HEMATURIA, MICROSCOPIC: ICD-10-CM

## 2023-02-23 DIAGNOSIS — E11.9 TYPE 2 DIABETES MELLITUS WITHOUT COMPLICATION, WITH LONG-TERM CURRENT USE OF INSULIN (HCC): ICD-10-CM

## 2023-02-23 DIAGNOSIS — C18.9 ADENOCARCINOMA OF COLON (HCC): Primary | ICD-10-CM

## 2023-02-23 DIAGNOSIS — E78.2 MIXED HYPERLIPIDEMIA: ICD-10-CM

## 2023-02-23 DIAGNOSIS — Z79.4 TYPE 2 DIABETES MELLITUS WITHOUT COMPLICATION, WITH LONG-TERM CURRENT USE OF INSULIN (HCC): ICD-10-CM

## 2023-02-23 DIAGNOSIS — K22.70 BARRETT'S ESOPHAGUS WITHOUT DYSPLASIA: ICD-10-CM

## 2023-02-23 DIAGNOSIS — I48.0 PAROXYSMAL ATRIAL FIBRILLATION (HCC): ICD-10-CM

## 2023-02-23 DIAGNOSIS — J44.9 CHRONIC OBSTRUCTIVE PULMONARY DISEASE, UNSPECIFIED COPD TYPE (HCC): ICD-10-CM
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
## 2023-02-23 RX ORDER — METFORMIN HYDROCHLORIDE 500 MG/1
1000 TABLET, EXTENDED RELEASE ORAL 2 TIMES DAILY WITH MEALS
Qty: 360 TABLET | Refills: 1 | Status: SHIPPED | OUTPATIENT
Start: 2023-02-23

## 2023-02-23 RX ORDER — DULOXETIN HYDROCHLORIDE 60 MG/1
60 CAPSULE, DELAYED RELEASE ORAL DAILY
Qty: 90 CAPSULE | Refills: 3 | Status: SHIPPED | OUTPATIENT
Start: 2023-02-23

## 2023-02-23 RX ORDER — BUDESONIDE, GLYCOPYRROLATE, AND FORMOTEROL FUMARATE 160; 9; 4.8 UG/1; UG/1; UG/1
2 AEROSOL, METERED RESPIRATORY (INHALATION) DAILY PRN
Qty: 10.7 G | Refills: 3 | Status: SHIPPED | OUTPATIENT
Start: 2023-02-23

## 2023-02-23 RX ORDER — EXENATIDE 250 UG/ML
10 INJECTION SUBCUTANEOUS 2 TIMES DAILY
Qty: 7.2 ML | Refills: 3 | Status: SHIPPED | OUTPATIENT
Start: 2023-02-23

## 2023-02-23 NOTE — TELEPHONE ENCOUNTER
I spoke with patient, advised that we are cancelling her office visit 3/15/2023 as she was already seen 2/15/23 with Dr Jim Miller regarding Anticoagulant Clearance  Patient advised that she is to hold her Eliquis (2) days 48 hours prior to her EGD scheduled 4/6/23 9 am arrival  We will call her closer to her procedure in April to remind her to hold Eliquis  Patient re-contacted regarding Integrative Oncology referral courtesy of Dr. Medrano. Integrative Oncology services, resources, provider support and those therapies applicable to patient's treatment plan discussed. Symptoms of chemoradiation reviewed. Benefits of acupuncture for prevention of CINV, neuropathy and xerostomia explained. Plan of care reviewed with patient. He expresses appreciation. He will complete questionaire as directed. Appointments confirmed for acupuncture and KATERINE [place]. MARY Frausto.

## 2023-02-23 NOTE — PATIENT INSTRUCTIONS
But are not walking every day at least 30 minutes to help her balance and decrease her blood sugars    Stop taking Ventolin as needed and instead take the new prescription called Breztri and use it as needed for shortness of breath only  It has a steroid in it that will also help you with your shortness of breath    Please get DEXA done before next visit because it is due in July and you will be back here in August    We will see you back in 6 months or sooner if needed

## 2023-02-23 NOTE — PROGRESS NOTES
ASSESSMENT/PLAN: Full PE done this visit    Type 2 diabetes  A1c 8 1 from 7 3  Part of that could have been the prednisone she needed for her bronchitis that she had in December she is going to try to start walking to help with her balance and also to help with her diabetes since she is having a hard time giving up sweets  Currently she is taking metformin Jardiance and Byetta  She will continue the same and is currently being managed with endocrine as well    Anxiety and fatigue  She is on duloxetine and is stable  Continue the same     Atrial fibrillation/sick sinus syndrome/cardiomegaly/coronary artery disease  Is on Eliquis for anticoagulation and atenolol for rate control  She also has a pacer in and has regular rhythm today  Had several pacer checks are fine  Saw cardiology for follow-up on her A-fib and no medication changes were made     Osteopenia  Is on calcium and vitamin-D  DEXA shows no change  Continue the same  Next DEXA July 2023     Adenocarcinoma of the colon 2012  Colonoscopy showed a small polyp 2021  Patient's next colonoscopy in 3 years 2023  Patient followed up with GI since last visit and he will decide whether or not they will do another colonoscopy this year  We will however do an EGD because of her complaints     Emphysema by CT scan and by exam  Patient is taking Ventolin alone so we discussed taking a steroid with it  I sent in a new inhaler that is generic that we will work like the Ventolin but she could take it just as needed and has a steroid in it so that she is protected      Hashimoto's thyroiditis   Also following with endocrine for suppression of her thyroid with medication  Continue levothyroxine 125 mcg daily   Renewed for a year     Vitamin D deficiency   Continue vitamin D 2000 units daily   Vitamin D level is normal at 43         History of large paraesophageal hernia  Surgery on esophagus now she does not need PPI     Hyperlipidemia   Simvastatin and CoQ10 and diet  Cholesterol is excellent at 141 LDL 36 however her triglycerides remain high at 203 because of dietary indiscretions     Thyroid goiter   Followed by endocrine who orders ultrasounds         Recheck in 6 months  AWV and recheck  Prn otherwise        BMI Counseling: Body mass index is 27 81 kg/m²  The BMI is above normal  Nutrition recommendations include decreasing portion sizes, encouraging healthy choices of fruits and vegetables, decreasing fast food intake and limiting drinks that contain sugar  Exercise recommendations include exercising 3-5 times per week  Rationale for BMI follow-up plan is due to patient being overweight or obese  Depression Screening and Follow-up Plan: Patient was screened for depression during today's encounter  They screened negative with a PHQ-2 score of 0             Health Maintenance   Topic Date Due   • SLP PLAN OF CARE  Never done   • OT PLAN OF CARE  03/06/2019   • COVID-19 Vaccine (5 - Booster for Moderna series) 01/07/2022   • BMI: Followup Plan  02/15/2023   • HEMOGLOBIN A1C  08/02/2023   • Fall Risk  08/23/2023   • Urinary Incontinence Screening  08/23/2023   • Medicare Annual Wellness Visit (AWV)  08/23/2023   • Breast Cancer Screening: Mammogram  08/31/2023   • Diabetic Foot Exam  09/20/2023   • DM Eye Exam  12/02/2023   • BMI: Adult  02/15/2024   • Depression Screening  02/23/2024   • DXA SCAN  07/01/2024   • Osteoporosis Screening  Completed   • Pneumococcal Vaccine: 65+ Years  Completed   • Influenza Vaccine  Completed   • HIB Vaccine  Aged Out   • IPV Vaccine  Aged Out   • Hepatitis A Vaccine  Aged Out   • Meningococcal ACWY Vaccine  Aged Out   • HPV Vaccine  Aged Out   • Colonoscopy  Discontinued         Problem List as of 2/23/2023 Reviewed: 2/23/2023  7:39 AM by Gi Ureña DO    Abnormal echocardiogram    Accident due to mechanical fall without injury    Adenocarcinoma of colon ([address] )    Anticoagulated by anticoagulation treatment    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    Cardiomegaly    Chronic obstructive pulmonary disease (HCC)    CMC DJD(carpometacarpal degenerative joint disease), localized primary, right    Coronary artery disease involving native coronary artery of native heart without angina pectoris    Diffuse nontoxic goiter    Essential hypertension    Feeling anxious    Gastro-esophageal reflux disease without esophagitis    Hematuria, microscopic    Hypothyroidism due to Hashimoto's thyroiditis    Insomnia    Lichen sclerosus et atrophicus    Lichen simplex chronicus    Mitral regurgitation    Mixed hyperlipidemia    MRI safe cardiac pacemaker in situ    Paraesophageal hernia    Paroxysmal atrial fibrillation (HCC)    Primary osteoarthritis involving multiple joints    Restrictive lung disease    Sick sinus syndrome ([address] )    Stress incontinence in female    Type 2 diabetes mellitus with diabetic neuropathy, without long-term current use of insulin (HCC)    Vitamin D deficiency         Subjective:   Chief Complaint   Patient presents with   • Hypertension   • Diabetes   • Vitamin D Deficiency     Patient is here for her 6-month recheck    She had a blood work done since last visit  She saw GI once, cardiology a few times including pacer checks, the foot doctor a few times, endocrine at least twice  She has all of her screening blood work done as well  Knows that she needs to exercise and watch her sweets but she really enjoys her sweets  patient ID: Donald Subramanian is a 81 y o  female  Patient's past medical history, surgical history, family history, social history, and Tobacco history reviewed with patient       MED LIST WAS REVIEWED AND UPDATED    ROS  As per HPI  Rest of 12 point review of systems negative     Objective:      VITALS:  Wt Readings from Last 3 Encounters:   02/23/23 71 2 kg (157 lb)   02/15/23 71 2 kg (157 lb)   01/23/23 70 3 kg (155 lb)     BP Readings from Last 3 Encounters:   02/23/23 122/78   02/15/23 138/78 01/23/23 122/78     Pulse Readings from Last 3 Encounters:   02/23/23 76   01/23/23 99   12/20/22 86     Body mass index is 27 81 kg/m²  Laboratory Results: All pertinent labs and studies were reviewed with patient during this office visit with highlights of the results contained in this note in the ASSESSMENT AND PLAN section       Physical Exam      Gen  No acute distress well-appearing well-nourished appears stated age    Mental status  Good judgment and insight oriented to time person and place, recent and remote memory intact mood and affect normal cooperative and patient is reasonable    HEENT  PERRLA 3 mm, EOMI without nystagmus, normocephalic atraumatic without facial weakness    Neck   supple no masses trachea midline positive click normal carotid upstrokes with no bruits    Cor  Regular rhythm without ectopy or murmur, no S3-S4, normal palpation that is no heave lift or thrill    Vascular  No edema, good pedal pulses    Lungs  CTA bilaterally in no respiratory distress no wheezes rhonchi or rales, normal to palpation no tactile fremitus    Abdomen  Soft, no palpable masses, no hepatosplenomegaly, normal bowel sounds, nontender    Lymphatics  No palpable nodes in the neck, supraclavicular area, axilla, or groin    Musculoskeletal  No clubbing cyanosis or edema muscle tone normal    Skin  no rashes or abnormal appearing lesions    Neuro  Normal ambulation, cranial nerves 2-12 grossly intact, higher functioning with reasoning intact

## 2023-03-13 ENCOUNTER — PREP FOR PROCEDURE (OUTPATIENT)
Dept: GASTROENTEROLOGY | Facility: AMBULARY SURGERY CENTER | Age: 83
End: 2023-03-13

## 2023-03-13 DIAGNOSIS — R10.13 DYSPEPSIA: Primary | ICD-10-CM

## 2023-04-14 ENCOUNTER — OFFICE VISIT (OUTPATIENT)
Dept: PODIATRY | Facility: CLINIC | Age: 83
End: 2023-04-14

## 2023-04-14 VITALS
WEIGHT: 157 LBS | BODY MASS INDEX: 27.82 KG/M2 | HEIGHT: 63 IN | SYSTOLIC BLOOD PRESSURE: 122 MMHG | DIASTOLIC BLOOD PRESSURE: 84 MMHG

## 2023-04-14 DIAGNOSIS — L84 CORNS AND CALLUS: ICD-10-CM

## 2023-04-14 DIAGNOSIS — E11.40 TYPE 2 DIABETES MELLITUS WITH DIABETIC NEUROPATHY, WITHOUT LONG-TERM CURRENT USE OF INSULIN (HCC): ICD-10-CM

## 2023-04-14 DIAGNOSIS — B35.1 ONYCHOMYCOSIS: Primary | ICD-10-CM

## 2023-04-25 ENCOUNTER — OFFICE VISIT (OUTPATIENT)
Dept: ENDOCRINOLOGY | Facility: HOSPITAL | Age: 83
End: 2023-04-25

## 2023-04-25 VITALS
SYSTOLIC BLOOD PRESSURE: 120 MMHG | HEIGHT: 63 IN | BODY MASS INDEX: 27.82 KG/M2 | HEART RATE: 81 BPM | DIASTOLIC BLOOD PRESSURE: 90 MMHG | WEIGHT: 157 LBS

## 2023-04-25 DIAGNOSIS — I10 ESSENTIAL HYPERTENSION: ICD-10-CM

## 2023-04-25 DIAGNOSIS — E55.9 VITAMIN D DEFICIENCY: ICD-10-CM

## 2023-04-25 DIAGNOSIS — E06.3 HYPOTHYROIDISM DUE TO HASHIMOTO'S THYROIDITIS: ICD-10-CM

## 2023-04-25 DIAGNOSIS — E03.8 HYPOTHYROIDISM DUE TO HASHIMOTO'S THYROIDITIS: ICD-10-CM

## 2023-04-25 DIAGNOSIS — M81.0 AGE-RELATED OSTEOPOROSIS WITHOUT CURRENT PATHOLOGICAL FRACTURE: ICD-10-CM

## 2023-04-25 DIAGNOSIS — E11.40 TYPE 2 DIABETES MELLITUS WITH DIABETIC NEUROPATHY, WITHOUT LONG-TERM CURRENT USE OF INSULIN (HCC): Primary | ICD-10-CM

## 2023-04-25 DIAGNOSIS — E78.2 MIXED HYPERLIPIDEMIA: ICD-10-CM

## 2023-04-25 DIAGNOSIS — E04.0 DIFFUSE NONTOXIC GOITER: ICD-10-CM

## 2023-04-25 NOTE — PROGRESS NOTES
Chase Thakur 81 y o  female MRN: [id number]    Encounter: [id number]      Assessment/Plan     Assessment: This is a 81y o -year-old female with type 2 diabetes, hypothyroidism, hypertension, hyperlipidemia and vitamin-D deficiency         Plan:  1   Type 2 diabetes:  Her most recent hemoglobin A1c is improved to 8  1    Recent blood sugars are relatively controlled for her age and medical condition  For now, she will continue current regimen   I have asked her to continue checking her blood sugars twice daily at alternating times and for the record to the office in 2 weeks for review and further adjustment, if necessary  I encouraged her to be mindful of her diet and to be more active with walking  She is up-to-date with her annual diabetic eye exams and is seeing the podiatrist every 12 weeks  Check hemoglobin A1c prior to next visit      2   Hypothyroidism:  Her most recent TSH free T4 were normal   Continue levothyroxine 125 mcg Monday through Saturday with no tablet on Sunday   Check TSH and free T4 prior to next office visit      3   Hypertension: Continue atenolol   Check comprehensive metabolic panel prior to next visit      4   Hyperlipidemia: Triglycerides remain slightly elevated  Discussed the relationship between hyperglycemia and hypertriglyceridemia   Continue simvastatin      5   Vitamin-D deficiency:  Most recent vitamin-D level is normal   Continue supplement with 1000 units of vitamin D3 daily         6   Osteopenia: Joanna Man will continue with calcium and vitamin-D supplementation   Stressed staying active reviewed fall risk and safety at the time of the visit  CC: Type 2 Diabetes follow up    History of Present Illness     HPI:  81 y  o  female for follow-up of type 2 diabetes, hypothyroidism, hypertension and hyperlipidemia   She states she has been diagnosed with type 2 diabetes for approximately 10 years   There are no blood sugar records available for review  She is currently taking metformin 500 mg - 2 tablets twice daily, Byetta 10 mg twice daily and Jardiance 25 mg daily   She denies any other episodes of hypoglycemia recently   She denies any recent polyuria, polydipsia or polyphagia   Her most recent hemoglobin A1c from February 2, 2023 is 8  1   She states she has not been as active and has been eating large amounts of fruits (strawberries and grapes) during the summer  She states she is up-to-date with her annual diabetic eye exam with Dr Diane Aldridge of  December 2021   She complains of some numbness and tingling to her feet at times and follows Podiatry every 12 weeks for regular diabetic foot care  Her most recent diabetic foot exam was performed on March 22, 2022       For her hypothyroidism, she is treated with levothyroxine 125 mcg daily Monday through Saturday and no tablet on Sunday   Her most recent TSH from February 2, 2023 is 1 190 with a free T4 of 1 03   She complains of some constipation at times but otherwise has no symptoms/complaints of hypo or hyperthyroidism      Her hypertension is treated with atenolol 50 mg daily      For her hyperlipidemia, she is treated with simvastatin 40 mg daily      For her vitamin-D deficiency, she supplements with 1000 units of vitamin D3 daily  Her most recent 25 hydroxy vitamin-D level from February 2, 2023 is 43 9      Her most recent DEXA scan performed on July 1, 2021 revealed a T-score of -1 3 and lumbar spine consistent with low bone mineral density-osteopenia  She continues to supplement with calcium and vitamin-D daily   She denies any recent falls or fractures  Review of Systems   Constitutional: Negative  Negative for chills, fatigue and fever  HENT: Negative  Negative for trouble swallowing and voice change  Eyes: Negative  Negative for photophobia, pain, discharge, redness, itching and visual disturbance  Respiratory: Negative  Negative for chest tightness and shortness of breath  Cardiovascular: Negative  Negative for chest pain  Gastrointestinal: Negative  Negative for abdominal pain, constipation, diarrhea and vomiting  Endocrine: Positive for polyuria (1-2 times per night nocturia)  Negative for cold intolerance, heat intolerance, polydipsia and polyphagia  Genitourinary: Negative  Musculoskeletal: Positive for arthralgias  Back pain: right hand  Skin: Negative  Allergic/Immunologic: Negative  Neurological: Negative  Negative for dizziness, syncope, light-headedness and headaches  Hematological: Negative  Psychiatric/Behavioral: Negative  All other systems reviewed and are negative        Historical Information   Past Medical History:   Diagnosis Date   • A-fib [facility])    • Abnormal ECG     last assessed: 7/14/2015   • Acid reflux     resolved   • Anxiety    • Pacheco esophagus    • Benign neoplasm of sigmoid colon 09/13/2011    next colon 2012   • Bronchitis, asthmatic     last assessed: 3/12/2012   • [facility]     2012- had colon resection   • Colon polyp    • COPD (chronic obstructive pulmonary disease) (HCC)     questionable   • Depression    • Diabetes mellitus ([facility] )     on byetta   • Dizziness     occ   • Esophageal stricture     last assessed: 9/30/2014   • High cholesterol    • Hypertension    • Hypothyroidism    • Iron (Fe) deficiency anemia 12/22/2011    iron pill continue   • Irritable bowel syndrome    • OA (osteoarthritis)    • 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   • Pacemaker     hx SSS   • Rash     labia area- uses cream prn   • Restrictive lung disease    • Seasonal allergies    • Skin scarring/fibrosis     fibrotic scar; last assessed: 3/22/2013   • MONA (stress urinary incontinence, female)    • Urinary frequency     last assessed: 9/10/2012   • Wears glasses     reading     Past Surgical History:   Procedure Laterality Date   • BREAST EXCISIONAL BIOPSY Right 1990    benign, best guess on year   • [address]      does not know type  Dr Des Flores is cariologist   •  SECTION     • COLON SURGERY      resection  removed 18 In    • COLONOSCOPY  2012: Adenomatous polyp, colorectal anastomosis, internal hemorrhoids  2012:  proctosigmoidectomy   • ESOPHAGOGASTRODUODENOSCOPY N/A 2016    Procedure: ESOPHAGOGASTRODUODENOSCOPY (EGD); Surgeon: Tacos Matamoros MD;  [facility];  Service:    • HYSTERECTOMY  1978   • OR ARTHROPLASTY INTERPHALANGEAL JOINT EACH Right 2018    Procedure: ARTHROPLASTY PIP/FINGER - RIGHT RING;  Surgeon: Uriel Tavares MD;  Location: [facility];  Service: Orthopedics   • OR ARTHRP INTERPOS INTERCARPAL/METACARPAL JOINTS Right 1/10/2019    Procedure: ARTHROPLASTY Guerda Jumana;  Surgeon: Uriel Tavares MD;  [facility];  Service: Orthopedics   • OR CYSTOURETHROSCOPY N/A 2018    Procedure: Bre Kendall;  Surgeon: Janeen Stephenson MD;  [facility];  Service: UroGynecology          • OR LAPS RPR PARAESPHGL HRNA INCL [address] N/A 2016    Procedure: LAPAROSCOPIC PARAESOPHAGEAL HERNIA REPAIR with mesh; toupet  FUNDOPLICATION ;  Surgeon: Thmoas Dale MD;  [facility];  Service: Thoracic   • OR SLING OPERATION STRESS INCONTINENCE N/A 2018    Procedure: INSERTION PUBOVAGINAL SLING (FEMALE); Surgeon: Janeen Stephenson MD;  [facility];  Service: UroGynecology          • TOTAL ABDOMINAL HYSTERECTOMY     • UPPER GASTROINTESTINAL ENDOSCOPY      2018:  Irregular Z-line positive for intestinal metaplasia/ Pacheco's, no dysplasia  Gastritis H pylori negative     • WRIST TENDON TRANSFER Right 1/10/2019    Procedure: TRANSFER TENDON FLEXOR CARPI RADIALIS FROM FOREARM TO HAND;  Surgeon: Uriel Tavares MD;  [facility];  Service: Orthopedics     Social History   Social History     Substance and Sexual Activity   Alcohol Use No     Social History     Substance and Sexual Activity   Drug Use No     Social History     Tobacco Use   Smoking Status Former   • Types: Cigarettes   • Quit date:    • Years since quittin 3   Smokeless Tobacco Former   • Quit date:      Family History:   Family History   Problem Relation Age of Onset   • Heart disease Mother    • Diabetes Mother    • Asthma Father    • Stomach cancer Father         gastrkic cancer   • Cancer Paternal Grandmother    • Cancer Paternal Grandfather    • No Known Problems Brother    • Breast cancer Sister 76   • Breast cancer Sister 77   • Pancreatic cancer Sister 72   • No Known Problems Maternal Aunt    • No Known Problems Maternal Aunt    • No Known Problems Maternal Aunt    • No Known Problems Paternal Aunt    • No Known Problems Daughter    • No Known Problems Maternal Grandmother    • No Known Problems Maternal Grandfather    • Prostate cancer Paternal Uncle         age unknown   • Substance Abuse Neg Hx    • Mental illness Neg Hx    • Alcohol abuse Neg Hx    • Colon cancer Neg Hx    • Colon polyps Neg Hx        Meds/Allergies   Current Outpatient Medications   Medication Sig Dispense Refill   • Apple Cider Vinegar 500 MG TABS Take by mouth daily     • ascorbic acid (VITAMIN C) 500 mg tablet Take 500 mg by mouth daily     • atenolol (TENORMIN) 25 mg tablet Take 1 tablet (25 mg total) by mouth daily at bedtime 90 tablet 3   • Blood Glucose Monitoring Suppl (OneTouch Verio) w/Device KIT Check blood sugars once a day 1 kit 0   • Budeson-Glycopyrrol-Formoterol (Breztri Aerosphere) 160-9-4 8 MCG/ACT AERO Inhale 2 puffs daily as needed (rattle or shotness of beath) Rinse mouth after use   10 7 g 3   • Byetta 10 MCG Pen 10 MCG/0 04ML SOPN Inject 0 04 mL under the skin 2 (two) times a day 7 2 mL 3   • Cholecalciferol (VITAMIN D-3) 1000 units CAPS Take 1 capsule by mouth daily     • clobetasol (TEMOVATE) 0 05 % cream      • Coenzyme Q10 (COQ10) 200 MG CAPS Take 200 mg by mouth daily       • Continuous Blood Gluc  (FreeStyle Julian 14 Day [place]) SVETLANA Use 1 Device 4 (four) times a day (before meals and at bedtime) 1 each 0   • Continuous Blood Gluc Sensor (FreeStyle Julian 14 Day Sensor) MISC Use 1 application every 14 (fourteen) days 2 each 6   • cycloSPORINE (Restasis) 0 05 % ophthalmic emulsion      • DULoxetine (CYMBALTA) 60 mg delayed release capsule Take 1 capsule (60 mg total) by mouth daily 90 capsule 3   • Eliquis 5 MG TAKE 1 TABLET TWICE A  tablet 3   • estradiol (ESTRACE VAGINAL) 0 1 mg/g vaginal cream Insert 0 5 g into the vagina 2 (two) times a week Patient is NOT to be using this daily 42 5 g 1   • Ferrous Sulfate 27 MG TABS Take 1 tablet by mouth daily     • FREESTYLE LITE test strip TEST THREE TIMES A  strip 3   • Glucosamine-Chondroitin (MOVE FREE PO) Take by mouth daily     • glucose blood (OneTouch Verio) test strip Check blood sugars once a day 100 strip 2   • hydrocortisone 2 5 % cream Apply topically 2 (two) times a day 60 g 2   • Jardiance 25 MG TABS TAKE 1 TABLET EVERY MORNING 90 tablet 3   • ketoconazole (NIZORAL) 2 % cream Apply topically 2 (two) times a day for 14 days 60 g 1   • Lancets (onetouch ultrasoft) lancets Check blood sugars once a day 100 each 2   • levothyroxine 125 mcg tablet Take one tablet 6 days of the week and nothing on sundays 90 tablet 3   • MEGARED OMEGA-3 KRILL OIL PO Take 1 capsule by mouth daily     • metFORMIN (GLUCOPHAGE-XR) 500 mg 24 hr tablet Take 2 tablets (1,000 mg total) by mouth 2 (two) times a day with meals 360 tablet 1   • simvastatin (ZOCOR) 20 mg tablet TAKE 2 TABLETS DAILY AT BEDTIME 180 tablet 3   • Triamcinolone Acetonide (Nasacort Allergy 24HR) 55 MCG/ACT AERO 2 Act (110 mcg total) into each nostril every morning 16 5 mL 3     No current facility-administered medications for this visit  Allergies   Allergen Reactions   • Fluconazole Dizziness       Objective   Vitals: not currently breastfeeding  Physical Exam  Vitals reviewed  Constitutional:       Appearance: She is well-developed     HENT:      Head: Normocephalic and atraumatic  Eyes:      Conjunctiva/sclera: Conjunctivae normal       Pupils: Pupils are equal, round, and reactive to light  Cardiovascular:      Rate and Rhythm: Normal rate and regular rhythm  Heart sounds: Normal heart sounds  Pulmonary:      Effort: Pulmonary effort is normal       Breath sounds: Normal breath sounds  Abdominal:      General: Bowel sounds are normal       Palpations: Abdomen is soft  Musculoskeletal:         General: Normal range of motion  Cervical back: Normal range of motion and neck supple  Skin:     General: Skin is warm and dry  Neurological:      Mental Status: She is alert and oriented to person, place, and time  Psychiatric:         Behavior: Behavior normal          Thought Content:  Thought content normal          Judgment: Judgment normal        Lab Results:   Lab Results   Component Value Date/Time    Hemoglobin A1C 8 1 (H) 02/02/2023 07:10 AM    Hemoglobin A1C 7 3 (H) 11/21/2022 08:08 AM    Hemoglobin A1C 7 7 (H) 06/09/2022 07:04 AM    WBC 8 06 02/02/2023 07:10 AM    WBC 7 77 11/21/2022 08:08 AM    WBC 8 56 06/09/2022 07:04 AM    Hemoglobin 15 6 (H) 02/02/2023 07:10 AM    Hemoglobin 15 1 11/21/2022 08:08 AM    Hemoglobin 16 2 (H) 06/09/2022 07:04 AM    Hematocrit 51 4 (H) 02/02/2023 07:10 AM    Hematocrit 49 1 (H) 11/21/2022 08:08 AM    Hematocrit 53 4 (H) 06/09/2022 07:04 AM    MCV 93 02/02/2023 07:10 AM    MCV 93 11/21/2022 08:08 AM    MCV 93 06/09/2022 07:04 AM    Platelets 304 83/26/4286 07:10 AM    Platelets 981 21/98/8027 08:08 AM    Platelets 362 89/74/3570 07:04 AM    BUN 13 02/02/2023 07:10 AM    BUN 12 11/21/2022 08:08 AM    BUN 9 06/09/2022 07:04 AM    Potassium 5 2 02/02/2023 07:10 AM    Potassium 4 5 11/21/2022 08:08 AM    Potassium 4 8 06/09/2022 07:04 AM    Chloride 105 02/02/2023 07:10 AM    Chloride 104 11/21/2022 08:08 AM    Chloride 105 06/09/2022 07:04 AM    CO2 28 02/02/2023 07:10 AM    CO2 26 11/21/2022 08:08 AM "CO2 28 06/09/2022 07:04 AM    Creatinine 0 64 02/02/2023 07:10 AM    Creatinine 0 63 11/21/2022 08:08 AM    Creatinine 0 71 06/09/2022 07:04 AM    AST 18 02/02/2023 07:10 AM    AST 17 11/21/2022 08:08 AM    AST 21 06/09/2022 07:04 AM    ALT 20 02/02/2023 07:10 AM    ALT 20 11/21/2022 08:08 AM    ALT 29 06/09/2022 07:04 AM    Albumin 3 9 02/02/2023 07:10 AM    Albumin 3 9 11/21/2022 08:08 AM    Albumin 3 6 06/09/2022 07:04 AM    HDL, Direct 36 (L) 02/02/2023 07:10 AM    HDL, Direct 37 (L) 11/21/2022 08:08 AM    Triglycerides 209 (H) 02/02/2023 07:10 AM    Triglycerides 172 (H) 11/21/2022 08:08 AM     Portions of the record may have been created with voice recognition software  Occasional wrong word or \"sound a like\" substitutions may have occurred due to the inherent limitations of voice recognition software  Read the chart carefully and recognize, using context, where substitutions have occurred    "

## 2023-04-25 NOTE — PATIENT INSTRUCTIONS
Be mindful of diet  Stay active and stay hydrated  Continue Metformin 500 mg - 2 tablets twice daily (breakfast and dinner)  Continue Mariah  Continue levothyroxine 125 mcg daily Monday through Saturday and no tablet on Sunday  Continue atenolol and simvastatin  Continue with regular supplementation of vitamin D3 daily  Obtain lab work as prescribed

## 2023-04-27 ENCOUNTER — OFFICE VISIT (OUTPATIENT)
Dept: DERMATOLOGY | Facility: CLINIC | Age: 83
End: 2023-04-27

## 2023-04-27 VITALS — WEIGHT: 157 LBS | TEMPERATURE: 97.4 F | BODY MASS INDEX: 27.82 KG/M2 | HEIGHT: 63 IN

## 2023-04-27 DIAGNOSIS — L57.0 ACTINIC KERATOSIS: Primary | ICD-10-CM

## 2023-04-27 RX ORDER — NEOMYCIN SULFATE, POLYMYXIN B SULFATE, AND DEXAMETHASONE 3.5; 10000; 1 MG/G; [USP'U]/G; MG/G
OINTMENT OPHTHALMIC
COMMUNITY
Start: 2023-04-20

## 2023-04-27 NOTE — PROGRESS NOTES
"[facility] Note     Patient Name: Debora Andres  Encounter Date: 04/27/23     [facility] [address] [facility] in the last 3 years and, if so, which description applies to you? Yes  I have been here within the last 3 years, and my medical history has NOT changed since that time  I am FEMALE/of child-bearing potential     REVIEW OF SYSTEMS:  Have you recently had or currently have any of the following? · No changes in my recent health  PAST MEDICAL HISTORY:  Have you personally ever had or currently have any of the following? If \"YES,\" then please provide more detail  · No changes in my medical history  FAMILY HISTORY:  Any \"first degree relatives\" (parent, brother, sister, or child) with the following? • No changes in my family's known health  PATIENT EXPERIENCE:    • Do you want the Dermatologist to perform a COMPLETE skin exam today including a clinical examination under the \"bra and underwear\" areas? NO  • If necessary, do we have your permission to call and leave a detailed message on your Preferred Phone number that includes your specific medical information?   Yes      Allergies   Allergen Reactions   • Fluconazole Dizziness      Current Outpatient Medications:   •  Apple Cider Vinegar 500 MG TABS, Take by mouth daily, Disp: , Rfl:   •  ascorbic acid (VITAMIN C) 500 mg tablet, Take 500 mg by mouth daily, Disp: , Rfl:   •  atenolol (TENORMIN) 25 mg tablet, Take 1 tablet (25 mg total) by mouth daily at bedtime, Disp: 90 tablet, Rfl: 3  •  Blood Glucose Monitoring Suppl (OneTouch Verio) w/Device KIT, Check blood sugars once a day, Disp: 1 kit, Rfl: 0  •  Budeson-Glycopyrrol-Formoterol (Breztri Aerosphere) 160-9-4 8 MCG/ACT AERO, Inhale 2 puffs daily as needed (rattle or shotness of beath) Rinse mouth after use , Disp: 10 7 g, Rfl: 3  •  Byetta 10 MCG Pen 10 MCG/0 04ML SOPN, Inject 0 04 mL under the skin 2 (two) times a day, Disp: 7 2 mL, Rfl: 3  •  " Cholecalciferol (VITAMIN D-3) 1000 units CAPS, Take 1 capsule by mouth daily, Disp: , Rfl:   •  clobetasol (TEMOVATE) 0 05 % cream, , Disp: , Rfl:   •  Coenzyme Q10 (COQ10) 200 MG CAPS, Take 200 mg by mouth daily  , Disp: , Rfl:   •  Continuous Blood Gluc  (FreeStyle Julian 14 Day [place]) SVETLANA, Use 1 Device 4 (four) times a day (before meals and at bedtime), Disp: 1 each, Rfl: 0  •  Continuous Blood Gluc Sensor (FreeStyle Julian 14 Day Sensor) MISC, Use 1 application every 14 (fourteen) days, Disp: 2 each, Rfl: 6  •  cycloSPORINE (Restasis) 0 05 % ophthalmic emulsion, , Disp: , Rfl:   •  DULoxetine (CYMBALTA) 60 mg delayed release capsule, Take 1 capsule (60 mg total) by mouth daily, Disp: 90 capsule, Rfl: 3  •  Eliquis 5 MG, TAKE 1 TABLET TWICE A DAY, Disp: 180 tablet, Rfl: 3  •  estradiol (ESTRACE VAGINAL) 0 1 mg/g vaginal cream, Insert 0 5 g into the vagina 2 (two) times a week Patient is NOT to be using this daily, Disp: 42 5 g, Rfl: 1  •  Ferrous Sulfate 27 MG TABS, Take 1 tablet by mouth daily, Disp: , Rfl:   •  FREESTYLE LITE test strip, TEST THREE TIMES A DAY, Disp: 300 strip, Rfl: 3  •  Glucosamine-Chondroitin (MOVE FREE PO), Take by mouth daily, Disp: , Rfl:   •  glucose blood (OneTouch Verio) test strip, Check blood sugars once a day, Disp: 100 strip, Rfl: 2  •  hydrocortisone 2 5 % cream, Apply topically 2 (two) times a day, Disp: 60 g, Rfl: 2  •  Jardiance 25 MG TABS, TAKE 1 TABLET EVERY MORNING, Disp: 90 tablet, Rfl: 3  •  ketoconazole (NIZORAL) 2 % cream, Apply topically 2 (two) times a day for 14 days, Disp: 60 g, Rfl: 1  •  Lancets (onetouch ultrasoft) lancets, Check blood sugars once a day, Disp: 100 each, Rfl: 2  •  levothyroxine 125 mcg tablet, Take one tablet 6 days of the week and nothing on sundays, Disp: 90 tablet, Rfl: 3  •  MEGARED OMEGA-3 KRILL OIL PO, Take 1 capsule by mouth daily, Disp: , Rfl:   •  metFORMIN (GLUCOPHAGE-XR) 500 mg 24 hr tablet, Take 2 tablets (1,000 mg total) by mouth 2 (two) times a day with meals, Disp: 360 tablet, Rfl: 1  •  simvastatin (ZOCOR) 20 mg tablet, TAKE 2 TABLETS DAILY AT BEDTIME, Disp: 180 tablet, Rfl: 3  •  Triamcinolone Acetonide (Nasacort Allergy 24HR) 55 MCG/ACT AERO, 2 Act (110 mcg total) into each nostril every morning, Disp: 16 5 mL, Rfl: 3          • Whom besides the patient is providing clinical information about today's encounter?   o NO ADDITIONAL HISTORIAN (patient alone provided history)    Physical Exam and Assessment/Plan by Diagnosis:    Patient is here for spot of concern on face under eyes  Patient states that problem started 4 to 5 months  Patient is never in the sun  Patient states it does peel but denies pain and itchiness  ACTINIC KERATOSES- On left nose, scaly red papule    - Relevant exam: On the nose are gritty pink papules  - Exam and clinical history consistent with actinic keratoses  - Discussed that these lesions are considered premalignant with the potential to evolve into squamous cell carcinoma  - Discussed that these are due to chronic sun exposure and therefore recommend use of sunscreen/sun protection to prevent further sun damage  - Discussed treatment options, including liquid nitrogen destruction, topical immunotherapy, and photodynamic therapy, including risks, benefits      PROCEDURE:  DESTRUCTION OF PRE-MALIGNANT LESIONS    - After a thorough discussion of treatment options and risk/benefits/alternatives (including but not limited to local pain, scarring, dyspigmentation, blistering, and possible superinfection), verbal and written consent were obtained and the aforementioned lesions were treated on with cryotherapy using liquid nitrogen x 1 cycle for 5-10 seconds  • TOTAL NUMBER of 1 pre-malignant lesions were treated today on the ANATOMIC LOCATION: left nose  The patient tolerated the procedure well, and after-care instructions were provided        SEBORRHEIC KERATOSIS; NON-INFLAMED    Physical "Exam:  • Anatomic Location Affected:  face  • Morphological Description:  Flat and raised, waxy, smooth to warty textured, yellow to brownish-grey to dark brown to blackish, discrete, \"stuck-on\" appearing papules  • Pertinent Positives:  • Pertinent Negatives: Additional History of Present Condition:  Patient reports new bumps on the skin  Denies itch, burn, pain, bleeding or ulceration  Present constantly; nothing seems to make it worse or better  No prior treatment  Assessment and Plan:  Based on a thorough discussion of this condition and the management approach to it (including a comprehensive discussion of the known risks, side effects and potential benefits of treatment), the patient (family) agrees to implement the following specific plan:  • Reassured benign     Seborrheic Keratosis  A seborrheic keratosis is a harmless warty spot that appears during adult life as a common sign of skin aging  Seborrheic keratoses can arise on any area of skin, covered or uncovered, with the usual exception of the palms and soles  They do not arise from mucous membranes  Seborrheic keratoses can have highly variable appearance  Seborrheic keratoses are extremely common  It has been estimated that over 90% of adults over the age of 61 years have one or more of them  They occur in males and females of all races, typically beginning to erupt in the 35s or 45s  They are uncommon under the age of 21 years  The precise cause of seborrhoeic keratoses is not known  Seborrhoeic keratoses are considered degenerative in nature  As time goes by, seborrheic keratoses tend to become more numerous  Some people inherit a tendency to develop a very large number of them; some people may have hundreds of them      The name \"seborrheic keratosis\" is misleading, because these lesions are not limited to a seborrhoeic distribution (scalp, mid-face, chest, upper back), nor are they formed from sebaceous glands, nor are they " "associated with sebum -- which is greasy  Seborrheic keratosis may also be called \"SK,\" \"Seb K,\" \"basal cell papilloma,\" \"senile wart,\" or \"barnacle  \"      Researchers have noted:  • Eruptive seborrhoeic keratoses can follow sunburn or dermatitis  • Skin friction may be the reason they appear in body folds  • Viral cause (e g , human papillomavirus) seems unlikely  • Stable and clonal mutations or activation of FRFR3, PIK3CA, ACE, AKT1 and EGFR genes are found in seborrhoeic keratoses  • Seborrhoeic keratosis can arise from solar lentigo  • FRFR3 mutations also arise in solar lentigines  These mutations are associated with increased age and location on the head and neck, suggesting a role of ultraviolet radiation in these lesions  • Seborrheic keratoses do not harbour tumour suppressor gene mutations  • Epidermal growth factor receptor inhibitors, which are used to treat some cancers, often result in an increase in verrucal (warty) keratoses  There is no easy way to remove multiple lesions on a single occasion  Unless a specific lesion is \"inflamed\" and is causing pain or stinging/burning or is bleeding, most insurance companies do not authorize treatment  MELANOCYTIC NEVI (\"Moles\")    Physical Exam:  • Anatomic Location Affected:   Mostly on sun-exposed areas of the face  • Morphological Description:  Scattered, 1-4mm round to ovoid, symmetrical-appearing, even bordered, skin colored to dark brown macules/papules, mostly in sun-exposed areas  • Pertinent Positives:  • Pertinent Negatives:     Additional History of Present Condition:  Present on exam    Assessment and Plan:  Based on a thorough discussion of this condition and the management approach to it (including a comprehensive discussion of the known risks, side effects and potential benefits of treatment), the patient (family) agrees to implement the following specific plan:  • Reassured benign      Melanocytic Nevi  Melanocytic nevi (\"moles\") are " "tan or brown, raised or flat areas of the skin which have an increased number of melanocytes  Melanocytes are the cells in our body which make pigment and account for skin color  Some moles are present at birth (I e , \"congenital nevi\"), while others come up later in life (i e , \"acquired nevi\")  The sun can stimulate the body to make more moles  Sunburns are not the only thing that triggers more moles  Chronic sun exposure can do it too  Clinically distinguishing a healthy mole from melanoma may be difficult, even for experienced dermatologists  The \"ABCDE's\" of moles have been suggested as a means of helping to alert a person to a suspicious mole and the possible increased risk of melanoma  The suggestions for raising alert are as follows:    Asymmetry: Healthy moles tend to be symmetric, while melanomas are often asymmetric  Asymmetry means if you draw a line through the mole, the two halves do not match in color, size, shape, or surface texture  Asymmetry can be a result of rapid enlargement of a mole, the development of a raised area on a previously flat lesion, scaling, ulceration, bleeding or scabbing within the mole  Any mole that starts to demonstrate \"asymmetry\" should be examined promptly by a board certified dermatologist      Border: Healthy moles tend to have discrete, even borders  The border of a melanoma often blends into the normal skin and does not sharply delineate the mole from normal skin  Any mole that starts to demonstrate \"uneven borders\" should be examined promptly by a board certified dermatologist      Color: Healthy moles tend to be one color throughout  Melanomas tend to be made up of different colors ranging from dark black, blue, white, or red    Any mole that demonstrates a color change should be examined promptly by a board certified dermatologist      Diameter: Healthy moles tend to be smaller than 0 6 cm in size; an exception are \"congenital nevi\" that can be " "larger  Melanomas tend to grow and can often be greater than 0 6 cm (1/4 of an inch, or the size of a pencil eraser)  This is only a guideline, and many normal moles may be larger than 0 6 cm without being unhealthy  Any mole that starts to change in size (small to bigger or bigger to smaller) should be examined promptly by a board certified dermatologist      Evolving: Healthy moles tend to \"stay the same  \"  Melanomas may often show signs of change or evolution such as a change in size, shape, color, or elevation  Any mole that starts to itch, bleed, crust, burn, hurt, or ulcerate or demonstrate a change or evolution should be examined promptly by a board certified dermatologist       Dysplastic Nevi  Dysplastic moles are moles that fit the ABCDE rules of melanoma but are not identified as melanomas when examined under the microscope  They may indicate an increased risk of melanoma in that person  If there is a family history of melanoma, most experts agree that the person may be at an increased risk for developing a melanoma  Experts still do not agree on what dysplastic moles mean in patients without a personal or family history of melanoma  Dysplastic moles are usually larger than common moles and have different colors within it with irregular borders  The appearance can be very similar to a melanoma  Biopsies of dysplastic moles may show abnormalities which are different from a regular mole  Melanoma  Malignant melanoma is a type of skin cancer that can be deadly if it spreads throughout the body  The incidence of melanoma in the [place] is growing faster than any other cancer  Melanoma usually grows near the surface of the skin for a period of time, and then begins to grow deeper into the skin  Once it grows deeper into the skin, the risk of spread to other organs greatly increases   Therefore, early detection and removal of a malignant melanoma may result in a better chance at a complete cure; " "removal after the tumor has spread may not be as effective, leading to worse clinical outcomes such as death  The true rate of nevus transformation into a melanoma is unknown  It has been estimated that the lifetime risk for any acquired melanocytic nevus on any 21year-old individual transforming into melanoma by age [de-identified] is 0 03% (1 in 3,164) for men and 0 009% (1 in 10,800) for women  The appearance of a \"new mole\" remains one of the most reliable methods for identifying a malignant melanoma  Occasionally, melanomas appear as rapidly growing, blue-black, dome-shaped bumps within a previous mole or previous area of normal skin  Other times, melanomas are suspected when a mole suddenly appears or changes  Itching, burning, or pain in a pigmented lesion should increase suspicion, but most patients with early melanoma have no skin discomfort whatsoever  Melanoma can occur anywhere on the skin, including areas that are difficult for self-examination  Many melanomas are first noticed by other family members  Suspicious-looking moles may be removed for microscopic examination  You may be able to prevent death from melanoma by doing two simple things:    1  Try to avoid unnecessary sun exposure and protect your skin when it is exposed to the sun  People who live near the equator, people who have intermittent exposures to large amounts of sun, and people who have had sunburns in childhood or adolescence have an increased risk for melanoma  Sun sense and vigilant sun protection may be keys to helping to prevent melanoma  We recommend wearing UPF-rated sun protective clothing and sunglasses whenever possible and applying a moisturizer-sunscreen combination product (SPF 50+) such as Neutrogena Daily Defense to sun exposed areas of skin at least three times a day  2  Have your moles regularly examined by a board certified dermatologist AND by yourself or a family member/friend at home    We recommend that " "you have your moles examined at least once a year by a board certified dermatologist   Use your birthday as an annual reminder to have your \"Birthday Suit\" (I e , your skin) examined; it is a nice birthday gift to yourself to know that your skin is healthy appearing! Additionally, at-home self examinations may be helpful for detecting a possible melanoma  Use the ABCDEs we discussed and check your moles once a month at home  Scribe Attestation    I,:  Joleen Salomon am acting as a scribe while in the presence of the attending physician :       I,:  Sadia Dahl MD personally performed the services described in this documentation    as scribed in my presence  :         "

## 2023-04-27 NOTE — PATIENT INSTRUCTIONS
"ACTINIC KERATOSES    Relevant exam: On the nose are gritty pink papules  - Exam and clinical history consistent with actinic keratoses  - Discussed that these lesions are considered premalignant with the potential to evolve into squamous cell carcinoma  - Discussed that these are due to chronic sun exposure and therefore recommend use of sunscreen/sun protection to prevent further sun damage  - Discussed treatment options, including liquid nitrogen destruction, topical immunotherapy, and photodynamic therapy, including risks, benefits    PROCEDURE:  DESTRUCTION OF PRE-MALIGNANT LESIONS    - After a thorough discussion of treatment options and risk/benefits/alternatives (including but not limited to local pain, scarring, dyspigmentation, blistering, and possible superinfection), verbal and written consent were obtained and the aforementioned lesions were treated on with cryotherapy using liquid nitrogen x 1 cycle for 5-10 seconds  Seborrheic Keratosis  A seborrheic keratosis is a harmless warty spot that appears during adult life as a common sign of skin aging  Seborrheic keratoses can arise on any area of skin, covered or uncovered, with the usual exception of the palms and soles  They do not arise from mucous membranes  Seborrheic keratoses can have highly variable appearance  Seborrheic keratoses are extremely common  It has been estimated that over 90% of adults over the age of 61 years have one or more of them  They occur in males and females of all races, typically beginning to erupt in the 35s or 45s  They are uncommon under the age of 21 years  The precise cause of seborrhoeic keratoses is not known  Seborrhoeic keratoses are considered degenerative in nature  As time goes by, seborrheic keratoses tend to become more numerous  Some people inherit a tendency to develop a very large number of them; some people may have hundreds of them      The name \"seborrheic keratosis\" is misleading, because " "these lesions are not limited to a seborrhoeic distribution (scalp, mid-face, chest, upper back), nor are they formed from sebaceous glands, nor are they associated with sebum -- which is greasy  Seborrheic keratosis may also be called \"SK,\" \"Seb K,\" \"basal cell papilloma,\" \"senile wart,\" or \"barnacle  \"      Researchers have noted:  Eruptive seborrhoeic keratoses can follow sunburn or dermatitis  Skin friction may be the reason they appear in body folds  Viral cause (e g , human papillomavirus) seems unlikely  Stable and clonal mutations or activation of FRFR3, PIK3CA, ACE, AKT1 and EGFR genes are found in seborrhoeic keratoses  Seborrhoeic keratosis can arise from solar lentigo  FRFR3 mutations also arise in solar lentigines  These mutations are associated with increased age and location on the head and neck, suggesting a role of ultraviolet radiation in these lesions  Seborrheic keratoses do not harbour tumour suppressor gene mutations  Epidermal growth factor receptor inhibitors, which are used to treat some cancers, often result in an increase in verrucal (warty) keratoses  There is no easy way to remove multiple lesions on a single occasion  Unless a specific lesion is \"inflamed\" and is causing pain or stinging/burning or is bleeding, most insurance companies do not authorize treatment  MELANOCYTIC NEVI (\"Moles\")    Melanocytic nevi (\"moles\") are tan or brown, raised or flat areas of the skin which have an increased number of melanocytes  Melanocytes are the cells in our body which make pigment and account for skin color  Some moles are present at birth (I e , \"congenital nevi\"), while others come up later in life (i e , \"acquired nevi\")  The sun can stimulate the body to make more moles  Sunburns are not the only thing that triggers more moles  Chronic sun exposure can do it too  Clinically distinguishing a healthy mole from melanoma may be difficult, even for experienced dermatologists   The " "\"ABCDE's\" of moles have been suggested as a means of helping to alert a person to a suspicious mole and the possible increased risk of melanoma  The suggestions for raising alert are as follows:    Asymmetry: Healthy moles tend to be symmetric, while melanomas are often asymmetric  Asymmetry means if you draw a line through the mole, the two halves do not match in color, size, shape, or surface texture  Asymmetry can be a result of rapid enlargement of a mole, the development of a raised area on a previously flat lesion, scaling, ulceration, bleeding or scabbing within the mole  Any mole that starts to demonstrate \"asymmetry\" should be examined promptly by a board certified dermatologist      Border: Healthy moles tend to have discrete, even borders  The border of a melanoma often blends into the normal skin and does not sharply delineate the mole from normal skin  Any mole that starts to demonstrate \"uneven borders\" should be examined promptly by a board certified dermatologist      Color: Healthy moles tend to be one color throughout  Melanomas tend to be made up of different colors ranging from dark black, blue, white, or red  Any mole that demonstrates a color change should be examined promptly by a board certified dermatologist      Diameter: Healthy moles tend to be smaller than 0 6 cm in size; an exception are \"congenital nevi\" that can be larger  Melanomas tend to grow and can often be greater than 0 6 cm (1/4 of an inch, or the size of a pencil eraser)  This is only a guideline, and many normal moles may be larger than 0 6 cm without being unhealthy  Any mole that starts to change in size (small to bigger or bigger to smaller) should be examined promptly by a board certified dermatologist      Evolving: Healthy moles tend to \"stay the same  \"  Melanomas may often show signs of change or evolution such as a change in size, shape, color, or elevation    Any mole that starts to itch, bleed, crust, burn, " "hurt, or ulcerate or demonstrate a change or evolution should be examined promptly by a board certified dermatologist       Dysplastic Nevi  Dysplastic moles are moles that fit the ABCDE rules of melanoma but are not identified as melanomas when examined under the microscope  They may indicate an increased risk of melanoma in that person  If there is a family history of melanoma, most experts agree that the person may be at an increased risk for developing a melanoma  Experts still do not agree on what dysplastic moles mean in patients without a personal or family history of melanoma  Dysplastic moles are usually larger than common moles and have different colors within it with irregular borders  The appearance can be very similar to a melanoma  Biopsies of dysplastic moles may show abnormalities which are different from a regular mole  Melanoma  Malignant melanoma is a type of skin cancer that can be deadly if it spreads throughout the body  The incidence of melanoma in the [place] is growing faster than any other cancer  Melanoma usually grows near the surface of the skin for a period of time, and then begins to grow deeper into the skin  Once it grows deeper into the skin, the risk of spread to other organs greatly increases  Therefore, early detection and removal of a malignant melanoma may result in a better chance at a complete cure; removal after the tumor has spread may not be as effective, leading to worse clinical outcomes such as death  The true rate of nevus transformation into a melanoma is unknown  It has been estimated that the lifetime risk for any acquired melanocytic nevus on any 21year-old individual transforming into melanoma by age [de-identified] is 0 03% (1 in 3,164) for men and 0 009% (1 in 10,800) for women  The appearance of a \"new mole\" remains one of the most reliable methods for identifying a malignant melanoma    Occasionally, melanomas appear as rapidly growing, blue-black, " "dome-shaped bumps within a previous mole or previous area of normal skin  Other times, melanomas are suspected when a mole suddenly appears or changes  Itching, burning, or pain in a pigmented lesion should increase suspicion, but most patients with early melanoma have no skin discomfort whatsoever  Melanoma can occur anywhere on the skin, including areas that are difficult for self-examination  Many melanomas are first noticed by other family members  Suspicious-looking moles may be removed for microscopic examination  You may be able to prevent death from melanoma by doing two simple things:    Try to avoid unnecessary sun exposure and protect your skin when it is exposed to the sun  People who live near the equator, people who have intermittent exposures to large amounts of sun, and people who have had sunburns in childhood or adolescence have an increased risk for melanoma  Sun sense and vigilant sun protection may be keys to helping to prevent melanoma  We recommend wearing UPF-rated sun protective clothing and sunglasses whenever possible and applying a moisturizer-sunscreen combination product (SPF 50+) such as Neutrogena Daily Defense to sun exposed areas of skin at least three times a day  Have your moles regularly examined by a board certified dermatologist AND by yourself or a family member/friend at home  We recommend that you have your moles examined at least once a year by a board certified dermatologist   Use your birthday as an annual reminder to have your \"Birthday Suit\" (I e , your skin) examined; it is a nice birthday gift to yourself to know that your skin is healthy appearing! Additionally, at-home self examinations may be helpful for detecting a possible melanoma  Use the ABCDEs we discussed and check your moles once a month at home  Education for Skin Cancer  What is skin cancer? Skin cancer is unfortunately very common   That's why we are here to help you on your journey " to healthy happy skin! There are two main types of skin cancer: melanoma and non-melanoma skin cancer  Melanoma is a form of skin cancer that often arises within an existing nevus or mole  However, this is not always the case  Melanoma can arise anywhere (not only where you have moles right now)  Melanoma can run in families, so letting us know about your family history is important  Non-melanoma skin cancer is the most common type of cancer in the [place]  The two main types of non-melanoma skin cancers are basal cell carcinomas (BCC) and squamous cell carcinoma (SCC)  These cancers tend to be less aggressive than melanomas but are still important to look for and treat  What can I do to prevent skin cancer? One of the largest risk factors for skin cancer is sun exposure or UV radiation  Therefore, sun protection is biggs! Here are some great tips for protecting yourself! Try to avoid direct sun exposure during peak sun hours (10 AM to 2 PM)  Remember you get A LOT of sun even under cloud coverage and through care windows! When choosing a sunscreen, look for one that says “broad spectrum” sunscreen  This means it protects you from more of the harmful UV rays  Choose a sunscreen that is SPF 30 or greater for best protection  Apply sunscreen to all sun-exposed skin and reapply every 2 hours  Consider sun protective clothing! Great additions to your sun protective clothing wardrobe include broad brimmed hats, sunglasses, UPF clothing  Avoid tanning salons  These have been shown to be very harmful in terms of your risk of skin cancer  Avoid “base tans”  We now know that tans are dangerous (not just sun burns)  If you want to have a tan for a trip, consider a spray tan! Should I check my skin at home between my dermatology appointments? Yes! It's always a great idea to look at your skin on a regular basis  Here are some things to look for when monitoring your skin     For melanoma, look for the ABCDE's! A = Asymmetry  Look for a spot where one half does not match the other! B = Borders  Look for a spot that has jagged, ragged or irregular borders  C = Color  Look for a spot that is not evenly colored and often includes multiple colors, especially true black, red, white, blue, grey  D = Diameter  Look for a spot that is larger than the size of a pencil eraser  E = Evolution  If you ever have a spot that is changing in shape, color, size or symptoms (becomes itchy, painful or starts to bleed), always call us! For non-melanoma skin cancers, look for a new, pink spot that is not going away, especially one that is itchy, painful or bleeding  What should I do if I see a spot that is concerning for melanoma or non-melanoma skin cancer? If you are ever concerned, call us! Do not wait for your next appointment  We want to help!

## 2023-04-28 DIAGNOSIS — L30.4 INTERTRIGO: ICD-10-CM

## 2023-05-01 RX ORDER — KETOCONAZOLE 20 MG/G
CREAM TOPICAL 2 TIMES DAILY
Qty: 60 G | Refills: 1 | Status: SHIPPED | OUTPATIENT
Start: 2023-05-01 | End: 2023-05-15

## 2023-05-01 NOTE — PROGRESS NOTES
PATIENT:  Sindhu PENALOZA Genoe  1940    ASSESSMENT:  1  Onychomycosis        2  Corns and callus        3  Type 2 diabetes mellitus with diabetic neuropathy, without long-term current use of insulin ([facility])  Nail removal            Orders Placed This Encounter   Procedures    Nail removal        PLAN:  Disease prevention and related risk factors of diabetes were identified and discussed  The patient was educated in proper foot wear for diabetics  Educated in daily foot assessment and routine diabetic foot care  Discussed the importance of controlling BS through diet and exercise  The patient will follow up in 9-12 weeks for further diabetic foot exam and care  Procedures: 96936, 92858, 41075  All mycotic toenails were reduced and debrided in length, width, and girth using a nail nipper and electric dremel  All hyperkeratotic skin lesion(s) if present were sharply pared with a #10 scalpel with no evidence of ulceration/abscess  Patient tolerated procedure(s) well without complications  Nail removal    Date/Time: 4/14/2023 11:15 AM  Performed by: Chaya Schirmer, DPM  Authorized by: Chaya Schirmer, DPM     Patient location:  Other (comment)Universal Protocol:  Consent: Verbal consent obtained  Risks and benefits: risks, benefits and alternatives were discussed  Consent given by: patient  Patient understanding: patient states understanding of the procedure being performed  Patient identity confirmed: verbally with patient      Nails trimmed:     Number of nails trimmed:  8  Post-procedure details:     Patient tolerance of procedure: Tolerated well, no immediate complications         HPI:  Cristy Cornelius is a 81 y  o year old female seen for diabetic foot exam   The patient has class findings with diabetes  BS is under control  The patient complained of thick toenails painful lesion right great toe      The patient denied any acute pedal disorder, redness, acute swelling, or recent "injury  The following portions of the patient's history were reviewed and updated as appropriate: allergies, current medications, past family history, past medical history, past social history, past surgical history and problem list     REVIEW OF SYSTEMS:  GENERAL: No fever or chills  HEART: No chest pain, or palpitation  RESPIRATORY:  No acute SOB or cough  GI: No Nausea, vomit or diarrhea  NEUROLOGIC: No syncope or acute weakness    PHYSICAL EXAM:    /84   Ht 5' 3\" (1 6 m)   Wt 71 2 kg (157 lb)   BMI 27 81 kg/m²     VASCULAR EXAM  Posterior tibial artery +1 bilateral  Dorsalis pedis artery absent bilateral  The patient has class findings with skin atrophy, lack of digital hair, and nail dystrophy  There is trace lower extremity edema bilaterally  NEUROLOGIC EXAM  Sensation is intact to light touch  Yes   Sensation is absent to 10gm monofilament  Vibratory sensation diminished  Tingling numbness paresthesias noted bilateral          DERMATOLOGIC EXAM:   Texture, Tone and Turgor are diminished bilateral   The patient has dystrophic/hypertrophic toenails with yellow/white discoloration, onycholysis, and subungal debris  Fungal odor noted  Brittle nature noted  Right foot nails dystrophic x1 with 0 5 cm ave thickness (hallux)  Left foot nails dystrophic x1 with 0 5 cm ave thickness (hallux)  Patient has hyperkeratotic lesions noted:  Right foot located at plantar medial hallux IPJ  Nora Skillern Left foot located at none  No notable suspicious skin lesions  MUSCULOSKELETAL EXAM:   No acute joint pain, edema, or redness  No acute musculoskeletal problem  Patient has mild digital contractures deformities  Patient has no ambulation limitations  Patient wears DM shoes? Yes    Risk Category/Class Findings:  Q8 (B2 ABC, B3)  1 = Loss of protective sensation    A1)  Has the patient had a previous amputation of the foot or integral skeletal portion thereof?  No  B1)  Does the patient have " absent posterior tibial pulse? No  B3)  Does the patient have absent dorsalis pedis? Yes  B2)  Does the patient have three of the following? Yes           1  Hair growth (increased or decreased), 2   Nail changes (thickening) and 3  Pigmentary changes (discoloring)  C)  Does the patient have two of the following and one above?  No

## 2023-05-03 ENCOUNTER — ANESTHESIA EVENT (OUTPATIENT)
Dept: GASTROENTEROLOGY | Facility: AMBULATORY SURGERY CENTER | Age: 83
End: 2023-05-03

## 2023-05-03 ENCOUNTER — HOSPITAL ENCOUNTER (OUTPATIENT)
Dept: GASTROENTEROLOGY | Facility: AMBULATORY SURGERY CENTER | Age: 83
Discharge: HOME | End: 2023-05-03

## 2023-05-03 ENCOUNTER — ANESTHESIA (OUTPATIENT)
Dept: GASTROENTEROLOGY | Facility: AMBULATORY SURGERY CENTER | Age: 83
End: 2023-05-03

## 2023-05-03 VITALS
HEIGHT: 63 IN | SYSTOLIC BLOOD PRESSURE: 125 MMHG | TEMPERATURE: 98 F | BODY MASS INDEX: 27.11 KG/M2 | HEART RATE: 98 BPM | DIASTOLIC BLOOD PRESSURE: 91 MMHG | OXYGEN SATURATION: 98 % | RESPIRATION RATE: 18 BRPM | WEIGHT: 153 LBS

## 2023-05-03 DIAGNOSIS — R10.13 DYSPEPSIA: ICD-10-CM

## 2023-05-03 RX ORDER — ALBUTEROL SULFATE 90 UG/1
2 AEROSOL, METERED RESPIRATORY (INHALATION) EVERY 6 HOURS PRN
COMMUNITY

## 2023-05-03 RX ORDER — SODIUM CHLORIDE, SODIUM LACTATE, POTASSIUM CHLORIDE, CALCIUM CHLORIDE 600; 310; 30; 20 MG/100ML; MG/100ML; MG/100ML; MG/100ML
50 INJECTION, SOLUTION INTRAVENOUS CONTINUOUS
Status: DISCONTINUED | OUTPATIENT
Start: 2023-05-03 | End: 2023-05-07 | Stop reason: HOSPADM

## 2023-05-03 RX ORDER — PROPOFOL 10 MG/ML
INJECTION, EMULSION INTRAVENOUS AS NEEDED
Status: DISCONTINUED | OUTPATIENT
Start: 2023-05-03 | End: 2023-05-03

## 2023-05-03 RX ADMIN — PROPOFOL 40 MG: 10 INJECTION, EMULSION INTRAVENOUS at 09:53

## 2023-05-03 RX ADMIN — PROPOFOL 120 MG: 10 INJECTION, EMULSION INTRAVENOUS at 09:47

## 2023-05-03 RX ADMIN — SODIUM CHLORIDE, SODIUM LACTATE, POTASSIUM CHLORIDE, CALCIUM CHLORIDE 50 ML/HR: 600; 310; 30; 20 INJECTION, SOLUTION INTRAVENOUS at 08:57

## 2023-05-03 NOTE — ANESTHESIA PREPROCEDURE EVALUATION
Procedure:  EGD    Relevant Problems   CARDIO   (+) A-fib (HCC)   (+) Coronary artery disease involving native coronary artery of native heart without angina pectoris   (+) Essential hypertension   (+) Mitral regurgitation   (+) Mixed hyperlipidemia   (+) Pacemaker   (+) Paroxysmal atrial fibrillation (HCC)   (+) Sick sinus syndrome (HCC)      ENDO   (+) Hypothyroidism due to Hashimoto's thyroiditis   (+) Type 2 diabetes mellitus with diabetic neuropathy, without long-term current use of insulin (HCC)      GI/HEPATIC   (+) Adenocarcinoma of colon (HCC)   (+) Gastro-esophageal reflux disease without esophagitis   (+) Paraesophageal hernia      MUSCULOSKELETAL   (+) CMC DJD(carpometacarpal degenerative joint disease), localized primary, right   (+) Paraesophageal hernia   (+) Primary osteoarthritis involving multiple joints      NEURO/PSYCH   (+) Feeling anxious      PULMONARY   (+) COPD (chronic obstructive pulmonary disease) (HCC)   (+) Chronic obstructive pulmonary disease (HCC)      Cardiovascular and Mediastinum   (+) Cardiomegaly      Other   (+) Anticoagulated by anticoagulation treatment        Physical Exam    Airway    Mallampati score: II  TM Distance: >3 FB  Neck ROM: full     Dental       Cardiovascular      Pulmonary      Other Findings        Anesthesia Plan  ASA Score- 3     Anesthesia Type- IV sedation with anesthesia with ASA Monitors  Additional Monitors:   Airway Plan:           Plan Factors-    Chart reviewed  Patient summary reviewed  Patient is not a current smoker  Induction- intravenous  Postoperative Plan-     Informed Consent- Anesthetic plan and risks discussed with patient  I personally reviewed this patient with the CRNA  Discussed and agreed on the Anesthesia Plan with the CRNA  Minh Ng

## 2023-05-03 NOTE — H&P
History and Physical -  [facility]  Magdiel Silver 81 y o  female MRN: [id number]    HPI: Magdiel Silver is a 81y o  year old female who presents for   Esophageal dysphagia  Increasing issues with solid food dysphagia   - EGD; Future     2  Chronic anticoagulation  On Eliquis secondary to atrial fibrillation  Followed by Dr Cruz Owusu  -Will hold Eliquis for 2 days prior to procedure if okay with cardiology  Patient understands the potential thromboembolic risks     3  Adenocarcinoma of colon [facility])  Last EGD March 2021  Will reevaluate next year decide whether we proceed with a follow-up colonoscopy at age 81          REVIEW OF SYSTEMS: Per the HPI, and otherwise unremarkable      Historical Information   Past Medical History:   Diagnosis Date   • A-fib [facility])    • Abnormal ECG     last assessed: 7/14/2015   • Acid reflux     resolved   • Anxiety    • Pacheco esophagus    • Benign neoplasm of sigmoid colon 09/13/2011    next colon 2012   • Bronchitis, asthmatic     last assessed: 3/12/2012   • Colon cancer ([facility] )     2012- had colon resection   • Colon polyp    • COPD (chronic obstructive pulmonary disease) (HCC)     questionable   • Depression    • Diabetes mellitus ([facility] )     on byetta   • Disease of thyroid gland    • Dizziness     occ   • Esophageal stricture     last assessed: 9/30/2014   • High cholesterol    • Hypertension    • Hypothyroidism    • Iron (Fe) deficiency anemia 12/22/2011    iron pill continue   • Irritable bowel syndrome    • OA (osteoarthritis)    • 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   • Pacemaker     hx SSS   • Rash     labia area- uses cream prn   • Restrictive lung disease    • Seasonal allergies    • Skin scarring/fibrosis     fibrotic scar; last assessed: 3/22/2013   • MONA (stress urinary incontinence, female)    • Urinary frequency     last assessed: 9/10/2012   • Wears glasses     reading     Past Surgical History:   Procedure Laterality Date   • BREAST EXCISIONAL BIOPSY Right     benign, best guess on year   • [address]      does not know type  Dr Manda Winter is cariologist   •  SECTION     • COLON SURGERY      resection  removed 18 In    • COLONOSCOPY  2012: Adenomatous polyp, colorectal anastomosis, internal hemorrhoids  2012:  proctosigmoidectomy   • ESOPHAGOGASTRODUODENOSCOPY N/A 2016    Procedure: ESOPHAGOGASTRODUODENOSCOPY (EGD); Surgeon: Nicole Sneed MD;  [facility]: [facility];  Service:    • HYSTERECTOMY  1978   • NM ARTHROPLASTY INTERPHALANGEAL JOINT EACH Right 2018    Procedure: ARTHROPLASTY PIP/FINGER - RIGHT RING;  Surgeon: Jyoti Holder MD;  [facility];  Service: Orthopedics   • NM ARTHRP INTERPOS INTERCARPAL/METACARPAL JOINTS Right 1/10/2019    Procedure: ARTHROPLASTY [employer];  Surgeon: Jyoti Holder MD;  [facility];  Service: Orthopedics   • NM CYSTOURETHROSCOPY N/A 2018    Procedure: Andie Salas;  Surgeon: Lei Lee MD;  [facility];  Service: UroGynecology          • NM LAPS RPR PARAESPHGL HRNA INCL [address] N/A 2016    Procedure: LAPAROSCOPIC PARAESOPHAGEAL HERNIA REPAIR with mesh; toupet  FUNDOPLICATION ;  Surgeon: Salinas Syed MD;  Location: [facility];  Service: Thoracic   • NM SLING OPERATION STRESS INCONTINENCE N/A 2018    Procedure: INSERTION PUBOVAGINAL SLING (FEMALE); Surgeon: Lei Lee MD;  [facility];  Service: UroGynecology          • TOTAL ABDOMINAL HYSTERECTOMY     • UPPER GASTROINTESTINAL ENDOSCOPY      2018:  Irregular Z-line positive for intestinal metaplasia/ Pacheco's, no dysplasia  Gastritis H pylori negative     • WRIST TENDON TRANSFER Right 1/10/2019    Procedure: TRANSFER TENDON FLEXOR CARPI RADIALIS FROM FOREARM TO HAND;  Surgeon: Jyoti Holder MD;  [facility];  Service: Orthopedics     Social History   Social History     Substance and Sexual Activity Alcohol Use No     Social History     Substance and Sexual Activity   Drug Use No     Social History     Tobacco Use   Smoking Status Former   • Types: Cigarettes   • Quit date:    • Years since quittin 3   Smokeless Tobacco Former   • Quit date:      Family History   Problem Relation Age of Onset   • Heart disease Mother    • Diabetes Mother    • Asthma Father    • Stomach cancer Father         gastrkic cancer   • Cancer Paternal Grandmother    • Cancer Paternal Grandfather    • No Known Problems Brother    • Breast cancer Sister 76   • Breast cancer Sister 77   • Pancreatic cancer Sister 72   • No Known Problems Maternal Aunt    • No Known Problems Maternal Aunt    • No Known Problems Maternal Aunt    • No Known Problems Paternal Aunt    • No Known Problems Daughter    • No Known Problems Maternal Grandmother    • No Known Problems Maternal Grandfather    • Prostate cancer Paternal Uncle         age unknown   • Substance Abuse Neg Hx    • Mental illness Neg Hx    • Alcohol abuse Neg Hx    • Colon cancer Neg Hx    • Colon polyps Neg Hx        Meds/Allergies       Current Outpatient Medications:   •  albuterol (PROVENTIL HFA,VENTOLIN HFA) 90 mcg/act inhaler  •  Apple Cider Vinegar 500 MG TABS  •  ascorbic acid (VITAMIN C) 500 mg tablet  •  atenolol (TENORMIN) 25 mg tablet  •  Cholecalciferol (VITAMIN D-3) 1000 units CAPS  •  Coenzyme Q10 (COQ10) 200 MG CAPS  •  DULoxetine (CYMBALTA) 60 mg delayed release capsule  •  Ferrous Sulfate 27 MG TABS  •  Glucosamine-Chondroitin (MOVE FREE PO)  •  levothyroxine 125 mcg tablet  •  MEGARED OMEGA-3 KRILL OIL PO  •  metFORMIN (GLUCOPHAGE-XR) 500 mg 24 hr tablet  •  simvastatin (ZOCOR) 20 mg tablet  •  Blood Glucose Monitoring Suppl (OneTouch Verio) w/Device KIT  •  Budeson-Glycopyrrol-Formoterol (Breztri Aerosphere) 160-9-4 8 MCG/ACT AERO  •  Byetta 10 MCG Pen 10 MCG/0 04ML SOPN  •  clobetasol (TEMOVATE) 0 05 % cream  •  Continuous Blood Gluc  (FreeStyle "Julian 14 Day [place]) SVETLANA  •  Continuous Blood Gluc Sensor (FreeStyle Julian 14 Day Sensor) MISC  •  cycloSPORINE (Restasis) 0 05 % ophthalmic emulsion  •  Eliquis 5 MG  •  estradiol (ESTRACE VAGINAL) 0 1 mg/g vaginal cream  •  FREESTYLE LITE test strip  •  glucose blood (OneTouch Verio) test strip  •  hydrocortisone 2 5 % cream  •  Jardiance 25 MG TABS  •  ketoconazole (NIZORAL) 2 % cream  •  Lancets (onetouch ultrasoft) lancets  •  neomycin-polymyxin-dexamethasone (MAXITROL) 0 35%-10,000 units/g-0 1%  •  Triamcinolone Acetonide (Nasacort Allergy 24HR) 55 MCG/ACT AERO    Current Facility-Administered Medications:   •  lactated ringers infusion, 50 mL/hr, Intravenous, Continuous, 50 mL/hr at 05/03/23 0857    No Known Allergies    Objective     /92   Pulse (!) 108   Temp 98 °F (36 7 °C) (Temporal)   Resp 15   Ht 5' 3\" (1 6 m)   Wt 69 4 kg (153 lb)   SpO2 96%   BMI 27 10 kg/m²     PHYSICAL EXAM    Gen: NAD AAOx3  Head: Normocephalic, Atraumatic  CV: S1S2 RRR no m/r/g  CHEST: Clear b/l no c/r/w  ABD: soft, +BS NT/ND  EXT: no edema    ASSESSMENT/PLAN:  This is a 81y o  year old female here for egd, and she is stable and optimized for her procedure        "

## 2023-05-03 NOTE — ANESTHESIA POSTPROCEDURE EVALUATION
Post-Op Assessment Note    CV Status:  Stable  Pain Score: 0    Pain management: adequate     Mental Status:  Sleepy   Hydration Status:  Euvolemic   PONV Controlled:  Controlled   Airway Patency:  Patent      Post Op Vitals Reviewed: Yes      Staff: Anesthesiologist, CRNA         No notable events documented      BP   96/50   Temp      Pulse 86   Resp   18   SpO2   99

## 2023-05-09 NOTE — RESULT ENCOUNTER NOTE
Spoke with patient biopsies negative no EGD recall her dysphagia is better I asked her to call if it gets worse

## 2023-05-19 ENCOUNTER — REMOTE DEVICE CLINIC VISIT (OUTPATIENT)
Dept: CARDIOLOGY CLINIC | Facility: CLINIC | Age: 83
End: 2023-05-19

## 2023-05-19 DIAGNOSIS — Z95.0 PRESENCE OF PERMANENT CARDIAC PACEMAKER: Primary | ICD-10-CM

## 2023-05-19 NOTE — PROGRESS NOTES
MDT-DUAL CHAMBER PPM/ ACTIVE SYSTEM IS MRI CONDITIONAL   CARELINK TRANSMISSION:  BATTERY VOLTAGE ADEQUATE (15 MONTHS)   AP 34 6%  35 7%   ALL AVAILABLE LEAD PARAMETERS WITHIN NORMAL LIMITS   7 DEVICE CLASSIFIED VT EPISODES WITH EGMS SHOWING AF WITH RVR UP  BPM   10,055 AT/AF EPISODES WITH AVAILABLE EGMS SHOWING AF WITH INT  ATRIAL UNDERSENSING, ALSO SEEN ON CURRENT EGM   PATIENT TAKES ELIQUIS AND ATENOLOL   NORMAL DEVICE FUNCTION    RG

## 2023-06-09 ENCOUNTER — APPOINTMENT (OUTPATIENT)
Dept: LAB | Facility: CLINIC | Age: 83
End: 2023-06-09
Payer: MEDICARE

## 2023-06-09 ENCOUNTER — OFFICE VISIT (OUTPATIENT)
Dept: FAMILY MEDICINE CLINIC | Facility: CLINIC | Age: 83
End: 2023-06-09
Payer: MEDICARE

## 2023-06-09 VITALS
DIASTOLIC BLOOD PRESSURE: 80 MMHG | RESPIRATION RATE: 16 BRPM | SYSTOLIC BLOOD PRESSURE: 120 MMHG | WEIGHT: 148 LBS | HEART RATE: 81 BPM | TEMPERATURE: 97.6 F | BODY MASS INDEX: 26.22 KG/M2 | HEIGHT: 63 IN | OXYGEN SATURATION: 96 %

## 2023-06-09 DIAGNOSIS — E04.0 DIFFUSE NONTOXIC GOITER: ICD-10-CM

## 2023-06-09 DIAGNOSIS — J06.9 VIRAL URI WITH COUGH: Primary | ICD-10-CM

## 2023-06-09 DIAGNOSIS — E03.8 HYPOTHYROIDISM DUE TO HASHIMOTO'S THYROIDITIS: ICD-10-CM

## 2023-06-09 DIAGNOSIS — E06.3 HYPOTHYROIDISM DUE TO HASHIMOTO'S THYROIDITIS: ICD-10-CM

## 2023-06-09 DIAGNOSIS — E55.9 VITAMIN D DEFICIENCY: ICD-10-CM

## 2023-06-09 DIAGNOSIS — E11.40 TYPE 2 DIABETES MELLITUS WITH DIABETIC NEUROPATHY, WITHOUT LONG-TERM CURRENT USE OF INSULIN (HCC): ICD-10-CM

## 2023-06-09 DIAGNOSIS — E78.2 MIXED HYPERLIPIDEMIA: ICD-10-CM

## 2023-06-09 DIAGNOSIS — M81.0 AGE-RELATED OSTEOPOROSIS WITHOUT CURRENT PATHOLOGICAL FRACTURE: ICD-10-CM

## 2023-06-09 DIAGNOSIS — I10 ESSENTIAL HYPERTENSION: ICD-10-CM

## 2023-06-09 LAB
ALBUMIN SERPL BCP-MCNC: 3.8 G/DL (ref 3.5–5)
ALP SERPL-CCNC: 76 U/L (ref 46–116)
ALT SERPL W P-5'-P-CCNC: 37 U/L (ref 12–78)
ANION GAP SERPL CALCULATED.3IONS-SCNC: 7 MMOL/L (ref 4–13)
AST SERPL W P-5'-P-CCNC: 45 U/L (ref 5–45)
BASOPHILS # BLD AUTO: 0.02 THOUSANDS/ÂΜL (ref 0–0.1)
BASOPHILS NFR BLD AUTO: 0 % (ref 0–1)
BILIRUB SERPL-MCNC: 0.51 MG/DL (ref 0.2–1)
BUN SERPL-MCNC: 16 MG/DL (ref 5–25)
CALCIUM SERPL-MCNC: 9.9 MG/DL (ref 8.3–10.1)
CHLORIDE SERPL-SCNC: 106 MMOL/L (ref 96–108)
CO2 SERPL-SCNC: 24 MMOL/L (ref 21–32)
CREAT SERPL-MCNC: 0.85 MG/DL (ref 0.6–1.3)
CREAT UR-MCNC: 48.8 MG/DL
EOSINOPHIL # BLD AUTO: 0.18 THOUSAND/ÂΜL (ref 0–0.61)
EOSINOPHIL NFR BLD AUTO: 3 % (ref 0–6)
ERYTHROCYTE [DISTWIDTH] IN BLOOD BY AUTOMATED COUNT: 13.2 % (ref 11.6–15.1)
EST. AVERAGE GLUCOSE BLD GHB EST-MCNC: 192 MG/DL
GFR SERPL CREATININE-BSD FRML MDRD: 64 ML/MIN/1.73SQ M
GLUCOSE P FAST SERPL-MCNC: 160 MG/DL (ref 65–99)
HBA1C MFR BLD: 8.3 %
HCT VFR BLD AUTO: 54 % (ref 34.8–46.1)
HGB BLD-MCNC: 16.6 G/DL (ref 11.5–15.4)
IMM GRANULOCYTES # BLD AUTO: 0.01 THOUSAND/UL (ref 0–0.2)
IMM GRANULOCYTES NFR BLD AUTO: 0 % (ref 0–2)
LYMPHOCYTES # BLD AUTO: 3.17 THOUSANDS/ÂΜL (ref 0.6–4.47)
LYMPHOCYTES NFR BLD AUTO: 56 % (ref 14–44)
MCH RBC QN AUTO: 27.9 PG (ref 26.8–34.3)
MCHC RBC AUTO-ENTMCNC: 30.7 G/DL (ref 31.4–37.4)
MCV RBC AUTO: 91 FL (ref 82–98)
MICROALBUMIN UR-MCNC: 8.1 MG/L (ref 0–20)
MICROALBUMIN/CREAT 24H UR: 17 MG/G CREATININE (ref 0–30)
MONOCYTES # BLD AUTO: 0.7 THOUSAND/ÂΜL (ref 0.17–1.22)
MONOCYTES NFR BLD AUTO: 12 % (ref 4–12)
NEUTROPHILS # BLD AUTO: 1.64 THOUSANDS/ÂΜL (ref 1.85–7.62)
NEUTS SEG NFR BLD AUTO: 29 % (ref 43–75)
NRBC BLD AUTO-RTO: 0 /100 WBCS
PLATELET # BLD AUTO: 236 THOUSANDS/UL (ref 149–390)
PMV BLD AUTO: 10.7 FL (ref 8.9–12.7)
POTASSIUM SERPL-SCNC: 4.2 MMOL/L (ref 3.5–5.3)
PROT SERPL-MCNC: 7.9 G/DL (ref 6.4–8.4)
RBC # BLD AUTO: 5.96 MILLION/UL (ref 3.81–5.12)
SODIUM SERPL-SCNC: 137 MMOL/L (ref 135–147)
T4 FREE SERPL-MCNC: 1.28 NG/DL (ref 0.61–1.12)
TSH SERPL DL<=0.05 MIU/L-ACNC: 0.34 UIU/ML (ref 0.45–4.5)
WBC # BLD AUTO: 5.72 THOUSAND/UL (ref 4.31–10.16)

## 2023-06-09 PROCEDURE — 36415 COLL VENOUS BLD VENIPUNCTURE: CPT

## 2023-06-09 PROCEDURE — 80053 COMPREHEN METABOLIC PANEL: CPT

## 2023-06-09 PROCEDURE — 83036 HEMOGLOBIN GLYCOSYLATED A1C: CPT

## 2023-06-09 PROCEDURE — 85025 COMPLETE CBC W/AUTO DIFF WBC: CPT

## 2023-06-09 PROCEDURE — 84443 ASSAY THYROID STIM HORMONE: CPT

## 2023-06-09 PROCEDURE — 99213 OFFICE O/P EST LOW 20 MIN: CPT | Performed by: PHYSICIAN ASSISTANT

## 2023-06-09 PROCEDURE — 84439 ASSAY OF FREE THYROXINE: CPT

## 2023-06-09 PROCEDURE — 82570 ASSAY OF URINE CREATININE: CPT

## 2023-06-09 PROCEDURE — 82043 UR ALBUMIN QUANTITATIVE: CPT

## 2023-06-09 NOTE — PROGRESS NOTES
Assessment/Plan:    1  Viral URI with cough - reassurance, fluids, rest, continue albuterol, mucinex and water  Work on increasing water  BRAT diet to help form stools, maybe due to muxinex    F/u as needed  F/u as scheduled    Subjective:   Chief Complaint   Patient presents with   • Cough     Bringing up clear mucus  Started a week ago   is also sick   • Night Sweats     Started a week ago  Also having chills      Patient ID: Gunjan Belle is a 81 y o  female  Patient here feeling sick since Monday  Taking mucinex every 1 hrs, has taken about 4 doses  Patient is having cough, congestion  Using rescue inhaler with relief  At first had sob, wheeze but resolved  Shaking, nervous, diarrhea, fatigued  Denies sore throat, nasal congestion  Did not check temp        The following portions of the patient's history were reviewed and updated as appropriate: allergies, current medications, past family history, past medical history, past social history, past surgical history and problem list     Past Medical History:   Diagnosis Date   • A-fib ([place] [address] )    • Abnormal ECG     last assessed: 7/14/2015   • Acid reflux     resolved   • Anxiety    • Pacheco esophagus    • Benign neoplasm of sigmoid colon 09/13/2011    next colon 2012   • Bronchitis, asthmatic     last assessed: 3/12/2012   • Colon cancer ([place] [address] )     2012- had colon resection   • Colon polyp    • COPD (chronic obstructive pulmonary disease) (HCC)     questionable   • Depression    • Diabetes mellitus ([place] [address] )     on byetta   • Disease of thyroid gland    • Dizziness     occ   • Esophageal stricture     last assessed: 9/30/2014   • High cholesterol    • Hypertension    • Hypothyroidism    • Iron (Fe) deficiency anemia 12/22/2011    iron pill continue   • Irritable bowel syndrome    • OA (osteoarthritis)    • 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   • Pacemaker     hx SSS   • Rash     labia area- uses cream prn   • Restrictive lung disease    • Seasonal allergies • Skin scarring/fibrosis     fibrotic scar; last assessed: 3/22/2013   • MONA (stress urinary incontinence, female)    • Urinary frequency     last assessed: 9/10/2012   • Wears glasses     reading     Past Surgical History:   Procedure Laterality Date   • BREAST EXCISIONAL BIOPSY Right     benign, best guess on year   • [address]      does not know type  Dr Jsoe Linder is cariologist   •  SECTION     • COLON SURGERY      resection  removed 18 In    • COLONOSCOPY  2012: Adenomatous polyp, colorectal anastomosis, internal hemorrhoids  2012:  proctosigmoidectomy   • ESOPHAGOGASTRODUODENOSCOPY N/A 2016    Procedure: ESOPHAGOGASTRODUODENOSCOPY (EGD); Surgeon: Yonatan Escamilla MD;  [facility];  Service:    • HYSTERECTOMY  1978   • WI ARTHROPLASTY INTERPHALANGEAL JOINT EACH Right 2018    Procedure: ARTHROPLASTY PIP/FINGER - RIGHT RING;  Surgeon: Adele Shipley MD;  [facility];  Service: Orthopedics   • WI ARTHRP INTERPOS INTERCARPAL/METACARPAL JOINTS Right 1/10/2019    Procedure: ARTHROPLASTY Jolinda [employer];  Surgeon: Adele Shipley MD;  [facility];  Service: Orthopedics   • WI CYSTOURETHROSCOPY N/A 2018    Procedure: Bozena Adams;  Surgeon: Raudel Smith MD;  [facility];  Service: UroGynecology          • WI LAPS RPR PARAESPHGL HRNA INCL [address] N/A 2016    Procedure: LAPAROSCOPIC PARAESOPHAGEAL HERNIA REPAIR with mesh; toupet  FUNDOPLICATION ;  Surgeon: Ashley Lassiter MD;  Location: [facility];  Service: Thoracic   • WI SLING OPERATION STRESS INCONTINENCE N/A 2018    Procedure: INSERTION PUBOVAGINAL SLING (FEMALE); Surgeon: Raudel Smith MD;  [facility];  Service: UroGynecology          • TOTAL ABDOMINAL HYSTERECTOMY     • UPPER GASTROINTESTINAL ENDOSCOPY      2018:  Irregular Z-line positive for intestinal metaplasia/ Pacheco's, no dysplasia  Gastritis H pylori negative  • WRIST TENDON TRANSFER Right 1/10/2019    Procedure: TRANSFER TENDON FLEXOR CARPI RADIALIS FROM FOREARM TO HAND;  Surgeon: Keith Atkinson MD;  Location:  MAIN OR;  Service: Orthopedics     Family History   Problem Relation Age of Onset   • Heart disease Mother    • Diabetes Mother    • Asthma Father    • Stomach cancer Father         gastrkic cancer   • Cancer Paternal Grandmother    • Cancer Paternal Grandfather    • No Known Problems Brother    • Breast cancer Sister 76   • Breast cancer Sister 77   • Pancreatic cancer Sister 72   • No Known Problems Maternal Aunt    • No Known Problems Maternal Aunt    • No Known Problems Maternal Aunt    • No Known Problems Paternal Aunt    • No Known Problems Daughter    • No Known Problems Maternal Grandmother    • No Known Problems Maternal Grandfather    • Prostate cancer Paternal Uncle         age unknown   • Substance Abuse Neg Hx    • Mental illness Neg Hx    • Alcohol abuse Neg Hx    • Colon cancer Neg Hx    • Colon polyps Neg Hx      Social History     Socioeconomic History   • Marital status: /Civil Union     Spouse name: Not on file   • Number of children: Not on file   • Years of education: Not on file   • Highest education level: Not on file   Occupational History   • Not on file   Tobacco Use   • Smoking status: Former     Types: Cigarettes     Quit date:      Years since quittin 4   • Smokeless tobacco: Former     Quit date:    Vaping Use   • Vaping Use: Never used   Substance and Sexual Activity   • Alcohol use: No   • Drug use: No   • Sexual activity: Not Currently   Other Topics Concern   • Not on file   Social History Narrative    Always uses seat belt    Copy of advanced directive obtained    Drinks coffee-drinks 5 cups a day    Has smoke detectors    Uses safety equipment-seatbelts     Social Determinants of Health     Financial Resource Strain: Low Risk  (2022)    Overall Financial Resource Strain (CARDIA)    • Difficulty of Paying Living Expenses: Not hard at all   Food Insecurity: Not on file   Transportation Needs: No Transportation Needs (8/23/2022)    PRAPARE - Transportation    • Lack of Transportation (Medical): No    • Lack of Transportation (Non-Medical):  No   Physical Activity: Not on file   Stress: Not on file   Social Connections: Not on file   Intimate Partner Violence: Not on file   Housing Stability: Not on file       Current Outpatient Medications:   •  albuterol (PROVENTIL HFA,VENTOLIN HFA) 90 mcg/act inhaler, Inhale 2 puffs every 6 (six) hours as needed for wheezing, Disp: , Rfl:   •  Apple Cider Vinegar 500 MG TABS, Take by mouth daily, Disp: , Rfl:   •  ascorbic acid (VITAMIN C) 500 mg tablet, Take 500 mg by mouth daily, Disp: , Rfl:   •  atenolol (TENORMIN) 25 mg tablet, Take 1 tablet (25 mg total) by mouth daily at bedtime, Disp: 90 tablet, Rfl: 3  •  Blood Glucose Monitoring Suppl (OneTouch Verio) w/Device KIT, Check blood sugars once a day, Disp: 1 kit, Rfl: 0  •  Budeson-Glycopyrrol-Formoterol (Breztri Aerosphere) 160-9-4 8 MCG/ACT AERO, Inhale 2 puffs daily as needed (rattle or shotness of beath) Rinse mouth after use , Disp: 10 7 g, Rfl: 3  •  Byetta 10 MCG Pen 10 MCG/0 04ML SOPN, Inject 0 04 mL under the skin 2 (two) times a day, Disp: 7 2 mL, Rfl: 3  •  Cholecalciferol (VITAMIN D-3) 1000 units CAPS, Take 1 capsule by mouth daily, Disp: , Rfl:   •  clobetasol (TEMOVATE) 0 05 % cream, , Disp: , Rfl:   •  Coenzyme Q10 (COQ10) 200 MG CAPS, Take 200 mg by mouth daily  , Disp: , Rfl:   •  Continuous Blood Gluc  (FreeStyle Julian 14 Day [place]) SVETLANA, Use 1 Device 4 (four) times a day (before meals and at bedtime), Disp: 1 each, Rfl: 0  •  Continuous Blood Gluc Sensor (FreeStyle Julian 14 Day Sensor) MISC, Use 1 application every 14 (fourteen) days, Disp: 2 each, Rfl: 6  •  cycloSPORINE (Restasis) 0 05 % ophthalmic emulsion, , Disp: , Rfl:   •  DULoxetine (CYMBALTA) 60 mg delayed release capsule, Take 1 capsule (60 mg total) by mouth daily, Disp: 90 capsule, Rfl: 3  •  Eliquis 5 MG, TAKE 1 TABLET TWICE A DAY, Disp: 180 tablet, Rfl: 3  •  estradiol (ESTRACE VAGINAL) 0 1 mg/g vaginal cream, Insert 0 5 g into the vagina 2 (two) times a week Patient is NOT to be using this daily, Disp: 42 5 g, Rfl: 1  •  Ferrous Sulfate 27 MG TABS, Take 1 tablet by mouth daily, Disp: , Rfl:   •  FREESTYLE LITE test strip, TEST THREE TIMES A DAY, Disp: 300 strip, Rfl: 3  •  Glucosamine-Chondroitin (MOVE FREE PO), Take by mouth daily, Disp: , Rfl:   •  glucose blood (OneTouch Verio) test strip, Check blood sugars once a day, Disp: 100 strip, Rfl: 2  •  hydrocortisone 2 5 % cream, Apply topically 2 (two) times a day, Disp: 60 g, Rfl: 2  •  Jardiance 25 MG TABS, TAKE 1 TABLET EVERY MORNING, Disp: 90 tablet, Rfl: 3  •  ketoconazole (NIZORAL) 2 % cream, Apply topically 2 (two) times a day for 14 days, Disp: 60 g, Rfl: 1  •  Lancets (onetouch ultrasoft) lancets, Check blood sugars once a day, Disp: 100 each, Rfl: 2  •  levothyroxine 125 mcg tablet, Take one tablet 6 days of the week and nothing on sundays, Disp: 90 tablet, Rfl: 3  •  MEGARED OMEGA-3 KRILL OIL PO, Take 1 capsule by mouth daily, Disp: , Rfl:   •  metFORMIN (GLUCOPHAGE-XR) 500 mg 24 hr tablet, Take 2 tablets (1,000 mg total) by mouth 2 (two) times a day with meals, Disp: 360 tablet, Rfl: 1  •  neomycin-polymyxin-dexamethasone (MAXITROL) 0 35%-10,000 HQIGX/O-7 9%, APPLY ONE APPLICATION BY OPHTHALMIC ROUTE TWO TIMES A DAY   A SMALL AMOUNT IN THE CONJUNCTIVAL SACS OF BOTH EYES, Disp: , Rfl:   •  simvastatin (ZOCOR) 20 mg tablet, TAKE 2 TABLETS DAILY AT BEDTIME, Disp: 180 tablet, Rfl: 3  •  Triamcinolone Acetonide (Nasacort Allergy 24HR) 55 MCG/ACT AERO, 2 Act (110 mcg total) into each nostril every morning, Disp: 16 5 mL, Rfl: 3    Review of Systems          Objective:    Vitals:    06/09/23 1320   BP: 120/80   BP Location: Left arm   Patient Position: Sitting   Cuff Size: Standard "  Pulse: 81   Resp: 16   Temp: 97 6 °F (36 4 °C)   TempSrc: Tympanic   SpO2: 96%   Weight: 67 1 kg (148 lb)   Height: 5' 3\" (1 6 m)        Physical Exam  Constitutional:       Appearance: Normal appearance  She is well-developed  HENT:      Head: Normocephalic and atraumatic  Right Ear: Tympanic membrane, ear canal and external ear normal       Left Ear: Tympanic membrane, ear canal and external ear normal       Nose: Mucosal edema and rhinorrhea present  Mouth/Throat:      Mouth: Mucous membranes are moist       Pharynx: Oropharynx is clear  No oropharyngeal exudate or posterior oropharyngeal erythema  Cardiovascular:      Rate and Rhythm: Normal rate and regular rhythm  Pulses: Normal pulses  Heart sounds: Normal heart sounds  Pulmonary:      Effort: Pulmonary effort is normal       Breath sounds: Normal breath sounds  No wheezing, rhonchi or rales  Musculoskeletal:      Cervical back: Normal range of motion and neck supple  Lymphadenopathy:      Cervical: No cervical adenopathy  Skin:     General: Skin is warm  Neurological:      General: No focal deficit present  Mental Status: She is alert and oriented to person, place, and time  Psychiatric:         Mood and Affect: Mood normal          Behavior: Behavior normal          Thought Content:  Thought content normal          Judgment: Judgment normal                "

## 2023-07-11 ENCOUNTER — APPOINTMENT (EMERGENCY)
Dept: RADIOLOGY | Facility: HOSPITAL | Age: 83
End: 2023-07-11
Payer: MEDICARE

## 2023-07-11 ENCOUNTER — APPOINTMENT (EMERGENCY)
Dept: CT IMAGING | Facility: HOSPITAL | Age: 83
End: 2023-07-11
Payer: MEDICARE

## 2023-07-11 ENCOUNTER — HOSPITAL ENCOUNTER (EMERGENCY)
Facility: HOSPITAL | Age: 83
Discharge: HOME | End: 2023-07-11
Attending: EMERGENCY MEDICINE
Payer: MEDICARE

## 2023-07-11 ENCOUNTER — HOSPITAL ENCOUNTER (OUTPATIENT)
Dept: BONE DENSITY | Facility: IMAGING CENTER | Age: 83
Discharge: HOME | End: 2023-07-11
Payer: MEDICARE

## 2023-07-11 VITALS — HEIGHT: 63 IN | BODY MASS INDEX: 27.18 KG/M2 | WEIGHT: 153.4 LBS

## 2023-07-11 DIAGNOSIS — S22.49XA RIB FRACTURES: Primary | ICD-10-CM

## 2023-07-11 DIAGNOSIS — N28.1 RENAL CYST: ICD-10-CM

## 2023-07-11 DIAGNOSIS — E04.9 ENLARGED THYROID: ICD-10-CM

## 2023-07-11 DIAGNOSIS — Z78.0 MENOPAUSE: ICD-10-CM

## 2023-07-11 LAB
2HR DELTA HS TROPONIN: 1 NG/L
ANION GAP SERPL CALCULATED.3IONS-SCNC: 7 MMOL/L
APTT PPP: 28 SECONDS (ref 23–37)
BASOPHILS # BLD AUTO: 0.05 THOUSANDS/ÂΜL (ref 0–0.1)
BASOPHILS NFR BLD AUTO: 1 % (ref 0–1)
BUN SERPL-MCNC: 15 MG/DL (ref 5–25)
CALCIUM SERPL-MCNC: 9.4 MG/DL (ref 8.4–10.2)
CARDIAC TROPONIN I PNL SERPL HS: 8 NG/L
CARDIAC TROPONIN I PNL SERPL HS: 9 NG/L
CHLORIDE SERPL-SCNC: 102 MMOL/L (ref 96–108)
CO2 SERPL-SCNC: 26 MMOL/L (ref 21–32)
CREAT SERPL-MCNC: 0.61 MG/DL (ref 0.6–1.3)
EOSINOPHIL # BLD AUTO: 0.21 THOUSAND/ÂΜL (ref 0–0.61)
EOSINOPHIL NFR BLD AUTO: 2 % (ref 0–6)
ERYTHROCYTE [DISTWIDTH] IN BLOOD BY AUTOMATED COUNT: 13.6 % (ref 11.6–15.1)
GFR SERPL CREATININE-BSD FRML MDRD: 85 ML/MIN/1.73SQ M
GLUCOSE SERPL-MCNC: 163 MG/DL (ref 65–140)
GLUCOSE SERPL-MCNC: 226 MG/DL (ref 65–140)
HCT VFR BLD AUTO: 51.2 % (ref 34.8–46.1)
HGB BLD-MCNC: 15.8 G/DL (ref 11.5–15.4)
IMM GRANULOCYTES # BLD AUTO: 0.09 THOUSAND/UL (ref 0–0.2)
IMM GRANULOCYTES NFR BLD AUTO: 1 % (ref 0–2)
INR PPP: 1.03 (ref 0.84–1.19)
LYMPHOCYTES # BLD AUTO: 2.16 THOUSANDS/ÂΜL (ref 0.6–4.47)
LYMPHOCYTES NFR BLD AUTO: 21 % (ref 14–44)
MCH RBC QN AUTO: 28 PG (ref 26.8–34.3)
MCHC RBC AUTO-ENTMCNC: 30.9 G/DL (ref 31.4–37.4)
MCV RBC AUTO: 91 FL (ref 82–98)
MONOCYTES # BLD AUTO: 0.74 THOUSAND/ÂΜL (ref 0.17–1.22)
MONOCYTES NFR BLD AUTO: 7 % (ref 4–12)
NEUTROPHILS # BLD AUTO: 7.2 THOUSANDS/ÂΜL (ref 1.85–7.62)
NEUTS SEG NFR BLD AUTO: 68 % (ref 43–75)
NRBC BLD AUTO-RTO: 0 /100 WBCS
PLATELET # BLD AUTO: 183 THOUSANDS/UL (ref 149–390)
PMV BLD AUTO: 11.8 FL (ref 8.9–12.7)
POTASSIUM SERPL-SCNC: 4.3 MMOL/L (ref 3.5–5.3)
PROTHROMBIN TIME: 14.3 SECONDS (ref 11.6–14.5)
RBC # BLD AUTO: 5.65 MILLION/UL (ref 3.81–5.12)
SODIUM SERPL-SCNC: 135 MMOL/L (ref 135–147)
WBC # BLD AUTO: 10.45 THOUSAND/UL (ref 4.31–10.16)

## 2023-07-11 PROCEDURE — 85730 THROMBOPLASTIN TIME PARTIAL: CPT | Performed by: EMERGENCY MEDICINE

## 2023-07-11 PROCEDURE — 74177 CT ABD & PELVIS W/CONTRAST: CPT

## 2023-07-11 PROCEDURE — 36415 COLL VENOUS BLD VENIPUNCTURE: CPT | Performed by: EMERGENCY MEDICINE

## 2023-07-11 PROCEDURE — 85025 COMPLETE CBC W/AUTO DIFF WBC: CPT | Performed by: EMERGENCY MEDICINE

## 2023-07-11 PROCEDURE — 84484 ASSAY OF TROPONIN QUANT: CPT | Performed by: EMERGENCY MEDICINE

## 2023-07-11 PROCEDURE — 70450 CT HEAD/BRAIN W/O DYE: CPT

## 2023-07-11 PROCEDURE — 85610 PROTHROMBIN TIME: CPT | Performed by: EMERGENCY MEDICINE

## 2023-07-11 PROCEDURE — 77080 DXA BONE DENSITY AXIAL: CPT

## 2023-07-11 PROCEDURE — 82948 REAGENT STRIP/BLOOD GLUCOSE: CPT

## 2023-07-11 PROCEDURE — 80048 BASIC METABOLIC PNL TOTAL CA: CPT | Performed by: EMERGENCY MEDICINE

## 2023-07-11 PROCEDURE — 71260 CT THORAX DX C+: CPT

## 2023-07-11 PROCEDURE — 71045 X-RAY EXAM CHEST 1 VIEW: CPT

## 2023-07-11 PROCEDURE — 72125 CT NECK SPINE W/O DYE: CPT

## 2023-07-11 RX ORDER — OXYCODONE HYDROCHLORIDE 5 MG/1
2.5 TABLET ORAL EVERY 4 HOURS PRN
Qty: 10 TABLET | Refills: 0 | Status: SHIPPED | OUTPATIENT
Start: 2023-07-11 | End: 2023-07-21

## 2023-07-11 RX ORDER — METHOCARBAMOL 500 MG/1
500 TABLET, FILM COATED ORAL ONCE
Status: COMPLETED | OUTPATIENT
Start: 2023-07-11 | End: 2023-07-11

## 2023-07-11 RX ORDER — LIDOCAINE 50 MG/G
1 PATCH TOPICAL ONCE
Status: DISCONTINUED | OUTPATIENT
Start: 2023-07-11 | End: 2023-07-11 | Stop reason: HOSPADM

## 2023-07-11 RX ORDER — METHOCARBAMOL 500 MG/1
500 TABLET, FILM COATED ORAL 2 TIMES DAILY
Qty: 20 TABLET | Refills: 0 | Status: SHIPPED | OUTPATIENT
Start: 2023-07-11

## 2023-07-11 RX ORDER — LIDOCAINE 50 MG/G
1 PATCH TOPICAL DAILY
Qty: 6 PATCH | Refills: 0 | Status: SHIPPED | OUTPATIENT
Start: 2023-07-11

## 2023-07-11 RX ADMIN — IOHEXOL 85 ML: 350 INJECTION, SOLUTION INTRAVENOUS at 09:59

## 2023-07-11 RX ADMIN — METHOCARBAMOL 500 MG: 500 TABLET ORAL at 09:41

## 2023-07-11 RX ADMIN — LIDOCAINE 1 PATCH: 50 PATCH TOPICAL at 09:42

## 2023-07-12 ENCOUNTER — VBI (OUTPATIENT)
Dept: FAMILY MEDICINE CLINIC | Facility: CLINIC | Age: 83
End: 2023-07-12

## 2023-07-12 VITALS
RESPIRATION RATE: 16 BRPM | SYSTOLIC BLOOD PRESSURE: 133 MMHG | HEART RATE: 109 BPM | OXYGEN SATURATION: 95 % | DIASTOLIC BLOOD PRESSURE: 89 MMHG | TEMPERATURE: 98.8 F

## 2023-07-12 NOTE — TELEPHONE ENCOUNTER
07/12/23 12:39 PM    Patient contacted post ED visit, [facility] department spoke with patient/caregiver and outreach was successful. Thank you.   AAYUSH LEACH  [facility]

## 2023-07-13 ENCOUNTER — OFFICE VISIT (OUTPATIENT)
Dept: PODIATRY | Facility: CLINIC | Age: 83
End: 2023-07-13
Payer: MEDICARE

## 2023-07-13 VITALS
SYSTOLIC BLOOD PRESSURE: 125 MMHG | DIASTOLIC BLOOD PRESSURE: 82 MMHG | WEIGHT: 153 LBS | BODY MASS INDEX: 27.11 KG/M2 | HEART RATE: 84 BPM | HEIGHT: 63 IN

## 2023-07-13 DIAGNOSIS — B35.1 ONYCHOMYCOSIS: Primary | ICD-10-CM

## 2023-07-13 DIAGNOSIS — L84 CORNS AND CALLUS: ICD-10-CM

## 2023-07-13 DIAGNOSIS — E11.40 TYPE 2 DIABETES MELLITUS WITH DIABETIC NEUROPATHY, WITHOUT LONG-TERM CURRENT USE OF INSULIN (HCC): ICD-10-CM

## 2023-07-13 PROCEDURE — 11055 PARING/CUTG B9 HYPRKER LES 1: CPT | Performed by: PODIATRIST

## 2023-07-13 PROCEDURE — 11721 DEBRIDE NAIL 6 OR MORE: CPT | Performed by: PODIATRIST

## 2023-07-13 NOTE — PROGRESS NOTES
PATIENT:  Sindhu PENALOZA Genoe  1940    ASSESSMENT:  1. Onychomycosis        2. Corns and callus        3. Type 2 diabetes mellitus with diabetic neuropathy, without long-term current use of insulin ([facility])  Nail removal            Orders Placed This Encounter   Procedures   • Nail removal        PLAN:  Disease prevention and related risk factors of diabetes were identified and discussed. The patient was educated in proper foot wear for diabetics. Educated in daily foot assessment and routine diabetic foot care. Discussed the importance of controlling BS through diet and exercise. The patient will follow up in 9-12 weeks for further diabetic foot exam and care. Procedures: 35429, 46139, 95012  All mycotic toenails were reduced and debrided in length, width, and girth using a nail nipper and electric dremel. All hyperkeratotic skin lesion(s) if present were sharply pared with a #10 scalpel with no evidence of ulceration/abscess. Patient tolerated procedure(s) well without complications. Nail removal    Date/Time: 7/13/2023 8:30 AM    Performed by: Ronen Benz DPM  Authorized by: Ronen Benz DPM    Patient location:  Other (comment)Universal Protocol:  Consent: Verbal consent obtained. Risks and benefits: risks, benefits and alternatives were discussed  Consent given by: patient  Patient understanding: patient states understanding of the procedure being performed  Patient identity confirmed: verbally with patient      Nails trimmed:     Number of nails trimmed:  8  Post-procedure details:     Patient tolerance of procedure: Tolerated well, no immediate complications         HPI:  Rosa Fried is a 81 y. o.year old female seen for diabetic foot exam.  The patient has class findings with diabetes. BS is under control. The patient complained of thick toenails painful lesion right great toe.     The patient denied any acute pedal disorder, redness, acute swelling, or recent injury. The following portions of the patient's history were reviewed and updated as appropriate: allergies, current medications, past family history, past medical history, past social history, past surgical history and problem list.    REVIEW OF SYSTEMS:  GENERAL: No fever or chills  HEART: No chest pain, or palpitation  RESPIRATORY:  No acute SOB or cough  GI: No Nausea, vomit or diarrhea  NEUROLOGIC: No syncope or acute weakness    PHYSICAL EXAM:    /82   Pulse 84   Ht 5' 2.5" (1.588 m)   Wt 69.4 kg (153 lb)   BMI 27.54 kg/m²     VASCULAR EXAM  Posterior tibial artery +1 bilateral  Dorsalis pedis artery absent bilateral  The patient has class findings with skin atrophy, lack of digital hair, and nail dystrophy. There is trace lower extremity edema bilaterally. NEUROLOGIC EXAM  Sensation is intact to light touch. Yes   Sensation is absent to 10gm monofilament. Vibratory sensation diminished. Tingling numbness paresthesias noted bilateral.         DERMATOLOGIC EXAM:   Texture, Tone and Turgor are diminished bilateral.  The patient has dystrophic/hypertrophic toenails with yellow/white discoloration, onycholysis, and subungal debris. Fungal odor noted. Brittle nature noted. Right foot nails dystrophic x1 with 0.5 cm ave thickness (hallux)  Left foot nails dystrophic x1 with 0.5 cm ave thickness (hallux)  Patient has hyperkeratotic lesions noted:  Right foot located at plantar medial hallux IPJ. Theodor Points Left foot located at none  No notable suspicious skin lesions. MUSCULOSKELETAL EXAM:   No acute joint pain, edema, or redness. No acute musculoskeletal problem. Patient has mild digital contractures deformities. Patient has no ambulation limitations. Patient wears DM shoes? Yes    Risk Category/Class Findings:  Q8 (B2 ABC, B3)  1 = Loss of protective sensation    A1)  Has the patient had a previous amputation of the foot or integral skeletal portion thereof?  No  B1)  Does the patient have absent posterior tibial pulse? No  B3)  Does the patient have absent dorsalis pedis? Yes  B2)  Does the patient have three of the following? Yes           1. Hair growth (increased or decreased), 2.  Nail changes (thickening) and 3. Pigmentary changes (discoloring)  C)  Does the patient have two of the following and one above?  No

## 2023-07-17 ENCOUNTER — OFFICE VISIT (OUTPATIENT)
Dept: FAMILY MEDICINE CLINIC | Facility: CLINIC | Age: 83
End: 2023-07-17
Payer: MEDICARE

## 2023-07-17 VITALS
WEIGHT: 151.6 LBS | BODY MASS INDEX: 27.29 KG/M2 | HEART RATE: 75 BPM | RESPIRATION RATE: 14 BRPM | DIASTOLIC BLOOD PRESSURE: 80 MMHG | OXYGEN SATURATION: 98 % | SYSTOLIC BLOOD PRESSURE: 130 MMHG | TEMPERATURE: 97.8 F

## 2023-07-17 DIAGNOSIS — M80.00XD AGE-RELATED OSTEOPOROSIS WITH CURRENT PATHOLOGICAL FRACTURE WITH ROUTINE HEALING, SUBSEQUENT ENCOUNTER: Primary | ICD-10-CM

## 2023-07-17 DIAGNOSIS — M81.0 AGE-RELATED OSTEOPOROSIS WITHOUT CURRENT PATHOLOGICAL FRACTURE: ICD-10-CM

## 2023-07-17 DIAGNOSIS — S22.42XD CLOSED FRACTURE OF MULTIPLE RIBS OF LEFT SIDE WITH ROUTINE HEALING, SUBSEQUENT ENCOUNTER: ICD-10-CM

## 2023-07-17 PROCEDURE — 99214 OFFICE O/P EST MOD 30 MIN: CPT | Performed by: FAMILY MEDICINE

## 2023-07-17 RX ORDER — ALENDRONATE SODIUM 70 MG/1
70 TABLET ORAL
Qty: 12 TABLET | Refills: 3 | Status: SHIPPED | OUTPATIENT
Start: 2023-07-17

## 2023-07-17 NOTE — PATIENT INSTRUCTIONS
Please brace herself against the back of your couch and take a deep breath at the top of every hour    Start taking 1000 mg of acetaminophen/Tylenol 3 times a day to help with your rib pain    Begin alendronate 1 pill 30 minutes before you eat 1st thing in the morning and take with 8 ounces of water and wait 30 minutes  Continue your calcium and vitamin D every day  Try to start walking with your cane every day    In 2 years we will check your DEXA again    Otherwise I will see you as scheduled for your regular recheck

## 2023-07-17 NOTE — PROGRESS NOTES
Assessment/Plan:    Fractured ribs  Splinting and deep breathing exercises at the top of the hour  Call with any coughing or fever    Osteoporosis  Discussed use of Actonel  Start walking with a cane  Continue daily calcium and vitamin D    Check as scheduled or needed        Depression Screening and Follow-up Plan: Patient was screened for depression during today's encounter. They screened negative with a PHQ-2 score of 0. Subjective:   Marry Perez is a 81 y. o.female  Chief Complaint   Patient presents with   • ER Follow up      Patient is here for 2 reasons, one post emergency room want to discuss her DEXA    As luck would have it, she got back from her DEXA, went to [facility] fell and fractured her 7th and 8th rib on the left side posterior area    She was in the emergency room and sent home on some oxycodone and is asking for more because she takes them every 4 hours, this request was denied      Past medical history, social history, and family history reviewed as appropriate for the complaint of this patient. MEDICATIONS REVIEWED AND UPDATED    10 point review of systems performed, the remainder of the ROS is negative except for what is noted in the history of chief complaint    Objective:    Vitals:    07/17/23 1105   BP: 130/80   Pulse: 75   Resp: 14   Temp: 97.8 °F (36.6 °C)   SpO2: 98%     Body mass index is 27.29 kg/m². Physical Exam    General  Patient in no acute distress, well appearing, well nourished and appears stated age    Mental status  Miserable as she usually is grumpy and complaining. She is oriented to time person and place, recent and remote memory is intact, mood and affect are as expected, cooperative, and patient is reasonable.     Lungs  Clear to auscultation bilaterally  Chest wall very ecchymotic as result of the fractures

## 2023-07-18 ENCOUNTER — TELEPHONE (OUTPATIENT)
Dept: FAMILY MEDICINE CLINIC | Facility: CLINIC | Age: 83
End: 2023-07-18

## 2023-07-18 NOTE — TELEPHONE ENCOUNTER
Starr Sox called and said that her DEXA scan was abnormal and she needs to talk to you about it but wasn't able to talk to you yesterday. Did you want her to schedule an appointment or send a message to her?

## 2023-07-20 ENCOUNTER — OFFICE VISIT (OUTPATIENT)
Dept: FAMILY MEDICINE CLINIC | Facility: CLINIC | Age: 83
End: 2023-07-20
Payer: MEDICARE

## 2023-07-20 VITALS
TEMPERATURE: 98 F | OXYGEN SATURATION: 97 % | WEIGHT: 152.2 LBS | RESPIRATION RATE: 16 BRPM | BODY MASS INDEX: 26.97 KG/M2 | HEART RATE: 104 BPM | DIASTOLIC BLOOD PRESSURE: 82 MMHG | HEIGHT: 63 IN | SYSTOLIC BLOOD PRESSURE: 124 MMHG

## 2023-07-20 DIAGNOSIS — M81.0 AGE-RELATED OSTEOPOROSIS WITHOUT CURRENT PATHOLOGICAL FRACTURE: Primary | ICD-10-CM

## 2023-07-20 PROCEDURE — 99213 OFFICE O/P EST LOW 20 MIN: CPT | Performed by: FAMILY MEDICINE

## 2023-07-20 NOTE — PROGRESS NOTES
Assessment/Plan:    Osteoporosis  Fosamax once a week 30 min before eating first thing in the am  Calcium tablet one with each meal  Vitamin D daily  To walk as much as you can to stimulate your bones to grow and get thicker  We will recheck a DEXA in 2 years           Subjective:   Deisy Rose is a 81 y. o.female  Chief Complaint   Patient presents with   • Discussion     Dexa Scan     HPI    Past medical history, social history, and family history reviewed as appropriate for the complaint of this patient. MEDICATIONS REVIEWED AND UPDATED    10 point review of systems performed, the remainder of the ROS is negative except for what is noted in the history of chief complaint    Objective:    Vitals:    07/20/23 1215   BP: 124/82   Pulse: 104   Resp: 16   Temp: 98 °F (36.7 °C)   SpO2: 97%     Body mass index is 27.39 kg/m².     Physical Exam

## 2023-07-20 NOTE — PATIENT INSTRUCTIONS
Osteoporosis  Fosamax once a week 30 min before eating first thing in the am  Calcium tablet one with each meal  Vitamin D daily  To walk as much as you can to stimulate your bones to grow and get thicker  We will recheck a DEXA in 2 years

## 2023-08-02 ENCOUNTER — APPOINTMENT (OUTPATIENT)
Dept: LAB | Facility: HOSPITAL | Age: 83
End: 2023-08-02
Payer: MEDICARE

## 2023-08-02 ENCOUNTER — OFFICE VISIT (OUTPATIENT)
Dept: ENDOCRINOLOGY | Facility: HOSPITAL | Age: 83
End: 2023-08-02
Payer: MEDICARE

## 2023-08-02 VITALS
HEART RATE: 80 BPM | HEIGHT: 63 IN | WEIGHT: 154 LBS | OXYGEN SATURATION: 97 % | SYSTOLIC BLOOD PRESSURE: 130 MMHG | DIASTOLIC BLOOD PRESSURE: 78 MMHG | BODY MASS INDEX: 27.29 KG/M2

## 2023-08-02 DIAGNOSIS — E11.40 TYPE 2 DIABETES MELLITUS WITH DIABETIC NEUROPATHY, WITHOUT LONG-TERM CURRENT USE OF INSULIN (HCC): Primary | ICD-10-CM

## 2023-08-02 DIAGNOSIS — M81.0 AGE-RELATED OSTEOPOROSIS WITHOUT CURRENT PATHOLOGICAL FRACTURE: ICD-10-CM

## 2023-08-02 DIAGNOSIS — E78.2 MIXED HYPERLIPIDEMIA: ICD-10-CM

## 2023-08-02 DIAGNOSIS — E03.8 HYPOTHYROIDISM DUE TO HASHIMOTO'S THYROIDITIS: ICD-10-CM

## 2023-08-02 DIAGNOSIS — E55.9 VITAMIN D DEFICIENCY: ICD-10-CM

## 2023-08-02 DIAGNOSIS — E06.3 HYPOTHYROIDISM DUE TO HASHIMOTO'S THYROIDITIS: ICD-10-CM

## 2023-08-02 LAB
T4 FREE SERPL-MCNC: 0.74 NG/DL (ref 0.61–1.12)
TSH SERPL DL<=0.05 MIU/L-ACNC: 8.61 UIU/ML (ref 0.45–4.5)

## 2023-08-02 PROCEDURE — 99215 OFFICE O/P EST HI 40 MIN: CPT | Performed by: STUDENT IN AN ORGANIZED HEALTH CARE EDUCATION/TRAINING PROGRAM

## 2023-08-02 PROCEDURE — 84439 ASSAY OF FREE THYROXINE: CPT

## 2023-08-02 PROCEDURE — 84443 ASSAY THYROID STIM HORMONE: CPT

## 2023-08-02 PROCEDURE — 36415 COLL VENOUS BLD VENIPUNCTURE: CPT

## 2023-08-02 NOTE — PROGRESS NOTES
Established Patient Progress Note       Chief Complaint   Patient presents with   • Diabetes Type 2   • Hypothyroidism   • Osteoporosis      History of Present Illness:     Marry Perez is a 81 y.o. female with a history of T2DM since 10+ years Without any complications, does have some balance issues and some numbness in feet. Other PMHX of hypothyroidism and osteoporosis d/t fragility fracture of 7th-8th left rib after fall from standing height last month who Presents today for diabetic care. She denies any polyuria, polydipsia, nocturia and blurry vision. She denies nephropathy and retinopathy. Blood Sugar/Glucometer/Pump/CGM review:  reports didn't bring her BG logs since her BG were high when had her fall and was in pain, hence stopped writing it down and was embarrassed about it. Did check BG today was 123  Current regime:- metformin 1000mg BID, Byetta 10mg BID, Jardiance 25mg daily  Medications tried/failed in past:- above  Hypoglycemic episodes: none  Diet- reports her main issues is eating fruits but she cannot stop eating fruits  Activity- stays active but has balance issues so doesn't walk alot  Weight- reports loosing 10lbs since last year    last eye exam- 12/21- reports has got exam recently but doesn't recall date. last foot exam- 07/23, also follows with podiatry  History of hypertension- Noo  History of hyperlipidemia- yes on zocor 20mg daily   Last lipid 06/23, LDL 63,    Last urine 06/23 normal   Last hbA1C 06.23 8.3%    Hypothyroidism:- patient reports hx of goiter d/t iodine def in [place]. Has been on levothyroxine for a long time. Currently on levothyroxine 125mcg M-sat, reports has been losing some weight but denies any tremors, palpitations, diarrhea. Does take her medication as prescribed and has not taken more than prescribed. Previously TSH under control however last TSH 06/23 low 0.340 with free t4 1.28.      Osteopenia:- patients most recent DXA scan showed osteopenia with lowest t score of -1.7 at left femoral neck however patient then had fracture of left ribs 7/8 after a fall- managed conservative. Therefore now given Dx of osteoporosis d/t fracture started on fosamax by PCP 2 weeks ago. Managed by PCP  Takes 1000IU vitD daily, and also eats calcium through diet. Does not use cane.      Social/Family hx- as below    Patient Active Problem List   Diagnosis   • Type 2 diabetes mellitus with diabetic neuropathy, without long-term current use of insulin ([address])   • Essential hypertension   • Coronary artery disease involving native coronary artery of native heart without angina pectoris   • Abnormal echocardiogram   • Adenocarcinoma of colon (HCC)   • 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   • Cardiomegaly   • Diffuse nontoxic goiter   • Feeling anxious   • Hematuria, microscopic   • Mixed hyperlipidemia   • Hypothyroidism due to Hashimoto's thyroiditis   • Insomnia   • Mitral regurgitation   • Paraesophageal hernia   • Paroxysmal atrial fibrillation ([facility])   • Restrictive lung disease   • Primary osteoarthritis involving multiple joints   • Sick sinus syndrome ([facility])   • Vitamin D deficiency   • Stress incontinence in female   • [facility] DJD(carpometacarpal degenerative joint disease), localized primary, right   • Gastro-esophageal reflux disease without esophagitis   • Lichen simplex chronicus   • MRI safe cardiac pacemaker in situ   • Anticoagulated by anticoagulation treatment   • Chronic obstructive pulmonary disease ([facility])   • Lichen sclerosus et atrophicus   • A-fib ([facility])   • Pacemaker   • COPD (chronic obstructive pulmonary disease) ([address])   • Age related osteoporosis      Past Medical History:   Diagnosis Date   • A-fib ([address])    • Abnormal ECG     last assessed: 7/14/2015   • Acid reflux     resolved   • Anxiety    • Pacheco esophagus    • Benign neoplasm of sigmoid colon 09/13/2011    next colon 2012   • Bronchitis, asthmatic     last assessed: 3/12/2012   • Colon cancer ([address]) - had colon resection   • Colon polyp    • COPD (chronic obstructive pulmonary disease) (HCC)     questionable   • Depression    • Diabetes mellitus ([address])     on byetta   • Disease of thyroid gland    • Dizziness     occ   • Esophageal stricture     last assessed: 2014   • High cholesterol    • Hypertension    • Hypothyroidism    • Iron (Fe) deficiency anemia 2011    iron pill continue   • Irritable bowel syndrome    • OA (osteoarthritis)    • Organoaxial gastric volvulus 2016   • Pacemaker     hx SSS   • Rash     labia area- uses cream prn   • Restrictive lung disease    • Seasonal allergies    • Skin scarring/fibrosis     fibrotic scar; last assessed: 3/22/2013   • MONA (stress urinary incontinence, female)    • Urinary frequency     last assessed: 9/10/2012   • Wears glasses     reading      Past Surgical History:   Procedure Laterality Date   • BREAST EXCISIONAL BIOPSY Right     benign, best guess on year   •  [address]      does not know type  Dr Annie Cintron is cariologist   •  SECTION     • COLON SURGERY      resection  removed 18 In.   • COLONOSCOPY  2012: Adenomatous polyp, colorectal anastomosis, internal hemorrhoids. 2012:  proctosigmoidectomy   • ESOPHAGOGASTRODUODENOSCOPY N/A 2016    Procedure: ESOPHAGOGASTRODUODENOSCOPY (EGD);   Surgeon: Mirian Kelley MD;  Location: [facility];  Service:    • HYSTERECTOMY  1978   • GA ARTHROPLASTY INTERPHALANGEAL JOINT EACH Right 2018    Procedure: ARTHROPLASTY PIP/FINGER - RIGHT RING;  Surgeon: Radha Sullivan MD;  Location: [facility];  Service: Orthopedics   • GA ARTHRP INTERPOS INTERCARPAL/METACARPAL JOINTS Right 1/10/2019    Procedure: ARTHROPLASTY Earlie Gerber;  Surgeon: Radha Sullivan MD;  [facility];  Service: Orthopedics   • GA CYSTOURETHROSCOPY N/A 2018    Procedure: Frances Forts;  Surgeon: Layo Lira MD;  Location[facility];  Service: UroGynecology • MN LAPS RPR PARAESPHGL HRNA INCL FUNDPLSTY W/MESH N/A 2016    Procedure: LAPAROSCOPIC PARAESOPHAGEAL HERNIA REPAIR with mesh; toupet  FUNDOPLICATION ;  Surgeon: Duran Dickey MD;  Location:  MAIN OR;  Service: Thoracic   • MN SLING OPERATION STRESS INCONTINENCE N/A 2018    Procedure: INSERTION PUBOVAGINAL SLING (FEMALE); Surgeon: Debbra Goodell, MD;  [facility];  Service: UroGynecology          • TOTAL ABDOMINAL HYSTERECTOMY     • UPPER GASTROINTESTINAL ENDOSCOPY      2018:  Irregular Z-line positive for intestinal metaplasia/ Pacheco's, no dysplasia. Gastritis H pylori negative.    • WRIST TENDON TRANSFER Right 1/10/2019    Procedure: TRANSFER TENDON FLEXOR CARPI RADIALIS FROM FOREARM TO HAND;  Surgeon: Clark Barry MD;  Location:  MAIN OR;  Service: Orthopedics      Family History   Problem Relation Age of Onset   • Heart disease Mother    • Diabetes Mother    • Asthma Father    • Stomach cancer Father         gastrkic cancer   • Cancer Paternal Grandmother    • Cancer Paternal Grandfather    • No Known Problems Brother    • Breast cancer Sister 76   • Breast cancer Sister 77   • Pancreatic cancer Sister 72   • No Known Problems Maternal Aunt    • No Known Problems Maternal Aunt    • No Known Problems Maternal Aunt    • No Known Problems Paternal Aunt    • No Known Problems Daughter    • No Known Problems Maternal Grandmother    • No Known Problems Maternal Grandfather    • Prostate cancer Paternal Uncle         age unknown   • Substance Abuse Neg Hx    • Mental illness Neg Hx    • Alcohol abuse Neg Hx    • Colon cancer Neg Hx    • Colon polyps Neg Hx      Social History     Tobacco Use   • Smoking status: Former     Types: Cigarettes     Quit date:      Years since quittin.5   • Smokeless tobacco: Former     Quit date:    Substance Use Topics   • Alcohol use: No     No Known Allergies    Current Outpatient Medications:   •  albuterol (PROVENTIL HFA,VENTOLIN HFA) 90 mcg/act inhaler, Inhale 2 puffs every 6 (six) hours as needed for wheezing, Disp: , Rfl:   •  alendronate (Fosamax) 70 mg tablet, Take 1 tablet (70 mg total) by mouth every 7 days, Disp: 12 tablet, Rfl: 3  •  Apple Cider Vinegar 500 MG TABS, Take by mouth daily, Disp: , Rfl:   •  ascorbic acid (VITAMIN C) 500 mg tablet, Take 500 mg by mouth daily, Disp: , Rfl:   •  atenolol (TENORMIN) 25 mg tablet, Take 1 tablet (25 mg total) by mouth daily at bedtime, Disp: 90 tablet, Rfl: 3  •  Blood Glucose Monitoring Suppl (OneTouch Verio) w/Device KIT, Check blood sugars once a day, Disp: 1 kit, Rfl: 0  •  Budeson-Glycopyrrol-Formoterol (Breztri Aerosphere) 160-9-4.8 MCG/ACT AERO, Inhale 2 puffs daily as needed (rattle or shotness of beath) Rinse mouth after use., Disp: 10.7 g, Rfl: 3  •  Calcium Carbonate (Caltrate 600) 1500 (600 Ca) MG TABS, Take 600 mg by mouth 3 (three) times a day with meals, Disp: , Rfl:   •  Cholecalciferol (VITAMIN D-3) 1000 units CAPS, Take 1 capsule by mouth daily, Disp: , Rfl:   •  clobetasol (TEMOVATE) 0.05 % cream, , Disp: , Rfl:   •  Coenzyme Q10 (COQ10) 200 MG CAPS, Take 200 mg by mouth daily  , Disp: , Rfl:   •  Continuous Blood Gluc  (FreeStyle Julian 14 Day Cyclone) SVETLANA, Use 1 Device 4 (four) times a day (before meals and at bedtime), Disp: 1 each, Rfl: 0  •  Continuous Blood Gluc Sensor (FreeStyle Julian 14 Day Sensor) MISC, Use 1 application every 14 (fourteen) days, Disp: 2 each, Rfl: 6  •  cycloSPORINE (Restasis) 0.05 % ophthalmic emulsion, , Disp: , Rfl:   •  DULoxetine (CYMBALTA) 60 mg delayed release capsule, Take 1 capsule (60 mg total) by mouth daily, Disp: 90 capsule, Rfl: 3  •  Eliquis 5 MG, TAKE 1 TABLET TWICE A DAY, Disp: 180 tablet, Rfl: 3  •  estradiol (ESTRACE VAGINAL) 0.1 mg/g vaginal cream, Insert 0.5 g into the vagina 2 (two) times a week Patient is NOT to be using this daily, Disp: 42.5 g, Rfl: 1  •  Ferrous Sulfate 27 MG TABS, Take 1 tablet by mouth daily, Disp: , Rfl:   •  FREESTYLE LITE test strip, TEST THREE TIMES A DAY, Disp: 300 strip, Rfl: 3  •  Glucosamine-Chondroitin (MOVE FREE PO), Take by mouth daily, Disp: , Rfl:   •  glucose blood (OneTouch Verio) test strip, Check blood sugars once a day, Disp: 100 strip, Rfl: 2  •  hydrocortisone 2.5 % cream, Apply topically 2 (two) times a day, Disp: 60 g, Rfl: 2  •  Jardiance 25 MG TABS, TAKE 1 TABLET EVERY MORNING, Disp: 90 tablet, Rfl: 3  •  Lancets (onetouch ultrasoft) lancets, Check blood sugars once a day, Disp: 100 each, Rfl: 2  •  levothyroxine 125 mcg tablet, Take one tablet 6 days of the week and nothing on sundays, Disp: 90 tablet, Rfl: 3  •  lidocaine (Lidoderm) 5 %, Apply 1 patch topically over 12 hours daily Remove & Discard patch within 12 hours or as directed by MD, Disp: 6 patch, Rfl: 0  •  MEGARED OMEGA-3 KRILL OIL PO, Take 1 capsule by mouth daily, Disp: , Rfl:   •  metFORMIN (GLUCOPHAGE-XR) 500 mg 24 hr tablet, Take 2 tablets (1,000 mg total) by mouth 2 (two) times a day with meals, Disp: 360 tablet, Rfl: 1  •  methocarbamol (ROBAXIN) 500 mg tablet, Take 1 tablet (500 mg total) by mouth 2 (two) times a day, Disp: 20 tablet, Rfl: 0  •  neomycin-polymyxin-dexamethasone (MAXITROL) 0.35%-10,000 ZKCKY/L-6.9%, APPLY ONE APPLICATION BY OPHTHALMIC ROUTE TWO TIMES A DAY.  A SMALL AMOUNT IN THE CONJUNCTIVAL SACS OF BOTH EYES, Disp: , Rfl:   •  semaglutide, 0.25 or 0.5 mg/dose, (Ozempic, 0.25 or 0.5 MG/DOSE,) 2 mg/3 mL injection pen, Inject 0.38 mL (0.2533 mg total) under the skin every 7 days Start 0.25mg weekly for 2 weeks, than 0.5mg weekly, Disp: 6 mL, Rfl: 1  •  simvastatin (ZOCOR) 20 mg tablet, TAKE 2 TABLETS DAILY AT BEDTIME, Disp: 180 tablet, Rfl: 3  •  Triamcinolone Acetonide (Nasacort Allergy 24HR) 55 MCG/ACT AERO, 2 Act (110 mcg total) into each nostril every morning, Disp: 16.5 mL, Rfl: 3  •  ketoconazole (NIZORAL) 2 % cream, Apply topically 2 (two) times a day for 14 days, Disp: 60 g, Rfl: 1    Review of Systems   Constitutional: Negative for diaphoresis, fatigue and unexpected weight change. Eyes: Negative for photophobia and visual disturbance. Gastrointestinal: Negative for constipation, diarrhea and vomiting. Endocrine: Negative for polydipsia and polyuria. Skin: Negative. Physical Exam:  Body mass index is 27.72 kg/m². /78   Pulse 80   Ht 5' 2.5" (1.588 m)   Wt 69.9 kg (154 lb)   SpO2 97%   BMI 27.72 kg/m²    Wt Readings from Last 3 Encounters:   08/02/23 69.9 kg (154 lb)   07/20/23 69 kg (152 lb 3.2 oz)   07/17/23 68.8 kg (151 lb 9.6 oz)       Physical Exam  Constitutional:       Appearance: Normal appearance. She is obese. Cardiovascular:      Rate and Rhythm: Normal rate and regular rhythm. Pulses: Normal pulses. no weak pulses          Dorsalis pedis pulses are 2+ on the right side and 2+ on the left side. Pulmonary:      Effort: Pulmonary effort is normal.   Abdominal:      General: Abdomen is flat. Palpations: Abdomen is soft. Musculoskeletal:      Cervical back: Normal range of motion and neck supple. Feet:      Right foot:      Skin integrity: No ulcer, skin breakdown, erythema, warmth, callus or dry skin. Left foot:      Skin integrity: No ulcer, skin breakdown, erythema, warmth, callus or dry skin. Skin:     Capillary Refill: Capillary refill takes less than 2 seconds. Neurological:      General: No focal deficit present. Mental Status: She is alert and oriented to person, place, and time. Psychiatric:         Mood and Affect: Mood normal.         Patient's shoes and socks removed. Right Foot/Ankle   Right Foot Inspection  Skin Exam: skin normal and skin intact. No dry skin, no warmth, no callus, no erythema, no maceration, no abnormal color, no pre-ulcer, no ulcer and no callus. Toe Exam: ROM and strength within normal limits.      Sensory   Vibration: intact  Monofilament testing: intact    Vascular  Capillary refills: < 3 seconds  The right DP pulse is 2+. Left Foot/Ankle  Left Foot Inspection  Skin Exam: skin normal and skin intact. No dry skin, no warmth, no erythema, no maceration, normal color, no pre-ulcer, no ulcer and no callus. Toe Exam: ROM and strength within normal limits. Sensory   Vibration: intact  Monofilament testing: intact    Vascular  Capillary refills: < 3 seconds  The left DP pulse is 2+. Assign Risk Category  No deformity present  No loss of protective sensation  No weak pulses  Risk: 0  :      Latest Reference Range & Units 02/07/22 07:10 06/09/23 08:01   EXT Creatinine Urine mg/dL 42.4 48.8   MICROALBUMIN/CREATININE RATIO 0 - 30 mg/g creatinine 22 17   MICROALBUM.,U,RANDOM 0.0 - 20.0 mg/L 9.3 8.1      Latest Reference Range & Units 02/02/23 07:10   Cholesterol See Comment mg/dL 141   Triglycerides See Comment mg/dL 209 (H)   HDL >=50 mg/dL 36 (L)   Non-HDL Cholesterol mg/dl 105   LDL Calculated 0 - 100 mg/dL 63   (H): Data is abnormally high  (L): Data is abnormally low     12/13/18 11:25 12/16/19 14:56 12/02/21 12:16   Left Eye Diabetic Retinopathy None (E) None (E) None (E)   (E): External lab result     12/13/18 11:25 12/16/19 14:56 12/02/21 12:16   Right Eye Diabetic Retinopathy None (E) None (E) None (E)   (E): External lab result     Latest Reference Range & Units 11/21/22 08:08 02/02/23 07:10 06/09/23 08:01 07/11/23 09:18   Hemoglobin A1C Normal 3.8-5.6%; PreDiabetic 5.7-6.4%; Diabetic >=6.5%; Glycemic control for adults with diabetes <7.0% % 7.3 (H) 8.1 (H) 8.3 (H)    eAG, EST AVG Glucose mg/dl 163 186 192    POC Glucose 65 - 140 mg/dl    226 (H)   TSH 3RD GENERATON 0.450 - 4.500 uIU/mL 2.420 1.190 0.340 (L)    Free T4 0.61 - 1.12 ng/dL 1.09 1.03 1.28 (H)    (H): Data is abnormally high  (L): Data is abnormally low      DXA 07/23  DXA SCAN     CLINICAL HISTORY: 81-year-old postmenopausal female.   OTHER RISK FACTORS: Hyperthyroidism, takes insulin.     PHARMACOLOGIC THERAPY FOR OSTEOPOROSIS:  None.     TECHNIQUE: Bone densitometry was performed using a Hologic Horizon A bone densitometer. Regions of interest appear properly placed.     COMPARISON: 7/1/2021.     RESULTS:     LUMBAR SPINE  Level: L1, L2, L4  (L3 vertebra excluded from analysis due to local structural abnormalities or artifact):  BMD: 0.885 gm/cm2  T-score: -1.4        LEFT  TOTAL HIP:  BMD: 0.841 gm/cm2  T-score: -0.8     LEFT  FEMORAL NECK:  BMD: 0.657 gm/cm2  T score: -1.7        IMPRESSION:     1. Low bone mass (osteopenia).     2. Since a DXA study from 7/1/2021, there has been:  A  STATISTICALLY SIGNIFICANT DECREASE in bone mineral density of 0.053 g/cm2 (6.0%) in the left total hip.           3. The 10 year risk of hip fracture is 3.9% with the 10 year risk of major osteoporotic fracture being 14% as calculated by the [facility] fracture risk assessment tool (FRAX, which is based on data generated by the [facility]   for Metabolic Bone Diseases).     4. The current NOF guidelines recommend treating patients with a T-score of -2.5 or less in the lumbar spine or hips, or in post-menopausal women and men over the age of 48 with low bone mass (osteopenia) and a FRAX 10 year risk score of >3% for hip   fracture and/or >20% for major osteoporotic fracture.     5. The NOF recommends follow-up DXA in 1-2 years after initiating therapy for osteoporosis and every 2 years thereafter. More frequent evaluation is appropriate for patients with conditions associated with rapid bone loss, such as glucocorticoid   therapy. The interval between DXA screenings may be longer for individuals without major risk factors and initial T-score in the normal or upper low bone mass range.     The FRAX algorithm has certain limitations:  -FRAX has not been validated in patients currently or previously treated with pharmacotherapy for osteoporosis.   In such patients, clinical judgment must be exercised in interpreting FRAX scores. -Prior hip, vertebral and humeral fragility fractures appear to confer greater risk of subsequent fracture than fractures at other sites (this is especially true for individuals with severe vertebral fractures), but quantification of this incremental   risk is not possible with FRAX. -FRAX underestimates fracture risk in patients with history of multiple fragility fractures. -FRAX may underestimate fracture risk in patients with history of frequent falls.  -It is not appropriate to use FRAX to monitor treatment response. Impression & Plan:    Problem List Items Addressed This Visit        Endocrine    Type 2 diabetes mellitus with diabetic neuropathy, without long-term current use of insulin (HCC) - Primary    Relevant Medications    semaglutide, 0.25 or 0.5 mg/dose, (Ozempic, 0.25 or 0.5 MG/DOSE,) 2 mg/3 mL injection pen    Other Relevant Orders    Hemoglobin A1C    Comprehensive metabolic panel    Albumin / creatinine urine ratio    Lipid panel    Vitamin D 25 hydroxy    Hypothyroidism due to Hashimoto's thyroiditis    Relevant Orders    T4, free    TSH, 3rd generation    T4, free    TSH, 3rd generation       Musculoskeletal and Integument    Age related osteoporosis       Other    Mixed hyperlipidemia    Vitamin D deficiency    Relevant Orders    Hemoglobin A1C    Comprehensive metabolic panel    Albumin / creatinine urine ratio    Lipid panel    Vitamin D 25 hydroxy       Orders Placed This Encounter   Procedures   • Hemoglobin A1C     Standing Status:   Future     Standing Expiration Date:   8/2/2024   • Comprehensive metabolic panel     This is a patient instruction: Patient fasting for 8 hours or longer recommended. Standing Status:   Future     Standing Expiration Date:   8/2/2024   • Albumin / creatinine urine ratio     Standing Status:   Future     Standing Expiration Date:   8/2/2024   • Lipid panel     This is a patient instruction:  This test requires patient fasting for 10-12 hours or longer. Drinking of black coffee or black tea is acceptable. Standing Status:   Future     Standing Expiration Date:   8/2/2024   • Vitamin D 25 hydroxy     Standing Status:   Future     Standing Expiration Date:   8/2/2024   • T4, free     Standing Status:   Future     Standing Expiration Date:   8/2/2024   • TSH, 3rd generation     This is a patient instruction: This test is non-fasting. Please drink two glasses of water morning of bloodwork. Standing Status:   Future     Standing Expiration Date:   8/2/2024   • T4, free     Standing Status:   Future     Standing Expiration Date:   8/2/2024   • TSH, 3rd generation     This is a patient instruction: This test is non-fasting. Please drink two glasses of water morning of bloodwork. Standing Status:   Future     Standing Expiration Date:   8/2/2024       There are no Patient Instructions on file for this visit. Patient is a 81yF with PMhx of T2DM, osteoporosis, hypothyroidism, hyperlipidemia who presents today for follow up    1) t2DM:- patients most recent HbA1C is higher than previously at 8.3% however acceptable given her age and risk of hypoglycemia and falls with strict BG control. Did discuss that she needs to work on better dietary habits to improve her BG and also certainly stress/pain from fall may have also caused her A1c to be slightly higher than before. For now will simply recommend a small switch of medications from byetta to ozempic- if covered to ease burden of daily injections. No other changes to regime. Plan   - c/w metformin 1000mg BID, jardianc 25mg daily  - Stop byetta and start ozempic 0.25mg weekly for 2 weeks then 0.5mg weekly   - labs in 3 months     Screening   Retinopathy- uptodate, will need records  Lipid/urine uptodate     2) hyperlipidemia:- TG still slightly elevated but not in range to consider medication, work on lifestyle habits. LDL at goal. C/w crestor for now.  Repeat next visit     3) hypothyroidism:- patient clinically feels well without any symptoms of hypo/hyperthyroidism. Previously thyroid labs in range, unsure if current labs accurate specially since clinically feels fine. Will repeat labs now and adjust dose if persistantly TSH low and free t4 high- reduce levothyroxine to 125mcg M-Fri then, if labs normal on repeat- possible previous lab error and in this case c.w levothyroxine 125mcg M-Sat. Repeat TFT in 2 months     4) osteoporosis:- reviewed DXA scan, agree with starting fosamax. Discussed fall precautions, weight baring exercises and also continued use of vitD and calcium supplement. Repeat vitd level next visit  Discussed with the patient and all questioned fully answered. She will call me if any problems arise. Follow-up appointment in 3 months. Counseled patient on diagnostic results, prognosis, risk and benefit of treatment options, instruction for management, importance of treatment compliance, Risk  factor reduction and impressions    I have spent a total time of 40 minutes on 08/02/23 in caring for this patient including Diagnostic results, Prognosis, Risks and benefits of tx options, Instructions for management, Patient and family education and Obtaining or reviewing history  .       Mame Castellanos MD

## 2023-08-03 DIAGNOSIS — E06.3 HYPOTHYROIDISM DUE TO HASHIMOTO'S THYROIDITIS: Primary | ICD-10-CM

## 2023-08-03 DIAGNOSIS — E03.8 HYPOTHYROIDISM DUE TO HASHIMOTO'S THYROIDITIS: Primary | ICD-10-CM

## 2023-08-04 ENCOUNTER — DOCUMENTATION (OUTPATIENT)
Dept: ENDOCRINOLOGY | Facility: HOSPITAL | Age: 83
End: 2023-08-04

## 2023-08-04 NOTE — PROGRESS NOTES
The office received notification for prescription clarification concerning the patient's Ozempic.     It was reviewed and filled out by the provider and faxed back to the pharmacy at [phone]

## 2023-08-07 ENCOUNTER — DOCUMENTATION (OUTPATIENT)
Dept: ENDOCRINOLOGY | Facility: HOSPITAL | Age: 83
End: 2023-08-07

## 2023-08-07 DIAGNOSIS — E11.40 TYPE 2 DIABETES MELLITUS WITH DIABETIC NEUROPATHY, WITHOUT LONG-TERM CURRENT USE OF INSULIN (HCC): Primary | ICD-10-CM

## 2023-08-07 RX ORDER — DULAGLUTIDE 0.75 MG/.5ML
0.75 INJECTION, SOLUTION SUBCUTANEOUS
Qty: 2 ML | Refills: 2 | Status: SHIPPED | OUTPATIENT
Start: 2023-08-07

## 2023-08-07 NOTE — PROGRESS NOTES
Prior auth form sent from [employer] was received via fax.     The provider was able to review and fill out the form and it has been sent back via fax to [phone]

## 2023-08-08 NOTE — PROGRESS NOTES
Received notification from [employer] through [employer] that the patient's prior auth for the [address] [place] [address] has been approved. The patient and the pharmacy will be notified.

## 2023-08-09 ENCOUNTER — DOCUMENTATION (OUTPATIENT)
Dept: ENDOCRINOLOGY | Facility: HOSPITAL | Age: 83
End: 2023-08-09

## 2023-08-09 NOTE — PROGRESS NOTES
Received paperwork from [employer] concerning the patient's trulicity.   The provider reviewed and filled out the form and it was faxed back to [phone]

## 2023-08-13 ENCOUNTER — NURSE TRIAGE (OUTPATIENT)
Dept: OTHER | Facility: OTHER | Age: 83
End: 2023-08-13

## 2023-08-13 DIAGNOSIS — E78.2 MIXED HYPERLIPIDEMIA: Primary | ICD-10-CM

## 2023-08-13 RX ORDER — SIMVASTATIN 20 MG
40 TABLET ORAL
Qty: 14 TABLET | Refills: 0 | Status: SHIPPED | OUTPATIENT
Start: 2023-08-13 | End: 2023-08-22

## 2023-08-13 NOTE — TELEPHONE ENCOUNTER
Reason for Disposition  • [1] Prescription prescribed recently is not at pharmacy AND [2] triager has access to patient's EMR AND [3] prescription is recorded in the EMR    Answer Assessment - Initial Assessment Questions  1. NAME of MEDICATION: "What medicine are you calling about?"      Simvastatin 20mg tablet    2. QUESTION: "What is your question?" (e.g., medication refill, side effect)      Needs short supply sent to local pharmacy- mail order will not come in time    3. PRESCRIBING HCP: "Who prescribed it?" Reason: if prescribed by specialist, call should be referred to that group. Dr. Laith Ríos    4. SYMPTOMS: "Do you have any symptoms?"  No    Protocols used: MEDICATION QUESTION CALL-ADULT-      RN sent 1 week supply to patients local pharmacy. Receipt was confirmed.

## 2023-08-13 NOTE — TELEPHONE ENCOUNTER
Regarding: MEDICATION LOW  ----- Message from Justyna Orozco sent at 8/13/2023  9:57 AM EDT -----  Patient is requesting a 7 day supply due to she only has 3 days left.  Mail order takes a week to get sent to her.    simvastatin (ZOCOR) 20 mg tablet   Take 2 tablets at bedtime     [address]   Phone:  [phone]  Fax:  [phone]

## 2023-08-14 NOTE — TELEPHONE ENCOUNTER
The prescription for that mail order prescription for the patient's Atorvastatin has been sent this morning

## 2023-08-16 ENCOUNTER — REMOTE DEVICE CLINIC VISIT (OUTPATIENT)
Dept: CARDIOLOGY CLINIC | Facility: CLINIC | Age: 83
End: 2023-08-16
Payer: MEDICARE

## 2023-08-16 ENCOUNTER — TELEPHONE (OUTPATIENT)
Dept: FAMILY MEDICINE CLINIC | Facility: CLINIC | Age: 83
End: 2023-08-16

## 2023-08-16 DIAGNOSIS — Z12.31 ENCOUNTER FOR SCREENING MAMMOGRAM FOR MALIGNANT NEOPLASM OF BREAST: Primary | ICD-10-CM

## 2023-08-16 DIAGNOSIS — Z95.0 PRESENCE OF CARDIAC PACEMAKER: Primary | ICD-10-CM

## 2023-08-16 PROCEDURE — 93296 REM INTERROG EVL PM/IDS: CPT | Performed by: INTERNAL MEDICINE

## 2023-08-16 PROCEDURE — 93294 REM INTERROG EVL PM/LDLS PM: CPT | Performed by: INTERNAL MEDICINE

## 2023-08-16 NOTE — PROGRESS NOTES
Results for orders placed or performed in visit on 08/16/23   Cardiac EP device report    Narrative    MDT-DUAL CHAMBER PPM/ ACTIVE SYSTEM IS MRI CONDITIONAL  CARELINK TRANSMISSION: BATTERY VOLTAGE ADEQUATE (12 MOS). AP: 21.1%. : 33.3% (MVP-ON). ALL AVAILABLE LEAD PARAMETERS WITHIN NORMAL LIMITS. 6 VT EPISODES W/ AVAIL EGMS SHOWING RVR W. -194 BPM (NO TERMINATION ON STRIP). 2 FAST A&V EPISODES W/ EGMS SHOWING RVR W/ -207 BPM, MAX DURATION 7 MINS 9 SECS. 6,066 AT/AF EPISODES W/ AF IN PROGRESS (HX OF SAME), AF BURDEN: 90.1%. PT TAKES ATENOLOL, ELIQUIS. EF: 55% (ECHO 3/7/19). NORMAL DEVICE FUNCTION.   [id number] [address]

## 2023-08-22 ENCOUNTER — TELEPHONE (OUTPATIENT)
Dept: ENDOCRINOLOGY | Facility: HOSPITAL | Age: 83
End: 2023-08-22

## 2023-08-22 DIAGNOSIS — E78.5 HYPERLIPIDEMIA, UNSPECIFIED HYPERLIPIDEMIA TYPE: ICD-10-CM

## 2023-08-22 RX ORDER — SIMVASTATIN 20 MG
20 TABLET ORAL
Qty: 100 TABLET | Refills: 1 | Status: SHIPPED | OUTPATIENT
Start: 2023-08-22

## 2023-08-22 NOTE — TELEPHONE ENCOUNTER
Patient aware. She received a prescription of Trulicity, would like to verify this is what she should take and not Ozempic-which was ordered initially?

## 2023-08-22 NOTE — TELEPHONE ENCOUNTER
Received call from patient. Her Simvastatin script was changed to 20 mg daily from two 20mg tablets daily. There is a paper script in media with this adjustment. Please verify dose patient should be on. Thank you.

## 2023-08-28 ENCOUNTER — RA CDI HCC (OUTPATIENT)
Dept: OTHER | Facility: HOSPITAL | Age: 83
End: 2023-08-28

## 2023-08-28 NOTE — PROGRESS NOTES
e11.65  [address] [facility] coding opportunities          Chart Reviewed number of suggestions sent to Provider: 1     Patients Insurance     Medicare Insurance: [employer]

## 2023-09-05 ENCOUNTER — TELEPHONE (OUTPATIENT)
Dept: FAMILY MEDICINE CLINIC | Facility: CLINIC | Age: 83
End: 2023-09-05

## 2023-09-05 ENCOUNTER — OFFICE VISIT (OUTPATIENT)
Dept: FAMILY MEDICINE CLINIC | Facility: CLINIC | Age: 83
End: 2023-09-05
Payer: MEDICARE

## 2023-09-05 VITALS
HEART RATE: 73 BPM | OXYGEN SATURATION: 96 % | SYSTOLIC BLOOD PRESSURE: 116 MMHG | RESPIRATION RATE: 18 BRPM | BODY MASS INDEX: 26.93 KG/M2 | TEMPERATURE: 98 F | WEIGHT: 152 LBS | HEIGHT: 63 IN | DIASTOLIC BLOOD PRESSURE: 80 MMHG

## 2023-09-05 DIAGNOSIS — I49.5 SICK SINUS SYNDROME (HCC): ICD-10-CM

## 2023-09-05 DIAGNOSIS — I48.91 ATRIAL FIBRILLATION, UNSPECIFIED TYPE (HCC): ICD-10-CM

## 2023-09-05 DIAGNOSIS — I25.10 CORONARY ARTERY DISEASE INVOLVING NATIVE CORONARY ARTERY OF NATIVE HEART WITHOUT ANGINA PECTORIS: ICD-10-CM

## 2023-09-05 DIAGNOSIS — K22.70 BARRETT'S ESOPHAGUS WITHOUT DYSPLASIA: ICD-10-CM

## 2023-09-05 DIAGNOSIS — C18.9 ADENOCARCINOMA OF COLON (HCC): ICD-10-CM

## 2023-09-05 DIAGNOSIS — Z00.00 MEDICARE ANNUAL WELLNESS VISIT, SUBSEQUENT: Primary | ICD-10-CM

## 2023-09-05 DIAGNOSIS — J44.9 CHRONIC OBSTRUCTIVE PULMONARY DISEASE, UNSPECIFIED COPD TYPE (HCC): ICD-10-CM

## 2023-09-05 DIAGNOSIS — Z79.01 ANTICOAGULATED BY ANTICOAGULATION TREATMENT: ICD-10-CM

## 2023-09-05 DIAGNOSIS — E55.9 VITAMIN D DEFICIENCY: ICD-10-CM

## 2023-09-05 DIAGNOSIS — E06.3 HYPOTHYROIDISM DUE TO HASHIMOTO'S THYROIDITIS: ICD-10-CM

## 2023-09-05 DIAGNOSIS — E03.8 HYPOTHYROIDISM DUE TO HASHIMOTO'S THYROIDITIS: ICD-10-CM

## 2023-09-05 DIAGNOSIS — E78.2 MIXED HYPERLIPIDEMIA: ICD-10-CM

## 2023-09-05 DIAGNOSIS — E11.40 TYPE 2 DIABETES MELLITUS WITH DIABETIC NEUROPATHY, WITHOUT LONG-TERM CURRENT USE OF INSULIN (HCC): ICD-10-CM

## 2023-09-05 DIAGNOSIS — I10 ESSENTIAL HYPERTENSION: ICD-10-CM

## 2023-09-05 DIAGNOSIS — M81.0 AGE-RELATED OSTEOPOROSIS WITHOUT CURRENT PATHOLOGICAL FRACTURE: ICD-10-CM

## 2023-09-05 PROCEDURE — 99214 OFFICE O/P EST MOD 30 MIN: CPT | Performed by: FAMILY MEDICINE

## 2023-09-05 PROCEDURE — G0439 PPPS, SUBSEQ VISIT: HCPCS | Performed by: FAMILY MEDICINE

## 2023-09-05 NOTE — PATIENT INSTRUCTIONS
Please compare today's med list to what you are actually taking at home and call us with any discrepancies  That way we could update your medicine on your list so if you are in the hospital people know you are taking    Otherwise see you in 6 months or sooner if needed  We will check any blood work as everybody else is                    Medicare Preventive Visit Patient Instructions  Thank you for completing your Welcome to Medicare Visit or Medicare Annual Wellness Visit today. Your next wellness visit will be due in one year (9/5/2024). The screening/preventive services that you may require over the next 5-10 years are detailed below. Some tests may not apply to you based off risk factors and/or age. Screening tests ordered at today's visit but not completed yet may show as past due. Also, please note that scanned in results may not display below. Preventive Screenings:  Service Recommendations Previous Testing/Comments   Colorectal Cancer Screening  * Colonoscopy    * Fecal Occult Blood Test (FOBT)/Fecal Immunochemical Test (FIT)  * Fecal DNA/Cologuard Test  * Flexible Sigmoidoscopy Age: 43-73 years old   Colonoscopy: every 10 years (may be performed more frequently if at higher risk)  OR  FOBT/FIT: every 1 year  OR  Cologuard: every 3 years  OR  Sigmoidoscopy: every 5 years  Screening may be recommended earlier than age 39 if at higher risk for colorectal cancer. Also, an individualized decision between you and your healthcare provider will decide whether screening between the ages of 77-80 would be appropriate. Colonoscopy: 03/23/2021  FOBT/FIT: Not on file  Cologuard: Not on file  Sigmoidoscopy: Not on file    History Colorectal Cancer     Breast Cancer Screening Age: 36 years old  Frequency: every 1-2 years  Not required if history of left and right mastectomy Mammogram: 08/31/2022    Screening Current   Cervical Cancer Screening Between the ages of 21-29, pap smear recommended once every 3 years. Between the ages of 32-69, can perform pap smear with HPV co-testing every 5 years. Recommendations may differ for women with a history of total hysterectomy, cervical cancer, or abnormal pap smears in past. Pap Smear: 05/21/2021    Screening Not Indicated   Hepatitis C Screening Once for adults born between 1945 and 1965  More frequently in patients at high risk for Hepatitis C Hep C Antibody: Not on file        Diabetes Screening 1-2 times per year if you're at risk for diabetes or have pre-diabetes Fasting glucose: 160 mg/dL (6/9/2023)  A1C: 8.3 % (6/9/2023)  Screening Not Indicated  History Diabetes   Cholesterol Screening Once every 5 years if you don't have a lipid disorder. May order more often based on risk factors. Lipid panel: 02/02/2023    Screening Not Indicated  History Lipid Disorder     Other Preventive Screenings Covered by Medicare:  Abdominal Aortic Aneurysm (AAA) Screening: covered once if your at risk. You're considered to be at risk if you have a family history of AAA. Lung Cancer Screening: covers low dose CT scan once per year if you meet all of the following conditions: (1) Age 48-67; (2) No signs or symptoms of lung cancer; (3) Current smoker or have quit smoking within the last 15 years; (4) You have a tobacco smoking history of at least 20 pack years (packs per day multiplied by number of years you smoked); (5) You get a written order from a healthcare provider.   Glaucoma Screening: covered annually if you're considered high risk: (1) You have diabetes OR (2) Family history of glaucoma OR (3)  aged 48 and older OR (3)  American aged 72 and older  Osteoporosis Screening: covered every 2 years if you meet one of the following conditions: (1) You're estrogen deficient and at risk for osteoporosis based off medical history and other findings; (2) Have a vertebral abnormality; (3) On glucocorticoid therapy for more than 3 months; (4) Have primary hyperparathyroidism; (5) On osteoporosis medications and need to assess response to drug therapy. Last bone density test (DXA Scan): 07/11/2023. HIV Screening: covered annually if you're between the age of 14-79. Also covered annually if you are younger than 13 and older than 72 with risk factors for HIV infection. For pregnant patients, it is covered up to 3 times per pregnancy. Immunizations:  Immunization Recommendations   Influenza Vaccine Annual influenza vaccination during flu season is recommended for all persons aged >= 6 months who do not have contraindications   Pneumococcal Vaccine   * Pneumococcal conjugate vaccine = PCV13 (Prevnar 13), PCV15 (Vaxneuvance), PCV20 (Prevnar 20)  * Pneumococcal polysaccharide vaccine = PPSV23 (Pneumovax) Adults 20-63 years old: 1-3 doses may be recommended based on certain risk factors  Adults 72 years old: 1-2 doses may be recommended based off what pneumonia vaccine you previously received   Hepatitis B Vaccine 3 dose series if at intermediate or high risk (ex: diabetes, end stage renal disease, liver disease)   Tetanus (Td) Vaccine - COST NOT COVERED BY MEDICARE PART B Following completion of primary series, a booster dose should be given every 10 years to maintain immunity against tetanus. Td may also be given as tetanus wound prophylaxis. Tdap Vaccine - COST NOT COVERED BY MEDICARE PART B Recommended at least once for all adults. For pregnant patients, recommended with each pregnancy. Shingles Vaccine (Shingrix) - COST NOT COVERED BY MEDICARE PART B  2 shot series recommended in those aged 48 and above     Health Maintenance Due:      Topic Date Due    Breast Cancer Screening: Mammogram  08/31/2023    DXA SCAN  07/11/2026     Immunizations Due:      Topic Date Due    Influenza Vaccine (1) 09/01/2023     Advance Directives   What are advance directives? Advance directives are legal documents that state your wishes and plans for medical care.  These plans are made ahead of time in case you lose your ability to make decisions for yourself. Advance directives can apply to any medical decision, such as the treatments you want, and if you want to donate organs. What are the types of advance directives? There are many types of advance directives, and each state has rules about how to use them. You may choose a combination of any of the following:  Living will: This is a written record of the treatment you want. You can also choose which treatments you do not want, which to limit, and which to stop at a certain time. This includes surgery, medicine, IV fluid, and tube feedings. Durable power of  for healthcare [facility]): This is a written record that states who you want to make healthcare choices for you when you are unable to make them for yourself. This person, called a proxy, is usually a family member or a friend. You may choose more than 1 proxy. Do not resuscitate (DNR) order:  A DNR order is used in case your heart stops beating or you stop breathing. It is a request not to have certain forms of treatment, such as CPR. A DNR order may be included in other types of advance directives. Medical directive: This covers the care that you want if you are in a coma, near death, or unable to make decisions for yourself. You can list the treatments you want for each condition. Treatment may include pain medicine, surgery, blood transfusions, dialysis, IV or tube feedings, and a ventilator (breathing machine). Values history: This document has questions about your views, beliefs, and how you feel and think about life. This information can help others choose the care that you would choose. Why are advance directives important? An advance directive helps you control your care. Although spoken wishes may be used, it is better to have your wishes written down. Spoken wishes can be misunderstood, or not followed. Treatments may be given even if you do not want them. An advance directive may make it easier for your family to make difficult choices about your care. Fall Prevention    Fall prevention  includes ways to make your home and other areas safer. It also includes ways you can move more carefully to prevent a fall. Health conditions that cause changes in your blood pressure, vision, or muscle strength and coordination may increase your risk for falls. Medicines may also increase your risk for falls if they make you dizzy, weak, or sleepy. Fall prevention tips:   Stand or sit up slowly. Use assistive devices as directed. Wear shoes that fit well and have soles that . Wear a personal alarm. Stay active. Manage your medical conditions. Home Safety Tips:  Add items to prevent falls in the bathroom. Keep paths clear. Install bright lights in your home. Keep items you use often on shelves within reach. Paint or place reflective tape on the edges of your stairs. Weight Management   Why it is important to manage your weight:  Being overweight increases your risk of health conditions such as heart disease, high blood pressure, type 2 diabetes, and certain types of cancer. It can also increase your risk for osteoarthritis, sleep apnea, and other respiratory problems. Aim for a slow, steady weight loss. Even a small amount of weight loss can lower your risk of health problems. How to lose weight safely:  A safe and healthy way to lose weight is to eat fewer calories and get regular exercise. You can lose up about 1 pound a week by decreasing the number of calories you eat by 500 calories each day. Healthy meal plan for weight management:  A healthy meal plan includes a variety of foods, contains fewer calories, and helps you stay healthy. A healthy meal plan includes the following:  Eat whole-grain foods more often. A healthy meal plan should contain fiber.  Fiber is the part of grains, fruits, and vegetables that is not broken down by your body. Whole-grain foods are healthy and provide extra fiber in your diet. Some examples of whole-grain foods are whole-wheat breads and pastas, oatmeal, brown rice, and bulgur. Eat a variety of vegetables every day. Include dark, leafy greens such as spinach, kale, pam greens, and mustard greens. Eat yellow and orange vegetables such as carrots, sweet potatoes, and winter squash. Eat a variety of fruits every day. Choose fresh or canned fruit (canned in its own juice or light syrup) instead of juice. Fruit juice has very little or no fiber. Eat low-fat dairy foods. Drink fat-free (skim) milk or 1% milk. Eat fat-free yogurt and low-fat cottage cheese. Try low-fat cheeses such as mozzarella and other reduced-fat cheeses. Choose meat and other protein foods that are low in fat. Choose beans or other legumes such as split peas or lentils. Choose fish, skinless poultry (chicken or turkey), or lean cuts of red meat (beef or pork). Before you cook meat or poultry, cut off any visible fat. Use less fat and oil. Try baking foods instead of frying them. Add less fat, such as margarine, sour cream, regular salad dressing and mayonnaise to foods. Eat fewer high-fat foods. Some examples of high-fat foods include french fries, doughnuts, ice cream, and cakes. Eat fewer sweets. Limit foods and drinks that are high in sugar. This includes candy, cookies, regular soda, and sweetened drinks. Exercise:  Exercise at least 30 minutes per day on most days of the week. Some examples of exercise include walking, biking, dancing, and swimming. You can also fit in more physical activity by taking the stairs instead of the elevator or parking farther away from stores. Ask your healthcare provider about the best exercise plan for you. © Copyright [employer] 2018 Information is for End User's use only and may not be sold, redistributed or otherwise used for commercial purposes.  All illustrations and images included in Ben are the copyrighted property of [employer] or [address] [place] [address]

## 2023-09-05 NOTE — TELEPHONE ENCOUNTER
Keli called to let you know she uses Albuterol inhaler. Also informed her Jerald's next appyt with you.

## 2023-09-05 NOTE — PROGRESS NOTES
Assessment and Plan:   COVID and flu shot end of the month  Everything is up-to-date  Problem List Items Addressed This Visit    None      Depression Screening and Follow-up Plan: Patient was screened for depression during today's encounter. They screened negative with a PHQ-2 score of 0. Falls Plan of Care: balance, strength, and gait training instructions were provided. Medications that increase falls were reviewed. Home safety education provided. Preventive health issues were discussed with patient, and age appropriate screening tests were ordered as noted in patient's After Visit Summary. Personalized health advice and appropriate referrals for health education or preventive services given if needed, as noted in patient's After Visit Summary.      History of Present Illness:     Patient presents for a Medicare Wellness Visit    HPI   Patient Care Team:  Ralph Ramos DO as PCP - Dany Thomas MD as PCP - Endocrinology (Endocrinology)  DO Brianna Jensen MD Rolanda Moos, MD Penny Stake, CRNP (Endocrinology)     Review of Systems:     Review of Systems     Problem List:     Patient Active Problem List   Diagnosis   • Type 2 diabetes mellitus with diabetic neuropathy, without long-term current use of insulin [facility])   • Essential hypertension   • Coronary artery disease involving native coronary artery of native heart without angina pectoris   • Abnormal echocardiogram   • Adenocarcinoma of colon ([address])   • 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   • Cardiomegaly   • Diffuse nontoxic goiter   • Feeling anxious   • Hematuria, microscopic   • Mixed hyperlipidemia   • Hypothyroidism due to Hashimoto's thyroiditis   • Insomnia   • Mitral regurgitation   • Paraesophageal hernia   • Paroxysmal atrial fibrillation ([address])   • Restrictive lung disease   • Primary osteoarthritis involving multiple joints   • Sick sinus syndrome ([address])   • Vitamin D deficiency   • Stress incontinence in female   • [facility] DJD(carpometacarpal degenerative joint disease), localized primary, right   • Gastro-esophageal reflux disease without esophagitis   • Lichen simplex chronicus   • MRI safe cardiac pacemaker in situ   • Anticoagulated by anticoagulation treatment   • Chronic obstructive pulmonary disease (HCC)   • Lichen sclerosus et atrophicus   • A-fib [facility])   • Pacemaker   • COPD (chronic obstructive pulmonary disease) ([address])   • Age related osteoporosis      Past Medical and Surgical History:     Past Medical History:   Diagnosis Date   • A-fib ([address])    • Abnormal ECG     last assessed: 2015   • Acid reflux     resolved   • Anxiety    • Pacheco esophagus    • Benign neoplasm of sigmoid colon 2011    next colon    • Bronchitis, asthmatic     last assessed: 3/12/2012   • Colon cancer ([address])     2012- had colon resection   • Colon polyp    • COPD (chronic obstructive pulmonary disease) (HCC)     questionable   • Depression    • Diabetes mellitus ([address])     on byetta   • Disease of thyroid gland    • Dizziness     occ   • Esophageal stricture     last assessed: 2014   • High cholesterol    • Hypertension    • Hypothyroidism    • Iron (Fe) deficiency anemia 2011    iron pill continue   • Irritable bowel syndrome    • OA (osteoarthritis)    • Organoaxial gastric volvulus 2016   • Pacemaker     hx SSS   • Rash     labia area- uses cream prn   • Restrictive lung disease    • Seasonal allergies    • Skin scarring/fibrosis     fibrotic scar; last assessed: 3/22/2013   • MONA (stress urinary incontinence, female)    • Urinary frequency     last assessed: 9/10/2012   • Wears glasses     reading     Past Surgical History:   Procedure Laterality Date   • BREAST EXCISIONAL BIOPSY Right     benign, best guess on year   •  [address]      does not know type  Dr Jeannie Thayer is cariologist   •  SECTION     • COLON SURGERY      resection  removed 18 In.   • COLONOSCOPY  2012 2018:  Adenomatous polyp, colorectal anastomosis, internal hemorrhoids. 06/01/2012:  proctosigmoidectomy   • ESOPHAGOGASTRODUODENOSCOPY N/A 1/9/2016    Procedure: ESOPHAGOGASTRODUODENOSCOPY (EGD); Surgeon: Abner Echavarria MD;  [facility]: [facility];  Service:    • HYSTERECTOMY  1978   • MS ARTHROPLASTY INTERPHALANGEAL JOINT EACH Right 8/16/2018    Procedure: ARTHROPLASTY PIP/FINGER - RIGHT RING;  Surgeon: Antoinette Ibarra MD;  Location[facility];  Service: Orthopedics   • MS ARTHRP INTERPOS INTERCARPAL/METACARPAL JOINTS Right 1/10/2019    Procedure: ARTHROPLASTY Sophie Pale;  Surgeon: Antoinette Ibarra MD;  [facility];  Service: Orthopedics   • MS CYSTOURETHROSCOPY N/A 5/14/2018    Procedure: Brandi Greer;  Surgeon: Primo Guerrero MD;  [facility];  Service: UroGynecology          • MS LAPS RPR PARAESPHGL HRNA INCL [address] [place] [address] N/A 1/27/2016    Procedure: LAPAROSCOPIC PARAESOPHAGEAL HERNIA REPAIR with mesh; toupet  FUNDOPLICATION ;  Surgeon: Teofilo Guzman MD;  [facility];  Service: Thoracic   • MS SLING OPERATION STRESS INCONTINENCE N/A 5/14/2018    Procedure: INSERTION PUBOVAGINAL SLING (FEMALE); Surgeon: Primo Guerrero MD;  [facility];  Service: UroGynecology          • TOTAL ABDOMINAL HYSTERECTOMY     • UPPER GASTROINTESTINAL ENDOSCOPY      June 2018:  Irregular Z-line positive for intestinal metaplasia/ Pacheco's, no dysplasia. Gastritis H pylori negative.    • WRIST TENDON TRANSFER Right 1/10/2019    Procedure: TRANSFER TENDON FLEXOR CARPI RADIALIS FROM FOREARM TO HAND;  Surgeon: Antoinette Ibarra MD;  [facility];  Service: Orthopedics      Family History:     Family History   Problem Relation Age of Onset   • Heart disease Mother    • Diabetes Mother    • Asthma Father    • Stomach cancer Father         gastrkic cancer   • Cancer Paternal Grandmother    • Cancer Paternal Grandfather    • No Known Problems Brother    • Breast cancer Sister 76   • Breast cancer Sister 77   • Pancreatic cancer Sister 72   • No Known Problems Maternal Aunt    • No Known Problems Maternal Aunt    • No Known Problems Maternal Aunt    • No Known Problems Paternal Aunt    • No Known Problems Daughter    • No Known Problems Maternal Grandmother    • No Known Problems Maternal Grandfather    • Prostate cancer Paternal Uncle         age unknown   • Substance Abuse Neg Hx    • Mental illness Neg Hx    • Alcohol abuse Neg Hx    • Colon cancer Neg Hx    • Colon polyps Neg Hx       Social History:     Social History     Socioeconomic History   • Marital status: /Civil Union     Spouse name: None   • Number of children: None   • Years of education: None   • Highest education level: None   Occupational History   • None   Tobacco Use   • Smoking status: Former     Types: Cigarettes     Quit date:      Years since quittin.6   • Smokeless tobacco: Former     Quit date:    Vaping Use   • Vaping Use: Never used   Substance and Sexual Activity   • Alcohol use: No   • Drug use: No   • Sexual activity: Not Currently   Other Topics Concern   • None   Social History Narrative    Always uses seat belt    Copy of advanced directive obtained    Drinks coffee-drinks 5 cups a day    Has smoke detectors    Uses safety equipment-seatbelts     Social Determinants of Health     Financial Resource Strain: Low Risk  (2023)    Overall Financial Resource Strain (CARDIA)    • Difficulty of Paying Living Expenses: Not hard at all   Food Insecurity: Not on file   Transportation Needs: No Transportation Needs (2023)    PRAPARE - Transportation    • Lack of Transportation (Medical): No    • Lack of Transportation (Non-Medical):  No   Physical Activity: Not on file   Stress: Not on file   Social Connections: Not on file   Intimate Partner Violence: Not on file   Housing Stability: Not on file      Medications and Allergies:     Current Outpatient Medications   Medication Sig Dispense Refill   • albuterol (PROVENTIL HFA,VENTOLIN HFA) 90 mcg/act inhaler Inhale 2 puffs every 6 (six) hours as needed for wheezing     • alendronate (Fosamax) 70 mg tablet Take 1 tablet (70 mg total) by mouth every 7 days 12 tablet 3   • Apple Cider Vinegar 500 MG TABS Take by mouth daily     • ascorbic acid (VITAMIN C) 500 mg tablet Take 500 mg by mouth daily     • atenolol (TENORMIN) 25 mg tablet Take 1 tablet (25 mg total) by mouth daily at bedtime 90 tablet 3   • Blood Glucose Monitoring Suppl (OneTouch Verio) w/Device KIT Check blood sugars once a day 1 kit 0   • Budeson-Glycopyrrol-Formoterol (Breztri Aerosphere) 160-9-4.8 MCG/ACT AERO Inhale 2 puffs daily as needed (rattle or shotness of beath) Rinse mouth after use.  10.7 g 3   • Calcium Carbonate (Caltrate 600) 1500 (600 Ca) MG TABS Take 600 mg by mouth 3 (three) times a day with meals     • Cholecalciferol (VITAMIN D-3) 1000 units CAPS Take 1 capsule by mouth daily     • clobetasol (TEMOVATE) 0.05 % cream      • Coenzyme Q10 (COQ10) 200 MG CAPS Take 200 mg by mouth daily       • Continuous Blood Gluc  (FreeStyle Julian 14 Day [place]) SVETLANA Use 1 Device 4 (four) times a day (before meals and at bedtime) 1 each 0   • Continuous Blood Gluc Sensor (FreeStyle Julian 14 Day Sensor) MISC Use 1 application every 14 (fourteen) days 2 each 6   • cycloSPORINE (Restasis) 0.05 % ophthalmic emulsion      • dulaglutide (Trulicity) 2.34 UA/7.2FG injection Inject 0.5 mL (0.75 mg total) under the skin every 7 days 2 mL 2   • DULoxetine (CYMBALTA) 60 mg delayed release capsule Take 1 capsule (60 mg total) by mouth daily 90 capsule 3   • Eliquis 5 MG TAKE 1 TABLET TWICE A  tablet 3   • estradiol (ESTRACE VAGINAL) 0.1 mg/g vaginal cream Insert 0.5 g into the vagina 2 (two) times a week Patient is NOT to be using this daily 42.5 g 1   • Ferrous Sulfate 27 MG TABS Take 1 tablet by mouth daily     • FREESTYLE LITE test strip TEST THREE TIMES A  strip 3   • Glucosamine-Chondroitin (MOVE FREE PO) Take by mouth daily     • glucose blood (OneTouch Verio) test strip Check blood sugars once a day 100 strip 2   • hydrocortisone 2.5 % cream Apply topically 2 (two) times a day 60 g 2   • Jardiance 25 MG TABS TAKE 1 TABLET EVERY MORNING 90 tablet 3   • ketoconazole (NIZORAL) 2 % cream Apply topically 2 (two) times a day for 14 days 60 g 1   • Lancets (onetouch ultrasoft) lancets Check blood sugars once a day 100 each 2   • levothyroxine 125 mcg tablet Take one tablet 6 days of the week and nothing on sundays 90 tablet 3   • lidocaine (Lidoderm) 5 % Apply 1 patch topically over 12 hours daily Remove & Discard patch within 12 hours or as directed by MD 6 patch 0   • MEGARED OMEGA-3 KRILL OIL PO Take 1 capsule by mouth daily     • metFORMIN (GLUCOPHAGE-XR) 500 mg 24 hr tablet Take 2 tablets (1,000 mg total) by mouth 2 (two) times a day with meals 360 tablet 1   • methocarbamol (ROBAXIN) 500 mg tablet Take 1 tablet (500 mg total) by mouth 2 (two) times a day (Patient taking differently: Take 500 mg by mouth in the morning) 20 tablet 0   • neomycin-polymyxin-dexamethasone (MAXITROL) 0.35%-10,000 DKNWT/I-8.1% APPLY ONE APPLICATION BY OPHTHALMIC ROUTE TWO TIMES A DAY. A SMALL AMOUNT IN THE CONJUNCTIVAL SACS OF BOTH EYES     • simvastatin (ZOCOR) 20 mg tablet Take 1 tablet (20 mg total) by mouth daily at bedtime 100 tablet 1   • Triamcinolone Acetonide (Nasacort Allergy 24HR) 55 MCG/ACT AERO 2 Act (110 mcg total) into each nostril every morning 16.5 mL 3     No current facility-administered medications for this visit.      No Known Allergies   Immunizations:     Immunization History   Administered Date(s) Administered   • COVID-19 MODERNA VACC 0.5 ML IM 01/20/2021, 02/17/2021, 10/20/2021, 11/12/2021   • INFLUENZA 09/30/2015, 09/19/2016, 10/02/2017, 10/28/2022   • Influenza Split High Dose Preservative Free IM 09/30/2013, 09/30/2014, 09/30/2015, 09/19/2016, 10/02/2017   • Influenza, high dose seasonal 0.7 mL 09/20/2019, 10/05/2020, 10/28/2022   • Influenza, injectable, quadrivalent, preservative free 0.5 mL 10/23/2018, 11/08/2021   • Influenza, seasonal, injectable 10/24/2008, 12/08/2009, 09/14/2010, 09/13/2011, 09/10/2012, 09/01/2015, 10/05/2016   • Pneumococcal Conjugate 13-Valent 10/29/2015   • Pneumococcal Polysaccharide PPV23 12/01/2005, 03/15/2011, 10/01/2015   • Zoster 01/07/2014   • Zoster Vaccine Recombinant 11/15/2019, 01/30/2020      Health Maintenance:         Topic Date Due   • Breast Cancer Screening: Mammogram  08/31/2023   • DXA SCAN  07/11/2026         Topic Date Due   • Influenza Vaccine (1) 09/01/2023      Medicare Screening Tests and Risk Assessments:     Martin Cisneros is here for her Subsequent Wellness visit. Health Risk Assessment:   Patient rates overall health as fair. Patient feels that their physical health rating is same. Patient is satisfied with their life. Eyesight was rated as same. Hearing was rated as slightly worse. Patient feels that their emotional and mental health rating is same. Patients states they are sometimes angry. Patient states they are never, rarely unusually tired/fatigued. Pain experienced in the last 7 days has been none. Patient states that she has experienced no weight loss or gain in last 6 months. Depression Screening:   PHQ-2 Score: 0      Fall Risk Screening: In the past year, patient has experienced: history of falling in past year    Number of falls: 2 or more  Injured during fall?: Yes    Feels unsteady when standing or walking?: No    Worried about falling?: Yes      Urinary Incontinence Screening:   Patient has not leaked urine accidently in the last six months. Home Safety:  Patient does not have trouble with stairs inside or outside of their home. Patient has working smoke alarms and has working carbon monoxide detector. Home safety hazards include: none. Nutrition:   Current diet is Regular.      Medications:   Patient is currently taking over-the-counter supplements. OTC medications include: see medication list. Patient is able to manage medications. Activities of Daily Living (ADLs)/Instrumental Activities of Daily Living (IADLs):   Walk and transfer into and out of bed and chair?: Yes  Dress and groom yourself?: Yes    Bathe or shower yourself?: Yes    Feed yourself? Yes  Do your laundry/housekeeping?: Yes  Manage your money, pay your bills and track your expenses?: Yes  Make your own meals?: Yes    Do your own shopping?: Yes    Previous Hospitalizations:   Any hospitalizations or ED visits within the last 12 months?: Yes    How many hospitalizations have you had in the last year?: 1-2    Advance Care Planning:   Living will: Yes    Advanced directive: Yes    End of Life Decisions reviewed with patient: Yes      Cognitive Screening:   Provider or family/friend/caregiver concerned regarding cognition?: No    PREVENTIVE SCREENINGS      Cardiovascular Screening:    General: Screening Not Indicated and History Lipid Disorder      Diabetes Screening:     General: Screening Not Indicated and History Diabetes      Colorectal Cancer Screening:     General: History Colorectal Cancer      Breast Cancer Screening:     General: Screening Current      Cervical Cancer Screening:    General: Screening Not Indicated      Osteoporosis Screening:    General: Screening Not Indicated and History Osteoporosis      Abdominal Aortic Aneurysm (AAA) Screening:        General: Screening Current      Lung Cancer Screening:     General: Screening Not Indicated      Hepatitis C Screening:    General: Screening Not Indicated    Screening, Brief Intervention, and Referral to Treatment (SBIRT)    Screening  Typical number of drinks in a day: 0  Typical number of drinks in a week: 0  Interpretation: Low risk drinking behavior. Brief Intervention  Alcohol & drug use screenings were reviewed. No concerns regarding substance use disorder identified.      Other Counseling Topics:   Regular weightbearing exercise and calcium and vitamin D intake. No results found. Physical Exam:     There were no vitals taken for this visit.     Physical Exam     Charley Goodpasture,

## 2023-09-05 NOTE — PROGRESS NOTES
ASSESSMENT/PLAN:    Osteoporosis  Doing well with Fosamax weekly  To continue calcium and vitamin D  To continue to try to exercise  Next DEXA July 2025    Type 2 diabetes  A1c keeps going up to 8.3 from 8.1  Endocrine changed patient to [place] from [facility]  The rest of her medicines stay the same and they will check her A1c soon  Patient know she needs to cut down on her sweets but she likes them  Continue metformin and Jardiance     Anxiety and fatigue  She is on duloxetine and is stable  Continue the same     Atrial fibrillation/sick sinus syndrome/cardiomegaly/coronary artery disease  Is on Eliquis for anticoagulation and atenolol for rate control  She also has a pacer  Had several pacer checks are fine  Also follows with cardiology    Adenocarcinoma of the colon 2012  Saw GI and they will decide next year when patient is 81 whether to continue to do colonoscopies    Pacheco's esophagitis  Had a EGD and everything is clear  No other EGD scheduled going forward  No medicines being taken now    COPD  Patient has both Ventolin and Breztri on her med list but does not know which when she takes  She notes she only takes them as needed      Hashimoto's thyroiditis   Also following with endocrine for suppression of her thyroid with medication  on levothyroxine 125 mcg daily      Vitamin D deficiency   Continue vitamin D 2000 units daily       History of large paraesophageal hernia  Surgery on esophagus now she does not need PPI     Hyperlipidemia   Simvastatin and CoQ10 and diet     Thyroid goiter   Followed by endocrine who orders ultrasounds         Recheck in 6 months  Recheck in PE  Prn otherwise          Depression Screening and Follow-up Plan: Patient was screened for depression during today's encounter. They screened negative with a PHQ-2 score of 0.            Health Maintenance   Topic Date Due   • SLP PLAN OF CARE  Never done   • Falls: Plan of Care  Never done   • OT PLAN OF CARE  03/06/2019   • Medicare Addendum  created 12/13/22 1510 by Ran Pringle MD    Clinical Note Signed       Annual Wellness Visit (AWV)  08/23/2023   • Breast Cancer Screening: Mammogram  08/31/2023   • Influenza Vaccine (1) 09/01/2023   • DM Eye Exam  12/02/2023   • BMI: Followup Plan  02/23/2024   • COVID-19 Vaccine (5 - Moderna series) 10/20/2023 (Originally 1/7/2022)   • HEMOGLOBIN A1C  12/09/2023   • Diabetic Foot Exam  08/02/2024   • Fall Risk  09/05/2024   • Depression Screening  09/05/2024   • Urinary Incontinence Screening  09/05/2024   • BMI: Adult  09/05/2024   • DXA SCAN  07/11/2026   • Osteoporosis Screening  Completed   • Pneumococcal Vaccine: 65+ Years  Completed   • HIB Vaccine  Aged Out   • IPV Vaccine  Aged Out   • Hepatitis A Vaccine  Aged Out   • Meningococcal ACWY Vaccine  Aged Out   • HPV Vaccine  Aged Out   • Colonoscopy  Discontinued         Problem List as of 9/5/2023 Reviewed: 8/2/2023 10:37 AM by João Ayala MD    A-fib [facility])    Abnormal echocardiogram    Adenocarcinoma of colon [facility])    Age related osteoporosis    Anticoagulated by anticoagulation treatment    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    Cardiomegaly    Chronic obstructive pulmonary disease ([address])    [address] [place] [address] Dr BARROSO(carpometacarpal degenerative joint disease), localized primary, right    COPD (chronic obstructive pulmonary disease) ([address])    Coronary artery disease involving native coronary artery of native heart without angina pectoris    Diffuse nontoxic goiter    Essential hypertension    Feeling anxious    Gastro-esophageal reflux disease without esophagitis    Hematuria, microscopic    Hypothyroidism due to Hashimoto's thyroiditis    Insomnia    Lichen sclerosus et atrophicus    Lichen simplex chronicus    Mitral regurgitation    Mixed hyperlipidemia    MRI safe cardiac pacemaker in situ    Pacemaker    Paraesophageal hernia    Paroxysmal atrial fibrillation (HCC)    Primary osteoarthritis involving multiple joints    Restrictive lung disease    Sick sinus syndrome ([address])    Stress incontinence in female    Type 2 diabetes mellitus with diabetic neuropathy, without long-term current use of insulin (HCC)    Vitamin D deficiency         Subjective:   Chief Complaint   Patient presents with   • Medicare Wellness Visit     Patient here for her 6-month recheck  Since last visit she has had several pacer checks by cardiology and has seen cardiology regarding her A-fib  She saw endocrine regarding her thyroid and her diabetes which they manage  She was here for viral cough  She saw GI for an endoscopy for Pacheco's which was quiet and they are done with EGDs  They may or may not consider colonoscopy for next year  She saw dermatology for skin disease and podiatry for her routine care for diabetes  We also saw patient regarding her DEXA on the fractured ribs now is considered osteoporotic    All these reviewed with patient        patient ID: Tej Nobles is a 81 y.o. female. Patient's past medical history, surgical history, family history, social history, and Tobacco history reviewed with patient. MED LIST WAS REVIEWED AND UPDATED    ROS  As per HPI  Rest of 12 point review of systems negative     Objective:      VITALS:  Wt Readings from Last 3 Encounters:   09/05/23 68.9 kg (152 lb)   08/02/23 69.9 kg (154 lb)   07/20/23 69 kg (152 lb 3.2 oz)     BP Readings from Last 3 Encounters:   09/05/23 116/80   08/02/23 130/78   07/20/23 124/82     Pulse Readings from Last 3 Encounters:   09/05/23 73   08/02/23 80   07/20/23 104     Body mass index is 27.14 kg/m². Laboratory Results:    All pertinent labs and studies were reviewed with patient during this office visit with highlights of the results contained in this note in the ASSESSMENT AND PLAN section       Physical Exam    Constitutional  Appears healthy, Looks well, Appearance consistent with age    Mental Status  Alert, Oriented, Cooperative, Memory function normal , clean, and reasonable    Neck  No neck mass, No thyromegaly, Good carotid upstrokes bilaterally, trachea midline positive click    Respiratory  Breath sounds normal, No rales, No rhonchi, No wheezing, normal palpation    Cardiac  Irregularly irregular with controlled rate, no murmur no S3-S4, no heave lift or thrill to palpation    Vascular  No leg edema, No pedal edema    Muscular skeletal  No clubbing cyanosis , muscle tone normal    Skin  No appreciable rashes or abnormal appearing lesions

## 2023-09-11 DIAGNOSIS — E11.40 TYPE 2 DIABETES MELLITUS WITH DIABETIC NEUROPATHY, WITHOUT LONG-TERM CURRENT USE OF INSULIN (HCC): ICD-10-CM

## 2023-09-11 RX ORDER — BLOOD-GLUCOSE METER
KIT MISCELLANEOUS
Qty: 100 STRIP | Refills: 3 | Status: SHIPPED | OUTPATIENT
Start: 2023-09-11

## 2023-09-14 ENCOUNTER — IMMUNIZATIONS (OUTPATIENT)
Dept: FAMILY MEDICINE CLINIC | Facility: CLINIC | Age: 83
End: 2023-09-14
Payer: MEDICARE

## 2023-09-14 DIAGNOSIS — Z23 ENCOUNTER FOR IMMUNIZATION: Primary | ICD-10-CM

## 2023-09-14 PROCEDURE — G0008 ADMIN INFLUENZA VIRUS VAC: HCPCS

## 2023-09-14 PROCEDURE — 90662 IIV NO PRSV INCREASED AG IM: CPT

## 2023-09-22 ENCOUNTER — HOSPITAL ENCOUNTER (OUTPATIENT)
Dept: MAMMOGRAPHY | Facility: CLINIC | Age: 83
Discharge: HOME | End: 2023-09-22
Payer: MEDICARE

## 2023-09-22 VITALS — WEIGHT: 151.9 LBS | BODY MASS INDEX: 26.91 KG/M2 | HEIGHT: 63 IN

## 2023-09-22 DIAGNOSIS — M81.0 AGE-RELATED OSTEOPOROSIS WITHOUT CURRENT PATHOLOGICAL FRACTURE: ICD-10-CM

## 2023-09-22 DIAGNOSIS — Z12.31 ENCOUNTER FOR SCREENING MAMMOGRAM FOR MALIGNANT NEOPLASM OF BREAST: ICD-10-CM

## 2023-09-22 PROCEDURE — 77067 SCR MAMMO BI INCL CAD: CPT

## 2023-09-22 PROCEDURE — 77063 BREAST TOMOSYNTHESIS BI: CPT

## 2023-09-22 RX ORDER — ALENDRONATE SODIUM 70 MG/1
70 TABLET ORAL
Qty: 12 TABLET | Refills: 3 | Status: SHIPPED | OUTPATIENT
Start: 2023-09-22

## 2023-09-29 ENCOUNTER — APPOINTMENT (OUTPATIENT)
Dept: LAB | Facility: CLINIC | Age: 83
End: 2023-09-29
Payer: MEDICARE

## 2023-09-29 DIAGNOSIS — E03.8 HYPOTHYROIDISM DUE TO HASHIMOTO'S THYROIDITIS: ICD-10-CM

## 2023-09-29 DIAGNOSIS — E06.3 HYPOTHYROIDISM DUE TO HASHIMOTO'S THYROIDITIS: ICD-10-CM

## 2023-09-29 LAB
T4 FREE SERPL-MCNC: 0.81 NG/DL (ref 0.61–1.12)
TSH SERPL DL<=0.05 MIU/L-ACNC: 4.29 UIU/ML (ref 0.45–4.5)

## 2023-09-29 PROCEDURE — 36415 COLL VENOUS BLD VENIPUNCTURE: CPT

## 2023-09-29 PROCEDURE — 84439 ASSAY OF FREE THYROXINE: CPT

## 2023-10-02 DIAGNOSIS — E03.8 HYPOTHYROIDISM DUE TO HASHIMOTO'S THYROIDITIS: Primary | ICD-10-CM

## 2023-10-02 DIAGNOSIS — E06.3 HYPOTHYROIDISM DUE TO HASHIMOTO'S THYROIDITIS: Primary | ICD-10-CM

## 2023-10-11 NOTE — ED PROVIDER NOTES
Emergency Department Trauma Note  Faina Tam 81 y.o. female MRN: [id number]  Unit/Bed#: ED TR13/TR13B Encounter: [id number]      Trauma Alert: Trauma Acuity: Trauma Evaluation  Model of Arrival: Mode of Arrival: Direct from scene via    Trauma Team: Current Providers  Attending Provider: Oksana Rogers DO  Medical Student: Rocky Henderson  Registered Nurse: Quincy Castanon RN  Consultants:     None      History of Present Illness     Chief Complaint:   Chief Complaint   Patient presents with   • Fall     Patient was inside [address] [place] [address] when she fell down on floor. Patient c/o left sided rib pain. No loc. Patient unsure if she hit her head. Patient taking eliquis. HPI:  Faina Tam is a 81 y.o. female who presents with mechanical fall. Mechanism:Details of Incident: Patient was inside [address] [place] [address] when she fell down on floor. Patient c/o left sided rib pain. No loc. Patient unsure if she hit her head. Patient taking eliquis. Injury Date: 07/11/23 Injury Time: 0815 Injury Occurence Location - [address] [place] [address]: [address] [place] [address] store    81-year-old female presents for evaluation of mechanical fall that occurred shortly prior to arrival.  Patient takes Eliquis daily. Patient reports she was at [facility], turned around and then fell onto her left side. She is uncertain of head trauma or loss of consciousness but denies head pain. Currently complains of left-sided rib pain. Worse with palpation. Review of Systems   Constitutional: Negative for fever.    Musculoskeletal:        Left-sided rib pain       Historical Information     Immunizations:   Immunization History   Administered Date(s) Administered   • COVID-19 MODERNA VACC 0.5 ML IM 01/20/2021, 02/17/2021, 10/20/2021, 11/12/2021   • INFLUENZA 09/30/2015, 09/19/2016, 10/02/2017, 10/28/2022   • Influenza Split High Dose Preservative Free IM 09/30/2013, 09/30/2014, 09/30/2015, 09/19/2016, 10/02/2017   • Influenza, high dose seasonal 0.7 mL 09/20/2019, 10/05/2020, Is the patient currently in the state of [place]? YES    Visit mode:VIDEO    If the visit is dropped, the patient can be reconnected by: VIDEO VISIT: Text to cell phone:   Telephone Information:   Mobile [phone]       Will anyone else be joining the visit? NO  (If patient encounters technical issues they should call [phone]    How would you like to obtain your AVS? MyChart    Are changes needed to the allergy or medication list? No    Reason for visit: RECHECK    Unable to complete distress screening due to time constraint.     Miley SARAHF     10/28/2022   • Influenza, injectable, quadrivalent, preservative free 0.5 mL 10/23/2018, 11/08/2021   • Influenza, seasonal, injectable 10/24/2008, 12/08/2009, 09/14/2010, 09/13/2011, 09/10/2012, 09/01/2015, 10/05/2016   • Pneumococcal Conjugate 13-Valent 10/29/2015   • Pneumococcal Polysaccharide PPV23 12/01/2005, 03/15/2011, 10/01/2015   • Zoster 01/07/2014   • Zoster Vaccine Recombinant 11/15/2019, 01/30/2020       Past Medical History:   Diagnosis Date   • A-fib ([address])    • Abnormal ECG     last assessed: 7/14/2015   • Acid reflux     resolved   • Anxiety    • Pacheco esophagus    • Benign neoplasm of sigmoid colon 09/13/2011    next colon 2012   • Bronchitis, asthmatic     last assessed: 3/12/2012   • Colon cancer ([address])     2012- had colon resection   • Colon polyp    • COPD (chronic obstructive pulmonary disease) (HCC)     questionable   • Depression    • Diabetes mellitus ([address])     on byetta   • Disease of thyroid gland    • Dizziness     occ   • Esophageal stricture     last assessed: 9/30/2014   • High cholesterol    • Hypertension    • Hypothyroidism    • Iron (Fe) deficiency anemia 12/22/2011    iron pill continue   • Irritable bowel syndrome    • OA (osteoarthritis)    • 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   • Pacemaker     hx SSS   • Rash     labia area- uses cream prn   • Restrictive lung disease    • Seasonal allergies    • Skin scarring/fibrosis     fibrotic scar; last assessed: 3/22/2013   • MONA (stress urinary incontinence, female)    • Urinary frequency     last assessed: 9/10/2012   • Wears glasses     reading       Family History   Problem Relation Age of Onset   • Heart disease Mother    • Diabetes Mother    • Asthma Father    • Stomach cancer Father         gastrkic cancer   • Cancer Paternal Grandmother    • Cancer Paternal Grandfather    • No Known Problems Brother    • Breast cancer Sister 76   • Breast cancer Sister 77   • Pancreatic cancer Sister 72   • No Known Problems Maternal Aunt • No Known Problems Maternal Aunt    • No Known Problems Maternal Aunt    • No Known Problems Paternal Aunt    • No Known Problems Daughter    • No Known Problems Maternal Grandmother    • No Known Problems Maternal Grandfather    • Prostate cancer Paternal Uncle         age unknown   • Substance Abuse Neg Hx    • Mental illness Neg Hx    • Alcohol abuse Neg Hx    • Colon cancer Neg Hx    • Colon polyps Neg Hx      Past Surgical History:   Procedure Laterality Date   • BREAST EXCISIONAL BIOPSY Right     benign, best guess on year   • [address]      does not know type  Dr Brian Marrufo is cariologist   •  SECTION     • COLON SURGERY      resection  removed 18 In.   • COLONOSCOPY  2012: Adenomatous polyp, colorectal anastomosis, internal hemorrhoids. 2012:  proctosigmoidectomy   • ESOPHAGOGASTRODUODENOSCOPY N/A 2016    Procedure: ESOPHAGOGASTRODUODENOSCOPY (EGD); Surgeon: Bon Aguilera MD;  Location: [facility];  Service:    • HYSTERECTOMY  1978   • AL ARTHROPLASTY INTERPHALANGEAL JOINT EACH Right 2018    Procedure: ARTHROPLASTY PIP/FINGER - RIGHT RING;  Surgeon: Liv Tejada MD;  Location[facility];  Service: Orthopedics   • AL ARTHRP INTERPOS INTERCARPAL/METACARPAL JOINTS Right 1/10/2019    Procedure: ARTHROPLASTY Pinkey Luis Carlos;  Surgeon: Liv Tejada MD;  [facility];  Service: Orthopedics   • AL CYSTOURETHROSCOPY N/A 2018    Procedure: Chau Barrier;  Surgeon: Senia Richard MD;  [facility];  Service: UroGynecology          • AL LAPS RPR PARAESPHGL HRNA INCL [address] N/A 2016    Procedure: LAPAROSCOPIC PARAESOPHAGEAL HERNIA REPAIR with mesh; toupet  FUNDOPLICATION ;  Surgeon: Manuela Galvan MD;  Location[facility];  Service: Thoracic   • AL SLING OPERATION STRESS INCONTINENCE N/A 2018    Procedure: INSERTION PUBOVAGINAL SLING (FEMALE);   Surgeon: Senia Richard MD;  [facility];  Service: UroGynecology          • TOTAL ABDOMINAL HYSTERECTOMY     • UPPER GASTROINTESTINAL ENDOSCOPY      2018:  Irregular Z-line positive for intestinal metaplasia/ Pacheco's, no dysplasia. Gastritis H pylori negative. • WRIST TENDON TRANSFER Right 1/10/2019    Procedure: TRANSFER TENDON FLEXOR CARPI RADIALIS FROM FOREARM TO HAND;  Surgeon: Donna Lozoya MD;  Location:  MAIN OR;  Service: Orthopedics     Social History     Tobacco Use   • Smoking status: Former     Types: Cigarettes     Quit date:      Years since quittin.5   • Smokeless tobacco: Former     Quit date:    Vaping Use   • Vaping Use: Never used   Substance Use Topics   • Alcohol use: No   • Drug use: No     E-Cigarette/Vaping   • E-Cigarette Use Never User      E-Cigarette/Vaping Substances   • Nicotine No    • THC No    • CBD No    • Flavoring No    • Other No    • Unknown No        Family History: non-contributory    Meds/Allergies   Prior to Admission Medications   Prescriptions Last Dose Informant Patient Reported? Taking? Apple Cider Vinegar 500 MG TABS  Self Yes No   Sig: Take by mouth daily   Blood Glucose Monitoring Suppl (OneTouch Verio) w/Device KIT  Self No No   Sig: Check blood sugars once a day   Budeson-Glycopyrrol-Formoterol (Breztri Aerosphere) 160-9-4.8 MCG/ACT AERO  Self No No   Sig: Inhale 2 puffs daily as needed (rattle or shotness of beath) Rinse mouth after use.    Byetta 10 MCG Pen 10 MCG/0.04ML SOPN  Self No No   Sig: Inject 0.04 mL under the skin 2 (two) times a day   Cholecalciferol (VITAMIN D-3) 1000 units CAPS  Self Yes No   Sig: Take 1 capsule by mouth daily   Coenzyme Q10 (COQ10) 200 MG CAPS  Self Yes No   Sig: Take 200 mg by mouth daily     Continuous Blood Gluc  (FreeStyle [place] 14 Day [place]) SVETLANA  Self No No   Sig: Use 1 Device 4 (four) times a day (before meals and at bedtime)   Continuous Blood Gluc Sensor (FreeStyle Julian 14 Day Sensor) MISC  Self No No   Sig: Use 1 application every 14 (fourteen) days   DULoxetine (CYMBALTA) 60 mg delayed release capsule  Self No No   Sig: Take 1 capsule (60 mg total) by mouth daily   Eliquis 5 MG  Self No No   Sig: TAKE 1 TABLET TWICE A DAY   FREESTYLE LITE test strip  Self No No   Sig: TEST THREE TIMES A DAY   Ferrous Sulfate 27 MG TABS  Self Yes No   Sig: Take 1 tablet by mouth daily   Glucosamine-Chondroitin (MOVE FREE PO)  Self Yes No   Sig: Take by mouth daily   Jardiance 25 MG TABS  Self No No   Sig: TAKE 1 TABLET EVERY MORNING   Lancets (onetouch ultrasoft) lancets  Self No No   Sig: Check blood sugars once a day   MEGARED OMEGA-3 KRILL OIL PO  Self Yes No   Sig: Take 1 capsule by mouth daily   Triamcinolone Acetonide (Nasacort Allergy 24HR) 55 MCG/ACT AERO  Self No No   Si Act (110 mcg total) into each nostril every morning   albuterol (PROVENTIL HFA,VENTOLIN HFA) 90 mcg/act inhaler   Yes No   Sig: Inhale 2 puffs every 6 (six) hours as needed for wheezing   ascorbic acid (VITAMIN C) 500 mg tablet  Self Yes No   Sig: Take 500 mg by mouth daily   atenolol (TENORMIN) 25 mg tablet  Self No No   Sig: Take 1 tablet (25 mg total) by mouth daily at bedtime   clobetasol (TEMOVATE) 0.05 % cream  Self Yes No   cycloSPORINE (Restasis) 0.05 % ophthalmic emulsion  Self Yes No   estradiol (ESTRACE VAGINAL) 0.1 mg/g vaginal cream  Self No No   Sig: Insert 0.5 g into the vagina 2 (two) times a week Patient is NOT to be using this daily   glucose blood (OneTouch Verio) test strip  Self No No   Sig: Check blood sugars once a day   hydrocortisone 2.5 % cream   No No   Sig: Apply topically 2 (two) times a day   ketoconazole (NIZORAL) 2 % cream   No No   Sig: Apply topically 2 (two) times a day for 14 days   levothyroxine 125 mcg tablet  Self No No   Sig: Take one tablet 6 days of the week and nothing on sundays   metFORMIN (GLUCOPHAGE-XR) 500 mg 24 hr tablet  Self No No   Sig: Take 2 tablets (1,000 mg total) by mouth 2 (two) times a day with meals neomycin-polymyxin-dexamethasone (MAXITROL) 0.35%-10,000 units/g-0.1%   Yes No   Sig: APPLY ONE APPLICATION BY OPHTHALMIC ROUTE TWO TIMES A DAY. A SMALL AMOUNT IN THE CONJUNCTIVAL SACS OF BOTH EYES   simvastatin (ZOCOR) 20 mg tablet  Self No No   Sig: TAKE 2 TABLETS DAILY AT BEDTIME      Facility-Administered Medications: None       No Known Allergies    PHYSICAL EXAM    PE limited by: none    Objective   Vitals:   First set: Temperature: 98.8 °F (37.1 °C) (07/11/23 0905)  Pulse: 103 (07/11/23 0905)  Respirations: 20 (07/11/23 0905)  Blood Pressure: (!) 196/93 (07/11/23 0905)  SpO2: 96 % (07/11/23 0905)    Primary Survey:   (A) Airway: Intact  (B) Breathing: B/L breath sounds  (C) Circulation: Pulses:   normal  (D) Disabliity:  GCS Total:  15  (E) Expose:  Completed    Secondary Survey: (Click on Physical Exam tab above)  Physical Exam  Vitals and nursing note reviewed. Constitutional:       Appearance: She is well-developed. HENT:      Head: Normocephalic and atraumatic. Right Ear: External ear normal.      Left Ear: External ear normal.      Nose: Nose normal.   Eyes:      General: No scleral icterus. Cardiovascular:      Rate and Rhythm: Normal rate. Pulmonary:      Effort: Pulmonary effort is normal. No respiratory distress. Breath sounds: No wheezing. Abdominal:      General: There is no distension. Musculoskeletal:         General: No deformity. Normal range of motion. Cervical back: Normal range of motion. Comments: 5/5 strength of bilateral upper and lower extremities. Tender to palpation of left lateral ribs 6-9   Skin:     Findings: No rash. Neurological:      General: No focal deficit present. Mental Status: She is alert and oriented to person, place, and time. Psychiatric:         Mood and Affect: Mood normal.         Cervical spine cleared by clinical criteria?  No (imaging required)      Invasive Devices     None                 Lab Results:   Results Reviewed Procedure Component Value Units Date/Time    HS Troponin I 2hr [[id number]]  (Normal) Collected: 07/11/23 1308    Lab Status: Final result Specimen: Blood from Arm, Right Updated: 07/11/23 1343     hs TnI 2hr 9 ng/L      Delta 2hr hsTnI 1 ng/L     Basic metabolic panel [[id number]]  (Abnormal) Collected: 07/11/23 1212    Lab Status: Final result Specimen: Blood from Arm, Left Updated: 07/11/23 1246     Sodium 135 mmol/L      Potassium 4.3 mmol/L      Chloride 102 mmol/L      CO2 26 mmol/L      ANION GAP 7 mmol/L      BUN 15 mg/dL      Creatinine 0.61 mg/dL      Glucose 163 mg/dL      Calcium 9.4 mg/dL      eGFR 85 ml/min/1.73sq m     Narrative:      [place] guidelines for Chronic Kidney Disease (CKD):   •  Stage 1 with normal or high GFR (GFR > 90 mL/min/1.73 square meters)  •  Stage 2 Mild CKD (GFR = 60-89 mL/min/1.73 square meters)  •  Stage 3A Moderate CKD (GFR = 45-59 mL/min/1.73 square meters)  •  Stage 3B Moderate CKD (GFR = 30-44 mL/min/1.73 square meters)  •  Stage 4 Severe CKD (GFR = 15-29 mL/min/1.73 square meters)  •  Stage 5 End Stage CKD (GFR <15 mL/min/1.73 square meters)  Note: GFR calculation is accurate only with a steady state creatinine    HS Troponin 0hr (reflex protocol) [[id number]]  (Normal) Collected: 07/11/23 1108    Lab Status: Final result Specimen: Blood from Arm, Left Updated: 07/11/23 1142     hs TnI 0hr 8 ng/L     Protime-INR [[id number]]  (Normal) Collected: 07/11/23 1108    Lab Status: Final result Specimen: Blood from Arm, Left Updated: 07/11/23 1137     Protime 14.3 seconds      INR 1.03    APTT [[id number]]  (Normal) Collected: 07/11/23 1108    Lab Status: Final result Specimen: Blood from Arm, Left Updated: 07/11/23 1137     PTT 28 seconds     CBC and differential [[id number]]  (Abnormal) Collected: 07/11/23 0921    Lab Status: Final result Specimen: Blood from Arm, Left Updated: 07/11/23 1023     WBC 10.45 Thousand/uL      RBC 5.65 Million/uL Hemoglobin 15.8 g/dL      Hematocrit 51.2 %      MCV 91 fL      MCH 28.0 pg      MCHC 30.9 g/dL      RDW 13.6 %      MPV 11.8 fL      Platelets 001 Thousands/uL      nRBC 0 /100 WBCs      Neutrophils Relative 68 %      Immat GRANS % 1 %      Lymphocytes Relative 21 %      Monocytes Relative 7 %      Eosinophils Relative 2 %      Basophils Relative 1 %      Neutrophils Absolute 7.20 Thousands/µL      Immature Grans Absolute 0.09 Thousand/uL      Lymphocytes Absolute 2.16 Thousands/µL      Monocytes Absolute 0.74 Thousand/µL      Eosinophils Absolute 0.21 Thousand/µL      Basophils Absolute 0.05 Thousands/µL     Fingerstick Glucose (POCT) [[id number]]  (Abnormal) Collected: 07/11/23 0918    Lab Status: Final result Updated: 07/11/23 0924     POC Glucose 226 mg/dl                  Imaging Studies:   Direct to CT: Yes  TRAUMA - CT head wo contrast   Final Result by Hung Ga MD (07/11 1043)      No acute intracranial pathology. In particular, no intracranial hemorrhage or calvarial fracture. The study was marked in [facility] for immediate notification given trauma designation. Workstation performed: [id number]         TRAUMA - CT spine cervical wo contrast   Final Result by Hung Ga MD (07/11 1044)      No cervical spine fracture or traumatic malalignment. The study was marked in [facility] for immediate notification given trauma designation. Workstation performed: [id number]         TRAUMA - CT chest abdomen pelvis w contrast   Final Result by Hung Ga MD (07/11 1043)      Acute mildly displaced fracture of the left 7th rib posteriorly and minimally displaced fracture of the left 8th rib posteriorly. No other thoracic, abdominal or pelvic trauma. Multiple incidental findings as above. The study was marked in [facility] for immediate notification given trauma designation.          Workstation performed: [id number]         XR Trauma chest portable   Final Result by Hung Ga MD (07/11 0933)      Acute, minimally displaced fracture of the left 7th rib posteriorly. No other obvious displaced fractures within limitation of supine AP chest technique. No acute pulmonary pathology. The study was marked in [facility] for immediate notification. Workstation performed: [id number]               Procedures  Procedures         ED Course           Medical Decision Making  45-year-old female presenting with mechanical fall. Trauma evaluation called. Obtain CT head, C-spine, chest abdomen pelvis. Bedside chest x-ray reveals rib fracture. Symptom control as needed. Incentive spirometer. Discussed all results with patient. Return precautions discussed. Follow-up with primary care provider. Rib fractures: acute illness or injury  Amount and/or Complexity of Data Reviewed  Labs: ordered. Radiology: ordered. Risk  Prescription drug management. Disposition  Priority One Transfer: No  Final diagnoses:   Rib fractures   Renal cyst   Enlarged thyroid     Time reflects when diagnosis was documented in both MDM as applicable and the Disposition within this note     Time User Action Codes Description Comment    7/11/2023  2:01 PM Ashok Night Add [G53.75LE] Rib fractures     7/11/2023  2:02 PM Ashok Night Add [N28.1] Renal cyst     7/11/2023  2:02 PM Ashok Night Add [E04.9] Enlarged thyroid       ED Disposition     ED Disposition   Discharge    Condition   Stable    Date/Time   Tue Jul 11, 2023  2:01 PM    Comment   Sindhu A Genoe discharge to home/self care.                Follow-up Information     Follow up With Specialties Details Why Contact Info Additional Information    Naty Banegas, DO Family Medicine In 1 week  [address]  [address] [place] [address] Emergency Department Emergency Medicine  If symptoms worsen [address] [facility] [id number] [phone] [address] [facility] Emergency Department, [address] [facility], [address]        Discharge Medication List as of 7/11/2023  2:11 PM      START taking these medications    Details   lidocaine (Lidoderm) 5 % Apply 1 patch topically over 12 hours daily Remove & Discard patch within 12 hours or as directed by MD, Starting Tue 7/11/2023, Normal      methocarbamol (ROBAXIN) 500 mg tablet Take 1 tablet (500 mg total) by mouth 2 (two) times a day, Starting Tue 7/11/2023, Normal      oxyCODONE (Roxicodone) 5 immediate release tablet Take 0.5 tablets (2.5 mg total) by mouth every 4 (four) hours as needed for moderate pain for up to 10 days Max Daily Amount: 15 mg, Starting Tue 7/11/2023, Until Fri 7/21/2023 at 2359, Normal         CONTINUE these medications which have NOT CHANGED    Details   albuterol (PROVENTIL HFA,VENTOLIN HFA) 90 mcg/act inhaler Inhale 2 puffs every 6 (six) hours as needed for wheezing, Historical Med      Apple Cider Vinegar 500 MG TABS Take by mouth daily, Historical Med      ascorbic acid (VITAMIN C) 500 mg tablet Take 500 mg by mouth daily, Historical Med      atenolol (TENORMIN) 25 mg tablet Take 1 tablet (25 mg total) by mouth daily at bedtime, Starting Wed 11/9/2022, Normal      Blood Glucose Monitoring Suppl (OneTouch Verio) w/Device KIT Check blood sugars once a day, Normal      Budeson-Glycopyrrol-Formoterol (Breztri Aerosphere) 160-9-4.8 MCG/ACT AERO Inhale 2 puffs daily as needed (rattle or shotness of beath) Rinse mouth after use., Starting Thu 2/23/2023, Normal      Byetta 10 MCG Pen 10 MCG/0.04ML SOPN Inject 0.04 mL under the skin 2 (two) times a day, Starting Thu 2/23/2023, Normal      Cholecalciferol (VITAMIN D-3) 1000 units CAPS Take 1 capsule by mouth daily, Historical Med      clobetasol (TEMOVATE) 0.05 % cream Starting Tue 12/18/2018, Historical Med      Coenzyme Q10 (COQ10) 200 MG CAPS Take 200 mg by mouth daily  , Historical Med      Continuous Blood Gluc  (FreeStyle [place] 14 Day [place]) SVETLANA Use 1 Device 4 (four) times a day (before meals and at bedtime), Starting Wed 12/15/2021, Normal      Continuous Blood Gluc Sensor (FreeStyle Julian 14 Day Sensor) MISC Use 1 application every 14 (fourteen) days, Starting Wed 12/15/2021, Normal      cycloSPORINE (Restasis) 0.05 % ophthalmic emulsion Starting Thu 12/2/2021, Historical Med      DULoxetine (CYMBALTA) 60 mg delayed release capsule Take 1 capsule (60 mg total) by mouth daily, Starting Thu 2/23/2023, Normal      Eliquis 5 MG TAKE 1 TABLET TWICE A DAY, Normal      estradiol (ESTRACE VAGINAL) 0.1 mg/g vaginal cream Insert 0.5 g into the vagina 2 (two) times a week Patient is NOT to be using this daily, Starting Thu 5/13/2021, Normal      Ferrous Sulfate 27 MG TABS Take 1 tablet by mouth daily, Historical Med      !! FREESTYLE LITE test strip TEST THREE TIMES A DAY, Normal      Glucosamine-Chondroitin (MOVE FREE PO) Take by mouth daily, Historical Med      !! glucose blood (OneTouch Verio) test strip Check blood sugars once a day, Normal      hydrocortisone 2.5 % cream Apply topically 2 (two) times a day, Starting Mon 5/1/2023, Normal      Jardiance 25 MG TABS TAKE 1 TABLET EVERY MORNING, Normal      ketoconazole (NIZORAL) 2 % cream Apply topically 2 (two) times a day for 14 days, Starting Mon 5/1/2023, Until Fri 6/9/2023, Normal      Lancets (onetouch ultrasoft) lancets Check blood sugars once a day, Normal      levothyroxine 125 mcg tablet Take one tablet 6 days of the week and nothing on sundays, Normal      MEGARED OMEGA-3 KRILL OIL PO Take 1 capsule by mouth daily, Historical Med      metFORMIN (GLUCOPHAGE-XR) 500 mg 24 hr tablet Take 2 tablets (1,000 mg total) by mouth 2 (two) times a day with meals, Starting Thu 2/23/2023, Normal      neomycin-polymyxin-dexamethasone (MAXITROL) 0.35%-10,000 IUYAJ/G-7.6% APPLY ONE APPLICATION BY OPHTHALMIC ROUTE TWO TIMES A DAY.  A SMALL AMOUNT IN THE CONJUNCTIVAL SACS OF BOTH EYES, Historical Med      simvastatin (ZOCOR) 20 mg tablet TAKE 2 TABLETS DAILY AT BEDTIME, Normal      Triamcinolone Acetonide (Nasacort Allergy 24HR) 55 MCG/ACT AERO 2 Act (110 mcg total) into each nostril every morning, Starting Fri 9/17/2021, Normal       !! - Potential duplicate medications found. Please discuss with provider. No discharge procedures on file.     PDMP Review     None          ED Provider  Electronically Signed by         Malick Cesar DO  07/12/23 8757

## 2023-10-13 ENCOUNTER — OFFICE VISIT (OUTPATIENT)
Dept: PODIATRY | Facility: CLINIC | Age: 83
End: 2023-10-13
Payer: MEDICARE

## 2023-10-13 VITALS
HEIGHT: 63 IN | BODY MASS INDEX: 27.11 KG/M2 | WEIGHT: 153 LBS | DIASTOLIC BLOOD PRESSURE: 80 MMHG | SYSTOLIC BLOOD PRESSURE: 122 MMHG

## 2023-10-13 DIAGNOSIS — B35.1 ONYCHOMYCOSIS: Primary | ICD-10-CM

## 2023-10-13 DIAGNOSIS — E11.40 TYPE 2 DIABETES MELLITUS WITH DIABETIC NEUROPATHY, WITHOUT LONG-TERM CURRENT USE OF INSULIN (HCC): ICD-10-CM

## 2023-10-13 DIAGNOSIS — L84 CORNS AND CALLUS: ICD-10-CM

## 2023-10-13 PROCEDURE — 11055 PARING/CUTG B9 HYPRKER LES 1: CPT | Performed by: PODIATRIST

## 2023-10-13 PROCEDURE — 11721 DEBRIDE NAIL 6 OR MORE: CPT | Performed by: PODIATRIST

## 2023-10-17 DIAGNOSIS — I48.91 ATRIAL FIBRILLATION, UNSPECIFIED TYPE (HCC): ICD-10-CM

## 2023-10-17 RX ORDER — APIXABAN 5 MG/1
TABLET, FILM COATED ORAL
Qty: 180 TABLET | Refills: 3 | Status: SHIPPED | OUTPATIENT
Start: 2023-10-17

## 2023-11-01 ENCOUNTER — APPOINTMENT (OUTPATIENT)
Dept: LAB | Facility: CLINIC | Age: 83
End: 2023-11-01
Payer: MEDICARE

## 2023-11-01 DIAGNOSIS — E11.40 TYPE 2 DIABETES MELLITUS WITH DIABETIC NEUROPATHY, WITHOUT LONG-TERM CURRENT USE OF INSULIN (HCC): ICD-10-CM

## 2023-11-01 DIAGNOSIS — E06.3 HYPOTHYROIDISM DUE TO HASHIMOTO'S THYROIDITIS: ICD-10-CM

## 2023-11-01 DIAGNOSIS — E55.9 VITAMIN D DEFICIENCY: ICD-10-CM

## 2023-11-01 DIAGNOSIS — E03.8 HYPOTHYROIDISM DUE TO HASHIMOTO'S THYROIDITIS: ICD-10-CM

## 2023-11-01 LAB
25(OH)D3 SERPL-MCNC: 49.4 NG/ML (ref 30–100)
ALBUMIN SERPL BCP-MCNC: 4.3 G/DL (ref 3.5–5)
ALP SERPL-CCNC: 61 U/L (ref 34–104)
ALT SERPL W P-5'-P-CCNC: 10 U/L (ref 7–52)
ANION GAP SERPL CALCULATED.3IONS-SCNC: 7 MMOL/L
AST SERPL W P-5'-P-CCNC: 15 U/L (ref 13–39)
BILIRUB SERPL-MCNC: 0.88 MG/DL (ref 0.2–1)
BUN SERPL-MCNC: 12 MG/DL (ref 5–25)
CALCIUM SERPL-MCNC: 9.4 MG/DL (ref 8.4–10.2)
CHLORIDE SERPL-SCNC: 102 MMOL/L (ref 96–108)
CHOLEST SERPL-MCNC: 146 MG/DL
CO2 SERPL-SCNC: 31 MMOL/L (ref 21–32)
CREAT SERPL-MCNC: 0.66 MG/DL (ref 0.6–1.3)
CREAT UR-MCNC: 45.7 MG/DL
EST. AVERAGE GLUCOSE BLD GHB EST-MCNC: 177 MG/DL
GFR SERPL CREATININE-BSD FRML MDRD: 82 ML/MIN/1.73SQ M
GLUCOSE P FAST SERPL-MCNC: 149 MG/DL (ref 65–99)
HBA1C MFR BLD: 7.8 %
HDLC SERPL-MCNC: 37 MG/DL
LDLC SERPL CALC-MCNC: 80 MG/DL (ref 0–100)
MICROALBUMIN UR-MCNC: <7 MG/L
MICROALBUMIN/CREAT 24H UR: <15 MG/G CREATININE (ref 0–30)
NONHDLC SERPL-MCNC: 109 MG/DL
POTASSIUM SERPL-SCNC: 4.9 MMOL/L (ref 3.5–5.3)
PROT SERPL-MCNC: 7.3 G/DL (ref 6.4–8.4)
SODIUM SERPL-SCNC: 140 MMOL/L (ref 135–147)
T4 FREE SERPL-MCNC: 1.1 NG/DL (ref 0.61–1.12)
TRIGL SERPL-MCNC: 144 MG/DL
TSH SERPL DL<=0.05 MIU/L-ACNC: 0.61 UIU/ML (ref 0.45–4.5)

## 2023-11-01 PROCEDURE — 80053 COMPREHEN METABOLIC PANEL: CPT

## 2023-11-01 PROCEDURE — 82570 ASSAY OF URINE CREATININE: CPT

## 2023-11-01 PROCEDURE — 82306 VITAMIN D 25 HYDROXY: CPT

## 2023-11-01 PROCEDURE — 83036 HEMOGLOBIN GLYCOSYLATED A1C: CPT

## 2023-11-01 PROCEDURE — 36415 COLL VENOUS BLD VENIPUNCTURE: CPT

## 2023-11-01 PROCEDURE — 84439 ASSAY OF FREE THYROXINE: CPT

## 2023-11-01 PROCEDURE — 82043 UR ALBUMIN QUANTITATIVE: CPT

## 2023-11-01 PROCEDURE — 80061 LIPID PANEL: CPT

## 2023-11-03 DIAGNOSIS — E11.9 TYPE 2 DIABETES MELLITUS WITHOUT COMPLICATION, WITH LONG-TERM CURRENT USE OF INSULIN (HCC): ICD-10-CM

## 2023-11-03 DIAGNOSIS — Z79.4 TYPE 2 DIABETES MELLITUS WITHOUT COMPLICATION, WITH LONG-TERM CURRENT USE OF INSULIN (HCC): ICD-10-CM

## 2023-11-03 RX ORDER — EMPAGLIFLOZIN 25 MG/1
TABLET, FILM COATED ORAL
Qty: 90 TABLET | Refills: 0 | Status: SHIPPED | OUTPATIENT
Start: 2023-11-03

## 2023-11-06 ENCOUNTER — TELEPHONE (OUTPATIENT)
Dept: OTHER | Facility: OTHER | Age: 83
End: 2023-11-06

## 2023-11-06 ENCOUNTER — TELEPHONE (OUTPATIENT)
Dept: ENDOCRINOLOGY | Facility: HOSPITAL | Age: 83
End: 2023-11-06

## 2023-11-06 DIAGNOSIS — E11.40 TYPE 2 DIABETES MELLITUS WITH DIABETIC NEUROPATHY, WITHOUT LONG-TERM CURRENT USE OF INSULIN (HCC): ICD-10-CM

## 2023-11-06 RX ORDER — DULAGLUTIDE 0.75 MG/.5ML
0.75 INJECTION, SOLUTION SUBCUTANEOUS
Qty: 2 ML | Refills: 2 | Status: SHIPPED | OUTPATIENT
Start: 2023-11-06

## 2023-11-06 RX ORDER — DULAGLUTIDE 0.75 MG/.5ML
0.75 INJECTION, SOLUTION SUBCUTANEOUS
Qty: 2 ML | Refills: 2 | Status: SHIPPED | OUTPATIENT
Start: 2023-11-06 | End: 2023-11-06 | Stop reason: SDUPTHER

## 2023-11-06 NOTE — TELEPHONE ENCOUNTER
The patient called and said that she tried to administer her trulicity, however when she was trying to administer it she missed herself and the trulicity went all over. She is worried as now she will not have any more trulicity for the week. She has an appointment on Wednesday but is unsure what to do. Thank you.

## 2023-11-06 NOTE — TELEPHONE ENCOUNTER
I was able to call the patient and she stated that she would like a prescription sent over the [facility] in [place].  Just enough for one month

## 2023-11-06 NOTE — TELEPHONE ENCOUNTER
Patient released bottom of her Trulicity too fast and now she has no medication for today's dose. She is completely out and needs her dose for this week.   Please call patient back to discuss

## 2023-11-07 NOTE — PROGRESS NOTES
Established Patient Progress Note       Chief Complaint   Patient presents with    Diabetes Type 2    Hypothyroidism      History of Present Illness:     Cha Lang is a 81 y.o. female with a history of T2DM since 10+ years Without any complications, does have some balance issues and some numbness in feet. Other PMHX of hypothyroidism and osteoporosis d/t fragility fracture of 7th-8th left rib after fall from standing height. She denies any polyuria, polydipsia, nocturia and blurry vision. She denies nephropathy and retinopathy. Last visit 08/23     Since last visit, patient has incorrectly used her trulicity twice and lost medication d/t pulling the reservior rather than the cap. Currently does not have trulicity d/t missing medication d/t this. Emergency prescription send to local pharmacy. Patient is moving to [place] end of this year since  got dementia. Reports is selling house and this causes a lot of stress leading to taking her trulicity incorrectly. Blood Sugar/Glucometer/Pump/CGM review:  checks fasting only, 120-140  Current regime:- metformin 2888IW BID, trulicity 6.15NV weekly, Jardiance 25mg daily  Medications tried/failed in past:- above  Hypoglycemic episodes: none  Diet- reports her main issues is eating fruits but she cannot stop eating fruits  Activity- limited d/t balance issues   Weight- stable     last eye exam- 12/21- reports has got exam recently but doesn't recall date. last foot exam- 07/23, also follows with podiatry  History of hypertension- Noo  History of hyperlipidemia- yes on zocor 20mg daily   Last lipid 11/23, LDL <100  Last urine 11/23 normal   Last hbA1C 11/23 7.8%, 06.23 8.3%    Hypothyroidism:- patient reports hx of goiter d/t iodine def in [place]. Has been on levothyroxine for a long time. Currently on levothyroxine 125mcg M-sat. Did have somewhat labile labs recently with low TSH and repeat elevated without any change in regime.  Given ?lab error we did not adjust her regime and most recent TSH 11/23 0.67 with free t4 1.04. Osteopenia:- patients most recent DXA scan showed osteopenia with lowest t score of -1.7 at left femoral neck however patient then had fracture of left ribs 7/8 after a fall- managed conservative. Therefore now given Dx of osteoporosis d/t fracture started on fosamax by PCP. Managed by PCP  Takes 1000IU vitD daily, and also eats calcium through diet. Does not use cane.      Social/Family hx- as below    Patient Active Problem List   Diagnosis    Type 2 diabetes mellitus with diabetic neuropathy, without long-term current use of insulin ([address])    Essential hypertension    Coronary artery disease involving native coronary artery of native heart without angina pectoris    Abnormal echocardiogram    Adenocarcinoma of colon (HCC)    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    Cardiomegaly    Diffuse nontoxic goiter    Feeling anxious    Hematuria, microscopic    Mixed hyperlipidemia    Hypothyroidism due to Hashimoto's thyroiditis    Insomnia    Mitral regurgitation    Paraesophageal hernia    Paroxysmal atrial fibrillation (HCC)    Restrictive lung disease    Primary osteoarthritis involving multiple joints    Sick sinus syndrome (HCC)    Vitamin D deficiency    Stress incontinence in female    [facility] DJD(carpometacarpal degenerative joint disease), localized primary, right    Lichen simplex chronicus    MRI safe cardiac pacemaker in situ    Anticoagulated by anticoagulation treatment    Lichen sclerosus et atrophicus    A-fib ([address])    Pacemaker    COPD (chronic obstructive pulmonary disease) ([address])    Age related osteoporosis      Past Medical History:   Diagnosis Date    A-fib ([address])     Abnormal ECG     last assessed: 7/14/2015    Acid reflux     resolved    Anxiety     Pacheco esophagus     Benign neoplasm of sigmoid colon 09/13/2011    next colon 2012    Bronchitis, asthmatic     last assessed: 3/12/2012    Colon cancer ([address])     2012- had colon resection    Colon polyp     COPD (chronic obstructive pulmonary disease) ([address])     questionable    Depression     Diabetes mellitus ([address])     on byetta    Disease of thyroid gland     Dizziness     occ    Esophageal stricture     last assessed: 9/30/2014    High cholesterol     Hypertension     Hypothyroidism     Iron (Fe) deficiency anemia 12/22/2011    iron pill continue    Irritable bowel syndrome     OA (osteoarthritis)     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    COLONOSCOPY  06/01/2012 June 2018: Adenomatous polyp, colorectal anastomosis, internal hemorrhoids. 06/01/2012:  proctosigmoidectomy    ESOPHAGOGASTRODUODENOSCOPY N/A 1/9/2016    Procedure: ESOPHAGOGASTRODUODENOSCOPY (EGD);   Surgeon: Jalen Castaneda MD;  [facility];  Service:     HYSTERECTOMY  1978    NJ ARTHROPLASTY INTERPHALANGEAL JOINT EACH Right 8/16/2018    Procedure: ARTHROPLASTY PIP/FINGER - RIGHT RING;  Surgeon: Jenaro Montemayor MD;  Location: [facility];  Service: Orthopedics    NJ ARTHRP INTERPOS INTERCARPAL/METACARPAL JOINTS Right 1/10/2019    Procedure: Elsie Wynn;  Surgeon: Jenaro Montemayor MD;  [facility];  Service: Orthopedics    NJ CYSTOURETHROSCOPY N/A 5/14/2018    Procedure: Mayito Amin;  Surgeon: Tanner Berrios MD;  [facility];  Service: UroGynecology           NJ LAPS RPR PARAESPHGL HRNA INCL [address] [place] [address] W/MESH N/A 2016    Procedure: LAPAROSCOPIC PARAESOPHAGEAL HERNIA REPAIR with mesh; toupet  FUNDOPLICATION ;  Surgeon: Jose Daniel Sandhu MD;  [facility];  Service: Thoracic    VA SLING OPERATION STRESS INCONTINENCE N/A 2018    Procedure: INSERTION PUBOVAGINAL SLING (FEMALE); Surgeon: Suhail Curtis MD;  [facility];  Service: UroGynecology           TOTAL ABDOMINAL HYSTERECTOMY      UPPER GASTROINTESTINAL ENDOSCOPY      2018:  Irregular Z-line positive for intestinal metaplasia/ Pacheco's, no dysplasia. Gastritis H pylori negative.     WRIST TENDON TRANSFER Right 1/10/2019    Procedure: TRANSFER TENDON FLEXOR CARPI RADIALIS FROM FOREARM TO HAND;  Surgeon: Lia Peterson MD;  [facility];  Service: Orthopedics      Family History   Problem Relation Age of Onset    Heart disease Mother     Diabetes Mother     Asthma Father     Stomach cancer Father         gastrkic cancer    Breast cancer Sister 76    Breast cancer Sister 77    Pancreatic cancer Sister 72    No Known Problems Daughter     No Known Problems Maternal Grandmother     No Known Problems Maternal Grandfather     Cancer Paternal Grandmother         unknown primary    Cancer Paternal Grandfather         unknown primary    No Known Problems Brother     No Known Problems Son     No Known Problems Maternal Aunt     No Known Problems Maternal Aunt     No Known Problems Maternal Aunt     No Known Problems Paternal Aunt     Prostate cancer Paternal Uncle         age unknown    Substance Abuse Neg Hx     Mental illness Neg Hx     Alcohol abuse Neg Hx     Colon cancer Neg Hx     Colon polyps Neg Hx      Social History     Tobacco Use    Smoking status: Former     Types: Cigarettes     Quit date:      Years since quittin.8    Smokeless tobacco: Former     Quit date:    Substance Use Topics    Alcohol use: No     No Known Allergies    Current Outpatient Medications:     albuterol (PROVENTIL HFA,VENTOLIN HFA) 90 mcg/act inhaler, Inhale 2 puffs every 6 (six) hours as needed for wheezing, Disp: , Rfl:     alendronate (Fosamax) 70 mg tablet, Take 1 tablet (70 mg total) by mouth every 7 days, Disp: 12 tablet, Rfl: 3    Apple Cider Vinegar 500 MG TABS, Take by mouth daily, Disp: , Rfl:     ascorbic acid (VITAMIN C) 500 mg tablet, Take 500 mg by mouth daily, Disp: , Rfl:     atenolol (TENORMIN) 25 mg tablet, Take 1 tablet (25 mg total) by mouth daily at bedtime, Disp: 90 tablet, Rfl: 3    Blood Glucose Monitoring Suppl (OneTouch Verio) w/Device KIT, Check blood sugars once a day, Disp: 1 kit, Rfl: 0    Calcium Carbonate (Caltrate 600) 1500 (600 Ca) MG TABS, Take 600 mg by mouth 3 (three) times a day with meals, Disp: , Rfl:     Cholecalciferol (VITAMIN D-3) 1000 units CAPS, Take 1 capsule by mouth daily, Disp: , Rfl:     clobetasol (TEMOVATE) 0.05 % cream, , Disp: , Rfl:     Coenzyme Q10 (COQ10) 200 MG CAPS, Take 200 mg by mouth daily  , Disp: , Rfl:     Continuous Blood Gluc  (FreeStyle Julian 14 Day Blunt) SVETLANA, Use 1 Device 4 (four) times a day (before meals and at bedtime), Disp: 1 each, Rfl: 0    Continuous Blood Gluc Sensor (FreeStyle Julian 14 Day Sensor) MISC, Use 1 application every 14 (fourteen) days, Disp: 2 each, Rfl: 6    cycloSPORINE (Restasis) 0.05 % ophthalmic emulsion, , Disp: , Rfl:     dulaglutide (Trulicity) 3.48 KD/0.9ZA injection, Inject 0.5 mL (0.75 mg total) under the skin every 7 days, Disp: 2 mL, Rfl: 2    DULoxetine (CYMBALTA) 60 mg delayed release capsule, Take 1 capsule (60 mg total) by mouth daily, Disp: 90 capsule, Rfl: 3    Eliquis 5 MG, TAKE 1 TABLET TWICE A DAY, Disp: 180 tablet, Rfl: 3    estradiol (ESTRACE VAGINAL) 0.1 mg/g vaginal cream, Insert 0.5 g into the vagina 2 (two) times a week Patient is NOT to be using this daily, Disp: 42.5 g, Rfl: 1    Ferrous Sulfate 27 MG TABS, Take 1 tablet by mouth daily, Disp: , Rfl:     FREESTYLE LITE test strip, TEST ONCE DAILY, Disp: 100 strip, Rfl: 3    Glucosamine-Chondroitin (MOVE FREE PO), Take by mouth daily, Disp: , Rfl:     glucose blood (OneTouch Verio) test strip, Check blood sugars once a day, Disp: 100 strip, Rfl: 2    hydrocortisone 2.5 % cream, Apply topically 2 (two) times a day, Disp: 60 g, Rfl: 2    Jardiance 25 MG TABS, TAKE 1 TABLET EVERY MORNING, Disp: 90 tablet, Rfl: 0    Lancets (onetouch ultrasoft) lancets, Check blood sugars once a day, Disp: 100 each, Rfl: 2    levothyroxine 125 mcg tablet, Take one tablet 6 days of the week and nothing on sundays, Disp: 90 tablet, Rfl: 3    lidocaine (Lidoderm) 5 %, Apply 1 patch topically over 12 hours daily Remove & Discard patch within 12 hours or as directed by MD, Disp: 6 patch, Rfl: 0    MEGARED OMEGA-3 KRILL OIL PO, Take 1 capsule by mouth daily, Disp: , Rfl:     metFORMIN (GLUCOPHAGE-XR) 500 mg 24 hr tablet, TAKE 2 TABLETS TWICE A DAY WITH MEALS, Disp: 360 tablet, Rfl: 3    neomycin-polymyxin-dexamethasone (MAXITROL) 0.35%-10,000 WUJRT/L-0.7%, APPLY ONE APPLICATION BY OPHTHALMIC ROUTE TWO TIMES A DAY. A SMALL AMOUNT IN THE CONJUNCTIVAL SACS OF BOTH EYES, Disp: , Rfl:     simvastatin (ZOCOR) 20 mg tablet, Take 1 tablet (20 mg total) by mouth daily at bedtime, Disp: 100 tablet, Rfl: 1    Triamcinolone Acetonide (Nasacort Allergy 24HR) 55 MCG/ACT AERO, 2 Act (110 mcg total) into each nostril every morning, Disp: 16.5 mL, Rfl: 3    ketoconazole (NIZORAL) 2 % cream, Apply topically 2 (two) times a day for 14 days, Disp: 60 g, Rfl: 1    Review of Systems   Constitutional:  Negative for diaphoresis, fatigue and unexpected weight change. Eyes:  Negative for photophobia and visual disturbance. Gastrointestinal:  Negative for constipation, diarrhea and vomiting. Endocrine: Negative for polydipsia and polyuria. Skin: Negative. Physical Exam:  Body mass index is 27.1 kg/m².   /80   Ht 5' 3" (1.6 m)   Wt 69.4 kg (153 lb)   BMI 27.10 kg/m²    Wt Readings from Last 3 Encounters: 11/08/23 69.4 kg (153 lb)   10/13/23 69.4 kg (153 lb)   09/22/23 68.9 kg (151 lb 14.4 oz)       Physical Exam  Constitutional:       Appearance: Normal appearance. She is obese. Cardiovascular:      Rate and Rhythm: Normal rate and regular rhythm. Pulses: Normal pulses. Dorsalis pedis pulses are 2+ on the right side and 2+ on the left side. Pulmonary:      Effort: Pulmonary effort is normal.   Abdominal:      General: Abdomen is flat. Palpations: Abdomen is soft. Musculoskeletal:      Cervical back: Normal range of motion and neck supple. Feet:      Right foot:      Skin integrity: No ulcer, skin breakdown, erythema, warmth, callus or dry skin. Left foot:      Skin integrity: No ulcer, skin breakdown, erythema, warmth, callus or dry skin. Skin:     Capillary Refill: Capillary refill takes less than 2 seconds. Neurological:      General: No focal deficit present. Mental Status: She is alert and oriented to person, place, and time. Psychiatric:         Mood and Affect: Mood normal.        Latest Reference Range & Units 02/07/22 07:10 06/09/22 07:04 11/21/22 08:08 02/02/23 07:10 06/09/23 08:01 07/11/23 09:18 11/01/23 07:38   Hemoglobin A1C Normal 4.0-5.6%; PreDiabetic 5.7-6.4%;  Diabetic >=6.5%; Glycemic control for adults with diabetes <7.0% % 7.2 (H) 7.7 (H) 7.3 (H) 8.1 (H) 8.3 (H)  7.8 (H)   eAG, EST AVG Glucose mg/dl 160 174 163 186 192  177   POC Glucose 65 - 140 mg/dl      226 (H)    (H): Data is abnormally high   Latest Reference Range & Units 06/09/23 08:01 11/01/23 11:23   EXT Creatinine Urine Reference range not established. mg/dL 48.8 45.7   MICROALBUMIN/CREATININE RATIO 0 - 30 mg/g creatinine 17 <15   MICROALBUM.,U,RANDOM <20.0 mg/L 8.1 <7.0      Latest Reference Range & Units 11/21/22 08:08 02/02/23 07:10 11/01/23 07:38   Cholesterol See Comment mg/dL 131 141 146   Triglycerides See Comment mg/dL 172 (H) 209 (H) 144   HDL >=50 mg/dL 37 (L) 36 (L) 37 (L) Non-HDL Cholesterol mg/dl 94 105 109   LDL Calculated 0 - 100 mg/dL 60 63 80   (H): Data is abnormally high  (L): Data is abnormally low     12/13/18 11:25 12/16/19 14:56 12/02/21 12:16   Left Eye Diabetic Retinopathy None (E) None (E) None (E)   (E): External lab result     12/13/18 11:25 12/16/19 14:56 12/02/21 12:16   Right Eye Diabetic Retinopathy None (E) None (E) None (E)   (E): External lab result   Latest Reference Range & Units 06/09/22 07:04 11/21/22 08:08 02/02/23 07:10 06/09/23 08:01 08/02/23 11:18 09/29/23 07:31 11/01/23 07:38   TSH 3RD GENERATON 0.450 - 4.500 uIU/mL 1.960 2.420 1.190 0.340 (L) 8.609 (H) 4.293 0.607   Free T4 0.61 - 1.12 ng/dL 1.02 1.09 1.03 1.28 (H) 0.74 0.81 1.10   (L): Data is abnormally low  (H): Data is abnormally high    DXA 07/23  DXA SCAN     CLINICAL HISTORY: 23-year-old postmenopausal female. OTHER RISK FACTORS: Hyperthyroidism, takes insulin. PHARMACOLOGIC THERAPY FOR OSTEOPOROSIS:  None. TECHNIQUE: Bone densitometry was performed using a Hologic Horizon A bone densitometer. Regions of interest appear properly placed. COMPARISON: 7/1/2021. RESULTS:     LUMBAR SPINE  Level: L1, L2, L4  (L3 vertebra excluded from analysis due to local structural abnormalities or artifact):  BMD: 0.885 gm/cm2  T-score: -1.4        LEFT  TOTAL HIP:  BMD: 0.841 gm/cm2  T-score: -0.8     LEFT  FEMORAL NECK:  BMD: 0.657 gm/cm2  T score: -1.7        IMPRESSION:     1. Low bone mass (osteopenia). 2.  Since a DXA study from 7/1/2021, there has been:  A  STATISTICALLY SIGNIFICANT DECREASE in bone mineral density of 0.053 g/cm2 (6.0%) in the left total hip. 3.  The 10 year risk of hip fracture is 3.9% with the 10 year risk of major osteoporotic fracture being 14% as calculated by the [facility] fracture risk assessment tool (FRAX, which is based on data generated by the [facility]   for Metabolic Bone Diseases).      4.  The current NOF guidelines recommend treating patients with a T-score of -2.5 or less in the lumbar spine or hips, or in post-menopausal women and men over the age of 48 with low bone mass (osteopenia) and a FRAX 10 year risk score of >3% for hip   fracture and/or >20% for major osteoporotic fracture. 5.  The NOF recommends follow-up DXA in 1-2 years after initiating therapy for osteoporosis and every 2 years thereafter. More frequent evaluation is appropriate for patients with conditions associated with rapid bone loss, such as glucocorticoid   therapy. The interval between DXA screenings may be longer for individuals without major risk factors and initial T-score in the normal or upper low bone mass range. The FRAX algorithm has certain limitations:  -FRAX has not been validated in patients currently or previously treated with pharmacotherapy for osteoporosis. In such patients, clinical judgment must be exercised in interpreting FRAX scores. -Prior hip, vertebral and humeral fragility fractures appear to confer greater risk of subsequent fracture than fractures at other sites (this is especially true for individuals with severe vertebral fractures), but quantification of this incremental   risk is not possible with FRAX. -FRAX underestimates fracture risk in patients with history of multiple fragility fractures. -FRAX may underestimate fracture risk in patients with history of frequent falls.  -It is not appropriate to use FRAX to monitor treatment response.     Impression & Plan:    Problem List Items Addressed This Visit          Endocrine    Hypothyroidism due to Hashimoto's thyroiditis    Relevant Orders    Hemoglobin A1C    Comprehensive metabolic panel    Albumin / creatinine urine ratio    Lipid panel    TSH, 3rd generation    T4, free    Type 2 diabetes mellitus with diabetic neuropathy, without long-term current use of insulin ([address]) - Primary    Relevant Orders    Hemoglobin A1C    Comprehensive metabolic panel    Albumin / creatinine urine ratio    Lipid panel    TSH, 3rd generation    T4, free       Other    Vitamin D deficiency    Relevant Orders    Hemoglobin A1C    Comprehensive metabolic panel    Albumin / creatinine urine ratio    Lipid panel    TSH, 3rd generation    T4, free     Other Visit Diagnoses       Type 2 diabetes mellitus without complication, with long-term current use of insulin (HCC)        Relevant Orders    Hemoglobin A1C    Comprehensive metabolic panel    Albumin / creatinine urine ratio    Lipid panel    TSH, 3rd generation    T4, free    Hyperlipidemia, unspecified hyperlipidemia type        Relevant Orders    Hemoglobin A1C    Comprehensive metabolic panel    Albumin / creatinine urine ratio    Lipid panel    TSH, 3rd generation    T4, free          Orders Placed This Encounter   Procedures    Hemoglobin A1C     Standing Status:   Future     Standing Expiration Date:   11/7/2024    Comprehensive metabolic panel     This is a patient instruction: Patient fasting for 8 hours or longer recommended. Standing Status:   Future     Standing Expiration Date:   11/7/2024    Albumin / creatinine urine ratio     Standing Status:   Future     Standing Expiration Date:   11/7/2024    Lipid panel     This is a patient instruction: This test requires patient fasting for 10-12 hours or longer. Drinking of black coffee or black tea is acceptable. Standing Status:   Future     Standing Expiration Date:   11/7/2024    TSH, 3rd generation     This is a patient instruction: This test is non-fasting. Please drink two glasses of water morning of bloodwork. Standing Status:   Future     Standing Expiration Date:   11/7/2024    T4, free     Standing Status:   Future     Standing Expiration Date:   11/7/2024       There are no Patient Instructions on file for this visit.     Patient is a 81yF with PMhx of T2DM, osteoporosis, hypothyroidism, hyperlipidemia who presents today for follow up    1) t2DM:- Patients more recent A1C remains at goal 7.8% considering her age, this is acceptable. BG data? For now will make no adjustment to regime. Did discuss proper use of trulicity again to avoid wasting medications. Repeat labs in 3 months     Plan   - c/w metformin 1000mg BID, jardianc 25mg daily  - c/w trulicity 7.27ZMLQVXRM   - labs in 3 months     Screening   Retinopathy- uptodate, will need records  Lipid/urine uptodate     2) hyperlipidemia:- LDL at goal, C/w crestor for now. Repeat next visit     3) hypothyroidism:- patient clinically feels well and biochemically euthyroid. C/w levothyroxine 125mcg M-sat and skip Sunday. Repeat TFT in 3 months     4) osteoporosis:- reviewed DXA scan, started on fosamax. Follow up with PCP for this     Discussed with the patient and all questioned fully answered. She will call me if any problems arise. Follow-up appointment in 3 months.      Counseled patient on diagnostic results, prognosis, risk and benefit of treatment options, instruction for management, importance of treatment compliance, Risk  factor reduction and impressions    Marcel Palafox MD

## 2023-11-08 ENCOUNTER — OFFICE VISIT (OUTPATIENT)
Dept: ENDOCRINOLOGY | Facility: HOSPITAL | Age: 83
End: 2023-11-08
Payer: MEDICARE

## 2023-11-08 VITALS
SYSTOLIC BLOOD PRESSURE: 120 MMHG | DIASTOLIC BLOOD PRESSURE: 80 MMHG | WEIGHT: 153 LBS | HEIGHT: 63 IN | BODY MASS INDEX: 27.11 KG/M2

## 2023-11-08 DIAGNOSIS — E11.40 TYPE 2 DIABETES MELLITUS WITH DIABETIC NEUROPATHY, WITHOUT LONG-TERM CURRENT USE OF INSULIN (HCC): Primary | ICD-10-CM

## 2023-11-08 DIAGNOSIS — Z79.4 TYPE 2 DIABETES MELLITUS WITHOUT COMPLICATION, WITH LONG-TERM CURRENT USE OF INSULIN (HCC): ICD-10-CM

## 2023-11-08 DIAGNOSIS — E11.9 TYPE 2 DIABETES MELLITUS WITHOUT COMPLICATION, WITH LONG-TERM CURRENT USE OF INSULIN (HCC): ICD-10-CM

## 2023-11-08 DIAGNOSIS — E03.8 HYPOTHYROIDISM DUE TO HASHIMOTO'S THYROIDITIS: ICD-10-CM

## 2023-11-08 DIAGNOSIS — E78.5 HYPERLIPIDEMIA, UNSPECIFIED HYPERLIPIDEMIA TYPE: ICD-10-CM

## 2023-11-08 DIAGNOSIS — E55.9 VITAMIN D DEFICIENCY: ICD-10-CM

## 2023-11-08 DIAGNOSIS — E06.3 HYPOTHYROIDISM DUE TO HASHIMOTO'S THYROIDITIS: ICD-10-CM

## 2023-11-08 PROCEDURE — 99214 OFFICE O/P EST MOD 30 MIN: CPT | Performed by: STUDENT IN AN ORGANIZED HEALTH CARE EDUCATION/TRAINING PROGRAM

## 2023-11-13 ENCOUNTER — OFFICE VISIT (OUTPATIENT)
Dept: CARDIOLOGY CLINIC | Facility: CLINIC | Age: 83
End: 2023-11-13
Payer: MEDICARE

## 2023-11-13 ENCOUNTER — IN-CLINIC DEVICE VISIT (OUTPATIENT)
Dept: CARDIOLOGY CLINIC | Facility: CLINIC | Age: 83
End: 2023-11-13
Payer: MEDICARE

## 2023-11-13 VITALS
WEIGHT: 155.4 LBS | HEART RATE: 102 BPM | DIASTOLIC BLOOD PRESSURE: 60 MMHG | HEIGHT: 63 IN | SYSTOLIC BLOOD PRESSURE: 118 MMHG | BODY MASS INDEX: 27.54 KG/M2

## 2023-11-13 DIAGNOSIS — I34.0 NONRHEUMATIC MITRAL VALVE REGURGITATION: ICD-10-CM

## 2023-11-13 DIAGNOSIS — Z95.0 PACEMAKER: ICD-10-CM

## 2023-11-13 DIAGNOSIS — Z95.0 MRI SAFE CARDIAC PACEMAKER IN SITU: ICD-10-CM

## 2023-11-13 DIAGNOSIS — Z79.01 ANTICOAGULATION ADEQUATE WITH ANTICOAGULANT THERAPY: ICD-10-CM

## 2023-11-13 DIAGNOSIS — I51.7 CARDIOMEGALY: ICD-10-CM

## 2023-11-13 DIAGNOSIS — I49.5 SICK SINUS SYNDROME (HCC): ICD-10-CM

## 2023-11-13 DIAGNOSIS — Z95.0 PRESENCE OF CARDIAC PACEMAKER: Primary | ICD-10-CM

## 2023-11-13 DIAGNOSIS — I48.0 PAROXYSMAL ATRIAL FIBRILLATION (HCC): ICD-10-CM

## 2023-11-13 DIAGNOSIS — I48.91 ATRIAL FIBRILLATION, UNSPECIFIED TYPE (HCC): Primary | ICD-10-CM

## 2023-11-13 DIAGNOSIS — I25.10 CORONARY ARTERY DISEASE INVOLVING NATIVE CORONARY ARTERY OF NATIVE HEART WITHOUT ANGINA PECTORIS: ICD-10-CM

## 2023-11-13 PROCEDURE — 99214 OFFICE O/P EST MOD 30 MIN: CPT | Performed by: INTERNAL MEDICINE

## 2023-11-13 PROCEDURE — 93280 PM DEVICE PROGR EVAL DUAL: CPT | Performed by: INTERNAL MEDICINE

## 2023-11-13 PROCEDURE — 93000 ELECTROCARDIOGRAM COMPLETE: CPT | Performed by: INTERNAL MEDICINE

## 2023-11-13 RX ORDER — DIGOXIN 125 MCG
125 TABLET ORAL DAILY
Qty: 90 TABLET | Refills: 3 | Status: SHIPPED | OUTPATIENT
Start: 2023-11-13

## 2023-11-13 NOTE — PROGRESS NOTES
Results for orders placed or performed in visit on 11/13/23   Cardiac EP device report    Narrative    MDT-DUAL [facility] INTERROGATED IN THE [facility] OFFICE. BATTERY VOLTAGE NEARING ION (9 MTHS). WILL SCHEDULE MONTHLY BATTERY CHECKS. AP 31.6%  34.1% ALL LEAD PARAMETERS WITHIN NORMAL LIMITS. MULTIPLE NEW AT/AF EPISODES ON SAME DAYS W/ EPISODE CURRENTLY IN PROGESS SINCE 11/13/23. AT/AF BURDEN 75.3% SINCE 11/9/22. [address] SEEING DR. COCHRAN TODAY. NO PROGRAMMING CHANGES MADE TO DEVICE PARAMETERS. NORMAL DEVICE FUNCTION.  AM/GV

## 2023-11-13 NOTE — PROGRESS NOTES
EPS Progress Note - Trenton Batista 81 y.o. female MRN: [id number]           ASSESSMENT:  1. Atrial fibrillation, unspecified type ([address])  POCT ECG      2. Coronary artery disease involving native coronary artery of native heart without angina pectoris        3. Cardiomegaly        4. Nonrheumatic mitral valve regurgitation        5. Paroxysmal atrial fibrillation (HCC)        6. Sick sinus syndrome ([address])        7. Pacemaker        8. Anticoagulation adequate with anticoagulant therapy        9. MRI safe cardiac pacemaker in situ             8/16/23  MDT-DUAL CHAMBER PPM/ ACTIVE SYSTEM IS MRI CONDITIONAL   CARELINK TRANSMISSION: BATTERY VOLTAGE ADEQUATE (12 MOS). AP: 21.1%. : 33.3% (MVP-ON). ALL AVAILABLE LEAD PARAMETERS WITHIN NORMAL LIMITS. 6 VT EPISODES W/ AVAIL EGMS SHOWING RVR W. -194 BPM (NO TERMINATION ON STRIP). 2 FAST A&V EPISODES W/ EGMS SHOWING RVR W/ -207 BPM, MAX DURATION 7 MINS 9 SECS. 6,066 AT/AF EPISODES W/ AF IN PROGRESS (HX OF SAME), AF BURDEN: 90.1%. PT TAKES ATENOLOL, ELIQUIS. EF: 55% (ECHO 3/7/19). NORMAL DEVICE FUNCTION. CH       PLAN:    1. Essential Hypertension- controlled probably experiencing orthostatic hypotension therefore I am decreasing her atenolol to 25 mg daily    2. Mitral Regurgitation no murmur appreciated today 2019 echo showed trace only will get echo as now with more afib    3. Atrial Fibrillation-  persistent not adequate rate control will start digoxin 125 mcg daily    4. history mechanical fall with foot fracture she did not pass out    5. Pacemaker in [address] [place] [address] working appropriately    She will need generator change in about 1 year will find EPS in [facility] for her       HPI:     Mabel Carrion presents to the office today for follow up visit. She is being seen same day as her , Jerson Covington. She is moving to moving to [place]. She does get some SOB with exertion. She is having more afib on her pacemaker.   She gets lightheaded only when she comes up the steps from the basement. Otherwise she is having no chest pain shortness of breath lightheadedness or dizziness all other 12 point review of systems negative    As stated in my previous note form 1/12/22, interim history patient presents to the office today with no complaints. She said she feels good. Patient gets shortness of breath when walking up the steps and just a little bit when walking flat. Patient feels this is nothing to worry about just wanted to inform. Patient said the only pains she feels in her body is her arthritis.     Patient denies chest pain,Headache,Double vision, palpitations, dizziness, lightheadedness, fatigue and syncope      ROS:  mild lower extremity edema but no SOB with exertion only all other 12 point ROS negative      Objective:     Vitals: /60 (BP Location: Right arm, Patient Position: Sitting, Cuff Size: Standard)   Pulse 102   Ht 5' 3" (1.6 m)   Wt 70.5 kg (155 lb 6.4 oz)   BMI 27.53 kg/m²        Physical Exam:    GEN: Sindhu A Genoe appears well, alert and oriented x 3, pleasant and cooperative   HEENT: pupils equal, round, and reactive to light; extraocular muscles intact  NECK: supple, no carotid bruits   HEART: irregular rhythm, normal S1 and S2, 2/6 holosystolic murmur, clicks, gallops or rubs   LUNGS: clear to auscultation bilaterally; no wheezes, rales, or rhonchi   ABDOMEN: normal bowel sounds, soft, no tenderness, no distention  EXTREMITIES: peripheral pulses normal; no clubbing, cyanosis, or trace edema  NEURO: no focal findings   SKIN: normal without suspicious lesions on exposed skin    Medications:      Current Outpatient Medications:     albuterol (PROVENTIL HFA,VENTOLIN HFA) 90 mcg/act inhaler, Inhale 2 puffs every 6 (six) hours as needed for wheezing, Disp: , Rfl:     alendronate (Fosamax) 70 mg tablet, Take 1 tablet (70 mg total) by mouth every 7 days, Disp: 12 tablet, Rfl: 3    Apple Cider Vinegar 500 MG TABS, Take by mouth daily, Disp: , Rfl:     ascorbic acid (VITAMIN C) 500 mg tablet, Take 500 mg by mouth daily, Disp: , Rfl:     atenolol (TENORMIN) 25 mg tablet, Take 1 tablet (25 mg total) by mouth daily at bedtime, Disp: 90 tablet, Rfl: 3    Blood Glucose Monitoring Suppl (OneTouch Verio) w/Device KIT, Check blood sugars once a day, Disp: 1 kit, Rfl: 0    Calcium Carbonate (Caltrate 600) 1500 (600 Ca) MG TABS, Take 600 mg by mouth 3 (three) times a day with meals, Disp: , Rfl:     Cholecalciferol (VITAMIN D-3) 1000 units CAPS, Take 1 capsule by mouth daily, Disp: , Rfl:     clobetasol (TEMOVATE) 0.05 % cream, , Disp: , Rfl:     Coenzyme Q10 (COQ10) 200 MG CAPS, Take 200 mg by mouth daily  , Disp: , Rfl:     Continuous Blood Gluc  (FreeStyle Julian 14 Day [place]) SVETLANA, Use 1 Device 4 (four) times a day (before meals and at bedtime), Disp: 1 each, Rfl: 0    Continuous Blood Gluc Sensor (FreeStyle Julian 14 Day Sensor) MISC, Use 1 application every 14 (fourteen) days, Disp: 2 each, Rfl: 6    cycloSPORINE (Restasis) 0.05 % ophthalmic emulsion, , Disp: , Rfl:     dulaglutide (Trulicity) 4.37 IZ/7.0YI injection, Inject 0.5 mL (0.75 mg total) under the skin every 7 days, Disp: 2 mL, Rfl: 2    DULoxetine (CYMBALTA) 60 mg delayed release capsule, Take 1 capsule (60 mg total) by mouth daily, Disp: 90 capsule, Rfl: 3    Eliquis 5 MG, TAKE 1 TABLET TWICE A DAY, Disp: 180 tablet, Rfl: 3    estradiol (ESTRACE VAGINAL) 0.1 mg/g vaginal cream, Insert 0.5 g into the vagina 2 (two) times a week Patient is NOT to be using this daily, Disp: 42.5 g, Rfl: 1    Ferrous Sulfate 27 MG TABS, Take 1 tablet by mouth daily, Disp: , Rfl:     FREESTYLE LITE test strip, TEST ONCE DAILY, Disp: 100 strip, Rfl: 3    glucose blood (OneTouch Verio) test strip, Check blood sugars once a day, Disp: 100 strip, Rfl: 2    hydrocortisone 2.5 % cream, Apply topically 2 (two) times a day, Disp: 60 g, Rfl: 2    Jardiance 25 MG TABS, TAKE 1 TABLET EVERY MORNING, Disp: 90 tablet, Rfl: 0    Lancets (onetouch ultrasoft) lancets, Check blood sugars once a day, Disp: 100 each, Rfl: 2    levothyroxine 125 mcg tablet, Take one tablet 6 days of the week and nothing on sundays, Disp: 90 tablet, Rfl: 3    MEGARED OMEGA-3 KRILL OIL PO, Take 1 capsule by mouth daily, Disp: , Rfl:     metFORMIN (GLUCOPHAGE-XR) 500 mg 24 hr tablet, TAKE 2 TABLETS TWICE A DAY WITH MEALS, Disp: 360 tablet, Rfl: 3    simvastatin (ZOCOR) 20 mg tablet, Take 1 tablet (20 mg total) by mouth daily at bedtime, Disp: 100 tablet, Rfl: 1    Glucosamine-Chondroitin (MOVE FREE PO), Take by mouth daily (Patient not taking: Reported on 11/13/2023), Disp: , Rfl:     ketoconazole (NIZORAL) 2 % cream, Apply topically 2 (two) times a day for 14 days, Disp: 60 g, Rfl: 1    lidocaine (Lidoderm) 5 %, Apply 1 patch topically over 12 hours daily Remove & Discard patch within 12 hours or as directed by MD (Patient not taking: Reported on 11/13/2023), Disp: 6 patch, Rfl: 0    neomycin-polymyxin-dexamethasone (MAXITROL) 0.35%-10,000 BUJWV/L-6.9%, APPLY ONE APPLICATION BY OPHTHALMIC ROUTE TWO TIMES A DAY.  A SMALL AMOUNT IN THE CONJUNCTIVAL SACS OF BOTH EYES (Patient not taking: Reported on 11/13/2023), Disp: , Rfl:     Triamcinolone Acetonide (Nasacort Allergy 24HR) 55 MCG/ACT AERO, 2 Act (110 mcg total) into each nostril every morning (Patient not taking: Reported on 11/13/2023), Disp: 16.5 mL, Rfl: 3     Family History   Problem Relation Age of Onset    Heart disease Mother     Diabetes Mother     Asthma Father     Stomach cancer Father         gastrkic cancer    Breast cancer Sister 76    Breast cancer Sister 77    Pancreatic cancer Sister 72    No Known Problems Daughter     No Known Problems Maternal Grandmother     No Known Problems Maternal Grandfather     Cancer Paternal Grandmother         unknown primary    Cancer Paternal Grandfather         unknown primary    No Known Problems Brother     No Known Problems Son     No Known Problems Maternal Aunt     No Known Problems Maternal Aunt     No Known Problems Maternal Aunt     No Known Problems Paternal Aunt     Prostate cancer Paternal Uncle         age unknown    Substance Abuse Neg Hx     Mental illness Neg Hx     Alcohol abuse Neg Hx     Colon cancer Neg Hx     Colon polyps Neg Hx      Social History     Socioeconomic History    Marital status: /Civil Union     Spouse name: Not on file    Number of children: Not on file    Years of education: Not on file    Highest education level: Not on file   Occupational History    Not on file   Tobacco Use    Smoking status: Former     Types: Cigarettes     Quit date:      Years since quittin.8    Smokeless tobacco: Former     Quit date:    Vaping Use    Vaping Use: Never used   Substance and Sexual Activity    Alcohol use: No    Drug use: No    Sexual activity: Not Currently   Other Topics Concern    Not on file   Social History Narrative    Always uses seat belt    Copy of advanced directive obtained    Drinks coffee-drinks 5 cups a day    Has smoke detectors    Uses safety equipment-seatbelts     Social Determinants of Health     Financial Resource Strain: Low Risk  (2023)    Overall Financial Resource Strain (CARDIA)     Difficulty of Paying Living Expenses: Not hard at all   Food Insecurity: Not on file   Transportation Needs: No Transportation Needs (2023)    PRAPARE - Transportation     Lack of Transportation (Medical): No     Lack of Transportation (Non-Medical):  No   Physical Activity: Not on file   Stress: Not on file   Social Connections: Not on file   Intimate Partner Violence: Not on file   Housing Stability: Not on file     Social History     Tobacco Use   Smoking Status Former    Types: Cigarettes    Quit date:     Years since quittin.8   Smokeless Tobacco Former    Quit date:      Social History     Substance and Sexual Activity   Alcohol Use No       Labs & Results:  Below is the patient's most recent value for Albumin, ALT, AST, BUN, Calcium, Chloride, Cholesterol, CO2, Creatinine, GFR, Glucose, HDL, Hematocrit, Hemoglobin, Hemoglobin A1C, LDL, Magnesium, Phosphorus, Platelets, Potassium, PSA, Sodium, Triglycerides, and WBC. Lab Results   Component Value Date    ALT 10 11/01/2023    AST 15 11/01/2023    BUN 12 11/01/2023    CALCIUM 9.4 11/01/2023     11/01/2023    CHOL 147 12/21/2015    CO2 31 11/01/2023    CREATININE 0.66 11/01/2023    HDL 37 (L) 11/01/2023    HCT 51.2 (H) 07/11/2023    HGB 15.8 (H) 07/11/2023    HGBA1C 7.8 (H) 11/01/2023    MG 1.8 01/27/2016     07/11/2023    K 4.9 11/01/2023     12/21/2015    TRIG 144 11/01/2023    WBC 10.45 (H) 07/11/2023     Note: for a comprehensive list of the patient's lab results, access the Results Review activity. No results found for this or any previous visit. Cardiac testing:   No results found for this or any previous visit. No results found for this or any previous visit. No results found for this or any previous visit. No results found for this or any previous visit. EKG personally reviewed by Amy Chilel.

## 2023-11-16 ENCOUNTER — HOSPITAL ENCOUNTER (OUTPATIENT)
Dept: NON INVASIVE DIAGNOSTICS | Age: 83
Discharge: HOME | End: 2023-11-16
Payer: MEDICARE

## 2023-11-16 VITALS
HEIGHT: 63 IN | SYSTOLIC BLOOD PRESSURE: 118 MMHG | BODY MASS INDEX: 27.46 KG/M2 | WEIGHT: 155 LBS | DIASTOLIC BLOOD PRESSURE: 60 MMHG | HEART RATE: 79 BPM

## 2023-11-16 DIAGNOSIS — Z95.0 PACEMAKER: ICD-10-CM

## 2023-11-16 DIAGNOSIS — I51.7 CARDIOMEGALY: ICD-10-CM

## 2023-11-16 DIAGNOSIS — I48.0 PAROXYSMAL ATRIAL FIBRILLATION (HCC): ICD-10-CM

## 2023-11-16 DIAGNOSIS — I48.91 ATRIAL FIBRILLATION, UNSPECIFIED TYPE (HCC): ICD-10-CM

## 2023-11-16 LAB
AORTIC ROOT: 3.2 CM
APICAL FOUR CHAMBER EJECTION FRACTION: 68 %
ASCENDING AORTA: 3.6 CM
E WAVE DECELERATION TIME: 216 MS
E/A RATIO: 4.58
FRACTIONAL SHORTENING: 29 (ref 28–44)
INTERVENTRICULAR SEPTUM IN DIASTOLE (PARASTERNAL SHORT AXIS VIEW): 1.1 CM
INTERVENTRICULAR SEPTUM: 1.1 CM (ref 0.6–1.1)
LAAS-AP2: 11.8 CM2
LAAS-AP4: 17.1 CM2
LEFT ATRIUM SIZE: 3.9 CM
LEFT ATRIUM VOLUME (MOD BIPLANE): 52 ML
LEFT ATRIUM VOLUME INDEX (MOD BIPLANE): 29.9 ML/M2
LEFT INTERNAL DIMENSION IN SYSTOLE: 2.9 CM (ref 2.1–4)
LEFT VENTRICLE DIASTOLIC VOLUME (MOD BIPLANE): 108 ML
LEFT VENTRICLE SYSTOLIC VOLUME (MOD BIPLANE): 39 ML
LEFT VENTRICULAR INTERNAL DIMENSION IN DIASTOLE: 4.1 CM (ref 3.5–6)
LEFT VENTRICULAR POSTERIOR WALL IN END DIASTOLE: 1.1 CM
LEFT VENTRICULAR STROKE VOLUME: 40 ML
LV EF: 64 %
LVSV (TEICH): 40 ML
MV E'TISSUE VEL-LAT: 8 CM/S
MV E'TISSUE VEL-SEP: 8 CM/S
MV PEAK A VEL: 0.19 M/S
MV PEAK E VEL: 87 CM/S
MV STENOSIS PRESSURE HALF TIME: 63 MS
MV VALVE AREA P 1/2 METHOD: 3.49
RA PRESSURE ESTIMATED: 3 MMHG
RIGHT ATRIUM AREA SYSTOLE A4C: 12.2 CM2
RIGHT VENTRICLE ID DIMENSION: 3.9 CM
RV PSP: 44 MMHG
SL CV LEFT ATRIUM LENGTH A2C: 3.2 CM
SL CV LV EF: 55
SL CV PED ECHO LEFT VENTRICLE DIASTOLIC VOLUME (MOD BIPLANE) 2D: 74 ML
SL CV PED ECHO LEFT VENTRICLE SYSTOLIC VOLUME (MOD BIPLANE) 2D: 33 ML
TR MAX PG: 41 MMHG
TR PEAK VELOCITY: 3.2 M/S
TRICUSPID ANNULAR PLANE SYSTOLIC EXCURSION: 1.8 CM
TRICUSPID VALVE PEAK REGURGITATION VELOCITY: 3.19 M/S

## 2023-11-16 PROCEDURE — 93306 TTE W/DOPPLER COMPLETE: CPT

## 2023-11-16 PROCEDURE — 93306 TTE W/DOPPLER COMPLETE: CPT | Performed by: INTERNAL MEDICINE

## 2023-11-21 DIAGNOSIS — E11.9 TYPE 2 DIABETES MELLITUS WITHOUT COMPLICATION, WITH LONG-TERM CURRENT USE OF INSULIN (HCC): Primary | ICD-10-CM

## 2023-11-21 DIAGNOSIS — Z79.4 TYPE 2 DIABETES MELLITUS WITHOUT COMPLICATION, WITH LONG-TERM CURRENT USE OF INSULIN (HCC): Primary | ICD-10-CM

## 2023-11-21 RX ORDER — BLOOD-GLUCOSE METER
KIT MISCELLANEOUS
Qty: 1 KIT | Refills: 0 | Status: SHIPPED | OUTPATIENT
Start: 2023-11-21

## 2023-12-08 ENCOUNTER — TELEPHONE (OUTPATIENT)
Dept: CARDIOLOGY CLINIC | Facility: CLINIC | Age: 83
End: 2023-12-08

## 2023-12-08 NOTE — TELEPHONE ENCOUNTER
Received a call from Selina harris. She is moving to [place], [place] and is following up on the name and contact information of the cardiologist for her to see there. Thank you.

## 2023-12-11 ENCOUNTER — TELEPHONE (OUTPATIENT)
Dept: ENDOCRINOLOGY | Facility: HOSPITAL | Age: 83
End: 2023-12-11

## 2023-12-11 DIAGNOSIS — E03.8 HYPOTHYROIDISM DUE TO HASHIMOTO'S THYROIDITIS: ICD-10-CM

## 2023-12-11 DIAGNOSIS — E06.3 HYPOTHYROIDISM DUE TO HASHIMOTO'S THYROIDITIS: ICD-10-CM

## 2023-12-11 RX ORDER — LEVOTHYROXINE SODIUM 0.12 MG/1
TABLET ORAL
Qty: 90 TABLET | Refills: 1 | Status: SHIPPED | OUTPATIENT
Start: 2023-12-11

## 2023-12-11 NOTE — TELEPHONE ENCOUNTER
The patient called and that she will be moving to [place] as of 1/5/2024. She is hoping to keep in touch until she is able to find someone down there to transfer care. She is hoping that you would also be able to recommend her to someone down there. She will let us know if she needs any refills prior to the move and she stated that she is going to try an come in to say goodbye as well.

## 2023-12-13 ENCOUNTER — REMOTE DEVICE CLINIC VISIT (OUTPATIENT)
Dept: CARDIOLOGY CLINIC | Facility: CLINIC | Age: 83
End: 2023-12-13

## 2023-12-13 DIAGNOSIS — Z95.0 PRESENCE OF PERMANENT CARDIAC PACEMAKER: Primary | ICD-10-CM

## 2023-12-13 PROCEDURE — RECHECK: Performed by: INTERNAL MEDICINE

## 2023-12-13 NOTE — PROGRESS NOTES
MDT-DUAL CHAMBER PPM/ ACTIVE SYSTEM IS MRI CONDITIONAL   NB CARELINK TRANSMISSION:  BATTERY VOLTAGE NEARING ION (8 MONTHS). WILL SCHEDULE MONTHLY BATTERY CHECKS. AP 21.5%  37.4%. ALL AVAILABLE LEAD PARAMETERS WITHIN NORMAL LIMITS. 2,276 AT/AF EPISODES SINCE 11/13/23 WITH AT/AF BURDEN OF 92.1%, LIKELY 100% DUE TO INT. AF UNDERSENSING SEEN ON AVAILABLE EGMS. PATIENT TAKES EIQUIS AND DIGOXIN. NORMAL DEVICE FUNCTION.   RG

## 2023-12-19 NOTE — PROGRESS NOTES
PATIENT:  Sindhu Fajardo  1940    ASSESSMENT:  1. Onychomycosis        2. Corns and callus        3. Type 2 diabetes mellitus with diabetic neuropathy, without long-term current use of insulin ([facility])  Nail removal            Orders Placed This Encounter   Procedures   • Nail removal          PLAN:  Disease prevention and related risk factors of diabetes were identified and discussed.    The patient was educated in proper foot wear for diabetics.    Educated in daily foot assessment and routine diabetic foot care.    Discussed the importance of controlling BS through diet and exercise.    The patient will follow up in 9-12 weeks for further diabetic foot exam and care.      Procedures: 66036, 23929, 58242  All mycotic toenails were reduced and debrided in length, width, and girth using a nail nipper and electric dremel.    All hyperkeratotic skin lesion(s) if present were sharply pared with a #10 scalpel with no evidence of ulceration/abscess.    Patient tolerated procedure(s) well without complications.    Nail removal    Date/Time: 10/13/2023 9:30 AM    Performed by: Bassam Hale DPM  Authorized by: Bassam Hale DPM    Patient location:  Other (comment)Universal Protocol:  Consent: Verbal consent obtained.  Risks and benefits: risks, benefits and alternatives were discussed  Consent given by: patient  Patient understanding: patient states understanding of the procedure being performed  Patient identity confirmed: verbally with patient    Nails trimmed:     Number of nails trimmed:  8  Post-procedure details:     Patient tolerance of procedure:  Tolerated well, no immediate complications       HPI:  Sindhu Fajardo is a 83 y.o.year old female seen for diabetic foot exam.  The patient has class findings with diabetes.     BS is under control.  The patient complained of thick toenails painful lesion right great toe.    The patient denied any acute pedal disorder, redness, acute swelling, or recent  "injury.      The following portions of the patient's history were reviewed and updated as appropriate: allergies, current medications, past family history, past medical history, past social history, past surgical history and problem list.    REVIEW OF SYSTEMS:  GENERAL: No fever or chills  HEART: No chest pain, or palpitation  RESPIRATORY:  No acute SOB or cough  GI: No Nausea, vomit or diarrhea  NEUROLOGIC: No syncope or acute weakness    PHYSICAL EXAM:    /80   Ht 5' 3\" (1.6 m)   Wt 69.4 kg (153 lb)   BMI 27.10 kg/m²     VASCULAR EXAM  Posterior tibial artery +1 bilateral  Dorsalis pedis artery absent bilateral  The patient has class findings with skin atrophy, lack of digital hair, and nail dystrophy.    There is trace lower extremity edema bilaterally.      NEUROLOGIC EXAM  Sensation is intact to light touch. Yes   Sensation is absent to 10gm monofilament.  Vibratory sensation diminished.     Tingling numbness paresthesias noted bilateral.         DERMATOLOGIC EXAM:   Texture, Tone and Turgor are diminished bilateral.  The patient has dystrophic/hypertrophic toenails with yellow/white discoloration, onycholysis, and subungal debris.   Fungal odor noted.  Brittle nature noted.  Right foot nails dystrophic x1 with 0.5 cm ave thickness (hallux)  Left foot nails dystrophic x1 with 0.5 cm ave thickness (hallux)  Patient has hyperkeratotic lesions noted:  Right foot located at plantar medial hallux IPJ..  Left foot located at none  No notable suspicious skin lesions.      MUSCULOSKELETAL EXAM:   No acute joint pain, edema, or redness.  No acute musculoskeletal problem.    Patient has mild digital contractures deformities. Patient has no ambulation limitations.      Patient wears DM shoes? Yes    Risk Category/Class Findings:  Q8 (B2 ABC, B3)  1 = Loss of protective sensation    A1)  Has the patient had a previous amputation of the foot or integral skeletal portion thereof? No  B1)  Does the patient have " absent posterior tibial pulse? No  B3)  Does the patient have absent dorsalis pedis? Yes  B2)  Does the patient have three of the following? Yes           1.  Hair growth (increased or decreased), 2.  Nail changes (thickening) and 3.  Pigmentary changes (discoloring)  C)  Does the patient have two of the following and one above? No

## 2023-12-27 DIAGNOSIS — M81.0 AGE-RELATED OSTEOPOROSIS WITHOUT CURRENT PATHOLOGICAL FRACTURE: ICD-10-CM

## 2023-12-27 RX ORDER — ALENDRONATE SODIUM 70 MG/1
70 TABLET ORAL
Qty: 12 TABLET | Refills: 3 | Status: SHIPPED | OUTPATIENT
Start: 2023-12-27

## 2023-12-28 ENCOUNTER — APPOINTMENT (OUTPATIENT)
Dept: LAB | Facility: CLINIC | Age: 83
End: 2023-12-28

## 2023-12-28 DIAGNOSIS — E06.3 HYPOTHYROIDISM DUE TO HASHIMOTO'S THYROIDITIS: ICD-10-CM

## 2023-12-28 DIAGNOSIS — E03.8 HYPOTHYROIDISM DUE TO HASHIMOTO'S THYROIDITIS: ICD-10-CM

## 2023-12-28 DIAGNOSIS — E11.9 TYPE 2 DIABETES MELLITUS WITHOUT COMPLICATION, WITH LONG-TERM CURRENT USE OF INSULIN (HCC): ICD-10-CM

## 2023-12-28 DIAGNOSIS — E78.5 HYPERLIPIDEMIA, UNSPECIFIED HYPERLIPIDEMIA TYPE: ICD-10-CM

## 2023-12-28 DIAGNOSIS — E55.9 VITAMIN D DEFICIENCY: ICD-10-CM

## 2023-12-28 DIAGNOSIS — Z79.4 TYPE 2 DIABETES MELLITUS WITHOUT COMPLICATION, WITH LONG-TERM CURRENT USE OF INSULIN (HCC): ICD-10-CM

## 2023-12-28 DIAGNOSIS — E11.40 TYPE 2 DIABETES MELLITUS WITH DIABETIC NEUROPATHY, WITHOUT LONG-TERM CURRENT USE OF INSULIN (HCC): ICD-10-CM

## 2023-12-31 DIAGNOSIS — E11.9 TYPE 2 DIABETES MELLITUS WITHOUT COMPLICATION, WITH LONG-TERM CURRENT USE OF INSULIN (HCC): ICD-10-CM

## 2023-12-31 DIAGNOSIS — Z79.4 TYPE 2 DIABETES MELLITUS WITHOUT COMPLICATION, WITH LONG-TERM CURRENT USE OF INSULIN (HCC): ICD-10-CM

## 2024-01-02 DIAGNOSIS — E11.40 TYPE 2 DIABETES MELLITUS WITH DIABETIC NEUROPATHY, WITHOUT LONG-TERM CURRENT USE OF INSULIN (HCC): ICD-10-CM

## 2024-01-02 RX ORDER — BLOOD-GLUCOSE METER
KIT MISCELLANEOUS
Qty: 1 KIT | Refills: 0 | Status: SHIPPED | OUTPATIENT
Start: 2024-01-02

## 2024-01-02 RX ORDER — BLOOD-GLUCOSE METER
KIT MISCELLANEOUS
Qty: 100 STRIP | Refills: 3 | Status: SHIPPED | OUTPATIENT
Start: 2024-01-02 | End: 2024-01-03 | Stop reason: SDUPTHER

## 2024-01-03 DIAGNOSIS — E11.40 TYPE 2 DIABETES MELLITUS WITH DIABETIC NEUROPATHY, WITHOUT LONG-TERM CURRENT USE OF INSULIN (HCC): ICD-10-CM

## 2024-01-03 RX ORDER — BLOOD-GLUCOSE METER
KIT MISCELLANEOUS
Qty: 100 STRIP | Refills: 3 | Status: SHIPPED | OUTPATIENT
Start: 2024-01-03

## 2024-01-17 DIAGNOSIS — E11.40 TYPE 2 DIABETES MELLITUS WITH DIABETIC NEUROPATHY, WITHOUT LONG-TERM CURRENT USE OF INSULIN (HCC): ICD-10-CM

## 2024-01-17 RX ORDER — DULAGLUTIDE 0.75 MG/.5ML
0.75 INJECTION, SOLUTION SUBCUTANEOUS
Qty: 6 ML | Refills: 0 | Status: SHIPPED | OUTPATIENT
Start: 2024-01-17

## 2024-02-13 DIAGNOSIS — Z79.4 TYPE 2 DIABETES MELLITUS WITHOUT COMPLICATION, WITH LONG-TERM CURRENT USE OF INSULIN (HCC): ICD-10-CM

## 2024-02-13 DIAGNOSIS — I10 ESSENTIAL HYPERTENSION: ICD-10-CM

## 2024-02-13 DIAGNOSIS — E11.9 TYPE 2 DIABETES MELLITUS WITHOUT COMPLICATION, WITH LONG-TERM CURRENT USE OF INSULIN (HCC): ICD-10-CM

## 2024-02-13 RX ORDER — ATENOLOL 25 MG/1
25 TABLET ORAL
Qty: 90 TABLET | Refills: 3 | Status: SHIPPED | OUTPATIENT
Start: 2024-02-13

## 2024-02-13 RX ORDER — EMPAGLIFLOZIN 25 MG/1
TABLET, FILM COATED ORAL
Qty: 90 TABLET | Refills: 3 | Status: SHIPPED | OUTPATIENT
Start: 2024-02-13

## 2024-02-14 ENCOUNTER — REMOTE DEVICE CLINIC VISIT (OUTPATIENT)
Dept: CARDIOLOGY CLINIC | Facility: CLINIC | Age: 84
End: 2024-02-14

## 2024-02-14 DIAGNOSIS — Z95.0 CARDIAC PACEMAKER IN SITU: Primary | ICD-10-CM

## 2024-02-14 PROCEDURE — RECHECK: Performed by: INTERNAL MEDICINE

## 2024-02-14 NOTE — PROGRESS NOTES
Results for orders placed or performed in visit on 02/14/24   Cardiac EP device report    Narrative    MDT-DUAL CHAMBER PPM/ ACTIVE SYSTEM IS MRI CONDITIONAL  CARELINK TRANSMISSION: BATTERY VOLTAGE NEARING ION-7 MONS. WILL SCHEDULE MONTHLY BATTERY CHECKS. AP 20%  55%. ALL AVAILABLE LEAD PARAMETERS WITHIN NORMAL LIMITS. 1 VHR NOTED; EGRAM SHOWING RVR. 4,159 AMS NOTED; 95% BURDEN; EGRAMS SHOWING AFIB. PT ON ATENOLOL & ELIQUIS. EF 55% (ECHO 2023). NORMAL DEVICE FUNCTION. NC

## 2024-03-13 ENCOUNTER — TELEPHONE (OUTPATIENT)
Age: 84
End: 2024-03-13

## 2024-03-13 NOTE — TELEPHONE ENCOUNTER
Patients GI provider:       Number to return call: [phone]    Reason for call: Pt called stating she received a letter that she was due for her colonoscopy. Pt stated she was told she did not need a colonoscopy at her age unless she was having problems. Pt has relocated to [place].     Scheduled procedure/appointment date if applicable: N/A

## 2024-06-24 NOTE — PROGRESS NOTES
FAMILY PRACTICE PRE-OPERATIVE EVALUATION  [facility]YS[facility]IAN [facility] - [facility]    NAME: Sindhu PENALOZA Genoe  AGE: [de-identified] y o  SEX: female  : 1940     DATE: 2022        Family Practice Pre-Operative Evaluation      Chief Complaint: Pre-operative Evaluation     Surgery: R cataract extraction and intraocular implant  Anticipated Date of Surgery: 22  Referring Provider: Dr Magdaleno Sylvester     History of Present Illness:     Planned anesthesia is regional and IV sedation  Patient has a bleeding risk of: no recent abnormal bleeding  Current anti-platelet/anti-coagulation medications that the patient is prescribed includes: Apixaban (Eliquis)        Assessment of Chronic Conditions:       Anxiety and fatigue  On duloxetine 60 mg     Type 2 diabetes  A1c is improving 7 2   On metformin  She is also on Jardiance, and Byetta     Atrial fibrillation/sick sinus syndrome/cardiomegaly/coronary artery disease  Is on Eliquis for anticoagulation and atenolol for rate control  She also has a pacer in and has regular rhythm today     Osteopenia  Is on calcium and vitamin-D     Adenocarcinoma of the colon   Colonoscopy showed a small polyp      Emphysema by CT scan/restrictive heart disease  Ventolin as needed      Hashimoto's thyroiditis   Also following with endocrine for suppression of her thyroid with medication   levothyroxine 125 mcg daily      Vitamin D deficiency   Continue vitamin D 2000 units daily          History of large paraesophageal hernia   Had surgery and no longer has Pacheco's or her reflux and no longer on any PPIs        Hyperlipidemia   Simvastatin and CoQ10 and diet     Thyroid goiter   Followed by endocrine who orders ultrasounds              Assessment of Cardiac Risk:  · Denies unstable or severe angina or MI in the last 6 weeks or history of stent placement in the last year   · Denies decompensated heart failure (e g  New onset heart failure, NYHA functional class IV heart failure, or worsening existing heart failure)  · Denies significant arrhythmias such as high grade AV block, symptomatic ventricular arrhythmia, newly recognized ventricular tachycardia, supraventricular tachycardia with resting heart rate >100, or symptomatic bradycardia  · Denies severe heart valve disease including aortic stenosis or symptomatic mitral stenosis     Exercise Capacity/shortness of breath symptoms/chest pain symptoms:   (this is only relevant for patients NOT having lower extremity joint replacement, therefore cannot walk the distance)  · Able to walk 4 blocks without symptoms?: no idea  · Able to walk 2 flights without symptoms?: Yes    Prior Anesthesia Reactions: No     Personal history of venous thromboembolic disease?  No       Review of Systems:       Constitutional  No fever chills no fatigue no weight loss no weight gain    Mental status  No anxiety or depression and no sleep disturbances no changes in personality or emotional problems no suicidal or homicidal ideations    Eyes  No eye pain no red eyes no visual disturbances no discharge no eye itch    ENT  No earache no hearing loss nasal discharge no sore throat no hoarseness no postnasal drip     Cardio  No chest pain no palpitations no leg edema no claudication no dyspnea on exertion no nocturnal dyspnea    Respiratory  No shortness of breath or wheeze no cough no orthopnea no dyspnea on exertion no hemoptysis no sputum production    GI  No abdominal pain no nausea no vomiting no diarrhea or constipation no bloody stools no change in bowel habits no change in weight      no dysuria hematuria no pyuria no incontinence no pelvic pain    Musculoskeletal  No myalgias arthralgias no joint swelling or stiffness no limb pain or swelling    Skin  No rashes or lesions no itchiness and no wounds    Neuro  No headache dizziness lightheadedness syncope numbness paresthesias or confusion    Heme  No swollen glands no easy bruising            Current Problem List:           Allergies:      Allergies   Allergen Reactions    Fluconazole Dizziness       Current Medications:       Current Outpatient Medications:     albuterol (ProAir HFA) 90 mcg/act inhaler, Inhale 2 puffs every 6 (six) hours as needed for wheezing or shortness of breath, Disp: 8 5 g, Rfl: 0    Apple Cider Vinegar 500 MG TABS, Take by mouth daily, Disp: , Rfl:     ascorbic acid (VITAMIN C) 500 mg tablet, Take 500 mg by mouth daily, Disp: , Rfl:     atenolol (TENORMIN) 50 mg tablet, TAKE 1 TABLET AT BEDTIME, Disp: 90 tablet, Rfl: 3    Blood Glucose Monitoring Suppl (OneTouch Verio) w/Device KIT, Check blood sugars once a day, Disp: 1 kit, Rfl: 0    Byetta 10 MCG Pen 10 MCG/0 04ML SOPN, INJECT 0 04 ML UNDER THE SKIN TWICE A DAY, Disp: 7 2 mL, Rfl: 3    Cholecalciferol (VITAMIN D-3) 1000 units CAPS, Take 1 capsule by mouth daily, Disp: , Rfl:     clobetasol (TEMOVATE) 0 05 % cream, , Disp: , Rfl:     Coenzyme Q10 (COQ10) 200 MG CAPS, Take 200 mg by mouth daily  , Disp: , Rfl:     Continuous Blood Gluc  (FreeStyle Julian 14 Day [place]) SVETLANA, Use 1 Device 4 (four) times a day (before meals and at bedtime), Disp: 1 each, Rfl: 0    Continuous Blood Gluc Sensor (FreeStyle Julian 14 Day Sensor) MISC, Use 1 application every 14 (fourteen) days, Disp: 2 each, Rfl: 6    cycloSPORINE (Restasis) 0 05 % ophthalmic emulsion, , Disp: , Rfl:     DULoxetine (CYMBALTA) 60 mg delayed release capsule, TAKE 1 CAPSULE DAILY, Disp: 90 capsule, Rfl: 3    Eliquis 5 MG, TAKE 1 TABLET TWICE A DAY, Disp: 180 tablet, Rfl: 3    estradiol (ESTRACE VAGINAL) 0 1 mg/g vaginal cream, Insert 0 5 g into the vagina 2 (two) times a week Patient is NOT to be using this daily, Disp: 42 5 g, Rfl: 1    Ferrous Sulfate 27 MG TABS, Take 1 tablet by mouth daily, Disp: , Rfl:    FREESTYLE LITE test strip, TEST THREE TIMES A DAY, Disp: 300 strip, Rfl: 3    Glucosamine-Chondroitin (MOVE FREE PO), Take by mouth daily, Disp: , Rfl:     glucose blood (OneTouch Verio) test strip, Check blood sugars once a day, Disp: 100 strip, Rfl: 2    hydrocortisone 2 5 % cream, Apply topically 2 (two) times a day, Disp: 60 g, Rfl: 2    Jardiance 25 MG TABS, TAKE 1 TABLET EVERY MORNING, Disp: 90 tablet, Rfl: 3    Lancets (onetouch ultrasoft) lancets, Check blood sugars once a day, Disp: 100 each, Rfl: 2    levothyroxine 125 mcg tablet, TAKE 1 TABLET SIX DAYS A WEEK, NOTHING ON SUNDAY, Disp: 90 tablet, Rfl: 3    MEGARED OMEGA-3 KRILL OIL PO, Take 1 capsule by mouth daily , Disp: , Rfl:     metFORMIN (GLUCOPHAGE-XR) 500 mg 24 hr tablet, Take 2 tablets (1,000 mg total) by mouth 2 (two) times a day with meals, Disp: 120 tablet, Rfl: 0    metFORMIN (GLUCOPHAGE-XR) 500 mg 24 hr tablet, Take 2 tablets (1,000 mg total) by mouth 2 (two) times a day with meals, Disp: 360 tablet, Rfl: 3    simvastatin (ZOCOR) 20 mg tablet, Take 2 tablets (40 mg total) by mouth daily at bedtime, Disp: 180 tablet, Rfl: 3    Triamcinolone Acetonide (Nasacort Allergy 24HR) 55 MCG/ACT AERO, 2 Act (110 mcg total) into each nostril every morning, Disp: 16 5 mL, Rfl: 3    ketoconazole (NIZORAL) 2 % cream, Apply topically 2 (two) times a day for 14 days, Disp: 60 g, Rfl: 1    Past Medical History:       Past Medical History:   Diagnosis Date    [facility]     Abnormal ECG     last assessed: 7/14/2015    Acid reflux     resolved    Anxiety     Pacheco esophagus     Benign neoplasm of sigmoid colon 09/13/2011    next colon 2012    Bronchitis, asthmatic     last assessed: 3/12/2012    [facility] cancer ([facility] )     2012- had colon resection    Colon polyp     COPD (chronic obstructive pulmonary disease) (HCC)     questionable    Depression     Diabetes mellitus (HCC)     on byetta    Dizziness     occ    Esophageal stricture last assessed: 9/30/2014    High cholesterol     Hypertension     Hypothyroidism     Iron (Fe) deficiency anemia 12/22/2011    iron pill continue    Irritable bowel syndrome     OA (osteoarthritis)     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    Pacemaker     hx SSS    Rash     labia area- uses cream prn    Restrictive lung disease     Seasonal allergies     Skin scarring/fibrosis     fibrotic scar; last assessed: 3/22/2013    MONA (stress urinary incontinence, female)     Urinary frequency     last assessed: 9/10/2012    Wears glasses     reading        Past Surgical History:   Procedure Laterality Date    BREAST EXCISIONAL BIOPSY Right 1990    benign, best guess on year   [address]      does not know type  Dr Terrial Opitz is cariologist    [address]      resection  removed 25 In     COLONOSCOPY  06/01/2012 June 2018: Adenomatous polyp, colorectal anastomosis, internal hemorrhoids  06/01/2012:  proctosigmoidectomy    ESOPHAGOGASTRODUODENOSCOPY N/A 1/9/2016    Procedure: ESOPHAGOGASTRODUODENOSCOPY (EGD);   Surgeon: Winston Casey MD;  [facility];  Service:     HYSTERECTOMY  1978    SC ARTHROPLASTY I-P JT Right 8/16/2018    Procedure: ARTHROPLASTY PIP/FINGER - RIGHT RING;  Surgeon: Tanya Sifuentes MD;  [facility];  Service: Orthopedics    SC CYSTOURETHROSCOPY N/A 5/14/2018    Procedure: Memory Sports;  Surgeon: Luis Warren MD;  [facility];  Service: UroGynecology           SC LAP, REPAIR PARAESOPHAGEAL HERNIA, INCL FUNDOPLASTY W/ MESH N/A 1/27/2016    Procedure: LAPAROSCOPIC PARAESOPHAGEAL HERNIA REPAIR with mesh; toupet  FUNDOPLICATION ;  Surgeon: Rogers Bustamante MD;  [facility];  Service: Thoracic    SC REPAIR INTERCARP/CARP-METACARP JT Right 1/10/2019    Procedure: Julieanne River;  Surgeon: Tanya Sifuentes MD;  [facility];  Service: Orthopedics    SC SLING OPER STRES INCONTINENCE N/A 2018    Procedure: INSERTION PUBOVAGINAL SLING (FEMALE); Surgeon: Wyatt Varner MD;  [facility];  Service: UroGynecology           TOTAL ABDOMINAL HYSTERECTOMY      UPPER GASTROINTESTINAL ENDOSCOPY      2018:  Irregular Z-line positive for intestinal metaplasia/ Pacheco's, no dysplasia  Gastritis H pylori negative      WRIST TENDON TRANSFER Right 1/10/2019    Procedure: TRANSFER TENDON FLEXOR CARPI RADIALIS FROM FOREARM TO HAND;  Surgeon: Alice Gordon MD;  Location:  MAIN OR;  Service: Orthopedics        Family History   Problem Relation Age of Onset    Heart disease Mother     Diabetes Mother     Asthma Father     Stomach cancer Father         gastrkic cancer    Cancer Paternal Grandmother     Cancer Paternal Grandfather     No Known Problems Brother     Breast cancer Sister 76    Breast cancer Sister 77    Pancreatic cancer Sister 72    No Known Problems Maternal Aunt     No Known Problems Maternal Aunt     No Known Problems Maternal Aunt     No Known Problems Paternal Aunt     No Known Problems Daughter     No Known Problems Maternal Grandmother     No Known Problems Maternal Grandfather     Prostate cancer Paternal Uncle         age unknown    Substance Abuse Neg Hx     Mental illness Neg Hx     Alcohol abuse Neg Hx     Colon cancer Neg Hx     Colon polyps Neg Hx         Social History     Socioeconomic History    Marital status: /Civil Union     Spouse name: Not on file    Number of children: Not on file    Years of education: Not on file    Highest education level: Not on file   Occupational History    Not on file   Tobacco Use    Smoking status: Former Smoker     Types: Cigarettes     Quit date:      Years since quittin 4    Smokeless tobacco: Former User     Quit date:    Vaping Use    Vaping Use: Never used   Substance and Sexual Activity    Alcohol use: No    Drug use: No    Sexual activity: Not Currently   Other Topics Concern    Not on file   Social History Narrative    Always uses seat belt    Copy of advanced directive obtained    Drinks coffee-drinks 5 cups a day    Has smoke detectors    Uses safety equipment-seatbelts     Social Determinants of Health     Financial Resource Strain: Not on file   Food Insecurity: Not on file   Transportation Needs: Not on file   Physical Activity: Not on file   Stress: Not on file   Social Connections: Not on file   Intimate Partner Violence: Not on file   Housing Stability: Not on file        Physical Exam:     /78   Pulse 80   Resp 17   Ht 5' 3" (1 6 m)   Wt 69 7 kg (153 lb 11 2 oz)   SpO2 98%   BMI 27 23 kg/m²     Constitutional  Appears healthy, Looks well, Appearance consistent with age    Mental Status  Alert, Oriented, Cooperative, Memory function normal , clean, and reasonable    Neck  No neck mass, No thyromegaly, Good carotid upstrokes bilaterally, trachea midline positive click    Respiratory  Breath sounds normal, No rales, No rhonchi, No wheezing, normal palpation    Cardiac   Regular rhythm without ectopy or murmur no S3-S4, no heave lift or thrill to palpation    Vascular  No leg edema, No pedal edema    Muscular skeletal  No clubbing cyanosis , muscle tone normal    Skin  No appreciable rashes or abnormal appearing lesions        Data:     Pre-operative work-up    Laboratory Results:none done     EKG: none done          Assessment & Recommendations:     1  Pre-op examination     2  Gastro-esophageal reflux disease without esophagitis     3  MRI safe cardiac pacemaker in situ     4  Anticoagulated by anticoagulation treatment     5  Chronic obstructive pulmonary disease, unspecified COPD type ([facility] )     6  Type 2 diabetes mellitus with diabetic neuropathy, without long-term current use of insulin ([facility] )     7  Essential hypertension     8  Coronary artery disease involving native coronary artery of native heart without angina pectoris     9   Adenocarcinoma of colon ([facility] )     10  Cardiomegaly     11  Diffuse nontoxic goiter     12  Mixed hyperlipidemia     13  Hypothyroidism due to Hashimoto's thyroiditis     14  Paroxysmal atrial fibrillation (HCC)     15  Restrictive lung disease     16  Sick sinus syndrome ([facility] )     17  Stress incontinence in female             Pre-Op Evaluation Plan  1  Further preoperative workup as follows:   - None; no further preoperative work-up is required    2  Medication Management/Recommendations: Onl take Eliquis with a small sip of water day of surgery  Take diabetic medicines when sure home in able to eat    3  Patient requires further consultation with: None    Clearance  Patient is CLEARED for surgery without any additional cardiac testing       Eliseo Board, DO  [address] [place] [address] [address] [facility] [address]  Phone#  [phone]  Fax#  [phone]

## 2025-02-03 NOTE — PATIENT INSTRUCTIONS
Recheck as scheduled for her regular appointment  You are cleared for surgery    Please call and see if you need the eyedrops which I am sure you do prior to surgery    Only take Eliquis the morning of surgery with a small sip of water  Take the rest once you get home you could knee spouse

## (undated) DEVICE — GAUZE SPONGES,16 PLY: Brand: CURITY

## (undated) DEVICE — GLOVE SRG LF STRL BGL SKNSNS 8 PF

## (undated) DEVICE — ABDOMINAL PAD: Brand: DERMACEA

## (undated) DEVICE — SPLINT 4 X 15 IN FAST SET PLASTER

## (undated) DEVICE — STERILE BETHLEHEM PLASTIC HAND: Brand: CARDINAL HEALTH

## (undated) DEVICE — SUT PROLENE 4-0 PC-3 18 IN 8634G

## (undated) DEVICE — GLOVE INDICATOR PI UNDERGLOVE SZ 8 BLUE

## (undated) DEVICE — GLOVE INDICATOR PI UNDERGLOVE SZ 8.5 BLUE

## (undated) DEVICE — SUT VICRYL 3-0 SH 27 IN J416H

## (undated) DEVICE — SUT SILK 1 60 IN SA7H

## (undated) DEVICE — GLOVE SRG BIOGEL 7.5

## (undated) DEVICE — INTENDED FOR TISSUE SEPARATION, AND OTHER PROCEDURES THAT REQUIRE A SHARP SURGICAL BLADE TO PUNCTURE OR CUT.: Brand: BARD-PARKER SAFETY BLADES SIZE 15, STERILE

## (undated) DEVICE — OCCLUSIVE GAUZE STRIP,3% BISMUTH TRIBROMOPHENATE IN PETROLATUM BLEND: Brand: XEROFORM

## (undated) DEVICE — INTENDED FOR TISSUE SEPARATION, AND OTHER PROCEDURES THAT REQUIRE A SHARP SURGICAL BLADE TO PUNCTURE OR CUT.: Brand: BARD-PARKER SAFETY BLADES SIZE 11, STERILE

## (undated) DEVICE — GLOVE SRG BIOGEL 8

## (undated) DEVICE — CHLORAPREP HI-LITE 26ML ORANGE

## (undated) DEVICE — SCD SEQUENTIAL COMPRESSION COMFORT SLEEVE MEDIUM KNEE LENGTH: Brand: KENDALL SCD

## (undated) DEVICE — 3M™ STERI-STRIP™ REINFORCED ADHESIVE SKIN CLOSURES, R1547, 1/2 IN X 4 IN (12 MM X 100 MM), 6 STRIPS/ENVELOPE: Brand: 3M™ STERI-STRIP™

## (undated) DEVICE — GLOVE SRG BIOGEL 7

## (undated) DEVICE — SURGIFOAM 7 X 12 SPONGE ABS

## (undated) DEVICE — ASTOUND STANDARD SURGICAL GOWN, XXL: Brand: CONVERTORS

## (undated) DEVICE — CUFF TOURNIQUET 18 X 4 IN QUICK CONNECT DISP 1 BLADDER

## (undated) DEVICE — DRAPE C-ARM 48 X 84 IN F/ OEC MINIVIEW 6800

## (undated) DEVICE — GLOVE INDICATOR PI UNDERGLOVE SZ 7 BLUE

## (undated) DEVICE — MINI BLADE ROUND TIP SHARP ON ONE SIDE

## (undated) DEVICE — ALLENTOWN DR  LUCENTE S LAP PK: Brand: CARDINAL HEALTH

## (undated) DEVICE — SUT ETHIBOND 2-0 V-7 30 IN B964H

## (undated) DEVICE — CATH FOLEY 18FR 5ML 2 WAY SILICONE ELASTIMER

## (undated) DEVICE — PREMIUM DRY TRAY LF: Brand: MEDLINE INDUSTRIES, INC.

## (undated) DEVICE — SPONGE PVP SCRUB WING STERILE

## (undated) DEVICE — EXIDINE 4 PCT

## (undated) DEVICE — DRAPE FLUID WARMER (BIRD BATH)

## (undated) DEVICE — NEEDLE HYPO 22G X 1-1/2 IN

## (undated) DEVICE — BLADE SAGITTAL 25.6 X 9.5MM

## (undated) DEVICE — GLOVE INDICATOR PI UNDERGLOVE SZ 7.5 BLUE